# Patient Record
Sex: MALE | Employment: OTHER | URBAN - METROPOLITAN AREA
[De-identification: names, ages, dates, MRNs, and addresses within clinical notes are randomized per-mention and may not be internally consistent; named-entity substitution may affect disease eponyms.]

---

## 2018-02-17 ENCOUNTER — HOSPITAL ENCOUNTER (INPATIENT)
Age: 68
LOS: 6 days | Discharge: NURSING, REHAB OR LONG-TERM CARE | DRG: 248 | End: 2018-02-23
Attending: EMERGENCY MEDICINE | Admitting: FAMILY MEDICINE
Payer: MEDICARE

## 2018-02-17 ENCOUNTER — APPOINTMENT (OUTPATIENT)
Dept: CT IMAGING | Age: 68
DRG: 248 | End: 2018-02-17
Attending: FAMILY MEDICINE
Payer: MEDICARE

## 2018-02-17 ENCOUNTER — APPOINTMENT (OUTPATIENT)
Dept: GENERAL RADIOLOGY | Age: 68
DRG: 248 | End: 2018-02-17
Attending: EMERGENCY MEDICINE
Payer: MEDICARE

## 2018-02-17 DIAGNOSIS — R53.1 WEAKNESS: ICD-10-CM

## 2018-02-17 DIAGNOSIS — R26.9 GAIT ABNORMALITY: ICD-10-CM

## 2018-02-17 DIAGNOSIS — J44.1 COPD EXACERBATION (HCC): Primary | ICD-10-CM

## 2018-02-17 PROBLEM — R06.02 SHORTNESS OF BREATH: Status: ACTIVE | Noted: 2018-02-17

## 2018-02-17 LAB
ALBUMIN SERPL-MCNC: 3.1 G/DL (ref 3.5–5)
ALBUMIN/GLOB SERPL: 0.6 {RATIO} (ref 1.1–2.2)
ALP SERPL-CCNC: 91 U/L (ref 45–117)
ALT SERPL-CCNC: 28 U/L (ref 12–78)
ANION GAP SERPL CALC-SCNC: 11 MMOL/L (ref 5–15)
ARTERIAL PATENCY WRIST A: YES
AST SERPL-CCNC: 64 U/L (ref 15–37)
BASE EXCESS BLD CALC-SCNC: 1 MMOL/L
BASOPHILS # BLD: 0 K/UL (ref 0–0.1)
BASOPHILS NFR BLD: 0 % (ref 0–1)
BDY SITE: ABNORMAL
BILIRUB SERPL-MCNC: 0.9 MG/DL (ref 0.2–1)
BUN SERPL-MCNC: 30 MG/DL (ref 6–20)
BUN/CREAT SERPL: 25 (ref 12–20)
CALCIUM SERPL-MCNC: 9.3 MG/DL (ref 8.5–10.1)
CHLORIDE SERPL-SCNC: 101 MMOL/L (ref 97–108)
CO2 SERPL-SCNC: 28 MMOL/L (ref 21–32)
CREAT SERPL-MCNC: 1.21 MG/DL (ref 0.7–1.3)
D DIMER PPP FEU-MCNC: 1.4 MG/L FEU (ref 0–0.65)
DIFFERENTIAL METHOD BLD: ABNORMAL
EOSINOPHIL # BLD: 0 K/UL (ref 0–0.4)
EOSINOPHIL NFR BLD: 0 % (ref 0–7)
ERYTHROCYTE [DISTWIDTH] IN BLOOD BY AUTOMATED COUNT: 13.8 % (ref 11.5–14.5)
GAS FLOW.O2 O2 DELIVERY SYS: ABNORMAL L/MIN
GAS FLOW.O2 SETTING OXYMISER: 3.5 L/M
GLOBULIN SER CALC-MCNC: 5 G/DL (ref 2–4)
GLUCOSE SERPL-MCNC: 106 MG/DL (ref 65–100)
HCO3 BLD-SCNC: 24.8 MMOL/L (ref 22–26)
HCT VFR BLD AUTO: 47.6 % (ref 36.6–50.3)
HGB BLD-MCNC: 16.2 G/DL (ref 12.1–17)
IMM GRANULOCYTES # BLD: 0.1 K/UL (ref 0–0.04)
IMM GRANULOCYTES NFR BLD AUTO: 1 % (ref 0–0.5)
LYMPHOCYTES # BLD: 1.5 K/UL (ref 0.8–3.5)
LYMPHOCYTES NFR BLD: 9 % (ref 12–49)
MAGNESIUM SERPL-MCNC: 2.1 MG/DL (ref 1.6–2.4)
MCH RBC QN AUTO: 31 PG (ref 26–34)
MCHC RBC AUTO-ENTMCNC: 34 G/DL (ref 30–36.5)
MCV RBC AUTO: 91 FL (ref 80–99)
MONOCYTES # BLD: 1.9 K/UL (ref 0–1)
MONOCYTES NFR BLD: 11 % (ref 5–13)
NEUTS SEG # BLD: 13.4 K/UL (ref 1.8–8)
NEUTS SEG NFR BLD: 79 % (ref 32–75)
NRBC # BLD: 0 K/UL (ref 0–0.01)
NRBC BLD-RTO: 0 PER 100 WBC
PCO2 BLD: 35 MMHG (ref 35–45)
PH BLD: 7.46 [PH] (ref 7.35–7.45)
PLATELET # BLD AUTO: 271 K/UL (ref 150–400)
PMV BLD AUTO: 11 FL (ref 8.9–12.9)
PO2 BLD: 98 MMHG (ref 80–100)
POTASSIUM SERPL-SCNC: 4.2 MMOL/L (ref 3.5–5.1)
PROT SERPL-MCNC: 8.1 G/DL (ref 6.4–8.2)
RBC # BLD AUTO: 5.23 M/UL (ref 4.1–5.7)
SAO2 % BLD: 98 % (ref 92–97)
SODIUM SERPL-SCNC: 140 MMOL/L (ref 136–145)
SPECIMEN TYPE: ABNORMAL
TROPONIN I SERPL-MCNC: 5.35 NG/ML
TROPONIN I SERPL-MCNC: 8.81 NG/ML
WBC # BLD AUTO: 17 K/UL (ref 4.1–11.1)

## 2018-02-17 PROCEDURE — A9270 NON-COVERED ITEM OR SERVICE: HCPCS | Performed by: EMERGENCY MEDICINE

## 2018-02-17 PROCEDURE — 80053 COMPREHEN METABOLIC PANEL: CPT | Performed by: EMERGENCY MEDICINE

## 2018-02-17 PROCEDURE — 74011636637 HC RX REV CODE- 636/637: Performed by: EMERGENCY MEDICINE

## 2018-02-17 PROCEDURE — 94640 AIRWAY INHALATION TREATMENT: CPT

## 2018-02-17 PROCEDURE — 74011000258 HC RX REV CODE- 258: Performed by: EMERGENCY MEDICINE

## 2018-02-17 PROCEDURE — 77030029684 HC NEB SM VOL KT MONA -A

## 2018-02-17 PROCEDURE — 93005 ELECTROCARDIOGRAM TRACING: CPT

## 2018-02-17 PROCEDURE — 99285 EMERGENCY DEPT VISIT HI MDM: CPT

## 2018-02-17 PROCEDURE — 65660000000 HC RM CCU STEPDOWN

## 2018-02-17 PROCEDURE — 74011250636 HC RX REV CODE- 250/636: Performed by: EMERGENCY MEDICINE

## 2018-02-17 PROCEDURE — 85025 COMPLETE CBC W/AUTO DIFF WBC: CPT | Performed by: EMERGENCY MEDICINE

## 2018-02-17 PROCEDURE — 36600 WITHDRAWAL OF ARTERIAL BLOOD: CPT

## 2018-02-17 PROCEDURE — 83735 ASSAY OF MAGNESIUM: CPT | Performed by: EMERGENCY MEDICINE

## 2018-02-17 PROCEDURE — 36415 COLL VENOUS BLD VENIPUNCTURE: CPT | Performed by: EMERGENCY MEDICINE

## 2018-02-17 PROCEDURE — 74011636320 HC RX REV CODE- 636/320: Performed by: EMERGENCY MEDICINE

## 2018-02-17 PROCEDURE — 74011000250 HC RX REV CODE- 250: Performed by: EMERGENCY MEDICINE

## 2018-02-17 PROCEDURE — 84484 ASSAY OF TROPONIN QUANT: CPT | Performed by: EMERGENCY MEDICINE

## 2018-02-17 PROCEDURE — 85379 FIBRIN DEGRADATION QUANT: CPT | Performed by: EMERGENCY MEDICINE

## 2018-02-17 PROCEDURE — 74011000250 HC RX REV CODE- 250: Performed by: FAMILY MEDICINE

## 2018-02-17 PROCEDURE — 74011250637 HC RX REV CODE- 250/637: Performed by: FAMILY MEDICINE

## 2018-02-17 PROCEDURE — 74011000258 HC RX REV CODE- 258: Performed by: FAMILY MEDICINE

## 2018-02-17 PROCEDURE — 74011250636 HC RX REV CODE- 250/636: Performed by: FAMILY MEDICINE

## 2018-02-17 PROCEDURE — 82803 BLOOD GASES ANY COMBINATION: CPT

## 2018-02-17 PROCEDURE — 74011636637 HC RX REV CODE- 636/637: Performed by: FAMILY MEDICINE

## 2018-02-17 PROCEDURE — 71275 CT ANGIOGRAPHY CHEST: CPT

## 2018-02-17 PROCEDURE — 71045 X-RAY EXAM CHEST 1 VIEW: CPT

## 2018-02-17 RX ORDER — SIMVASTATIN 40 MG/1
40 TABLET, FILM COATED ORAL
Status: DISCONTINUED | OUTPATIENT
Start: 2018-02-17 | End: 2018-02-23 | Stop reason: HOSPADM

## 2018-02-17 RX ORDER — ALBUTEROL SULFATE 0.83 MG/ML
0.63 SOLUTION RESPIRATORY (INHALATION)
Status: DISCONTINUED | OUTPATIENT
Start: 2018-02-17 | End: 2018-02-18

## 2018-02-17 RX ORDER — PREDNISONE 20 MG/1
40 TABLET ORAL
Status: COMPLETED | OUTPATIENT
Start: 2018-02-17 | End: 2018-02-21

## 2018-02-17 RX ORDER — ENOXAPARIN SODIUM 100 MG/ML
60 INJECTION SUBCUTANEOUS EVERY 12 HOURS
Status: DISCONTINUED | OUTPATIENT
Start: 2018-02-17 | End: 2018-02-19

## 2018-02-17 RX ORDER — LEVOFLOXACIN 5 MG/ML
500 INJECTION, SOLUTION INTRAVENOUS EVERY 24 HOURS
Status: DISCONTINUED | OUTPATIENT
Start: 2018-02-17 | End: 2018-02-20 | Stop reason: CLARIF

## 2018-02-17 RX ORDER — SODIUM CHLORIDE 0.9 % (FLUSH) 0.9 %
10 SYRINGE (ML) INJECTION
Status: COMPLETED | OUTPATIENT
Start: 2018-02-17 | End: 2018-02-17

## 2018-02-17 RX ORDER — IPRATROPIUM BROMIDE AND ALBUTEROL SULFATE 2.5; .5 MG/3ML; MG/3ML
3 SOLUTION RESPIRATORY (INHALATION)
Status: DISCONTINUED | OUTPATIENT
Start: 2018-02-17 | End: 2018-02-21

## 2018-02-17 RX ORDER — PREDNISONE 20 MG/1
40 TABLET ORAL
Status: COMPLETED | OUTPATIENT
Start: 2018-02-17 | End: 2018-02-17

## 2018-02-17 RX ORDER — OXYCODONE HYDROCHLORIDE 5 MG/1
40 TABLET ORAL
Status: DISCONTINUED | OUTPATIENT
Start: 2018-02-17 | End: 2018-02-23 | Stop reason: HOSPADM

## 2018-02-17 RX ORDER — SODIUM CHLORIDE 0.9 % (FLUSH) 0.9 %
5-10 SYRINGE (ML) INJECTION AS NEEDED
Status: DISCONTINUED | OUTPATIENT
Start: 2018-02-17 | End: 2018-02-23 | Stop reason: HOSPADM

## 2018-02-17 RX ORDER — ALBUTEROL SULFATE 0.83 MG/ML
2.5 SOLUTION RESPIRATORY (INHALATION)
Status: COMPLETED | OUTPATIENT
Start: 2018-02-17 | End: 2018-02-17

## 2018-02-17 RX ORDER — OXYCODONE HYDROCHLORIDE 5 MG/1
40 TABLET ORAL EVERY 8 HOURS
Status: DISCONTINUED | OUTPATIENT
Start: 2018-02-17 | End: 2018-02-20

## 2018-02-17 RX ORDER — SODIUM CHLORIDE 9 MG/ML
100 INJECTION, SOLUTION INTRAVENOUS CONTINUOUS
Status: DISCONTINUED | OUTPATIENT
Start: 2018-02-17 | End: 2018-02-17

## 2018-02-17 RX ORDER — DEXTROSE MONOHYDRATE AND SODIUM CHLORIDE 5; .9 G/100ML; G/100ML
50 INJECTION, SOLUTION INTRAVENOUS CONTINUOUS
Status: DISCONTINUED | OUTPATIENT
Start: 2018-02-17 | End: 2018-02-20

## 2018-02-17 RX ORDER — SODIUM CHLORIDE 0.9 % (FLUSH) 0.9 %
5-10 SYRINGE (ML) INJECTION EVERY 8 HOURS
Status: DISCONTINUED | OUTPATIENT
Start: 2018-02-17 | End: 2018-02-23 | Stop reason: HOSPADM

## 2018-02-17 RX ORDER — BUDESONIDE 0.5 MG/2ML
500 INHALANT ORAL 2 TIMES DAILY
Status: DISCONTINUED | OUTPATIENT
Start: 2018-02-17 | End: 2018-02-20 | Stop reason: SDUPTHER

## 2018-02-17 RX ORDER — ENOXAPARIN SODIUM 100 MG/ML
40 INJECTION SUBCUTANEOUS EVERY 24 HOURS
Status: DISCONTINUED | OUTPATIENT
Start: 2018-02-17 | End: 2018-02-17

## 2018-02-17 RX ADMIN — SIMVASTATIN 40 MG: 40 TABLET, FILM COATED ORAL at 22:51

## 2018-02-17 RX ADMIN — Medication 10 ML: at 17:39

## 2018-02-17 RX ADMIN — Medication 10 ML: at 22:51

## 2018-02-17 RX ADMIN — BUDESONIDE 500 MCG: 0.5 INHALANT RESPIRATORY (INHALATION) at 20:11

## 2018-02-17 RX ADMIN — ALBUTEROL SULFATE 2.5 MG: 2.5 SOLUTION RESPIRATORY (INHALATION) at 16:48

## 2018-02-17 RX ADMIN — ALBUTEROL SULFATE 2.5 MG: 2.5 SOLUTION RESPIRATORY (INHALATION) at 15:09

## 2018-02-17 RX ADMIN — PREDNISONE 40 MG: 20 TABLET ORAL at 15:09

## 2018-02-17 RX ADMIN — ALBUTEROL SULFATE 0.63 MG: 2.5 SOLUTION RESPIRATORY (INHALATION) at 20:11

## 2018-02-17 RX ADMIN — IOPAMIDOL 75 ML: 755 INJECTION, SOLUTION INTRAVENOUS at 17:39

## 2018-02-17 RX ADMIN — LEVOFLOXACIN 500 MG: 5 INJECTION, SOLUTION INTRAVENOUS at 17:52

## 2018-02-17 RX ADMIN — PREDNISONE 40 MG: 20 TABLET ORAL at 19:43

## 2018-02-17 RX ADMIN — SODIUM CHLORIDE 1000 ML: 900 INJECTION, SOLUTION INTRAVENOUS at 16:35

## 2018-02-17 RX ADMIN — SODIUM CHLORIDE 100 ML: 900 INJECTION, SOLUTION INTRAVENOUS at 17:39

## 2018-02-17 RX ADMIN — DEXTROSE MONOHYDRATE AND SODIUM CHLORIDE 100 ML/HR: 5; .9 INJECTION, SOLUTION INTRAVENOUS at 19:00

## 2018-02-17 RX ADMIN — ENOXAPARIN SODIUM 60 MG: 60 INJECTION SUBCUTANEOUS at 19:42

## 2018-02-17 NOTE — H&P
Bailey Rizzo MD  Please call  and page for questions. Call physician on-call through the  7pm-7am      History & Physical    Primary Care Provider: Vidhi Monk MD  Source of Information: Patient, medical record and his sister at the bedside. History of Presenting Illness:   Sean Dhaliwal is a 78 y.o. male with past medical history of lung cancer, COPD, hyperlipidemia, presented to the ED with shortness of breath. He is on home oxygen and who had been diagnosed with lung cancer and had surgical resection, a right lobectomy in 2009. He said he has worsening weakness, SOB, and cough for the past 3 days. Patient said he can not walk few steps without getting SOB. Patient said he coughs all the time but recently it is getting worse. He also mentioned of producing mucus. Pt was seen at [facility] recently and had a negative workup. The patient denies any fever, chills, chest pain, recent illness, palpitations, or dysuria. He denies nausea and vomiting. He denies abdominal pain. He denies any sick contact recently. Patient lives by himself at home. Review of Systems:  A comprehensive review of systems was negative except for that written in the History of Present Illness. Past Medical History:   Diagnosis Date    Asthma     Cancer ([facility] [address]     Lung Cancer    Chronic obstructive pulmonary disease (HCC)     Chronic pain     High cholesterol     Lung cancer (HCC)       Past Surgical History:   Procedure Laterality Date    HX LOBECTOMY      Right lung    HX OTHER SURGICAL      Partial right lung removal     Prior to Admission medications    Medication Sig Start Date End Date Taking? Authorizing Provider   albuterol (PROVENTIL VENTOLIN) 2.5 mg /3 mL (0.083 %) nebulizer solution 3 mL by Nebulization route every two (2) hours as needed for Wheezing. 10/8/15   Aileen Montejo MD   simvastatin (ZOCOR) 40 mg tablet Take 40 mg by mouth nightly.     Historical Provider   oxyCODONE IR (ROXICODONE) 20 mg immediate release tablet Take 40 mg by mouth three (3) times daily as needed for Pain. Historical Provider   albuterol-ipratropium (DUO-NEB) 2.5 mg-0.5 mg/3 ml nebulizer solution 3 mL by Nebulization route every six (6) hours as needed for Wheezing. 9/16/15   Yady Astudillo MD   budesonide (PULMICORT) 0.5 mg/2 mL nebulizer suspension 2 mL by Nebulization route two (2) times a day. 9/16/15   Yady Astudillo MD   albuterol (ACCUNEB) 0.63 mg/3 mL nebulizer solution 3 mL by Nebulization route every six (6) hours as needed for Wheezing. 8/13/15   Solo Rothman MD   oxyCODONE IR (ROXICODONE) 20 mg immediate release tablet Take 40 mg by mouth every eight (8) hours. Historical Provider     No Known Allergies   No family history on file. SOCIAL HISTORY:  Patient resides:  Independently x   Assisted Living    SNF    With family care       Smoking history:   None    Former x   Chronic      Alcohol history:   None x   Social    Chronic      Ambulates:   Independently x   w/cane    w/walker    w/wc    CODE STATUS:  DNR    Full x   Other      Objective:     Physical Exam:     Visit Vitals    /74    Pulse (!) 118    Resp 21    SpO2 94%    O2 Flow Rate (L/min): 3 l/min O2 Device: Nasal cannula    General:  Alert, cooperative, no distress, appears stated age. Head:  Normocephalic, without obvious abnormality, atraumatic. Eyes:  Conjunctivae/corneas clear. PERRL, EOMs intact. Nose: Nares normal. Septum midline. Mucosa normal. No drainage or sinus tenderness. Neck: Supple, symmetrical, trachea midline, no adenopathy, thyroid: no enlargement/tenderness/nodules, no carotid bruit and no JVD. Lungs:   Clear to auscultation bilaterally. Decrease breath sound on right side. Chest wall:  No tenderness or deformity. Heart:  Regular rate and rhythm, S1, S2 normal, no murmur, click, rub or gallop. Abdomen:   Soft, non-tender.  Bowel sounds normal. No masses,  No organomegaly. Extremities: Extremities normal, atraumatic, no cyanosis or edema. Skin: Skin color, texture, turgor normal. No rashes or lesions   Neurologic: CNII-XII intact. EKG:  Sinus tachycardia. Data Review:     Recent Days:  Recent Labs      02/17/18   1502   WBC  17.0*   HGB  16.2   HCT  47.6   PLT  271     Recent Labs      02/17/18   1502   NA  140   K  4.2   CL  101   CO2  28   GLU  106*   BUN  30*   CREA  1.21   CA  9.3   MG  2.1   ALB  3.1*   SGOT  64*   ALT  28     No results for input(s): PH, PCO2, PO2, HCO3, FIO2 in the last 72 hours.     24 Hour Results:  Recent Results (from the past 24 hour(s))   EKG, 12 LEAD, INITIAL    Collection Time: 02/17/18  2:52 PM   Result Value Ref Range    Ventricular Rate 120 BPM    Atrial Rate 120 BPM    P-R Interval 124 ms    QRS Duration 72 ms    Q-T Interval 330 ms    QTC Calculation (Bezet) 466 ms    Calculated P Axis 84 degrees    Calculated R Axis 50 degrees    Calculated T Axis -112 degrees    Diagnosis       Sinus tachycardia  Biatrial enlargement  Low voltage QRS  ST & T wave abnormality, consider inferolateral ischemia  When compared with ECG of 28-NOV-2016 15:17,  ST elevation now present in Anterior leads  T wave inversion now evident in Inferior leads  T wave inversion more evident in Anterolateral leads     MAGNESIUM    Collection Time: 02/17/18  3:02 PM   Result Value Ref Range    Magnesium 2.1 1.6 - 2.4 mg/dL   METABOLIC PANEL, COMPREHENSIVE    Collection Time: 02/17/18  3:02 PM   Result Value Ref Range    Sodium 140 136 - 145 mmol/L    Potassium 4.2 3.5 - 5.1 mmol/L    Chloride 101 97 - 108 mmol/L    CO2 28 21 - 32 mmol/L    Anion gap 11 5 - 15 mmol/L    Glucose 106 (H) 65 - 100 mg/dL    BUN 30 (H) 6 - 20 MG/DL    Creatinine 1.21 0.70 - 1.30 MG/DL    BUN/Creatinine ratio 25 (H) 12 - 20      GFR est AA >60 >60 ml/min/1.73m2    GFR est non-AA >60 >60 ml/min/1.73m2    Calcium 9.3 8.5 - 10.1 MG/DL    Bilirubin, total 0.9 0.2 - 1.0 MG/DL    ALT (SGPT) 28 12 - 78 U/L    AST (SGOT) 64 (H) 15 - 37 U/L    Alk. phosphatase 91 45 - 117 U/L    Protein, total 8.1 6.4 - 8.2 g/dL    Albumin 3.1 (L) 3.5 - 5.0 g/dL    Globulin 5.0 (H) 2.0 - 4.0 g/dL    A-G Ratio 0.6 (L) 1.1 - 2.2     CBC WITH AUTOMATED DIFF    Collection Time: 02/17/18  3:02 PM   Result Value Ref Range    WBC 17.0 (H) 4.1 - 11.1 K/uL    RBC 5.23 4. 10 - 5.70 M/uL    HGB 16.2 12.1 - 17.0 g/dL    HCT 47.6 36.6 - 50.3 %    MCV 91.0 80.0 - 99.0 FL    MCH 31.0 26.0 - 34.0 PG    MCHC 34.0 30.0 - 36.5 g/dL    RDW 13.8 11.5 - 14.5 %    PLATELET 484 236 - 444 K/uL    MPV 11.0 8.9 - 12.9 FL    NRBC 0.0 0  WBC    ABSOLUTE NRBC 0.00 0.00 - 0.01 K/uL    NEUTROPHILS 79 (H) 32 - 75 %    LYMPHOCYTES 9 (L) 12 - 49 %    MONOCYTES 11 5 - 13 %    EOSINOPHILS 0 0 - 7 %    BASOPHILS 0 0 - 1 %    IMMATURE GRANULOCYTES 1 (H) 0.0 - 0.5 %    ABS. NEUTROPHILS 13.4 (H) 1.8 - 8.0 K/UL    ABS. LYMPHOCYTES 1.5 0.8 - 3.5 K/UL    ABS. MONOCYTES 1.9 (H) 0.0 - 1.0 K/UL    ABS. EOSINOPHILS 0.0 0.0 - 0.4 K/UL    ABS. BASOPHILS 0.0 0.0 - 0.1 K/UL    ABS. IMM. GRANS. 0.1 (H) 0.00 - 0.04 K/UL    DF AUTOMATED     D DIMER    Collection Time: 02/17/18  3:02 PM   Result Value Ref Range    D-dimer 1.40 (H) 0.00 - 0.65 mg/L FEU         Imaging:     Assessment and Plan:       SOB:   Possibly COPD exacerbation as patient was wheezing at the time of presentation. Will do bronchodilator Neb, PO steroid and monitor. Patient has elevated D dimer so CTA of chest was ordered. Patient troponin has been significantly increased. Patient denied chest pain at this time. Will admit to telemetry, will do serial cardiac enzymes. Will get TTE. Cardiology has been consulted. Will do Lovenox until we R/O the ACS. Patient has significant SOB. Leukocytosis: Probably PNA. Will start with Levaquin. HLD: Will continue his statin.         See orders for other plans:   VTE prophylaxis: Lovenox  Code status: Full  Discussed plan of care with Patient/Family and Nurse. Patient sister was at the bedside. Pre-admission lived at home:   Disposition: To telemetry. Discharge planning: pending.            Signed By: Ann-Marie Busch MD     February 17, 2018

## 2018-02-17 NOTE — ED TRIAGE NOTES
Pt alert and oriented, coming from home. Pt reporting shortness of breath since this morning. Pt states that he is normally on 3L nc due to hx of right lower lobectomy post cancer. Pt states that he has increasingly become short of breath and more weak, losing more than 20 pounds recently. Pt found to be with oxygenation at 90% with home oxygen. Pt placed on nonrebreather at 12lpm once placed in room.

## 2018-02-17 NOTE — PROGRESS NOTES
02/17/18 1655   Oxygen Therapy   O2 Sat (%) 95 %   Pulse via Oximetry 118 beats per minute   O2 Device Nasal cannula   O2 Flow Rate (L/min) 3 l/min   Patient taken off of non-rebreather mask

## 2018-02-17 NOTE — IP AVS SNAPSHOT
[address]00 [facility] [address] [place] [address] 
[phone] Patient: Charity Davidson MRN: [id number] :1950 About your hospitalization You were admitted on:  2018 You last received care in the:  [facility] 4 [facility] 2 You were discharged on:  2018 Why you were hospitalized Your primary diagnosis was:  Systolic Chf, Acute (Hcc) Your diagnoses also included:  Copd Exacerbation (Hcc), Shortness Of Breath, Nstemi (Non-St Elevated Myocardial Infarction) (Hcc) Follow-up Information Follow up With Details Comments Contact Info Aisha Burroughs MD In 1 week  [facility] [address] 
[phone] Herminia Camilo MD In 3 weeks  1808 [facility] [address] [facility] [address] 
[phone] [facility] Provider In 1 week Tod Turner MD On 3/13/2018 Hospital follow up PCP appointment from [address] [place] [address] on Tuesday, 3/13/18 @ 10:20 a.m. Patient needs to provide a listing of all medications including over the counter. [place] CERDA [place] [address] [address] [facility] 7 [address] [phone] Discharge Orders None A check marcin indicates which time of day the medication should be taken. My Medications START taking these medications Instructions Each Dose to Equal  
 Morning Noon Evening Bedtime  
 aspirin delayed-release 81 mg tablet Your last dose was: Your next dose is: Take 1 Tab by mouth daily. 81 mg  
    
   
   
   
  
 clopidogrel 75 mg Tab Commonly known as:  PLAVIX Your last dose was: Your next dose is: Take 1 Tab by mouth daily. 75 mg  
    
   
   
   
  
 docusate sodium 100 mg capsule Commonly known as:  Sanaz Parra Your last dose was: Your next dose is: Take 1 Cap by mouth daily for 30 doses.   
 100 mg  
    
   
   
   
  
 fluticasone-salmeterol 500-50 mcg/dose diskus inhaler Commonly known as:  ADVAIR DISKUS Your last dose was: Your next dose is: Take 1 Puff by inhalation every twelve (12) hours. 1 Puff  
    
   
   
   
  
 levoFLOXacin 500 mg tablet Commonly known as:  Mare Wang Your last dose was: Your next dose is: Take 1 Tab by mouth every twenty-four (24) hours for 4 days. 500 mg  
    
   
   
   
  
 tiotropium 18 mcg inhalation capsule Commonly known as:  [address] [place] [address] Dionne Monique Your last dose was: Your next dose is: Take 1 Cap by inhalation daily. 1 Cap CHANGE how you take these medications Instructions Each Dose to Equal  
 Morning Noon Evening Bedtime  
 albuterol-ipratropium 2.5 mg-0.5 mg/3 ml Nebu Commonly known as:  Leland Camarena What changed:   
- when to take this 
- reasons to take this Your last dose was: Your next dose is:    
   
   
 3 mL by Nebulization route every four (4) hours as needed. 3 mL CONTINUE taking these medications Instructions Each Dose to Equal  
 Morning Noon Evening Bedtime  
 simvastatin 40 mg tablet Commonly known as:  ZOCOR Your last dose was: Your next dose is: Take 40 mg by mouth nightly. 40 mg  
    
   
   
   
  
  
STOP taking these medications   
 albuterol 0.63 mg/3 mL nebulizer solution Commonly known as:  ACCUNEB  
   
  
 albuterol 2.5 mg /3 mL (0.083 %) nebulizer solution Commonly known as:  PROVENTIL VENTOLIN  
   
  
 budesonide 0.5 mg/2 mL Nbsp Commonly known as:  PULMICORT  
   
  
 oxyCODONE IR 20 mg immediate release tablet Commonly known as:  Gayatri Loop Where to Get Your Medications Information on where to get these meds will be given to you by the nurse or doctor. ! Ask your nurse or doctor about these medications albuterol-ipratropium 2.5 mg-0.5 mg/3 ml Nebu  
 aspirin delayed-release 81 mg tablet  
 clopidogrel 75 mg Tab  
 docusate sodium 100 mg capsule  
 fluticasone-salmeterol 500-50 mcg/dose diskus inhaler  
 levoFLOXacin 500 mg tablet  
 tiotropium 18 mcg inhalation capsule Discharge Instructions Discharge Summary  
  
PATIENT ID: Marguerite Sheikh MRN: [id number] YOB: 1950 DATE OF ADMISSION: 2/17/2018  2:37 PM   
DATE OF DISCHARGE: 2/23/2018 PRIMARY CARE PROVIDER: [facility] physician ATTENDING PHYSICIAN: Conor Jacinto MD 
DISCHARGING PROVIDER: Yonatan Hodgson NP. To contact this individual call [phone] and ask the  to page. If unavailable ask to be transferred the Adult Hospitalist Department. 
  
CONSULTATIONS: IP CONSULT TO CARDIOLOGY 
IP CONSULT TO PULMONOLOGY 
  
ADMITTING [address]:  
Pt presented to the ED with shortness of breath, cough and worsening weakness for the past 3 days. Reported he could not walk few steps without getting SOB. Has a chronic cough but recently it is getting worse and is productive.  
   
Pt was seen at [facility] recently and had a negative workup.  
  
DISCHARGE DIAGNOSES / PLAN:   
  
Hypotension:  
- all cardiac medications stopped  
- gave a total of ~ 1 L fluids yesterday 
- chest xray 2/23: results pending. Imaging reviewed.  
   
NSTEMI (likely predating admit): 
- denied chest pain at any point prior to or during admit 
- serial cardiac enzymes: peak of 8.81 ->5.35 -> 3.75-> 1.51 
- FLP reviewed: Chol 121 Hdl 43 Ldl 59.4 (on statin) - s/p cardiac cath 2/19: stents placed. Significant 2 vessel CAD with 90% lesion in large mid-diagonal and short  of mid-cx. Dilated LV with severe hypokinesis of apical and anteroapical walls. EF 30% via cardiac cath. - continue with ASA/plavix 
- stopped BB and ACEi due to BP intolerance - Pt will need Plavix x 1 month - He has Bare Metal Stents - will need to follow up with Cardiologist outpatient, Dr. Halley Olivas 
Pneumonia (POA): 
- CTA of chest: Chronic lung disease, LLL infiltrate (which was present in 2016) - continue with levryder pulmonary recommending 10 days (today is day 7) - continue nebs; spiriva and advair on discharge 
- will need repeat CT in 4-6 weeks and PFTS with pulmonary follow up 
- remains afebrile - leukocytosis could be due to steroids (which are now done). Will suggest checking CBC in ~ 1 week. 
   
COPD exacerbation due to above:   
- wheezing at the time of presentation in ED but this has resolved, lungs diminished 
- on nebs/abx  
- received po steroids x 5 doses - elevated D dimer but no pulmonary embolus seen on Chest CTA 
   
Severe protein-calorie malnutrition: - Underweight with BMI 17.62 
- albumin 2.4 
- poor-fair appetite 
- supplements BID on discharge 
   
Hx Chronic Respiratory Failure:  
- hx of lung cancer with lobectomy 2009 
- on home oxygen (3 L NC at max and does not wear continuously) 
   
Hx Hyperlipidemia: continue statin.   
  
FOLLOW UP APPOINTMENTS:   
Follow-up Information Follow up With Details Comments Contact Info  
  Karen Dawson MD In 1 week   200 [facility] [address] [facility] 7 [phone] Son Leary MD In 3 weeks   1808 [facility] [address] Devon Gabby BL[place] 55. 
[phone]  
  [facility] Provider In 1 week      
  
  
ADDITIONAL CARE RECOMMENDATIONS: 
- will need repeat CT scan in 4-6 weeks and follow up PFTs with pulmonologist 
- will need to see cardiologist within the week 
  
Blood Pressure daily, keep record for cardiology review 2-3 L Oxygen via NC 
  
Check CBC in 1 week 
  
DIET: Cardiac Diet Oral Nutritional Supplements: Ensure EnliveTwice daily 
  
ACTIVITY: PT/OT Eval and Treat 
  
DISCHARGE MEDICATIONS: 
     
Current Discharge Medication List  
   
     
START taking these medications  
  Details levoFLOXacin (LEVAQUIN) 500 mg tablet Take 1 Tab by mouth every twenty-four (24) hours for 4 days. Qty: 4 Tab, Refills: 0  
   
fluticasone-salmeterol (ADVAIR DISKUS) 500-50 mcg/dose diskus inhaler Take 1 Puff by inhalation every twelve (12) hours. Qty: 1 Inhaler, Refills: 1  
   
tiotropium (SPIRIVA WITH HANDIHALER) 18 mcg inhalation capsule Take 1 Cap by inhalation daily. Qty: 30 Cap, Refills: 1  
   
aspirin delayed-release 81 mg tablet Take 1 Tab by mouth daily. Qty: 30 Tab, Refills: 0  
   
clopidogrel (PLAVIX) 75 mg tab Take 1 Tab by mouth daily. Qty: 30 Tab, Refills: 0  
   
docusate sodium (COLACE) 100 mg capsule Take 1 Cap by mouth daily for 30 doses. Qty: 30 Cap, Refills: 0  
   
   
     
CONTINUE these medications which have CHANGED  
  Details  
albuterol-ipratropium (DUO-NEB) 2.5 mg-0.5 mg/3 ml nebu 3 mL by Nebulization route every four (4) hours as needed. Qty: 30 Nebule, Refills: 0  
   
   
     
CONTINUE these medications which have NOT CHANGED  
  Details  
simvastatin (ZOCOR) 40 mg tablet Take 40 mg by mouth nightly.  
   
   
    
STOP taking these medications  
   
  albuterol (PROVENTIL VENTOLIN) 2.5 mg /3 mL (0.083 %) nebulizer solution Comments:  
Reason for Stopping:   
     
  oxyCODONE IR (ROXICODONE) 20 mg immediate release tablet Comments:  
Reason for Stopping:   
     
  budesonide (PULMICORT) 0.5 mg/2 mL nebulizer suspension Comments:  
Reason for Stopping:   
     
  albuterol (ACCUNEB) 0.63 mg/3 mL nebulizer solution Comments:  
Reason for Stopping:   
     
  oxyCODONE IR (ROXICODONE) 20 mg immediate release tablet Comments:  
Reason for Stopping:   
     
   
  
NOTIFY YOUR PHYSICIAN FOR ANY OF THE FOLLOWING:  
Fever over 101 degrees for 24 hours. Chest pain, shortness of breath, fever, chills, nausea, vomiting, diarrhea, change in mentation, falling, weakness, bleeding. Severe pain or pain not relieved by medications. Or, any other signs or symptoms that you may have questions about. 
  
DISPOSITION: 
   Home With: 
  OT   PT   HH   RN  
  
xx Long term SNF/Inpatient Rehab  
  Independent/assisted living  
  Hospice  
  Other:  
  
PATIENT CONDITION AT DISCHARGE:  
Functional status  
  Poor   
xx Deconditioned   
  Independent   
  
Cognition  
xx  Lucid   
  Forgetful   
  Dementia   
  
Catheters/lines (plus indication)  
  Burch   
  PICC   
  PEG   
xx None   
  
Code status  
xx  Full code   
  DNR   
  
PHYSICAL EXAMINATION AT DISCHARGE: 
/66 (BP 1 Location: Left arm, BP Patient Position: At rest)  Pulse 95  Temp 97.9 °F (36.6 °C)  Resp 18  Ht 5' 11\" (1.803 m)  Wt 57.6 kg (126 lb 15.8 oz)  SpO2 97%  BMI 17.71 kg/m2 
  
Pt in bed, no new complaints. BPs are better - plans for discharge today to [facility]. 
  
Constitutional:  No acute distress, cooperative, pleasant. Very thin/emaciated appearance.   
ENT:  Oral mucous membranes dry. Resp:  Diminished but no wheezing/rhonchi/rales. On 2-3 L NC.  
CV:  Regular rhythm, normal rate, no murmurs. SR on tele review.   
 GI:  Soft, non distended, non tender. Normoactive bowel sounds.  
 Musculoskeletal:  No edema, warm, 2+ pulses throughout.  
 Neurologic:  Moves all extremities. [id number] Susy 
CHRONIC MEDICAL DIAGNOSES: 
Problem List as of 2/23/2018  Date Reviewed: 2/23/2018  
          Codes Class Noted - Resolved  
  * (Principal)Systolic [facility], acute ([facility] ICD-10-CM: I50.21 ICD-9-CM: 428.21, 428.0   2/22/2018 - Present  
     
  Shortness of breath ICD-10-CM: R06.02 
ICD-9-CM: 786.05   2/17/2018 - Present  
     
  COPD exacerbation (HCC) ICD-10-CM: J44.1 ICD-9-CM: 491.21   8/10/2015 - Present  
     
  RESOLVED: NSTEMI (non-ST elevated myocardial infarction) ([facility].) ICD-10-CM: I21.4 ICD-9-CM: 410.70   2/22/2018 - 2/22/2018  
     
  RESOLVED: COPD exacerbation (HCC) (Chronic) ICD-10-CM: J44.1 ICD-9-CM: 491.21   9/5/2015 - 2/22/2018  
     
  
  
 Greater than 30 minutes were spent with the patient on counseling and coordination of care 
  
Signed:  
Manisha Hu NP 
2/23/2018 
7:53 AM 
  
 
 
  
  
  
C[employer] Announcement We are excited to announce that we are making your provider's discharge notes available to you in Cymphonix. You will see these notes when they are completed and signed by the physician that discharged you from your recent hospital stay. If you have any questions or concerns about any information you see in Cymphonix, please call the Health Information Department where you were seen or reach out to your Primary Care Provider for more information about your plan of care. Introducing [facility] SERVICES! Dear Felisa Moore: Thank you for requesting a Cymph[employer]ix account. Our records indicate that you already have an active C[employer] account. You can access your account anytime at [email or web address] Did you know that you can access your hospital and ER discharge instructions at any time in Cymphonix? You can also review all of your test results from your hospital stay or ER visit. Additional Information If you have questions, please visit the Frequently Asked Questions section of the C[employer]ix website at [email or web address] Remember, Cymphonix is NOT to be used for urgent needs. For medical emergencies, dial 911. Now available from your iPhone and Android! Providers Seen During Your Hospitalization Provider Specialty Primary office phone Emir Washington MD Emergency Medicine [phone] Hailey Wilcox MD [facility] [phone] Vashti Morgan MD Hospitalist [phone] Zenobia Flaherty MD Hospitalist [phone] Your Primary Care Physician (PCP) Primary Care Physician Office Phone Office Fax OTHER, PHYS ** None ** ** None ** You are allergic to the following No active allergies Recent Documentation Height Weight BMI Smoking Status 1.803 m 57.6 kg 17.71 kg/m2 Former Smoker Emergency Contacts Name Discharge Info Relation Home Work Mobile Kimi Fallon DISCHARGE CAREGIVER [3] Life Partner [7] [phone] Vitaly Feliciano  Sister [23] [phone] Patient Belongings The following personal items are in your possession at time of discharge: 
  Dental Appliances: None  Visual Aid: None      Home Medications: None   Jewelry: None  Clothing: Pants, With patient    Other Valuables: None Discharge Instructions Attachments/References HEART FAILURE: AVOIDING TRIGGERS (ENGLISH) Patient Handouts Avoiding Triggers With Heart Failure: Care Instructions Your Care Instructions Triggers are anything that make your heart failure flare up. A flare-up is also called \"sudden heart failure\" or \"acute heart failure. \" When you have a flare-up, fluid builds up in your lungs, and you have problems breathing. You might need to go to the hospital. By watching for changes in your condition and avoiding triggers, you can prevent heart failure flare-ups. Follow-up care is a key part of your treatment and safety. Be sure to make and go to all appointments, and call your doctor if you are having problems. It's also a good idea to know your test results and keep a list of the medicines you take. How can you care for yourself at home? Watch for changes in your weight and condition · Weigh yourself without clothing at the same time each day. Record your weight. Call your doctor if you have sudden weight gain, such as more than 2 to 3 pounds in a day or 5 pounds in a week. (Your doctor may suggest a different range of weight gain.) A sudden weight gain may mean that your heart failure is getting worse. · Keep a daily record of your symptoms. Write down any changes in how you feel, such as new shortness of breath, cough, or problems eating.  Also record if your ankles are more swollen than usual and if you feel more tired than usual. Note anything that you ate or did that could have triggered these changes. Limit sodium Sodium causes your body to hold on to extra water. This may cause your heart failure symptoms to get worse. People get most of their sodium from processed foods. Fast food and restaurant meals also tend to be very high in sodium. · Your doctor may suggest that you limit sodium to 2,000 milligrams (mg) a day or less. That is less than 1 teaspoon of salt a day, including all the salt you eat in cooking or in packaged foods. · Read food labels on cans and food packages. They tell you how much sodium you get in one serving. Check the serving size. If you eat more than one serving, you are getting more sodium. · Be aware that sodium can come in forms other than salt, including monosodium glutamate (MSG), sodium citrate, and sodium bicarbonate (baking soda). MSG is often added to Asian food. You can sometimes ask for food without MSG or salt. · Slowly reducing salt will help you adjust to the taste. Take the salt shaker off the table. · Flavor your food with garlic, lemon juice, onion, vinegar, herbs, and spices instead of salt. Do not use soy sauce, steak sauce, onion salt, garlic salt, mustard, or ketchup on your food, unless it is labeled \"low-sodium\" or \"low-salt. \" 
· Make your own salad dressings, sauces, and ketchup without adding salt. · Use fresh or frozen ingredients, instead of canned ones, whenever you can. Choose low-sodium canned goods. · Eat less processed food and food from restaurants, including fast food. Exercise as directed Moderate, regular exercise is very good for your heart. It improves your blood flow and helps control your weight. But too much exercise can stress your heart and cause a heart failure flare-up.  
· Check with your doctor before you start an exercise program. 
 · Walking is an easy way to get exercise. Start out slowly. Gradually increase the length and pace of your walk. Swimming, riding a bike, and using a treadmill are also good forms of exercise. · When you exercise, watch for signs that your heart is working too hard. You are pushing yourself too hard if you cannot talk while you are exercising. If you become short of breath or dizzy or have chest pain, stop, sit down, and rest. 
· Do not exercise when you do not feel well. Take medicines correctly · Take your medicines exactly as prescribed. Call your doctor if you think you are having a problem with your medicine. · Make a list of all the medicines you take. Include those prescribed to you by other doctors and any over-the-counter medicines, vitamins, or supplements you take. Take this list with you when you go to any doctor. · Take your medicines at the same time every day. It may help you to post a list of all the medicines you take every day and what time of day you take them. · Make taking your medicine as simple as you can. Plan times to take your medicines when you are doing other things, such as eating a meal or getting ready for bed. This will make it easier to remember to take your medicines. · Get organized. Use helpful tools, such as daily or weekly pill containers. When should you call for help? Call 911 if you have symptoms of sudden heart failure such as: 
? · You have severe trouble breathing. ? · You cough up pink, foamy mucus. ? · You have a new irregular or rapid heartbeat. ?Call your doctor now or seek immediate medical care if: 
? · You have new or increased shortness of breath. ? · You are dizzy or lightheaded, or you feel like you may faint. ? · You have sudden weight gain, such as more than 2 to 3 pounds in a day or 5 pounds in a week. (Your doctor may suggest a different range of weight gain.) ? · You have increased swelling in your legs, ankles, or feet. ? · You are suddenly so tired or weak that you cannot do your usual activities. ? Watch closely for changes in your health, and be sure to contact your doctor if you develop new symptoms. Where can you learn more? Go to http://brando-adalid.info/. Enter B880 in the search box to learn more about \"Avoiding Triggers With Heart Failure: Care Instructions. \" Current as of: September 21, 2016 Content Version: 11.4 © 20066968-3424 [employer]wise[employer]. Care instructions adapted under license by [employer] (which disclaims liability or warranty for this information). If you have questions about a medical condition or this instruction, always ask your healthcare professional. [address] any warranty or liability for your use of this information. Please provide this summary of care documentation to your next provider. Signatures-by signing, you are acknowledging that this After Visit Summary has been reviewed with you and you have received a copy. Patient Signature:  ____________________________________________________________ Date:  ____________________________________________________________  
  
[facility] Provider Signature:  ____________________________________________________________ Date:  ____________________________________________________________

## 2018-02-17 NOTE — ED PROVIDER NOTES
HPI Comments: 78 y.o. male with past medical history significant for lung cancer, COPD, asthma, chronic pain who presents from home via EMS with chief complaint of shortness of breath. Pt reports worsening weakness, SOB, and cough for the past 3 days. Pt reports that his cough is productive for clear mucus. Pt has breathing treatments at home, but denies using them PTA. [place] [facility] [place]. Pt has hx of similar sx 3 years ago and was admitted for a COPD exacerbation. Denies fever, chills, nausea, vomiting, diarrhea, sore throat. There are no other acute medical concerns at this time. Social hx: former tobacco smoker (quit 2009), +rare EtOH consumption    Note written by Nathalia Do, as dictated by Jem Mccoy MD 2:51 PM      The history is provided by the patient. No  was used. Past Medical History:   Diagnosis Date    Asthma     Cancer [facility])     Lung Cancer    Chronic obstructive pulmonary disease (HCC)     Chronic pain     High cholesterol     Lung cancer (HCC)        Past Surgical History:   Procedure Laterality Date    HX LOBECTOMY      Right lung    HX OTHER SURGICAL      Partial right lung removal         No family history on file. Social History     Social History    Marital status: LIFE PARTNER     Spouse name: N/A    Number of children: N/A    Years of education: N/A     Occupational History    Not on file. Social History Main Topics    Smoking status: Former Smoker    Smokeless tobacco: Not on file    Alcohol use No      Comment: rarely/social     Drug use: No    Sexual activity: Not on file     Other Topics Concern    Not on file     Social History Narrative    ** Merged History Encounter **              ALLERGIES: Review of patient's allergies indicates no known allergies. Review of Systems   Constitutional: Negative for appetite change, chills and fever.    HENT: Negative for rhinorrhea, sore throat and trouble swallowing. Eyes: Negative for photophobia. Respiratory: Positive for cough and shortness of breath. Cardiovascular: Negative for chest pain and palpitations. Gastrointestinal: Negative for abdominal pain, nausea and vomiting. Genitourinary: Negative for dysuria, frequency and hematuria. Musculoskeletal: Negative for arthralgias. Neurological: Positive for weakness. Negative for dizziness and syncope. Psychiatric/Behavioral: Negative for behavioral problems. The patient is not nervous/anxious. All other systems reviewed and are negative. There were no vitals filed for this visit. Physical Exam   Constitutional: He appears well-developed and well-nourished. Cachectic. HENT:   Head: Normocephalic and atraumatic. Mouth/Throat: Oropharynx is clear and moist.   Eyes: EOM are normal. Pupils are equal, round, and reactive to light. Neck: Normal range of motion. Neck supple. Cardiovascular: Normal rate, regular rhythm, normal heart sounds and intact distal pulses. Exam reveals no gallop and no friction rub. No murmur heard. . Pulmonary/Chest: Effort normal. He has no wheezes. He has no rales. Moderately SOB at rest. Bilaterally decreased breath sounds. Abdominal: Soft. There is no tenderness. There is no rebound. Musculoskeletal: Normal range of motion. He exhibits no tenderness. Neurological: He is alert. No cranial nerve deficit. Motor; symmetric   Skin: No erythema. Psychiatric: He has a normal mood and affect. His behavior is normal.   Nursing note and vitals reviewed. Note written by Nathalia Do, as dictated by Jem Mccoy MD 3:04 PM        [facility]      ED Course       Procedures                       CONSULT NOTE:  Spoke to Dr Jona Bassett  concerning the patient. The patient's history, presentation, physical findings, and results were all discussed. 4:33 PM  ED EKG interpretation:  Rhythm: sinus tachycardia; and regular .  Rate (approx.): 120; Axis: normal; P wave: normal; QRS interval: normal ; ST/T wave: non-specific changes; in  Lead: ; Other findings: . This EKG was interpreted by Don Martinez MD,ED Provider.  4:38 PM

## 2018-02-17 NOTE — ED NOTES
Pt alert and oriented, pt transported on stretcher with medic. Transported with monitor and o2.   Report called to floor

## 2018-02-18 LAB
ALBUMIN SERPL-MCNC: 2.4 G/DL (ref 3.5–5)
ALBUMIN/GLOB SERPL: 0.5 {RATIO} (ref 1.1–2.2)
ALP SERPL-CCNC: 76 U/L (ref 45–117)
ALT SERPL-CCNC: 23 U/L (ref 12–78)
ANION GAP SERPL CALC-SCNC: 8 MMOL/L (ref 5–15)
AST SERPL-CCNC: 36 U/L (ref 15–37)
ATRIAL RATE: 120 BPM
ATRIAL RATE: 90 BPM
BASOPHILS # BLD: 0 K/UL (ref 0–0.1)
BASOPHILS NFR BLD: 0 % (ref 0–1)
BILIRUB SERPL-MCNC: 0.5 MG/DL (ref 0.2–1)
BNP SERPL-MCNC: 9946 PG/ML (ref 0–125)
BUN SERPL-MCNC: 24 MG/DL (ref 6–20)
BUN/CREAT SERPL: 24 (ref 12–20)
CALCIUM SERPL-MCNC: 8.8 MG/DL (ref 8.5–10.1)
CALCULATED P AXIS, ECG09: 80 DEGREES
CALCULATED P AXIS, ECG09: 84 DEGREES
CALCULATED R AXIS, ECG10: 50 DEGREES
CALCULATED R AXIS, ECG10: 67 DEGREES
CALCULATED T AXIS, ECG11: -102 DEGREES
CALCULATED T AXIS, ECG11: -112 DEGREES
CHLORIDE SERPL-SCNC: 104 MMOL/L (ref 97–108)
CO2 SERPL-SCNC: 26 MMOL/L (ref 21–32)
CREAT SERPL-MCNC: 0.99 MG/DL (ref 0.7–1.3)
DIAGNOSIS, 93000: NORMAL
DIAGNOSIS, 93000: NORMAL
DIFFERENTIAL METHOD BLD: ABNORMAL
EOSINOPHIL # BLD: 0 K/UL (ref 0–0.4)
EOSINOPHIL NFR BLD: 0 % (ref 0–7)
ERYTHROCYTE [DISTWIDTH] IN BLOOD BY AUTOMATED COUNT: 13.5 % (ref 11.5–14.5)
GLOBULIN SER CALC-MCNC: 4.4 G/DL (ref 2–4)
GLUCOSE SERPL-MCNC: 141 MG/DL (ref 65–100)
HCT VFR BLD AUTO: 41.2 % (ref 36.6–50.3)
HGB BLD-MCNC: 13.8 G/DL (ref 12.1–17)
IMM GRANULOCYTES # BLD: 0.1 K/UL (ref 0–0.04)
IMM GRANULOCYTES NFR BLD AUTO: 1 % (ref 0–0.5)
LYMPHOCYTES # BLD: 0.9 K/UL (ref 0.8–3.5)
LYMPHOCYTES NFR BLD: 8 % (ref 12–49)
MCH RBC QN AUTO: 30.3 PG (ref 26–34)
MCHC RBC AUTO-ENTMCNC: 33.5 G/DL (ref 30–36.5)
MCV RBC AUTO: 90.5 FL (ref 80–99)
MONOCYTES # BLD: 0.9 K/UL (ref 0–1)
MONOCYTES NFR BLD: 8 % (ref 5–13)
NEUTS SEG # BLD: 10.2 K/UL (ref 1.8–8)
NEUTS SEG NFR BLD: 84 % (ref 32–75)
NRBC # BLD: 0 K/UL (ref 0–0.01)
NRBC BLD-RTO: 0 PER 100 WBC
P-R INTERVAL, ECG05: 118 MS
P-R INTERVAL, ECG05: 124 MS
PLATELET # BLD AUTO: 216 K/UL (ref 150–400)
PMV BLD AUTO: 11.4 FL (ref 8.9–12.9)
POTASSIUM SERPL-SCNC: 4.1 MMOL/L (ref 3.5–5.1)
PROT SERPL-MCNC: 6.8 G/DL (ref 6.4–8.2)
Q-T INTERVAL, ECG07: 330 MS
Q-T INTERVAL, ECG07: 370 MS
QRS DURATION, ECG06: 72 MS
QRS DURATION, ECG06: 86 MS
QTC CALCULATION (BEZET), ECG08: 452 MS
QTC CALCULATION (BEZET), ECG08: 466 MS
RBC # BLD AUTO: 4.55 M/UL (ref 4.1–5.7)
SODIUM SERPL-SCNC: 138 MMOL/L (ref 136–145)
TROPONIN I SERPL-MCNC: 3.75 NG/ML
VENTRICULAR RATE, ECG03: 120 BPM
VENTRICULAR RATE, ECG03: 90 BPM
WBC # BLD AUTO: 12.2 K/UL (ref 4.1–11.1)

## 2018-02-18 PROCEDURE — 85025 COMPLETE CBC W/AUTO DIFF WBC: CPT | Performed by: FAMILY MEDICINE

## 2018-02-18 PROCEDURE — 74011000258 HC RX REV CODE- 258: Performed by: INTERNAL MEDICINE

## 2018-02-18 PROCEDURE — 93005 ELECTROCARDIOGRAM TRACING: CPT

## 2018-02-18 PROCEDURE — 74011636637 HC RX REV CODE- 636/637: Performed by: FAMILY MEDICINE

## 2018-02-18 PROCEDURE — 36415 COLL VENOUS BLD VENIPUNCTURE: CPT | Performed by: FAMILY MEDICINE

## 2018-02-18 PROCEDURE — 94640 AIRWAY INHALATION TREATMENT: CPT

## 2018-02-18 PROCEDURE — 74011250637 HC RX REV CODE- 250/637: Performed by: INTERNAL MEDICINE

## 2018-02-18 PROCEDURE — 84484 ASSAY OF TROPONIN QUANT: CPT | Performed by: FAMILY MEDICINE

## 2018-02-18 PROCEDURE — 74011250636 HC RX REV CODE- 250/636: Performed by: FAMILY MEDICINE

## 2018-02-18 PROCEDURE — 74011000250 HC RX REV CODE- 250: Performed by: FAMILY MEDICINE

## 2018-02-18 PROCEDURE — 74011250636 HC RX REV CODE- 250/636: Performed by: INTERNAL MEDICINE

## 2018-02-18 PROCEDURE — 83880 ASSAY OF NATRIURETIC PEPTIDE: CPT | Performed by: INTERNAL MEDICINE

## 2018-02-18 PROCEDURE — 80053 COMPREHEN METABOLIC PANEL: CPT | Performed by: FAMILY MEDICINE

## 2018-02-18 PROCEDURE — 74011250637 HC RX REV CODE- 250/637: Performed by: FAMILY MEDICINE

## 2018-02-18 PROCEDURE — 65660000000 HC RM CCU STEPDOWN

## 2018-02-18 PROCEDURE — 77010033678 HC OXYGEN DAILY

## 2018-02-18 PROCEDURE — 74011000258 HC RX REV CODE- 258: Performed by: FAMILY MEDICINE

## 2018-02-18 RX ORDER — METOPROLOL TARTRATE 25 MG/1
12.5 TABLET, FILM COATED ORAL 2 TIMES DAILY
Status: DISCONTINUED | OUTPATIENT
Start: 2018-02-18 | End: 2018-02-19

## 2018-02-18 RX ORDER — ASPIRIN 325 MG
325 TABLET ORAL ONCE
Status: COMPLETED | OUTPATIENT
Start: 2018-02-18 | End: 2018-02-18

## 2018-02-18 RX ORDER — FUROSEMIDE 10 MG/ML
40 INJECTION INTRAMUSCULAR; INTRAVENOUS ONCE
Status: COMPLETED | OUTPATIENT
Start: 2018-02-18 | End: 2018-02-18

## 2018-02-18 RX ORDER — CLOPIDOGREL BISULFATE 75 MG/1
75 TABLET ORAL DAILY
Status: DISCONTINUED | OUTPATIENT
Start: 2018-02-19 | End: 2018-02-23 | Stop reason: HOSPADM

## 2018-02-18 RX ORDER — CLOPIDOGREL BISULFATE 75 MG/1
300 TABLET ORAL ONCE
Status: COMPLETED | OUTPATIENT
Start: 2018-02-18 | End: 2018-02-18

## 2018-02-18 RX ORDER — ALBUTEROL SULFATE 0.83 MG/ML
0.63 SOLUTION RESPIRATORY (INHALATION)
Status: DISCONTINUED | OUTPATIENT
Start: 2018-02-18 | End: 2018-02-23 | Stop reason: HOSPADM

## 2018-02-18 RX ORDER — ASPIRIN 81 MG/1
81 TABLET ORAL DAILY
Status: DISCONTINUED | OUTPATIENT
Start: 2018-02-19 | End: 2018-02-23 | Stop reason: HOSPADM

## 2018-02-18 RX ADMIN — ASPIRIN 325 MG: 325 TABLET ORAL at 13:03

## 2018-02-18 RX ADMIN — ALBUTEROL SULFATE 0.63 MG: 2.5 SOLUTION RESPIRATORY (INHALATION) at 15:01

## 2018-02-18 RX ADMIN — SIMVASTATIN 40 MG: 40 TABLET, FILM COATED ORAL at 21:39

## 2018-02-18 RX ADMIN — ALBUTEROL SULFATE 0.63 MG: 2.5 SOLUTION RESPIRATORY (INHALATION) at 09:23

## 2018-02-18 RX ADMIN — ALBUTEROL SULFATE 2.5 MG: 2.5 SOLUTION RESPIRATORY (INHALATION) at 20:27

## 2018-02-18 RX ADMIN — BUDESONIDE 500 MCG: 0.5 INHALANT RESPIRATORY (INHALATION) at 09:23

## 2018-02-18 RX ADMIN — ENOXAPARIN SODIUM 60 MG: 60 INJECTION SUBCUTANEOUS at 17:54

## 2018-02-18 RX ADMIN — LEVOFLOXACIN 500 MG: 5 INJECTION, SOLUTION INTRAVENOUS at 17:54

## 2018-02-18 RX ADMIN — METOPROLOL TARTRATE 12.5 MG: 25 TABLET ORAL at 13:03

## 2018-02-18 RX ADMIN — ENOXAPARIN SODIUM 60 MG: 60 INJECTION SUBCUTANEOUS at 05:29

## 2018-02-18 RX ADMIN — PREDNISONE 40 MG: 20 TABLET ORAL at 09:48

## 2018-02-18 RX ADMIN — CLOPIDOGREL BISULFATE 300 MG: 75 TABLET ORAL at 13:04

## 2018-02-18 RX ADMIN — Medication 10 ML: at 05:29

## 2018-02-18 RX ADMIN — DEXTROSE MONOHYDRATE AND SODIUM CHLORIDE 50 ML/HR: 5; .9 INJECTION, SOLUTION INTRAVENOUS at 17:54

## 2018-02-18 RX ADMIN — FUROSEMIDE 40 MG: 10 INJECTION, SOLUTION INTRAMUSCULAR; INTRAVENOUS at 13:03

## 2018-02-18 RX ADMIN — BUDESONIDE 500 MCG: 0.5 INHALANT RESPIRATORY (INHALATION) at 17:54

## 2018-02-18 RX ADMIN — DEXTROSE MONOHYDRATE AND SODIUM CHLORIDE 100 ML/HR: 5; .9 INJECTION, SOLUTION INTRAVENOUS at 05:31

## 2018-02-18 RX ADMIN — Medication 10 ML: at 14:00

## 2018-02-18 RX ADMIN — Medication 10 ML: at 21:39

## 2018-02-18 RX ADMIN — METOPROLOL TARTRATE 12.5 MG: 25 TABLET ORAL at 18:12

## 2018-02-18 NOTE — PROGRESS NOTES
Bedside shift change report given to Sergio Blanchard RN (oncoming nurse) by Jas Medina (offgoing nurse). Report included the following information SBAR, Kardex, Procedure Summary, Intake/Output, MAR, Recent Results and Cardiac Rhythm NSR/ST.       0945-Spoke with Kayli Cheatham MD. Troponin has peaked and trending down. Per MD okay to discontinue Q6 troponin labs. Bedside shift change report given to Jas Medina RN (oncoming nurse) by Sergio Blanchard RN (offgoing nurse). Report included the following information SBAR, Kardex, Procedure Summary, Intake/Output, MAR, Recent Results and Cardiac Rhythm NSR/ST.

## 2018-02-18 NOTE — PROGRESS NOTES
Primary Nurse Yoan Perez and [facility], RN performed a dual skin assessment on this patient No impairment noted  Ramos score is in flow sheet

## 2018-02-18 NOTE — CONSULTS
Cardiology consult dictated #[id number]  Impressions:  Subacute myocardial infarction. His troponin at the time of admission indicates that the acute events predated admission. His subsequent troponin have been trending down  Suspect his dyspnea is due to new LV dysfunction on the basis of his MI  25# weight loss over the past month of unclear etiology  COPD  Lung CA treated with right upper lobectomy and chemotherapy [id number]  Recommendations:  Continue full dose lovenox until tomorrow  Dual antiplatelet therapy  diuretics  BB as tolerated  Cath later this week.  Without CP and troponin levels that are down trending no need to proceed with cath today  Thank you for this referral  Kenn Aschoff, MD

## 2018-02-18 NOTE — PROGRESS NOTES
Problem: Breathing Pattern - Ineffective  Goal: *Use of effective breathing techniques  Outcome: Progressing Towards Goal  Patient experiencing SOB. Was placed on nonrebreather in ED. Was not able to switch to NC due to anxiety of change. Will attempt to change over. Continue to teach effective breathing patterns.

## 2018-02-18 NOTE — PROGRESS NOTES
Problem: Falls - Risk of  Goal: *Absence of Falls  Document Rakesh Fall Risk and appropriate interventions in the flowsheet. Outcome: Progressing Towards Goal  Fall Risk Interventions:            Medication Interventions: Assess postural VS orthostatic hypotension, Evaluate medications/consider consulting pharmacy, Patient to call before getting OOB, Teach patient to arise slowly                  Problem: Breathing Pattern - Ineffective  Goal: *Absence of hypoxia  Outcome: Progressing Towards Goal  Patient now on 3.5 liters nasal cannula O2 sats remaining 93 or greater.

## 2018-02-18 NOTE — CONSULTS
[address]    Bal Donovan  MR#: [id number]  : 1950  ACCOUNT #: [de-identified]   DATE OF SERVICE: 2018    REFERRING PHYSICIAN:  Dr. Billy Faye. HISTORY OF PRESENT ILLNESS:  Mr. Matthew Slaughter presented to the hospital yesterday with worsening shortness of breath and weakness and was found to have an elevated troponin at 8.81. Patient was admitted and was given full dose Lovenox and other medications. He denies, at any point, having chest pain, just more short of breath. He does have chronic obstructive pulmonary disease and has baseline level of dyspnea, but this was apparently much worse. He has no cardiac history, specifically denies previous myocardial infarction, stroke, rheumatic fever. He had lung cancer in , partial lobectomy and chemotherapy. Has lost about 25 pounds over the last month due to poor appetite. REVIEW OF SYSTEMS: Other than for the dyspnea and fatigue, his review of systems is otherwise unremarkable. He specifically denies syncope, diaphoresis, any bleeding per urine or stool, any hemoptysis, any chills or fever. MEDICATIONS:  His medications at home prior to admission include the following:  Albuterol inhaler, simvastatin 40 mg daily, Roxicodone 40 mg 3 times a day as needed for pain, DuoNeb treatments, budesonide 0.5 2 times a day. ALLERGIES:  NONE. REVIEW OF SYSTEMS:  Otherwise unremarkable. PHYSICAL EXAMINATION:  GENERAL:  A well-developed, but pleasant male age 78, looks his stated age. VITAL SIGNS:  Temperature 97.5, pulse 88, respirations 16, blood pressure 111/74, sats 95%. HEENT:  Unremarkable. NECK:  Supple. No adenopathy. CHEST:  Chest wall nontender. Dullness in the right base. No rales or wheezing. HEART:  Regular, moderate rhythm. Moderate S4, no S3. No friction rub. No neck vein distention. No hepatojugular reflux. No thrills or heaves. ABDOMEN:  Soft, nontender. No bruits.   No ascites. No palpable masses. EXTREMITIES:  No edema. Normal pedal pulses. Normal radial pulses. LABORATORY DATA:  Hemoglobin 13.8, hematocrit 41.2, platelets 224. White count 12,200. Chemistries:  BUN 24, creatinine 0.99, potassium 4.1. His troponin on admission is 8.81, second was 5.35, third is 3.75. Chest x-ray was reviewed, report reviewed indicating status post right upper lobectomy, no acute abnormalities. EKG on admission demonstrated inferior lateral T-wave inversion and ST segment elevation in leads V3, 4, 5 and 6. Today's tracing demonstrates the inferior lateral ischemic changes and a lesser degree of ST segment elevation in these leads. ASSESSMENT:  This patient likely has the onset of LV dysfunction with myocardial infarction that clearly predated his admission with a troponin of 8. By the time he got here, his troponin is trending down, his EKG changes have improved and he is not having chest pain. RECOMMENDATIONS:  1. An echocardiogram has been ordered. Will review when completed. 2.  Will empirically start the patient on diuretic. 3.  He is already on full dose Lovenox. Will add aspirin and a second antiplatelet drug and will follow clinically. The acute events seem to have subsided, so no clear indication to proceed to the cath lab today. We discussed all of this with the patient.     Thank you for this referral.      MD KATHLEEN Solis / MARCIA  D: 02/18/2018 12:00     T: 02/18/2018 18:04  JOB #: [id number]

## 2018-02-19 LAB
ANION GAP SERPL CALC-SCNC: 8 MMOL/L (ref 5–15)
ARTERIAL PATENCY WRIST A: ABNORMAL
BASE EXCESS BLDV CALC-SCNC: 2 MMOL/L
BDY SITE: ABNORMAL
BUN SERPL-MCNC: 30 MG/DL (ref 6–20)
BUN/CREAT SERPL: 24 (ref 12–20)
CALCIUM SERPL-MCNC: 8.7 MG/DL (ref 8.5–10.1)
CHLORIDE SERPL-SCNC: 107 MMOL/L (ref 97–108)
CHOLEST SERPL-MCNC: 121 MG/DL
CO2 SERPL-SCNC: 28 MMOL/L (ref 21–32)
CREAT SERPL-MCNC: 1.24 MG/DL (ref 0.7–1.3)
ERYTHROCYTE [DISTWIDTH] IN BLOOD BY AUTOMATED COUNT: 13.2 % (ref 11.5–14.5)
GAS FLOW.O2 O2 DELIVERY SYS: ABNORMAL L/MIN
GAS FLOW.O2 SETTING OXYMISER: 4 L/M
GLUCOSE SERPL-MCNC: 100 MG/DL (ref 65–100)
HCO3 BLDV-SCNC: 26.5 MMOL/L (ref 23–28)
HCT VFR BLD AUTO: 39.7 % (ref 36.6–50.3)
HDLC SERPL-MCNC: 43 MG/DL
HDLC SERPL: 2.8 {RATIO} (ref 0–5)
HGB BLD-MCNC: 13.2 G/DL (ref 12.1–17)
LDLC SERPL CALC-MCNC: 59.4 MG/DL (ref 0–100)
LIPID PROFILE,FLP: NORMAL
MCH RBC QN AUTO: 30.6 PG (ref 26–34)
MCHC RBC AUTO-ENTMCNC: 33.2 G/DL (ref 30–36.5)
MCV RBC AUTO: 91.9 FL (ref 80–99)
NRBC # BLD: 0 K/UL (ref 0–0.01)
NRBC BLD-RTO: 0 PER 100 WBC
O2/TOTAL GAS SETTING VFR VENT: 36 %
PCO2 BLDV: 40.9 MMHG (ref 41–51)
PH BLDV: 7.42 [PH] (ref 7.32–7.42)
PLATELET # BLD AUTO: 248 K/UL (ref 150–400)
PMV BLD AUTO: 11.1 FL (ref 8.9–12.9)
PO2 BLDV: 33 MMHG (ref 25–40)
POTASSIUM SERPL-SCNC: 4 MMOL/L (ref 3.5–5.1)
RBC # BLD AUTO: 4.32 M/UL (ref 4.1–5.7)
SAO2 % BLDV: 66 % (ref 65–88)
SODIUM SERPL-SCNC: 143 MMOL/L (ref 136–145)
SPECIMEN TYPE: ABNORMAL
TRIGL SERPL-MCNC: 93 MG/DL (ref ?–150)
VLDLC SERPL CALC-MCNC: 18.6 MG/DL
WBC # BLD AUTO: 15.8 K/UL (ref 4.1–11.1)

## 2018-02-19 PROCEDURE — 93041 RHYTHM ECG TRACING: CPT

## 2018-02-19 PROCEDURE — 36415 COLL VENOUS BLD VENIPUNCTURE: CPT | Performed by: NURSE PRACTITIONER

## 2018-02-19 PROCEDURE — C1887 CATHETER, GUIDING: HCPCS

## 2018-02-19 PROCEDURE — C1751 CATH, INF, PER/CENT/MIDLINE: HCPCS

## 2018-02-19 PROCEDURE — C1876 STENT, NON-COA/NON-COV W/DEL: HCPCS

## 2018-02-19 PROCEDURE — C1769 GUIDE WIRE: HCPCS

## 2018-02-19 PROCEDURE — 74011000250 HC RX REV CODE- 250: Performed by: FAMILY MEDICINE

## 2018-02-19 PROCEDURE — 74011250637 HC RX REV CODE- 250/637: Performed by: INTERNAL MEDICINE

## 2018-02-19 PROCEDURE — 65660000000 HC RM CCU STEPDOWN

## 2018-02-19 PROCEDURE — 74011636320 HC RX REV CODE- 636/320: Performed by: INTERNAL MEDICINE

## 2018-02-19 PROCEDURE — 77030013715 HC INFL SYS MRTM -B

## 2018-02-19 PROCEDURE — 02713FZ DILATION OF CORONARY ARTERY, TWO ARTERIES WITH THREE INTRALUMINAL DEVICES, PERCUTANEOUS APPROACH: ICD-10-PCS | Performed by: INTERNAL MEDICINE

## 2018-02-19 PROCEDURE — 92928 PRQ TCAT PLMT NTRAC ST 1 LES: CPT

## 2018-02-19 PROCEDURE — 74011000250 HC RX REV CODE- 250: Performed by: INTERNAL MEDICINE

## 2018-02-19 PROCEDURE — 74011250637 HC RX REV CODE- 250/637: Performed by: FAMILY MEDICINE

## 2018-02-19 PROCEDURE — 80048 BASIC METABOLIC PNL TOTAL CA: CPT | Performed by: NURSE PRACTITIONER

## 2018-02-19 PROCEDURE — B2111ZZ FLUOROSCOPY OF MULTIPLE CORONARY ARTERIES USING LOW OSMOLAR CONTRAST: ICD-10-PCS | Performed by: INTERNAL MEDICINE

## 2018-02-19 PROCEDURE — 85027 COMPLETE CBC AUTOMATED: CPT | Performed by: NURSE PRACTITIONER

## 2018-02-19 PROCEDURE — 77030029065 HC DRSG HEMO QCLOT ZMED -B

## 2018-02-19 PROCEDURE — 94640 AIRWAY INHALATION TREATMENT: CPT

## 2018-02-19 PROCEDURE — B2151ZZ FLUOROSCOPY OF LEFT HEART USING LOW OSMOLAR CONTRAST: ICD-10-PCS | Performed by: INTERNAL MEDICINE

## 2018-02-19 PROCEDURE — 77030002996 HC SUT SLK J&J -A

## 2018-02-19 PROCEDURE — 82803 BLOOD GASES ANY COMBINATION: CPT

## 2018-02-19 PROCEDURE — 74011636637 HC RX REV CODE- 636/637: Performed by: FAMILY MEDICINE

## 2018-02-19 PROCEDURE — 77030013744

## 2018-02-19 PROCEDURE — 4A023N6 MEASUREMENT OF CARDIAC SAMPLING AND PRESSURE, RIGHT HEART, PERCUTANEOUS APPROACH: ICD-10-PCS | Performed by: INTERNAL MEDICINE

## 2018-02-19 PROCEDURE — 80061 LIPID PANEL: CPT | Performed by: NURSE PRACTITIONER

## 2018-02-19 PROCEDURE — 74011250636 HC RX REV CODE- 250/636: Performed by: FAMILY MEDICINE

## 2018-02-19 PROCEDURE — 74011000258 HC RX REV CODE- 258: Performed by: INTERNAL MEDICINE

## 2018-02-19 PROCEDURE — C1725 CATH, TRANSLUMIN NON-LASER: HCPCS

## 2018-02-19 PROCEDURE — 77010033678 HC OXYGEN DAILY

## 2018-02-19 PROCEDURE — 74011250636 HC RX REV CODE- 250/636: Performed by: INTERNAL MEDICINE

## 2018-02-19 PROCEDURE — 77030004533 HC CATH ANGI DX IMP BSC -B

## 2018-02-19 RX ORDER — ATROPINE SULFATE 0.1 MG/ML
0.4 INJECTION INTRAVENOUS AS NEEDED
Status: DISCONTINUED | OUTPATIENT
Start: 2018-02-19 | End: 2018-02-19

## 2018-02-19 RX ORDER — SODIUM CHLORIDE 0.9 % (FLUSH) 0.9 %
5-10 SYRINGE (ML) INJECTION AS NEEDED
Status: DISCONTINUED | OUTPATIENT
Start: 2018-02-19 | End: 2018-02-23 | Stop reason: HOSPADM

## 2018-02-19 RX ORDER — SODIUM CHLORIDE 0.9 % (FLUSH) 0.9 %
5-10 SYRINGE (ML) INJECTION EVERY 8 HOURS
Status: DISCONTINUED | OUTPATIENT
Start: 2018-02-19 | End: 2018-02-23 | Stop reason: HOSPADM

## 2018-02-19 RX ORDER — SODIUM CHLORIDE 0.9 % (FLUSH) 0.9 %
10 SYRINGE (ML) INJECTION AS NEEDED
Status: DISCONTINUED | OUTPATIENT
Start: 2018-02-19 | End: 2018-02-23 | Stop reason: HOSPADM

## 2018-02-19 RX ORDER — HEPARIN SODIUM 1000 [USP'U]/ML
5000 INJECTION, SOLUTION INTRAVENOUS; SUBCUTANEOUS AS NEEDED
Status: DISCONTINUED | OUTPATIENT
Start: 2018-02-19 | End: 2018-02-19

## 2018-02-19 RX ORDER — VERAPAMIL HYDROCHLORIDE 2.5 MG/ML
2.5-5 INJECTION, SOLUTION INTRAVENOUS
Status: DISCONTINUED | OUTPATIENT
Start: 2018-02-19 | End: 2018-02-19

## 2018-02-19 RX ORDER — HEPARIN SODIUM 200 [USP'U]/100ML
2000 INJECTION, SOLUTION INTRAVENOUS ONCE
Status: COMPLETED | OUTPATIENT
Start: 2018-02-19 | End: 2018-02-19

## 2018-02-19 RX ORDER — MIDAZOLAM HYDROCHLORIDE 1 MG/ML
1-10 INJECTION, SOLUTION INTRAMUSCULAR; INTRAVENOUS
Status: DISCONTINUED | OUTPATIENT
Start: 2018-02-19 | End: 2018-02-19

## 2018-02-19 RX ORDER — SODIUM CHLORIDE 9 MG/ML
85 INJECTION, SOLUTION INTRAVENOUS CONTINUOUS
Status: DISCONTINUED | OUTPATIENT
Start: 2018-02-19 | End: 2018-02-19

## 2018-02-19 RX ORDER — CARVEDILOL 6.25 MG/1
6.25 TABLET ORAL EVERY 12 HOURS
Status: DISCONTINUED | OUTPATIENT
Start: 2018-02-19 | End: 2018-02-21

## 2018-02-19 RX ORDER — FENTANYL CITRATE 50 UG/ML
25-200 INJECTION, SOLUTION INTRAMUSCULAR; INTRAVENOUS
Status: DISCONTINUED | OUTPATIENT
Start: 2018-02-19 | End: 2018-02-19

## 2018-02-19 RX ORDER — LIDOCAINE HYDROCHLORIDE 10 MG/ML
5-30 INJECTION INFILTRATION; PERINEURAL AS NEEDED
Status: DISCONTINUED | OUTPATIENT
Start: 2018-02-19 | End: 2018-02-19

## 2018-02-19 RX ORDER — FUROSEMIDE 10 MG/ML
40 INJECTION INTRAMUSCULAR; INTRAVENOUS ONCE
Status: COMPLETED | OUTPATIENT
Start: 2018-02-19 | End: 2018-02-19

## 2018-02-19 RX ORDER — CLOPIDOGREL 300 MG/1
600 TABLET, FILM COATED ORAL AS NEEDED
Status: DISCONTINUED | OUTPATIENT
Start: 2018-02-19 | End: 2018-02-19

## 2018-02-19 RX ORDER — SODIUM CHLORIDE 9 MG/ML
171 INJECTION, SOLUTION INTRAVENOUS CONTINUOUS
Status: DISPENSED | OUTPATIENT
Start: 2018-02-19 | End: 2018-02-19

## 2018-02-19 RX ORDER — SODIUM CHLORIDE 9 MG/ML
100 INJECTION, SOLUTION INTRAVENOUS CONTINUOUS
Status: DISCONTINUED | OUTPATIENT
Start: 2018-02-19 | End: 2018-02-19

## 2018-02-19 RX ADMIN — MIDAZOLAM HYDROCHLORIDE 1 MG: 1 INJECTION, SOLUTION INTRAMUSCULAR; INTRAVENOUS at 14:33

## 2018-02-19 RX ADMIN — FENTANYL CITRATE 25 MCG: 50 INJECTION, SOLUTION INTRAMUSCULAR; INTRAVENOUS at 15:16

## 2018-02-19 RX ADMIN — FENTANYL CITRATE 25 MCG: 50 INJECTION, SOLUTION INTRAMUSCULAR; INTRAVENOUS at 14:33

## 2018-02-19 RX ADMIN — SIMVASTATIN 40 MG: 40 TABLET, FILM COATED ORAL at 21:14

## 2018-02-19 RX ADMIN — MIDAZOLAM HYDROCHLORIDE 2 MG: 1 INJECTION, SOLUTION INTRAMUSCULAR; INTRAVENOUS at 14:03

## 2018-02-19 RX ADMIN — CLOPIDOGREL BISULFATE 75 MG: 75 TABLET ORAL at 09:40

## 2018-02-19 RX ADMIN — PREDNISONE 40 MG: 20 TABLET ORAL at 09:39

## 2018-02-19 RX ADMIN — FENTANYL CITRATE 25 MCG: 50 INJECTION, SOLUTION INTRAMUSCULAR; INTRAVENOUS at 14:04

## 2018-02-19 RX ADMIN — Medication 10 ML: at 21:15

## 2018-02-19 RX ADMIN — CARVEDILOL 6.25 MG: 6.25 TABLET, FILM COATED ORAL at 21:14

## 2018-02-19 RX ADMIN — MIDAZOLAM HYDROCHLORIDE 1 MG: 1 INJECTION, SOLUTION INTRAMUSCULAR; INTRAVENOUS at 14:11

## 2018-02-19 RX ADMIN — IOPAMIDOL 353 ML: 755 INJECTION, SOLUTION INTRAVENOUS at 15:16

## 2018-02-19 RX ADMIN — METOPROLOL TARTRATE 12.5 MG: 25 TABLET ORAL at 09:42

## 2018-02-19 RX ADMIN — LEVOFLOXACIN 500 MG: 5 INJECTION, SOLUTION INTRAVENOUS at 19:03

## 2018-02-19 RX ADMIN — BIVALIRUDIN 1.75 MG/KG/HR: 250 INJECTION, POWDER, LYOPHILIZED, FOR SOLUTION INTRAVENOUS at 14:46

## 2018-02-19 RX ADMIN — HEPARIN SODIUM 2000 UNITS: 200 INJECTION, SOLUTION INTRAVENOUS at 13:54

## 2018-02-19 RX ADMIN — BUDESONIDE 500 MCG: 0.5 INHALANT RESPIRATORY (INHALATION) at 07:53

## 2018-02-19 RX ADMIN — ASPIRIN 81 MG: 81 TABLET, COATED ORAL at 09:39

## 2018-02-19 RX ADMIN — OXYCODONE HYDROCHLORIDE 40 MG: 5 TABLET ORAL at 21:15

## 2018-02-19 RX ADMIN — FENTANYL CITRATE 25 MCG: 50 INJECTION, SOLUTION INTRAMUSCULAR; INTRAVENOUS at 14:11

## 2018-02-19 RX ADMIN — SODIUM CHLORIDE 85 ML/HR: 900 INJECTION, SOLUTION INTRAVENOUS at 13:15

## 2018-02-19 RX ADMIN — LIDOCAINE HYDROCHLORIDE 10 ML: 10 INJECTION, SOLUTION INFILTRATION; PERINEURAL at 14:07

## 2018-02-19 RX ADMIN — SODIUM CHLORIDE 171 ML/HR: 900 INJECTION, SOLUTION INTRAVENOUS at 12:11

## 2018-02-19 RX ADMIN — IOPAMIDOL 50 ML: 755 INJECTION, SOLUTION INTRAVENOUS at 14:55

## 2018-02-19 RX ADMIN — FUROSEMIDE 40 MG: 10 INJECTION, SOLUTION INTRAMUSCULAR; INTRAVENOUS at 17:41

## 2018-02-19 RX ADMIN — NITROGLYCERIN 1 INCH: 20 OINTMENT TOPICAL at 17:39

## 2018-02-19 RX ADMIN — MIDAZOLAM HYDROCHLORIDE 1 MG: 1 INJECTION, SOLUTION INTRAMUSCULAR; INTRAVENOUS at 14:19

## 2018-02-19 RX ADMIN — Medication 5 ML: at 17:45

## 2018-02-19 RX ADMIN — FENTANYL CITRATE 25 MCG: 50 INJECTION, SOLUTION INTRAMUSCULAR; INTRAVENOUS at 14:19

## 2018-02-19 RX ADMIN — CLOPIDOGREL BISULFATE 600 MG: 300 TABLET, FILM COATED ORAL at 15:19

## 2018-02-19 NOTE — PROGRESS NOTES
Bedside and Verbal shift change report given to Jeyson Del Rio rn (oncoming nurse) by Gomez Guerrero RN (offgoing nurse). Report included the following information SBAR, Kardex, Intake/Output, MAR, Recent Results and Cardiac Rhythm NSR.

## 2018-02-19 NOTE — PROGRESS NOTES
TRANSFER - OUT REPORT:    Verbal report given to [address] RN(name) on Time Leander  being transferred to [facility]) for routine progression of care       Report consisted of patients Situation, Background, Assessment and   Recommendations(SBAR). Information from the following report(s) Procedure Summary, Intake/Output, MAR, Accordion, Recent Results, Med Rec Status and Cardiac Rhythm SR-ST was reviewed with the receiving nurse. Lines:   Peripheral IV 02/19/18 Right Wrist (Active)   Site Assessment Clean, dry, & intact 2/19/2018 12:45 PM   Phlebitis Assessment 0 2/19/2018 12:45 PM   Infiltration Assessment 0 2/19/2018 12:45 PM   Dressing Status Clean, dry, & intact 2/19/2018 12:45 PM   Dressing Type Transparent 2/19/2018 12:45 PM   Hub Color/Line Status Pink; Infusing 2/19/2018 12:45 PM   Action Taken Open ports on tubing capped 2/19/2018 12:30 PM   Alcohol Cap Used Yes 2/19/2018 12:45 PM        Opportunity for questions and clarification was provided.       Patient transported with:   Monitor  Registered Nurse and O2 3lpm    1830 transferred via stretcher to room 420, rec'd by [address]  Right groin dsg D&I

## 2018-02-19 NOTE — PROGRESS NOTES
Dr Flash Martin called- pts -114, exp wheezing, resp dept called for Bruce.Jump, orders rec'd    8916 pt stated his breathing much better since breathing treatment, decreased wheezing noted. voide total of 650 ml of ua since Lasix was given.

## 2018-02-19 NOTE — PROGRESS NOTES
Hospitalist Progress Note  Aram Lucio NP  Answering service: [phone] OR [phone] from in house phone  Cell: Deya Furnace   Date of Service:  2018  NAME:  Nader Young  :  1950  MRN:  [id number]    Admission Summary:   Pt presented to the ED with shortness of breath, cough and worsening weakness for the past 3 days. Reported he could not walk few steps without getting SOB. Has a chronic cough but recently it is getting worse and is productive. Pt was seen at [facility] recently and had a negative workup. Interval history / Subjective:   Pt in bed, sister at bedside. No new complaints - still feeling tired and fatigued. He is aware of plans for cardiac cath today (~ 1:15 pm). Dr. Chico Wynn at bedside - discussed procedure planned and ok with pt receiving breakfast this morning as cath planned for afternoon.      Assessment & Plan:     Myocardial Infarction (likely predating admit):  - denied chest pain at any point prior to or during admit  - serial cardiac enzymes: peak of 8.81 ->5.35 -> 3.75  - TTE has been ordered, not yet done  - on Lovenox BID and ASA/plavix  - BB  - plans for cardiac cath today  - FLP reviewed: Chol 121 Hdl 43 Ldl 59.4 (on statin)    Possible Pneumonia (POA):  - CTA of chest: Chronic lung disease, left lower lobe infiltrate (which was present in 2016)  - continue with levaquin for now  - continue nebs  - leukocytosis back up today but remains afebrile - could be due to steroids    COPD exacerbation due to above:    - wheezing at the time of presentation in ED but this has resolved, lungs diminished  - on nebs/abx   - po steroids x 5 doses/abx  - elevated D dimer but no pulmonary embolus seen on Chest CTA    Underweight:  - BMI 17.62  - poor-fair appetite  - supplements added    Hx Chronic Respiratory Failure:   - hx of lung cancer with lobectomy   - on home oxygen (3 L NC at max and does not wear continuously)    Hx Hyperlipidemia: continue statin. Code status: Full  DVT prophylaxis: Lovenox  Care Plan discussed with: patient, sister and cardiologist  Disposition: Lives at home alone - is concerned about his weakness, will be evaluating physical function prior to discharge - pt wants to go to [facility] (has been there before)     Hospital Problems  Date Reviewed: 10/5/2015          Codes Class Noted POA    * (Principal)Shortness of breath ICD-10-CM: R06.02  ICD-9-CM: 786.05  2/17/2018 Yes        COPD exacerbation ([address] (Chronic) ICD-10-CM: J44.1  ICD-9-CM: 491.21  9/5/2015 Yes            Review of Systems:   Denies HA. No chest pain or pressure. + SOB and fatigues easily on exertion. Hungry. No N/V. Vital Signs:    Last 24hrs VS reviewed since prior progress note. Most recent are:  Visit Vitals    /66 (BP 1 Location: Left arm, BP Patient Position: At rest)    Pulse 98    Temp 98.5 °F (36.9 °C)    Resp 16    Ht 5' 11\" (1.803 m)    Wt 57.3 kg (126 lb 5.2 oz)    SpO2 92%    BMI 17.62 kg/m2       Intake/Output Summary (Last 24 hours) at 02/19/18 0816  Last data filed at 02/19/18 0404   Gross per 24 hour   Intake          1591.67 ml   Output             1395 ml   Net           196.67 ml      Physical Examination:         Constitutional:  No acute distress, cooperative, pleasant. Very thin/emaciated appearance    ENT:  Oral mucous membranes dry   Resp:  Diminished but no wheezing/rhonchi/rales. On 2-3 L NC   CV:  Regular rhythm, normal rate, no murmurs. SR on tele review     GI:  Soft, non distended, non tender. Normoactive bowel sounds    Musculoskeletal:  No edema, warm, 2+ pulses throughout    Neurologic:  Moves all extremities.  AAOx3       Data Review:   Review and/or order of clinical lab test  Review and/or order of tests in the radiology section of CPT  Review and/or order of tests in the medicine section of CPT    Labs:     Recent Labs      02/19/18   0357  02/18/18   0530   WBC 15.8*  12.2*   HGB  13.2  13.8   HCT  39.7  41.2   PLT  248  216     Recent Labs      02/19/18   0357  02/18/18   0530  02/17/18   1502   NA  143  138  140   K  4.0  4.1  4.2   CL  107  104  101   CO2  28  26  28   BUN  30*  24*  30*   CREA  1.24  0.99  1.21   GLU  100  141*  106*   CA  8.7  8.8  9.3   MG   --    --   2.1     Recent Labs      02/18/18   0530  02/17/18   1502   SGOT  36  64*   ALT  23  28   AP  76  91   TBILI  0.5  0.9   TP  6.8  8.1   ALB  2.4*  3.1*   GLOB  4.4*  5.0*     No results for input(s): INR, PTP, APTT in the last 72 hours. No lab exists for component: INREXT, INREXT   No results for input(s): FE, TIBC, PSAT, FERR in the last 72 hours. No results found for: FOL, RBCF   No results for input(s): PH, PCO2, PO2 in the last 72 hours.   Recent Labs      02/18/18   0530  02/17/18   2253  02/17/18   1502   TROIQ  3.75*  5.35*  8.81*     Lab Results   Component Value Date/Time    Cholesterol, total 121 02/19/2018 03:57 AM    HDL Cholesterol 43 02/19/2018 03:57 AM    LDL, calculated 59.4 02/19/2018 03:57 AM    Triglyceride 93 02/19/2018 03:57 AM    CHOL/HDL Ratio 2.8 02/19/2018 03:57 AM     Lab Results   Component Value Date/Time    Glucose (POC) 100 09/14/2015 12:14 PM     Lab Results   Component Value Date/Time    Color YELLOW/STRAW 11/28/2016 04:44 PM    Appearance CLEAR 11/28/2016 04:44 PM    Specific gravity 1.020 11/28/2016 04:44 PM    pH (UA) 5.0 11/28/2016 04:44 PM    Protein TRACE (A) 11/28/2016 04:44 PM    Glucose NEGATIVE  11/28/2016 04:44 PM    Ketone TRACE (A) 11/28/2016 04:44 PM    Bilirubin NEGATIVE  11/28/2016 04:44 PM    Urobilinogen 0.2 11/28/2016 04:44 PM    Nitrites NEGATIVE  11/28/2016 04:44 PM    Leukocyte Esterase TRACE (A) 11/28/2016 04:44 PM    Epithelial cells FEW 11/28/2016 04:44 PM    Bacteria NEGATIVE  11/28/2016 04:44 PM    WBC 5-10 11/28/2016 04:44 PM    RBC 0-5 11/28/2016 04:44 PM     Medications Reviewed:     Current Facility-Administered Medications   Medication Dose Route Frequency    metoprolol tartrate (LOPRESSOR) tablet 12.5 mg  12.5 mg Oral BID    aspirin delayed-release tablet 81 mg  81 mg Oral DAILY    clopidogrel (PLAVIX) tablet 75 mg  75 mg Oral DAILY    albuterol (PROVENTIL VENTOLIN) nebulizer solution 0.63 mg  0.63 mg Nebulization Q6H PRN    levoFLOXacin (LEVAQUIN) 500 mg in D5W IVPB  500 mg IntraVENous Q24H    albuterol-ipratropium (DUO-NEB) 2.5 MG-0.5 MG/3 ML  3 mL Nebulization Q4H PRN    budesonide (PULMICORT) 500 mcg/2 ml nebulizer suspension  500 mcg Nebulization BID    oxyCODONE IR (ROXICODONE) tablet 40 mg  40 mg Oral Q8H    oxyCODONE IR (ROXICODONE) tablet 40 mg  40 mg Oral Q8H PRN    simvastatin (ZOCOR) tablet 40 mg  40 mg Oral QHS    sodium chloride (NS) flush 5-10 mL  5-10 mL IntraVENous Q8H    sodium chloride (NS) flush 5-10 mL  5-10 mL IntraVENous PRN    predniSONE (DELTASONE) tablet 40 mg  40 mg Oral DAILY WITH BREAKFAST    enoxaparin (LOVENOX) injection 60 mg  60 mg SubCUTAneous Q12H    dextrose 5% and 0.9% NaCl infusion  50 mL/hr IntraVENous CONTINUOUS   ______________________________________________________________________  EXPECTED LENGTH OF STAY: - - -  ACTUAL LENGTH OF STAY:          2              Mihir Holman NP

## 2018-02-19 NOTE — PROGRESS NOTES
SHEATH PULL NOTE:    Patient informed of procedure with questions answered with review. Sheath site prepped with Chloraprep swab. 6 fr sheath in right groin- artery,  1715    6 fr right groin sheath venous pulled by Justo Fatima RN,RN. Hand hold and quick clot, with manual compression to site. No bleeding, no hematoma, no pain at site. Hemostasis obtained with hand hold/manual compression at site. Patient tolerated well. No change in status. Handhold for 20 minutes. No change at site. Gauze and tegaderm  dressing applied to site. No bleeding, no hematoma, no pain/discomfort at site. Groin instructions provided with review. Continue to monitor procedure site and patient status. *Advised patient to keep head flat and extremity flat to decrease risk of bleeding. *Recommended that patient not drink for ONE HOUR post sheath pull completion. *Recommended that patient not eat for TWO HOURS post sheath pull completion. *Instructed patient on rationale for delay of PO products to decrease risk for aspiration and if additional treatment to procedure site is required. Patient verbalized understanding of instructions with review.

## 2018-02-19 NOTE — PROGRESS NOTES
Cardiac Cath Lab Recovery Arrival Note:      Maira Pacheco arrived to Cardiac Cath Lab, Recovery Area. Staff introduced to patient. Patient identifiers verified with NAME and DATE OF BIRTH. Procedure verified with patient. Consent forms reviewed and signed by patient or authorized representative and verified. Allergies verified. Patient and family oriented to department. Patient and family informed of procedure and plan of care. Questions answered with review. Patient prepped for procedure, per orders from physician, prior to arrival.    Patient on cardiac monitor, non-invasive blood pressure, SPO2 monitor. On O2 3lpm. Patient is A&Ox 4. Patient reports no . Patient in stretcher, in low position, with side rails up, call bell within reach, patient instructed to call if assistance as needed. Patient prep in: [address], [place] [address].    Patient family has pager # [phone]  Family in: no one in hospital.   Prep by: Liv Mistry RN  Pre cath teaching completed

## 2018-02-19 NOTE — PROGRESS NOTES
Problem: Falls - Risk of  Goal: *Absence of Falls  Document Rakesh Fall Risk and appropriate interventions in the flowsheet.    Outcome: Progressing Towards Goal  Fall Risk Interventions:  Mobility Interventions: Patient to call before getting OOB, PT Consult for mobility concerns, Utilize walker, cane, or other assitive device, Mechanical lift, Communicate number of staff needed for ambulation/transfer         Medication Interventions: Patient to call before getting OOB, Teach patient to arise slowly, Utilize gait belt for transfers/ambulation, Evaluate medications/consider consulting pharmacy, Bed/chair exit alarm

## 2018-02-19 NOTE — CDMP QUERY
Patient is noted to have a BMI of 17.62. Please clarify if this patient is:     =>Underweight  =>Cachexia  =>Failure to Thrive  =>Other explanation of clinical findings  =>Unable to determine (no explanation for clinical findings)    Presentation:   BMI: 17.62  Ht 5'11\"  Wt 126 Lbs. Please clarify and document your clinical opinion in the progress notes and discharge summary, including the definitive and or presumptive diagnosis, (suspected or probable), related to the above clinical findings. Please include clinical findings supporting your diagnosis.      Thank you  Jana Rizzo  [facility]  [phone]

## 2018-02-19 NOTE — PROGRESS NOTES
NUTRITION COMPLETE ASSESSMENT    RECOMMENDATIONS:   1. Resume pre-procedure diet as tolerated  2. Continue daily weights via standing scale     Interventions/Plan:   Food/Nutrient Delivery:TBD (will return to complete assessment)    Assessment:   Reason for Assessment:   [x]BPA/MST Referral     Diet:  NPO  Nutritionally Significant Medications: [x] Reviewed & Includes: 0.9% sodium chloride @ 171 mL/hr; plavix; levaquin daily; prednisone daily   Meal Intake:   Patient Vitals for the past 100 hrs:   % Diet Eaten   02/19/18 0845 100 %   02/18/18 1300 50 %   02/18/18 0900 25 %     Subjective:  Pt off the floor in cardiac cath lab. Will return at a later date to interview. Objective:  Chart reviewed. Pt admitted with SOB. PMHx: lung cancer, COPD, hyperlipidemia, others noted. Pt with severe wegiht los over the past 2 years (20%); however, unable to assess most recent trends as last weight in EHR is from 11/28/16. Noted pt ate 100% of tray prior to procedure today and 25-50% of meal yesterday. Estimated Nutrition Needs:   Kcals/day: 9638 Kcals/day (1729-7530 kcal/day (MSJ x 1.3-1.4))  Protein: 69 g (69-74 g/day (1.2-1.3 g/kg))  Fluid: 1800 ml (1 mL/kcal)     Based On: [place] [facility] Jeor  Weight Used: Actual wt (57.3 kg)    Pt expected to meet estimated nutrient needs:  []   Yes     []  No  [x] Unable to predict at this time    Nutrition Diagnosis:   1. Inadequate protein-energy intake related to increased energy expenditure, ? other etiology as evidenced by 20% weight loss over the past 2 years    Goals:     Pt to maintain weight +/- 2# over the next 5-7 days     Monitoring & Evaluation:    - Total energy intake   - Weight/weight change      Previous Nutrition Goals Met:  N/A  Previous Recommendations:      N/A    Education & Discharge Needs:   [x] None Identified   [] Identified and addressed    [x] Participated in care plan, discharge planning, and/or interdisciplinary rounds        Cultural, [employer]alism and ethnic food preferences identified:  NONE      Skin Integrity: [x]Intact  []Other  Edema: [x]None []Other  Last BM: 2/17/18  Food Allergies: [x]None []Other    Anthropometrics:    Weight Loss Metrics 2/19/2018 11/28/2016 10/5/2015 9/15/2015 8/11/2015 8/10/2015   Today's Wt 126 lb 5.2 oz 157 lb 146 lb 146 lb 11.2 oz 149 lb 14.6 oz -   BMI 17.62 kg/m2 21.9 kg/m2 20.37 kg/m2 20.47 kg/m2 - 20.92 kg/m2      Last 3 Recorded Weights in this Encounter    02/17/18 1826 02/18/18 0321 02/19/18 0544   Weight: 56.6 kg (124 lb 12.5 oz) 56.5 kg (124 lb 8 oz) 57.3 kg (126 lb 5.2 oz)      Weight Source: Standing scale (comment)  Height: 5' 11\" (180.3 cm),    Body mass index is 17.62 kg/(m^2). ,     ,      Labs:    Lab Results   Component Value Date/Time    Sodium 143 02/19/2018 03:57 AM    Potassium 4.0 02/19/2018 03:57 AM    Chloride 107 02/19/2018 03:57 AM    CO2 28 02/19/2018 03:57 AM    Glucose 100 02/19/2018 03:57 AM    BUN 30 (H) 02/19/2018 03:57 AM    Creatinine 1.24 02/19/2018 03:57 AM    Calcium 8.7 02/19/2018 03:57 AM    Magnesium 2.1 02/17/2018 03:02 PM    Phosphorus 4.4 09/15/2015 03:33 AM    Albumin 2.4 (L) 02/18/2018 05:30 AM     No results found for: HBA1C, HGBE8, DYY4MIXO, JEW5GOUZ, BHS3MOPT  Lab Results   Component Value Date/Time    Glucose 100 02/19/2018 03:57 AM    Glucose (POC) 100 09/14/2015 12:14 PM      Lab Results   Component Value Date/Time    ALT (SGPT) 23 02/18/2018 05:30 AM    AST (SGOT) 36 02/18/2018 05:30 AM    Alk.  phosphatase 76 02/18/2018 05:30 AM    Bilirubin, total 0.5 02/18/2018 05:30 AM     [address]

## 2018-02-19 NOTE — PROGRESS NOTES
Physical Therapy Screening:    An [facility] screening referral was triggered for physical therapy based on results obtained during the nursing admission assessment. The patients chart was reviewed and the patient is appropriate for a skilled therapy evaluation if there is a decline in functional mobility from baseline. Please order a consult for physical therapy if you are in agreement and would like an evaluation to be completed. Thank you.     Tyra Nielsen, PT

## 2018-02-19 NOTE — PROGRESS NOTES
Cardiac Catheterization Procedure Note   Patient: Marguerite Sheikh  MRN: [id number]  SSN: [id number]   YOB: 1950 Age: 78 y.o. Sex: male    Date of Procedure: 2/19/2018   Pre-procedure Diagnosis: NSTEMI  Post-procedure Diagnosis: Congestive Heart Failure and Coronary Artery Disease  Procedure: Left Heart Cath, PCI, to circumflex and to diagonal  :  Dr. Marce Love MD    Assistant(s):  None  Anesthesia: Moderate Sedation   Estimated Blood Loss: Less than 10 mL   Specimens Removed: None  Findings: Significant 2 vessel CAD with 90% lesion in large mid-diagonal and short  of mid-Cx. Dilated LV with severe hypokinesis of apical, infero-apical, and uziel-apical walls with LVEF 30%. No residual post 2.5 IGNACIA x 2 to the diagonal and 2.5 IGNACIA to mid-Cx.     Complications: None   Implants:  None  Signed by:  Marce Love MD  2/19/2018  4:16 PM

## 2018-02-19 NOTE — PROGRESS NOTES
1350 -   Cardiac Cath Lab Procedure Area Arrival Note:    Rachel Larson arrived to Cardiac Cath Lab, Procedure Area. Patient identifiers verified with NAME and DATE OF BIRTH. Procedure verified with patient. Consent forms verified. Allergies verified. Patient informed of procedure and plan of care. Questions answered with review. Patient voiced understanding of procedure and plan of care. Patient on cardiac monitor, non-invasive blood pressure, SPO2 monitor. Placed on O2 @ 4 lpm via NC.  IV of NS on pump at 85 ml/hr. Patient status doing well with some problems : shortness of breath. Patient is A&Ox 4. Patient reports no pain at this time. Patient medicated during procedure with orders obtained and verified by Dr. Vamsi Barone. Refer to patients Cardiac Cath Lab PROCEDURE REPORT for vital signs, assessment, status, and response during procedure, printed at end of case. Printed report on chart or scanned into chart. 1525 - TRANSFER - OUT REPORT:    Verbal report given to [facility] on Rachel Larson  being transferred to (unit) for routine post - op       Report consisted of patients Situation, Background, Assessment and   Recommendations(SBAR). Information from the following report(s) Procedure Summary and MAR was reviewed with the receiving nurse. Lines:   Peripheral IV 02/19/18 Right Wrist (Active)   Site Assessment Clean, dry, & intact 2/19/2018 12:45 PM   Phlebitis Assessment 0 2/19/2018 12:45 PM   Infiltration Assessment 0 2/19/2018 12:45 PM   Dressing Status Clean, dry, & intact 2/19/2018 12:45 PM   Dressing Type Transparent 2/19/2018 12:45 PM   Hub Color/Line Status Pink; Infusing 2/19/2018 12:45 PM   Action Taken Open ports on tubing capped 2/19/2018 12:30 PM   Alcohol Cap Used Yes 2/19/2018 12:45 PM        Opportunity for questions and clarification was provided.       Patient transported with:   [employer]

## 2018-02-20 LAB
ANION GAP SERPL CALC-SCNC: 9 MMOL/L (ref 5–15)
BASOPHILS # BLD: 0 K/UL (ref 0–0.1)
BASOPHILS NFR BLD: 0 % (ref 0–1)
BUN SERPL-MCNC: 28 MG/DL (ref 6–20)
BUN/CREAT SERPL: 24 (ref 12–20)
CALCIUM SERPL-MCNC: 8.7 MG/DL (ref 8.5–10.1)
CHLORIDE SERPL-SCNC: 103 MMOL/L (ref 97–108)
CHOLEST SERPL-MCNC: 106 MG/DL
CK MB CFR SERPL CALC: 11.4 % (ref 0–2.5)
CK MB SERPL-MCNC: 8.1 NG/ML (ref 5–25)
CK SERPL-CCNC: 71 U/L (ref 39–308)
CO2 SERPL-SCNC: 28 MMOL/L (ref 21–32)
CREAT SERPL-MCNC: 1.15 MG/DL (ref 0.7–1.3)
DIFFERENTIAL METHOD BLD: ABNORMAL
EOSINOPHIL # BLD: 0 K/UL (ref 0–0.4)
EOSINOPHIL NFR BLD: 0 % (ref 0–7)
ERYTHROCYTE [DISTWIDTH] IN BLOOD BY AUTOMATED COUNT: 13.5 % (ref 11.5–14.5)
GLUCOSE SERPL-MCNC: 86 MG/DL (ref 65–100)
HCT VFR BLD AUTO: 40.9 % (ref 36.6–50.3)
HDLC SERPL-MCNC: 45 MG/DL
HDLC SERPL: 2.4 {RATIO} (ref 0–5)
HGB BLD-MCNC: 13.5 G/DL (ref 12.1–17)
IMM GRANULOCYTES # BLD: 0 K/UL
IMM GRANULOCYTES NFR BLD AUTO: 0 %
LDLC SERPL CALC-MCNC: 46 MG/DL (ref 0–100)
LIPID PROFILE,FLP: NORMAL
LYMPHOCYTES # BLD: 1.1 K/UL (ref 0.8–3.5)
LYMPHOCYTES NFR BLD: 8 % (ref 12–49)
MCH RBC QN AUTO: 30.3 PG (ref 26–34)
MCHC RBC AUTO-ENTMCNC: 33 G/DL (ref 30–36.5)
MCV RBC AUTO: 91.7 FL (ref 80–99)
MONOCYTES # BLD: 1.7 K/UL (ref 0–1)
MONOCYTES NFR BLD: 12 % (ref 5–13)
NEUTS BAND NFR BLD MANUAL: 1 % (ref 0–6)
NEUTS SEG # BLD: 11.3 K/UL (ref 1.8–8)
NEUTS SEG NFR BLD: 79 % (ref 32–75)
NRBC # BLD: 0 K/UL (ref 0–0.01)
NRBC BLD-RTO: 0 PER 100 WBC
PLATELET # BLD AUTO: 321 K/UL (ref 150–400)
PMV BLD AUTO: 11.1 FL (ref 8.9–12.9)
POTASSIUM SERPL-SCNC: 4.1 MMOL/L (ref 3.5–5.1)
RBC # BLD AUTO: 4.46 M/UL (ref 4.1–5.7)
RBC MORPH BLD: ABNORMAL
SODIUM SERPL-SCNC: 140 MMOL/L (ref 136–145)
T4 FREE SERPL-MCNC: 2.3 NG/DL (ref 0.8–1.5)
TRIGL SERPL-MCNC: 75 MG/DL (ref ?–150)
TROPONIN I SERPL-MCNC: 1.51 NG/ML
TSH SERPL DL<=0.05 MIU/L-ACNC: 0.23 UIU/ML (ref 0.36–3.74)
VLDLC SERPL CALC-MCNC: 15 MG/DL
WBC # BLD AUTO: 14.1 K/UL (ref 4.1–11.1)

## 2018-02-20 PROCEDURE — 97165 OT EVAL LOW COMPLEX 30 MIN: CPT

## 2018-02-20 PROCEDURE — 80048 BASIC METABOLIC PNL TOTAL CA: CPT | Performed by: INTERNAL MEDICINE

## 2018-02-20 PROCEDURE — 97161 PT EVAL LOW COMPLEX 20 MIN: CPT

## 2018-02-20 PROCEDURE — 85025 COMPLETE CBC W/AUTO DIFF WBC: CPT | Performed by: INTERNAL MEDICINE

## 2018-02-20 PROCEDURE — 84484 ASSAY OF TROPONIN QUANT: CPT | Performed by: INTERNAL MEDICINE

## 2018-02-20 PROCEDURE — 97530 THERAPEUTIC ACTIVITIES: CPT

## 2018-02-20 PROCEDURE — 74011250637 HC RX REV CODE- 250/637: Performed by: FAMILY MEDICINE

## 2018-02-20 PROCEDURE — 80061 LIPID PANEL: CPT | Performed by: INTERNAL MEDICINE

## 2018-02-20 PROCEDURE — 65660000000 HC RM CCU STEPDOWN

## 2018-02-20 PROCEDURE — 74011636637 HC RX REV CODE- 636/637: Performed by: FAMILY MEDICINE

## 2018-02-20 PROCEDURE — 74011000250 HC RX REV CODE- 250: Performed by: INTERNAL MEDICINE

## 2018-02-20 PROCEDURE — 84480 ASSAY TRIIODOTHYRONINE (T3): CPT | Performed by: INTERNAL MEDICINE

## 2018-02-20 PROCEDURE — 36415 COLL VENOUS BLD VENIPUNCTURE: CPT | Performed by: INTERNAL MEDICINE

## 2018-02-20 PROCEDURE — 82550 ASSAY OF CK (CPK): CPT | Performed by: INTERNAL MEDICINE

## 2018-02-20 PROCEDURE — 93005 ELECTROCARDIOGRAM TRACING: CPT

## 2018-02-20 PROCEDURE — 84439 ASSAY OF FREE THYROXINE: CPT | Performed by: INTERNAL MEDICINE

## 2018-02-20 PROCEDURE — 74011000250 HC RX REV CODE- 250: Performed by: FAMILY MEDICINE

## 2018-02-20 PROCEDURE — 74011250637 HC RX REV CODE- 250/637: Performed by: HOSPITALIST

## 2018-02-20 PROCEDURE — 94640 AIRWAY INHALATION TREATMENT: CPT

## 2018-02-20 PROCEDURE — G8979 MOBILITY GOAL STATUS: HCPCS

## 2018-02-20 PROCEDURE — G8978 MOBILITY CURRENT STATUS: HCPCS

## 2018-02-20 PROCEDURE — 77010033678 HC OXYGEN DAILY

## 2018-02-20 PROCEDURE — 94664 DEMO&/EVAL PT USE INHALER: CPT

## 2018-02-20 PROCEDURE — 77030027138 HC INCENT SPIROMETER -A

## 2018-02-20 PROCEDURE — 84443 ASSAY THYROID STIM HORMONE: CPT | Performed by: INTERNAL MEDICINE

## 2018-02-20 PROCEDURE — 74011250637 HC RX REV CODE- 250/637: Performed by: INTERNAL MEDICINE

## 2018-02-20 PROCEDURE — 74011000258 HC RX REV CODE- 258: Performed by: INTERNAL MEDICINE

## 2018-02-20 PROCEDURE — 97110 THERAPEUTIC EXERCISES: CPT

## 2018-02-20 RX ORDER — LEVOFLOXACIN 500 MG/1
500 TABLET, FILM COATED ORAL EVERY 24 HOURS
Status: DISCONTINUED | OUTPATIENT
Start: 2018-02-20 | End: 2018-02-23 | Stop reason: HOSPADM

## 2018-02-20 RX ORDER — BUDESONIDE 0.5 MG/2ML
500 INHALANT ORAL
Status: DISCONTINUED | OUTPATIENT
Start: 2018-02-20 | End: 2018-02-23 | Stop reason: HOSPADM

## 2018-02-20 RX ORDER — LISINOPRIL 5 MG/1
5 TABLET ORAL DAILY
Status: DISCONTINUED | OUTPATIENT
Start: 2018-02-20 | End: 2018-02-21

## 2018-02-20 RX ORDER — DOCUSATE SODIUM 100 MG/1
100 CAPSULE, LIQUID FILLED ORAL DAILY
Status: DISCONTINUED | OUTPATIENT
Start: 2018-02-21 | End: 2018-02-23 | Stop reason: HOSPADM

## 2018-02-20 RX ORDER — FUROSEMIDE 40 MG/1
40 TABLET ORAL DAILY
Status: DISCONTINUED | OUTPATIENT
Start: 2018-02-20 | End: 2018-02-21

## 2018-02-20 RX ADMIN — Medication 10 ML: at 06:12

## 2018-02-20 RX ADMIN — SIMVASTATIN 40 MG: 40 TABLET, FILM COATED ORAL at 21:27

## 2018-02-20 RX ADMIN — BUDESONIDE 500 MCG: 0.5 INHALANT RESPIRATORY (INHALATION) at 09:15

## 2018-02-20 RX ADMIN — DEXTROSE MONOHYDRATE AND SODIUM CHLORIDE 50 ML/HR: 5; .9 INJECTION, SOLUTION INTRAVENOUS at 04:45

## 2018-02-20 RX ADMIN — CLOPIDOGREL BISULFATE 75 MG: 75 TABLET ORAL at 08:17

## 2018-02-20 RX ADMIN — Medication 5 ML: at 14:00

## 2018-02-20 RX ADMIN — CARVEDILOL 6.25 MG: 6.25 TABLET, FILM COATED ORAL at 08:18

## 2018-02-20 RX ADMIN — LEVOFLOXACIN 500 MG: 500 TABLET, FILM COATED ORAL at 20:40

## 2018-02-20 RX ADMIN — LISINOPRIL 5 MG: 5 TABLET ORAL at 09:30

## 2018-02-20 RX ADMIN — NITROGLYCERIN 1 INCH: 20 OINTMENT TOPICAL at 06:12

## 2018-02-20 RX ADMIN — Medication 10 ML: at 21:30

## 2018-02-20 RX ADMIN — BUDESONIDE 500 MCG: 0.5 INHALANT RESPIRATORY (INHALATION) at 20:00

## 2018-02-20 RX ADMIN — ASPIRIN 81 MG: 81 TABLET, COATED ORAL at 08:17

## 2018-02-20 RX ADMIN — FUROSEMIDE 40 MG: 40 TABLET ORAL at 09:30

## 2018-02-20 RX ADMIN — OXYCODONE HYDROCHLORIDE 40 MG: 5 TABLET ORAL at 06:13

## 2018-02-20 RX ADMIN — PREDNISONE 40 MG: 20 TABLET ORAL at 08:18

## 2018-02-20 NOTE — NURSE NAVIGATOR
Chart reviewed by Heart Failure Nurse Navigator. Heart Failure database completed. EF 30% via cardiac cath. ACEi/ARB: lisinopril 5 mg, daily. BB: coreg 6.25 mg twice daily      CRT:      NY[facility] Functional Class documentation requested via Provider Message on [place] [address]. Heart Failure Teach Back in Patient Education. Heart Failure Avoiding Triggers on Discharge Instructions.      Cardiologist:  Dr. Purvi Whittaker ([facility])

## 2018-02-20 NOTE — PROGRESS NOTES
Hospitalist Progress Note  Monik Chaudhary NP  Answering service: [phone] OR [phone] from in house phone  Cell: [phone]   Date of Service:  2018  NAME:  Roberto Josue  :  1950  MRN:  [id number]    Admission Summary:   Pt presented to the ED with shortness of breath, cough and worsening weakness for the past 3 days. Reported he could not walk few steps without getting SOB. Has a chronic cough but recently it is getting worse and is productive. Pt was seen at [facility] recently and had a negative workup. Interval history / Subjective:   Pt in bed, reports he does not feel much better - still fatigued. Really wants to go to [facility]. Assessment & Plan:     NSTEMI (likely predating admit):  - denied chest pain at any point prior to or during admit  - serial cardiac enzymes: peak of 8.81 ->5.35 -> 3.75-> 1.51  - FLP reviewed: Chol 121 Hdl 43 Ldl 59.4 (on statin)  - s/p cardiac cath : stents placed. Significant 2 vessel CAD with 90% lesion in large mid-diagonal and short  of mid-cx. Dilated LV with severe hypokinesis of apical and anteroapical walls.  EF 30% via cardiac cath  - continue with ASA/plavix  - BB, ACEi, lasix    Pneumonia (POA):  - CTA of chest: Chronic lung disease, LLL infiltrate (which was present in 2016)  - continue with levaquin, pulmonary recommending 8-10 days (today is day 4)  - continue nebs and pulmonary recommends spiriva and advair on discharge  - leukocytosis back up today but remains afebrile - could be due to steroids    COPD exacerbation due to above:    - wheezing at the time of presentation in ED but this has resolved, lungs diminished  - on nebs/abx   - po steroids x 5 doses  - elevated D dimer but no pulmonary embolus seen on Chest CTA    Underweight:  - BMI 17.62  - poor-fair appetite  - supplements TID    Hx Chronic Respiratory Failure:   - hx of lung cancer with lobectomy   - on home oxygen (3 L NC at max and does not wear continuously)    Hx Hyperlipidemia: continue statin. Code status: Full  DVT prophylaxis: Lovenox  Care Plan discussed with: patient, sister and cardiologist  Disposition: Lives at home alone, pt wants to go to [address] [place] [address] (has been there before). Consulted  for referral.     Hospital Problems  Date Reviewed: 10/5/2015          Codes Class Noted POA    * (Principal)Shortness of breath ICD-10-CM: R06.02  ICD-9-CM: 786.05  2/17/2018 Yes        COPD exacerbation ([facility] (Chronic) ICD-10-CM: J44.1  ICD-9-CM: 491.21  9/5/2015 Yes            Review of Systems:   Denies HA. No chest pain or pressure. + SOB and fatigues easily on exertion. No N/V. Vital Signs:    Last 24hrs VS reviewed since prior progress note. Most recent are:  Visit Vitals    BP (!) 85/59    Pulse 80    Temp 97.6 °F (36.4 °C)    Resp 14    Ht 5' 11\" (1.803 m)    Wt 57.6 kg (126 lb 15.8 oz)    SpO2 92%    BMI 17.71 kg/m2       Intake/Output Summary (Last 24 hours) at 02/20/18 1650  Last data filed at 02/20/18 1623   Gross per 24 hour   Intake              900 ml   Output             2525 ml   Net            -1625 ml      Physical Examination:         Constitutional:  No acute distress, cooperative, pleasant. Very thin/emaciated appearance.    ENT:  Oral mucous membranes dry. Resp:  Diminished but no wheezing/rhonchi/rales. On 4 L NC.   CV:  Regular rhythm, normal rate, no murmurs. SR on tele review. GI:  Soft, non distended, non tender. Normoactive bowel sounds. Musculoskeletal:  No edema, warm, 2+ pulses throughout. Neurologic:  Moves all extremities. AAOx3.        Data Review:   Review and/or order of clinical lab test  Review and/or order of tests in the radiology section of CPT  Review and/or order of tests in the medicine section of CPT    Labs:     Recent Labs      02/20/18   0454  02/19/18   0357   WBC  14.1*  15.8*   HGB  13.5  13.2   HCT  40.9  39.7   PLT  321  248 Recent Labs      02/20/18   0454  02/19/18   0357  02/18/18   0530   NA  140  143  138   K  4.1  4.0  4.1   CL  103  107  104   CO2  28  28  26   BUN  28*  30*  24*   CREA  1.15  1.24  0.99   GLU  86  100  141*   CA  8.7  8.7  8.8     Recent Labs      02/18/18   0530   SGOT  36   ALT  23   AP  76   TBILI  0.5   TP  6.8   ALB  2.4*   GLOB  4.4*     No results for input(s): INR, PTP, APTT in the last 72 hours. No lab exists for component: INREXT, INREXT   No results for input(s): FE, TIBC, PSAT, FERR in the last 72 hours. No results found for: FOL, RBCF   No results for input(s): PH, PCO2, PO2 in the last 72 hours.   Recent Labs      02/20/18   0454  02/18/18   0530  02/17/18   2253   CPK  71   --    --    CKNDX  11.4*   --    --    TROIQ  1.51*  3.75*  5.35*     Lab Results   Component Value Date/Time    Cholesterol, total 106 02/20/2018 04:54 AM    HDL Cholesterol 45 02/20/2018 04:54 AM    LDL, calculated 46 02/20/2018 04:54 AM    Triglyceride 75 02/20/2018 04:54 AM    CHOL/HDL Ratio 2.4 02/20/2018 04:54 AM     Lab Results   Component Value Date/Time    Glucose (POC) 100 09/14/2015 12:14 PM     Lab Results   Component Value Date/Time    Color YELLOW/STRAW 11/28/2016 04:44 PM    Appearance CLEAR 11/28/2016 04:44 PM    Specific gravity 1.020 11/28/2016 04:44 PM    pH (UA) 5.0 11/28/2016 04:44 PM    Protein TRACE (A) 11/28/2016 04:44 PM    Glucose NEGATIVE  11/28/2016 04:44 PM    Ketone TRACE (A) 11/28/2016 04:44 PM    Bilirubin NEGATIVE  11/28/2016 04:44 PM    Urobilinogen 0.2 11/28/2016 04:44 PM    Nitrites NEGATIVE  11/28/2016 04:44 PM    Leukocyte Esterase TRACE (A) 11/28/2016 04:44 PM    Epithelial cells FEW 11/28/2016 04:44 PM    Bacteria NEGATIVE  11/28/2016 04:44 PM    WBC 5-10 11/28/2016 04:44 PM    RBC 0-5 11/28/2016 04:44 PM     Medications Reviewed:     Current Facility-Administered Medications   Medication Dose Route Frequency    furosemide (LASIX) tablet 40 mg  40 mg Oral DAILY    lisinopril (PRINIVIL, ZESTRIL) tablet 5 mg  5 mg Oral DAILY    levoFLOXacin (LEVAQUIN) tablet 500 mg  500 mg Oral Q24H    budesonide (PULMICORT) 500 mcg/2 ml nebulizer suspension  500 mcg Nebulization BID RT    [START ON 2/21/2018] docusate sodium (COLACE) capsule 100 mg  100 mg Oral DAILY    saline peripheral flush soln 10 mL  10 mL InterCATHeter PRN    sodium chloride (NS) flush 5-10 mL  5-10 mL IntraVENous Q8H    sodium chloride (NS) flush 5-10 mL  5-10 mL IntraVENous PRN    carvedilol (COREG) tablet 6.25 mg  6.25 mg Oral Q12H    aspirin delayed-release tablet 81 mg  81 mg Oral DAILY    clopidogrel (PLAVIX) tablet 75 mg  75 mg Oral DAILY    albuterol (PROVENTIL VENTOLIN) nebulizer solution 0.63 mg  0.63 mg Nebulization Q6H PRN    albuterol-ipratropium (DUO-NEB) 2.5 MG-0.5 MG/3 ML  3 mL Nebulization Q4H PRN    oxyCODONE IR (ROXICODONE) tablet 40 mg  40 mg Oral Q8H PRN    simvastatin (ZOCOR) tablet 40 mg  40 mg Oral QHS    sodium chloride (NS) flush 5-10 mL  5-10 mL IntraVENous Q8H    sodium chloride (NS) flush 5-10 mL  5-10 mL IntraVENous PRN    predniSONE (DELTASONE) tablet 40 mg  40 mg Oral DAILY WITH BREAKFAST   ______________________________________________________________________  EXPECTED LENGTH OF STAY: 3d 19h  ACTUAL LENGTH OF STAY:          3              Emmanuel Calixto NP

## 2018-02-20 NOTE — PROGRESS NOTES
TRANSFER - IN REPORT:    Verbal report received from [address] CVT on Vashti Liner  being received from procedure for routine progression of care. Report consisted of patients Situation, Background, Assessment and Recommendations(SBAR). Information from the following report(s) Procedure Summary, MAR, Recent Results and Med Rec Status was reviewed with the receiving clinician. Opportunity for questions and clarification was provided. Assessment completed upon patients arrival to [address] [place] [address] and care assumed. Cardiac Cath Lab Recovery Arrival Note:    Vashti Liner arrived to [facility] recovery area. Patient procedure= C. Patient on cardiac monitor, non-invasive blood pressure, SPO2 monitor. On  O2 @ 3 lpm via n/c. IV  of nacl on pump at 85 ml/hr. Patient status doing well without problems. Patient is A&Ox 4. Patient reports no complaints. PROCEDURE SITE CHECK:    Procedure site:without any bleeding and or hematoma, no pain/discomfort reported at procedure site. No change in patient status. Continue to monitor patient and status. Venous and arterial sheath sutured in right groin. Dr Meryle Carp talked with pt and his sister.

## 2018-02-20 NOTE — PROGRESS NOTES
Cardiology Progress Note  2018     Admit Date: 2018  Admit Diagnosis: COPD exacerbation ([facility]  CC: none currently    Assessment:   Principal Problem:    Shortness of breath (2018)    Active Problems:    COPD exacerbation ([facility] (2015)      Plan:   Feels no different. Labs and EKG pending. Good output post IV lasix in Cath Lab recovery. Ambulate, adjust meds and consult Pulmonary re: lung CA and recent profound weight loss. Volume status:euvolemic  Renal function: stable    For other plans, see orders.   Subjective: Sean Madhuri reports   Chest Pain:  [x]   none,  consistent with  []   non-cardiac   []   atypical   []   angina             [x]   none now    []      on-going  Dyspnea: [x]   none  []   at rest  []   with exertion     []   improved   []   unchanged   []   worsening  PND:       [x]   none  []   overnight    Orthopnea: [x]   none  []   improved  []   unchanged  []   worsening  Presyncope: [x]   none   []   improved    []   unchanged    []   worsening  Ambulated in hallway without symptoms  []   Yes  Ambulated in room without symptoms  []   Yes    Objective:    Physical Exam:  Overall VSSAF;    Visit Vitals    /72    Pulse 84    Temp 97.8 °F (36.6 °C)    Resp 12    Ht 5' 11\" (1.803 m)    Wt 57.3 kg (126 lb 5.2 oz)  Comment: nurse chloe aware    SpO2 92%    BMI 17.62 kg/m2     Temp (24hrs), Av °F (36.7 °C), Min:97.7 °F (36.5 °C), Max:98.7 °F (37.1 °C)    Patient Vitals for the past 8 hrs:   Pulse   18 0814 84   18 0803 85   18 0445 86    Patient Vitals for the past 8 hrs:   Resp   18 0814 12   18 0803 16   18 0445 22    Patient Vitals for the past 8 hrs:   BP   18 0814 118/72   18 0803 110/74   18 0445 111/61        Intake/Output Summary (Last 24 hours) at 18 0834  Last data filed at 18 8510   Gross per 24 hour   Intake              440 ml   Output             2350 ml   Net -1910 ml       General Appearance: No acute distress. Ears/Nose/Mouth/Throat:   Normal MM; anicteric. JVP: WNL   Resp:   Lungs clear to auscultation bilaterally. Nl resp effort. Cardiovascular:  RRR, S1, S2 normal, no new murmur. No gallop or rub. Abdomen:   Soft, non-tender, bowel sounds are present. Extremities: No edema bilaterally. Skin:  Neuro: Warm and dry. A/O x3, grossly nonfocal    [x]      cath site intact w/o hematoma or bruit; distal pulse unchanged. Data Review:     Telemetry independently reviewed : [x]   sinus  []   chronic afib   []   par afib  []      NSVT    ECG independently reviewed:  []   NSR   []   no significant changes  [x]   no new ECG provided for review  Lab results reviewed as noted below. Current medications reviewed as noted below. No results for input(s): PH, PCO2, PO2 in the last 72 hours. Recent Labs      02/18/18   0530  02/17/18   2253  02/17/18   1502   TROIQ  3.75*  5.35*  8.81*     Recent Labs      02/20/18   0454  02/19/18   0357  02/18/18   0530  02/17/18   1502   NA   --   143  138  140   K   --   4.0  4.1  4.2   CL   --   107  104  101   CO2   --   28  26  28   BUN   --   30*  24*  30*   CREA   --   1.24  0.99  1.21   GLU   --   100  141*  106*   CA   --   8.7  8.8  9.3   ALB   --    --   2.4*  3.1*   WBC  14.1*  15.8*  12.2*  17.0*   HGB  13.5  13.2  13.8  16.2   HCT  40.9  39.7  41.2  47.6   PLT  321  248  216  271     Recent Labs      02/18/18   0530  02/17/18   1502   SGOT  36  64*   ALT  23  28   AP  76  91   TBILI  0.5  0.9   TP  6.8  8.1   ALB  2.4*  3.1*   GLOB  4.4*  5.0*     No results for input(s): INR, PTP, APTT in the last 72 hours. No lab exists for component: INREXT   No results for input(s): FE, TIBC, PSAT, FERR in the last 72 hours.    Lab Results   Component Value Date/Time    Glucose (POC) 100 09/14/2015 12:14 PM       Current Facility-Administered Medications   Medication Dose Route Frequency    furosemide (LASIX) tablet 40 mg 40 mg Oral DAILY    saline peripheral flush soln 10 mL  10 mL InterCATHeter PRN    sodium chloride (NS) flush 5-10 mL  5-10 mL IntraVENous Q8H    sodium chloride (NS) flush 5-10 mL  5-10 mL IntraVENous PRN    carvedilol (COREG) tablet 6.25 mg  6.25 mg Oral Q12H    aspirin delayed-release tablet 81 mg  81 mg Oral DAILY    clopidogrel (PLAVIX) tablet 75 mg  75 mg Oral DAILY    albuterol (PROVENTIL VENTOLIN) nebulizer solution 0.63 mg  0.63 mg Nebulization Q6H PRN    levoFLOXacin (LEVAQUIN) 500 mg in D5W IVPB  500 mg IntraVENous Q24H    albuterol-ipratropium (DUO-NEB) 2.5 MG-0.5 MG/3 ML  3 mL Nebulization Q4H PRN    budesonide (PULMICORT) 500 mcg/2 ml nebulizer suspension  500 mcg Nebulization BID    oxyCODONE IR (ROXICODONE) tablet 40 mg  40 mg Oral Q8H    oxyCODONE IR (ROXICODONE) tablet 40 mg  40 mg Oral Q8H PRN    simvastatin (ZOCOR) tablet 40 mg  40 mg Oral QHS    sodium chloride (NS) flush 5-10 mL  5-10 mL IntraVENous Q8H    sodium chloride (NS) flush 5-10 mL  5-10 mL IntraVENous PRN    predniSONE (DELTASONE) tablet 40 mg  40 mg Oral DAILY WITH BREAKFAST        Maxine Sr MD

## 2018-02-20 NOTE — PROGRESS NOTES
Clinical Pharmacy Note: Re: IV to PO Automatic Conversion - Antibiotic    Please note: Nilo Driver medication- Levofloxacin has been changed from IV to PO based on the following criteria:    The patient:  1. Has received IV therapy for at least 48 hours   2. Has a functioning GI tract  - Taking scheduled oral medications  - Tolerating tube feeds at goal rate or a full liquid, soft, or regular diet         3. Is clinically stable        - Temperature < 100.4F for at least 24 hours        - WBC is trending down    This IV to PO conversion is based on the [employer] approved automatic conversion policy for eligible patients. Please call with questions.

## 2018-02-20 NOTE — PROGRESS NOTES
Problem: Self Care Deficits Care Plan (Adult)  Goal: *Acute Goals and Plan of Care (Insert Text)  Occupational Therapy Goals  Initiated 2/20/2018  1. Patient will perform ADLs standing 2 mins without fatigue or LOB with modified independence within 7 day(s). 2.  Patient will perform lower body ADLs with modified independence within 7 day(s). 3.  Patient will perform bathing with supervision/set-up within 7 day(s). 4.  Patient will perform toilet transfers with modified independence within 7 day(s). 5.  Patient will perform all aspects of toileting with independence within 7 day(s). 6.  Patient will participate in upper extremity therapeutic exercise/activities with focus on PLB and pacing to increase independence with ADLs with independence for 5 minutes within 7 day(s). Occupational Therapy EVALUATION  Patient: Treva Schwazr (46 y.o. male)  Date: 2/20/2018  Primary Diagnosis: COPD exacerbation ([facility] [address]        Precautions: Fall       ASSESSMENT :  Based on the objective data described below, the patient presents with overall Min A x1 for functional mobility, up to Mod A for lower body ADLs, and independent for upper body ADLs s/p COPD exacerbation. Patient primary deficits related to impaired pulmonary status/activity tolerance and global BLE weakness d/t deconditioning. Reinforced functional incorporation of PLB techniques into ADLs and simple independent BUE exercises. Patient demonstrating good understanding. Patient with O2 saturation >90% on 4LO2 NC. Issued incentive spirometer and education on proper use to maximize lung health and capacity. Patient reporting he is very motivated to return to independent lifestyle and is interested in inpatient rehab. Patient is steadily working towards tolerating 3 hours/day of therapy. Will benefit from pacing, energy conservation techniques, therapeutic exercises, and increased time OOB as medically tolerated to maximize achievement of OT/PT goals.     Patient will benefit from skilled intervention to address the above impairments. Patients rehabilitation potential is considered to be Good  Factors which may influence rehabilitation potential include:   []             None noted  []             Mental ability/status  []             Medical condition  []             Home/family situation and support systems  []             Safety awareness  []             Pain tolerance/management  []             Other:      PLAN :  Recommendations and Planned Interventions:  [x]               Self Care Training                  [x]        Therapeutic Activities  [x]               Functional Mobility Training    []        Cognitive Retraining  [x]               Therapeutic Exercises           [x]        Endurance Activities  [x]               Balance Training                   []        Neuromuscular Re-Education  []               Visual/Perceptual Training     [x]   Home Safety Training  [x]               Patient Education                 [x]        Family Training/Education  []               Other (comment):    Frequency/Duration: Patient will be followed by occupational therapy 5 times a week to address goals. Discharge Recommendations: Inpatient pulmonary rehab - patient requesting [facility], has been to in the past when experiencing similar medical deficits  Further Equipment Recommendations for Discharge: TBD     SUBJECTIVE:   Patient stated I want to work on getting my lungs strong so I can walk.     OBJECTIVE DATA SUMMARY:   HISTORY:   Past Medical History:   Diagnosis Date    Asthma     Cancer ([facility] [address]     Lung Cancer    Chronic obstructive pulmonary disease (HCC)     Chronic pain     High cholesterol     Lung cancer (HCC)      Past Surgical History:   Procedure Laterality Date    HX LOBECTOMY      Right lung    HX OTHER SURGICAL      Partial right lung removal       Prior Level of Function/Environment/Context: Per patient report, lives at home with wife.  Patient is typically independent with ADLs. Patient has been to [facility] 2 years ago, states he experienced similar medical events and initially unable to walk >10 feet, was independent with mobility after 7 days at inpatient rehab. Home Situation  Home Environment: Private residence  # Steps to Enter: 0  One/Two Story Residence: One story  Living Alone: No  Support Systems: Family member(s)  Patient Expects to be Discharged to[de-identified] Private residence  Current DME Used/Available at Home: None, Oxygen, portable  []  Right hand dominant   []  Left hand dominant    EXAMINATION OF PERFORMANCE DEFICITS:  Cognitive/Behavioral Status:  Neurologic State: Alert  Orientation Level: Oriented X4  Cognition: Appropriate for age attention/concentration; Follows commands  Perception: Appears intact  Perseveration: No perseveration noted  Safety/Judgement: Awareness of environment; Insight into deficits; Fall prevention    Skin: Appears intact    Edema: None noted in BUEs    Hearing: Auditory  Auditory Impairment: None    Vision/Perceptual:    Tracking: Able to track stimulus in all quadrants w/o difficulty                      Acuity: Within Defined Limits         Range of Motion:  In BUEs  AROM: Within functional limits  PROM: Within functional limits                      Strength: In BUEs  Strength: Generally decreased, functional                Coordination:  Coordination: Within functional limits  Fine Motor Skills-Upper: Left Intact; Right Intact    Gross Motor Skills-Upper: Left Intact; Right Intact    Tone & Sensation:  In BUEs  Tone: Normal  Sensation: Intact                      Balance:  Sitting: Intact  Standing: Impaired  Standing - Static: Good  Standing - Dynamic : Fair    Functional Mobility and Transfers for ADLs:  Bed Mobility:  Rolling: Additional time;Minimum assistance;Assist x1  Supine to Sit: Minimum assistance  Scooting:  Additional time;Modified independent    Transfers:  Sit to Stand: Assist x1;Minimum assistance  Toilet Transfer : Minimum assistance;Assist x1 (Inferred per PT report of mobility and O2 sats)    ADL Assessment:  Feeding: Independent    Oral Facial Hygiene/Grooming: Independent    Bathing: Moderate assistance (Inferred for dec fxnl reach d/t balance & pulm. def.)    Upper Body Dressing: Independent    Lower Body Dressing: Moderate assistance (Inferred for dec fxnl reach d/t balance & pulm. def.)    Toileting: Supervision (Inferred for imapired activity tolerance)    ADL Intervention and task modifications:    Cognitive Retraining  Safety/Judgement: Awareness of environment; Insight into deficits; Fall prevention    Therapeutic Exercise:    EXERCISE   Sets   Reps   Active Active Assist   Passive   Comments   Shoulder Flexion 1 5 [x]           []           []              Scapular Elevation 1 5 [x]           []           []              Shoulder abduction 1 5 [x]           []           []              Incentive Spirometer 1 10 [x]           []           []                 Functional Measure:  Barthel Index:    Bathin  Bladder: 10  Bowels: 10  Groomin  Dressin  Feeding: 10  Mobility: 10  Stairs: 0  Toilet Use: 5  Transfer (Bed to Chair and Back): 10  Total: 65       Barthel and G-code impairment scale:  Percentage of impairment CH  0% CI  1-19% CJ  20-39% CK  40-59% CL  60-79% CM  80-99% CN  100%   Barthel Score 0-100 100 99-80 79-60 59-40 20-39 1-19   0   Barthel Score 0-20 20 17-19 13-16 9-12 5-8 1-4 0      The Barthel ADL Index: Guidelines  1. The index should be used as a record of what a patient does, not as a record of what a patient could do. 2. The main aim is to establish degree of independence from any help, physical or verbal, however minor and for whatever reason. 3. The need for supervision renders the patient not independent. 4. A patient's performance should be established using the best available evidence.  Asking the patient, friends/relatives and nurses are the usual sources, but direct observation and common sense are also important. However direct testing is not needed. 5. Usually the patient's performance over the preceding 24-48 hours is important, but occasionally longer periods will be relevant. 6. Middle categories imply that the patient supplies over 50 per cent of the effort. 7. Use of aids to be independent is allowed. [place] Akhtar., Barthel, D.W. (4137). Functional evaluation: the Barthel Index. [facility] (14)2. RANCHO Banerjee, Jay Crenshaw., Dick Montejo., [place], [facility] (1999). Measuring the change indisability after inpatient rehabilitation; comparison of the responsiveness of the Barthel Index and Functional [place] Measure. Journal of Neurology, Neurosurgery, and Psychiatry, 66(4), 238-289. LORAINE Oconnell, MARI Allen, & Monik Castro M.A. (2004.) Assessment of post-stroke quality of life in cost-effectiveness studies: The usefulness of the Barthel Index and the EuroQoL-5D. Quality of Life Research, 13, 580-82         G codes: In compliance with CMSs Claims Based Outcome Reporting, the following G-code set was chosen for this patient based on their primary functional limitation being treated: The outcome measure chosen to determine the severity of the functional limitation was the Barthel Index with a score of 65/100 which was correlated with the impairment scale. ?  Self Care:     - CURRENT STATUS: CJ - 20%-39% impaired, limited or restricted    - GOAL STATUS: CI - 1%-19% impaired, limited or restricted    - D/C STATUS:  ---------------To be determined---------------     Occupational Therapy Evaluation Charge Determination   History Examination Decision-Making   LOW Complexity : Brief history review  LOW Complexity : 1-3 performance deficits relating to physical, cognitive , or psychosocial skils that result in activity limitations and / or participation restrictions  MEDIUM Complexity : Patient may present with comorbidities that affect occupational performnce. Miniml to moderate modification of tasks or assistance (eg, physical or verbal ) with assesment(s) is necessary to enable patient to complete evaluation       Based on the above components, the patient evaluation is determined to be of the following complexity level: LOW   Pain:  Pain Scale 1: Numeric (0 - 10)  Pain Intensity 1: 0              Activity Tolerance:   Good. O2 saturation >90% on 4LO2 NC while participating in exercises in bed  Please refer to the flowsheet for vital signs taken during this treatment. After treatment:   [] Patient left in no apparent distress sitting up in chair  [x] Patient left in no apparent distress in bed  [x] Call bell left within reach  [x] Nursing notified  [] Caregiver present  [] Bed alarm activated    COMMUNICATION/EDUCATION:   The patients plan of care was discussed with: Physical Therapist and Registered Nurse. [x] Home safety education was provided and the patient/caregiver indicated understanding. [x] Patient/family have participated as able in goal setting and plan of care. [] Patient/family agree to work toward stated goals and plan of care. [] Patient understands intent and goals of therapy, but is neutral about his/her participation. [] Patient is unable to participate in goal setting and plan of care. This patients plan of care is appropriate for delegation to [facility].     Thank you for this referral.  Danny Banuelos OT  Time Calculation: 13 mins

## 2018-02-20 NOTE — PROGRESS NOTES
Problem: Mobility Impaired (Adult and Pediatric)  Goal: *Acute Goals and Plan of Care (Insert Text)  Physical Therapy Goals  Initiated 2/20/2018  1. Patient will move from supine to sit and sit to supine , scoot up and down and roll side to side in bed with independence within 7 day(s). 2.  Patient will transfer from bed to chair and chair to bed with independence using the least restrictive device within 7 day(s). 3.  Patient will perform sit to stand with independence within 7 day(s). 4.  Patient will ambulate with minimal assistance/contact guard assist for 100 feet with the least restrictive device within 7 day(s). 5.  Patient will maintain sats >90% with activity within 7 days. physical Therapy EVALUATION  Patient: Jessica Gaffney (24 y.o. male)  Date: 2/20/2018  Primary Diagnosis: COPD exacerbation ([facility] [address]        Precautions: falls,        ASSESSMENT :  Based on the objective data described below, the patient presents with generalized weakness, decreased functional mobility, decreased sats with activity and SOB with activity, decreased gait below his baseline. Pt stating he normally ambulates without an assistive device but shortly PTA he was unable to get to his front door. Pt attributes this to weakness as well as SOB. Pt today with sats in mid 90s on 31/2 liters NC O2. With activity to edge of bed and to stand, assisted nsg with standing weight on scale, pt able to perform with min assist.  Pt's balance fair with standing mobility and requiring min assist for all. His main issue was resultant desats with wheezing/SOB after this minimal activity. sats were 86%. Pt cued for deep, pursed lip breathing as he was mouth breathing. Increased O2 to 4 liters and slow improvement noted to 90%. Pt would benefit from inpt rehab vs snf after discharge to help him attain his prior level of function. .    Patient will benefit from skilled intervention to address the above impairments.   Patients rehabilitation potential is considered to be Good  Factors which may influence rehabilitation potential include:   [x]         None noted  []         Mental ability/status  []         Medical condition  []         Home/family situation and support systems  []         Safety awareness  []         Pain tolerance/management  []         Other:      PLAN :  Recommendations and Planned Interventions:  [x]           Bed Mobility Training             []    Neuromuscular Re-Education  [x]           Transfer Training                   []    Orthotic/Prosthetic Training  []           Gait Training                         []    Modalities  [x]           Therapeutic Exercises           []    Edema Management/Control  []           Therapeutic Activities            []    Patient and Family Training/Education  []           Other (comment):    Frequency/Duration: Patient will be followed by physical therapy  5 times a week to address goals. Discharge Recommendations: Rehab  Further Equipment Recommendations for Discharge: tbd     SUBJECTIVE:   Patient stated I feel so weak, I can't get up and move around right now.     OBJECTIVE DATA SUMMARY:   HISTORY:    Past Medical History:   Diagnosis Date    Asthma     Cancer ([facility] [address]     Lung Cancer    Chronic obstructive pulmonary disease (HCC)     Chronic pain     High cholesterol     Lung cancer (HCC)      Past Surgical History:   Procedure Laterality Date    HX LOBECTOMY      Right lung    HX OTHER SURGICAL      Partial right lung removal     Prior Level of Function/Home Situation: Ind PTA  Personal factors and/or comorbidities impacting plan of care:     Home Situation  Home Environment: Private residence  # Steps to Enter: 0  One/Two Story Residence: One story  Living Alone: No  Support Systems: Family member(s)  Patient Expects to be Discharged to[de-identified] Private residence  Current DME Used/Available at Home: None, Oxygen, portable    EXAMINATION/PRESENTATION/DECISION MAKING: Critical Behavior: AxOx3  Hearing: Auditory  Auditory Impairment: None  Skin:  intact  Edema: none noted  Range Of Motion:  AROM: Generally decreased, functional      PROM: Generally decreased, functional           Strength:    Strength: Generally decreased, functional         Tone & Sensation:          Sensation: Intact               Coordination:  Coordination: Within functional limits  Vision:      Functional Mobility:  Bed Mobility:  Rolling: Additional time;Minimum assistance;Assist x1  Supine to Sit: Minimum assistance     Scooting: Additional time;Modified independent  Transfers:  Sit to Stand: Assist x1;Minimum assistance         Balance:   Sitting: Intact  Standing: Impaired  Standing - Static: Good  Standing - Dynamic : Fair  Ambulation/Gait Training:              Gait Description (WDL): Exceptions to WDL           Base of Support: Narrowed         Therapeutic Exercises:   Instructed in deep/pursed lip breathing      Tinetti test:    Sitting Balance: 1  Arises: 1  Attempts to Rise: 0  Immediate Standing Balance: 1  Standing Balance: 1  Nudged: 1  Eyes Closed: 0  Turn 360 Degrees - Continuous/Discontinuous: 0  Turn 360 Degrees - Steady/Unsteady: 0  Sitting Down: 1  Balance Score: 6  Indication of Gait: 0  R Step Length/Height: 0  L Step Length/Height: 0  R Foot Clearance: 0  L Foot Clearance: 0  Step Symmetry: 0  Step Continuity: 0  Path: 0  Trunk: 0  Walking Time: 1  Gait Score: 1  Total Score: 7       Tinetti Test and G-code impairment scale:  Percentage of Impairment CH    0%   CI    1-19% CJ    20-39% CK    40-59% CL    60-79% CM    80-99% CN     100%   Tinetti  Score 0-28 28 23-27 17-22 12-16 6-11 1-5 0       Tinetti Tool Score Risk of Falls  <19 = High Fall Risk  19-24 = Moderate Fall Risk  25-28 = Low Fall Risk  Tinetti ME. Performance-Oriented Assessment of Mobility Problems in Elderly Patients. [address]; [id number].  (Scoring Description: PT Bulletin Feb. 10, 1993)    Older adults: Donna Egan et al, 2009; n = 1000 [facility] elderly evaluated with ABC, ARMEN, ADL, and IADL)  · Mean ARMEN score for males aged 69-68 years = 26.21(3.40)  · Mean ARMEN score for females age 69-68 years = 25.16(4.30)  · Mean ARMEN score for males over 80 years = 23.29(6.02)  · Mean ARMEN score for females over 80 years = 17.20(8.32)           G codes: In compliance with CMSs Claims Based Outcome Reporting, the following G-code set was chosen for this patient based on their primary functional limitation being treated: The outcome measure chosen to determine the severity of the functional limitation was the Tinetti with a score of 7 which was correlated with the impairment scale. ? Mobility - Walking and Moving Around:     - CURRENT STATUS: CL - 60%-79% impaired, limited or restricted    - GOAL STATUS: CK - 40%-59% impaired, limited or restricted    - D/C STATUS:  ---------------To be determined---------------      Physical Therapy Evaluation Charge Determination   History Examination Presentation Decision-Making   MEDIUM  Complexity : 1-2 comorbidities / personal factors will impact the outcome/ POC  LOW Complexity : 1-2 Standardized tests and measures addressing body structure, function, activity limitation and / or participation in recreation  LOW Complexity : Stable, uncomplicated  LOW Complexity : FOTO score of       Based on the above components, the patient evaluation is determined to be of the following complexity level: LOW     Pain:  Pain Scale 1: Numeric (0 - 10)  Pain Intensity 1: 0              Activity Tolerance:   Poor, limited by SOB  Please refer to the flowsheet for vital signs taken during this treatment.   After treatment:   []         Patient left in no apparent distress sitting up in chair  [x]         Patient left in no apparent distress in bed  [x]         Call bell left within reach  [x]         Nursing notified  []         Caregiver present  []         Bed alarm activated    COMMUNICATION/EDUCATION:   The patients plan of care was discussed with: Registered Nurse. [x]         Fall prevention education was provided and the patient/caregiver indicated understanding. [x]         Patient/family have participated as able in goal setting and plan of care. [x]         Patient/family agree to work toward stated goals and plan of care. []         Patient understands intent and goals of therapy, but is neutral about his/her participation. []         Patient is unable to participate in goal setting and plan of care.     Thank you for this referral.  Michael Varela, PT   Time Calculation: 22 mins

## 2018-02-20 NOTE — PROGRESS NOTES
Problem: Breathing Pattern - Ineffective  Goal: *Absence of hypoxia  Outcome: Progressing Towards Goal  Pt maintains O2 saturations above 95% on 4L NC. Pt coached with pursed lip breathing after drop in saturations after standing up. 2000  Bedside and Verbal shift change report given to Leon De Paz RN (oncoming nurse) by Sumi Almonte (offgoing nurse). Report included the following information SBAR, ED Summary, Procedure Summary, Intake/Output, MAR, Recent Results and Cardiac Rhythm nsr. Hourly rounds performed.

## 2018-02-20 NOTE — PROGRESS NOTES
Reason for Admission: Shortness of breath  RRAT Score:8  Plan for utilizing home health:TBD  Likelihood of Readmission: Low based on review of frequency of admissions    CM met with patient at beside to discuss CM role and to assess needs. Patient's sister Pia Cherry was present in the room and appeared to be incontinent. Patient verified demographic info. Patient reports that he lives with his son, grandkids and son's girlfriend but he stays in a \"12x12\" outside of the home that has electricity. Patient reports that he goes to the actual house for water. Patient reports that he also receives support from his patrick Damico [place]. Patient reports that his PCP is \"[facility]. Kalpana Duke at [facility]" and that he sees her every three months. Patient reports that he has 3 liters of oxygen at home but he does not use this everyday. Patient's pharmacy is [facility]. Patient reports that he has always been fairly independent up until now. Patient stated that he wants to go to [facility] following hospital discharge as he has been there before. He also reports that he is a member of the \"gym\" at [facility]. CM to continue to monitor needs. Care Management Interventions  PCP Verified by CM: Yes (Patient reports that he sees \"Dr. Kalpana Duke at [facility]\")  Mode of Transport at Discharge: Other (see comment) (Family)  MyChart Signup: Yes  Discharge Durable Medical Equipment: No  Physical Therapy Consult: Yes  Occupational Therapy Consult: No  Speech Therapy Consult: No  Current Support Network: Other (Patient lives in a [address] building near actual home)  Confirm Follow Up Transport: Family (Patient reports that if he can not drive, he will have someone to transport him to where he needs to go.)  Plan discussed with Pt/Family/Caregiver: Yes  Freedom of Choice Offered:  Yes    Kishor Laureano MS

## 2018-02-20 NOTE — CONSULTS
[facility]  Pulmonary, Critical Care, and Sleep Medicine    Initial Patient Consult    Name: Nader Young MRN: [id number]   : 1950 [facility]   Date: 2018        IMPRESSION:   · LLL CAP- agree with levaquin  · COPD exacerbation  · H/o Lung cancer - s/p R partial lung resection  · HTN  · Weight loss      RECOMMENDATIONS:   · Suspect pt has clinical-radiographic PNA LLL. · Would treat with 8-10 days levaquin. · Will need re CT chest in 6 weeks to eval mass like consolidation LLL. If this area persists, will need CT- FNA  · Suggest advair+ spiriva on d/c  · Bedside silvio here  · Check A1AT     Subjective: This patient has been seen and evaluated at the request of Dr. Chico Wynn for SOB. Patient is a 78 y.o. male   With h/o COPD on alb nebs at home who was admitted  with cough and SOB. Dx'd with CAP and placed on levaquin + nebs  Cough better. States SOB better. Admits to weight loss over last few weeks. No fevers chills or sweats. Past Medical History:   Diagnosis Date    Asthma     Cancer ([facility] [address]     Lung Cancer    Chronic obstructive pulmonary disease (HCC)     Chronic pain     High cholesterol     Lung cancer (HCC)       Past Surgical History:   Procedure Laterality Date    HX LOBECTOMY      Right lung    HX OTHER SURGICAL      Partial right lung removal      Prior to Admission medications    Medication Sig Start Date End Date Taking? Authorizing Provider   albuterol (PROVENTIL VENTOLIN) 2.5 mg /3 mL (0.083 %) nebulizer solution 3 mL by Nebulization route every two (2) hours as needed for Wheezing. 10/8/15   Arik Dey MD   simvastatin (ZOCOR) 40 mg tablet Take 40 mg by mouth nightly. Historical Provider   oxyCODONE IR (ROXICODONE) 20 mg immediate release tablet Take 40 mg by mouth three (3) times daily as needed for Pain.     Historical Provider   albuterol-ipratropium (DUO-NEB) 2.5 mg-0.5 mg/3 ml nebulizer solution 3 mL by Nebulization route every six (6) hours as needed for Wheezing. 9/16/15   Julianna Langston MD   budesonide (PULMICORT) 0.5 mg/2 mL nebulizer suspension 2 mL by Nebulization route two (2) times a day. 9/16/15   Julianna Langston MD   albuterol (ACCUNEB) 0.63 mg/3 mL nebulizer solution 3 mL by Nebulization route every six (6) hours as needed for Wheezing. 8/13/15   Katharina Abernathy MD   oxyCODONE IR (ROXICODONE) 20 mg immediate release tablet Take 40 mg by mouth every eight (8) hours. Historical Provider     No Known Allergies   Social History   Substance Use Topics    Smoking status: Former Smoker    Smokeless tobacco: Not on file    Alcohol use No      Comment: rarely/social       No family history on file. Current Facility-Administered Medications   Medication Dose Route Frequency    furosemide (LASIX) tablet 40 mg  40 mg Oral DAILY    lisinopril (PRINIVIL, ZESTRIL) tablet 5 mg  5 mg Oral DAILY    levoFLOXacin (LEVAQUIN) tablet 500 mg  500 mg Oral Q24H    sodium chloride (NS) flush 5-10 mL  5-10 mL IntraVENous Q8H    carvedilol (COREG) tablet 6.25 mg  6.25 mg Oral Q12H    aspirin delayed-release tablet 81 mg  81 mg Oral DAILY    clopidogrel (PLAVIX) tablet 75 mg  75 mg Oral DAILY    budesonide (PULMICORT) 500 mcg/2 ml nebulizer suspension  500 mcg Nebulization BID    simvastatin (ZOCOR) tablet 40 mg  40 mg Oral QHS    sodium chloride (NS) flush 5-10 mL  5-10 mL IntraVENous Q8H    predniSONE (DELTASONE) tablet 40 mg  40 mg Oral DAILY WITH BREAKFAST       Review of Systems:  A comprehensive review of systems was negative except for that written in the HPI.     Objective:   Vital Signs:    Visit Vitals    /65    Pulse 95    Temp 98.3 °F (36.8 °C)    Resp 17    Ht 5' 11\" (1.803 m)    Wt 57.6 kg (126 lb 15.8 oz)    SpO2 95%    BMI 17.71 kg/m2       O2 Device: Nasal cannula   O2 Flow Rate (L/min): 4 l/min   Temp (24hrs), Av °F (36.7 °C), Min:97.7 °F (36.5 °C), Max:98.7 °F (37.1 °C)       Intake/Output:   Last shift:      02/20 0701 - 02/20 1900  In: 700 [P.O.:700]  Out: 625 [Urine:625]  Last 3 shifts: 02/18 1901 - 02/20 0700  In: 1080.8 [P.O.:440; I.V.:640.8]  Out: 2600 [Urine:2600]    Intake/Output Summary (Last 24 hours) at 02/20/18 1443  Last data filed at 02/20/18 1317   Gross per 24 hour   Intake              900 ml   Output             2525 ml   Net            -1625 ml      Physical Exam:   General:  Alert, cooperative, no distress, appears stated age. Head:  Normocephalic, without obvious abnormality, atraumatic. Eyes:  Conjunctivae/corneas clear. PERRL, EOMs intact. Nose: Nares normal. Septum midline. Mucosa normal. No drainage or sinus tenderness. Throat: Lips, mucosa, and tongue normal. Teeth and gums normal.       Back:   Symmetric, no curvature. ROM normal.   Lungs:   Clear to auscultation bilaterally. Chest wall:  No tenderness or deformity. Heart:  Regular rate and rhythm, S1, S2 normal, no murmur, click, rub or gallop. Abdomen:   Soft, non-tender. Bowel sounds normal. No masses,  No organomegaly. Extremities: Extremities normal, atraumatic, no cyanosis or edema. Pulses: 2+ and symmetric all extremities.    Skin: Skin color, texture, turgor normal. No rashes or lesions   Lymph nodes: Cervical, supraclavicular, and axillary nodes normal.   Neurologic: Grossly nonfocal     Data review:     Recent Results (from the past 24 hour(s))   ECG RHYTHM ANALYSIS ADULT    Collection Time: 02/19/18  4:59 PM   Result Value Ref Range    Ventricular Rate 99 BPM    Atrial Rate 99 BPM    P-R Interval 104 ms    QRS Duration 70 ms    Q-T Interval 344 ms    QTC Calculation (Bezet) 441 ms    Calculated P Axis 82 degrees    Calculated R Axis 65 degrees    Calculated T Axis -101 degrees    Diagnosis       Sinus rhythm with short FL  ST & T wave abnormality, consider inferolateral ischemia  When compared with ECG of 18-FEB-2018 11:48,  ST now depressed in Inferior leads  ST no longer elevated in Anterior leads     CBC WITH AUTOMATED DIFF    Collection Time: 02/20/18  4:54 AM   Result Value Ref Range    WBC 14.1 (H) 4.1 - 11.1 K/uL    RBC 4.46 4.10 - 5.70 M/uL    HGB 13.5 12.1 - 17.0 g/dL    HCT 40.9 36.6 - 50.3 %    MCV 91.7 80.0 - 99.0 FL    MCH 30.3 26.0 - 34.0 PG    MCHC 33.0 30.0 - 36.5 g/dL    RDW 13.5 11.5 - 14.5 %    PLATELET 881 765 - 342 K/uL    MPV 11.1 8.9 - 12.9 FL    NRBC 0.0 0  WBC    ABSOLUTE NRBC 0.00 0.00 - 0.01 K/uL    NEUTROPHILS 79 (H) 32 - 75 %    BAND NEUTROPHILS 1 0 - 6 %    LYMPHOCYTES 8 (L) 12 - 49 %    MONOCYTES 12 5 - 13 %    EOSINOPHILS 0 0 - 7 %    BASOPHILS 0 0 - 1 %    IMMATURE GRANULOCYTES 0 %    ABS. NEUTROPHILS 11.3 (H) 1.8 - 8.0 K/UL    ABS. LYMPHOCYTES 1.1 0.8 - 3.5 K/UL    ABS. MONOCYTES 1.7 (H) 0.0 - 1.0 K/UL    ABS. EOSINOPHILS 0.0 0.0 - 0.4 K/UL    ABS. BASOPHILS 0.0 0.0 - 0.1 K/UL    ABS. IMM.  GRANS. 0.0 K/UL    DF MANUAL      RBC COMMENTS NORMOCYTIC, NORMOCHROMIC     CK W/ CKMB & INDEX    Collection Time: 02/20/18  4:54 AM   Result Value Ref Range    CK 71 39 - 308 U/L    CK - MB 8.1 (H) <3.6 NG/ML    CK-MB Index 11.4 (H) 0 - 2.5     LIPID PANEL    Collection Time: 02/20/18  4:54 AM   Result Value Ref Range    LIPID PROFILE          Cholesterol, total 106 <200 MG/DL    Triglyceride 75 <150 MG/DL    HDL Cholesterol 45 MG/DL    LDL, calculated 46 0 - 100 MG/DL    VLDL, calculated 15 MG/DL    CHOL/HDL Ratio 2.4 0 - 5.0     METABOLIC PANEL, BASIC    Collection Time: 02/20/18  4:54 AM   Result Value Ref Range    Sodium 140 136 - 145 mmol/L    Potassium 4.1 3.5 - 5.1 mmol/L    Chloride 103 97 - 108 mmol/L    CO2 28 21 - 32 mmol/L    Anion gap 9 5 - 15 mmol/L    Glucose 86 65 - 100 mg/dL    BUN 28 (H) 6 - 20 MG/DL    Creatinine 1.15 0.70 - 1.30 MG/DL    BUN/Creatinine ratio 24 (H) 12 - 20      GFR est AA >60 >60 ml/min/1.73m2    GFR est non-AA >60 >60 ml/min/1.73m2    Calcium 8.7 8.5 - 10.1 MG/DL   TSH 3RD GENERATION    Collection Time: 02/20/18 4:54 AM   Result Value Ref Range    TSH 0.23 (L) 0.36 - 3.74 uIU/mL   T4, FREE    Collection Time: 02/20/18  4:54 AM   Result Value Ref Range    T4, Free 2.3 (H) 0.8 - 1.5 NG/DL   TROPONIN I    Collection Time: 02/20/18  4:54 AM   Result Value Ref Range    Troponin-I, Qt. 1.51 (H) <0.05 ng/mL   EKG, 12 LEAD, INITIAL    Collection Time: 02/20/18  9:39 AM   Result Value Ref Range    Ventricular Rate 80 BPM    Atrial Rate 80 BPM    P-R Interval 116 ms    QRS Duration 82 ms    Q-T Interval 406 ms    QTC Calculation (Bezet) 468 ms    Calculated P Axis 77 degrees    Calculated R Axis 65 degrees    Calculated T Axis -113 degrees    Diagnosis       Sinus rhythm with occasional premature ventricular complexes  Possible Left atrial enlargement  T wave abnormality, consider inferolateral ischemia  Prolonged QT  When compared with ECG of 19-FEB-2018 16:59,  premature ventricular complexes are now present         Imaging:  I have personally reviewed the patients radiographs and have reviewed the reports:  CT chest with mass like consolidation LLL + emphysema no LAD or effusions.          Nata Garcia MD

## 2018-02-20 NOTE — PROGRESS NOTES
Received call earlier this evening about pts low BP 85/59 (normal pulse rate). Instructed to repeat and call in ~ 1 hour. BP still low 85/57 with noraml HR and still asymptomatic. No dizziness. He received lisinopril for first time this am, along with lasix and coreg. Will hold on bolus for now and hold coreg this evening. Cardiology is aware. Instructed nursing if BP continues to drop to please call hospitalist on-call.

## 2018-02-20 NOTE — PROGRESS NOTES
Problem: Discharge Planning  Goal: *Discharge to safe environment  Outcome: Progressing Towards Goal  See CM note.   Ck Kovacs MS

## 2018-02-20 NOTE — PROGRESS NOTES
Spiritual Care Partner Volunteer visited patient in 5 on 2.19.18    [id number] Rosa Mccurdy M.Div, M.S, [address] [facility]([id number][facility]

## 2018-02-20 NOTE — PROGRESS NOTES
Problem: Breathing Pattern - Ineffective  Goal: *Absence of hypoxia  Outcome: Progressing Towards Goal  Patients oxygen saturation above 92% on 3L nasal cannula no signs or symptoms of respiratory distress, will continue to monitor. 0500- Bed scale broken, patient able to stand on scale but prefers to do this after breakfast. Will pass this information on to the next shift. 0730- Bedside and Verbal shift change report given to [facility] (oncoming nurse) by Harjeet Mahmood (offgoing nurse). Report included the following information SBAR, Kardex, Intake/Output, MAR, Recent Results and Cardiac Rhythm NSR with PVCs.

## 2018-02-20 NOTE — PROGRESS NOTES
TRANSFER - IN REPORT:    Verbal report received from LINUS Bello(name) on Time Tabor  being received from Cath lab(unit) for routine progression of care      Report consisted of patients Situation, Background, Assessment and   Recommendations(SBAR). Information from the following report(s) SBAR, Kardex, ED Summary, Intake/Output, MAR, Recent Results and Cardiac Rhythm NSR, Sinus tach was reviewed with the receiving nurse. Opportunity for questions and clarification was provided. Assessment completed upon patients arrival to unit and care assumed. Bedside shift change report given to Julianna Tate RN (oncoming nurse) by Paola vance RN (offgoing nurse). Report included the following information SBAR, Kardex, ED Summary, Intake/Output, MAR, Recent Results and Cardiac Rhythm NSR.

## 2018-02-21 LAB
ANION GAP SERPL CALC-SCNC: 8 MMOL/L (ref 5–15)
ATRIAL RATE: 80 BPM
ATRIAL RATE: 99 BPM
BUN SERPL-MCNC: 28 MG/DL (ref 6–20)
BUN/CREAT SERPL: 29 (ref 12–20)
CALCIUM SERPL-MCNC: 8.6 MG/DL (ref 8.5–10.1)
CALCULATED P AXIS, ECG09: 77 DEGREES
CALCULATED P AXIS, ECG09: 82 DEGREES
CALCULATED R AXIS, ECG10: 65 DEGREES
CALCULATED R AXIS, ECG10: 65 DEGREES
CALCULATED T AXIS, ECG11: -101 DEGREES
CALCULATED T AXIS, ECG11: -113 DEGREES
CHLORIDE SERPL-SCNC: 101 MMOL/L (ref 97–108)
CO2 SERPL-SCNC: 30 MMOL/L (ref 21–32)
CREAT SERPL-MCNC: 0.98 MG/DL (ref 0.7–1.3)
DIAGNOSIS, 93000: NORMAL
DIAGNOSIS, 93000: NORMAL
GLUCOSE SERPL-MCNC: 92 MG/DL (ref 65–100)
P-R INTERVAL, ECG05: 104 MS
P-R INTERVAL, ECG05: 116 MS
POTASSIUM SERPL-SCNC: 3.7 MMOL/L (ref 3.5–5.1)
Q-T INTERVAL, ECG07: 344 MS
Q-T INTERVAL, ECG07: 406 MS
QRS DURATION, ECG06: 70 MS
QRS DURATION, ECG06: 82 MS
QTC CALCULATION (BEZET), ECG08: 441 MS
QTC CALCULATION (BEZET), ECG08: 468 MS
SODIUM SERPL-SCNC: 139 MMOL/L (ref 136–145)
T3 SERPL-MCNC: 73 NG/DL (ref 71–180)
VENTRICULAR RATE, ECG03: 80 BPM
VENTRICULAR RATE, ECG03: 99 BPM

## 2018-02-21 PROCEDURE — 80048 BASIC METABOLIC PNL TOTAL CA: CPT | Performed by: NURSE PRACTITIONER

## 2018-02-21 PROCEDURE — 74011000250 HC RX REV CODE- 250: Performed by: HOSPITALIST

## 2018-02-21 PROCEDURE — 94664 DEMO&/EVAL PT USE INHALER: CPT

## 2018-02-21 PROCEDURE — 94640 AIRWAY INHALATION TREATMENT: CPT

## 2018-02-21 PROCEDURE — 74011000250 HC RX REV CODE- 250: Performed by: PHYSICIAN ASSISTANT

## 2018-02-21 PROCEDURE — 74011636637 HC RX REV CODE- 636/637: Performed by: FAMILY MEDICINE

## 2018-02-21 PROCEDURE — 97530 THERAPEUTIC ACTIVITIES: CPT

## 2018-02-21 PROCEDURE — 36415 COLL VENOUS BLD VENIPUNCTURE: CPT | Performed by: NURSE PRACTITIONER

## 2018-02-21 PROCEDURE — 65660000000 HC RM CCU STEPDOWN

## 2018-02-21 PROCEDURE — 74011000250 HC RX REV CODE- 250: Performed by: FAMILY MEDICINE

## 2018-02-21 PROCEDURE — 74011250637 HC RX REV CODE- 250/637: Performed by: HOSPITALIST

## 2018-02-21 PROCEDURE — 74011250637 HC RX REV CODE- 250/637: Performed by: INTERNAL MEDICINE

## 2018-02-21 PROCEDURE — 97110 THERAPEUTIC EXERCISES: CPT

## 2018-02-21 PROCEDURE — 74011250637 HC RX REV CODE- 250/637: Performed by: FAMILY MEDICINE

## 2018-02-21 PROCEDURE — 94010 BREATHING CAPACITY TEST: CPT

## 2018-02-21 PROCEDURE — 74011000250 HC RX REV CODE- 250: Performed by: INTERNAL MEDICINE

## 2018-02-21 PROCEDURE — 74011250637 HC RX REV CODE- 250/637: Performed by: NURSE PRACTITIONER

## 2018-02-21 PROCEDURE — 77010033678 HC OXYGEN DAILY

## 2018-02-21 RX ORDER — IPRATROPIUM BROMIDE AND ALBUTEROL SULFATE 2.5; .5 MG/3ML; MG/3ML
3 SOLUTION RESPIRATORY (INHALATION)
Status: DISCONTINUED | OUTPATIENT
Start: 2018-02-21 | End: 2018-02-22

## 2018-02-21 RX ORDER — CARVEDILOL 3.12 MG/1
3.12 TABLET ORAL EVERY 12 HOURS
Status: DISCONTINUED | OUTPATIENT
Start: 2018-02-21 | End: 2018-02-22

## 2018-02-21 RX ORDER — LISINOPRIL 5 MG/1
2.5 TABLET ORAL DAILY
Status: DISCONTINUED | OUTPATIENT
Start: 2018-02-22 | End: 2018-02-22

## 2018-02-21 RX ADMIN — BUDESONIDE 500 MCG: 0.5 INHALANT RESPIRATORY (INHALATION) at 08:45

## 2018-02-21 RX ADMIN — PREDNISONE 40 MG: 20 TABLET ORAL at 08:40

## 2018-02-21 RX ADMIN — Medication 10 ML: at 21:20

## 2018-02-21 RX ADMIN — IPRATROPIUM BROMIDE AND ALBUTEROL SULFATE 3 ML: .5; 3 SOLUTION RESPIRATORY (INHALATION) at 08:46

## 2018-02-21 RX ADMIN — Medication 10 ML: at 06:42

## 2018-02-21 RX ADMIN — BUDESONIDE 500 MCG: 0.5 INHALANT RESPIRATORY (INHALATION) at 20:48

## 2018-02-21 RX ADMIN — IPRATROPIUM BROMIDE AND ALBUTEROL SULFATE 3 ML: .5; 3 SOLUTION RESPIRATORY (INHALATION) at 20:48

## 2018-02-21 RX ADMIN — Medication 10 ML: at 14:00

## 2018-02-21 RX ADMIN — ASPIRIN 81 MG: 81 TABLET, COATED ORAL at 08:40

## 2018-02-21 RX ADMIN — ALBUTEROL SULFATE 0.63 MG: 2.5 SOLUTION RESPIRATORY (INHALATION) at 11:31

## 2018-02-21 RX ADMIN — DOCUSATE SODIUM 100 MG: 100 CAPSULE, LIQUID FILLED ORAL at 08:41

## 2018-02-21 RX ADMIN — LEVOFLOXACIN 500 MG: 500 TABLET, FILM COATED ORAL at 18:04

## 2018-02-21 RX ADMIN — CLOPIDOGREL BISULFATE 75 MG: 75 TABLET ORAL at 08:40

## 2018-02-21 RX ADMIN — CARVEDILOL 6.25 MG: 6.25 TABLET, FILM COATED ORAL at 08:40

## 2018-02-21 RX ADMIN — CARVEDILOL 3.12 MG: 3.12 TABLET, FILM COATED ORAL at 21:18

## 2018-02-21 RX ADMIN — IPRATROPIUM BROMIDE AND ALBUTEROL SULFATE 3 ML: .5; 3 SOLUTION RESPIRATORY (INHALATION) at 14:26

## 2018-02-21 RX ADMIN — SIMVASTATIN 40 MG: 40 TABLET, FILM COATED ORAL at 21:18

## 2018-02-21 RX ADMIN — LISINOPRIL 5 MG: 5 TABLET ORAL at 08:40

## 2018-02-21 NOTE — PROGRESS NOTES
Problem: Mobility Impaired (Adult and Pediatric)  Goal: *Acute Goals and Plan of Care (Insert Text)  Physical Therapy Goals  Initiated 2/20/2018  1. Patient will move from supine to sit and sit to supine , scoot up and down and roll side to side in bed with independence within 7 day(s). 2.  Patient will transfer from bed to chair and chair to bed with independence using the least restrictive device within 7 day(s). 3.  Patient will perform sit to stand with independence within 7 day(s). 4.  Patient will ambulate with minimal assistance/contact guard assist for 100 feet with the least restrictive device within 7 day(s). 5.  Patient will maintain sats >90% with activity within 7 days. physical Therapy TREATMENT  Patient: Sean Dhaliwal (89 y.o. male)  Date: 2/21/2018  Diagnosis: COPD exacerbation ([address] Shortness of breath       Precautions:    Chart, physical therapy assessment, plan of care and goals were reviewed. ASSESSMENT:  Chart reviewed, RN cleared patient for mobility, and patient received in bed initially refusing therapy. [facility] liaison entered room, and patient after a little education/encouragement was agreeable to participate. Patient performed supine and seated LE ther ex before standing and sidestepping to [facility]. O2 sats remained stable throughout session and patient was agreeable to continue with mobility schedule (sitting at EOB for lunch and transferring to chair for dinner). Patient ended session in chair with all needs placed within reach and RN notified of patient's status.  Discharge rec - Pulm IP rehab    PT recommending patient stand/sidestep 3x/day with RN/PCT assist and either sit on EOB or transfer to chair for all meals in order to improve tolerance to activity and facilitate strengthening/balance     Progression toward goals:  []    Improving appropriately and progressing toward goals  []    Improving slowly and progressing toward goals  []    Not making progress toward goals and plan of care will be adjusted     PLAN:  Patient continues to benefit from skilled intervention to address the above impairments. Continue treatment per established plan of care. Discharge Recommendations:  Inpatient Rehab  Further Equipment Recommendations for Discharge:  TBD     SUBJECTIVE:   Patient stated I think that's reasonable.  with regard to sitting up for all meals    OBJECTIVE DATA SUMMARY:   Critical Behavior:  Neurologic State: Alert  Orientation Level: Oriented X4  Cognition: Appropriate decision making, Appropriate for age attention/concentration, Appropriate safety awareness, Follows commands  Safety/Judgement: Awareness of environment, Insight into deficits, Fall prevention  Functional Mobility Training:  Bed Mobility:     Supine to Sit: Contact guard assistance  Sit to Supine: Contact guard assistance           Transfers:  Sit to Stand: Contact guard assistance  Stand to Sit: Contact guard assistance                             Balance:     Ambulation/Gait Training:  Distance (ft): 3 Feet (ft) (sidestepping)  Assistive Device: Gait belt  Ambulation - Level of Assistance: Contact guard assistance                 Base of Support: Narrowed        Step Length: Right shortened;Left shortened                    Stairs:            Functional Measure:    Neuro Re-Education:    Therapeutic Exercises:   Bridging x 10  SLR x 10 each  Seated hip flexion x 10 each  Seated knee extension x 10 each  Pain:  Pain Scale 1: Numeric (0 - 10)  Pain Intensity 1: 0              Activity Tolerance:   Fair, patient continues to be reluctant to perform OOB activity however is capable. Requires moderate encouragement/education  Please refer to the flowsheet for vital signs taken during this treatment.   After treatment:   []    Patient left in no apparent distress sitting up in chair  [x]    Patient left in no apparent distress in bed  [x]    Call bell left within reach  [x]    Nursing notified  [] Caregiver present  []    Bed alarm activated    COMMUNICATION/COLLABORATION:   The patients plan of care was discussed with: Registered Nurse    Leslie Garcia PT, DPT   Time Calculation: 24 mins

## 2018-02-21 NOTE — PROGRESS NOTES
Hospitalist Progress Note  Shyam Ortiz NP  Answering service: [phone] OR [phone] from in house phone  Cell: [phone]   Date of Service:  2018  NAME:  Jesse Macias  :  1950  MRN:  [id number]    Admission Summary:   Pt presented to the ED with shortness of breath, cough and worsening weakness for the past 3 days. Reported he could not walk few steps without getting SOB. Has a chronic cough but recently it is getting worse and is productive. Pt was seen at [facility] recently and had a negative workup. Interval history / Subjective:   Pt in bed, reports he does not feel much better - still fatigued. Really wants to go to [facility]. Assessment & Plan:     NSTEMI (likely predating admit):  - denied chest pain at any point prior to or during admit  - serial cardiac enzymes: peak of 8.81 ->5.35 -> 3.75-> 1.51  - FLP reviewed: Chol 121 Hdl 43 Ldl 59.4 (on statin)  - s/p cardiac cath : stents placed. Significant 2 vessel CAD with 90% lesion in large mid-diagonal and short  of mid-cx. Dilated LV with severe hypokinesis of apical and anteroapical walls.  EF 30% via cardiac cath  - continue with ASA/plavix  - reduce dose of BB and ACEi and stop lasix due to low BP (discussed with cards)  - Pt will need Plavix x 1 month - He has Bare Metal Stents    Pneumonia (POA):  - CTA of chest: Chronic lung disease, LLL infiltrate (which was present in 2016)  - continue with levaquin, pulmonary recommending 10 days (today is day 5)  - continue nebs; pulmonary recommends spiriva and advair on discharge  - will need repeat CT in 4-6 weeks and PFTS with pulmonary follow up  - remains afebrile - leukocytosis could be due to steroids    COPD exacerbation due to above:    - wheezing at the time of presentation in ED but this has resolved, lungs diminished  - on nebs/abx   - received po steroids x 5 doses  - elevated D dimer but no pulmonary embolus seen on Chest CTA    Underweight:  - BMI 17.62  - poor-fair appetite  - supplements TID    Hx Chronic Respiratory Failure:   - hx of lung cancer with lobectomy 2009  - on home oxygen (3 L NC at max and does not wear continuously)    Hx Hyperlipidemia: continue statin. Code status: Full  DVT prophylaxis: Lovenox  Care Plan discussed with: patient, cardiologist and   Disposition: Lives at home alone, pt wants to go to [facility] (has been there before). Consulted  for referral - [facility] Problems  Date Reviewed: 10/5/2015          Codes Class Noted POA    * (Principal)Shortness of breath ICD-10-CM: R06.02  ICD-9-CM: 786.05  2/17/2018 Yes        COPD exacerbation ([facility] [address] (Chronic) ICD-10-CM: J44.1  ICD-9-CM: 491.21  9/5/2015 Yes            Review of Systems:   Denies HA. No chest pain or pressure. + SOB and fatigues easily on exertion. No N/V. Vital Signs:    Last 24hrs VS reviewed since prior progress note. Most recent are:  Visit Vitals    BP (!) 70/44 (BP 1 Location: Left arm, BP Patient Position: At rest)    Pulse (!) 105    Temp 97.8 °F (36.6 °C)    Resp 25    Ht 5' 11\" (1.803 m)    Wt 57.6 kg (126 lb 15.8 oz)    SpO2 92%    BMI 17.71 kg/m2       Intake/Output Summary (Last 24 hours) at 02/21/18 1600  Last data filed at 02/21/18 4775   Gross per 24 hour   Intake              350 ml   Output              725 ml   Net             -375 ml      Physical Examination:         Constitutional:  No acute distress, cooperative, pleasant. Very thin/emaciated appearance.    ENT:  Oral mucous membranes dry. Resp:  Diminished but no wheezing/rhonchi/rales. On 2 L NC.   CV:  Regular rhythm, normal rate, no murmurs. ST on tele review. GI:  Soft, non distended, non tender. Normoactive bowel sounds. Musculoskeletal:  No edema, warm, 2+ pulses throughout. Neurologic:  Moves all extremities. AAOx3.        Data Review:   Review and/or order of clinical lab test  Review and/or order of tests in the radiology section of CPT  Review and/or order of tests in the medicine section of CPT    Labs:     Recent Labs      02/20/18 0454 02/19/18 0357   WBC  14.1*  15.8*   HGB  13.5  13.2   HCT  40.9  39.7   PLT  321  248     Recent Labs      02/21/18   0339  02/20/18 0454  02/19/18   0357   NA  139  140  143   K  3.7  4.1  4.0   CL  101  103  107   CO2  30  28  28   BUN  28*  28*  30*   CREA  0.98  1.15  1.24   GLU  92  86  100   CA  8.6  8.7  8.7     No results for input(s): SGOT, GPT, ALT, AP, TBIL, TBILI, TP, ALB, GLOB, GGT, AML, LPSE in the last 72 hours. No lab exists for component: AMYP, HLPSE  No results for input(s): INR, PTP, APTT in the last 72 hours. No lab exists for component: INREXT, INREXT   No results for input(s): FE, TIBC, PSAT, FERR in the last 72 hours. No results found for: FOL, RBCF   No results for input(s): PH, PCO2, PO2 in the last 72 hours.   Recent Labs      02/20/18 0454   CPK  71   CKNDX  11.4*   TROIQ  1.51*     Lab Results   Component Value Date/Time    Cholesterol, total 106 02/20/2018 04:54 AM    HDL Cholesterol 45 02/20/2018 04:54 AM    LDL, calculated 46 02/20/2018 04:54 AM    Triglyceride 75 02/20/2018 04:54 AM    CHOL/HDL Ratio 2.4 02/20/2018 04:54 AM     Lab Results   Component Value Date/Time    Glucose (POC) 100 09/14/2015 12:14 PM     Lab Results   Component Value Date/Time    Color YELLOW/STRAW 11/28/2016 04:44 PM    Appearance CLEAR 11/28/2016 04:44 PM    Specific gravity 1.020 11/28/2016 04:44 PM    pH (UA) 5.0 11/28/2016 04:44 PM    Protein TRACE (A) 11/28/2016 04:44 PM    Glucose NEGATIVE  11/28/2016 04:44 PM    Ketone TRACE (A) 11/28/2016 04:44 PM    Bilirubin NEGATIVE  11/28/2016 04:44 PM    Urobilinogen 0.2 11/28/2016 04:44 PM    Nitrites NEGATIVE  11/28/2016 04:44 PM    Leukocyte Esterase TRACE (A) 11/28/2016 04:44 PM    Epithelial cells FEW 11/28/2016 04:44 PM    Bacteria NEGATIVE  11/28/2016 04:44 PM WBC 5-10 11/28/2016 04:44 PM    RBC 0-5 11/28/2016 04:44 PM     Medications Reviewed:     Current Facility-Administered Medications   Medication Dose Route Frequency    albuterol-ipratropium (DUO-NEB) 2.5 MG-0.5 MG/3 ML  3 mL Nebulization TID RT    furosemide (LASIX) tablet 40 mg  40 mg Oral DAILY    lisinopril (PRINIVIL, ZESTRIL) tablet 5 mg  5 mg Oral DAILY    levoFLOXacin (LEVAQUIN) tablet 500 mg  500 mg Oral Q24H    budesonide (PULMICORT) 500 mcg/2 ml nebulizer suspension  500 mcg Nebulization BID RT    docusate sodium (COLACE) capsule 100 mg  100 mg Oral DAILY    saline peripheral flush soln 10 mL  10 mL InterCATHeter PRN    sodium chloride (NS) flush 5-10 mL  5-10 mL IntraVENous Q8H    sodium chloride (NS) flush 5-10 mL  5-10 mL IntraVENous PRN    carvedilol (COREG) tablet 6.25 mg  6.25 mg Oral Q12H    aspirin delayed-release tablet 81 mg  81 mg Oral DAILY    clopidogrel (PLAVIX) tablet 75 mg  75 mg Oral DAILY    albuterol (PROVENTIL VENTOLIN) nebulizer solution 0.63 mg  0.63 mg Nebulization Q6H PRN    oxyCODONE IR (ROXICODONE) tablet 40 mg  40 mg Oral Q8H PRN    simvastatin (ZOCOR) tablet 40 mg  40 mg Oral QHS    sodium chloride (NS) flush 5-10 mL  5-10 mL IntraVENous Q8H    sodium chloride (NS) flush 5-10 mL  5-10 mL IntraVENous PRN   ______________________________________________________________________  EXPECTED LENGTH OF STAY: 3d 19h  ACTUAL LENGTH OF STAY:          Javier Honeycutt 1277, NP

## 2018-02-21 NOTE — PROGRESS NOTES
Problem: Cath Lab Procedures: Post-Cath Day 1  Goal: Activity/Safety  Outcome: Progressing Towards Goal  Patient is off of bedrest and is moving around more freely. Bed is in the lowest position and wheels are locked, call bell is within reach, bathroom light is on during evening hours, gripper socks are on and patient has been instructed to call out for assistance if needed. As of now, patient is free from falls and will continue to be monitored.

## 2018-02-21 NOTE — PROGRESS NOTES
Cardiology Progress Note  2018     Admit Date: 2018  Admit Diagnosis: COPD exacerbation ([facility]  CC: none currently    Assessment:   Principal Problem:    Shortness of breath (2018)    Active Problems:    COPD exacerbation ([facility] (2015)      Plan:   Improved overall. No CP and breathing is better. No changes. Agree with plans and can f/u with me post Rehab. Volume status:euvolemic  Renal function: stable    For other plans, see orders. Subjective: Barrie Anderson reports   Chest Pain:  [x]   none,  consistent with  []   non-cardiac   []   atypical   []   angina             [x]   none now    []      on-going  Dyspnea: [x]   none  []   at rest  []   with exertion     []   improved   []   unchanged   []   worsening  PND:       [x]   none  []   overnight    Orthopnea: [x]   none  []   improved  []   unchanged  []   worsening  Presyncope: [x]   none   []   improved    []   unchanged    []   worsening  Ambulated in hallway without symptoms  []   Yes  Ambulated in room without symptoms  []   Yes    Objective:    Physical Exam:  Overall VSSAF;    Visit Vitals    /58 (BP 1 Location: Left arm, BP Patient Position: At rest)    Pulse 89    Temp 98.2 °F (36.8 °C)    Resp 16    Ht 5' 11\" (1.803 m)    Wt 57.6 kg (126 lb 15.8 oz)    SpO2 96%    BMI 17.71 kg/m2     Temp (24hrs), Av.8 °F (36.6 °C), Min:97.4 °F (36.3 °C), Max:98.2 °F (36.8 °C)    Patient Vitals for the past 8 hrs:   Pulse   18 0823 89    Patient Vitals for the past 8 hrs:   Resp   18 0823 16    Patient Vitals for the past 8 hrs:   BP   18 0823 103/58        Intake/Output Summary (Last 24 hours) at 18 1541  Last data filed at 18 0643   Gross per 24 hour   Intake              350 ml   Output              725 ml   Net             -375 ml       General Appearance: Well developed, well nourished, no acute distress. Ears/Nose/Mouth/Throat:   Normal MM; anicteric.      JVP: WNL   Resp: Lungs clear to auscultation bilaterally. Nl resp effort. Cardiovascular:  RRR, S1, S2 normal, no new murmur. No gallop or rub. Abdomen:   Soft, non-tender, bowel sounds are present. Extremities: No edema bilaterally. Skin:  Neuro: Warm and dry. A/O x3, grossly nonfocal    [x]      cath site intact w/o hematoma or bruit; distal pulse unchanged. Data Review:     Telemetry independently reviewed : [x]   sinus  []   chronic afib   []   par afib  []      NSVT    ECG independently reviewed:  []   NSR   []   no significant changes  []   no new ECG provided for review  Lab results reviewed as noted below. Current medications reviewed as noted below. No results for input(s): PH, PCO2, PO2 in the last 72 hours. Recent Labs      02/20/18   0454   CPK  71   CKMB  8.1*   TROIQ  1.51*     Recent Labs      02/21/18   0339  02/20/18   0454  02/19/18   0357   NA  139  140  143   K  3.7  4.1  4.0   CL  101  103  107   CO2  30  28  28   BUN  28*  28*  30*   CREA  0.98  1.15  1.24   GLU  92  86  100   CA  8.6  8.7  8.7   WBC   --   14.1*  15.8*   HGB   --   13.5  13.2   HCT   --   40.9  39.7   PLT   --   321  248     No results for input(s): SGOT, GPT, ALT, AP, TBIL, TBILI, TP, ALB, GLOB, GGT, AML, LPSE in the last 72 hours. No lab exists for component: AMYP, HLPSE  No results for input(s): INR, PTP, APTT in the last 72 hours. No lab exists for component: INREXT   No results for input(s): FE, TIBC, PSAT, FERR in the last 72 hours.    Lab Results   Component Value Date/Time    Glucose (POC) 100 09/14/2015 12:14 PM       Current Facility-Administered Medications   Medication Dose Route Frequency    albuterol-ipratropium (DUO-NEB) 2.5 MG-0.5 MG/3 ML  3 mL Nebulization TID RT    furosemide (LASIX) tablet 40 mg  40 mg Oral DAILY    lisinopril (PRINIVIL, ZESTRIL) tablet 5 mg  5 mg Oral DAILY    levoFLOXacin (LEVAQUIN) tablet 500 mg  500 mg Oral Q24H    budesonide (PULMICORT) 500 mcg/2 ml nebulizer suspension  500 mcg Nebulization BID RT    docusate sodium (COLACE) capsule 100 mg  100 mg Oral DAILY    saline peripheral flush soln 10 mL  10 mL InterCATHeter PRN    sodium chloride (NS) flush 5-10 mL  5-10 mL IntraVENous Q8H    sodium chloride (NS) flush 5-10 mL  5-10 mL IntraVENous PRN    carvedilol (COREG) tablet 6.25 mg  6.25 mg Oral Q12H    aspirin delayed-release tablet 81 mg  81 mg Oral DAILY    clopidogrel (PLAVIX) tablet 75 mg  75 mg Oral DAILY    albuterol (PROVENTIL VENTOLIN) nebulizer solution 0.63 mg  0.63 mg Nebulization Q6H PRN    oxyCODONE IR (ROXICODONE) tablet 40 mg  40 mg Oral Q8H PRN    simvastatin (ZOCOR) tablet 40 mg  40 mg Oral QHS    sodium chloride (NS) flush 5-10 mL  5-10 mL IntraVENous Q8H    sodium chloride (NS) flush 5-10 mL  5-10 mL IntraVENous PRN        Yash Guzman MD

## 2018-02-21 NOTE — PROGRESS NOTES
NUTRITION       Recommendations:  1. Liberalize diet to Regular, 2 gm sodium  -- Encourage small, frequent meals, snacks and supplements  -- Include Ensure Enlive (or equivalent) as well as AM/PM/HS snacks on discharge instructions    2. Continue daily weights    Brief note. Chart reviewed, discussed with RN and team during interdisciplinary rounds. Pt admits his appetite is coming back, but he has a hard time breathing during meals. He is snacking well (has had 3 ice creams since lunch). He ate 95% of his lunch tray at the bedside. He will accept Ensure Enlive, but chocolate only. Order entered for all meals (1050 kcal, 60 g protein-- meeting 59% and 87% of estimated kcal and protein needs, respectively). Pt agreeable to high protein/high calorie snacks, so RD to order. Discussed the risks for malnutrition as well. Patient Vitals for the past 100 hrs:   % Diet Eaten   02/21/18 1239 95 %   02/20/18 2047 75 %   02/20/18 1317 30 %   02/20/18 0814 75 %   02/19/18 0845 100 %   02/18/18 1300 50 %   02/18/18 0900 25 %     Patient meets criteria for Severe Protein Calorie Malnutrition as evidenced by:   ASPEN Malnutrition Criteria  Acute Illness, Chronic Illness, or Social/Enviornmental: Acute illness  Energy Intake: <75% est energy req for > 7 days  Body Fat: Moderate  Muscle Mass: Moderate  ASPEN Malnutrition Score - Acute Illness: 13  Acute Illness - Malnutrition Diagnosis: Severe malnutrition. Pt with severe fat and muscle wasting. Noted wasting of orbital, temporal, clavicle, calves, fingers. Pt admits his UBW is 151#, which indicates a 16% weight loss. Estimated Nutrition Needs:   Kcals/day: 5544 Kcals/day (6088-8268 kcal/day (MSJ x 1.3-1.4))  Protein: 69 g (69-74 g/day (1.2-1.3 g/kg))  Fluid: 1800 ml (1 mL/kcal)     Based On:  Martaa 1898  Weight Used: Actual wt (57.3 kg)    [address]

## 2018-02-21 NOTE — PROGRESS NOTES
Bedside shift change report given to Chun Isidro RN (oncoming nurse) by Catie Vance RN (offgoing nurse). Report included the following information SBAR, Kardex, MAR, Accordion, Recent Results and Cardiac Rhythm NSR.

## 2018-02-21 NOTE — PROGRESS NOTES
Pulmonary, Critical Care, and Sleep Medicine~Progress Note    Name: Rody Kwan MRN: [id number]   : 1950 [facility]   Date: 2018 10:28 AM Admission: 2018     IMPRESSION:   · Acute on chronic hypoxic resp failure  · AE of COPD  · LLL CAP  · Hx of lung cancer; s/p [facility] at [facility]  · HTN      PLAN:   · 10 days of levaquin  · CT scan in 4-6 wks  · advair and spiriva at discharge  · O2 titration above 90%  · Will need full PFTs at follow up  · AAT pending. Daily Progression:    No acute complaints     I have reviewed the labs and previous days notes. A comprehensive review of systems was negative.     OBJECTIVE:     Vital Signs:       Visit Vitals    /58 (BP 1 Location: Left arm, BP Patient Position: At rest)    Pulse 89    Temp 98.2 °F (36.8 °C)    Resp 16    Ht 5' 11\" (1.803 m)    Wt 57.6 kg (126 lb 15.8 oz)    SpO2 91%    BMI 17.71 kg/m2      Temp (24hrs), Av.9 °F (36.6 °C), Min:97.4 °F (36.3 °C), Max:98.3 °F (36.8 °C)     Intake/Output:     Last shift:      Last 3 shifts:  1901 -  0700  In: 1050 [P.O.:1050]  Out: 1950 [Urine:1950]        Intake/Output Summary (Last 24 hours) at 18 1028  Last data filed at 18 3475   Gross per 24 hour   Intake              700 ml   Output             1150 ml   Net             -450 ml       Physical Exam:                                        Exam Findings Other   General: No resp distress noted, appears stated age    HEENT:  No ulcers, JVD not elevated, no cervical LAD    Chest: No pectus deformity, normal chest rise b/l    HEART:  RRR, no murmurs/rubs/gallops    Lungs:  CTA b/l, no rhonchi/crackles/wheeze, diminished BS at bases    ABD: Soft/NT, non rigid mildly distended    EXT: No cyanosis/clubbing/edema, normal peripheral pulses    Skin: No rashes or ulcers, no mottling    Neuro:  A/O x 3 Medications:  Current Facility-Administered Medications   Medication Dose Route Frequency    albuterol-ipratropium (DUO-NEB) 2.5 MG-0.5 MG/3 ML  3 mL Nebulization TID RT    furosemide (LASIX) tablet 40 mg  40 mg Oral DAILY    lisinopril (PRINIVIL, ZESTRIL) tablet 5 mg  5 mg Oral DAILY    levoFLOXacin (LEVAQUIN) tablet 500 mg  500 mg Oral Q24H    budesonide (PULMICORT) 500 mcg/2 ml nebulizer suspension  500 mcg Nebulization BID RT    docusate sodium (COLACE) capsule 100 mg  100 mg Oral DAILY    saline peripheral flush soln 10 mL  10 mL InterCATHeter PRN    sodium chloride (NS) flush 5-10 mL  5-10 mL IntraVENous Q8H    sodium chloride (NS) flush 5-10 mL  5-10 mL IntraVENous PRN    carvedilol (COREG) tablet 6.25 mg  6.25 mg Oral Q12H    aspirin delayed-release tablet 81 mg  81 mg Oral DAILY    clopidogrel (PLAVIX) tablet 75 mg  75 mg Oral DAILY    albuterol (PROVENTIL VENTOLIN) nebulizer solution 0.63 mg  0.63 mg Nebulization Q6H PRN    oxyCODONE IR (ROXICODONE) tablet 40 mg  40 mg Oral Q8H PRN    simvastatin (ZOCOR) tablet 40 mg  40 mg Oral QHS    sodium chloride (NS) flush 5-10 mL  5-10 mL IntraVENous Q8H    sodium chloride (NS) flush 5-10 mL  5-10 mL IntraVENous PRN       Labs:  ABG Recent Labs      02/19/18   1424   FIO2I  36        CBC Recent Labs      02/20/18   0454  02/19/18   0357   WBC  14.1*  15.8*   HGB  13.5  13.2   HCT  40.9  39.7   PLT  321  248   MCV  91.7  91.9   MCH  30.3  47.9        Metabolic  Panel Recent Labs      02/21/18   0339  02/20/18   0454  02/19/18   0357   NA  139  140  143   K  3.7  4.1  4.0   CL  101  103  107   CO2  30  28  28   GLU  92  86  100   BUN  28*  28*  30*   CREA  0.98  1.15  1.24   CA  8.6  8.7  8.7        Pertinent Labs                HERMELINDA Fields  2/21/2018

## 2018-02-21 NOTE — PROGRESS NOTES
1630: PCT report pt with low BP. RN took manual BP. BP WNL. Will continue to monitor. Pt reports no  Nausea, dizziness or pain at this time. Pt remains asymptomatic.

## 2018-02-22 PROBLEM — I21.4 NSTEMI (NON-ST ELEVATED MYOCARDIAL INFARCTION) (HCC): Status: ACTIVE | Noted: 2018-02-22

## 2018-02-22 PROBLEM — I21.4 NSTEMI (NON-ST ELEVATED MYOCARDIAL INFARCTION) (HCC): Status: RESOLVED | Noted: 2018-02-22 | Resolved: 2018-02-22

## 2018-02-22 PROBLEM — I50.21 SYSTOLIC CHF, ACUTE (HCC): Status: ACTIVE | Noted: 2018-02-22

## 2018-02-22 LAB
ANION GAP SERPL CALC-SCNC: 9 MMOL/L (ref 5–15)
BUN SERPL-MCNC: 34 MG/DL (ref 6–20)
BUN/CREAT SERPL: 27 (ref 12–20)
CALCIUM SERPL-MCNC: 8.9 MG/DL (ref 8.5–10.1)
CHLORIDE SERPL-SCNC: 102 MMOL/L (ref 97–108)
CO2 SERPL-SCNC: 27 MMOL/L (ref 21–32)
CREAT SERPL-MCNC: 1.25 MG/DL (ref 0.7–1.3)
ERYTHROCYTE [DISTWIDTH] IN BLOOD BY AUTOMATED COUNT: 13.2 % (ref 11.5–14.5)
GLUCOSE SERPL-MCNC: 121 MG/DL (ref 65–100)
HCT VFR BLD AUTO: 39.9 % (ref 36.6–50.3)
HGB BLD-MCNC: 13.5 G/DL (ref 12.1–17)
MCH RBC QN AUTO: 30.3 PG (ref 26–34)
MCHC RBC AUTO-ENTMCNC: 33.8 G/DL (ref 30–36.5)
MCV RBC AUTO: 89.7 FL (ref 80–99)
NRBC # BLD: 0 K/UL (ref 0–0.01)
NRBC BLD-RTO: 0 PER 100 WBC
PLATELET # BLD AUTO: 367 K/UL (ref 150–400)
PMV BLD AUTO: 10.2 FL (ref 8.9–12.9)
POTASSIUM SERPL-SCNC: 3.6 MMOL/L (ref 3.5–5.1)
RBC # BLD AUTO: 4.45 M/UL (ref 4.1–5.7)
SODIUM SERPL-SCNC: 138 MMOL/L (ref 136–145)
WBC # BLD AUTO: 17.7 K/UL (ref 4.1–11.1)

## 2018-02-22 PROCEDURE — 36415 COLL VENOUS BLD VENIPUNCTURE: CPT | Performed by: NURSE PRACTITIONER

## 2018-02-22 PROCEDURE — 82103 ALPHA-1-ANTITRYPSIN TOTAL: CPT | Performed by: HOSPITALIST

## 2018-02-22 PROCEDURE — 74011250637 HC RX REV CODE- 250/637: Performed by: INTERNAL MEDICINE

## 2018-02-22 PROCEDURE — 74011250637 HC RX REV CODE- 250/637: Performed by: HOSPITALIST

## 2018-02-22 PROCEDURE — 74011250637 HC RX REV CODE- 250/637: Performed by: NURSE PRACTITIONER

## 2018-02-22 PROCEDURE — 80048 BASIC METABOLIC PNL TOTAL CA: CPT | Performed by: NURSE PRACTITIONER

## 2018-02-22 PROCEDURE — 65660000000 HC RM CCU STEPDOWN

## 2018-02-22 PROCEDURE — 94640 AIRWAY INHALATION TREATMENT: CPT

## 2018-02-22 PROCEDURE — 85027 COMPLETE CBC AUTOMATED: CPT | Performed by: NURSE PRACTITIONER

## 2018-02-22 PROCEDURE — 74011250636 HC RX REV CODE- 250/636: Performed by: NURSE PRACTITIONER

## 2018-02-22 PROCEDURE — 94664 DEMO&/EVAL PT USE INHALER: CPT

## 2018-02-22 PROCEDURE — 77010033678 HC OXYGEN DAILY

## 2018-02-22 PROCEDURE — 74011000250 HC RX REV CODE- 250: Performed by: INTERNAL MEDICINE

## 2018-02-22 PROCEDURE — 74011250637 HC RX REV CODE- 250/637: Performed by: FAMILY MEDICINE

## 2018-02-22 RX ORDER — ASPIRIN 81 MG/1
81 TABLET ORAL DAILY
Qty: 30 TAB | Refills: 0 | Status: ON HOLD
Start: 2018-02-23

## 2018-02-22 RX ORDER — IPRATROPIUM BROMIDE AND ALBUTEROL SULFATE 2.5; .5 MG/3ML; MG/3ML
3 SOLUTION RESPIRATORY (INHALATION)
Qty: 30 NEBULE | Refills: 0 | Status: SHIPPED
Start: 2018-02-22 | End: 2020-01-06

## 2018-02-22 RX ORDER — LEVOFLOXACIN 500 MG/1
500 TABLET, FILM COATED ORAL EVERY 24 HOURS
Qty: 5 TAB | Refills: 0 | Status: SHIPPED
Start: 2018-02-22 | End: 2018-02-23

## 2018-02-22 RX ORDER — CLOPIDOGREL BISULFATE 75 MG/1
75 TABLET ORAL DAILY
Qty: 30 TAB | Refills: 0 | Status: SHIPPED
Start: 2018-02-23 | End: 2018-04-04

## 2018-02-22 RX ORDER — IPRATROPIUM BROMIDE AND ALBUTEROL SULFATE 2.5; .5 MG/3ML; MG/3ML
3 SOLUTION RESPIRATORY (INHALATION)
Status: DISCONTINUED | OUTPATIENT
Start: 2018-02-22 | End: 2018-02-23 | Stop reason: HOSPADM

## 2018-02-22 RX ORDER — DOCUSATE SODIUM 100 MG/1
100 CAPSULE, LIQUID FILLED ORAL DAILY
Qty: 30 CAP | Refills: 0 | Status: SHIPPED
Start: 2018-02-23 | End: 2018-03-25

## 2018-02-22 RX ADMIN — SODIUM CHLORIDE 500 ML: 900 INJECTION, SOLUTION INTRAVENOUS at 12:33

## 2018-02-22 RX ADMIN — BUDESONIDE 500 MCG: 0.5 INHALANT RESPIRATORY (INHALATION) at 07:43

## 2018-02-22 RX ADMIN — LISINOPRIL 2.5 MG: 5 TABLET ORAL at 09:00

## 2018-02-22 RX ADMIN — Medication 10 ML: at 06:38

## 2018-02-22 RX ADMIN — DOCUSATE SODIUM 100 MG: 100 CAPSULE, LIQUID FILLED ORAL at 09:00

## 2018-02-22 RX ADMIN — ASPIRIN 81 MG: 81 TABLET, COATED ORAL at 09:00

## 2018-02-22 RX ADMIN — LEVOFLOXACIN 500 MG: 500 TABLET, FILM COATED ORAL at 19:52

## 2018-02-22 RX ADMIN — BUDESONIDE 500 MCG: 0.5 INHALANT RESPIRATORY (INHALATION) at 20:51

## 2018-02-22 RX ADMIN — CLOPIDOGREL BISULFATE 75 MG: 75 TABLET ORAL at 09:00

## 2018-02-22 RX ADMIN — SIMVASTATIN 40 MG: 40 TABLET, FILM COATED ORAL at 23:51

## 2018-02-22 RX ADMIN — SODIUM CHLORIDE 500 ML: 900 INJECTION, SOLUTION INTRAVENOUS at 15:37

## 2018-02-22 RX ADMIN — CARVEDILOL 3.12 MG: 3.12 TABLET, FILM COATED ORAL at 09:00

## 2018-02-22 NOTE — PROGRESS NOTES
Occupational Therapy  Chart reviewed, discussed with PT post rounds, patient with symptomatic hypotension with standing earlier today, and BP remains low, bolus to be given, expect discharge to [place] [address] today or tomorrow. Will follow up tomorrow for OT. Yeison Cintron.  MS Alexi OTR/L

## 2018-02-22 NOTE — PROGRESS NOTES
Pulmonary, Critical Care, and Sleep Medicine~Progress Note    Name: Rober Clemente MRN: [id number]   : 1950 Hospital: [address]   Date: 2018 10:28 AM Admission: 2018     IMPRESSION:   · Acute on chronic hypoxic resp failure  · AE of COPD; improving   · LLL CAP  · Hx of lung cancer; s/p [facility] at [facility]  · HTN      PLAN:   · 10 days of levaquin  · CT scan in 4-6 wks  · advair and spiriva at discharge  · O2 titration above 90%  · Will need full PFTs at follow up  · AAT pending. Daily Progression:    No acute complaints     I have reviewed the labs and previous days notes. A comprehensive review of systems was negative.     OBJECTIVE:     Vital Signs:       Visit Vitals    BP (!) 87/52 (BP 1 Location: Left arm, BP Patient Position: At rest;Supine)    Pulse 91    Temp 98.8 °F (37.1 °C)    Resp 22    Ht 5' 11\" (1.803 m)    Wt 57 kg (125 lb 10.6 oz)    SpO2 96%    BMI 17.53 kg/m2      Temp (24hrs), Av °F (36.7 °C), Min:97.5 °F (36.4 °C), Max:98.8 °F (37.1 °C)     Intake/Output:     Last shift:      Last 3 shifts:  1901 -  0700  In: 2030 [P.O.:2030]  Out: 1025 [Urine:1025]          Intake/Output Summary (Last 24 hours) at 18 1041  Last data filed at 18 0400   Gross per 24 hour   Intake             1260 ml   Output              450 ml   Net              810 ml       Physical Exam:                                        Exam Findings Other   General: No resp distress noted, appears stated age    HEENT:  No ulcers, JVD not elevated, no cervical LAD    Chest: No pectus deformity, normal chest rise b/l    HEART:  RRR, no murmurs/rubs/gallops    Lungs:  CTA b/l, no rhonchi/crackles/wheeze, diminished BS at bases    ABD: Soft/NT, non rigid mildly distended    EXT: No cyanosis/clubbing/edema, normal peripheral pulses    Skin: No rashes or ulcers, no mottling    Neuro: A/O x 3        Medications:  Current Facility-Administered Medications   Medication Dose Route Frequency    albuterol-ipratropium (DUO-NEB) 2.5 MG-0.5 MG/3 ML  3 mL Nebulization Q4H PRN    carvedilol (COREG) tablet 3.125 mg  3.125 mg Oral Q12H    lisinopril (PRINIVIL, ZESTRIL) tablet 2.5 mg  2.5 mg Oral DAILY    levoFLOXacin (LEVAQUIN) tablet 500 mg  500 mg Oral Q24H    budesonide (PULMICORT) 500 mcg/2 ml nebulizer suspension  500 mcg Nebulization BID RT    docusate sodium (COLACE) capsule 100 mg  100 mg Oral DAILY    saline peripheral flush soln 10 mL  10 mL InterCATHeter PRN    sodium chloride (NS) flush 5-10 mL  5-10 mL IntraVENous Q8H    sodium chloride (NS) flush 5-10 mL  5-10 mL IntraVENous PRN    aspirin delayed-release tablet 81 mg  81 mg Oral DAILY    clopidogrel (PLAVIX) tablet 75 mg  75 mg Oral DAILY    albuterol (PROVENTIL VENTOLIN) nebulizer solution 0.63 mg  0.63 mg Nebulization Q6H PRN    oxyCODONE IR (ROXICODONE) tablet 40 mg  40 mg Oral Q8H PRN    simvastatin (ZOCOR) tablet 40 mg  40 mg Oral QHS    sodium chloride (NS) flush 5-10 mL  5-10 mL IntraVENous Q8H    sodium chloride (NS) flush 5-10 mL  5-10 mL IntraVENous PRN       Labs:  ABG Recent Labs      02/19/18   1424   FIO2I  36        CBC Recent Labs      02/22/18   0402  02/20/18   0454   WBC  17.7*  14.1*   HGB  13.5  13.5   HCT  39.9  40.9   PLT  367  321   MCV  89.7  91.7   MCH  30.3  53.9        Metabolic  Panel Recent Labs      02/22/18   0402  02/21/18   0339  02/20/18   0454   NA  138  139  140   K  3.6  3.7  4.1   CL  102  101  103   CO2  27  30  28   GLU  121*  92  86   BUN  34*  28*  28*   CREA  1.25  0.98  1.15   CA  8.9  8.6  8.7        Pertinent Labs                HERMELINDA Hummel  2/22/2018

## 2018-02-22 NOTE — PROGRESS NOTES
[facility] has accepted. Pt having some problems with low BP - already received low dose ACEi and BB today. Will give 500 mL bolus and monitor. Possible discharge later today.

## 2018-02-22 NOTE — PROGRESS NOTES
Hospitalist Progress Note  Magnolia Gore NP  Answering service: [phone] OR 1100 from in house phone  Cell: [phone]   Date of Service:  2018  NAME:  Rachel Larson  :  1950  MRN:  [id number]    Admission Summary:   Pt presented to the ED with shortness of breath, cough and worsening weakness for the past 3 days. Reported he could not walk few steps without getting SOB. Has a chronic cough but recently it is getting worse and is productive. [place] [facility] [place] and had a negative workup. Interval history / Subjective:   Pt in bed, feels about the same. Blood pressures are low and he indicates time- to- time dizziness. Orthostatics completed by measuring orthostatics both manually and through automatic machine:    Lying 85/56  (manual) 77/44 (auto)  Sitting 90/68  (manual) 87/52 (auto)  Standing 80/60  (manual) 99/58 (auto)     Assessment & Plan:     Hypotension:   - all cardiac medications stopped (received BB and ACEi today)  - gave 500 mL bolus earlier this am, repeating this afternoon  - monitor respiratory status - will repeat chest xray in am    NSTEMI (likely predating admit):  - denied chest pain at any point prior to or during admit  - serial cardiac enzymes: peak of 8.81 ->5.35 -> 3.75-> 1.51  - FLP reviewed: Chol 121 Hdl 43 Ldl 59.4 (on statin)  - s/p cardiac cath : stents placed. Significant 2 vessel CAD with 90% lesion in large mid-diagonal and short  of mid-cx. Dilated LV with severe hypokinesis of apical and anteroapical walls. EF 30% via cardiac cath.   - continue with ASA/plavix  - stopped BB and ACEi due to BP intolerance  - Pt will need Plavix x 1 month - He has Bare Metal Stents  - will need to follow up with Cardiologist outpatient    Pneumonia (POA):  - CTA of chest: Chronic lung disease, LLL infiltrate (which was present in )  - continue with levaquin, pulmonary recommending 10 days (today is day 6)  - continue nebs; pulmonary recommends spiriva and advair on discharge  - will need repeat CT in 4-6 weeks and PFTS with pulmonary follow up  - remains afebrile - leukocytosis could be due to steroids    COPD exacerbation due to above:    - wheezing at the time of presentation in ED but this has resolved, lungs diminished  - on nebs/abx   - received po steroids x 5 doses  - elevated D dimer but no pulmonary embolus seen on Chest CTA    Underweight:  - BMI 17.62  - poor-fair appetite  - supplements TID    Hx Chronic Respiratory Failure:   - hx of lung cancer with lobectomy 2009  - on home oxygen (3 L NC at max and does not wear continuously)    Hx Hyperlipidemia: continue statin. Code status: Full  DVT prophylaxis: Lovenox  Care Plan discussed with: patient, cardiologist and   Disposition:has been accepted to [facility], plan for discharge tomorrow. Want to watch blood pressures overnight     Hospital Problems  Date Reviewed: 2/22/2018          Codes Class Noted POA    * (Principal)Systolic CHF, acute ([facility] ICD-10-CM: I50.21  ICD-9-CM: 428.21, 428.0  2/22/2018 Yes        Shortness of breath ICD-10-CM: R06.02  ICD-9-CM: 786.05  2/17/2018 Yes            Review of Systems:   Denies HA. No chest pain or pressure. + SOB and fatigues easily on exertion. No N/V. Vital Signs:    Last 24hrs VS reviewed since prior progress note. Most recent are:  Visit Vitals    BP (!) 80/48    Pulse 98    Temp 97.9 °F (36.6 °C)    Resp 24    Ht 5' 11\" (1.803 m)    Wt 57 kg (125 lb 10.6 oz)    SpO2 94%    BMI 17.53 kg/m2       Intake/Output Summary (Last 24 hours) at 02/22/18 1521  Last data filed at 02/22/18 0800   Gross per 24 hour   Intake             1080 ml   Output              650 ml   Net              430 ml      Physical Examination:         Constitutional:  No acute distress, cooperative, pleasant. Very thin/emaciated appearance. Some dizziness when standing.    ENT: Oral mucous membranes dry. Resp:  Diminished but no wheezing/rhonchi/rales. On 2-3 L NC.   CV:  Regular rhythm, normal rate, no murmurs. SR-ST on tele review. GI:  Soft, non distended, non tender. Normoactive bowel sounds. Musculoskeletal:  No edema, warm, 2+ pulses throughout. Neurologic:  Moves all extremities. AAOx3. Data Review:   Review and/or order of clinical lab test  Review and/or order of tests in the radiology section of CPT  Review and/or order of tests in the medicine section of CPT    Labs:     Recent Labs      02/22/18   0402  02/20/18   0454   WBC  17.7*  14.1*   HGB  13.5  13.5   HCT  39.9  40.9   PLT  367  321     Recent Labs      02/22/18   0402  02/21/18   0339  02/20/18 0454   NA  138  139  140   K  3.6  3.7  4.1   CL  102  101  103   CO2  27  30  28   BUN  34*  28*  28*   CREA  1.25  0.98  1.15   GLU  121*  92  86   CA  8.9  8.6  8.7     No results for input(s): SGOT, GPT, ALT, AP, TBIL, TBILI, TP, ALB, GLOB, GGT, AML, LPSE in the last 72 hours. No lab exists for component: AMYP, HLPSE  No results for input(s): INR, PTP, APTT in the last 72 hours. No lab exists for component: INREXT, INREXT   No results for input(s): FE, TIBC, PSAT, FERR in the last 72 hours. No results found for: FOL, RBCF   No results for input(s): PH, PCO2, PO2 in the last 72 hours.   Recent Labs      02/20/18 0454   CPK  71   CKNDX  11.4*   TROIQ  1.51*     Lab Results   Component Value Date/Time    Cholesterol, total 106 02/20/2018 04:54 AM    HDL Cholesterol 45 02/20/2018 04:54 AM    LDL, calculated 46 02/20/2018 04:54 AM    Triglyceride 75 02/20/2018 04:54 AM    CHOL/HDL Ratio 2.4 02/20/2018 04:54 AM     Lab Results   Component Value Date/Time    Glucose (POC) 100 09/14/2015 12:14 PM     Lab Results   Component Value Date/Time    Color YELLOW/STRAW 11/28/2016 04:44 PM    Appearance CLEAR 11/28/2016 04:44 PM    Specific gravity 1.020 11/28/2016 04:44 PM    pH (UA) 5.0 11/28/2016 04:44 PM Protein TRACE (A) 11/28/2016 04:44 PM    Glucose NEGATIVE  11/28/2016 04:44 PM    Ketone TRACE (A) 11/28/2016 04:44 PM    Bilirubin NEGATIVE  11/28/2016 04:44 PM    Urobilinogen 0.2 11/28/2016 04:44 PM    Nitrites NEGATIVE  11/28/2016 04:44 PM    Leukocyte Esterase TRACE (A) 11/28/2016 04:44 PM    Epithelial cells FEW 11/28/2016 04:44 PM    Bacteria NEGATIVE  11/28/2016 04:44 PM    WBC 5-10 11/28/2016 04:44 PM    RBC 0-5 11/28/2016 04:44 PM     Medications Reviewed:     Current Facility-Administered Medications   Medication Dose Route Frequency    albuterol-ipratropium (DUO-NEB) 2.5 MG-0.5 MG/3 ML  3 mL Nebulization Q4H PRN    sodium chloride 0.9 % bolus infusion 500 mL  500 mL IntraVENous ONCE    levoFLOXacin (LEVAQUIN) tablet 500 mg  500 mg Oral Q24H    budesonide (PULMICORT) 500 mcg/2 ml nebulizer suspension  500 mcg Nebulization BID RT    docusate sodium (COLACE) capsule 100 mg  100 mg Oral DAILY    saline peripheral flush soln 10 mL  10 mL InterCATHeter PRN    sodium chloride (NS) flush 5-10 mL  5-10 mL IntraVENous Q8H    sodium chloride (NS) flush 5-10 mL  5-10 mL IntraVENous PRN    aspirin delayed-release tablet 81 mg  81 mg Oral DAILY    clopidogrel (PLAVIX) tablet 75 mg  75 mg Oral DAILY    albuterol (PROVENTIL VENTOLIN) nebulizer solution 0.63 mg  0.63 mg Nebulization Q6H PRN    oxyCODONE IR (ROXICODONE) tablet 40 mg  40 mg Oral Q8H PRN    simvastatin (ZOCOR) tablet 40 mg  40 mg Oral QHS    sodium chloride (NS) flush 5-10 mL  5-10 mL IntraVENous Q8H    sodium chloride (NS) flush 5-10 mL  5-10 mL IntraVENous PRN   ______________________________________________________________________  EXPECTED LENGTH OF STAY: 3d 19h  ACTUAL LENGTH OF STAY:          [id number] Jason Leary, NP

## 2018-02-22 NOTE — PROCEDURES
[address]  PULMONARY FUNCTION    Georgina Be  MR#: [id number]  : 1950  ACCOUNT #: [de-identified]   DATE OF SERVICE: 2018    REQUESTING PRACTITIONER:  Dr. Mihai Centeno:  COPD. Spirometry is uninterpretable.       MD STARLA Rowe / [place]  D: 2018 16:03     T: 2018 17:04  JOB #: [id number]

## 2018-02-22 NOTE — PROGRESS NOTES
Bedside shift change report given to Mikie Enamorado RN (oncoming nurse) by Graham Pérez RN (offgoing nurse). Report included the following information SBAR, Kardex, Accordion, Recent Results and Cardiac Rhythm NSR.

## 2018-02-22 NOTE — PROGRESS NOTES
Case discussed with pt's nurse who reports that pt is not cleared for PT right now, last BP 76/45. PT will continue to follow and see as appropriate.  Thank you, Flakita Wheatley, PT

## 2018-02-22 NOTE — PROGRESS NOTES
Cardiology Progress Note  2018     Admit Date: 2018  Admit Diagnosis: COPD exacerbation ([facility]  CC: none currently    Assessment:   Principal Problem:    Shortness of breath (2018)    Active Problems:    COPD exacerbation ([facility] (2015)      Plan:   BP still low and will stop coreg and lisinopril. Apparently runs low BP's at home. Otherwise stable, looks chronically ill and cachectic. Needs plavix for total one month with bare metal stents placed. Volume status:euvolemic  Renal function: stable    For other plans, see orders.   Subjective: Maira Milling reports   Chest Pain:  [x]   none,  consistent with  []   non-cardiac   []   atypical   []   angina             [x]   none now    []      on-going  Dyspnea: [x]   none  []   at rest  []   with exertion     []   improved   []   unchanged   []   worsening  PND:       [x]   none  []   overnight    Orthopnea: [x]   none  []   improved  []   unchanged  []   worsening  Presyncope: [x]   none   []   improved    []   unchanged    []   worsening  Ambulated in hallway without symptoms  []   Yes  Ambulated in room without symptoms  []   Yes    Objective:    Physical Exam:  Overall VSSAF;    Visit Vitals    BP (!) 87/52 (BP 1 Location: Left arm, BP Patient Position: At rest;Supine)    Pulse 91    Temp 98.8 °F (37.1 °C)    Resp 22    Ht 5' 11\" (1.803 m)    Wt 57 kg (125 lb 10.6 oz)    SpO2 96%    BMI 17.53 kg/m2     Temp (24hrs), Av °F (36.7 °C), Min:97.5 °F (36.4 °C), Max:98.8 °F (37.1 °C)    Patient Vitals for the past 8 hrs:   Pulse   18 0845 91   18 0301 85    Patient Vitals for the past 8 hrs:   Resp   18 0845 22   18 0301 20    Patient Vitals for the past 8 hrs:   BP   18 0845 (!) 87/52   18 0301 92/53        Intake/Output Summary (Last 24 hours) at 18 1043  Last data filed at 18 0400   Gross per 24 hour   Intake             1260 ml   Output              450 ml   Net 810 ml       General Appearance: Well developed, well nourished, no acute distress. Ears/Nose/Mouth/Throat:   Normal MM; anicteric. JVP: WNL   Resp:   Lungs clear to auscultation bilaterally. Nl resp effort. Cardiovascular:  RRR, S1, S2 normal, no new murmur. No gallop or rub. Abdomen:   Soft, non-tender, bowel sounds are present. Extremities: No edema bilaterally. Skin:  Neuro: Warm and dry. A/O x3, grossly nonfocal    []      cath site intact w/o hematoma or bruit; distal pulse unchanged. Data Review:     Telemetry independently reviewed : [x]   sinus  []   chronic afib   []   par afib  []      NSVT    ECG independently reviewed:  []   NSR   []   no significant changes  []   no new ECG provided for review  Lab results reviewed as noted below. Current medications reviewed as noted below. No results for input(s): PH, PCO2, PO2 in the last 72 hours. Recent Labs      02/20/18   0454   CPK  71   CKMB  8.1*   TROIQ  1.51*     Recent Labs      02/22/18   0402  02/21/18   0339  02/20/18   0454   NA  138  139  140   K  3.6  3.7  4.1   CL  102  101  103   CO2  27  30  28   BUN  34*  28*  28*   CREA  1.25  0.98  1.15   GLU  121*  92  86   CA  8.9  8.6  8.7   WBC  17.7*   --   14.1*   HGB  13.5   --   13.5   HCT  39.9   --   40.9   PLT  367   --   321     No results for input(s): SGOT, GPT, ALT, AP, TBIL, TBILI, TP, ALB, GLOB, GGT, AML, LPSE in the last 72 hours. No lab exists for component: AMYP, HLPSE  No results for input(s): INR, PTP, APTT in the last 72 hours. No lab exists for component: INREXT   No results for input(s): FE, TIBC, PSAT, FERR in the last 72 hours.    Lab Results   Component Value Date/Time    Glucose (POC) 100 09/14/2015 12:14 PM       Current Facility-Administered Medications   Medication Dose Route Frequency    albuterol-ipratropium (DUO-NEB) 2.5 MG-0.5 MG/3 ML  3 mL Nebulization Q4H PRN    levoFLOXacin (LEVAQUIN) tablet 500 mg  500 mg Oral Q24H    budesonide (PULMICORT) 500 mcg/2 ml nebulizer suspension  500 mcg Nebulization BID RT    docusate sodium (COLACE) capsule 100 mg  100 mg Oral DAILY    saline peripheral flush soln 10 mL  10 mL InterCATHeter PRN    sodium chloride (NS) flush 5-10 mL  5-10 mL IntraVENous Q8H    sodium chloride (NS) flush 5-10 mL  5-10 mL IntraVENous PRN    aspirin delayed-release tablet 81 mg  81 mg Oral DAILY    clopidogrel (PLAVIX) tablet 75 mg  75 mg Oral DAILY    albuterol (PROVENTIL VENTOLIN) nebulizer solution 0.63 mg  0.63 mg Nebulization Q6H PRN    oxyCODONE IR (ROXICODONE) tablet 40 mg  40 mg Oral Q8H PRN    simvastatin (ZOCOR) tablet 40 mg  40 mg Oral QHS    sodium chloride (NS) flush 5-10 mL  5-10 mL IntraVENous Q8H    sodium chloride (NS) flush 5-10 mL  5-10 mL IntraVENous PRN        Luigi Dao MD

## 2018-02-22 NOTE — CDMP QUERY
Please clarify if this patient is (was) being treated/managed for Severe protein-calorie malnutrition in the setting of Unintended weight loss, UBW is 151#, which indicates a 16% weight loss.   severe fat and muscle wasting, wasting of orbital, temporal, clavicle, calves, fingers, BMI 17.62 requiring Nutrition consult and nutritional  supplements.     => Other explanation of clinical findings  => Unable to determine (no explanation for clinical findings)    The medical record reflects the following clinical findings, treatment, and risk factors. Risk Factors:  Debility    Clinical Indicators:    Patient meets criteria for Severe Protein Calorie Malnutrition as evidenced by:   ASPEN Malnutrition Criteria  Acute Illness, Chronic Illness, or Social/Enviornmental: Acute illness  Energy Intake: <75% est energy req for > 7 days  Body Fat: Moderate  Muscle Mass: Moderate  ASPEN Malnutrition Score - Acute Illness: 13  Acute Illness - Malnutrition Diagnosis: Severe malnutrition. BMI: 17.62  Ht 5'11\"  Wt 126 Lbs. Treatment: Nutritional consult and nutritional supplements    Please clarify and document your clinical opinion in the progress notes and discharge summary including the definitive and/or presumptive diagnosis, (suspected or probable), related to the above clinical findings. Please include clinical findings supporting your diagnosis.     Thank you  Vishal Chavarria  [facility]  [phone]

## 2018-02-22 NOTE — PROGRESS NOTES
Problem: Cath Lab Procedures: Discharge Outcomes  Goal: *Stable cardiac rhythm  Outcome: Progressing Towards Goal  Patient is in Normal sinus. Highest heart rate today was 105. Patient's baseline heart rate today was 97. Goal: *Optimal pain control at patient's stated goal  Outcome: Progressing Towards Goal  Patient has reported NO pain  Goal: *Pulses palpable, skin color within defined limits, skin temperature warm  Outcome: Progressing Towards Goal  Pulses are strong and palpable on all 4 extremeties    Goal: *No signs and symptoms of infection or wound complications  Outcome: Progressing Towards Goal  R groin cath site looks good. Dressing was removed today by MD, the area is without redness or swelling, patient reports no pain at site and patient is afebrile.

## 2018-02-22 NOTE — PROGRESS NOTES
UPDATE:  In anticipation of patient being ready for discharge tomorrow, 2/23, this CM confirmed with Guerrero Arboleda, that [facility] can take patient at 10 a.m. No ambulance preference, so [facility] is set up for 10 am transport tomorrow, Friday, 2/23. UPDATE:  [facility] has accepted patient. Left [facility] for attending to let him know.     Sent referral to [facility] per patient request.

## 2018-02-23 ENCOUNTER — APPOINTMENT (OUTPATIENT)
Dept: GENERAL RADIOLOGY | Age: 68
DRG: 248 | End: 2018-02-23
Attending: NURSE PRACTITIONER
Payer: MEDICARE

## 2018-02-23 VITALS
TEMPERATURE: 97.9 F | HEIGHT: 71 IN | HEART RATE: 95 BPM | SYSTOLIC BLOOD PRESSURE: 111 MMHG | RESPIRATION RATE: 18 BRPM | OXYGEN SATURATION: 97 % | BODY MASS INDEX: 17.78 KG/M2 | WEIGHT: 126.98 LBS | DIASTOLIC BLOOD PRESSURE: 66 MMHG

## 2018-02-23 LAB
A1AT SERPL-MCNC: 190 MG/DL (ref 90–200)
ANION GAP SERPL CALC-SCNC: 8 MMOL/L (ref 5–15)
BUN SERPL-MCNC: 30 MG/DL (ref 6–20)
BUN/CREAT SERPL: 27 (ref 12–20)
CALCIUM SERPL-MCNC: 8.2 MG/DL (ref 8.5–10.1)
CHLORIDE SERPL-SCNC: 102 MMOL/L (ref 97–108)
CO2 SERPL-SCNC: 26 MMOL/L (ref 21–32)
CREAT SERPL-MCNC: 1.11 MG/DL (ref 0.7–1.3)
ERYTHROCYTE [DISTWIDTH] IN BLOOD BY AUTOMATED COUNT: 13.4 % (ref 11.5–14.5)
GLUCOSE SERPL-MCNC: 90 MG/DL (ref 65–100)
HCT VFR BLD AUTO: 37.5 % (ref 36.6–50.3)
HGB BLD-MCNC: 12.5 G/DL (ref 12.1–17)
MCH RBC QN AUTO: 30.2 PG (ref 26–34)
MCHC RBC AUTO-ENTMCNC: 33.3 G/DL (ref 30–36.5)
MCV RBC AUTO: 90.6 FL (ref 80–99)
NRBC # BLD: 0 K/UL (ref 0–0.01)
NRBC BLD-RTO: 0 PER 100 WBC
PLATELET # BLD AUTO: 360 K/UL (ref 150–400)
PMV BLD AUTO: 10.1 FL (ref 8.9–12.9)
POTASSIUM SERPL-SCNC: 3.9 MMOL/L (ref 3.5–5.1)
RBC # BLD AUTO: 4.14 M/UL (ref 4.1–5.7)
SODIUM SERPL-SCNC: 136 MMOL/L (ref 136–145)
WBC # BLD AUTO: 18.9 K/UL (ref 4.1–11.1)

## 2018-02-23 PROCEDURE — 74011250637 HC RX REV CODE- 250/637: Performed by: INTERNAL MEDICINE

## 2018-02-23 PROCEDURE — 80048 BASIC METABOLIC PNL TOTAL CA: CPT | Performed by: NURSE PRACTITIONER

## 2018-02-23 PROCEDURE — 94640 AIRWAY INHALATION TREATMENT: CPT

## 2018-02-23 PROCEDURE — 74011250637 HC RX REV CODE- 250/637: Performed by: NURSE PRACTITIONER

## 2018-02-23 PROCEDURE — 74011000250 HC RX REV CODE- 250: Performed by: INTERNAL MEDICINE

## 2018-02-23 PROCEDURE — 85027 COMPLETE CBC AUTOMATED: CPT | Performed by: NURSE PRACTITIONER

## 2018-02-23 PROCEDURE — 77010033678 HC OXYGEN DAILY

## 2018-02-23 PROCEDURE — 36415 COLL VENOUS BLD VENIPUNCTURE: CPT | Performed by: NURSE PRACTITIONER

## 2018-02-23 PROCEDURE — 71045 X-RAY EXAM CHEST 1 VIEW: CPT

## 2018-02-23 RX ORDER — FLUTICASONE PROPIONATE AND SALMETEROL 500; 50 UG/1; UG/1
1 POWDER RESPIRATORY (INHALATION) EVERY 12 HOURS
Qty: 1 INHALER | Refills: 1 | Status: SHIPPED
Start: 2018-02-23 | End: 2018-04-04

## 2018-02-23 RX ORDER — LEVOFLOXACIN 500 MG/1
500 TABLET, FILM COATED ORAL EVERY 24 HOURS
Qty: 4 TAB | Refills: 0 | Status: SHIPPED
Start: 2018-02-23 | End: 2018-02-27

## 2018-02-23 RX ADMIN — DOCUSATE SODIUM 100 MG: 100 CAPSULE, LIQUID FILLED ORAL at 08:22

## 2018-02-23 RX ADMIN — ASPIRIN 81 MG: 81 TABLET, COATED ORAL at 08:21

## 2018-02-23 RX ADMIN — Medication 10 ML: at 06:39

## 2018-02-23 RX ADMIN — BUDESONIDE 500 MCG: 0.5 INHALANT RESPIRATORY (INHALATION) at 08:55

## 2018-02-23 RX ADMIN — CLOPIDOGREL BISULFATE 75 MG: 75 TABLET ORAL at 08:21

## 2018-02-23 NOTE — PROGRESS NOTES
Problem: Cath Lab Procedures: Discharge Outcomes  Goal: *Stable cardiac rhythm  Outcome: Progressing Towards Goal  Patient has been in NSR and his rhythm is stable  Goal: *Hemodynamically stable  Outcome: Progressing Towards Goal  Patient's vitals are stable and in no distress  Goal: *Optimal pain control at patient's stated goal  Outcome: Progressing Towards Goal  Patient has reported no pain  Goal: *Pulses palpable, skin color within defined limits, skin temperature warm  Pulses are palpable on all extremities, skin color is appropriate for race and skin is warm and dry

## 2018-02-23 NOTE — DISCHARGE INSTRUCTIONS
Discharge Summary      PATIENT ID: Sue Davenport  MRN: [id number]   YOB: 1950    DATE OF ADMISSION: 2/17/2018  2:37 PM    DATE OF DISCHARGE: 2/23/2018  PRIMARY CARE PROVIDER: [facility] physician  ATTENDING PHYSICIAN: Alfred Sousa MD  DISCHARGING PROVIDER: Chai Tran NP. To contact this individual call [phone] and ask the  to page. If unavailable ask to be transferred the Adult Hospitalist Department.     CONSULTATIONS: IP CONSULT TO CARDIOLOGY  IP CONSULT TO PULMONOLOGY     ADMITTING [address]:   Pt presented to the ED with shortness of breath, cough and worsening weakness for the past 3 days. Reported he could not walk few steps without getting SOB. Has a chronic cough but recently it is getting worse and is productive.       Pt was seen at [facility] recently and had a negative workup.      DISCHARGE DIAGNOSES / PLAN:       Hypotension:   - all cardiac medications stopped   - gave a total of ~ 1 L fluids yesterday  - chest xray 2/23: results pending. Imaging reviewed.       NSTEMI (likely predating admit):  - denied chest pain at any point prior to or during admit  - serial cardiac enzymes: peak of 8.81 ->5.35 -> 3.75-> 1.51  - FLP reviewed: Chol 121 Hdl 43 Ldl 59.4 (on statin)  - s/p cardiac cath 2/19: stents placed. Significant 2 vessel CAD with 90% lesion in large mid-diagonal and short  of mid-cx. Dilated LV with severe hypokinesis of apical and anteroapical walls. EF 30% via cardiac cath.   - continue with ASA/plavix  - stopped BB and ACEi due to BP intolerance  - Pt will need Plavix x 1 month - He has Bare Metal Stents  - will need to follow up with Cardiologist outpatient, Dr. Camilo Montilla  Pneumonia (POA):  - CTA of chest: Chronic lung disease, LLL infiltrate (which was present in 2016)  - continue with levaquin, pulmonary recommending 10 days (today is day 7)  - continue nebs; spiriva and advair on discharge  - will need repeat CT in 4-6 weeks and PFTS with pulmonary follow up  - remains afebrile - leukocytosis could be due to steroids (which are now done). Will suggest checking CBC in ~ 1 week.      COPD exacerbation due to above:    - wheezing at the time of presentation in ED but this has resolved, lungs diminished  - on nebs/abx   - received po steroids x 5 doses  - elevated D dimer but no pulmonary embolus seen on Chest CTA      Severe protein-calorie malnutrition:  - Underweight with BMI 17.62  - albumin 2.4  - poor-fair appetite  - supplements BID on discharge      Hx Chronic Respiratory Failure:   - hx of lung cancer with lobectomy 2009  - on home oxygen (3 L NC at max and does not wear continuously)      Hx Hyperlipidemia: continue statin.       FOLLOW UP APPOINTMENTS:    Follow-up Information     Follow up With Details Comments Contact Info     Earlene Adams MD In 1 week   [address] [facility]  [address] [facility] [facility] [address]   Albert Rust MD In 3 weeks   [address] [facility]  [address] [place] [address]  [phone]     [facility] Provider In 1 week             ADDITIONAL CARE RECOMMENDATIONS:  - will need repeat CT scan in 4-6 weeks and follow up PFTs with pulmonologist  - will need to see cardiologist within the week     Blood Pressure daily, keep record for cardiology review  2-3 L Oxygen via NC     Check CBC in 1 week     DIET: Cardiac Diet  Oral Nutritional Supplements: Ensure EnliveTwice daily     ACTIVITY: PT/OT Eval and Treat     DISCHARGE MEDICATIONS:        Current Discharge Medication List             START taking these medications     Details   levoFLOXacin (LEVAQUIN) 500 mg tablet Take 1 Tab by mouth every twenty-four (24) hours for 4 days. Qty: 4 Tab, Refills: 0       fluticasone-salmeterol (ADVAIR DISKUS) 500-50 mcg/dose diskus inhaler Take 1 Puff by inhalation every twelve (12) hours.   Qty: 1 Inhaler, Refills: 1       tiotropium (SPIRIVA WITH HANDIHALER) 18 mcg inhalation capsule Take 1 Cap by inhalation daily. Qty: 30 Cap, Refills: 1       aspirin delayed-release 81 mg tablet Take 1 Tab by mouth daily. Qty: 30 Tab, Refills: 0       clopidogrel (PLAVIX) 75 mg tab Take 1 Tab by mouth daily. Qty: 30 Tab, Refills: 0       docusate sodium (COLACE) 100 mg capsule Take 1 Cap by mouth daily for 30 doses. Qty: 30 Cap, Refills: 0                 CONTINUE these medications which have CHANGED     Details   albuterol-ipratropium (DUO-NEB) 2.5 mg-0.5 mg/3 ml nebu 3 mL by Nebulization route every four (4) hours as needed. Qty: 30 Nebule, Refills: 0                 CONTINUE these medications which have NOT CHANGED     Details   simvastatin (ZOCOR) 40 mg tablet Take 40 mg by mouth nightly.                STOP taking these medications         albuterol (PROVENTIL VENTOLIN) 2.5 mg /3 mL (0.083 %) nebulizer solution Comments:   Reason for Stopping:            oxyCODONE IR (ROXICODONE) 20 mg immediate release tablet Comments:   Reason for Stopping:            budesonide (PULMICORT) 0.5 mg/2 mL nebulizer suspension Comments:   Reason for Stopping:            albuterol (ACCUNEB) 0.63 mg/3 mL nebulizer solution Comments:   Reason for Stopping:            oxyCODONE IR (ROXICODONE) 20 mg immediate release tablet Comments:   Reason for Stopping:                 NOTIFY YOUR PHYSICIAN FOR ANY OF THE FOLLOWING:   Fever over 101 degrees for 24 hours. Chest pain, shortness of breath, fever, chills, nausea, vomiting, diarrhea, change in mentation, falling, weakness, bleeding. Severe pain or pain not relieved by medications.   Or, any other signs or symptoms that you may have questions about.     DISPOSITION:     Home With:    OT   PT   HH   RN      xx Long term SNF/Inpatient Rehab     Independent/assisted living     Hospice     Other:      PATIENT CONDITION AT DISCHARGE:   Functional status     Poor    xx Deconditioned      Independent       Cognition   xx  Lucid      Forgetful      Dementia       Catheters/lines (plus indication)   Marcin      PICC      PEG    xx None       Code status   xx  Full code      DNR       PHYSICAL EXAMINATION AT DISCHARGE:  /66 (BP 1 Location: Left arm, BP Patient Position: At rest)  Pulse 95  Temp 97.9 °F (36.6 °C)  Resp 18  Ht 5' 11\" (1.803 m)  Wt 57.6 kg (126 lb 15.8 oz)  SpO2 97%  BMI 17.71 kg/m2     Pt in bed, no new complaints. BPs are better - plans for discharge today to [facility].     Constitutional:  No acute distress, cooperative, pleasant. Very thin/emaciated appearance.    ENT:  Oral mucous membranes dry. Resp:  Diminished but no wheezing/rhonchi/rales. On 2-3 L NC.   CV:  Regular rhythm, normal rate, no murmurs. SR on tele review.     GI:  Soft, non distended, non tender. Normoactive bowel sounds.    Musculoskeletal:  No edema, warm, 2+ pulses throughout.    Neurologic:  Moves all extremities.  [id number] Paling  CHRONIC MEDICAL DIAGNOSES:  Problem List as of 2/23/2018  Date Reviewed: 2/23/2018             Codes Class Noted - Resolved     * (Principal)Systolic CHF, acute ([facility] ICD-10-CM: I50.21  ICD-9-CM: 428.21, 428.0   2/22/2018 - Present           Shortness of breath ICD-10-CM: R06.02  ICD-9-CM: 786.05   2/17/2018 - Present           COPD exacerbation (HCC) ICD-10-CM: J44.1  ICD-9-CM: 491.21   8/10/2015 - Present           RESOLVED: NSTEMI (non-ST elevated myocardial infarction) ([facility] ICD-10-CM: I21.4  ICD-9-CM: 410.70   2/22/2018 - 2/22/2018           RESOLVED: COPD exacerbation (HCC) (Chronic) ICD-10-CM: J44.1  ICD-9-CM: 491.21   9/5/2015 - 2/22/2018               Greater than 30 minutes were spent with the patient on counseling and coordination of care     Signed:   Marco Antonio Tyson NP  2/23/2018  7:53 AM

## 2018-02-23 NOTE — DISCHARGE SUMMARY
Discharge Summary     PATIENT ID: Barrie Anderson  MRN: [id number]   YOB: 1950    DATE OF ADMISSION: 2/17/2018  2:37 PM    DATE OF DISCHARGE: 2/23/2018  PRIMARY CARE PROVIDER: [facility] physician  ATTENDING PHYSICIAN: Franck Mancilla MD  DISCHARGING PROVIDER: Lindsey Capone NP. To contact this individual call [phone] and ask the  to page. If unavailable ask to be transferred the Adult Hospitalist Department. CONSULTATIONS: IP CONSULT TO CARDIOLOGY  IP CONSULT TO PULMONOLOGY    ADMITTING [address] [facility] COURSE:   Pt presented to the ED with shortness of breath, cough and worsening weakness for the past 3 days. Reported he could not walk few steps without getting SOB. Has a chronic cough but recently it is getting worse and is productive.      Pt was seen at [facility] recently and had a negative workup. DISCHARGE DIAGNOSES / PLAN:      Hypotension:   - all cardiac medications stopped   - gave a total of ~ 1 L fluids yesterday  - chest xray 2/23: results pending. Imaging reviewed.      NSTEMI (likely predating admit):  - denied chest pain at any point prior to or during admit  - serial cardiac enzymes: peak of 8.81 ->5.35 -> 3.75-> 1.51  - FLP reviewed: Chol 121 Hdl 43 Ldl 59.4 (on statin)  - s/p cardiac cath 2/19: stents placed. Significant 2 vessel CAD with 90% lesion in large mid-diagonal and short  of mid-cx. Dilated LV with severe hypokinesis of apical and anteroapical walls. EF 30% via cardiac cath.   - continue with ASA/plavix  - stopped BB and ACEi due to BP intolerance  - Pt will need Plavix x 1 month - He has Bare Metal Stents  - will need to follow up with Cardiologist outpatient, Dr. Katie Rosado     Pneumonia (POA):  - CTA of chest: Chronic lung disease, LLL infiltrate (which was present in 2016)  - continue with levaquin, pulmonary recommending 10 days (today is day 7)  - continue nebs; spiriva and advair on discharge  - will need repeat CT in 4-6 weeks and PFTS with pulmonary follow up  - remains afebrile - leukocytosis could be due to steroids (which are now done). Will suggest checking CBC in ~ 1 week.     COPD exacerbation due to above:    - wheezing at the time of presentation in ED but this has resolved, lungs diminished  - on nebs/abx   - received po steroids x 5 doses  - elevated D dimer but no pulmonary embolus seen on Chest CTA     Severe protein-calorie malnutrition:  - Underweight with BMI 17.62  - albumin 2.4  - poor-fair appetite  - supplements BID on discharge     Hx Chronic Respiratory Failure:   - hx of lung cancer with lobectomy 2009  - on home oxygen (3 L NC at max and does not wear continuously)     Hx Hyperlipidemia: continue statin. FOLLOW UP APPOINTMENTS:    Follow-up Information     Follow up With Details Comments Contact Info    Rukhsana Berumen MD In 1 week  [facility]  [address]      Evelin Jackson MD In 3 weeks  [address] [facility]  [address]  [facility] [address]  [address]  [phone]      [facility] Provider In 1 week           ADDITIONAL CARE RECOMMENDATIONS:  - will need repeat CT scan in 4-6 weeks and follow up PFTs with pulmonologist  - will need to see cardiologist within the week    Blood Pressure daily, keep record for cardiology review  2-3 L Oxygen via NC    Check CBC in 1 week    DIET: Cardiac Diet  Oral Nutritional Supplements: Ensure EnliveTwice daily    ACTIVITY: PT/OT Eval and Treat    DISCHARGE MEDICATIONS:  Current Discharge Medication List      START taking these medications    Details   levoFLOXacin (LEVAQUIN) 500 mg tablet Take 1 Tab by mouth every twenty-four (24) hours for 4 days. Qty: 4 Tab, Refills: 0      fluticasone-salmeterol (ADVAIR DISKUS) 500-50 mcg/dose diskus inhaler Take 1 Puff by inhalation every twelve (12) hours. Qty: 1 Inhaler, Refills: 1      tiotropium (SPIRIVA WITH HANDIHALER) 18 mcg inhalation capsule Take 1 Cap by inhalation daily.   Qty: 30 Cap, Refills: 1      aspirin delayed-release 81 mg tablet Take 1 Tab by mouth daily. Qty: 30 Tab, Refills: 0      clopidogrel (PLAVIX) 75 mg tab Take 1 Tab by mouth daily. Qty: 30 Tab, Refills: 0      docusate sodium (COLACE) 100 mg capsule Take 1 Cap by mouth daily for 30 doses. Qty: 30 Cap, Refills: 0         CONTINUE these medications which have CHANGED    Details   albuterol-ipratropium (DUO-NEB) 2.5 mg-0.5 mg/3 ml nebu 3 mL by Nebulization route every four (4) hours as needed. Qty: 30 Nebule, Refills: 0         CONTINUE these medications which have NOT CHANGED    Details   simvastatin (ZOCOR) 40 mg tablet Take 40 mg by mouth nightly. STOP taking these medications       albuterol (PROVENTIL VENTOLIN) 2.5 mg /3 mL (0.083 %) nebulizer solution Comments:   Reason for Stopping:         oxyCODONE IR (ROXICODONE) 20 mg immediate release tablet Comments:   Reason for Stopping:         budesonide (PULMICORT) 0.5 mg/2 mL nebulizer suspension Comments:   Reason for Stopping:         albuterol (ACCUNEB) 0.63 mg/3 mL nebulizer solution Comments:   Reason for Stopping:         oxyCODONE IR (ROXICODONE) 20 mg immediate release tablet Comments:   Reason for Stopping:             NOTIFY YOUR PHYSICIAN FOR ANY OF THE FOLLOWING:   Fever over 101 degrees for 24 hours. Chest pain, shortness of breath, fever, chills, nausea, vomiting, diarrhea, change in mentation, falling, weakness, bleeding. Severe pain or pain not relieved by medications. Or, any other signs or symptoms that you may have questions about.     DISPOSITION:    Home With:   OT  PT  HH  RN      xx Long term SNF/Inpatient Rehab    Independent/assisted living    Hospice    Other:     PATIENT CONDITION AT DISCHARGE:   Functional status    Poor    xx Deconditioned     Independent      Cognition   xx  Lucid     Forgetful     Dementia      Catheters/lines (plus indication)    Burch     PICC     PEG    xx None      Code status   xx  Full code     DNR      PHYSICAL EXAMINATION AT DISCHARGE:  /66 (BP 1 Location: Left arm, BP Patient Position: At rest)  Pulse 95  Temp 97.9 °F (36.6 °C)  Resp 18  Ht 5' 11\" (1.803 m)  Wt 57.6 kg (126 lb 15.8 oz)  SpO2 97%  BMI 17.71 kg/m2    Pt in bed, no new complaints. BPs are better - plans for discharge today to [facility]. Constitutional:  No acute distress, cooperative, pleasant. Very thin/emaciated appearance.    ENT:  Oral mucous membranes dry. Resp:  Diminished but no wheezing/rhonchi/rales. On 2-3 L NC.   CV:  Regular rhythm, normal rate, no murmurs. SR on tele review. GI:  Soft, non distended, non tender. Normoactive bowel sounds. Musculoskeletal:  No edema, warm, 2+ pulses throughout. Neurologic:  Moves all extremities.  [id number]  CHRONIC MEDICAL DIAGNOSES:  Problem List as of 2/23/2018  Date Reviewed: 2/23/2018          Codes Class Noted - Resolved    * (Principal)Systolic CHF, acute ([facility] ICD-10-CM: I50.21  ICD-9-CM: 428.21, 428.0  2/22/2018 - Present        Shortness of breath ICD-10-CM: R06.02  ICD-9-CM: 786.05  2/17/2018 - Present        COPD exacerbation ([facility] ICD-10-CM: J44.1  ICD-9-CM: 491.21  8/10/2015 - Present        RESOLVED: NSTEMI (non-ST elevated myocardial infarction) ([facility] ICD-10-CM: I21.4  ICD-9-CM: 410.70  2/22/2018 - 2/22/2018        RESOLVED: COPD exacerbation ([facility] (Chronic) ICD-10-CM: J44.1  ICD-9-CM: 491.21  9/5/2015 - 2/22/2018            Greater than 30 minutes were spent with the patient on counseling and coordination of care    Signed:   Veronika Deluca NP  2/23/2018  7:53 AM

## 2018-02-23 NOTE — PROGRESS NOTES
TRANSFER - OUT REPORT:    Verbal report given to Navarro Estrada RN(name) on Time Leander  being transferred to [facility](unit) for routine progression of care       Report consisted of patients Situation, Background, Assessment and   Recommendations(SBAR). Information from the following report(s) SBAR, Kardex, ED Summary, Procedure Summary, Intake/Output, MAR, Accordion, Recent Results, Med Rec Status and Cardiac Rhythm NSR was reviewed with the receiving nurse. Lines:       Opportunity for questions and clarification was provided.       Patient transported with:   Monitor  O2 @ 2 liters   Medical Transport

## 2018-02-23 NOTE — PROGRESS NOTES
Bedside shift change report given to LINUS Brownlee (oncoming nurse) by Shelly Boyd RN (offgoing nurse). Report included the following information SBAR, Kardex, MAR, Accordion, Recent Results and Cardiac Rhythm NSR.

## 2018-02-23 NOTE — PROGRESS NOTES
Hospital follow-up PCP transitional care appointment has been scheduled with Dr. Fleming for Tuesday, 3/13/18 at 10:20 a.m. Patient needs to provide a listing of all medications including over the counter. Pending patient discharge.   Abdiel Lehman, Care Management Specialist.

## 2018-03-02 ENCOUNTER — TELEPHONE (OUTPATIENT)
Dept: CARDIAC REHAB | Age: 68
End: 2018-03-02

## 2018-03-02 NOTE — TELEPHONE ENCOUNTER
3/2/2018 Cardiac Rehab: Called Mr. Abigail Bowens  to discuss participation in the Cardiac Rehab Program following MI and stents on 2/19/18. He is still in rehab and is planning on being discharged \"next week\". Will recall the week of 3/16/18.  Britta Wheatley RN

## 2018-03-16 ENCOUNTER — TELEPHONE (OUTPATIENT)
Dept: CARDIAC REHAB | Age: 68
End: 2018-03-16

## 2018-03-16 NOTE — TELEPHONE ENCOUNTER
3/16/2018 Cardiac Rehab: Mamta Zazuetaleslye Liberty to follow-up on enrollment in cardiac rehab. His voice mail was not set up, I was not able to leave a message. Will follow the week of 3/19/18. Merced Burgos RN    3/2/2018 Cardiac Rehab: Called Mr. Myrna Mendoza  to discuss participation in the Cardiac Rehab Program following MI and stents on 2/19/18. He is still in rehab and is planning on being discharged \"next week\". Will recall the week of 3/16/18.  Merced Burgos RN

## 2018-03-19 ENCOUNTER — TELEPHONE (OUTPATIENT)
Dept: CARDIAC REHAB | Age: 68
End: 2018-03-19

## 2018-03-19 NOTE — TELEPHONE ENCOUNTER
3/19/2018 Cardiac Rehab: Margy Hudsonrosa Koenig. Discussed the cardiac rehab program. He feels he is able to participate at this time, however, he is currently \"in the doctor's office\". He requested that we call back tomorrow \"anytime after 8\". Will follow-up the week fo 3/20/18. Karen Beverly RN    3/16/2018 Cardiac Rehab: Margy Koenig to follow-up on enrollment in cardiac rehab. His voice mail was not set up, I was not able to leave a message. Will follow the week of 3/19/18. Karen Beverly RN     3/2/2018 Cardiac Rehab: Called Mr. Nilo Koenig  to discuss participation in the Cardiac Rehab Program following MI and stents on 2/19/18. He is still in rehab and is planning on being discharged \"next week\". Will recall the week of 3/16/18.  Karen Beverly RN

## 2018-03-20 ENCOUNTER — TELEPHONE (OUTPATIENT)
Dept: CARDIAC REHAB | Age: 68
End: 2018-03-20

## 2018-04-04 ENCOUNTER — HOSPITAL ENCOUNTER (INPATIENT)
Age: 68
LOS: 6 days | Discharge: HOME | DRG: 167 | End: 2018-04-10
Attending: EMERGENCY MEDICINE | Admitting: INTERNAL MEDICINE
Payer: MEDICARE

## 2018-04-04 ENCOUNTER — APPOINTMENT (OUTPATIENT)
Dept: GENERAL RADIOLOGY | Age: 68
DRG: 167 | End: 2018-04-04
Attending: PHYSICIAN ASSISTANT
Payer: MEDICARE

## 2018-04-04 ENCOUNTER — APPOINTMENT (OUTPATIENT)
Dept: CT IMAGING | Age: 68
DRG: 167 | End: 2018-04-04
Attending: EMERGENCY MEDICINE
Payer: MEDICARE

## 2018-04-04 DIAGNOSIS — J69.0 ASPIRATION PNEUMONIA OF BOTH LOWER LOBES, UNSPECIFIED ASPIRATION PNEUMONIA TYPE (HCC): Primary | ICD-10-CM

## 2018-04-04 DIAGNOSIS — R09.02 HYPOXIA: ICD-10-CM

## 2018-04-04 LAB
ALBUMIN SERPL-MCNC: 3.5 G/DL (ref 3.5–5)
ALBUMIN/GLOB SERPL: 0.9 {RATIO} (ref 1.1–2.2)
ALP SERPL-CCNC: 93 U/L (ref 45–117)
ALT SERPL-CCNC: 20 U/L (ref 12–78)
ANION GAP SERPL CALC-SCNC: 6 MMOL/L (ref 5–15)
AST SERPL-CCNC: 13 U/L (ref 15–37)
BASOPHILS # BLD: 0.1 K/UL (ref 0–0.1)
BASOPHILS NFR BLD: 0 % (ref 0–1)
BILIRUB SERPL-MCNC: 0.6 MG/DL (ref 0.2–1)
BUN SERPL-MCNC: 19 MG/DL (ref 6–20)
BUN/CREAT SERPL: 15 (ref 12–20)
CALCIUM SERPL-MCNC: 9.4 MG/DL (ref 8.5–10.1)
CHLORIDE SERPL-SCNC: 108 MMOL/L (ref 97–108)
CO2 SERPL-SCNC: 27 MMOL/L (ref 21–32)
CREAT SERPL-MCNC: 1.29 MG/DL (ref 0.7–1.3)
DIFFERENTIAL METHOD BLD: ABNORMAL
EOSINOPHIL # BLD: 0.3 K/UL (ref 0–0.4)
EOSINOPHIL NFR BLD: 2 % (ref 0–7)
ERYTHROCYTE [DISTWIDTH] IN BLOOD BY AUTOMATED COUNT: 14.2 % (ref 11.5–14.5)
GLOBULIN SER CALC-MCNC: 3.9 G/DL (ref 2–4)
GLUCOSE SERPL-MCNC: 114 MG/DL (ref 65–100)
HCT VFR BLD AUTO: 44.9 % (ref 36.6–50.3)
HGB BLD-MCNC: 14.5 G/DL (ref 12.1–17)
IMM GRANULOCYTES # BLD: 0.1 K/UL (ref 0–0.04)
IMM GRANULOCYTES NFR BLD AUTO: 0 % (ref 0–0.5)
LACTATE SERPL-SCNC: 1.5 MMOL/L (ref 0.4–2)
LYMPHOCYTES # BLD: 1.1 K/UL (ref 0.8–3.5)
LYMPHOCYTES NFR BLD: 8 % (ref 12–49)
MCH RBC QN AUTO: 30.7 PG (ref 26–34)
MCHC RBC AUTO-ENTMCNC: 32.3 G/DL (ref 30–36.5)
MCV RBC AUTO: 95.1 FL (ref 80–99)
MONOCYTES # BLD: 0.7 K/UL (ref 0–1)
MONOCYTES NFR BLD: 5 % (ref 5–13)
NEUTS SEG # BLD: 12.1 K/UL (ref 1.8–8)
NEUTS SEG NFR BLD: 85 % (ref 32–75)
NRBC # BLD: 0 K/UL (ref 0–0.01)
NRBC BLD-RTO: 0 PER 100 WBC
PLATELET # BLD AUTO: 278 K/UL (ref 150–400)
PMV BLD AUTO: 9.7 FL (ref 8.9–12.9)
POTASSIUM SERPL-SCNC: 4.2 MMOL/L (ref 3.5–5.1)
PROT SERPL-MCNC: 7.4 G/DL (ref 6.4–8.2)
RBC # BLD AUTO: 4.72 M/UL (ref 4.1–5.7)
SODIUM SERPL-SCNC: 141 MMOL/L (ref 136–145)
TROPONIN I SERPL-MCNC: <0.04 NG/ML
WBC # BLD AUTO: 14.3 K/UL (ref 4.1–11.1)

## 2018-04-04 PROCEDURE — 84484 ASSAY OF TROPONIN QUANT: CPT | Performed by: PHYSICIAN ASSISTANT

## 2018-04-04 PROCEDURE — 87040 BLOOD CULTURE FOR BACTERIA: CPT | Performed by: EMERGENCY MEDICINE

## 2018-04-04 PROCEDURE — 71275 CT ANGIOGRAPHY CHEST: CPT

## 2018-04-04 PROCEDURE — 93005 ELECTROCARDIOGRAM TRACING: CPT

## 2018-04-04 PROCEDURE — 94640 AIRWAY INHALATION TREATMENT: CPT

## 2018-04-04 PROCEDURE — 74011636320 HC RX REV CODE- 636/320: Performed by: EMERGENCY MEDICINE

## 2018-04-04 PROCEDURE — 65660000001 HC RM ICU INTERMED STEPDOWN

## 2018-04-04 PROCEDURE — 99285 EMERGENCY DEPT VISIT HI MDM: CPT

## 2018-04-04 PROCEDURE — 96365 THER/PROPH/DIAG IV INF INIT: CPT

## 2018-04-04 PROCEDURE — 36415 COLL VENOUS BLD VENIPUNCTURE: CPT | Performed by: EMERGENCY MEDICINE

## 2018-04-04 PROCEDURE — 71046 X-RAY EXAM CHEST 2 VIEWS: CPT

## 2018-04-04 PROCEDURE — 80053 COMPREHEN METABOLIC PANEL: CPT | Performed by: PHYSICIAN ASSISTANT

## 2018-04-04 PROCEDURE — 74011250637 HC RX REV CODE- 250/637: Performed by: INTERNAL MEDICINE

## 2018-04-04 PROCEDURE — 83605 ASSAY OF LACTIC ACID: CPT | Performed by: EMERGENCY MEDICINE

## 2018-04-04 PROCEDURE — 74011000250 HC RX REV CODE- 250: Performed by: EMERGENCY MEDICINE

## 2018-04-04 PROCEDURE — 85025 COMPLETE CBC W/AUTO DIFF WBC: CPT | Performed by: PHYSICIAN ASSISTANT

## 2018-04-04 PROCEDURE — 74011250636 HC RX REV CODE- 250/636: Performed by: INTERNAL MEDICINE

## 2018-04-04 PROCEDURE — 74011250636 HC RX REV CODE- 250/636: Performed by: EMERGENCY MEDICINE

## 2018-04-04 PROCEDURE — 74011000258 HC RX REV CODE- 258: Performed by: EMERGENCY MEDICINE

## 2018-04-04 RX ORDER — LEVOFLOXACIN 5 MG/ML
750 INJECTION, SOLUTION INTRAVENOUS EVERY 24 HOURS
Status: DISCONTINUED | OUTPATIENT
Start: 2018-04-04 | End: 2018-04-10 | Stop reason: HOSPADM

## 2018-04-04 RX ORDER — SODIUM CHLORIDE 9 MG/ML
50 INJECTION, SOLUTION INTRAVENOUS CONTINUOUS
Status: DISCONTINUED | OUTPATIENT
Start: 2018-04-04 | End: 2018-04-10 | Stop reason: HOSPADM

## 2018-04-04 RX ORDER — SODIUM CHLORIDE 0.9 % (FLUSH) 0.9 %
10 SYRINGE (ML) INJECTION
Status: COMPLETED | OUTPATIENT
Start: 2018-04-04 | End: 2018-04-04

## 2018-04-04 RX ORDER — IPRATROPIUM BROMIDE AND ALBUTEROL SULFATE 2.5; .5 MG/3ML; MG/3ML
3 SOLUTION RESPIRATORY (INHALATION)
Status: DISCONTINUED | OUTPATIENT
Start: 2018-04-04 | End: 2018-04-06 | Stop reason: SDUPTHER

## 2018-04-04 RX ORDER — IPRATROPIUM BROMIDE 0.5 MG/2.5ML
0.5 SOLUTION RESPIRATORY (INHALATION)
Status: DISCONTINUED | OUTPATIENT
Start: 2018-04-05 | End: 2018-04-05

## 2018-04-04 RX ORDER — SODIUM CHLORIDE 0.9 % (FLUSH) 0.9 %
5-10 SYRINGE (ML) INJECTION AS NEEDED
Status: DISCONTINUED | OUTPATIENT
Start: 2018-04-04 | End: 2018-04-10 | Stop reason: HOSPADM

## 2018-04-04 RX ORDER — ASPIRIN 81 MG/1
81 TABLET ORAL DAILY
Status: DISCONTINUED | OUTPATIENT
Start: 2018-04-05 | End: 2018-04-10 | Stop reason: HOSPADM

## 2018-04-04 RX ORDER — OXYCODONE HYDROCHLORIDE 5 MG/1
5 TABLET ORAL
Status: DISCONTINUED | OUTPATIENT
Start: 2018-04-04 | End: 2018-04-10 | Stop reason: HOSPADM

## 2018-04-04 RX ORDER — SODIUM CHLORIDE 0.9 % (FLUSH) 0.9 %
5-10 SYRINGE (ML) INJECTION EVERY 8 HOURS
Status: DISCONTINUED | OUTPATIENT
Start: 2018-04-04 | End: 2018-04-10 | Stop reason: HOSPADM

## 2018-04-04 RX ORDER — ACETAMINOPHEN 325 MG/1
650 TABLET ORAL
Status: DISCONTINUED | OUTPATIENT
Start: 2018-04-04 | End: 2018-04-10 | Stop reason: HOSPADM

## 2018-04-04 RX ORDER — SIMVASTATIN 40 MG/1
40 TABLET, FILM COATED ORAL
Status: DISCONTINUED | OUTPATIENT
Start: 2018-04-04 | End: 2018-04-10 | Stop reason: HOSPADM

## 2018-04-04 RX ORDER — ENOXAPARIN SODIUM 100 MG/ML
40 INJECTION SUBCUTANEOUS EVERY 24 HOURS
Status: DISCONTINUED | OUTPATIENT
Start: 2018-04-04 | End: 2018-04-10 | Stop reason: HOSPADM

## 2018-04-04 RX ORDER — OXYCODONE HYDROCHLORIDE 20 MG/1
40 TABLET ORAL
Status: ON HOLD | COMMUNITY

## 2018-04-04 RX ADMIN — Medication 10 ML: at 20:42

## 2018-04-04 RX ADMIN — ENOXAPARIN SODIUM 40 MG: 40 INJECTION SUBCUTANEOUS at 23:50

## 2018-04-04 RX ADMIN — PIPERACILLIN SODIUM AND TAZOBACTAM SODIUM 3.38 G: 3; .375 INJECTION, POWDER, LYOPHILIZED, FOR SOLUTION INTRAVENOUS at 21:56

## 2018-04-04 RX ADMIN — IOPAMIDOL 70 ML: 755 INJECTION, SOLUTION INTRAVENOUS at 20:34

## 2018-04-04 RX ADMIN — SODIUM CHLORIDE 100 ML: 900 INJECTION, SOLUTION INTRAVENOUS at 20:43

## 2018-04-04 RX ADMIN — Medication 10 ML: at 23:52

## 2018-04-04 RX ADMIN — LEVOFLOXACIN 750 MG: 5 INJECTION, SOLUTION INTRAVENOUS at 23:49

## 2018-04-04 RX ADMIN — SIMVASTATIN 40 MG: 40 TABLET, FILM COATED ORAL at 23:50

## 2018-04-04 RX ADMIN — SODIUM CHLORIDE 100 ML/HR: 900 INJECTION, SOLUTION INTRAVENOUS at 23:50

## 2018-04-04 RX ADMIN — ACETAMINOPHEN 650 MG: 325 TABLET, FILM COATED ORAL at 23:56

## 2018-04-04 RX ADMIN — ALBUTEROL SULFATE 1 DOSE: 2.5 SOLUTION RESPIRATORY (INHALATION) at 20:30

## 2018-04-04 NOTE — ED PROVIDER NOTES
HPI Comments: 78 y.o. male with past medical history significant for MI, lung cancer, COPD, asthma, right lobectomy who presents from home with chief complaint of fever. Pt has onset this morning of feeling febrile after visiting his outpatient clinic. Following, pt reports having \"chills and his legs started shaking which is indicative of a fever, based on past experiences. \" Accompanying sx include CP, chest tightness, subjective fever, and cough. He notes his current sx are similar to a past episode of which he was diagnosed with PNA. In the past pt was able to find temporarily relief using tylenol. Pt states that he has a hx of pulmonary disorders. At home he uses an 3L of O2 at night, and as needed during the day along with his inhaler (albuterol). Does not use Neb treatment at home. There are no other acute medical concerns at this time. Chart Review: Pt admitted on 2/17/18 through 2/23/18 for hypotension, PNA, nSTEMI, and COPD exacerbation. Diagnosis opted for Plavix course for one month. Continues to take Levaquin. Indicated past hx of lung cancer and right lobectomy in 2009. Note written by Nathalia Claudio, as dictated by Aryan Alanis MD 7:33 PM      The history is provided by the patient. No  was used. Past Medical History:   Diagnosis Date    Asthma     Cancer [facility])     Lung Cancer    Chronic obstructive pulmonary disease (HCC)     Chronic pain     High cholesterol     Lung cancer (HCC)        Past Surgical History:   Procedure Laterality Date    HX LOBECTOMY      Right lung    HX OTHER SURGICAL      Partial right lung removal         History reviewed. No pertinent family history. Social History     Social History    Marital status: LIFE PARTNER     Spouse name: N/A    Number of children: N/A    Years of education: N/A     Occupational History    Not on file.      Social History Main Topics    Smoking status: Former Smoker    Smokeless tobacco: Not on file    Alcohol use No      Comment: rarely/social     Drug use: No    Sexual activity: Not on file     Other Topics Concern    Not on file     Social History Narrative    ** Merged History Encounter **              ALLERGIES: Review of patient's allergies indicates no known allergies. Review of Systems   Constitutional: Positive for chills and fever. Respiratory: Positive for cough, chest tightness and shortness of breath. Cardiovascular: Positive for chest pain. Musculoskeletal: Positive for myalgias. All other systems reviewed and are negative. Vitals:    04/04/18 1527 04/04/18 1922 04/04/18 2000   BP: 132/73 135/67 125/54   Pulse: (!) 115 (!) 110 (!) 108   Resp: 20 22 (!) 33   Temp: 98.9 °F (37.2 °C)     SpO2: 95% (!) 86% 91%   Weight: 62.6 kg (138 lb)     Height: 5' 11\" (1.803 m)              Physical Exam     Nursing note and vitals reviewed. Constitutional: appears well-developed and well-nourished. No distress. HENT:   Head: Normocephalic and atraumatic. Sclera anicteric  Nose: No rhinorrhea  Mouth/Throat: Oropharynx is clear and moist. Pharynx normal  Eyes: Conjunctivae are normal. Pupils are equal, round, and reactive to light. Right eye exhibits no discharge. Left eye exhibits no discharge. No scleral icterus. Neck: Painless normal range of motion. Supple  Cardiovascular: Normal rate, regular rhythm, normal heart sounds and intact distal pulses. Exam reveals no gallop and no friction rub. No murmur heard. Pulmonary/Chest: Effort normal. Decreased breath sounds bilaterally. No respiratory distress. no wheezes. no rales. Abdominal: Soft. Bowel sounds are normal. Exhibits no distension and no mass. No tenderness. No guarding. Musculoskeletal: Normal range of motion. no tenderness. No edema  Lymphadenopathy:   No cervical adenopathy. Neurological:  Alert and oriented to person, place, and time. Coordination normal. CN 2-12 intact. Moving all extremities.     Skin: Skin is warm and dry. No rash noted. No pallor. Note written by Nathalia Lua, as dictated by Sidney Nash MD 7:33 PM      MDM    69-year-old male the prior history of lung cancer and lobectomy, recent hospitalization for COPD exacerbation and pneumonia here with chest pain, shortness of breath and increasing FiO2 requirement.  Patient requiring 4 L of oxygen when his max is normally 3 L.  Diminished breath sounds bilaterally.  Patient states that he feels febrile but is afebrile.  Mild tachycardia. Differential diagnosis includes pneumonia, PE, COPD exacerbation and others.  Some results have resulted already which shows white count 14.3, chest x-ray without acute process, negative troponin.  Will check CT scan for PE, DuoNeb. ED Course       Procedures    CONSULT NOTE:  9:19 PM Sidney Nash MD spoke with Dr. Zulema Ocampo, Consult for Hospitalist.  Discussed available diagnostic tests and clinical findings. Dr. Zulema Ocampo will admit. Treating for aspiration pneumonia with zosyn. I updated pt and family at bedside.       Recent Results (from the past 24 hour(s))   EKG, 12 LEAD, INITIAL    Collection Time: 04/04/18  3:50 PM   Result Value Ref Range    Ventricular Rate 110 BPM    Atrial Rate 110 BPM    P-R Interval 130 ms    QRS Duration 68 ms    Q-T Interval 296 ms    QTC Calculation (Bezet) 400 ms    Calculated P Axis 77 degrees    Calculated R Axis 43 degrees    Calculated T Axis 109 degrees    Diagnosis       Sinus tachycardia  ST & T wave abnormality, consider anterolateral ischemia  When compared with ECG of 20-FEB-2018 09:39,  premature ventricular complexes are no longer present  ST now depressed in Anterior leads  T wave inversion no longer evident in Inferior leads  T wave inversion more evident in Anterior leads  QT has shortened     CBC WITH AUTOMATED DIFF    Collection Time: 04/04/18  3:54 PM   Result Value Ref Range    WBC 14.3 (H) 4.1 - 11.1 K/uL    RBC 4.72 4.10 - 5.70 M/uL    HGB 14.5 12.1 - 17.0 g/dL    HCT 44.9 36.6 - 50.3 %    MCV 95.1 80.0 - 99.0 FL    MCH 30.7 26.0 - 34.0 PG    MCHC 32.3 30.0 - 36.5 g/dL    RDW 14.2 11.5 - 14.5 %    PLATELET 644 384 - 418 K/uL    MPV 9.7 8.9 - 12.9 FL    NRBC 0.0 0  WBC    ABSOLUTE NRBC 0.00 0.00 - 0.01 K/uL    NEUTROPHILS 85 (H) 32 - 75 %    LYMPHOCYTES 8 (L) 12 - 49 %    MONOCYTES 5 5 - 13 %    EOSINOPHILS 2 0 - 7 %    BASOPHILS 0 0 - 1 %    IMMATURE GRANULOCYTES 0 0.0 - 0.5 %    ABS. NEUTROPHILS 12.1 (H) 1.8 - 8.0 K/UL    ABS. LYMPHOCYTES 1.1 0.8 - 3.5 K/UL    ABS. MONOCYTES 0.7 0.0 - 1.0 K/UL    ABS. EOSINOPHILS 0.3 0.0 - 0.4 K/UL    ABS. BASOPHILS 0.1 0.0 - 0.1 K/UL    ABS. IMM. GRANS. 0.1 (H) 0.00 - 0.04 K/UL    DF AUTOMATED     METABOLIC PANEL, COMPREHENSIVE    Collection Time: 04/04/18  3:54 PM   Result Value Ref Range    Sodium 141 136 - 145 mmol/L    Potassium 4.2 3.5 - 5.1 mmol/L    Chloride 108 97 - 108 mmol/L    CO2 27 21 - 32 mmol/L    Anion gap 6 5 - 15 mmol/L    Glucose 114 (H) 65 - 100 mg/dL    BUN 19 6 - 20 MG/DL    Creatinine 1.29 0.70 - 1.30 MG/DL    BUN/Creatinine ratio 15 12 - 20      GFR est AA >60 >60 ml/min/1.73m2    GFR est non-AA 56 (L) >60 ml/min/1.73m2    Calcium 9.4 8.5 - 10.1 MG/DL    Bilirubin, total 0.6 0.2 - 1.0 MG/DL    ALT (SGPT) 20 12 - 78 U/L    AST (SGOT) 13 (L) 15 - 37 U/L    Alk.  phosphatase 93 45 - 117 U/L    Protein, total 7.4 6.4 - 8.2 g/dL    Albumin 3.5 3.5 - 5.0 g/dL    Globulin 3.9 2.0 - 4.0 g/dL    A-G Ratio 0.9 (L) 1.1 - 2.2     TROPONIN I    Collection Time: 04/04/18  3:54 PM   Result Value Ref Range    Troponin-I, Qt. <0.04 <0.05 ng/mL   LACTIC ACID    Collection Time: 04/04/18  9:20 PM   Result Value Ref Range    Lactic acid 1.5 0.4 - 2.0 MMOL/L       Xr Chest Pa Lat    Result Date: 4/4/2018  INDICATION:  chest pain with history of lung cancer, COPD COMPARISON: 2/23/2018 FINDINGS: PA and lateral views of the chest demonstrate a stable cardiomediastinal silhouette with unchanged volume loss in the right hemithorax and elevation of the right hemidiaphragm. There is no focal airspace disease. There is a chronic small left pleural effusion. There is a background of COPD. IMPRESSION: No acute process     Cta Chest W Or W Wo Cont    Result Date: 4/4/2018  INDICATION:   chest pain, sob, more hypoxia COMPARISON:  CT 2/17/2018 TECHNIQUE:  Following the uneventful intravenous administration of 100 cc Isovue 847, thin helical axial images were obtained through the chest. Postprocessing was performed. 3D image postprocessing was performed. CT dose reduction was achieved through the use of a standardized protocol tailored for this examination and automatic exposure control for dose modulation. FINDINGS: There are no enlarged mediastinal or hilar lymph nodes. The heart size is normal.  There is no pericardial effusion. No filling defect is seen within the pulmonary arterial system to suggest pulmonary embolus. The aorta is normal in caliber. There is a background of centrilobular emphysema. There is evidence of right lung surgery, as well as mild right basilar airspace disease. There is also dense consolidation within the left lower lobe, with partial filling of the left lower lobe bronchial tree compatible with aspiration. . The superior aspect of this chronic infiltrate appears masslike and may represent an underlying malignancy. No pulmonary nodule or mass is seen. There are no pleural effusions. Limited evaluation of the upper abdomen demonstrates no abnormality. The osseous structures are unremarkable. IMPRESSION: 1. No evidence of pulmonary embolus. 2.  Chronic left lower lobe infiltrate, worse than prior study. Additionally, there is debris filling the left lower lobe bronchial tree, consistent with aspiration. 3. Additionally, there is mild right basilar pneumonia. 4. Background of emphysema.

## 2018-04-04 NOTE — ED NOTES

## 2018-04-04 NOTE — ED TRIAGE NOTES
Reports spiking fevers x years. Also reports bilateral leg weakness and chest pain earlier today. +SOB when his fevers start. PNA during last admission.   3lpm NC baseline (intemrittently PRN at home)

## 2018-04-04 NOTE — IP AVS SNAPSHOT
[address] 
[phone] Patient: Arben Adkins MRN: [id number] :1950 About your hospitalization You were admitted on:  2018 You last received care in the:  [place] [address] You were discharged on:  April 10, 2018 Why you were hospitalized Your primary diagnosis was:  Aspiration Pneumonia (Hcc) Your diagnoses also included: Malnutrition (Hcc), Shortness Of Breath, Respiratory Failure With Hypoxia (Hcc) Follow-up Information Follow up With Details Comments Contact Info Britt Lovett MD Go on 2018 hospital f/u with NP Amilcar Smith on 2018 @ 8:00 a.m. If patient is unable to attend,please call the office. [place] [address] [address] [place] [address] 
[phone] Radha Reyes MD In 2 weeks Please call to schedule follow up  [facility] [place] [address] 
[phone] Your Scheduled Appointments Tuesday May 01, 2018  9:00 AM EDT  
CR INTAKE with Maryse Escobar RN  
[address] [place] [address] ([address] [address] [place] [address]  
[phone]  
  
    
[address], [address]. Please arrive 15 minutes prior to your appointment time and you will register in the [place] [address], [address], on the first floor of the [address] [place] [address] Road. Telephone: [phone] Fax: [phone] Driving directions To [facility] [place]. Building: Driving WEST on [address], take exit [address] to [employer]. Turn left onto [facility], then turn right into [employer] Parking lot Driving EAST on , take exit [address] [facility]. Turn right at the end of the exit ramp. Turn left onto [address], then turn right into [employer] lot. Discharge Orders None A check marcin indicates which time of day the medication should be taken. My Medications START taking these medications Instructions Each Dose to Equal  
 Morning Noon Evening Bedtime  
 clindamycin 300 mg capsule Commonly known as:  CLEOCIN Your last dose was: Your next dose is: Take 1 Cap by mouth three (3) times daily. 300 mg  
    
   
   
   
  
 dronabinol 5 mg capsule Commonly known as:  Wilmanaline Brittani Your last dose was: Your next dose is: Take 1 Cap by mouth two (2) times a day. Max Daily Amount: 10 mg. Indications: malnutrition 5 mg  
    
   
   
   
  
 levoFLOXacin 750 mg tablet Commonly known as:  Gerda Boeck Your last dose was: Your next dose is: Take 1 Tab by mouth daily. 750 mg Saccharomyces boulardii 250 mg capsule Commonly known as:  Satya Dimas Your last dose was: Your next dose is: Take 1 Cap by mouth two (2) times a day for 10 days. 250 mg CONTINUE taking these medications Instructions Each Dose to Equal  
 Morning Noon Evening Bedtime  
 albuterol-ipratropium 2.5 mg-0.5 mg/3 ml Nebu Commonly known as:  Kanwal Brown Your last dose was: Your next dose is:    
   
   
 3 mL by Nebulization route every four (4) hours as needed. 3 mL  
    
   
   
   
  
 aspirin delayed-release 81 mg tablet Your last dose was: Your next dose is: Take 1 Tab by mouth daily. 81 mg  
    
   
   
   
  
 oxyCODONE IR 20 mg immediate release tablet Commonly known as:  Elisa Spivey Your last dose was: Your next dose is: Take 20-40 mg by mouth every six (6) hours as needed for Pain (severe pain). 20-40 mg  
    
   
   
   
  
 simvastatin 40 mg tablet Commonly known as:  ZOCOR Your last dose was: Your next dose is: Take 40 mg by mouth nightly. 40 mg  
    
   
   
   
  
 tiotropium 18 mcg inhalation capsule Commonly known as:  [address] Your last dose was: Your next dose is: Take 1 Cap by inhalation daily. 1 Cap Where to Get Your Medications Information on where to get these meds will be given to you by the nurse or doctor. ! Ask your nurse or doctor about these medications  
  clindamycin 300 mg capsule  
 dronabinol 5 mg capsule  
 levoFLOXacin 750 mg tablet Saccharomyces boulardii 250 mg capsule Opioid Education Prescription Opioids: What You Need to Know: 
 
 
ATTENDING PHYSICIAN: Yadira Jacques MD 
DISCHARGING PROVIDER: Zachariah Salazar NP To contact this individual call [phone] and ask the  to page. If unavailable ask to be transferred the Adult Hospitalist Department. DISCHARGE DIAGNOSES Acute Hypoxic Respiratory Failure Aspiration Pneumonia CONSULTATIONS: IP CONSULT TO HOSPITALIST 
IP CONSULT TO PULMONOLOGY PROCEDURES/SURGERIES: Procedure(s): BRONCHOSCOPY NAVIGATIONAL 
BRONCHOSCOPY WITH BIOPSY PENDING TEST RESULTS:  
At the time of discharge the following test results are still pending: Biopsy results from Bronchoscopy as well as culture results, please call Dr. Yael Brunner on Friday to receive preliminary results. FOLLOW UP APPOINTMENTS:  
Follow-up Information Follow up With Details Comments Contact Info Venessa Kuo MD In 7 days As needed, If symptoms worsen [address] [place]  [address] 
[phone] Becky James MD In 2 weeks Please call to schedule follow up  [address] [facility] [address] [place] [address] 
[phone] ADDITIONAL CARE RECOMMENDATIONS: 
 
1. You should continue taking Levaquin and Clindamycin for a remaining 9 days after discharge to complete a total of 2 weeks of antibiotic therapy. 2. During this time you should definitely take a probiotic ( I have written a prescription for one ) to help with the side effects of the antibiotics and help prevent GI upset and diarrhea. 3. Start taking Marinol 2 times daily, this is an appetite stimulant to make you more hungry. You need to gain some weight as your body mass index is only 18 ( very low). 4. Follow up with primary care doctor as needed. 5. Call to schedule follow up with Dr. Marquis Castrejon within 2 weeks from now. You may call on Friday to get preliminary results from biopsy . DIET: Resume previous diet ACTIVITY: Activity as tolerated DISCHARGE MEDICATIONS: 
 See Medication Reconciliation Form · It is important that you take the medication exactly as they are prescribed. · Keep your medication in the bottles provided by the pharmacist and keep a list of the medication names, dosages, and times to be taken in your wallet. · Do not take other medications without consulting your doctor. NOTIFY YOUR PHYSICIAN FOR ANY OF THE FOLLOWING:  
Fever over 101 degrees for 24 hours. Chest pain, shortness of breath, fever, chills, nausea, vomiting, diarrhea, change in mentation, falling, weakness, bleeding. Severe pain or pain not relieved by medications. Or, any other signs or symptoms that you may have questions about. DISPOSITION: 
X  Home With: none OT  PT  [place]  RN  
  
 SNF/Inpatient Rehab/LTAC Independent/assisted living Hospice Other: CDMP Checked: Yes X PROBLEM LIST Updated: Yes X Signed:  
Sherif Martinez NP 
4/10/2018 
8:10 AM 
 
 
 
  
  
  
Introducing [facility]! Dear Jeremias Diamond: Thank you for requesting a LRN account. Our records indicate that you already have an active LRN account. You can access your account anytime at [email or web address] Did you know that you can access your hospital and ER discharge instructions at any time in LRN? You can also review all of your test results from your hospital stay or ER visit. Additional Information If you have questions, please visit the Frequently Asked Questions section of the LRN website at [email or web address] Remember, LRN is NOT to be used for urgent needs. For medical emergencies, dial 911. Now available from your iPhone and Android! Introducing Ezequiel Ceballos As a [facility] patient, I wanted to make you aware of our electronic visit tool called Ezequiel Ceballos. [facility] 24/7 allows you to connect within minutes with a medical provider 24 hours a day, seven days a week via a mobile device or tablet or logging into a secure website from your computer. You can access Ezequiel Ceballos from anywhere in the [place]. A virtual visit might be right for you when you have a simple condition and feel like you just dont want to get out of bed, or cant get away from work for an appointment, when your regular [facility] provider is not available (evenings, weekends or holidays), or when youre out of town and need minor care. Electronic visits cost only $49 and if the [facility] 24/7 provider determines a prescription is needed to treat your condition, one can be electronically transmitted to a nearby pharmacy*. Please take a moment to enroll today if you have not already done so. The enrollment process is free and takes just a few minutes.   To enroll, please download the On [employer] Bill 24/7 ebenezer to your tablet or phone, or visit [email or web address] to enroll on your computer. And, as an [address] [place] [address] patient with a [facility] account, the results of your visits will be scanned into your electronic medical record and your primary care provider will be able to view the scanned results. We urge you to continue to see your regular [facility] provider for your ongoing medical care. And while your primary care provider may not be the one available when you seek a [facility] virtual visit, the peace of mind you get from getting a real diagnosis real time can be priceless. For more information on Luminal, view our Frequently Asked Questions (FAQs) at [email or web address]. Sincerely, 
 
Rebecca Haas MD 
Chief Medical Officer Immanuel Loco Aries *:  certain medications cannot be prescribed via Lum[facility] Unresulted Labs-Please follow up with your PCP about these lab tests Order Current Status AFB CULTURE + SMEAR W/RFLX ID FROM CULTURE In process AFB CULTURE + SMEAR W/RFLX ID FROM CULTURE In process CULTURE, FUNGUS In process CULTURE, FUNGUS In process CULTURE, VIRAL In process CULTURE, RESPIRATORY/SPUTUM/BRONCH W GRAM STAIN Preliminary result CULTURE, RESPIRATORY/SPUTUM/BRONCH W GRAM STAIN Preliminary result Providers Seen During Your Hospitalization Provider Specialty Primary office phone Cristina Pearson MD Emergency Medicine [phone] Anupam Foster MD Internal Medicine [phone] Navneet Blood MD Hospitalist [phone] Derotha Severe, MD Internal Medicine [phone] Your Primary Care Physician (PCP) Primary Care Physician Office Phone Office Fax Trenton Robbins [phone] You are allergic to the following No active allergies Recent Documentation Height Weight BMI Smoking Status 1.803 m 62.3 kg 19.15 kg/m2 Former Smoker Emergency Contacts Name Discharge Info Relation Home Work Mobile Kimi Fallon DISCHARGE CAREGIVER [3] Life Partner [7]   [phone] Maryse Natarajan N/A  AT THIS TIME [6] Sister [23] [id number] Patient Belongings The following personal items are in your possession at time of discharge: 
  Dental Appliances: None  Visual Aid: None      Home Medications: None   Jewelry: None  Clothing: At bedside    Other Valuables: None  Personal Items Sent to Safe: none Please provide this summary of care documentation to your next provider. Signatures-by signing, you are acknowledging that this After Visit Summary has been reviewed with you and you have received a copy. Patient Signature:  ____________________________________________________________ Date:  ____________________________________________________________  
  
[facility] Provider Signature:  ____________________________________________________________ Date:  ____________________________________________________________

## 2018-04-05 LAB
ANION GAP SERPL CALC-SCNC: 10 MMOL/L (ref 5–15)
ATRIAL RATE: 110 BPM
BUN SERPL-MCNC: 20 MG/DL (ref 6–20)
BUN/CREAT SERPL: 16 (ref 12–20)
CALCIUM SERPL-MCNC: 8.3 MG/DL (ref 8.5–10.1)
CALCULATED P AXIS, ECG09: 77 DEGREES
CALCULATED R AXIS, ECG10: 43 DEGREES
CALCULATED T AXIS, ECG11: 109 DEGREES
CHLORIDE SERPL-SCNC: 107 MMOL/L (ref 97–108)
CO2 SERPL-SCNC: 24 MMOL/L (ref 21–32)
CREAT SERPL-MCNC: 1.24 MG/DL (ref 0.7–1.3)
DIAGNOSIS, 93000: NORMAL
ERYTHROCYTE [DISTWIDTH] IN BLOOD BY AUTOMATED COUNT: 14.5 % (ref 11.5–14.5)
GLUCOSE SERPL-MCNC: 112 MG/DL (ref 65–100)
HCT VFR BLD AUTO: 36.5 % (ref 36.6–50.3)
HGB BLD-MCNC: 12.1 G/DL (ref 12.1–17)
MCH RBC QN AUTO: 30.6 PG (ref 26–34)
MCHC RBC AUTO-ENTMCNC: 33.2 G/DL (ref 30–36.5)
MCV RBC AUTO: 92.4 FL (ref 80–99)
NRBC # BLD: 0 K/UL (ref 0–0.01)
NRBC BLD-RTO: 0 PER 100 WBC
P-R INTERVAL, ECG05: 130 MS
PLATELET # BLD AUTO: 237 K/UL (ref 150–400)
PMV BLD AUTO: 10.5 FL (ref 8.9–12.9)
POTASSIUM SERPL-SCNC: 3.8 MMOL/L (ref 3.5–5.1)
Q-T INTERVAL, ECG07: 296 MS
QRS DURATION, ECG06: 68 MS
QTC CALCULATION (BEZET), ECG08: 400 MS
RBC # BLD AUTO: 3.95 M/UL (ref 4.1–5.7)
SODIUM SERPL-SCNC: 141 MMOL/L (ref 136–145)
VENTRICULAR RATE, ECG03: 110 BPM
WBC # BLD AUTO: 22.2 K/UL (ref 4.1–11.1)

## 2018-04-05 PROCEDURE — 65660000000 HC RM CCU STEPDOWN

## 2018-04-05 PROCEDURE — 74011250636 HC RX REV CODE- 250/636: Performed by: INTERNAL MEDICINE

## 2018-04-05 PROCEDURE — G8997 SWALLOW GOAL STATUS: HCPCS | Performed by: SPEECH-LANGUAGE PATHOLOGIST

## 2018-04-05 PROCEDURE — 94640 AIRWAY INHALATION TREATMENT: CPT

## 2018-04-05 PROCEDURE — G8996 SWALLOW CURRENT STATUS: HCPCS | Performed by: SPEECH-LANGUAGE PATHOLOGIST

## 2018-04-05 PROCEDURE — 85027 COMPLETE CBC AUTOMATED: CPT | Performed by: INTERNAL MEDICINE

## 2018-04-05 PROCEDURE — 77030029684 HC NEB SM VOL KT MONA -A

## 2018-04-05 PROCEDURE — 80048 BASIC METABOLIC PNL TOTAL CA: CPT | Performed by: INTERNAL MEDICINE

## 2018-04-05 PROCEDURE — 74011000250 HC RX REV CODE- 250: Performed by: INTERNAL MEDICINE

## 2018-04-05 PROCEDURE — 87070 CULTURE OTHR SPECIMN AEROBIC: CPT | Performed by: INTERNAL MEDICINE

## 2018-04-05 PROCEDURE — G8998 SWALLOW D/C STATUS: HCPCS | Performed by: SPEECH-LANGUAGE PATHOLOGIST

## 2018-04-05 PROCEDURE — 74011250637 HC RX REV CODE- 250/637: Performed by: INTERNAL MEDICINE

## 2018-04-05 PROCEDURE — 94667 MNPJ CHEST WALL 1ST: CPT

## 2018-04-05 PROCEDURE — 74011000258 HC RX REV CODE- 258: Performed by: INTERNAL MEDICINE

## 2018-04-05 PROCEDURE — 74011250637 HC RX REV CODE- 250/637: Performed by: NURSE PRACTITIONER

## 2018-04-05 PROCEDURE — 74011250636 HC RX REV CODE- 250/636: Performed by: NURSE PRACTITIONER

## 2018-04-05 PROCEDURE — 36415 COLL VENOUS BLD VENIPUNCTURE: CPT | Performed by: INTERNAL MEDICINE

## 2018-04-05 PROCEDURE — 92610 EVALUATE SWALLOWING FUNCTION: CPT | Performed by: SPEECH-LANGUAGE PATHOLOGIST

## 2018-04-05 RX ORDER — GUAIFENESIN 600 MG/1
600 TABLET, EXTENDED RELEASE ORAL EVERY 12 HOURS
Status: DISCONTINUED | OUTPATIENT
Start: 2018-04-05 | End: 2018-04-10 | Stop reason: HOSPADM

## 2018-04-05 RX ORDER — VANCOMYCIN 1.75 GRAM/500 ML IN 0.9 % SODIUM CHLORIDE INTRAVENOUS
1750 ONCE
Status: COMPLETED | OUTPATIENT
Start: 2018-04-05 | End: 2018-04-05

## 2018-04-05 RX ORDER — BENZONATATE 100 MG/1
100 CAPSULE ORAL
Status: DISCONTINUED | OUTPATIENT
Start: 2018-04-05 | End: 2018-04-10 | Stop reason: HOSPADM

## 2018-04-05 RX ORDER — IPRATROPIUM BROMIDE AND ALBUTEROL SULFATE 2.5; .5 MG/3ML; MG/3ML
3 SOLUTION RESPIRATORY (INHALATION)
Status: DISCONTINUED | OUTPATIENT
Start: 2018-04-05 | End: 2018-04-06

## 2018-04-05 RX ADMIN — PIPERACILLIN SODIUM AND TAZOBACTAM SODIUM 4.5 G: 4; .5 INJECTION, POWDER, LYOPHILIZED, FOR SOLUTION INTRAVENOUS at 12:18

## 2018-04-05 RX ADMIN — SIMVASTATIN 40 MG: 40 TABLET, FILM COATED ORAL at 21:48

## 2018-04-05 RX ADMIN — PIPERACILLIN SODIUM AND TAZOBACTAM SODIUM 4.5 G: 4; .5 INJECTION, POWDER, LYOPHILIZED, FOR SOLUTION INTRAVENOUS at 04:14

## 2018-04-05 RX ADMIN — Medication 10 ML: at 17:59

## 2018-04-05 RX ADMIN — SODIUM CHLORIDE 50 ML/HR: 900 INJECTION, SOLUTION INTRAVENOUS at 17:59

## 2018-04-05 RX ADMIN — ENOXAPARIN SODIUM 40 MG: 40 INJECTION SUBCUTANEOUS at 22:18

## 2018-04-05 RX ADMIN — IPRATROPIUM BROMIDE AND ALBUTEROL SULFATE 3 ML: .5; 3 SOLUTION RESPIRATORY (INHALATION) at 12:30

## 2018-04-05 RX ADMIN — BENZONATATE 100 MG: 100 CAPSULE ORAL at 17:59

## 2018-04-05 RX ADMIN — IPRATROPIUM BROMIDE AND ALBUTEROL SULFATE 3 ML: .5; 3 SOLUTION RESPIRATORY (INHALATION) at 21:16

## 2018-04-05 RX ADMIN — VANCOMYCIN HYDROCHLORIDE 1750 MG: 10 INJECTION, POWDER, LYOPHILIZED, FOR SOLUTION INTRAVENOUS at 12:14

## 2018-04-05 RX ADMIN — IPRATROPIUM BROMIDE 0.5 MG: 0.5 SOLUTION RESPIRATORY (INHALATION) at 03:41

## 2018-04-05 RX ADMIN — IPRATROPIUM BROMIDE 0.5 MG: 0.5 SOLUTION RESPIRATORY (INHALATION) at 07:13

## 2018-04-05 RX ADMIN — Medication 10 ML: at 21:48

## 2018-04-05 RX ADMIN — ASPIRIN 81 MG: 81 TABLET, COATED ORAL at 12:18

## 2018-04-05 RX ADMIN — LEVOFLOXACIN 750 MG: 5 INJECTION, SOLUTION INTRAVENOUS at 22:18

## 2018-04-05 RX ADMIN — PIPERACILLIN SODIUM AND TAZOBACTAM SODIUM 4.5 G: 4; .5 INJECTION, POWDER, LYOPHILIZED, FOR SOLUTION INTRAVENOUS at 20:08

## 2018-04-05 RX ADMIN — GUAIFENESIN 600 MG: 600 TABLET, EXTENDED RELEASE ORAL at 20:41

## 2018-04-05 RX ADMIN — IPRATROPIUM BROMIDE AND ALBUTEROL SULFATE 3 ML: .5; 3 SOLUTION RESPIRATORY (INHALATION) at 17:07

## 2018-04-05 NOTE — CDMP QUERY
Patient is noted to have a BMI: 19.25 kg/m 2  Please clarify if this patient is:     =>Underweight  =>Cachexia  =>Failure to Thrive  =>Other explanation of clinical findings  =>Unable to determine (no explanation for clinical findings)    Presentation:     Ht: 5' 11\" (1.803 m)  Wt: 62.6 kg (138 lb)    Please clarify and document your clinical opinion in the progress notes and discharge summary, including the definitive and or presumptive diagnosis, (suspected or probable), related to the above clinical findings. Please include clinical findings supporting your diagnosis.      Thank you,  Annette Clay, RN  [phone]

## 2018-04-05 NOTE — CONSULTS
[facility] [address] [place] [address] Day: 2    4/5/2018     Case discussed with patient and offered EBUS and Navigational Bronch. He has had sampling last 1-2 years ago at [facility]. Records unavailable. LLL change is worse over several Years and certainly in last month. He prefers to get abx and repeat CT in 3 weeks and if still there we can do EBUS / Robe as out patient. If he changes his mind we can schedule as inpatient next week. History Source: review of chart and the patient  · Consult Requested by: Navneet Blood MD   · Reason for consult: abnormal CXR  · Severity:  moderate  · Began: several years ago  · Duration:  continuous   · Course:  rapidly worsening  · Worse with: Time   · Better with: nothing. Medical Decision Making Today  · Acute or chronic illness that poses a threat to life or bodily function  · Review and Order of Radiology tests  · Review and Order of Medicine tests  · Independent visualization of Image  · I have personally reviewed the patients ECG / Tele     ID: Catalina Marquez is a 78 y.o. PATIENT REFUSED male    admitted on 4/4/2018 with problem list as below    Active Hospital Problems    Diagnosis Date Noted    Aspiration pneumonia ([facility] 04/04/2018         Hospital Problems  Date Reviewed: 4/4/2018          Codes Class Noted POA    * (Principal)Aspiration pneumonia ([facility] ICD-10-CM: J69.0  ICD-9-CM: 507.0  4/4/2018 Unknown                               IMPRESSION     · LLL Pneumonia - Mucous retention  - ? Lung mass - Personal History of Lung cancer within contralateral lung - recommend bronch but he would like to delay and do as an outpatient after abx - alternatively we can do as inpatient.   He will let me know his decision       PLAN     · As Above       VITALS INS & OUTS   Visit Vitals    /82    Pulse 74    Temp 98.8 °F (37.1 °C)    Resp 22    Ht 5' 11\" (1.803 m)    Wt 62.6 kg (138 lb)    SpO2 95%    BMI 19.25 kg/m2       Temp (24hrs), Av.7 °F (37.1 °C), Min:98.3 °F (36.8 °C), Max:98.9 °F (37.2 °C)    No intake or output data in the 24 hours ending 18 1138                OTHER:      [] Drain  [] ICP  [] Chest Tube  []        [] Vent    [] BiPAP  [] CPAP  [] A-Line      [] NGT   [] ETT  [] Burch  [] C-Line    GEN:  [x] Nontoxic   [x] No Distress  [] Toxic  [] Distressed    Eyes:  [x] Anicteric    [x] Noninjected  [] Icteric  [] injected    ENT:  [x] Moist   [x] No Thrush  [] Dry  [] Thrush    CV:  [x] Regular    [x] No Murmur  [] Irregular  [] Murmur    LUNG:  [x] Clear    [x] Equal BS  []  Wheeze  [] Unequal BS    GI:  [x] NABS  [x] Nontender  [] No Sounds  [] Tender    :  [x] Clear Urine  [x] Norm Genitalia   [] Burch  [] Deferred Exam    SKEL:  [x] WD WN  [x] No Clubbing  [] Wasted  [] Clubbing    SKIN:  [x] Perfused    [x] No Drug Rash  [] Cool  [] Rash    NEURO:  [x] A & O x 3  [x]  Non Focal  [] Confused  [] Focal    PSYCH:  [x] Nonagitated  [x] Good Insight  [] Agitated  [] Poor Insight    LYMPH:  [x] No edema   [x] No ROMA  [] Edema  [] Regional ROMA      VENTILATOR - BIPAP - O2:     [x]  Reviewed Ventilator / BiPAP / O2  O2 Device: Nasal cannula (18 0700)    Ideal body weight: 75.3 kg (166 lb 0.1 oz)                       Mode:            Tidal Volume:                   Pressure:                         FiO2:                       PEEP:                           PIP:     Minute Ventilation:       LABS:    No results for input(s): PHI, PCO2I, PO2I in the last 72 hours.     Recent Labs      18   035   WBC  22.2*   HGB  12.1   PLT  237       Recent Labs      18   0357  18   2120  18   1554   NA  141   --   141   K  3.8   --   4.2   CL  107   --   108   CO2  24   --   27   GLU  112*   --   114*   BUN  20   --   19   CREA  1.24   --   1.29   CA  8.3*   --   9.4   LAC   --   1.5   --    ALB   --    --   3.5   SGOT   --    --   13*   ALT   --    --   20       IMAGING:      Results from [facility] En[place] encounter on 04/04/18   XR CHEST PA LAT   Narrative INDICATION:  chest pain with history of lung cancer, COPD    COMPARISON: 2/23/2018    FINDINGS: PA and lateral views of the chest demonstrate a stable  cardiomediastinal silhouette with unchanged volume loss in the right hemithorax  and elevation of the right hemidiaphragm. There is no focal airspace disease. There is a chronic small left pleural effusion. There is a background of COPD. Impression IMPRESSION: No acute process        Results from Hospital Encounter encounter on 02/17/18   XR CHEST PORT   Narrative EXAM:  XR CHEST PORT. INDICATION: Follow-up congestive heart failure and shortness of breath and  pneumonia. COMPARISON: 2/17/2018. FINDINGS:   A portable AP radiograph of the chest was obtained at 0718 hours. Lines and tubes: The patient is on a cardiac monitor. Lungs: There are surgical clips and staples in the right hilum with superior  retraction of the hilum. The lungs are reinflated and hyperlucent and there is  improved aeration in the left lower lobe. Pleura: There is blunting of the right costophrenic angle which is unchanged. Mediastinum: The cardiac and mediastinal contours and pulmonary vascularity are  normal.  Bones and soft tissues: The bones and soft tissues are grossly within normal  limits. Impression IMPRESSION: Improved aeration in the left lower lobe. Results from [place] encounter on 04/04/18   CTA CHEST W OR W WO CONT   Narrative INDICATION:   chest pain, sob, more hypoxia     COMPARISON:  CT 2/17/2018    TECHNIQUE:  Following the uneventful intravenous administration of 100 cc Isovue  173, thin helical axial images were obtained through the chest. Postprocessing  was performed. 3D image postprocessing was performed.     CT dose reduction was achieved through the use of a standardized protocol  tailored for this examination and automatic exposure control for dose  modulation. FINDINGS:    There are no enlarged mediastinal or hilar lymph nodes. The heart size is  normal.  There is no pericardial effusion. No filling defect is seen within the pulmonary arterial system to suggest  pulmonary embolus. The aorta is normal in caliber. There is a background of centrilobular emphysema. There is evidence of right  lung surgery, as well as mild right basilar airspace disease. There is also  dense consolidation within the left lower lobe, with partial filling of the left  lower lobe bronchial tree compatible with aspiration. . The superior aspect of  this chronic infiltrate appears masslike and may represent an underlying  malignancy. No pulmonary nodule or mass is seen. There are no pleural effusions. Limited evaluation of the upper abdomen demonstrates no abnormality. The osseous  structures are unremarkable. Impression IMPRESSION:  1. No evidence of pulmonary embolus. 2.  Chronic left lower lobe infiltrate, worse than prior study. Additionally,  there is debris filling the left lower lobe bronchial tree, consistent with  aspiration. 3. Additionally, there is mild right basilar pneumonia. 4. Background of emphysema. Results from [place] encounter on 02/17/18   CTA CHEST W OR W WO CONT   Narrative Clinical indication: Dyspnea on exertion. Localizer obtained without contrast at the level of the pulmonary arteries. Fast  injection rate of 75 cc of Isovue-370 with review of the raw data and MIP  reconstructions. Comparison November 20, 2016. CT dose reduction was achieved  through the use of a standardized protocol tailored for this examination and  automatic exposure control for dose modulation . [facility] There is a persistent left  lower lobe infiltrate. There is no definite evidence for pulmonary emboli. There  is no definite evidence for mediastinal or hilar adenopathy. Aorta does appear  unremarkable. No parenchymal mass. Impression IMPRESSION: Chronic lung disease, left lower lobe infiltrate. PMH:  has a past medical history of Asthma; Cancer ([address] Chronic obstructive pulmonary disease ([address] Chronic pain; High cholesterol; and Lung cancer ([facility] PSH:  has a past surgical history that includes hx other surgical and hx lobectomy. FHX: family history is not on file. SHX:  reports that he has quit smoking. He does not have any smokeless tobacco history on file. He reports that he does not drink alcohol or use illicit drugs. No Known Allergies    Current Facility-Administered Medications   Medication    albuterol-ipratropium (DUO-NEB) 2.5 MG-0.5 MG/3 ML    albuterol-ipratropium (DUO-NEB) 2.5 MG-0.5 MG/3 ML    aspirin delayed-release tablet 81 mg    oxyCODONE IR (ROXICODONE) tablet 5 mg    simvastatin (ZOCOR) tablet 40 mg    sodium chloride (NS) flush 5-10 mL    sodium chloride (NS) flush 5-10 mL    levoFLOXacin (LEVAQUIN) 750 mg in D5W IVPB    acetaminophen (TYLENOL) tablet 650 mg    enoxaparin (LOVENOX) injection 40 mg    0.9% sodium chloride infusion    piperacillin-tazobactam (ZOSYN) 4.5 g in 0.9% sodium chloride (MBP/ADV) 100 mL     Current Outpatient Prescriptions   Medication Sig    oxyCODONE IR (ROXICODONE) 20 mg immediate release tablet Take 20-40 mg by mouth every six (6) hours as needed for Pain (severe pain).  tiotropium (SPIRIVA WITH HANDIHALER) 18 mcg inhalation capsule Take 1 Cap by inhalation daily.  albuterol-ipratropium (DUO-NEB) 2.5 mg-0.5 mg/3 ml nebu 3 mL by Nebulization route every four (4) hours as needed.  aspirin delayed-release 81 mg tablet Take 1 Tab by mouth daily.  simvastatin (ZOCOR) 40 mg tablet Take 40 mg by mouth nightly.         Nina Davenport MD [facility]

## 2018-04-05 NOTE — ED NOTES
Gave bedside report regarding, SBAR, MAR, and plan of care to Tavia Villegas, [address] [place] [address]. Transfered care of patient to RN.

## 2018-04-05 NOTE — CDMP QUERY
#1  Please clarify if this patient is (was) being treated/managed for:     => Chronic Respiratory Failure with hypoxia in the setting of Pneumonia requiring increase in supplemental O2.  => Other explanation of clinical findings  => Clinically Undetermined (no explanation for clinical findings)    The medical record reflects the following clinical findings, treatment, and risk factors. Risk Factors: Vinicio Pneumonia,  COPD, Lobectomy, Asthma, O2 dep    Clinical Indicators:   ED: Note pulse ox on 3 liters to be 86-90%. Decreased breath sounds bilaterally     Treatment: Increased oxygen to 4 liters to maint sats >92; Duo-nebs    Please clarify and document your clinical opinion in the progress notes and discharge summary including the definitive and/or presumptive diagnosis, (suspected or probable), related to the above clinical findings. Please include clinical findings supporting your diagnosis.     Thank you,  Kailey Monaco RN  [phone]

## 2018-04-05 NOTE — ED NOTES
TRANSFER - OUT REPORT:    Verbal report given to LINUS Joseph(name) on Michaelle Rangel  being transferred to [facility](unit) for routine progression of care       Report consisted of patients Situation, Background, Assessment and   Recommendations(SBAR). Information from the following report(s) SBAR, Kardex, ED Summary, Intake/Output, MAR, Recent Results and Med Rec Status was reviewed with the receiving nurse. Lines:   Peripheral IV 04/04/18 Right Antecubital (Active)   Site Assessment Clean, dry, & intact 4/4/2018  4:00 PM   Phlebitis Assessment 0 4/4/2018  4:00 PM   Infiltration Assessment 0 4/4/2018  4:00 PM   Dressing Status Clean, dry, & intact 4/4/2018  4:00 PM   Dressing Type Transparent 4/4/2018  4:00 PM   Hub Color/Line Status Pink;Capped;Flushed;Patent 4/4/2018  4:00 PM   Action Taken Blood drawn 4/4/2018  4:00 PM       Peripheral IV 04/04/18 Left; Lower Arm (Active)   Site Assessment Clean, dry, & intact 4/4/2018 10:11 PM   Phlebitis Assessment 0 4/4/2018 10:11 PM   Infiltration Assessment 0 4/4/2018 10:11 PM   Dressing Status Clean, dry, & intact 4/4/2018 10:11 PM        Opportunity for questions and clarification was provided.

## 2018-04-05 NOTE — PROGRESS NOTES
Hospitalist Progress Note  Cedrick Rutledge NP  Answering service: [phone] OR [phone] from in house phone  Cell: [phone]      Date of Service:  2018  NAME:  Michell Elizondo  :  1950  MRN:  [id number]      Admission Summary:   78year old male with history of COPD and Lung Cancer who presented with LLL Pneumonia   And concern for aspiration pneumonia. He was due to fu with Chest CT and possible FNA after discharge in February but that would be about now. He wants to follow with Dr. Chinyere Severino and stop getting his Pulmonary care  At [facility]. Of note he is on nocturnal O2 at 2L and rarely uses oxygen in the day. Interval history / Subjective:     Mr. Kaden Topete was seen by Pulmonology. He would like to consider moving forward with the bronchoscopy  This admission. Will need to discuss with pulm. Assessment & Plan:     Acute  On Chronic Hypoxic Respiratory Failure in the setting of COPD exacerbation and Pneumonia (POA)  -Chest CT with LLL infiltrate, worse than prior study  -Concern for aspiration , speech eval  -Pulm consulted for possible bronch, pt would like to move forward  -Levaquin and Zosyn  -Spiriva and Duonebs  -Mucinex and Tessalon Pearls    Leukocytosis  WBC 22  monitor    History of Lung cancer and s/p Lobectomy    Underweight  Nutrition consult for supplements    Hyperlipidemia  Zocor    Chronic Back Pain  Takes Oxycodone 20 mg q 6 hours at home    Code status: Full  DVT prophylaxis: Lovenox    Care Plan discussed with: Patient/Family and Nurse  Disposition: Home w/Family and TBD     Hospital Problems  Date Reviewed: 2018          Codes Class Noted POA    * (Principal)Aspiration pneumonia ([facility] [address] ICD-10-CM: J69.0  ICD-9-CM: 507.0  2018 Unknown                Review of Systems:   A comprehensive review of systems was negative except for that written in the HPI.        Vital Signs:    Last 24hrs VS reviewed since prior progress note. Most recent are:  Visit Vitals    /60 (BP 1 Location: Right arm, BP Patient Position: At rest)    Pulse 80    Temp 98 °F (36.7 °C)    Resp 20    Ht 5' 11\" (1.803 m)    Wt 62.6 kg (138 lb)    SpO2 94%    BMI 19.25 kg/m2       No intake or output data in the 24 hours ending 04/05/18 1649     Physical Examination:             Constitutional:  No acute distress, cooperative, pleasant, thin   ENT:  Oral mucous moist, oropharynx benign. Neck supple,    Resp:  Diminished bases blt, LLL worse and ronchi noted, No accessory muscle use   CV:  Regular rhythm, normal rate, no murmurs, gallops, rubs    GI:  Soft, non distended, non tender. normoactive bowel sounds, no hepatosplenomegaly     Musculoskeletal:  No edema, warm, 2+ pulses throughout    Neurologic:  Moves all extremities. AAOx3, CN II-XII reviewed     Psych:  Good insight, Not anxious nor agitated. Skin:  Good turgor, no rashes or ulcers  Hematologic/Lymphatic/Immunlogic:  No jaundice nor lymph node swelling       Data Review:    Review and/or order of clinical lab test  Review and/or order of tests in the radiology section of CPT      Labs:     Recent Labs      04/05/18   0357  04/04/18   1554   WBC  22.2*  14.3*   HGB  12.1  14.5   HCT  36.5*  44.9   PLT  237  278     Recent Labs      04/05/18   0357  04/04/18   1554   NA  141  141   K  3.8  4.2   CL  107  108   CO2  24  27   BUN  20  19   CREA  1.24  1.29   GLU  112*  114*   CA  8.3*  9.4     Recent Labs      04/04/18   1554   SGOT  13*   ALT  20   AP  93   TBILI  0.6   TP  7.4   ALB  3.5   GLOB  3.9     No results for input(s): INR, PTP, APTT in the last 72 hours. No lab exists for component: INREXT   No results for input(s): FE, TIBC, PSAT, FERR in the last 72 hours. No results found for: FOL, RBCF   No results for input(s): PH, PCO2, PO2 in the last 72 hours.   Recent Labs      04/04/18   1554   TROIQ  <0.04     Lab Results   Component Value Date/Time    Cholesterol, total 106 02/20/2018 04:54 AM    HDL Cholesterol 45 02/20/2018 04:54 AM    LDL, calculated 46 02/20/2018 04:54 AM    Triglyceride 75 02/20/2018 04:54 AM    CHOL/HDL Ratio 2.4 02/20/2018 04:54 AM     Lab Results   Component Value Date/Time    Glucose (POC) 100 09/14/2015 12:14 PM     Lab Results   Component Value Date/Time    Color YELLOW/STRAW 11/28/2016 04:44 PM    Appearance CLEAR 11/28/2016 04:44 PM    Specific gravity 1.020 11/28/2016 04:44 PM    pH (UA) 5.0 11/28/2016 04:44 PM    Protein TRACE (A) 11/28/2016 04:44 PM    Glucose NEGATIVE  11/28/2016 04:44 PM    Ketone TRACE (A) 11/28/2016 04:44 PM    Bilirubin NEGATIVE  11/28/2016 04:44 PM    Urobilinogen 0.2 11/28/2016 04:44 PM    Nitrites NEGATIVE  11/28/2016 04:44 PM    Leukocyte Esterase TRACE (A) 11/28/2016 04:44 PM    Epithelial cells FEW 11/28/2016 04:44 PM    Bacteria NEGATIVE  11/28/2016 04:44 PM    WBC 5-10 11/28/2016 04:44 PM    RBC 0-5 11/28/2016 04:44 PM         Medications Reviewed:     Current Facility-Administered Medications   Medication Dose Route Frequency    albuterol-ipratropium (DUO-NEB) 2.5 MG-0.5 MG/3 ML  3 mL Nebulization QID RT    Vancomycin Pharmacy to Dose   Other Rx Dosing/Monitoring    [START ON 4/6/2018] vancomycin (VANCOCIN) 1,000 mg in 0.9% sodium chloride (MBP/ADV) 250 mL  1,000 mg IntraVENous Q16H    albuterol-ipratropium (DUO-NEB) 2.5 MG-0.5 MG/3 ML  3 mL Nebulization Q4H PRN    aspirin delayed-release tablet 81 mg  81 mg Oral DAILY    oxyCODONE IR (ROXICODONE) tablet 5 mg  5 mg Oral Q6H PRN    simvastatin (ZOCOR) tablet 40 mg  40 mg Oral QHS    sodium chloride (NS) flush 5-10 mL  5-10 mL IntraVENous Q8H    sodium chloride (NS) flush 5-10 mL  5-10 mL IntraVENous PRN    levoFLOXacin (LEVAQUIN) 750 mg in D5W IVPB  750 mg IntraVENous Q24H    acetaminophen (TYLENOL) tablet 650 mg  650 mg Oral Q4H PRN    enoxaparin (LOVENOX) injection 40 mg  40 mg SubCUTAneous Q24H    0.9% sodium chloride infusion  100 mL/hr IntraVENous CONTINUOUS    piperacillin-tazobactam (ZOSYN) 4.5 g in 0.9% sodium chloride (MBP/ADV) 100 mL  4.5 g IntraVENous Q8H     ______________________________________________________________________  EXPECTED LENGTH OF STAY: 3d 9h  ACTUAL LENGTH OF STAY:          [address],

## 2018-04-05 NOTE — H&P
[address]  ACUTE CARE HISTORY AND PHYSICAL    Monserrat Camara  MR#: [id number]  : 1950  ACCOUNT #: [de-identified]   DATE OF SERVICE: 2018    PRIMARY CARE PHYSICIAN:  None. PRESENTING COMPLAINT:  Fever x1 day. HISTORY OF PRESENT ILLNESS:  Patient is a 71-year-old male with history of COPD and lung cancer who presented to the emergency room with complaints of fever that started this morning. According to patient, after visiting his outpatient clinic, patient reports having chills. He did not check his temperature, but he reports subjective feeling of fever. The patient also reported that his legs started shaking which is indicative of fever based on his past experiences. Fever is associated with chest pain and chest tightness. There is also a cough that is productive of yellowish phlegm. The patient states that his current symptoms are similar to his past episode of pneumonia. The patient stated that he took Tylenol with no relief. The patient is on oxygen via nasal cannula at 3 liters, especially at night for his COPD. He is also on inhalers. Patient is not on nebulizer treatment at home. He denied diaphoresis, wheezing or rhonchi. He also denied sore throat, nasal congestion, epistaxis or rhinorrhea. There is no headache, lightheadedness, dizziness, blurry vision, double vision, syncopal episode or seizures. Patient has no neck pain, neck stiffness or confusion. He has no report of nausea, vomiting, abdominal pain, constipation or diarrhea. He denied dysuria, frequency, hematuria or urethral discharge. There is no lower extremity pain or swelling. The patient has no back pain or flank pain. He has no report of any skin lesions or rash. He denied trauma or fall. He reports loss of appetite, but denied dysphagia, odynophagia.   There was reported generalized weakness, but there is no focal weakness, numbness, tingling sensation or abnormal sensations. PAST MEDICAL HISTORY:  1. Asthma. 2.  COPD. 3.  Lung cancer status post right lobectomy. 4.  Chronic pain syndrome. 5.  Hypercholesterolemia. PAST SURGICAL HISTORY:   1.  Lobectomy, right lung. 2.  Partial right lung removal.    MEDICATIONS:  DuoNeb nebulizer treatment every 4 hours as needed, aspirin one tablet daily. Oxycodone IR 20-40 mg every 6 hours as needed for pain, simvastatin 40 mg every night, Spiriva 18 mcg inhalation daily, oxygen 3 liters via nasal cannula especially at night. ALLERGIES:  NO KNOWN DRUG ALLERGIES. SOCIAL HISTORY:  Patient is a former smoker. He denies alcohol or recreational drug use. FAMILY HISTORY:  Reviewed, but noncontributory. REVIEW OF SYSTEMS:  More than 12 systems were reviewed and the pertinent positives are in the history of present illness and others are negative. PHYSICAL EXAMINATION:  GENERAL:  Patient seen lying in bed in no acute respiratory distress. He looks acutely ill. VITAL SIGNS:  Blood pressure 132/73, respirations 115, pulse 20, temperature 98.9, pulse ox 95% on room air. HEENT:  Atraumatic, normocephalic. Anicteric, not pale, not jaundiced. Extraocular muscles intact. Pupils bilaterally reactive, equal, round. NECK:  Supple, no JVD, no carotid bruit. HEART:  Heart sounds 1 and 2 tachycardic. No gallop, no friction, no rub. RESPIRATION:  No wheezing, no rhonchi, no rales. [address] air entry bilaterally. ABDOMEN:  Soft, nontender. Bowel sounds normoactive. NEUROLOGIC:  Alert, oriented x2. Cranial nerves II-XII intact. SKIN:  Warm, dry. Normal skin color, normal skin turgor. PSYCHIATRIC:  Normal mood, normal affect. BACK:  No CVA tenderness. EXTREMITIES:  No edema, no cyanosis. Normal range of motion. DIAGNOSTIC TESTS:  WBC 14.3, hemoglobin 14.5, hematocrit 44.9, platelets 960.   Sodium 141, potassium 4.2, chloride 108, carbon dioxide 27, glucose 114, BUN 19, creatinine 1.29, calcium 9.4, ALT 20, AST 13, alkaline phosphatase 93. Troponin negative. Chest x-ray showed no acute process. CTA of the chest showed:  1. No evidence of pulmonary embolus. 2.  Chronic left lower lobe infiltrate, worse than prior study. 3.  Additionally, there is debris filling the left lower lobe bronchial tree consistent with aspiration. 4.  Additionally, there is mild right basilar pneumonia. 5.  Background of emphysema. EKG showed sinus tachycardia at 110 beats per minute, nonspecific ST-T wave changes. ASSESSMENT:  1. Fever associated with cough. 2.  Bilateral pneumonia, possible aspiration pneumonia. 3.  Dehydration. 4.  COPD.  5.  Lung cancer status post right lobectomy. 6.  Asthma. 7.  Oxygen dependent. 8.  Hyperlipidemia. 9.  Chronic pain syndrome. PLAN:    1. To admit the patient to intermediate care unit under hospitalist service. 2.  Blood cultures. 3.  Lactic acid. 4.  IV antibiotics including Zosyn and Levaquin. 5.  IV fluid hydration. 6.  Monitor kidney function with hydration. 7.  Monitor electrolytes with hydration. 8.  Monitor vital signs including blood pressure as per unit protocol. 9.  Restart appropriate home medications. 10.  Extensive patient education was done by bedside. Fall precautions, skin care  precautions, aspiration precaution. Out of bed to chair with assistance. 11.  Bedside swallow evaluation. 12.  Deep venous thrombosis prophylaxis, Lovenox. 13.  I discussed advanced directive with the patient. PATIENT IS FULL CODE.  14.  Further management will depend on the patient's clinical progression, further evaluation by the attending physician and results of tests. 15.  Nebulizer treatment. 16.  Oxygen supplementation, maintain O2 saturation around 92%.       MD RORY Mckeon / MN  D: 04/04/2018 22:09     T: 04/05/2018 01:59  JOB #: [id number]

## 2018-04-05 NOTE — PROGRESS NOTES
Admission Medication Reconciliation:    Information obtained from: patient, RX Query    Significant PMH/Disease States:   Past Medical History:   Diagnosis Date    Asthma     Cancer ([facility] [address]     Lung Cancer    Chronic obstructive pulmonary disease ([facility] [address]     Chronic pain     High cholesterol     Lung cancer [facility])        Chief Complaint for this Admission:    Chief Complaint   Patient presents with    Fever    Shortness of Breath       Allergies:  Review of patient's allergies indicates no known allergies. Prior to Admission Medications:   Prior to Admission Medications   Prescriptions Last Dose Informant Patient Reported? Taking? albuterol-ipratropium (DUO-NEB) 2.5 mg-0.5 mg/3 ml nebu 4/4/2018 at Unknown time  No Yes   Sig: 3 mL by Nebulization route every four (4) hours as needed. aspirin delayed-release 81 mg tablet 4/4/2018 at am  No Yes   Sig: Take 1 Tab by mouth daily. oxyCODONE IR (ROXICODONE) 20 mg immediate release tablet 4/4/2018 at Unknown time  Yes Yes   Sig: Take 20-40 mg by mouth every six (6) hours as needed for Pain (severe pain). simvastatin (ZOCOR) 40 mg tablet 4/3/2018 at pm  Yes Yes   Sig: Take 40 mg by mouth nightly. tiotropium (SPIRIVA WITH HANDIHALER) 18 mcg inhalation capsule 4/4/2018 at Unknown time  No Yes   Sig: Take 1 Cap by inhalation daily. Facility-Administered Medications: None         Comments/Recommendations:    Added: oxycodone  Removed clopidogrel, Advair

## 2018-04-05 NOTE — PROGRESS NOTES
Speech Pathology bedside swallow evaluation/discharge  Patient: Jennifer Brock (62 y.o. male)  Date: 4/5/2018  Primary Diagnosis: Aspiration pneumonia ([facility] [address]        Precautions: fall        ASSESSMENT :  Based on the objective data described below, the patient presents with no oral or pharyngeal dysphagia. Timely and complete mastication for dentition, timely swallow initiation and functional hyolaryngeal elevation/excursion via palpation. No s/s of aspiration observed. Skilled therapy provided by a speech-language pathologist is not indicated at this time. PLAN :  Recommendations:  --continue regular diet. No further SLP needs at this time. Discharge Recommendations: None     SUBJECTIVE:   Patient stated no I don't think so. When asked if he had trouble swallowing. Patient alert, cooperative. Family present at bedside. OBJECTIVE:     Past Medical History:   Diagnosis Date    Asthma     Cancer ([facility] [address]     Lung Cancer    Chronic obstructive pulmonary disease (HCC)     Chronic pain     High cholesterol     Lung cancer (HCC)      Past Surgical History:   Procedure Laterality Date    HX LOBECTOMY      Right lung    HX OTHER SURGICAL      Partial right lung removal     Prior Level of Function/Home Situation:      Diet prior to admission: regular   Current Diet:  Regular    Cognitive and Communication Status:  Neurologic State: Alert  Orientation Level: Oriented X4  Cognition: Appropriate decision making, Appropriate for age attention/concentration  Perception: Appears intact  Perseveration: No perseveration noted  Safety/Judgement: Awareness of environment, Insight into deficits  Oral Assessment:  Oral Assessment  Labial: No impairment  Dentition: Edentulous  Oral Hygiene: moist mucosa   Lingual: No impairment  Mandible: No impairment  P.O. Trials:  Patient Position: upright in bed  Vocal quality prior to P.O.: No impairment  Consistency Presented: Thin liquid;Puree; Solid; Ice chips  How Presented: Self-fed/presented;Cup/sip;Cup/gulp;Straw;Successive swallows;Spoon     Bolus Acceptance: No impairment  Bolus Formation/Control: No impairment     Propulsion: No impairment  Oral Residue: None  Initiation of Swallow: No impairment  Laryngeal Elevation: Functional  Aspiration Signs/Symptoms: None  Pharyngeal Phase Characteristics: No impairment, issues, or problems              Oral Phase Severity: No impairment  Pharyngeal Phase Severity : No impairment    G Codes: In compliance with CMSs Claims Based Outcome Reporting, the following G-code set was chosen for this patient based the use of the NOMS functional outcome to quantify this patient's level of swallowing impairment. Using the NOMS, the patient was determined to be at level 7 for swallow function which correlates with the CH= 0% level of severity. Based on the objective assessment provided within this note, the current, goal, and discharge g-codes are as follows:    Swallow  Swallowing:   Swallow Current Status CH= 0%   Swallow Goal Status CH= 0%   Swallow D/C Status CH= 0%      NOMS Swallowing Levels:  Level 1 (CN): NPO  Level 2 (CM): NPO but takes consistency in therapy  Level 3 (CL): Takes less than 50% of nutrition p.o. and continues with nonoral feedings; and/or safe with mod cues; and/or max diet restriction  Level 4 (CK): Safe swallow but needs mod cues; and/or mod diet restriction; and/or still requires some nonoral feeding/supplements  Level 5 (CJ): Safe swallow with min diet restriction; and/or needs min cues  Level 6 (CI): Independent with p.o.; rare cues; usually self cues; may need to avoid some foods or needs extra time  Level 7 ([address] [place] [address]): Independent for all p.o.  ARYA. (2003). National Outcomes Measurement System (NOMS): Adult Speech-Language Pathology User's Guide.        Pain:  Pain Scale 1: Numeric (0 - 10)  Pain Intensity 1: 0     After treatment:   [] Patient left in no apparent distress sitting up in chair  [x] Patient left in no apparent distress in bed  [x] Call bell left within reach  [x] Nursing notified  [x] Caregiver present  [] Bed alarm activated    COMMUNICATION/EDUCATION:   The patients plan of care including findings, recommendations, and recommended diet changes were discussed with: Registered Nurse and NP . [x] Patient/family have participated as able and agree with findings and recommendations. [] Patient is unable to participate in plan of care at this time. Thank you for this referral.  Angela Warren M.CD.  CCC-SLP   Time Calculation: 15 mins

## 2018-04-05 NOTE — PROGRESS NOTES
Pharmacist Note - Vancomycin Dosing    Consult provided for this 78 y.o. male for indication of Sepsis of unknown etiology. Antibiotic regimen(s): Levaquin, vanc, and zosyn    Recent Labs      18   0357  18   1554   WBC  22.2*  14.3*   CREA  1.24  1.29   BUN  20  19     Frequency of BMP: ordered daily x 3  Height: 180.3 cm  Weight: 62.6 kg  Est CrCl: 51.9 ml/min; UO: not assessed  Temp (24hrs), Av.5 °F (36.9 °C), Min:98 °F (36.7 °C), Max:98.9 °F (37.2 °C)    Cultures:   blood - NGTD   resp - pending    Goal trough = 15 - 20 mcg/mL    Therapy will be initiated with a loading dose of 1750 mg IV x 1 to be followed by a maintenance dose of 1000 mg IV every 16 hours. Pharmacy to follow patient daily and order levels / make dose adjustments as appropriate.        Niki German, ChristoD

## 2018-04-05 NOTE — PROGRESS NOTES
I have seen and examined this patient independently. Please refer to my progress note with the same date. I agree with Oliverio Hope assessment and plan. Jerardo Alexis [facility]                                                                  Hospitalist Progress Note  Curtis Green NP student  Answering service: [phone] from in house phone        Date of Service:  2018  NAME:  Stephen Guillen  :  1950  MRN:  [id number]      Admission Summary:   Mr. Otis Mendez is a 78year old male with PMH of Asthma, COPD, Lung CA post right lobectomy, chronic pain syndrome, hypercholesterolemia who presented for fever that started the morning of . After visiting his outpatient clinic, patient reported having chills and a subjective feeling of fever. The patient also reported that his legs started shaking which is indicative of fever based on his past experiences. His fever was associated with chest pain, chest tightness, and a productive cough (yellowish phlegm). He is on home oxygen PRN during the day and up to 3L at bedtime. He denied wheezing, rhonchi, sore throat, congestion, sick contacts, headache, dizziness, lightheadedness, neck pain, palpitations, nausea, vomiting, diarrhea, constipation, urinary issues, weakness. Interval history / Subjective:      Patient was last seen her on 18 and discharge with instructions to follow up with pulmonary in 4-6 weeks for repeat CT scan and PFTs. His f/u appointment with pulmonary is this week. He is unsure if he followed up with Dr. Randee Sterling. He is currently not taking any cardiac medications. His wife and step daughter are at the beside with him. He reports that overall he has been doing much better since february. He is working out at [employer] walking everyday. Will occasionally use oxygen during day and consistently using 3 l nc at bedtime. Oxygen demands have not increased. He denies aspiration or difficulty swallowing.  He has a non-productive cough with some associated left flank pain. He feel better than when he first came in. Denies fever or further episodes of leg shaking or rigor. Assessment & Plan:     Bilateral Pneumonia chronic with possible aspiration  CT of chest-No PE. Chronic left lower lobe infiltrate, worse than prior study and debris filling the left lower lobe bronchial tree, consistent withaspiration. Additionally, there is mild right basilar pneumonia. Background emphysema. Leukocytosis-continue levaquin and zosyn  Pulmonary following-patient to decide on bronch outpatient after round of abx tx vs inpatient bronch. Speech consulted-no signs of aspiration    COPD/Asthma exacerbation  Continue scheduled and PRN duo nebs   Add PTA LABA-spiriva  Add mucinex and tessalon  Continue Home O2 requirements    Chest pain  Continue ASA  resolved  Troponin negative  EKG- sinus tach with st depression in anterior leads    Hx of lung cancer s/p lobectomy  See above     HX of Chronic pain  Continue PTA roxicodone    Hx of hypercholesterolemia  Continue zocor        Code status: full  DVT prophylaxis: Lovenox    Care Plan discussed with: Patient/Family  Disposition: Home w/Family and TBD     Hospital Problems  Date Reviewed: 4/4/2018          Codes Class Noted POA    * (Principal)[facility] ICD-10-CM: J69.0  ICD-9-CM: 507.0  4/4/2018 Unknown                Review of Systems:   A comprehensive review of systems was negative except for that written in the HPI. Vital Signs:    Last 24hrs VS reviewed since prior progress note.  Most recent are:  Visit Vitals    BP 96/57 (BP 1 Location: Left arm, BP Patient Position: At rest)    Pulse 72    Temp 98.8 °F (37.1 °C)    Resp 23    Ht 5' 11\" (1.803 m)    Wt 62.6 kg (138 lb)    SpO2 93%    BMI 19.25 kg/m2       No intake or output data in the 24 hours ending 04/05/18 1054     Physical Examination:             Constitutional:  No acute distress, cooperative, pleasant    ENT:  Mucous membranes moist, oropharynx benign. Neck supple   Resp:  Diminished bilaterally. No wheezing/rhonchi/rales. No accessory muscle use, non-productive cough. CV:  Regular rhythm, normal rate, no murmurs, gallops, rubs    GI:  Soft, non distended, non tender. normal bowel sounds, no hepatosplenomegaly     Musculoskeletal:  No edema, warm, 2+ pulses throughout    Neurologic:  Moves all extremities. AAOx3, CN II-XII reviewed  Psych:  Fair insight, Not anxious nor agitated. Skin:  Good turgor, no rashes or ulcers  Eyes:  EOMI. Anicteric sclerae, PERRL. Data Review:    Review and/or order of clinical lab test      Labs:     Recent Labs      04/05/18   0357  04/04/18   1554   WBC  22.2*  14.3*   HGB  12.1  14.5   HCT  36.5*  44.9   PLT  237  278     Recent Labs      04/05/18   0357  04/04/18   1554   NA  141  141   K  3.8  4.2   CL  107  108   CO2  24  27   BUN  20  19   CREA  1.24  1.29   GLU  112*  114*   CA  8.3*  9.4     Recent Labs      04/04/18   1554   SGOT  13*   ALT  20   AP  93   TBILI  0.6   TP  7.4   ALB  3.5   GLOB  3.9     No results for input(s): INR, PTP, APTT in the last 72 hours. No lab exists for component: INREXT   No results for input(s): FE, TIBC, PSAT, FERR in the last 72 hours. No results found for: FOL, RBCF   No results for input(s): PH, PCO2, PO2 in the last 72 hours.   Recent Labs      04/04/18   1554   TROIQ  <0.04     Lab Results   Component Value Date/Time    Cholesterol, total 106 02/20/2018 04:54 AM    HDL Cholesterol 45 02/20/2018 04:54 AM    LDL, calculated 46 02/20/2018 04:54 AM    Triglyceride 75 02/20/2018 04:54 AM    CHOL/HDL Ratio 2.4 02/20/2018 04:54 AM     Lab Results   Component Value Date/Time    Glucose (POC) 100 09/14/2015 12:14 PM     Lab Results   Component Value Date/Time    Color YELLOW/STRAW 11/28/2016 04:44 PM    Appearance CLEAR 11/28/2016 04:44 PM    Specific gravity 1.020 11/28/2016 04:44 PM    pH (UA) 5.0 11/28/2016 04:44 PM    Protein TRACE (A) 11/28/2016 04:44 PM    Glucose NEGATIVE  11/28/2016 04:44 PM    Ketone TRACE (A) 11/28/2016 04:44 PM    Bilirubin NEGATIVE  11/28/2016 04:44 PM    Urobilinogen 0.2 11/28/2016 04:44 PM    Nitrites NEGATIVE  11/28/2016 04:44 PM    Leukocyte Esterase TRACE (A) 11/28/2016 04:44 PM    Epithelial cells FEW 11/28/2016 04:44 PM    Bacteria NEGATIVE  11/28/2016 04:44 PM    WBC 5-10 11/28/2016 04:44 PM    RBC 0-5 11/28/2016 04:44 PM         Medications Reviewed:     Current Facility-Administered Medications   Medication Dose Route Frequency    albuterol-ipratropium (DUO-NEB) 2.5 MG-0.5 MG/3 ML  3 mL Nebulization Q4H PRN    aspirin delayed-release tablet 81 mg  81 mg Oral DAILY    oxyCODONE IR (ROXICODONE) tablet 5 mg  5 mg Oral Q6H PRN    simvastatin (ZOCOR) tablet 40 mg  40 mg Oral QHS    ipratropium (ATROVENT) 0.02 % nebulizer solution 0.5 mg  0.5 mg Nebulization Q6H RT    sodium chloride (NS) flush 5-10 mL  5-10 mL IntraVENous Q8H    sodium chloride (NS) flush 5-10 mL  5-10 mL IntraVENous PRN    levoFLOXacin (LEVAQUIN) 750 mg in D5W IVPB  750 mg IntraVENous Q24H    acetaminophen (TYLENOL) tablet 650 mg  650 mg Oral Q4H PRN    enoxaparin (LOVENOX) injection 40 mg  40 mg SubCUTAneous Q24H    0.9% sodium chloride infusion  100 mL/hr IntraVENous CONTINUOUS    piperacillin-tazobactam (ZOSYN) 4.5 g in 0.9% sodium chloride (MBP/ADV) 100 mL  4.5 g IntraVENous Q8H     Current Outpatient Prescriptions   Medication Sig    oxyCODONE IR (ROXICODONE) 20 mg immediate release tablet Take 20-40 mg by mouth every six (6) hours as needed for Pain (severe pain).  tiotropium (SPIRIVA WITH HANDIHALER) 18 mcg inhalation capsule Take 1 Cap by inhalation daily.  albuterol-ipratropium (DUO-NEB) 2.5 mg-0.5 mg/3 ml nebu 3 mL by Nebulization route every four (4) hours as needed.  aspirin delayed-release 81 mg tablet Take 1 Tab by mouth daily.  simvastatin (ZOCOR) 40 mg tablet Take 40 mg by mouth nightly. ______________________________________________________________________  EXPECTED LENGTH OF STAY: - - -  ACTUAL LENGTH OF STAY:          1                 Juliana Gates

## 2018-04-05 NOTE — CDMP QUERY
#2  There are noted documentations of COPD and Asthma on this record. Could this be further clarified as    =>COPD with possible Asthma Exacerbation in the setting of Pneumonia requiring nebulizers. =>Other Explanation of clinical findings  =>Unable to Determine (no explanation of clinical findings)    The medical record reflects the following clinical findings, treatment, and risk factors:    Risk Factors: Vinicio. Pneumonia, COPD, Asthma, O2 dep, lobectomy    Clinical Indicators:   Note pulse ox on 3 liters to be 86-90%. Decreased breath sounds bilaterally  CT: Background of emphysema  Fever associated with cough    Treatment: increase O2 to maintain sats >92, Duo-Nebs    Please clarify and document your clinical opinion in the progress notes and discharge summary including the definitive and/or presumptive diagnosis, (suspected or probable), related to the above clinical findings.  Please include clinical findings supporting your diagnosis    Thank you,  Jeff Puga, RN  [phone]

## 2018-04-06 ENCOUNTER — ANESTHESIA EVENT (OUTPATIENT)
Dept: ENDOSCOPY | Age: 68
DRG: 167 | End: 2018-04-06
Payer: MEDICARE

## 2018-04-06 LAB
ANION GAP SERPL CALC-SCNC: 8 MMOL/L (ref 5–15)
BUN SERPL-MCNC: 13 MG/DL (ref 6–20)
BUN/CREAT SERPL: 13 (ref 12–20)
CALCIUM SERPL-MCNC: 8.7 MG/DL (ref 8.5–10.1)
CHLORIDE SERPL-SCNC: 107 MMOL/L (ref 97–108)
CO2 SERPL-SCNC: 24 MMOL/L (ref 21–32)
CREAT SERPL-MCNC: 1.03 MG/DL (ref 0.7–1.3)
GLUCOSE SERPL-MCNC: 95 MG/DL (ref 65–100)
POTASSIUM SERPL-SCNC: 3.9 MMOL/L (ref 3.5–5.1)
SODIUM SERPL-SCNC: 139 MMOL/L (ref 136–145)
T3FREE SERPL-MCNC: 2.4 PG/ML (ref 2.2–4)
T4 FREE SERPL-MCNC: 1.5 NG/DL (ref 0.8–1.5)
TSH SERPL DL<=0.05 MIU/L-ACNC: 0.79 UIU/ML (ref 0.36–3.74)

## 2018-04-06 PROCEDURE — 74011000258 HC RX REV CODE- 258: Performed by: INTERNAL MEDICINE

## 2018-04-06 PROCEDURE — 74011250637 HC RX REV CODE- 250/637: Performed by: NURSE PRACTITIONER

## 2018-04-06 PROCEDURE — 84443 ASSAY THYROID STIM HORMONE: CPT | Performed by: INTERNAL MEDICINE

## 2018-04-06 PROCEDURE — 84481 FREE ASSAY (FT-3): CPT | Performed by: INTERNAL MEDICINE

## 2018-04-06 PROCEDURE — 74011250637 HC RX REV CODE- 250/637: Performed by: INTERNAL MEDICINE

## 2018-04-06 PROCEDURE — 74011250636 HC RX REV CODE- 250/636: Performed by: INTERNAL MEDICINE

## 2018-04-06 PROCEDURE — 94640 AIRWAY INHALATION TREATMENT: CPT

## 2018-04-06 PROCEDURE — 65660000000 HC RM CCU STEPDOWN

## 2018-04-06 PROCEDURE — 80048 BASIC METABOLIC PNL TOTAL CA: CPT | Performed by: INTERNAL MEDICINE

## 2018-04-06 PROCEDURE — 84439 ASSAY OF FREE THYROXINE: CPT | Performed by: INTERNAL MEDICINE

## 2018-04-06 PROCEDURE — 36415 COLL VENOUS BLD VENIPUNCTURE: CPT | Performed by: INTERNAL MEDICINE

## 2018-04-06 PROCEDURE — 74011250636 HC RX REV CODE- 250/636: Performed by: NURSE PRACTITIONER

## 2018-04-06 PROCEDURE — 74011000250 HC RX REV CODE- 250: Performed by: INTERNAL MEDICINE

## 2018-04-06 RX ORDER — MAGNESIUM SULFATE 1 G/100ML
1 INJECTION INTRAVENOUS ONCE
Status: COMPLETED | OUTPATIENT
Start: 2018-04-06 | End: 2018-04-06

## 2018-04-06 RX ORDER — IPRATROPIUM BROMIDE AND ALBUTEROL SULFATE 2.5; .5 MG/3ML; MG/3ML
3 SOLUTION RESPIRATORY (INHALATION)
Status: DISCONTINUED | OUTPATIENT
Start: 2018-04-06 | End: 2018-04-10 | Stop reason: HOSPADM

## 2018-04-06 RX ORDER — POTASSIUM CHLORIDE 750 MG/1
20 TABLET, FILM COATED, EXTENDED RELEASE ORAL
Status: COMPLETED | OUTPATIENT
Start: 2018-04-06 | End: 2018-04-06

## 2018-04-06 RX ORDER — DRONABINOL 2.5 MG/1
5 CAPSULE ORAL 2 TIMES DAILY
Status: DISCONTINUED | OUTPATIENT
Start: 2018-04-06 | End: 2018-04-10 | Stop reason: HOSPADM

## 2018-04-06 RX ADMIN — Medication 10 ML: at 05:52

## 2018-04-06 RX ADMIN — POTASSIUM CHLORIDE 20 MEQ: 750 TABLET, EXTENDED RELEASE ORAL at 10:21

## 2018-04-06 RX ADMIN — PIPERACILLIN SODIUM AND TAZOBACTAM SODIUM 4.5 G: 4; .5 INJECTION, POWDER, LYOPHILIZED, FOR SOLUTION INTRAVENOUS at 20:11

## 2018-04-06 RX ADMIN — PIPERACILLIN SODIUM AND TAZOBACTAM SODIUM 4.5 G: 4; .5 INJECTION, POWDER, LYOPHILIZED, FOR SOLUTION INTRAVENOUS at 05:52

## 2018-04-06 RX ADMIN — IPRATROPIUM BROMIDE AND ALBUTEROL SULFATE 3 ML: .5; 3 SOLUTION RESPIRATORY (INHALATION) at 08:26

## 2018-04-06 RX ADMIN — SIMVASTATIN 40 MG: 40 TABLET, FILM COATED ORAL at 22:30

## 2018-04-06 RX ADMIN — ASPIRIN 81 MG: 81 TABLET, COATED ORAL at 08:19

## 2018-04-06 RX ADMIN — Medication 10 ML: at 22:32

## 2018-04-06 RX ADMIN — VANCOMYCIN HYDROCHLORIDE 1000 MG: 1 INJECTION, POWDER, LYOPHILIZED, FOR SOLUTION INTRAVENOUS at 06:49

## 2018-04-06 RX ADMIN — VANCOMYCIN HYDROCHLORIDE 1000 MG: 1 INJECTION, POWDER, LYOPHILIZED, FOR SOLUTION INTRAVENOUS at 18:11

## 2018-04-06 RX ADMIN — MAGNESIUM SULFATE HEPTAHYDRATE 1 G: 1 INJECTION, SOLUTION INTRAVENOUS at 10:22

## 2018-04-06 RX ADMIN — SODIUM CHLORIDE 50 ML/HR: 900 INJECTION, SOLUTION INTRAVENOUS at 15:33

## 2018-04-06 RX ADMIN — PIPERACILLIN SODIUM AND TAZOBACTAM SODIUM 4.5 G: 4; .5 INJECTION, POWDER, LYOPHILIZED, FOR SOLUTION INTRAVENOUS at 12:34

## 2018-04-06 RX ADMIN — ENOXAPARIN SODIUM 40 MG: 40 INJECTION SUBCUTANEOUS at 22:30

## 2018-04-06 RX ADMIN — GUAIFENESIN 600 MG: 600 TABLET, EXTENDED RELEASE ORAL at 20:11

## 2018-04-06 RX ADMIN — DRONABINOL 5 MG: 2.5 CAPSULE ORAL at 18:11

## 2018-04-06 RX ADMIN — GUAIFENESIN 600 MG: 600 TABLET, EXTENDED RELEASE ORAL at 08:19

## 2018-04-06 RX ADMIN — LEVOFLOXACIN 750 MG: 5 INJECTION, SOLUTION INTRAVENOUS at 22:30

## 2018-04-06 NOTE — PROGRESS NOTES
Hospitalist Progress Note  Devonte Bond NP  Answering service: [phone] from in house phone  Cell: [phone]      Date of Service:  2018  NAME:  Nixon Sierra  :  1950  MRN:  [id number]      Admission Summary:   78year old male with history of COPD and Lung Cancer who presented with LLL Pneumonia   And concern for aspiration pneumonia. He was due to fu with Chest CT and possible FNA after discharge in February but that would be about now. He wants to follow with Dr. Ovidio Valdovinos and stop getting his Pulmonary care  At [facility]. Of note he is on nocturnal O2 at 2L and rarely uses oxygen in the day. Interval history / Subjective:     Mr. Saritha Soni is improving slowly. His oxygen saturations remain 89-91 % on 4L NC AT REST. His baseline oxygen level is 2L NC. The plan is for bronchoscopy on Monday with Dr. Ovidio Valdovinos.       Assessment & Plan:     Possible Mild Respiratory Distress superimposed on Chronic Hypoxic Respiratory Failure in the setting of COPD exacerbation and Pneumonia  (POA)  - requiring increase in oxygen from baseline   -Chest CT with LLL infiltrate, worse than prior study  -Concern for aspiration , speech eval  -bronchoscopy planned for Monday   -Levaquin and Zosyn  -Spiriva and Duonebs  -Mucinex and Tessalon Pearls    Leukocytosis- repeat blood work in AM   WBC 22  monitor    History of Lung cancer and s/p Lobectomy    Underweight  Nutrition consult for supplements  Adding Marinol for appetite stimulation per Nutrition reccs    Hyperlipidemia  Zocor    Chronic Back Pain  Takes Oxycodone 20 mg q 6 hours at home    Code status: Full  DVT prophylaxis: Lovenox    Care Plan discussed with: Patient/Family and Nurse  Disposition: Home w/Family and TBD     Hospital Problems  Date Reviewed: 2018          Codes Class Noted POA    * (Principal)[facility] ICD-10-CM: J69.0  ICD-9-CM: 507.0  2018 Unknown                Review of Systems:   A comprehensive review of systems was negative except for that written in the HPI. Vital Signs:    Last 24hrs VS reviewed since prior progress note. Most recent are:  Visit Vitals    /67 (BP 1 Location: Left arm, BP Patient Position: At rest)    Pulse 84    Temp 98.1 °F (36.7 °C)    Resp 20    Ht 5' 11\" (1.803 m)    Wt 63.2 kg (139 lb 5.3 oz)    SpO2 96%    BMI 19.43 kg/m2         Intake/Output Summary (Last 24 hours) at 04/06/18 1610  Last data filed at 04/06/18 1354   Gross per 24 hour   Intake             1200 ml   Output             2225 ml   Net            -1025 ml        Physical Examination:             Constitutional:  No acute distress, cooperative, pleasant, thin   ENT:  Oral mucous moist, oropharynx benign. Neck supple,    Resp:  Diminished bases blt, LLL worse and ronchi noted, No accessory muscle use   CV:  Regular rhythm, normal rate, no murmurs, gallops, rubs    GI:  Soft, non distended, non tender. normoactive bowel sounds, no hepatosplenomegaly     Musculoskeletal:  No edema, warm, 2+ pulses throughout    Neurologic:  Moves all extremities. AAOx3     Psych:  Good insight, Not anxious nor agitated.   Skin:  Good turgor, no rashes or ulcers  Hematologic/Lymphatic/Immunlogic:  No jaundice nor lymph node swelling       Data Review:    Review and/or order of clinical lab test  Review and/or order of tests in the radiology section of [facility]      Labs:     Recent Labs      04/05/18   0357  04/04/18   1554   WBC  22.2*  14.3*   HGB  12.1  14.5   HCT  36.5*  44.9   PLT  237  278     Recent Labs      04/06/18   0550  04/05/18   0357  04/04/18   1554   NA  139  141  141   K  3.9  3.8  4.2   CL  107  107  108   CO2  24  24  27   BUN  13  20  19   CREA  1.03  1.24  1.29   GLU  95  112*  114*   CA  8.7  8.3*  9.4     Recent Labs      04/04/18   1554   SGOT  13*   ALT  20   AP  93   TBILI  0.6   TP  7.4   ALB  3.5   GLOB  3.9     No results for input(s): INR, PTP, APTT in the last 72 hours. No lab exists for component: INREXT, INREXT   No results for input(s): FE, TIBC, PSAT, FERR in the last 72 hours. No results found for: FOL, RBCF   No results for input(s): PH, PCO2, PO2 in the last 72 hours.   Recent Labs      04/04/18   1554   TROIQ  <0.04     Lab Results   Component Value Date/Time    Cholesterol, total 106 02/20/2018 04:54 AM    HDL Cholesterol 45 02/20/2018 04:54 AM    LDL, calculated 46 02/20/2018 04:54 AM    Triglyceride 75 02/20/2018 04:54 AM    CHOL/HDL Ratio 2.4 02/20/2018 04:54 AM     Lab Results   Component Value Date/Time    Glucose (POC) 100 09/14/2015 12:14 PM     Lab Results   Component Value Date/Time    Color YELLOW/STRAW 11/28/2016 04:44 PM    Appearance CLEAR 11/28/2016 04:44 PM    Specific gravity 1.020 11/28/2016 04:44 PM    pH (UA) 5.0 11/28/2016 04:44 PM    Protein TRACE (A) 11/28/2016 04:44 PM    Glucose NEGATIVE  11/28/2016 04:44 PM    Ketone TRACE (A) 11/28/2016 04:44 PM    Bilirubin NEGATIVE  11/28/2016 04:44 PM    Urobilinogen 0.2 11/28/2016 04:44 PM    Nitrites NEGATIVE  11/28/2016 04:44 PM    Leukocyte Esterase TRACE (A) 11/28/2016 04:44 PM    Epithelial cells FEW 11/28/2016 04:44 PM    Bacteria NEGATIVE  11/28/2016 04:44 PM    WBC 5-10 11/28/2016 04:44 PM    RBC 0-5 11/28/2016 04:44 PM         Medications Reviewed:     Current Facility-Administered Medications   Medication Dose Route Frequency    albuterol-ipratropium (DUO-NEB) 2.5 MG-0.5 MG/3 ML  3 mL Nebulization Q4H PRN    vancomycin (VANCOCIN) 1,000 mg in 0.9% sodium chloride (MBP/ADV) 250 mL  1,000 mg IntraVENous Q12H    Vancomycin Pharmacy to Dose   Other Rx Dosing/Monitoring    guaiFENesin ER (MUCINEX) tablet 600 mg  600 mg Oral Q12H    benzonatate (TESSALON) capsule 100 mg  100 mg Oral TID PRN    aspirin delayed-release tablet 81 mg  81 mg Oral DAILY    oxyCODONE IR (ROXICODONE) tablet 5 mg  5 mg Oral Q6H PRN    simvastatin (ZOCOR) tablet 40 mg  40 mg Oral QHS  sodium chloride (NS) flush 5-10 mL  5-10 mL IntraVENous Q8H    sodium chloride (NS) flush 5-10 mL  5-10 mL IntraVENous PRN    levoFLOXacin (LEVAQUIN) 750 mg in D5W IVPB  750 mg IntraVENous Q24H    acetaminophen (TYLENOL) tablet 650 mg  650 mg Oral Q4H PRN    enoxaparin (LOVENOX) injection 40 mg  40 mg SubCUTAneous Q24H    0.9% sodium chloride infusion  50 mL/hr IntraVENous CONTINUOUS    piperacillin-tazobactam (ZOSYN) 4.5 g in 0.9% sodium chloride (MBP/ADV) 100 mL  4.5 g IntraVENous Q8H     ______________________________________________________________________  EXPECTED LENGTH OF STAY: 3d 9h  ACTUAL LENGTH OF STAY:          2                 Bella Hyde NP

## 2018-04-06 NOTE — PROGRESS NOTES
[facility] [address] [place] [address] Day: 3    2018     · He agrees to [place] EBUS on Monday       We will see the patient as needed over the weekend. Please call if there is any change or questions. Continue current abx    ID: Nohemy Keith is a 78 y.o. PATIENT REFUSED male    admitted on 2018 with problem list as below    Active Hospital Problems    Diagnosis Date Noted    Aspiration pneumonia ([facility] [address] 2018         Hospital Problems  Date Reviewed: 2018          Codes Class Noted POA    * (Principal)Aspiration pneumonia ([facility] [address] ICD-10-CM: J69.0  ICD-9-CM: 507.0  2018 Unknown                               IMPRESSION     · LLL Pneumonia - Mucous retention  - ? Lung mass - Personal History of Lung cancer within contralateral lung - recommend bronch but he would like to delay and do as an outpatient after abx - alternatively we can do as inpatient.          PLAN     · As Above  · Bronch and EBUS [place] Monday       VITALS INS & OUTS   Visit Vitals    /68 (BP 1 Location: Left arm, BP Patient Position: At rest)    Pulse 86    Temp 98.7 °F (37.1 °C)    Resp 20    Ht 5' 11\" (1.803 m)    Wt 63.2 kg (139 lb 5.3 oz)    SpO2 90%    BMI 19.43 kg/m2       Temp (24hrs), Av.8 °F (37.1 °C), Min:98 °F (36.7 °C), Max:99.4 °F (37.4 °C)      Intake/Output Summary (Last 24 hours) at 18 1207  Last data filed at 18 1114   Gross per 24 hour   Intake              960 ml   Output             1975 ml   Net            -1015 ml                   OTHER:      [] Drain  [] ICP  [] Chest Tube  []        [] Vent    [] BiPAP  [] CPAP  [] A-Line      [] NGT   [] ETT  [] Burch  [] C-Line    GEN:  [x] Nontoxic   [x] No Distress  [] Toxic  [] Distressed    Eyes:  [x] Anicteric    [x] Noninjected  [] Icteric  [] injected    ENT:  [x] Moist   [x] No Thrush  [] Dry  [] Thrush    CV:  [x] Regular    [x] No Murmur  [] Irregular  [] Murmur    LUNG:  [x] Clear    [x] Equal BS  [] Wheeze  [] Unequal BS    GI:  [x] NABS  [x] Nontender  [] No Sounds  [] Tender    :  [x] Clear Urine  [x] Norm Genitalia   [] Burch  [] Deferred Exam    SKEL:  [x] WD WN  [x] No Clubbing  [] Wasted  [] Clubbing    SKIN:  [x] Perfused    [x] No Drug Rash  [] Cool  [] Rash    NEURO:  [x] A & O x 3  [x]  Non Focal  [] Confused  [] Focal    PSYCH:  [x] Nonagitated  [x] Good Insight  [] Agitated  [] Poor Insight    LYMPH:  [x] No edema   [x] No ROMA  [] Edema  [] Regional ROMA      VENTILATOR - BIPAP - O2:     [x]  Reviewed Ventilator / BiPAP / O2  O2 Device: Nasal cannula (04/06/18 0808)    Ideal body weight: 75.3 kg (166 lb 0.1 oz)                       Mode:            Tidal Volume:                   Pressure:                         FiO2:                       PEEP:                           PIP:     Minute Ventilation:       LABS:    No results for input(s): PHI, PCO2I, PO2I in the last 72 hours. Recent Labs      04/05/18   0357   WBC  22.2*   HGB  12.1   PLT  237       Recent Labs      04/06/18   0550   04/04/18   2120  04/04/18   1554   NA  139   < >   --   141   K  3.9   < >   --   4.2   CL  107   < >   --   108   CO2  24   < >   --   27   GLU  95   < >   --   114*   BUN  13   < >   --   19   CREA  1.03   < >   --   1.29   CA  8.7   < >   --   9.4   LAC   --    --   1.5   --    ALB   --    --    --   3.5   SGOT   --    --    --   13*   ALT   --    --    --   20    < > = values in this interval not displayed. IMAGING:      Results from Hospital Encounter encounter on 04/04/18   XR CHEST PA LAT   Narrative INDICATION:  chest pain with history of lung cancer, COPD    COMPARISON: 2/23/2018    FINDINGS: PA and lateral views of the chest demonstrate a stable  cardiomediastinal silhouette with unchanged volume loss in the right hemithorax  and elevation of the right hemidiaphragm. There is no focal airspace disease. There is a chronic small left pleural effusion. There is a background of COPD. Impression IMPRESSION: No acute process        Results from Hospital Encounter encounter on 02/17/18   XR CHEST PORT   Narrative EXAM:  XR CHEST PORT. INDICATION: Follow-up congestive heart failure and shortness of breath and  pneumonia. COMPARISON: 2/17/2018. FINDINGS:   A portable AP radiograph of the chest was obtained at 0718 hours. Lines and tubes: The patient is on a cardiac monitor. Lungs: There are surgical clips and staples in the right hilum with superior  retraction of the hilum. The lungs are reinflated and hyperlucent and there is  improved aeration in the left lower lobe. Pleura: There is blunting of the right costophrenic angle which is unchanged. Mediastinum: The cardiac and mediastinal contours and pulmonary vascularity are  normal.  Bones and soft tissues: The bones and soft tissues are grossly within normal  limits. Impression IMPRESSION: Improved aeration in the left lower lobe. Results from [place] encounter on 04/04/18   CTA CHEST W OR W WO CONT   Narrative INDICATION:   chest pain, sob, more hypoxia     COMPARISON:  CT 2/17/2018    TECHNIQUE:  Following the uneventful intravenous administration of 100 cc Isovue  960, thin helical axial images were obtained through the chest. Postprocessing  was performed. 3D image postprocessing was performed. CT dose reduction was achieved through the use of a standardized protocol  tailored for this examination and automatic exposure control for dose  modulation. FINDINGS:    There are no enlarged mediastinal or hilar lymph nodes. The heart size is  normal.  There is no pericardial effusion. No filling defect is seen within the pulmonary arterial system to suggest  pulmonary embolus. The aorta is normal in caliber. There is a background of centrilobular emphysema. There is evidence of right  lung surgery, as well as mild right basilar airspace disease.  There is also  dense consolidation within the left lower lobe, with partial filling of the left  lower lobe bronchial tree compatible with aspiration. . The superior aspect of  this chronic infiltrate appears masslike and may represent an underlying  malignancy. No pulmonary nodule or mass is seen. There are no pleural effusions. Limited evaluation of the upper abdomen demonstrates no abnormality. The osseous  structures are unremarkable. Impression IMPRESSION:  1. No evidence of pulmonary embolus. 2.  Chronic left lower lobe infiltrate, worse than prior study. Additionally,  there is debris filling the left lower lobe bronchial tree, consistent with  aspiration. 3. Additionally, there is mild right basilar pneumonia. 4. Background of emphysema. Results from [place] encounter on 02/17/18   CTA CHEST W OR W WO CONT   Narrative Clinical indication: Dyspnea on exertion. Localizer obtained without contrast at the level of the pulmonary arteries. Fast  injection rate of 75 cc of Isovue-370 with review of the raw data and MIP  reconstructions. Comparison November 20, 2016. CT dose reduction was achieved  through the use of a standardized protocol tailored for this examination and  automatic exposure control for dose modulation . [facility] There is a persistent left  lower lobe infiltrate. There is no definite evidence for pulmonary emboli. There  is no definite evidence for mediastinal or hilar adenopathy. Aorta does appear  unremarkable. No parenchymal mass. Impression IMPRESSION: Chronic lung disease, left lower lobe infiltrate. PMH:  has a past medical history of Asthma; Cancer ([address] Chronic obstructive pulmonary disease ([address] Chronic pain; High cholesterol; and Lung cancer ([address] PSH:  has a past surgical history that includes hx other surgical and hx lobectomy. FHX: family history is not on file. SHX:  reports that he has quit smoking. He does not have any smokeless tobacco history on file.  He reports that he does not drink alcohol or use illicit drugs.     No Known Allergies    Current Facility-Administered Medications   Medication    albuterol-ipratropium (DUO-NEB) 2.5 MG-0.5 MG/3 ML    Vancomycin Pharmacy to Dose    vancomycin (VANCOCIN) 1,000 mg in 0.9% sodium chloride (MBP/ADV) 250 mL    guaiFENesin ER (MUCINEX) tablet 600 mg    benzonatate (TESSALON) capsule 100 mg    aspirin delayed-release tablet 81 mg    oxyCODONE IR (ROXICODONE) tablet 5 mg    simvastatin (ZOCOR) tablet 40 mg    sodium chloride (NS) flush 5-10 mL    sodium chloride (NS) flush 5-10 mL    levoFLOXacin (LEVAQUIN) 750 mg in D5W IVPB    acetaminophen (TYLENOL) tablet 650 mg    enoxaparin (LOVENOX) injection 40 mg    0.9% sodium chloride infusion    piperacillin-tazobactam (ZOSYN) 4.5 g in 0.9% sodium chloride (MBP/ADV) 100 mL        Joanna Irving MD [facility]

## 2018-04-06 NOTE — PROGRESS NOTES
Bedside shift change report given to Nannette Bergeron RN (oncoming nurse) by Selena Angeles RN (offgoing nurse). Report included the following information SBAR, Intake/Output and MAR.

## 2018-04-06 NOTE — PROGRESS NOTES
RN received call from [facility] that patient had 12beat run of Vtach then returned to normal rhythm. Dr. Niya Cohn notified. No new orders at this time. Will continue to monitor.

## 2018-04-06 NOTE — PROGRESS NOTES
Bedside shift change report given to Johnie Mary RN (oncoming nurse) by Anisha Garcia RN (offgoing nurse). Report included the following information SBAR, Kardex, Intake/Output and MAR.

## 2018-04-06 NOTE — PROGRESS NOTES
NUTRITION COMPLETE ASSESSMENT    RECOMMENDATIONS:   1. Consider adding marinol - see comments below  2. Weekly weights     Interventions/Plan:   Food/Nutrient Delivery:   (diet liberalized to regular + snacks) Commercial supplement (Ensure TID)          Assessment:   Reason for Assessment: [x]At Nutrition Risk    Diet: Cardiac  Supplements: none  Nutritionally Significant Medications: [x] Reviewed & Includes:   Meal Intake:   Patient Vitals for the past 100 hrs:   % Diet Eaten   04/06/18 0808 75 %     Pre-Hospitalization:  Usual Appetite: Good (prior to Tuesday)  Diet at Home: regular  Vitamins/Supplements: Yes (Ensure occassionally)    Current Hospitalization:   Fluid Restriction:    Appetite: Fair  PO Ability: Independent Average po intake:50-75%  Average supplements intake:        Subjective:  \"I was actually doing pretty good prior to Tuesday. I would have a 6 inch sub for a snack before bed every night. I probably wasn't doing as many of the shakes as I should. ... Can I have an ice cream now? \"    Objective:  Pt admitted for aspiration PNA. PMHx: COPD, lung CA s/p loblectomy, chronic back pain. PNA noted with mucous retention vs lung mass. With hx of lung CA plans for bronchoscopy noted. Pulmonology following. Seen by SLP with no aspiration noted, cleared for regular diet. Pt with chronically low wt. Low wt of 126# in February Happy to see has regained some wt since. Pt confirms wt has been 138-139# prior to admit on multiple scales. Wt Readings from Last 10 Encounters:   04/05/18 63.2 kg (139 lb 5.3 oz)   02/23/18 57.6 kg (126 lb 15.8 oz)   11/28/16 71.2 kg (157 lb)   10/05/15 66.2 kg (146 lb)   09/15/15 66.5 kg (146 lb 11.2 oz)   08/11/15 68 kg (149 lb 14.6 oz)     Pt and wife visited. Pt reports very good appetite at home, except for the past 3-4 days PTA. Tips for increasing intake and use of supplements at home reviewed.   Pt notes being on marinol in the past with great success, but has not been taking recently due to cost. Unsure it there would be any benefit to ordering marinol for just a few days while inpatient, but may want to consider. Will resumed snacks between meals (AM: chicken salad/crackers; PM: ice cream, cookie; HS: turkey sandwich) + Ensure Enlive (chocolate) TID to provide: 1050kcal, 60g protein). Will follow for results of bronch, PO intake, wt trends, and adjustment to snacks/supplments PRN.    Estimated Nutrition Needs:   Kcals/day: 7202 Kcals/day (2205-2520kcal)  Protein: 76 g (76-88g (1.2-1.4g/kg))  Fluid: 2205 ml (1ml/kcal)  Based On: Kcal/kg - specify (Comment) (35-40kcal)  Weight Used: Actual wt (63kg)    Pt expected to meet estimated nutrient needs:  []   Yes     []  No [x] Unable to predict at this time  Nutrition Diagnosis:   1.  (Increased protein/energy needs) related to increased energy expenditure as evidenced by underweight, COPD/lung CA hx    Goals:     Consumption of at least 75% of meals and 2 supplements/day in 5-7 days; wt maintenance     Monitoring & Evaluation:    - Liquid meal replacement, Total energy intake, Protein intake   - Weight/weight change    Previous Nutrition Goals Met:   N/A  Previous Recommendations:    N/A    Education & Discharge Needs:   [] None Identified   [x] Identified and addressed    [x] Participated in care plan, discharge planning, and/or interdisciplinary rounds        Cultural, Lutheran and ethnic food preferences identified: None    Skin Integrity: [x]Intact  []Other  Edema: [x]None []Other  Last BM: 4/3  Food Allergies: [x]None []Other  Diet Restrictions: Cultural/Tenriism Preference(s): None     Anthropometrics:    Weight Loss Metrics 4/5/2018 2/23/2018 11/28/2016 10/5/2015 9/15/2015 8/11/2015 8/10/2015   Today's Wt 139 lb 5.3 oz 126 lb 15.8 oz 157 lb 146 lb 146 lb 11.2 oz 149 lb 14.6 oz -   BMI 19.43 kg/m2 17.71 kg/m2 21.9 kg/m2 20.37 kg/m2 20.47 kg/m2 - 20.92 kg/m2      Weight Source: Standing scale (comment)  Height: 5' 11\" (180.3 cm),    Body mass index is 19.43 kg/(m^2).   IBW : 78 kg (172 lb), % IBW (Calculated): 81.01 %  Usual Body Weight: 66.2 kg (146 lb),      Labs:    Lab Results   Component Value Date/Time    Sodium 139 04/06/2018 05:50 AM    Potassium 3.9 04/06/2018 05:50 AM    Chloride 107 04/06/2018 05:50 AM    CO2 24 04/06/2018 05:50 AM    Glucose 95 04/06/2018 05:50 AM    BUN 13 04/06/2018 05:50 AM    Creatinine 1.03 04/06/2018 05:50 AM    Calcium 8.7 04/06/2018 05:50 AM    Magnesium 2.1 02/17/2018 03:02 PM    Phosphorus 4.4 09/15/2015 03:33 AM    Albumin 3.5 04/04/2018 03:54 PM     Michael Gerber RD

## 2018-04-06 NOTE — PROGRESS NOTES
Brief:   Asymptomatic and non sustained VT on monitor,  K ok but low nl , will give 20 mEq k and empiric Mag and f/u lab in am.     Ranjana Whitten MD 4/6/2018

## 2018-04-06 NOTE — PROGRESS NOTES
Day #2 of Vancomycin  Indication:  sepsis of unknown etiology - aspiration PNA  Current regimen:  1000 mg IV Q16H  Abx regimen:  Levaquin, Zosyn, Vancomycin  ID Following ?: NO  Concomitant nephrotoxic drugs (requires more frequent monitoring): None  Frequency of BMP?: daily through     Recent Labs      18   0550  18   0357  18   1554   WBC   --   22.2*  14.3*   CREA  1.03  1.24  1.29   BUN  13  20  19     Est CrCl: 63.1 ml/min; UO: 0.7 ml/kg/hr  Temp (24hrs), Av.8 °F (37.1 °C), Min:98 °F (36.7 °C), Max:99.4 °F (37.4 °C)    Cultures:    blood - NGTD   resp - no organisms seen, pending    Goal trough = 15 - 20 mcg/mL    Recent trough history (date/time/level/dose/action taken):  None thus far    Plan: Change to 1000 mg IV Q12H due to improved renal function and plan for trough prior to dose  at 1900 (4th dose of Q12H regimen, 5th total dose)  Pharmacy will continue to monitor patient and order levels / make dose adjustments as appropriate.       Radha Capellan, PharmD

## 2018-04-06 NOTE — PROGRESS NOTES
Reason for Admission:  LLL PNA          RRAT Score: 15      Do you (patient/family) have any concerns for transition/discharge? No    [facility]. Likelihood of readmission? Moderate 2/2 medical condition    Patient is a 79 y/o  male insured by [facility] A/B, admitted to [facility] 4/4/18 for acute on chronic hypoxic respiratory failure in setting of COPD exacerbation and PNA, questionable lung mass. PMH COPD, lung CA. Pulmonology consulted, plan for bronch on Monday. Per chart review, patient last at [facility] in February, at which time he discharged to [facility] acute rehab. CM s/w patient at bedside. Patient A&Ox4, confirmed demographics on facesheet. Patient lives with son, son's fiance, and son's children in [address] [place] [address], independent with ADL/IADL's. DME includes home O2 with portable concentrator - patient generally uses O2 only at night however does have supplies needed for daytime use. Follows for primary care with Dr. Jackeline Velez at [facility]. CM updated PCP in [facility]. Patient reports that he stayed at [facility] for approx 12 days after last [facility] admission, returned home with no additional [place] needs. Patient would be interested in returning to [facility] if medically appropriate following current hospitalization. If rehab not needed, patient agrees to accept [place] and would use [facility] (no agency preference if [facility] unable to accept). Discharge [facility] rehab v. Home with [place], anticipate potential oncology and/or palliative involvement pending pulmonology workup results. No discharge referrals sent 2/2 needs yet to be determined. CM left note for hospitalist, requesting PT/OT consults. Covering CM to follow.     LIBERTAD Martinez

## 2018-04-06 NOTE — CDMP QUERY
The diagnosis of Acute  On Chronic Hypoxic Respiratory Failure has been documented for your patient. Currently, the documentation does not meet the Acute Respiratory Criteria for this diagnosis, and may be challenged by an external reviewer. Could this be further clarified as:    =>Possible Mild Respiratory Distress superimposed on Chronic Hypoxic Respiratory Failure in the setting of COPD exacerbation and Pneumonia requiring an increase of supplemental O2.  =>Other Explanation of clinical findings  =>Unable to Determine (no explanation of clinical findings)    The medical record reflects the following clinical findings, treatment, and risk factors:    Risk Factors: COPD/Asthma Exac, Vinicio PNA, Lung Ca, O2 dep 3L NC    Clinical Indicators:  ED: No respiratory distress; Note pulse ox on 3 liters to be 86-90%. NP PN: No acute distress; No accessory muscle use  No ABG this admit   RR 16-24    Treatment: Increased oxygen to 4 liters to maint sats >92%    REFERENCE:   Acute Respiratory Failure indicators include:   - Respirations <12 or >25   - Air hunger   - Use of accessory muscles of respiration   - Inability to speak in full sentences   - Cyanosis                AND  - Pulse ox <90% RA or <95% on O2   - pH <7.35 or >7.45   - pO2 < 60 mm Hg (or 10mm below COPD patient's baseline)   - pCO2 >50mm Hg (or 10mm above COPD patient's baseline)     Please clarify and document your clinical opinion in the progress notes and discharge summary including the definitive and/or presumptive diagnosis, (suspected or probable), related to the above clinical findings.  Please include clinical findings supporting your diagnosis    Thank you,  Juan Escamilla, RN  [phone]

## 2018-04-07 LAB
ANION GAP SERPL CALC-SCNC: 8 MMOL/L (ref 5–15)
BACTERIA SPEC CULT: NORMAL
BASOPHILS # BLD: 0.1 K/UL (ref 0–0.1)
BASOPHILS NFR BLD: 1 % (ref 0–1)
BUN SERPL-MCNC: 12 MG/DL (ref 6–20)
BUN/CREAT SERPL: 11 (ref 12–20)
CALCIUM SERPL-MCNC: 8.9 MG/DL (ref 8.5–10.1)
CHLORIDE SERPL-SCNC: 109 MMOL/L (ref 97–108)
CO2 SERPL-SCNC: 25 MMOL/L (ref 21–32)
CREAT SERPL-MCNC: 1.11 MG/DL (ref 0.7–1.3)
DIFFERENTIAL METHOD BLD: ABNORMAL
EOSINOPHIL # BLD: 0.6 K/UL (ref 0–0.4)
EOSINOPHIL NFR BLD: 6 % (ref 0–7)
ERYTHROCYTE [DISTWIDTH] IN BLOOD BY AUTOMATED COUNT: 14.1 % (ref 11.5–14.5)
GLUCOSE SERPL-MCNC: 103 MG/DL (ref 65–100)
GRAM STN SPEC: NORMAL
HCT VFR BLD AUTO: 35.2 % (ref 36.6–50.3)
HGB BLD-MCNC: 11.5 G/DL (ref 12.1–17)
IMM GRANULOCYTES # BLD: 0 K/UL (ref 0–0.04)
IMM GRANULOCYTES NFR BLD AUTO: 0 % (ref 0–0.5)
LYMPHOCYTES # BLD: 1.4 K/UL (ref 0.8–3.5)
LYMPHOCYTES NFR BLD: 14 % (ref 12–49)
MAGNESIUM SERPL-MCNC: 1.9 MG/DL (ref 1.6–2.4)
MCH RBC QN AUTO: 30.3 PG (ref 26–34)
MCHC RBC AUTO-ENTMCNC: 32.7 G/DL (ref 30–36.5)
MCV RBC AUTO: 92.9 FL (ref 80–99)
MONOCYTES # BLD: 0.9 K/UL (ref 0–1)
MONOCYTES NFR BLD: 9 % (ref 5–13)
NEUTS SEG # BLD: 6.8 K/UL (ref 1.8–8)
NEUTS SEG NFR BLD: 69 % (ref 32–75)
NRBC # BLD: 0 K/UL (ref 0–0.01)
NRBC BLD-RTO: 0 PER 100 WBC
PLATELET # BLD AUTO: 208 K/UL (ref 150–400)
PMV BLD AUTO: 10.3 FL (ref 8.9–12.9)
POTASSIUM SERPL-SCNC: 4.3 MMOL/L (ref 3.5–5.1)
RBC # BLD AUTO: 3.79 M/UL (ref 4.1–5.7)
SERVICE CMNT-IMP: NORMAL
SODIUM SERPL-SCNC: 142 MMOL/L (ref 136–145)
WBC # BLD AUTO: 9.8 K/UL (ref 4.1–11.1)

## 2018-04-07 PROCEDURE — 74011250637 HC RX REV CODE- 250/637: Performed by: NURSE PRACTITIONER

## 2018-04-07 PROCEDURE — 83735 ASSAY OF MAGNESIUM: CPT | Performed by: INTERNAL MEDICINE

## 2018-04-07 PROCEDURE — 97116 GAIT TRAINING THERAPY: CPT

## 2018-04-07 PROCEDURE — 74011000258 HC RX REV CODE- 258: Performed by: INTERNAL MEDICINE

## 2018-04-07 PROCEDURE — G8980 MOBILITY D/C STATUS: HCPCS

## 2018-04-07 PROCEDURE — G8978 MOBILITY CURRENT STATUS: HCPCS

## 2018-04-07 PROCEDURE — 74011250636 HC RX REV CODE- 250/636: Performed by: INTERNAL MEDICINE

## 2018-04-07 PROCEDURE — 85025 COMPLETE CBC W/AUTO DIFF WBC: CPT | Performed by: INTERNAL MEDICINE

## 2018-04-07 PROCEDURE — 80048 BASIC METABOLIC PNL TOTAL CA: CPT | Performed by: INTERNAL MEDICINE

## 2018-04-07 PROCEDURE — 74011250637 HC RX REV CODE- 250/637: Performed by: INTERNAL MEDICINE

## 2018-04-07 PROCEDURE — 36415 COLL VENOUS BLD VENIPUNCTURE: CPT | Performed by: INTERNAL MEDICINE

## 2018-04-07 PROCEDURE — 74011250636 HC RX REV CODE- 250/636: Performed by: NURSE PRACTITIONER

## 2018-04-07 PROCEDURE — G8979 MOBILITY GOAL STATUS: HCPCS

## 2018-04-07 PROCEDURE — 77010033678 HC OXYGEN DAILY

## 2018-04-07 PROCEDURE — 65660000000 HC RM CCU STEPDOWN

## 2018-04-07 PROCEDURE — 97161 PT EVAL LOW COMPLEX 20 MIN: CPT

## 2018-04-07 RX ADMIN — PIPERACILLIN SODIUM AND TAZOBACTAM SODIUM 4.5 G: 4; .5 INJECTION, POWDER, LYOPHILIZED, FOR SOLUTION INTRAVENOUS at 19:27

## 2018-04-07 RX ADMIN — DRONABINOL 5 MG: 2.5 CAPSULE ORAL at 17:53

## 2018-04-07 RX ADMIN — PIPERACILLIN SODIUM AND TAZOBACTAM SODIUM 4.5 G: 4; .5 INJECTION, POWDER, LYOPHILIZED, FOR SOLUTION INTRAVENOUS at 12:25

## 2018-04-07 RX ADMIN — DRONABINOL 5 MG: 2.5 CAPSULE ORAL at 09:46

## 2018-04-07 RX ADMIN — GUAIFENESIN 600 MG: 600 TABLET, EXTENDED RELEASE ORAL at 09:46

## 2018-04-07 RX ADMIN — SODIUM CHLORIDE 50 ML/HR: 900 INJECTION, SOLUTION INTRAVENOUS at 22:23

## 2018-04-07 RX ADMIN — ENOXAPARIN SODIUM 40 MG: 40 INJECTION SUBCUTANEOUS at 22:24

## 2018-04-07 RX ADMIN — VANCOMYCIN HYDROCHLORIDE 1000 MG: 1 INJECTION, POWDER, LYOPHILIZED, FOR SOLUTION INTRAVENOUS at 19:27

## 2018-04-07 RX ADMIN — PIPERACILLIN SODIUM AND TAZOBACTAM SODIUM 4.5 G: 4; .5 INJECTION, POWDER, LYOPHILIZED, FOR SOLUTION INTRAVENOUS at 04:36

## 2018-04-07 RX ADMIN — BENZONATATE 100 MG: 100 CAPSULE ORAL at 09:54

## 2018-04-07 RX ADMIN — GUAIFENESIN 600 MG: 600 TABLET, EXTENDED RELEASE ORAL at 22:24

## 2018-04-07 RX ADMIN — SIMVASTATIN 40 MG: 40 TABLET, FILM COATED ORAL at 22:24

## 2018-04-07 RX ADMIN — VANCOMYCIN HYDROCHLORIDE 1000 MG: 1 INJECTION, POWDER, LYOPHILIZED, FOR SOLUTION INTRAVENOUS at 07:42

## 2018-04-07 RX ADMIN — ASPIRIN 81 MG: 81 TABLET, COATED ORAL at 09:46

## 2018-04-07 NOTE — PROGRESS NOTES
Hospitalist Progress Note  Jose Molina NP  Answering service: [phone] OR [phone] from in house phone  Cell: [phone]      Date of Service:  2018  NAME:  Paresh Bennett  :  1950  MRN:  [id number]      Admission Summary:   78year old male with history of COPD and Lung Cancer who presented with LLL Pneumonia   And concern for aspiration pneumonia. He was due to fu with Chest CT and possible FNA after discharge in February but that would be about now. He wants to follow with Dr. Yunior Diego and stop getting his Pulmonary care  At [facility]. Of note he is on nocturnal O2 at 2L and rarely uses oxygen in the day. Interval history / Subjective:     Mr. Dianna Bradley is doing much better today. His oxygen has been weaned 2L ( which is home dose). Plans for Bronchoscopy on Monday.       Assessment & Plan:     Possible Mild Respiratory Distress superimposed on Chronic Hypoxic Respiratory Failure in the setting of COPD exacerbation and Pneumonia  (POA)  - requiring increase in oxygen from baseline   -Chest CT with LLL infiltrate, worse than prior study  -Concern for aspiration , speech eval  -bronchoscopy planned for Monday   -Levaquin and Zosyn and VANC started  by Dr. Yunior Diego   -Spiriva and Duonebs  -Mucinex and Tessalon Pearls    Leukocytosis- repeat blood work in AM   WBC 9.8   monitor    History of Lung cancer and s/p Lobectomy    Underweight  Nutrition consult for supplements  Adding Marinol for appetite stimulation per Nutrition reccs    Hyperlipidemia  Zocor    Chronic Back Pain  Takes Oxycodone 20 mg q 6 hours at home    Code status: Full  DVT prophylaxis: Lovenox    Care Plan discussed with: Patient/Family and Nurse  Disposition: Home w/Family and TBD     Plans for discharge on Monday [address] [facility] [facility] Problems  Date Reviewed: 2018          Codes Class Noted POA    * (Principal)Aspiration pneumonia ([facility] [address] ICD-10-CM: J69.0  ICD-9-CM: 507.0  4/4/2018 Unknown                Review of Systems:   A comprehensive review of systems was negative except for that written in the HPI. Vital Signs:    Last 24hrs VS reviewed since prior progress note. Most recent are:  Visit Vitals    /64 (BP 1 Location: Right arm, BP Patient Position: At rest)    Pulse 77    Temp 97.7 °F (36.5 °C)    Resp 18    Ht 5' 11\" (1.803 m)    Wt 61.8 kg (136 lb 4.8 oz)    SpO2 96%    BMI 19.01 kg/m2         Intake/Output Summary (Last 24 hours) at 04/07/18 1718  Last data filed at 04/07/18 1225   Gross per 24 hour   Intake              720 ml   Output             2875 ml   Net            -2155 ml        Physical Examination:             Constitutional:  No acute distress, cooperative, pleasant, thin   ENT:  Oral mucous moist, oropharynx benign. Neck supple,    Resp:  Diminished bases blt, LLL worse and rhonchi noted, No accessory muscle use   CV:  Regular rhythm, normal rate, no murmurs, gallops, rubs    GI:  Soft, non distended, non tender. normoactive bowel sounds, no hepatosplenomegaly     Musculoskeletal:  No edema, warm, 2+ pulses throughout    Neurologic:  Moves all extremities. AAOx3     Psych:  Good insight, Not anxious nor agitated.   Skin:  Good turgor, no rashes or ulcers  Hematologic/Lymphatic/Immunlogic:  No jaundice nor lymph node swelling       Data Review:    Review and/or order of clinical lab test  Review and/or order of tests in the radiology section of CPT      Labs:     Recent Labs      04/07/18   0423  04/05/18   0357   WBC  9.8  22.2*   HGB  11.5*  12.1   HCT  35.2*  36.5*   PLT  208  237     Recent Labs      04/07/18   0423  04/06/18   0550  04/05/18   0357   NA  142  139  141   K  4.3  3.9  3.8   CL  109*  107  107   CO2  25  24  24   BUN  12  13  20   CREA  1.11  1.03  1.24   GLU  103*  95  112*   CA  8.9  8.7  8.3*   MG  1.9   --    --      No results for input(s): SGOT, GPT, ALT, AP, TBIL, TBILI, TP, ALB, GLOB, GGT, AML, LPSE in the last 72 hours. No lab exists for component: AMYP, HLPSE  No results for input(s): INR, PTP, APTT in the last 72 hours. No lab exists for component: INREXT, INREXT   No results for input(s): FE, TIBC, PSAT, FERR in the last 72 hours. No results found for: FOL, RBCF   No results for input(s): PH, PCO2, PO2 in the last 72 hours. No results for input(s): CPK, CKNDX, TROIQ in the last 72 hours. No lab exists for component: CPKMB  Lab Results   Component Value Date/Time    Cholesterol, total 106 02/20/2018 04:54 AM    HDL Cholesterol 45 02/20/2018 04:54 AM    LDL, calculated 46 02/20/2018 04:54 AM    Triglyceride 75 02/20/2018 04:54 AM    CHOL/HDL Ratio 2.4 02/20/2018 04:54 AM     Lab Results   Component Value Date/Time    Glucose (POC) 100 09/14/2015 12:14 PM     Lab Results   Component Value Date/Time    Color YELLOW/STRAW 11/28/2016 04:44 PM    Appearance CLEAR 11/28/2016 04:44 PM    Specific gravity 1.020 11/28/2016 04:44 PM    pH (UA) 5.0 11/28/2016 04:44 PM    Protein TRACE (A) 11/28/2016 04:44 PM    Glucose NEGATIVE  11/28/2016 04:44 PM    Ketone TRACE (A) 11/28/2016 04:44 PM    Bilirubin NEGATIVE  11/28/2016 04:44 PM    Urobilinogen 0.2 11/28/2016 04:44 PM    Nitrites NEGATIVE  11/28/2016 04:44 PM    Leukocyte Esterase TRACE (A) 11/28/2016 04:44 PM    Epithelial cells FEW 11/28/2016 04:44 PM    Bacteria NEGATIVE  11/28/2016 04:44 PM    WBC 5-10 11/28/2016 04:44 PM    RBC 0-5 11/28/2016 04:44 PM         Medications Reviewed:     Current Facility-Administered Medications   Medication Dose Route Frequency    [START ON 4/8/2018] Vancomycin immediately before 0700 dose on 4/8. Thanks!    Other ONCE    albuterol-ipratropium (DUO-NEB) 2.5 MG-0.5 MG/3 ML  3 mL Nebulization Q4H PRN    vancomycin (VANCOCIN) 1,000 mg in 0.9% sodium chloride (MBP/ADV) 250 mL  1,000 mg IntraVENous Q12H    dronabinol (MARINOL) capsule 5 mg  5 mg Oral BID    Vancomycin Pharmacy to Dose   Other Rx Dosing/Monitoring    guaiFENesin ER (MUCINEX) tablet 600 mg  600 mg Oral Q12H    benzonatate (TESSALON) capsule 100 mg  100 mg Oral TID PRN    aspirin delayed-release tablet 81 mg  81 mg Oral DAILY    oxyCODONE IR (ROXICODONE) tablet 5 mg  5 mg Oral Q6H PRN    simvastatin (ZOCOR) tablet 40 mg  40 mg Oral QHS    sodium chloride (NS) flush 5-10 mL  5-10 mL IntraVENous Q8H    sodium chloride (NS) flush 5-10 mL  5-10 mL IntraVENous PRN    levoFLOXacin (LEVAQUIN) 750 mg in D5W IVPB  750 mg IntraVENous Q24H    acetaminophen (TYLENOL) tablet 650 mg  650 mg Oral Q4H PRN    enoxaparin (LOVENOX) injection 40 mg  40 mg SubCUTAneous Q24H    0.9% sodium chloride infusion  50 mL/hr IntraVENous CONTINUOUS    piperacillin-tazobactam (ZOSYN) 4.5 g in 0.9% sodium chloride (MBP/ADV) 100 mL  4.5 g IntraVENous Q8H     ______________________________________________________________________  EXPECTED LENGTH OF STAY: 3d 9h  ACTUAL LENGTH OF STAY:          3                 Calvin Shelton, NP

## 2018-04-07 NOTE — PROGRESS NOTES
2017:  Patient's continuous pulse ox continuing to alarm with sats at 87%. RN in to assess. Pulse ox from dynamap reads 96%. Called RT for new pulse ox machine. Bedside shift change report given to Salinas Wall (oncoming nurse) by Vanessa Giron RN (offgoing nurse). Report included the following information SBAR, Intake/Output and MAR.

## 2018-04-07 NOTE — PROGRESS NOTES
physical Therapy EVALUATION/DISCHARGE  Patient: Ki Roberts (33 y.o. male)  Date: 4/7/2018  Primary Diagnosis: Abnormal chest CT [R93.8]  Procedure(s) (LRB):  BRONCHOSCOPY NAVIGATIONAL (N/A)     Precautions:     ASSESSMENT :  Based on the objective data described below, the patient presents with baseline functional mobility as he was able to ambulate the unit without LOB or instability on 2 L O2. Pt reports he has home O2, however typically only wears it at night. He is physically at his baseline and encouraged to continue mobilizing 3x/day and up in chair 3x/day with nursing supervision to maintain his functional independence. From a PT standpoint he is cleared for home with no further skilled needs. Will sign off at this this. Skilled physical therapy is not indicated at this time. PLAN :  Discharge Recommendations: None  Further Equipment Recommendations for Discharge: none     SUBJECTIVE:   Patient stated I would like to get up .     OBJECTIVE DATA SUMMARY:   HISTORY:    Past Medical History:   Diagnosis Date    Asthma     Cancer ([facility] [address]     Lung Cancer    Chronic obstructive pulmonary disease (HCC)     Chronic pain     High cholesterol     Lung cancer (HCC)      Past Surgical History:   Procedure Laterality Date    HX LOBECTOMY      Right lung    HX OTHER SURGICAL      Partial right lung removal     Prior Level of Function/Home Situation: independent  Personal factors and/or comorbidities impacting plan of care:     Home Situation  Home Environment: Private residence  # Steps to Enter: 0  One/Two Story Residence: One story  Living Alone: No  Support Systems: Family member(s)  Patient Expects to be Discharged to[de-identified] Private residence  Current DME Used/Available at Home: Oxygen, portable    EXAMINATION/PRESENTATION/DECISION MAKING:   Critical Behavior:  Neurologic State: Alert, Appropriate for age  Orientation Level: Oriented X4  Cognition: Appropriate decision making, Appropriate for age attention/concentration, Appropriate safety awareness, Follows commands  Safety/Judgement: Awareness of environment  Hearing: Auditory  Auditory Impairment: None  Skin:  Appears intact  Range Of Motion:  AROM: Within functional limits                       Strength:    Strength: Within functional limits                    Tone & Sensation:   Tone: Normal              Sensation: Intact               Coordination:  Coordination: Within functional limits  Functional Mobility:  Bed Mobility:     Supine to Sit: Independent  Sit to Supine: Independent     Transfers:  Sit to Stand: Independent  Stand to Sit: Independent                       Balance:   Sitting: Intact  Standing: Intact  Ambulation/Gait Training:     Gait Description (WDL): Within defined limits (Pt ambulated halls without AD, no LOB or instability)       Functional Measure:  Tinetti test:    Sitting Balance: 1  Arises: 1  Attempts to Rise: 2  Immediate Standing Balance: 2  Standing Balance: 2  Nudged: 2  Eyes Closed: 1  Turn 360 Degrees - Continuous/Discontinuous: 1  Turn 360 Degrees - Steady/Unsteady: 1  Sitting Down: 1  Balance Score: 14  Indication of Gait: 1  R Step Length/Height: 1  L Step Length/Height: 1  R Foot Clearance: 1  L Foot Clearance: 1  Step Symmetry: 1  Step Continuity: 1  Path: 2  Trunk: 2  Walking Time: 1  Gait Score: 12  Total Score: 26       Tinetti Test and G-code impairment scale:  Percentage of Impairment CH    0%   CI    1-19% CJ    20-39% CK    40-59% CL    60-79% CM    80-99% CN     100%   Tinetti  Score 0-28 28 23-27 17-22 12-16 6-11 1-5 0       Tinetti Tool Score Risk of Falls  <19 = High Fall Risk  19-24 = Moderate Fall Risk  25-28 = Low Fall Risk  Tinetti ME. Performance-Oriented Assessment of Mobility Problems in Elderly Patients. [address]; [id number].  (Scoring Description: PT Bulletin Feb. 10, 1993)    Older adults: Aidan Pedraza et al, 2009; n = [address] elderly evaluated with ABC, ARMEN, ADL, and IADL)  · Mean ARMEN score for males aged 69-68 years = 26.21(3.40)  · Mean ARMEN score for females age 69-68 years = 25.16(4.30)  · Mean ARMEN score for males over 80 years = 23.29(6.02)  · Mean ARMEN score for females over 80 years = 17.20(8.32)       G codes: In compliance with CMSs Claims Based Outcome Reporting, the following G-code set was chosen for this patient based on their primary functional limitation being treated: The outcome measure chosen to determine the severity of the functional limitation was the Tinetti with a score of 26/28 which was correlated with the impairment scale. ? Mobility - Walking and Moving Around:     - CURRENT STATUS: CI - 1%-19% impaired, limited or restricted    - GOAL STATUS: CI - 1%-19% impaired, limited or restricted    - D/C STATUS:  CI - 1%-19% impaired, limited or restricted        Physical Therapy Evaluation Charge Determination   History Examination Presentation Decision-Making   MEDIUM  Complexity : 1-2 comorbidities / personal factors will impact the outcome/ POC  LOW Complexity : 1-2 Standardized tests and measures addressing body structure, function, activity limitation and / or participation in recreation  LOW Complexity : Stable, uncomplicated  Other outcome measures Tinetti  LOW       Based on the above components, the patient evaluation is determined to be of the following complexity level: LOW     Pain:  Pain Scale 1: Numeric (0 - 10)  Pain Intensity 1: 0     Activity Tolerance:   No apparent distress   Please refer to the flowsheet for vital signs taken during this treatment. After treatment:   []   Patient left in no apparent distress sitting up in chair  [x]   Patient left in no apparent distress in bed  [x]   Call bell left within reach  [x]   Nursing notified  []   Caregiver present  []   Bed alarm activated    COMMUNICATION/EDUCATION:   Communication/Collaboration:  [x]   Fall prevention education was provided and the patient/caregiver indicated understanding.   [x] Patient/family have participated as able and agree with findings and recommendations. []   Patient is unable to participate in plan of care at this time.   Findings and recommendations were discussed with: Registered Nurse    Thank you for this referral.  Cynthia Meyer, PT   Time Calculation: 15 mins

## 2018-04-07 NOTE — PROGRESS NOTES
Bedside shift change report given to Nicole Hardy RN (oncoming nurse) by Delmy Jenkins RN (offgoing nurse). Report included the following information SBAR, Kardex, Intake/Output and MAR.

## 2018-04-08 LAB
ANION GAP SERPL CALC-SCNC: 6 MMOL/L (ref 5–15)
BUN SERPL-MCNC: 14 MG/DL (ref 6–20)
BUN/CREAT SERPL: 13 (ref 12–20)
CALCIUM SERPL-MCNC: 8.9 MG/DL (ref 8.5–10.1)
CHLORIDE SERPL-SCNC: 108 MMOL/L (ref 97–108)
CO2 SERPL-SCNC: 26 MMOL/L (ref 21–32)
CREAT SERPL-MCNC: 1.09 MG/DL (ref 0.7–1.3)
DATE LAST DOSE: ABNORMAL
GLUCOSE SERPL-MCNC: 99 MG/DL (ref 65–100)
POTASSIUM SERPL-SCNC: 4 MMOL/L (ref 3.5–5.1)
REPORTED DOSE,DOSE: 1000 UNITS
REPORTED DOSE/TIME,TMG: 1900
SODIUM SERPL-SCNC: 140 MMOL/L (ref 136–145)
VANCOMYCIN TROUGH SERPL-MCNC: 13.7 UG/ML (ref 5–10)

## 2018-04-08 PROCEDURE — 36415 COLL VENOUS BLD VENIPUNCTURE: CPT | Performed by: INTERNAL MEDICINE

## 2018-04-08 PROCEDURE — 74011250637 HC RX REV CODE- 250/637: Performed by: INTERNAL MEDICINE

## 2018-04-08 PROCEDURE — 80202 ASSAY OF VANCOMYCIN: CPT | Performed by: INTERNAL MEDICINE

## 2018-04-08 PROCEDURE — 74011250636 HC RX REV CODE- 250/636: Performed by: INTERNAL MEDICINE

## 2018-04-08 PROCEDURE — 74011000258 HC RX REV CODE- 258: Performed by: INTERNAL MEDICINE

## 2018-04-08 PROCEDURE — G8988 SELF CARE GOAL STATUS: HCPCS

## 2018-04-08 PROCEDURE — 80048 BASIC METABOLIC PNL TOTAL CA: CPT | Performed by: INTERNAL MEDICINE

## 2018-04-08 PROCEDURE — 74011250637 HC RX REV CODE- 250/637: Performed by: NURSE PRACTITIONER

## 2018-04-08 PROCEDURE — G8987 SELF CARE CURRENT STATUS: HCPCS

## 2018-04-08 PROCEDURE — G8989 SELF CARE D/C STATUS: HCPCS

## 2018-04-08 PROCEDURE — 65660000000 HC RM CCU STEPDOWN

## 2018-04-08 PROCEDURE — 97165 OT EVAL LOW COMPLEX 30 MIN: CPT

## 2018-04-08 RX ADMIN — VANCOMYCIN HYDROCHLORIDE 1000 MG: 1 INJECTION, POWDER, LYOPHILIZED, FOR SOLUTION INTRAVENOUS at 18:58

## 2018-04-08 RX ADMIN — PIPERACILLIN SODIUM AND TAZOBACTAM SODIUM 4.5 G: 4; .5 INJECTION, POWDER, LYOPHILIZED, FOR SOLUTION INTRAVENOUS at 06:37

## 2018-04-08 RX ADMIN — PIPERACILLIN SODIUM AND TAZOBACTAM SODIUM 4.5 G: 4; .5 INJECTION, POWDER, LYOPHILIZED, FOR SOLUTION INTRAVENOUS at 19:24

## 2018-04-08 RX ADMIN — DRONABINOL 5 MG: 2.5 CAPSULE ORAL at 18:58

## 2018-04-08 RX ADMIN — LEVOFLOXACIN 750 MG: 5 INJECTION, SOLUTION INTRAVENOUS at 01:42

## 2018-04-08 RX ADMIN — DRONABINOL 5 MG: 2.5 CAPSULE ORAL at 09:27

## 2018-04-08 RX ADMIN — PIPERACILLIN SODIUM AND TAZOBACTAM SODIUM 4.5 G: 4; .5 INJECTION, POWDER, LYOPHILIZED, FOR SOLUTION INTRAVENOUS at 12:06

## 2018-04-08 RX ADMIN — ASPIRIN 81 MG: 81 TABLET, COATED ORAL at 09:27

## 2018-04-08 RX ADMIN — GUAIFENESIN 600 MG: 600 TABLET, EXTENDED RELEASE ORAL at 09:27

## 2018-04-08 RX ADMIN — GUAIFENESIN 600 MG: 600 TABLET, EXTENDED RELEASE ORAL at 22:11

## 2018-04-08 RX ADMIN — VANCOMYCIN HYDROCHLORIDE 1000 MG: 1 INJECTION, POWDER, LYOPHILIZED, FOR SOLUTION INTRAVENOUS at 06:26

## 2018-04-08 RX ADMIN — SIMVASTATIN 40 MG: 40 TABLET, FILM COATED ORAL at 22:11

## 2018-04-08 RX ADMIN — ENOXAPARIN SODIUM 40 MG: 40 INJECTION SUBCUTANEOUS at 22:11

## 2018-04-08 RX ADMIN — LEVOFLOXACIN 750 MG: 5 INJECTION, SOLUTION INTRAVENOUS at 23:27

## 2018-04-08 NOTE — PROGRESS NOTES
Bedside shift change report given to Fletcher Anand RN (oncoming nurse) by Talha Barnes RN (offgoing nurse). Report included the following information SBAR, Kardex, Intake/Output and MAR.

## 2018-04-08 NOTE — PROGRESS NOTES
Day #4 of Vancomycin  Indication:  sepsis of unknown etiology - aspiration PNA  Current regimen: 1000 mg IV Q12H  Abx regimen:  Levaquin, Zosyn, Vancomycin  Concomitant nephrotoxic drugs (requires more frequent monitoring): None    Recent Labs      18   0626  18   0423  18   0550   WBC   --   9.8   --    CREA  1.09  1.11  1.03   BUN  14  12  13     Est CrCl: 58.3 ml/min; UO: >1 ml/kg/hr  Temp (24hrs), Av °F (36.7 °C), Min:97.7 °F (36.5 °C), Max:98.4 °F (36.9 °C)    Cultures:    blood - NGTD   resp - normal hever, final    Goal trough = 15 - 20 mcg/mL    Recent trough history (date/time/level/dose/action taken):   = 13.7 mcg/mL (~11 hours post dose on 1000mg Q12h)    Plan: Trough is slightly below goal but will continue current regimen for now as patient is improving.

## 2018-04-08 NOTE — PROGRESS NOTES
Hospitalist Progress Note  Blank Ramirez NP  Answering service: [phone] OR [phone] from in house phone  Cell: [phone]      Date of Service:  2018  NAME:  Michelle Albert  :  1950  MRN:  [id number]      Admission Summary:   78year old male with history of COPD and Lung Cancer who presented with LLL Pneumonia   And concern for aspiration pneumonia. He was due to fu with Chest CT and possible FNA after discharge in February but that would be about now. He wants to follow with Dr. Julia Jefferson and stop getting his Pulmonary care  At [address] [place] [address]. Of note he is on nocturnal O2 at 2L and rarely uses oxygen in the day. Interval history / Subjective:     Mr. Veronica Logan continues to do well today. Is taking multiple walks in hallway daily. His oxygen has been weaned 2L ( which is home dose). Plans for Bronchoscopy on Monday then likely discharge.       Assessment & Plan:     Possible Mild Respiratory Distress superimposed on Chronic Hypoxic Respiratory Failure in the setting of COPD exacerbation and Pneumonia  (POA)  - requiring increase in oxygen from baseline   -Chest CT with LLL infiltrate, worse than prior study  -Concern for aspiration , speech eval  -bronchoscopy planned for Monday   -Levaquin and Zosyn and VANC started  by Dr. Julia Jefferson , will switch to oral abx tomorrow after discussion with pulmonary   -Spiriva and Gaviota  -Mucinex and Tessalon Pearls    Leukocytosis- repeat blood work in AM   WBC 9.8   monitor    History of Lung cancer and s/p Lobectomy    Underweight  Nutrition consult for supplements  Adding Marinol for appetite stimulation per Nutrition reccs    Hyperlipidemia  Zocor    Chronic Back Pain  Takes Oxycodone 20 mg q 6 hours at home    Code status: Full  DVT prophylaxis: Lovenox    Care Plan discussed with: Patient/Family and Nurse  Disposition: Home w/Family and TBD     Plans for discharge on Monday [facility]  Date Reviewed: 4/4/2018          Codes Class Noted POA    * (Principal)Aspiration pneumonia [facility]) ICD-10-CM: J69.0  ICD-9-CM: 507.0  4/4/2018 Unknown                Review of Systems:   A comprehensive review of systems was negative except for that written in the HPI. + productive, coarse cough     Vital Signs:    Last 24hrs VS reviewed since prior progress note. Most recent are:  Visit Vitals    /74 (BP 1 Location: Right arm, BP Patient Position: At rest)    Pulse 77    Temp 98.3 °F (36.8 °C)    Resp 18    Ht 5' 11\" (1.803 m)    Wt 61.6 kg (135 lb 14.4 oz)    SpO2 95%    BMI 18.95 kg/m2         Intake/Output Summary (Last 24 hours) at 04/08/18 1538  Last data filed at 04/08/18 1306   Gross per 24 hour   Intake                0 ml   Output             2550 ml   Net            -2550 ml        Physical Examination:             Constitutional:  No acute distress, cooperative, pleasant, thin   ENT:  Oral mucous moist, oropharynx benign. Neck supple,    Resp:  Diminished bases blt, LLL worse and rhonchi noted, No accessory muscle use   CV:  Regular rhythm, normal rate, no murmurs, gallops, rubs    GI:  Soft, non distended, non tender. normoactive bowel sounds, no hepatosplenomegaly     Musculoskeletal:  No edema, warm, 2+ pulses throughout    Neurologic:  Moves all extremities. AAOx3     Psych:  Good insight, Not anxious nor agitated.   Skin:  Good turgor, no rashes or ulcers  Hematologic/Lymphatic/Immunlogic:  No jaundice nor lymph node swelling       Data Review:    Review and/or order of clinical lab test  Review and/or order of tests in the radiology section of CPT      Labs:     Recent Labs      04/07/18   0423   WBC  9.8   HGB  11.5*   HCT  35.2*   PLT  208     Recent Labs      04/08/18   0626  04/07/18   0423  04/06/18   0550   NA  140  142  139   K  4.0  4.3  3.9   CL  108  109*  107   CO2  26  25  24   BUN  14  12  13   CREA  1.09  1.11  1.03   GLU  99  103*  95   CA  8.9 8.9  8.7   MG   --   1.9   --      No results for input(s): SGOT, GPT, ALT, AP, TBIL, TBILI, TP, ALB, GLOB, GGT, AML, LPSE in the last 72 hours. No lab exists for component: AMYP, HLPSE  No results for input(s): INR, PTP, APTT in the last 72 hours. No lab exists for component: INREXT, INREXT   No results for input(s): FE, TIBC, PSAT, FERR in the last 72 hours. No results found for: FOL, RBCF   No results for input(s): PH, PCO2, PO2 in the last 72 hours. No results for input(s): CPK, CKNDX, TROIQ in the last 72 hours.     No lab exists for component: CPKMB  Lab Results   Component Value Date/Time    Cholesterol, total 106 02/20/2018 04:54 AM    HDL Cholesterol 45 02/20/2018 04:54 AM    LDL, calculated 46 02/20/2018 04:54 AM    Triglyceride 75 02/20/2018 04:54 AM    CHOL/HDL Ratio 2.4 02/20/2018 04:54 AM     Lab Results   Component Value Date/Time    Glucose (POC) 100 09/14/2015 12:14 PM     Lab Results   Component Value Date/Time    Color YELLOW/STRAW 11/28/2016 04:44 PM    Appearance CLEAR 11/28/2016 04:44 PM    Specific gravity 1.020 11/28/2016 04:44 PM    pH (UA) 5.0 11/28/2016 04:44 PM    Protein TRACE (A) 11/28/2016 04:44 PM    Glucose NEGATIVE  11/28/2016 04:44 PM    Ketone TRACE (A) 11/28/2016 04:44 PM    Bilirubin NEGATIVE  11/28/2016 04:44 PM    Urobilinogen 0.2 11/28/2016 04:44 PM    Nitrites NEGATIVE  11/28/2016 04:44 PM    Leukocyte Esterase TRACE (A) 11/28/2016 04:44 PM    Epithelial cells FEW 11/28/2016 04:44 PM    Bacteria NEGATIVE  11/28/2016 04:44 PM    WBC 5-10 11/28/2016 04:44 PM    RBC 0-5 11/28/2016 04:44 PM         Medications Reviewed:     Current Facility-Administered Medications   Medication Dose Route Frequency    albuterol-ipratropium (DUO-NEB) 2.5 MG-0.5 MG/3 ML  3 mL Nebulization Q4H PRN    vancomycin (VANCOCIN) 1,000 mg in 0.9% sodium chloride (MBP/ADV) 250 mL  1,000 mg IntraVENous Q12H    dronabinol (MARINOL) capsule 5 mg  5 mg Oral BID    Vancomycin Pharmacy to Dose   Other Rx Dosing/Monitoring    guaiFENesin ER (MUCINEX) tablet 600 mg  600 mg Oral Q12H    benzonatate (TESSALON) capsule 100 mg  100 mg Oral TID PRN    aspirin delayed-release tablet 81 mg  81 mg Oral DAILY    oxyCODONE IR (ROXICODONE) tablet 5 mg  5 mg Oral Q6H PRN    simvastatin (ZOCOR) tablet 40 mg  40 mg Oral QHS    sodium chloride (NS) flush 5-10 mL  5-10 mL IntraVENous Q8H    sodium chloride (NS) flush 5-10 mL  5-10 mL IntraVENous PRN    levoFLOXacin (LEVAQUIN) 750 mg in D5W IVPB  750 mg IntraVENous Q24H    acetaminophen (TYLENOL) tablet 650 mg  650 mg Oral Q4H PRN    enoxaparin (LOVENOX) injection 40 mg  40 mg SubCUTAneous Q24H    0.9% sodium chloride infusion  50 mL/hr IntraVENous CONTINUOUS    piperacillin-tazobactam (ZOSYN) 4.5 g in 0.9% sodium chloride (MBP/ADV) 100 mL  4.5 g IntraVENous Q8H     ______________________________________________________________________  EXPECTED LENGTH OF STAY: 3d 9h  ACTUAL LENGTH OF STAY:          Naresh MedicJEROD cornelius

## 2018-04-08 NOTE — PROGRESS NOTES
Occupational Therapy EVALUATION/discharge  Patient: Michelle Albert (45 y.o. male)  Date: 4/8/2018  Primary Diagnosis: Abnormal chest CT [R93.8]  Procedure(s) (LRB):  BRONCHOSCOPY NAVIGATIONAL (N/A)     Precautions: none      ASSESSMENT:   Based on the objective data described below, the patient presents with no functional impairment. Patient currently performs self-care and mobility tasks independently. Patient self-monitors SPO2 with handheld pulsox and reports removing O2 NC today with SPO2 stable. Patient removed NC during evaluation for mobility to bathroom. With activity, SPO2 91% on RA, HR 98 bpm.  2L O2 returned, SPO2 95%, HR 77 bpm.  Nursing notified. Patient with no additional occupational therapy needs. Recommend discharge home when medically stable. Further skilled acute occupational therapy is not indicated at this time. Discharge Recommendations: home with family support  Further Equipment Recommendations for Discharge: none     SUBJECTIVE:   Patient stated I'm doing just fine.     OBJECTIVE DATA SUMMARY:   HISTORY:   Past Medical History:   Diagnosis Date    Asthma     Cancer ([facility] [address]     Lung Cancer    Chronic obstructive pulmonary disease (HCC)     Chronic pain     High cholesterol     Lung cancer (HCC)      Past Surgical History:   Procedure Laterality Date    HX LOBECTOMY      Right lung    HX OTHER SURGICAL      Partial right lung removal       Prior Level of Function/Environment/Context: indep with ADLs and IADLs, lives with son  Expanded or extensive additional review of patient history:     Home Situation  Home Environment: Private residence  # Steps to Enter: 0  One/Two Story Residence: One story  Living Alone: No  Support Systems: Family member(s)  Patient Expects to be Discharged to[de-identified] Private residence  Current DME Used/Available at Home: Oxygen, portable  []  Right hand dominant   []  Left hand dominant    EXAMINATION OF PERFORMANCE DEFICITS:  Cognitive/Behavioral Status:  Neurologic State: Alert  Orientation Level: Oriented X4  Cognition: Appropriate decision making; Appropriate for age attention/concentration; Appropriate safety awareness  Perception: Appears intact  Perseveration: No perseveration noted  Safety/Judgement: Awareness of environment; Insight into deficits    Skin: visible skin appears intact    Edema: none noted    Hearing: Auditory  Auditory Impairment: None    Vision/Perceptual:                           Acuity: Within Defined Limits;Able to read clock/calendar on wall without difficulty; Able to read employee name badge without difficulty         Range of Motion:    AROM: Within functional limits  PROM: Within functional limits                      Strength:    Strength: Within functional limits                Coordination:  Coordination: Within functional limits  Fine Motor Skills-Upper: Left Intact; Right Intact    Gross Motor Skills-Upper: Left Intact; Right Intact    Tone & Sensation:    Tone: Normal  Sensation: Intact                      Balance:  Sitting: Intact  Standing: Intact; Without support    Functional Mobility and Transfers for ADLs:  Bed Mobility:  Rolling: Independent  Supine to Sit: Independent  Sit to Supine: Independent  Scooting: Independent    Transfers:  Sit to Stand: Independent  Stand to Sit: Independent  Toilet Transfer : Independent (on standard toilet)    ADL Assessment:  Feeding: Independent    Oral Facial Hygiene/Grooming: Independent (standing at sink)    Bathing: Independent (inferred)    Upper Body Dressing: Independent (inferred)    Lower Body Dressing: Independent (dons/ doffs socks sitting EOB)    Toileting: Independent (inferred)                ADL Intervention and task modifications:              Cognitive Retraining  Safety/Judgement: Awareness of environment; Insight into deficits      Functional Measure:  Barthel Index:    Bathin  Bladder: 10  Bowels: 10  Groomin  Dressing: 10  Feeding: 10  Mobility: 15  Stairs: 10  Toilet Use: 10  Transfer (Bed to Chair and Back): 15  Total: 100       Barthel and G-code impairment scale:  Percentage of impairment CH  0% CI  1-19% CJ  20-39% CK  40-59% CL  60-79% CM  80-99% CN  100%   Barthel Score 0-100 100 99-80 79-60 59-40 20-39 1-19   0   Barthel Score 0-20 20 17-19 13-16 9-12 5-8 1-4 0      The Barthel ADL Index: Guidelines  1. The index should be used as a record of what a patient does, not as a record of what a patient could do. 2. The main aim is to establish degree of independence from any help, physical or verbal, however minor and for whatever reason. 3. The need for supervision renders the patient not independent. 4. A patient's performance should be established using the best available evidence. Asking the patient, friends/relatives and nurses are the usual sources, but direct observation and common sense are also important. However direct testing is not needed. 5. Usually the patient's performance over the preceding 24-48 hours is important, but occasionally longer periods will be relevant. 6. Middle categories imply that the patient supplies over 50 per cent of the effort. 7. Use of aids to be independent is allowed. Lesly Trejo., Barthel, DSunnyW. (1965). Functional evaluation: the Barthel Index. [address] [facility] (14)2. [facility] evita MARIA ISABEL KebedeF, Jason Saldivar., Marlee Serrano., [place], [address] [place] [address] (1999). Measuring the change indisability after inpatient rehabilitation; comparison of the responsiveness of the Barthel Index and Functional [place] Measure. Journal of Neurology, Neurosurgery, and Psychiatry, 66(4), 394-556. Indira Castillo, N.J.A, JUN Allen.MILAN, & Nehal Lee, M.A. (2004.) Assessment of post-stroke quality of life in cost-effectiveness studies: The usefulness of the Barthel Index and the EuroQoL-5D. Quality of Life Research, 13, 103-88       G codes:   In compliance with CMSs Claims Based Outcome Reporting, the following G-code set was chosen for this patient based on their primary functional limitation being treated: The outcome measure chosen to determine the severity of the functional limitation was the Barthel Index with a score of 100/100 which was correlated with the impairment scale. ? Self Care:     - CURRENT STATUS: CH - 0% impaired, limited or restricted    - GOAL STATUS: CH - 0% impaired, limited or restricted    - D/C STATUS:  CH - 0% impaired, limited or restricted     Occupational Therapy Evaluation Charge Determination   History Examination Decision-Making   LOW Complexity : Brief history review  LOW Complexity : 1-3 performance deficits relating to physical, cognitive , or psychosocial skils that result in activity limitations and / or participation restrictions  LOW Complexity : No comorbidities that affect functional and no verbal or physical assistance needed to complete eval tasks       Based on the above components, the patient evaluation is determined to be of the following complexity level: LOW   Pain:  Pain Scale 1: Numeric (0 - 10)  Pain Intensity 1: 0              Activity Tolerance:   Vitals:    04/08/18 1222 04/08/18 1225   Pulse: 98 77   SpO2: 91% with activity on RA 95% post activity on 2L O2 NC     Please refer to the flowsheet for vital signs taken during this treatment. After treatment:   []  Patient left in no apparent distress sitting up in chair  [x]  Patient left in no apparent distress in bed  [x]  Call bell left within reach  [x]  Nursing notified  []  Caregiver present  []  Bed alarm activated    COMMUNICATION/EDUCATION:   Communication/Collaboration:  [x]      Home safety education was provided and the patient/caregiver indicated understanding. [x]      Patient/family have participated as able and agree with findings and recommendations. []      Patient is unable to participate in plan of care at this time.   Findings and recommendations were discussed with: Registered Nurse    Александр Rollins OT  Time Calculation: 15 mins

## 2018-04-09 ENCOUNTER — ANESTHESIA (OUTPATIENT)
Dept: ENDOSCOPY | Age: 68
DRG: 167 | End: 2018-04-09
Payer: MEDICARE

## 2018-04-09 LAB
ANION GAP SERPL CALC-SCNC: 10 MMOL/L (ref 5–15)
APPEARANCE FLD: CLEAR
BACTERIA SPEC CULT: NORMAL
BUN SERPL-MCNC: 13 MG/DL (ref 6–20)
BUN/CREAT SERPL: 11 (ref 12–20)
CALCIUM SERPL-MCNC: 9 MG/DL (ref 8.5–10.1)
CHLORIDE SERPL-SCNC: 108 MMOL/L (ref 97–108)
CO2 SERPL-SCNC: 24 MMOL/L (ref 21–32)
COLOR FLD: COLORLESS
CREAT SERPL-MCNC: 1.14 MG/DL (ref 0.7–1.3)
GLUCOSE SERPL-MCNC: 102 MG/DL (ref 65–100)
NUC CELL # FLD: 4 /CU MM
POTASSIUM SERPL-SCNC: 3.9 MMOL/L (ref 3.5–5.1)
RBC # FLD: 19 /CU MM
SERVICE CMNT-IMP: NORMAL
SODIUM SERPL-SCNC: 142 MMOL/L (ref 136–145)
SPECIMEN SOURCE FLD: ABNORMAL

## 2018-04-09 PROCEDURE — 77030008684 HC TU ET CUF COVD -B: Performed by: ANESTHESIOLOGY

## 2018-04-09 PROCEDURE — 76040000020: Performed by: INTERNAL MEDICINE

## 2018-04-09 PROCEDURE — 87252 VIRUS INOCULATION TISSUE: CPT | Performed by: INTERNAL MEDICINE

## 2018-04-09 PROCEDURE — 87116 MYCOBACTERIA CULTURE: CPT | Performed by: INTERNAL MEDICINE

## 2018-04-09 PROCEDURE — 74011250636 HC RX REV CODE- 250/636: Performed by: NURSE PRACTITIONER

## 2018-04-09 PROCEDURE — 0WCQ8ZZ EXTIRPATION OF MATTER FROM RESPIRATORY TRACT, VIA NATURAL OR ARTIFICIAL OPENING ENDOSCOPIC: ICD-10-PCS | Performed by: INTERNAL MEDICINE

## 2018-04-09 PROCEDURE — 88173 CYTOPATH EVAL FNA REPORT: CPT | Performed by: INTERNAL MEDICINE

## 2018-04-09 PROCEDURE — 76040000019: Performed by: INTERNAL MEDICINE

## 2018-04-09 PROCEDURE — 87070 CULTURE OTHR SPECIMN AEROBIC: CPT | Performed by: INTERNAL MEDICINE

## 2018-04-09 PROCEDURE — 74011250637 HC RX REV CODE- 250/637: Performed by: INTERNAL MEDICINE

## 2018-04-09 PROCEDURE — 99152 MOD SED SAME PHYS/QHP 5/>YRS: CPT

## 2018-04-09 PROCEDURE — 87102 FUNGUS ISOLATION CULTURE: CPT | Performed by: INTERNAL MEDICINE

## 2018-04-09 PROCEDURE — 0BD48ZX EXTRACTION OF RIGHT UPPER LOBE BRONCHUS, VIA NATURAL OR ARTIFICIAL OPENING ENDOSCOPIC, DIAGNOSTIC: ICD-10-PCS | Performed by: INTERNAL MEDICINE

## 2018-04-09 PROCEDURE — 80048 BASIC METABOLIC PNL TOTAL CA: CPT | Performed by: INTERNAL MEDICINE

## 2018-04-09 PROCEDURE — 65270000029 HC RM PRIVATE

## 2018-04-09 PROCEDURE — 74011250637 HC RX REV CODE- 250/637: Performed by: NURSE PRACTITIONER

## 2018-04-09 PROCEDURE — 77030026438 HC STYL ET INTUB CARD -A: Performed by: ANESTHESIOLOGY

## 2018-04-09 PROCEDURE — 74011250636 HC RX REV CODE- 250/636: Performed by: INTERNAL MEDICINE

## 2018-04-09 PROCEDURE — 74011250636 HC RX REV CODE- 250/636

## 2018-04-09 PROCEDURE — 88305 TISSUE EXAM BY PATHOLOGIST: CPT | Performed by: INTERNAL MEDICINE

## 2018-04-09 PROCEDURE — 87106 FUNGI IDENTIFICATION YEAST: CPT | Performed by: INTERNAL MEDICINE

## 2018-04-09 PROCEDURE — 36415 COLL VENOUS BLD VENIPUNCTURE: CPT | Performed by: INTERNAL MEDICINE

## 2018-04-09 PROCEDURE — 74011000258 HC RX REV CODE- 258: Performed by: INTERNAL MEDICINE

## 2018-04-09 PROCEDURE — 94640 AIRWAY INHALATION TREATMENT: CPT

## 2018-04-09 PROCEDURE — 74011000250 HC RX REV CODE- 250

## 2018-04-09 PROCEDURE — 89050 BODY FLUID CELL COUNT: CPT | Performed by: INTERNAL MEDICINE

## 2018-04-09 PROCEDURE — 77010033678 HC OXYGEN DAILY

## 2018-04-09 PROCEDURE — 88333 PATH CONSLTJ SURG CYTO XM 1: CPT | Performed by: INTERNAL MEDICINE

## 2018-04-09 PROCEDURE — 0BDJ8ZX EXTRACTION OF LEFT LOWER LUNG LOBE, VIA NATURAL OR ARTIFICIAL OPENING ENDOSCOPIC, DIAGNOSTIC: ICD-10-PCS | Performed by: INTERNAL MEDICINE

## 2018-04-09 RX ORDER — ALBUTEROL SULFATE 0.83 MG/ML
SOLUTION RESPIRATORY (INHALATION)
Status: COMPLETED
Start: 2018-04-09 | End: 2018-04-09

## 2018-04-09 RX ORDER — MORPHINE SULFATE 10 MG/ML
2 INJECTION, SOLUTION INTRAMUSCULAR; INTRAVENOUS
Status: DISCONTINUED | OUTPATIENT
Start: 2018-04-09 | End: 2018-04-09 | Stop reason: HOSPADM

## 2018-04-09 RX ORDER — DIPHENHYDRAMINE HYDROCHLORIDE 50 MG/ML
12.5 INJECTION, SOLUTION INTRAMUSCULAR; INTRAVENOUS AS NEEDED
Status: DISCONTINUED | OUTPATIENT
Start: 2018-04-09 | End: 2018-04-09 | Stop reason: HOSPADM

## 2018-04-09 RX ORDER — PROPOFOL 10 MG/ML
INJECTION, EMULSION INTRAVENOUS AS NEEDED
Status: DISCONTINUED | OUTPATIENT
Start: 2018-04-09 | End: 2018-04-09 | Stop reason: HOSPADM

## 2018-04-09 RX ORDER — SODIUM CHLORIDE, SODIUM LACTATE, POTASSIUM CHLORIDE, CALCIUM CHLORIDE 600; 310; 30; 20 MG/100ML; MG/100ML; MG/100ML; MG/100ML
125 INJECTION, SOLUTION INTRAVENOUS CONTINUOUS
Status: DISCONTINUED | OUTPATIENT
Start: 2018-04-09 | End: 2018-04-09 | Stop reason: HOSPADM

## 2018-04-09 RX ORDER — DEXAMETHASONE SODIUM PHOSPHATE 4 MG/ML
INJECTION, SOLUTION INTRA-ARTICULAR; INTRALESIONAL; INTRAMUSCULAR; INTRAVENOUS; SOFT TISSUE AS NEEDED
Status: DISCONTINUED | OUTPATIENT
Start: 2018-04-09 | End: 2018-04-09 | Stop reason: HOSPADM

## 2018-04-09 RX ORDER — SODIUM CHLORIDE, SODIUM LACTATE, POTASSIUM CHLORIDE, CALCIUM CHLORIDE 600; 310; 30; 20 MG/100ML; MG/100ML; MG/100ML; MG/100ML
INJECTION, SOLUTION INTRAVENOUS
Status: DISCONTINUED | OUTPATIENT
Start: 2018-04-09 | End: 2018-04-09 | Stop reason: HOSPADM

## 2018-04-09 RX ORDER — SUCCINYLCHOLINE CHLORIDE 20 MG/ML
INJECTION INTRAMUSCULAR; INTRAVENOUS AS NEEDED
Status: DISCONTINUED | OUTPATIENT
Start: 2018-04-09 | End: 2018-04-09 | Stop reason: HOSPADM

## 2018-04-09 RX ORDER — PHENYLEPHRINE HCL IN 0.9% NACL 0.4MG/10ML
SYRINGE (ML) INTRAVENOUS AS NEEDED
Status: DISCONTINUED | OUTPATIENT
Start: 2018-04-09 | End: 2018-04-09 | Stop reason: HOSPADM

## 2018-04-09 RX ORDER — LIDOCAINE HYDROCHLORIDE 20 MG/ML
INJECTION, SOLUTION EPIDURAL; INFILTRATION; INTRACAUDAL; PERINEURAL AS NEEDED
Status: DISCONTINUED | OUTPATIENT
Start: 2018-04-09 | End: 2018-04-09 | Stop reason: HOSPADM

## 2018-04-09 RX ORDER — SODIUM CHLORIDE 0.9 % (FLUSH) 0.9 %
5-10 SYRINGE (ML) INJECTION AS NEEDED
Status: DISCONTINUED | OUTPATIENT
Start: 2018-04-09 | End: 2018-04-10 | Stop reason: HOSPADM

## 2018-04-09 RX ORDER — ALBUTEROL SULFATE 0.83 MG/ML
2.5 SOLUTION RESPIRATORY (INHALATION)
Status: COMPLETED | OUTPATIENT
Start: 2018-04-09 | End: 2018-04-09

## 2018-04-09 RX ORDER — SODIUM CHLORIDE 9 MG/ML
INJECTION, SOLUTION INTRAVENOUS
Status: DISCONTINUED | OUTPATIENT
Start: 2018-04-09 | End: 2018-04-09 | Stop reason: HOSPADM

## 2018-04-09 RX ORDER — ROCURONIUM BROMIDE 10 MG/ML
INJECTION, SOLUTION INTRAVENOUS AS NEEDED
Status: DISCONTINUED | OUTPATIENT
Start: 2018-04-09 | End: 2018-04-09 | Stop reason: HOSPADM

## 2018-04-09 RX ORDER — FENTANYL CITRATE 50 UG/ML
50 INJECTION, SOLUTION INTRAMUSCULAR; INTRAVENOUS AS NEEDED
Status: DISCONTINUED | OUTPATIENT
Start: 2018-04-09 | End: 2018-04-09 | Stop reason: HOSPADM

## 2018-04-09 RX ORDER — LIDOCAINE HYDROCHLORIDE AND EPINEPHRINE 10; 10 MG/ML; UG/ML
INJECTION, SOLUTION INFILTRATION; PERINEURAL
Status: DISPENSED
Start: 2018-04-09 | End: 2018-04-10

## 2018-04-09 RX ORDER — DEXTROSE, SODIUM CHLORIDE, SODIUM LACTATE, POTASSIUM CHLORIDE, AND CALCIUM CHLORIDE 5; .6; .31; .03; .02 G/100ML; G/100ML; G/100ML; G/100ML; G/100ML
125 INJECTION, SOLUTION INTRAVENOUS CONTINUOUS
Status: DISCONTINUED | OUTPATIENT
Start: 2018-04-09 | End: 2018-04-09 | Stop reason: HOSPADM

## 2018-04-09 RX ORDER — ONDANSETRON 2 MG/ML
INJECTION INTRAMUSCULAR; INTRAVENOUS AS NEEDED
Status: DISCONTINUED | OUTPATIENT
Start: 2018-04-09 | End: 2018-04-09 | Stop reason: HOSPADM

## 2018-04-09 RX ORDER — LIDOCAINE HYDROCHLORIDE 10 MG/ML
0.5 INJECTION, SOLUTION EPIDURAL; INFILTRATION; INTRACAUDAL; PERINEURAL AS NEEDED
Status: DISCONTINUED | OUTPATIENT
Start: 2018-04-09 | End: 2018-04-09 | Stop reason: HOSPADM

## 2018-04-09 RX ORDER — ONDANSETRON 2 MG/ML
4 INJECTION INTRAMUSCULAR; INTRAVENOUS AS NEEDED
Status: DISCONTINUED | OUTPATIENT
Start: 2018-04-09 | End: 2018-04-09 | Stop reason: HOSPADM

## 2018-04-09 RX ORDER — FENTANYL CITRATE 50 UG/ML
25 INJECTION, SOLUTION INTRAMUSCULAR; INTRAVENOUS
Status: DISCONTINUED | OUTPATIENT
Start: 2018-04-09 | End: 2018-04-09 | Stop reason: HOSPADM

## 2018-04-09 RX ORDER — MIDAZOLAM HYDROCHLORIDE 1 MG/ML
1 INJECTION, SOLUTION INTRAMUSCULAR; INTRAVENOUS
Status: DISCONTINUED | OUTPATIENT
Start: 2018-04-09 | End: 2018-04-09 | Stop reason: HOSPADM

## 2018-04-09 RX ORDER — OXYCODONE HYDROCHLORIDE 5 MG/1
5 TABLET ORAL AS NEEDED
Status: DISCONTINUED | OUTPATIENT
Start: 2018-04-09 | End: 2018-04-09 | Stop reason: SDUPTHER

## 2018-04-09 RX ORDER — SODIUM CHLORIDE 0.9 % (FLUSH) 0.9 %
5-10 SYRINGE (ML) INJECTION EVERY 8 HOURS
Status: DISCONTINUED | OUTPATIENT
Start: 2018-04-09 | End: 2018-04-10 | Stop reason: HOSPADM

## 2018-04-09 RX ORDER — MIDAZOLAM HYDROCHLORIDE 1 MG/ML
1 INJECTION, SOLUTION INTRAMUSCULAR; INTRAVENOUS AS NEEDED
Status: DISCONTINUED | OUTPATIENT
Start: 2018-04-09 | End: 2018-04-09 | Stop reason: HOSPADM

## 2018-04-09 RX ORDER — SODIUM CHLORIDE 0.9 % (FLUSH) 0.9 %
5-10 SYRINGE (ML) INJECTION AS NEEDED
Status: DISCONTINUED | OUTPATIENT
Start: 2018-04-09 | End: 2018-04-09 | Stop reason: HOSPADM

## 2018-04-09 RX ADMIN — PROPOFOL 50 MG: 10 INJECTION, EMULSION INTRAVENOUS at 13:20

## 2018-04-09 RX ADMIN — PROPOFOL 50 MG: 10 INJECTION, EMULSION INTRAVENOUS at 13:24

## 2018-04-09 RX ADMIN — SIMVASTATIN 40 MG: 40 TABLET, FILM COATED ORAL at 22:06

## 2018-04-09 RX ADMIN — ALBUTEROL SULFATE 2.5 MG: 2.5 SOLUTION RESPIRATORY (INHALATION) at 14:08

## 2018-04-09 RX ADMIN — PIPERACILLIN SODIUM AND TAZOBACTAM SODIUM 4.5 G: 4; .5 INJECTION, POWDER, LYOPHILIZED, FOR SOLUTION INTRAVENOUS at 22:05

## 2018-04-09 RX ADMIN — PIPERACILLIN SODIUM AND TAZOBACTAM SODIUM 4.5 G: 4; .5 INJECTION, POWDER, LYOPHILIZED, FOR SOLUTION INTRAVENOUS at 04:22

## 2018-04-09 RX ADMIN — SODIUM CHLORIDE: 9 INJECTION, SOLUTION INTRAVENOUS at 13:00

## 2018-04-09 RX ADMIN — PROPOFOL 50 MG: 10 INJECTION, EMULSION INTRAVENOUS at 13:28

## 2018-04-09 RX ADMIN — LEVOFLOXACIN 750 MG: 5 INJECTION, SOLUTION INTRAVENOUS at 23:05

## 2018-04-09 RX ADMIN — ALBUTEROL SULFATE 2.5 MG: 0.83 SOLUTION RESPIRATORY (INHALATION) at 14:08

## 2018-04-09 RX ADMIN — PROPOFOL 150 MG: 10 INJECTION, EMULSION INTRAVENOUS at 13:10

## 2018-04-09 RX ADMIN — DRONABINOL 5 MG: 2.5 CAPSULE ORAL at 17:13

## 2018-04-09 RX ADMIN — DEXAMETHASONE SODIUM PHOSPHATE 4 MG: 4 INJECTION, SOLUTION INTRA-ARTICULAR; INTRALESIONAL; INTRAMUSCULAR; INTRAVENOUS; SOFT TISSUE at 13:16

## 2018-04-09 RX ADMIN — Medication 10 ML: at 09:05

## 2018-04-09 RX ADMIN — PROPOFOL 50 MG: 10 INJECTION, EMULSION INTRAVENOUS at 13:16

## 2018-04-09 RX ADMIN — ONDANSETRON 4 MG: 2 INJECTION INTRAMUSCULAR; INTRAVENOUS at 13:16

## 2018-04-09 RX ADMIN — SODIUM CHLORIDE, SODIUM LACTATE, POTASSIUM CHLORIDE, CALCIUM CHLORIDE: 600; 310; 30; 20 INJECTION, SOLUTION INTRAVENOUS at 13:40

## 2018-04-09 RX ADMIN — Medication 100 MCG: at 13:32

## 2018-04-09 RX ADMIN — SUCCINYLCHOLINE CHLORIDE 120 MG: 20 INJECTION INTRAMUSCULAR; INTRAVENOUS at 13:10

## 2018-04-09 RX ADMIN — ROCURONIUM BROMIDE 5 MG: 10 INJECTION, SOLUTION INTRAVENOUS at 13:10

## 2018-04-09 RX ADMIN — GUAIFENESIN 600 MG: 600 TABLET, EXTENDED RELEASE ORAL at 22:06

## 2018-04-09 RX ADMIN — VANCOMYCIN HYDROCHLORIDE 1000 MG: 1 INJECTION, POWDER, LYOPHILIZED, FOR SOLUTION INTRAVENOUS at 06:45

## 2018-04-09 RX ADMIN — Medication 100 MCG: at 13:28

## 2018-04-09 RX ADMIN — PIPERACILLIN SODIUM AND TAZOBACTAM SODIUM 4.5 G: 4; .5 INJECTION, POWDER, LYOPHILIZED, FOR SOLUTION INTRAVENOUS at 15:21

## 2018-04-09 RX ADMIN — VANCOMYCIN HYDROCHLORIDE 1000 MG: 1 INJECTION, POWDER, LYOPHILIZED, FOR SOLUTION INTRAVENOUS at 18:37

## 2018-04-09 RX ADMIN — LIDOCAINE HYDROCHLORIDE 60 MG: 20 INJECTION, SOLUTION EPIDURAL; INFILTRATION; INTRACAUDAL; PERINEURAL at 13:10

## 2018-04-09 RX ADMIN — SODIUM CHLORIDE 50 ML/HR: 900 INJECTION, SOLUTION INTRAVENOUS at 04:22

## 2018-04-09 NOTE — PROGRESS NOTES
Hospitalist Progress Note  Chrissy Spencer NP  Answering service: [phone] OR [phone] from in house phone  Cell: [phone]      Date of Service:  2018  NAME:  Juanis Prakash  :  1950  MRN:  [id number]      Admission Summary:   78year old male with history of COPD and Lung Cancer who presented with LLL Pneumonia   And concern for aspiration pneumonia. He was due to fu with Chest CT and possible FNA after discharge in February but that would be about now. He wants to follow with Dr. Chelsea Aggarwal and stop getting his Pulmonary care  At [facility]. Of note he is on nocturnal O2 at 2L and rarely uses oxygen in the day. Interval history / Subjective:     Mr. Sommer Comment  Is not feeling well after his bronchoscopy today. Discussed with Dr. Chelsea Aggarwal about staying overnight today to monitor oxygen saturations. We will discharge him in the morning. He will get prelim results on Friday when he calls Pulmonology office. He will discharge on Levaquin and Clinda to complete 14 days and f/u with pulmonary in 2-4 weeks.      Assessment & Plan:     Possible Mild Respiratory Distress superimposed on Chronic Hypoxic Respiratory Failure in the setting of COPD exacerbation and Pneumonia  (POA)  - requiring increase in oxygen from baseline   -Chest CT with LLL infiltrate, worse than prior study  -Concern for aspiration , speech eval  -bronchoscopy planned for Monday   -Levaquin and Zosyn and VANC started  by Dr. Chelsea Aggarwal , will switch to oral abx tomorrow after discussion with pulmonary   -Spiriva and Gaviota  -Mucinex and Tessalon Pearls    Leukocytosis- repeat blood work in AM   WBC 9.8   monitor    History of Lung cancer and s/p Lobectomy    Underweight  Nutrition consult for supplements  Adding Marinol for appetite stimulation per Nutrition reccs    Hyperlipidemia  Zocor    Chronic Back Pain  Takes Oxycodone 20 mg q 6 hours at home    Code status: Full  DVT prophylaxis: Lovenox    Care Plan discussed with: Patient/Family and Nurse  Disposition: Home w/Family and TBD     Plans for discharge on Monday [address] [employer] Problems  Date Reviewed: 4/4/2018          Codes Class Noted POA    * (Principal)Aspiration pneumonia [facility]) ICD-10-CM: J69.0  ICD-9-CM: 507.0  4/4/2018 Unknown                Review of Systems:   A comprehensive review of systems was negative except for that written in the HPI. + productive, coarse cough     Vital Signs:    Last 24hrs VS reviewed since prior progress note. Most recent are:  Visit Vitals    /71    Pulse 76    Temp 97.9 °F (36.6 °C)    Resp 16    Ht 5' 11\" (1.803 m)    Wt 61 kg (134 lb 7.7 oz)    SpO2 97%    BMI 18.76 kg/m2         Intake/Output Summary (Last 24 hours) at 04/09/18 1719  Last data filed at 04/09/18 1617   Gross per 24 hour   Intake          5888.34 ml   Output             2730 ml   Net          3158.34 ml        Physical Examination:             Constitutional:  No acute distress, cooperative, pleasant, thin   ENT:  Oral mucous moist, oropharynx benign. Neck supple,    Resp:  Diminished bases blt, LLL worse and rhonchi noted, No accessory muscle use   CV:  Regular rhythm, normal rate, no murmurs, gallops, rubs    GI:  Soft, non distended, non tender. normoactive bowel sounds, no hepatosplenomegaly     Musculoskeletal:  No edema, warm, 2+ pulses throughout    Neurologic:  Moves all extremities. AAOx3     Psych:  Good insight, Not anxious nor agitated.   Skin:  Good turgor, no rashes or ulcers  Hematologic/Lymphatic/Immunlogic:  No jaundice nor lymph node swelling       Data Review:    Review and/or order of clinical lab test  Review and/or order of tests in the radiology section of CPT      Labs:     Recent Labs      04/07/18   0423   WBC  9.8   HGB  11.5*   HCT  35.2*   PLT  208     Recent Labs      04/09/18   0429  04/08/18   0626  04/07/18   0423   NA  142  140  142   K  3.9  4.0  4.3   CL 108  108  109*   CO2  24  26  25   BUN  13  14  12   CREA  1.14  1.09  1.11   GLU  102*  99  103*   CA  9.0  8.9  8.9   MG   --    --   1.9     No results for input(s): SGOT, GPT, ALT, AP, TBIL, TBILI, TP, ALB, GLOB, GGT, AML, LPSE in the last 72 hours. No lab exists for component: AMYP, HLPSE  No results for input(s): INR, PTP, APTT in the last 72 hours. No lab exists for component: INREXT, INREXT   No results for input(s): FE, TIBC, PSAT, FERR in the last 72 hours. No results found for: FOL, RBCF   No results for input(s): PH, PCO2, PO2 in the last 72 hours. No results for input(s): CPK, CKNDX, TROIQ in the last 72 hours.     No lab exists for component: CPKMB  Lab Results   Component Value Date/Time    Cholesterol, total 106 02/20/2018 04:54 AM    HDL Cholesterol 45 02/20/2018 04:54 AM    LDL, calculated 46 02/20/2018 04:54 AM    Triglyceride 75 02/20/2018 04:54 AM    CHOL/HDL Ratio 2.4 02/20/2018 04:54 AM     Lab Results   Component Value Date/Time    Glucose (POC) 100 09/14/2015 12:14 PM     Lab Results   Component Value Date/Time    Color YELLOW/STRAW 11/28/2016 04:44 PM    Appearance CLEAR 11/28/2016 04:44 PM    Specific gravity 1.020 11/28/2016 04:44 PM    pH (UA) 5.0 11/28/2016 04:44 PM    Protein TRACE (A) 11/28/2016 04:44 PM    Glucose NEGATIVE  11/28/2016 04:44 PM    Ketone TRACE (A) 11/28/2016 04:44 PM    Bilirubin NEGATIVE  11/28/2016 04:44 PM    Urobilinogen 0.2 11/28/2016 04:44 PM    Nitrites NEGATIVE  11/28/2016 04:44 PM    Leukocyte Esterase TRACE (A) 11/28/2016 04:44 PM    Epithelial cells FEW 11/28/2016 04:44 PM    Bacteria NEGATIVE  11/28/2016 04:44 PM    WBC 5-10 11/28/2016 04:44 PM    RBC 0-5 11/28/2016 04:44 PM         Medications Reviewed:     Current Facility-Administered Medications   Medication Dose Route Frequency    sodium chloride (NS) flush 5-10 mL  5-10 mL IntraVENous Q8H    sodium chloride (NS) flush 5-10 mL  5-10 mL IntraVENous PRN    lidocaine-EPINEPHrine (XYLOCAINE) 1 %-1:100,000 injection        albuterol-ipratropium (DUO-NEB) 2.5 MG-0.5 MG/3 ML  3 mL Nebulization Q4H PRN    vancomycin (VANCOCIN) 1,000 mg in 0.9% sodium chloride (MBP/ADV) 250 mL  1,000 mg IntraVENous Q12H    dronabinol (MARINOL) capsule 5 mg  5 mg Oral BID    Vancomycin Pharmacy to Dose   Other Rx Dosing/Monitoring    guaiFENesin ER (MUCINEX) tablet 600 mg  600 mg Oral Q12H    benzonatate (TESSALON) capsule 100 mg  100 mg Oral TID PRN    aspirin delayed-release tablet 81 mg  81 mg Oral DAILY    oxyCODONE IR (ROXICODONE) tablet 5 mg  5 mg Oral Q6H PRN    simvastatin (ZOCOR) tablet 40 mg  40 mg Oral QHS    sodium chloride (NS) flush 5-10 mL  5-10 mL IntraVENous Q8H    sodium chloride (NS) flush 5-10 mL  5-10 mL IntraVENous PRN    levoFLOXacin (LEVAQUIN) 750 mg in D5W IVPB  750 mg IntraVENous Q24H    acetaminophen (TYLENOL) tablet 650 mg  650 mg Oral Q4H PRN    enoxaparin (LOVENOX) injection 40 mg  40 mg SubCUTAneous Q24H    0.9% sodium chloride infusion  50 mL/hr IntraVENous CONTINUOUS    piperacillin-tazobactam (ZOSYN) 4.5 g in 0.9% sodium chloride (MBP/ADV) 100 mL  4.5 g IntraVENous Q8H     ______________________________________________________________________  EXPECTED LENGTH OF STAY: 3d 9h  ACTUAL LENGTH OF STAY:          [address], NP

## 2018-04-09 NOTE — ANESTHESIA POSTPROCEDURE EVALUATION
Post-Anesthesia Evaluation and Assessment    Patient: Nixon Sierra MRN: [id number]  SSN: [id number]    YOB: 1950  Age: 78 y.o. Sex: male       Cardiovascular Function/Vital Signs  Visit Vitals    /71    Pulse 76    Temp 36.6 °C (97.9 °F)    Resp 16    Ht 5' 11\" (1.803 m)    Wt 61 kg (134 lb 7.7 oz)    SpO2 97%    BMI 18.76 kg/m2       Patient is status post general anesthesia for Procedure(s):  BRONCHOSCOPY NAVIGATIONAL  BRONCHOSCOPY WITH BIOPSY. Nausea/Vomiting: None    Postoperative hydration reviewed and adequate. Pain:  Pain Scale 1: Numeric (0 - 10) (04/09/18 1454)  Pain Intensity 1: 0 (04/09/18 1454)   Managed    Neurological Status:   Neuro  Neurologic State: Alert; Appropriate for age (04/09/18 9963)  Orientation Level: Oriented X4 (04/09/18 2944)  Cognition: Appropriate decision making (04/09/18 0917)   At baseline    Mental Status and Level of Consciousness: Alert and oriented     Pulmonary Status:   O2 Device: Nasal cannula (04/09/18 1454)   Adequate oxygenation and airway patent    Complications related to anesthesia: None    Post-anesthesia assessment completed.  No concerns    Signed By: Emmanuel Sen DO     April 9, 2018

## 2018-04-09 NOTE — PROGRESS NOTES
Bedside shift change report given to [facility] and LINUS Adams (oncoming nurse) by Sarah Fairchild (offgoing nurse). Report included the following information SBAR, Kardex, Intake/Output, MAR and Recent Results.

## 2018-04-09 NOTE — PROGRESS NOTES
Problem: Nutrition Deficit  Goal: *Optimize nutritional status  Outcome: Progressing Towards Goal  Received patient from previous RN via bedside shift report. Patient is resting comfortably; A&Ox4. AVSS, NAD noted at this time. Family at bedside at this time. Pt to have bronchoscopy today. Pt has been NPO. Full assessment into epic. Patient updated on current POC- verbalized understanding. All safety precautions in place- safety maintained. Will continue to monitor.

## 2018-04-09 NOTE — PROGRESS NOTES
[facility] [address] [place] [address] Day: 6    2018     Risks, benefits and alternatives were discussed with patient. All questions were answered and the patient agrees to proceed with planned procedure of Navigational Bronch with Biopsy    · 1:51 PM   · Uneventful Robe Bronch  - Can recover and be discharged later today. · Levaquin clinda to complete 14 days  · Follow up with me / Jeri Yanez in 2-4 weeks with CXR or alternatively with his pulmonary team at [facility]  · He is to call Friday for preliminary results     ID: Michelle Albert is a 78 y.o.  PATIENT REFUSED male    admitted on 2018 with problem list as below    Active Hospital Problems    Diagnosis Date Noted    Aspiration pneumonia ([facility] [address] 2018         Hospital Problems  Date Reviewed: 2018          Codes Class Noted POA    * (Principal)Aspiration pneumonia [facility]) ICD-10-CM: J69.0  ICD-9-CM: 507.0  2018 Unknown                               IMPRESSION     LLL Pneumonia - Mucous retention  ----> Robe Bronch     PLAN     · As Above       VITALS INS & OUTS   Visit Vitals    /79 (BP Patient Position: At rest)    Pulse 75    Temp 98.4 °F (36.9 °C)    Resp 18    Ht 5' 11\" (1.803 m)    Wt 61.6 kg (135 lb 14.4 oz)    SpO2 95%    BMI 18.95 kg/m2       Temp (24hrs), Av.9 °F (36.6 °C), Min:97.4 °F (36.3 °C), Max:98.4 °F (36.9 °C)      Intake/Output Summary (Last 24 hours) at 18 1337  Last data filed at 18 1022   Gross per 24 hour   Intake          4411.67 ml   Output             2230 ml   Net          2181.67 ml                   OTHER:      [] Drain  [] ICP  [] Chest Tube  []        [] Vent    [] BiPAP  [] CPAP  [] A-Line      [] NGT   [] ETT  [] Burch  [] C-Line    GEN:  [x] Nontoxic   [x] No Distress  [] Toxic  [] Distressed    Eyes:  [x] Anicteric    [x] Noninjected  [] Icteric  [] injected    ENT:  [x] Moist   [x] No Thrush  [] Dry  [] Thrush    CV:  [x] Regular    [x] No Murmur [] Irregular  [] Murmur    LUNG:  [x] Clear    [x] Equal BS  []  Wheeze  [] Unequal BS    GI:  [x] NABS  [x] Nontender  [] No Sounds  [] Tender    :  [x] Clear Urine  [x] Norm Genitalia   [] Burch  [] Deferred Exam    SKEL:  [x] WD WN  [x] No Clubbing  [] Wasted  [] Clubbing    SKIN:  [x] Perfused    [x] No Drug Rash  [] Cool  [] Rash    NEURO:  [x] A & O x 3  [x]  Non Focal  [] Confused  [] Focal    PSYCH:  [x] Nonagitated  [x] Good Insight  [] Agitated  [] Poor Insight    LYMPH:  [x] No edema   [x] No ROMA  [] Edema  [] Regional ROMA      VENTILATOR - BIPAP - O2:     [x]  Reviewed Ventilator / BiPAP / O2  O2 Device: Nasal cannula (04/09/18 0917)    Ideal body weight: 75.3 kg (166 lb 0.1 oz)                       Mode:            Tidal Volume:                   Pressure:                         FiO2:                       PEEP:                           PIP:     Minute Ventilation:       LABS:    No results for input(s): PHI, PCO2I, PO2I in the last 72 hours. Recent Labs      04/07/18   0423   WBC  9.8   HGB  11.5*   PLT  208       Recent Labs      04/09/18   0429   04/07/18   0423   NA  142   < >  142   K  3.9   < >  4.3   CL  108   < >  109*   CO2  24   < >  25   GLU  102*   < >  103*   BUN  13   < >  12   CREA  1.14   < >  1.11   CA  9.0   < >  8.9   MG   --    --   1.9    < > = values in this interval not displayed. IMAGING:      Results from Hospital Encounter encounter on 04/04/18   XR CHEST PA LAT   Narrative INDICATION:  chest pain with history of lung cancer, COPD    COMPARISON: 2/23/2018    FINDINGS: PA and lateral views of the chest demonstrate a stable  cardiomediastinal silhouette with unchanged volume loss in the right hemithorax  and elevation of the right hemidiaphragm. There is no focal airspace disease. There is a chronic small left pleural effusion. There is a background of COPD.          Impression IMPRESSION: No acute process        Results from Hospital Encounter encounter on 02/17/18 XR CHEST PORT   Narrative EXAM:  XR CHEST PORT. INDICATION: Follow-up congestive heart failure and shortness of breath and  pneumonia. COMPARISON: 2/17/2018. FINDINGS:   A portable AP radiograph of the chest was obtained at 0718 hours. Lines and tubes: The patient is on a cardiac monitor. Lungs: There are surgical clips and staples in the right hilum with superior  retraction of the hilum. The lungs are reinflated and hyperlucent and there is  improved aeration in the left lower lobe. Pleura: There is blunting of the right costophrenic angle which is unchanged. Mediastinum: The cardiac and mediastinal contours and pulmonary vascularity are  normal.  Bones and soft tissues: The bones and soft tissues are grossly within normal  limits. Impression IMPRESSION: Improved aeration in the left lower lobe. Results from [place] encounter on 04/04/18   CTA CHEST W OR W WO CONT   Narrative INDICATION:   chest pain, sob, more hypoxia     COMPARISON:  CT 2/17/2018    TECHNIQUE:  Following the uneventful intravenous administration of 100 cc Isovue  275, thin helical axial images were obtained through the chest. Postprocessing  was performed. 3D image postprocessing was performed. CT dose reduction was achieved through the use of a standardized protocol  tailored for this examination and automatic exposure control for dose  modulation. FINDINGS:    There are no enlarged mediastinal or hilar lymph nodes. The heart size is  normal.  There is no pericardial effusion. No filling defect is seen within the pulmonary arterial system to suggest  pulmonary embolus. The aorta is normal in caliber. There is a background of centrilobular emphysema. There is evidence of right  lung surgery, as well as mild right basilar airspace disease. There is also  dense consolidation within the left lower lobe, with partial filling of the left  lower lobe bronchial tree compatible with aspiration. . The superior aspect of  this chronic infiltrate appears masslike and may represent an underlying  malignancy. No pulmonary nodule or mass is seen. There are no pleural effusions. Limited evaluation of the upper abdomen demonstrates no abnormality. The osseous  structures are unremarkable. Impression IMPRESSION:  1. No evidence of pulmonary embolus. 2.  Chronic left lower lobe infiltrate, worse than prior study. Additionally,  there is debris filling the left lower lobe bronchial tree, consistent with  aspiration. 3. Additionally, there is mild right basilar pneumonia. 4. Background of emphysema. Results from [place] encounter on 02/17/18   CTA CHEST W OR W WO CONT   Narrative Clinical indication: Dyspnea on exertion. Localizer obtained without contrast at the level of the pulmonary arteries. Fast  injection rate of 75 cc of Isovue-370 with review of the raw data and MIP  reconstructions. Comparison November 20, 2016. CT dose reduction was achieved  through the use of a standardized protocol tailored for this examination and  automatic exposure control for dose modulation . Estanislado Lobstein There is a persistent left  lower lobe infiltrate. There is no definite evidence for pulmonary emboli. There  is no definite evidence for mediastinal or hilar adenopathy. Aorta does appear  unremarkable. No parenchymal mass. Impression IMPRESSION: Chronic lung disease, left lower lobe infiltrate. PMH:  has a past medical history of Asthma; Cancer ([address] Chronic obstructive pulmonary disease ([address] Chronic pain; High cholesterol; and Lung cancer ([address] PSH:  has a past surgical history that includes hx other surgical and hx lobectomy. FHX: family history is not on file. SHX:  reports that he has quit smoking. He does not have any smokeless tobacco history on file. He reports that he does not drink alcohol or use illicit drugs.     No Known Allergies    Current Facility-Administered Medications   Medication    sodium chloride (NS) flush 5-10 mL    sodium chloride (NS) flush 5-10 mL    oxyCODONE IR (ROXICODONE) tablet 5 mg    morphine injection 2 mg    lidocaine-EPINEPHrine (XYLOCAINE) 1 %-1:100,000 injection    albuterol-ipratropium (DUO-NEB) 2.5 MG-0.5 MG/3 ML    vancomycin (VANCOCIN) 1,000 mg in 0.9% sodium chloride (MBP/ADV) 250 mL    dronabinol (MARINOL) capsule 5 mg    Vancomycin Pharmacy to Dose    guaiFENesin ER (MUCINEX) tablet 600 mg    benzonatate (TESSALON) capsule 100 mg    aspirin delayed-release tablet 81 mg    oxyCODONE IR (ROXICODONE) tablet 5 mg    simvastatin (ZOCOR) tablet 40 mg    sodium chloride (NS) flush 5-10 mL    sodium chloride (NS) flush 5-10 mL    levoFLOXacin (LEVAQUIN) 750 mg in D5W IVPB    acetaminophen (TYLENOL) tablet 650 mg    enoxaparin (LOVENOX) injection 40 mg    0.9% sodium chloride infusion    piperacillin-tazobactam (ZOSYN) 4.5 g in 0.9% sodium chloride (MBP/ADV) 100 mL     Facility-Administered Medications Ordered in Other Encounters   Medication    0.9% sodium chloride infusion    lidocaine (PF) (XYLOCAINE) 20 mg/mL (2 %) injection    propofol (DIPRIVAN) 10 mg/mL injection    rocuronium (ZEMURON) injection    succinylcholine (ANECTINE) injection    dexamethasone (DECADRON) 4 mg/mL injection    ondansetron (ZOFRAN) injection    PHENYLephrine (NEOSYNEPHRINE) in NS syringe        Joanna Irving MD [facility]

## 2018-04-09 NOTE — PROCEDURES
Hospital: [facility]   Name: Michelle Albert   : 1950   MRN: [id number]   Code: Full Code       Procedure:  Diagnostic bronchoscopy  - Veran Electromagnetic Navigational  Bronchoscopy  With Transbronchial Biopsy to LLL and endobronchial Biopsy RUL stump    Preoperative Diagnosis:  Abnormal CT scan, chest    Postoperative Diagnosis:   · Abnormal CT scan, chest  · RUL stump intact - minor abnormal Mucosa RUL stump - endobronchial Biopsy  · LLL  Mucus plug    Specimens:  1. Endobronchial biopsies from the RUL stump were sent for surgical pathology  2. Transbronchial biopsy from LLL was performed using Veran Electromagnetic Navigational  Bronchoscopy  guidance and sent for  microbiology, AFB smear and culture, fungal culture and surgical pathology  3. LLL Mucus plug sent for routine culture, AFB, fungal culture virus. [facility] query Nurse THIS WAS NOT A BAL    Procedure:   After answering all questions and full information as to risks, benefits, alternatives and goals, consent was obtained from Patient. A timeout was taken in the bronchoscopy suite in the presence of the operative team to confirm the patient's identity and planned procedure. There was agreement and the bronchscope was introduced through an endotracheal tube. Findings as noted below:    -- Pharynx:  Normal    -- Vocal cords: Normal    -- Trachea: Normal    -- Hazel: Normal    -- RUL:  abnormal findings - stump intact - minor mucosal abnormality      -- RML:  Normal     -- RLL:  Normal    -- MELVIN:  Normal    -- LLL:  abnormal findings  - mucus plug     -- Endobronchial biopsy: An abnormality involving the RUL stump was identified. The biopsy forceps were advanced easily into the abnormality. Endobronchial biobsy times 2 was obtained. There was mild bleeding at the biopsy site. The procedure was terminated after adequate hemostasis was achieved. Endobronchial Biopsy was sent for cytology.     --  Barbara Sandifer [facility] Bronchoscopy: Using Veran Electromagnetic guidance, the abnormality involving the LLL was identified. Navigation was excellent. The Biopsy forceps / Needle were advanced easily into the region of the ab normality and 8 samples were obtained. There was no significant bleeding at the biopsy site. The procedure was terminated after adequate hemostasis was achieved. Sent to pathology and micro for routine AFB and fungal    Complications: none           Disposition: PACU - hemodynamically stable. Condition: stable    ASA Score: ASA 3 - Severe systemic disease but compensated      Mallampati Score: II (soft palate, uvula, fauces visible)    Estimated Blood Loss: Minimal    Anesthesia / Sedation:  General anesthesia was performed by anesthesiology      Monitoring:  Vital signs monitored by attending RN as well as pulse oximetry.        Implants:   none           Surgeon: Yary Guevara MD, [facility]

## 2018-04-09 NOTE — ANESTHESIA PREPROCEDURE EVALUATION
Anesthetic History   No history of anesthetic complications            Review of Systems / Medical History  Patient summary reviewed, nursing notes reviewed and pertinent labs reviewed    Pulmonary    COPD      Shortness of breath      Comments: Aspiration pneumonia ([address]   Neuro/Psych   Within defined limits           Cardiovascular          CHF    Past MI, CAD, cardiac stents and hyperlipidemia    Exercise tolerance: <4 METS  Comments:  Analysis of regional contractile function demonstrated anterolateral  dyskinesis, severe apical hypokinesis, and severe inferolateral  hypokinesis. --  Global left ventricular function was severely depressed. EF calculated by contrast ventriculography was 30 %. --  The left ventricle was moderately dilated.    GI/Hepatic/Renal  Within defined limits              Endo/Other        Cancer     Other Findings            Physical Exam    Airway  Mallampati: I  TM Distance: > 6 cm  Neck ROM: normal range of motion   Mouth opening: Normal     Cardiovascular  Regular rate and rhythm,  S1 and S2 normal,  no murmur, click, rub, or gallop  Rhythm: regular  Rate: normal         Dental    Dentition: Edentulous     Pulmonary  Breath sounds clear to auscultation               Abdominal  GI exam deferred       Other Findings            Anesthetic Plan    ASA: 3  Anesthesia type: general          Induction: Intravenous  Anesthetic plan and risks discussed with: Patient

## 2018-04-10 VITALS
BODY MASS INDEX: 19.22 KG/M2 | HEART RATE: 85 BPM | OXYGEN SATURATION: 98 % | SYSTOLIC BLOOD PRESSURE: 111 MMHG | RESPIRATION RATE: 19 BRPM | TEMPERATURE: 97.5 F | DIASTOLIC BLOOD PRESSURE: 76 MMHG | HEIGHT: 71 IN | WEIGHT: 137.3 LBS

## 2018-04-10 PROBLEM — E46 MALNUTRITION (HCC): Status: ACTIVE | Noted: 2018-04-10

## 2018-04-10 PROBLEM — J96.91 RESPIRATORY FAILURE WITH HYPOXIA (HCC): Status: ACTIVE | Noted: 2018-04-10

## 2018-04-10 PROCEDURE — 74011250636 HC RX REV CODE- 250/636: Performed by: INTERNAL MEDICINE

## 2018-04-10 PROCEDURE — 77010033678 HC OXYGEN DAILY

## 2018-04-10 PROCEDURE — 74011250637 HC RX REV CODE- 250/637: Performed by: NURSE PRACTITIONER

## 2018-04-10 PROCEDURE — 74011000258 HC RX REV CODE- 258: Performed by: INTERNAL MEDICINE

## 2018-04-10 PROCEDURE — 74011250637 HC RX REV CODE- 250/637: Performed by: INTERNAL MEDICINE

## 2018-04-10 RX ORDER — CLINDAMYCIN HYDROCHLORIDE 300 MG/1
300 CAPSULE ORAL 3 TIMES DAILY
Qty: 27 CAP | Refills: 0 | Status: SHIPPED | OUTPATIENT
Start: 2018-04-10 | End: 2019-01-09 | Stop reason: ALTCHOICE

## 2018-04-10 RX ORDER — DRONABINOL 5 MG/1
5 CAPSULE ORAL 2 TIMES DAILY
Qty: 60 CAP | Refills: 1 | Status: SHIPPED | OUTPATIENT
Start: 2018-04-10 | End: 2019-01-09

## 2018-04-10 RX ORDER — SAME BUTANEDISULFONATE/BETAINE 400-600 MG
250 POWDER IN PACKET (EA) ORAL 2 TIMES DAILY
Qty: 20 CAP | Refills: 0 | Status: SHIPPED | OUTPATIENT
Start: 2018-04-10 | End: 2018-04-20

## 2018-04-10 RX ORDER — LEVOFLOXACIN 750 MG/1
750 TABLET ORAL DAILY
Qty: 9 TAB | Refills: 0 | Status: SHIPPED | OUTPATIENT
Start: 2018-04-10 | End: 2019-01-09 | Stop reason: ALTCHOICE

## 2018-04-10 RX ADMIN — DRONABINOL 5 MG: 2.5 CAPSULE ORAL at 08:32

## 2018-04-10 RX ADMIN — Medication 10 ML: at 07:23

## 2018-04-10 RX ADMIN — PIPERACILLIN SODIUM AND TAZOBACTAM SODIUM 4.5 G: 4; .5 INJECTION, POWDER, LYOPHILIZED, FOR SOLUTION INTRAVENOUS at 04:03

## 2018-04-10 RX ADMIN — ASPIRIN 81 MG: 81 TABLET, COATED ORAL at 08:32

## 2018-04-10 RX ADMIN — VANCOMYCIN HYDROCHLORIDE 1000 MG: 1 INJECTION, POWDER, LYOPHILIZED, FOR SOLUTION INTRAVENOUS at 07:23

## 2018-04-10 RX ADMIN — GUAIFENESIN 600 MG: 600 TABLET, EXTENDED RELEASE ORAL at 08:32

## 2018-04-10 NOTE — PROGRESS NOTES
D/C instructions given to pt. All questions answered at this time. Pt to be d/c'd via wheelchair to main entrance with all belongings. Wife to transport. 1000- Scripts given to pt. All follow up questions answered. Pt dressed and awaiting ride home.

## 2018-04-10 NOTE — PROGRESS NOTES
Bedside and Verbal shift change report given to Nata Lester RN (oncoming nurse) by Sanjana Bella RN (offgoing nurse). Report included the following information SBAR, Kardex, OR Summary, Procedure Summary, Intake/Output, MAR, Accordion, Recent Results and Med Rec Status.

## 2018-04-10 NOTE — DISCHARGE INSTRUCTIONS
Discharge Instructions       PATIENT ID: Abdelrahman Santiago  MRN: [id number]   YOB: 1950    DATE OF ADMISSION: 4/4/2018  7:11 PM    DATE OF DISCHARGE: 4/10/2018    PRIMARY CARE PROVIDER: Lesley Olszewski, MD     ATTENDING PHYSICIAN: Amy Simpson MD  DISCHARGING PROVIDER: Bella Hyde NP    To contact this individual call [phone] and ask the  to page. If unavailable ask to be transferred the Adult Hospitalist Department. DISCHARGE DIAGNOSES     Acute Hypoxic Respiratory Failure   Aspiration Pneumonia       CONSULTATIONS: IP CONSULT TO HOSPITALIST  IP CONSULT TO PULMONOLOGY    PROCEDURES/SURGERIES: Procedure(s):  BRONCHOSCOPY NAVIGATIONAL  BRONCHOSCOPY WITH BIOPSY    PENDING TEST RESULTS:   At the time of discharge the following test results are still pending: Biopsy results from Bronchoscopy as well as culture results, please call Dr. Camelia Castelan on Friday to receive preliminary results. FOLLOW UP APPOINTMENTS:   Follow-up Information     Follow up With Details Comments [address],[facility], MD In 7 days As needed, If symptoms worsen [address]  [address]      Jaspreet Dueñas MD In 2 weeks Please call to schedule follow up  [address]:    1. You should continue taking Levaquin and Clindamycin for a remaining 9 days after discharge to complete a total of 2 weeks of antibiotic therapy. 2. During this time you should definitely take a probiotic ( I have written a prescription for one ) to help with the side effects of the antibiotics and help prevent GI upset and diarrhea. 3. Start taking Marinol 2 times daily, this is an appetite stimulant to make you more hungry. You need to gain some weight as your body mass index is only 18 ( very low). 4. Follow up with primary care doctor as needed.      5. Call to schedule follow up with Dr. Monique Ackerman within 2 weeks from now. You may call on Friday to get preliminary results from biopsy . DIET: Resume previous diet    ACTIVITY: Activity as tolerated    DISCHARGE MEDICATIONS:   See Medication Reconciliation Form    · It is important that you take the medication exactly as they are prescribed. · Keep your medication in the bottles provided by the pharmacist and keep a list of the medication names, dosages, and times to be taken in your wallet. · Do not take other medications without consulting your doctor. NOTIFY YOUR PHYSICIAN FOR ANY OF THE FOLLOWING:   Fever over 101 degrees for 24 hours. Chest pain, shortness of breath, fever, chills, nausea, vomiting, diarrhea, change in mentation, falling, weakness, bleeding. Severe pain or pain not relieved by medications. Or, any other signs or symptoms that you may have questions about. DISPOSITION:  X  Home With: none   OT  PT  HH  RN       SNF/Inpatient Rehab/LTAC    Independent/assisted living    Hospice    Other:     CDMP Checked: Yes X     PROBLEM LIST Updated:   Yes X       Signed:   Keturah Geronimo NP  4/10/2018  8:10 AM

## 2018-04-10 NOTE — DISCHARGE SUMMARY
Discharge Summary       PATIENT ID: [facility]  MRN: [id number]   YOB: 1950    DATE OF ADMISSION: 4/4/2018  7:11 PM    DATE OF DISCHARGE: 4/10/2018  PRIMARY CARE PROVIDER: Pauline Mata MD       DISCHARGING PHYSICIAN: Lotus Rojo NP    To contact this individual call [phone] and ask the  to page. If unavailable ask to be transferred the Adult Hospitalist Department. CONSULTATIONS: IP CONSULT TO HOSPITALIST  IP CONSULT TO PULMONOLOGY    PROCEDURES/SURGERIES: Procedure(s):  BRONCHOSCOPY NAVIGATIONAL  BRONCHOSCOPY WITH BIOPSY    ADMITTING DIAGNOSES & HOSPITAL COURSE:     78year old male with history of COPD and Lung Cancer who presented with LLL Pneumonia   And concern for aspiration pneumonia. He was due to fu with Chest CT and possible FNA after discharge in February but that would be about now. He wants to follow with Dr. Awa Almonte and stop getting his Pulmonary care  At [facility]. Of note he is on nocturnal O2 at 2L and rarely uses oxygen in the day. During admission he was treated with broad spectrum antibiotics for aspiration pneumonia. He underwent a bronchoscopy , diagnostic, on 4/9 with multiple biopsies of LLL and RML taken. He will discharge with Clinda and Levaquin to complete total of 2 week course and follow up with DR. Awa Almonte outpatient for results of bronchoscopy.       DISCHARGE DIAGNOSES / PLAN:      Possible Mild Respiratory Distress superimposed on Chronic Hypoxic Respiratory Failure in the setting of COPD exacerbation and Pneumonia  (POA)  - requiring increase in oxygen from baseline   -Chest CT with LLL infiltrate, worse than prior study  -Concern for aspiration , speech eval  -bronchoscopy planned for Monday   -Levaquin and Zosyn and VANC started 4/6 by Dr. Awa Almonte , will switch to oral abx tomorrow after discussion with pulmonary   -Spiriva and Gaviota  -Mucinex and Tessalon Pearls     Leukocytosis- repeat blood work in AM   WBC 9.8 monitor     History of Lung cancer and s/p Lobectomy     Underweight  Nutrition consult for supplements  Adding Marinol for appetite stimulation per Nutrition reccs     Hyperlipidemia  Zocor     Chronic Back Pain  Takes Oxycodone 20 mg q 6 hours at home          PENDING TEST RESULTS:   At the time of discharge the following test results are still pending: Biopsy results    FOLLOW UP APPOINTMENTS:    Follow-up Information     Follow up With Details Comments Contact Info    Maryse Kramer MD Go on 4/16/2018 hospital f/u with NP Lesa Scherer on Monday April 16, 2018 @ 8:00 a.m. If patient is unable to attend,please call the office. [address] [facility] 13      Veronica Fajardo MD In 2 weeks Please call to schedule follow up  [address]:       1. You should continue taking Levaquin and Clindamycin for a remaining 9 days after discharge to complete a total of 2 weeks of antibiotic therapy. 2. During this time you should definitely take a probiotic ( I have written a prescription for one ) to help with the side effects of the antibiotics and help prevent GI upset and diarrhea. 3. Start taking Marinol 2 times daily, this is an appetite stimulant to make you more hungry. You need to gain some weight as your body mass index is only 18 ( very low). 4. Follow up with primary care doctor as needed. 5. Call to schedule follow up with Dr. Jackson Vargas within 2 weeks from now. You may call on Friday to get preliminary results from biopsy . DIET: Resume previous diet    ACTIVITY: Activity as tolerated        DISCHARGE MEDICATIONS:  Discharge Medication List as of 4/10/2018  9:32 AM      START taking these medications    Details   dronabinol (MARINOL) 5 mg capsule Take 1 Cap by mouth two (2) times a day. Max Daily Amount: 10 mg.  Indications: malnutrition, Print, Disp-60 Cap, R-1      levoFLOXacin (LEVAQUIN) 750 mg tablet Take 1 Tab by mouth daily. , Print, Disp-9 Tab, R-0      clindamycin (CLEOCIN) 300 mg capsule Take 1 Cap by mouth three (3) times daily. , Print, Disp-27 Cap, R-0      Saccharomyces boulardii (FLORASTOR) 250 mg capsule Take 1 Cap by mouth two (2) times a day for 10 days. , Print, Disp-20 Cap, R-0         CONTINUE these medications which have NOT CHANGED    Details   oxyCODONE IR (ROXICODONE) 20 mg immediate release tablet Take 20-40 mg by mouth every six (6) hours as needed for Pain (severe pain). , Historical Med      tiotropium (SPIRIVA WITH HANDIHALER) 18 mcg inhalation capsule Take 1 Cap by inhalation daily. , No Print, Disp-30 Cap, R-1      albuterol-ipratropium (DUO-NEB) 2.5 mg-0.5 mg/3 ml nebu 3 mL by Nebulization route every four (4) hours as needed., No Print, Disp-30 Nebule, R-0      aspirin delayed-release 81 mg tablet Take 1 Tab by mouth daily. , No Print, Disp-30 Tab, R-0      simvastatin (ZOCOR) 40 mg tablet Take 40 mg by mouth nightly., Historical Med               NOTIFY YOUR PHYSICIAN FOR ANY OF THE FOLLOWING:   Fever over 101 degrees for 24 hours. Chest pain, shortness of breath, fever, chills, nausea, vomiting, diarrhea, change in mentation, falling, weakness, bleeding. Severe pain or pain not relieved by medications. Or, any other signs or symptoms that you may have questions about. DISPOSITION:    Home With:   OT  PT  HH  RN       Long term SNF/Inpatient Rehab    Independent/assisted living    Hospice    Other:       PATIENT CONDITION AT DISCHARGE:     Functional status    Poor     Deconditioned    X Independent      Cognition    X Lucid     Forgetful     Dementia      Catheters/lines (plus indication)    Burch     PICC     PEG    X None      Code status    X Full code     DNR      PHYSICAL EXAMINATION AT DISCHARGE:     Constitutional:  No acute distress, cooperative, pleasant, thin   ENT:  Oral mucous moist, oropharynx benign.  Neck supple, Resp:  Lungs are CTA , No accessory muscle use   CV:  Regular rhythm, normal rate, no murmurs, gallops, rubs    GI:  Soft, non distended, non tender. normoactive bowel sounds, no hepatosplenomegaly     Musculoskeletal:  No edema, warm, 2+ pulses throughout    Neurologic:  Moves all extremities. AAOx3                                             Psych:  Good insight, Not anxious nor agitated.   Skin:  Good turgor, no rashes or ulcers  Hematologic/Lymphatic/Immunlogic:  No jaundice nor lymph node swelling              CHRONIC MEDICAL DIAGNOSES:  Problem List as of 4/10/2018  Date Reviewed: 4/10/2018          Codes Class Noted - Resolved    Malnutrition ([facility] ICD-10-CM: E46  ICD-9-CM: 263.9  4/10/2018 - Present        Respiratory failure with hypoxia ([facility] ICD-10-CM: J96.91  ICD-9-CM: 518.81  4/10/2018 - Present        * (Principal)Aspiration pneumonia ([facility] ICD-10-CM: J69.0  ICD-9-CM: 507.0  4/4/2018 - Present        Systolic CHF, acute ([facility] ICD-10-CM: I50.21  ICD-9-CM: 428.21, 428.0  2/22/2018 - Present        Shortness of breath ICD-10-CM: R06.02  ICD-9-CM: 786.05  2/17/2018 - Present        COPD exacerbation ([facility] ICD-10-CM: J44.1  ICD-9-CM: 491.21  8/10/2015 - Present        RESOLVED: NSTEMI (non-ST elevated myocardial infarction) ([facility] ICD-10-CM: I21.4  ICD-9-CM: 410.70  2/22/2018 - 2/22/2018        RESOLVED: COPD exacerbation ([facility] (Chronic) ICD-10-CM: J44.1  ICD-9-CM: 491.21  9/5/2015 - 2/22/2018              Greater than 30 minutes were spent with the patient on counseling and coordination of care    Signed:   Keturah Geronimo NP  4/10/2018  11:43 AM

## 2018-04-10 NOTE — PROGRESS NOTES
Problem: Nutrition Deficit  Goal: *Optimize nutritional status  Outcome: Progressing Towards Goal  Received patient from previous RN via bedside shift report. Patient is resting comfortably; A&Ox4. AVSS, NAD noted at this time. Full assessment into epic. Pt using O2 as needed, uses O2 at home. Pt hoping to be d/c'd home this morning- will update pt as needed. IVF infusing per mar. Pt up with minimal assist well tolerated. Patient updated on current POC- verbalized understanding. All safety precautions in place- safety maintained. Will continue to monitor.

## 2018-04-10 NOTE — PROGRESS NOTES
Hospital PCP [facility] follow-up appointment with Shereen Cabezas NP on Monday April 16, 2018 @ 8:00 a.m.  Added to AVS.   Yelena Conway CM Specialist

## 2018-04-11 ENCOUNTER — PATIENT OUTREACH (OUTPATIENT)
Dept: FAMILY MEDICINE CLINIC | Age: 68
End: 2018-04-11

## 2018-04-11 LAB
BACTERIA SPEC CULT: ABNORMAL
BACTERIA SPEC CULT: NORMAL
GRAM STN SPEC: ABNORMAL
GRAM STN SPEC: NORMAL
SERVICE CMNT-IMP: ABNORMAL
SERVICE CMNT-IMP: NORMAL

## 2018-04-11 NOTE — PROGRESS NOTES
Hospital Discharge Follow-Up      Date/Time:  2018 3:08 PM    Patient was admitted to [facility] on 18 and discharged on 4/10/18 for Aspiration Pneumonia . The physician discharge summary was available at the time of outreach. Patient was contacted within 1 business days of discharge. Top Challenges reviewed with the provider   Unable to afford discharge antibiotic medications Clindamycin and Levaquin. Discussed this challenge with Pharm-D and High Risk team.  Patient states that he has no Cardiologist       Inpatient RRAT score: 25  Was this a readmission? no   Patient stated reason for the readmission: N/A    Method of communication with provider :staff message, Spoke with Manager Andrei Del Real and sent message to Pharm D Manuel Reed. Nurse Navigator (NN) contacted the patient by telephone to perform post hospital discharge assessment. Verified name and  with patient as identifiers. Provided introduction to self, and explanation of the Nurse Navigator role. Reviewed discharge instructions and red flags with  patient who verbalized understanding. Patient given an opportunity to ask questions and does not have any further questions or concerns at this time. The patient agrees to contact the PCP office for questions related to their healthcare. NN provided contact information for future reference. Summary of patients top problems:  1. Does not have discharge antibiotic medications Clindamycin and Levaquin  2. Patient states that he has no Cardiologist      Home Health orders at discharge: none Patient is receiving Pulmonary Rehab with [facility].   [address]: [facility]  Date of initial visit: 18    Durable Medical Equipment ordered/company: O2 (2L) portable concentrator, Nebulizer  Durable Medical Equipment received: None    Barriers to care? financial, level of motivation, medication management, support system, transportation    Advance Care Planning:   Does patient have an Advance Directive:  not on file       Medication:     New Medications at Discharge: Marinol, Levaquin, Cleocin, and Florastor  Changed Medications at Discharge: None  Discontinued Medications at Discharge: None    Medication reconciliation was performed with patient, who verbalizes understanding of administration of home medications. There were barriers to obtaining medications identified at this time. Referral to Pharm D needed: yes     Current Outpatient Prescriptions   Medication Sig    oxyCODONE IR (ROXICODONE) 20 mg immediate release tablet Take 20-40 mg by mouth every six (6) hours as needed for Pain (severe pain).  tiotropium (SPIRIVA WITH HANDIHALER) 18 mcg inhalation capsule Take 1 Cap by inhalation daily.  albuterol-ipratropium (DUO-NEB) 2.5 mg-0.5 mg/3 ml nebu 3 mL by Nebulization route every four (4) hours as needed.  aspirin delayed-release 81 mg tablet Take 1 Tab by mouth daily.  simvastatin (ZOCOR) 40 mg tablet Take 40 mg by mouth nightly.  dronabinol (MARINOL) 5 mg capsule Take 1 Cap by mouth two (2) times a day. Max Daily Amount: 10 mg. Indications: malnutrition    levoFLOXacin (LEVAQUIN) 750 mg tablet Take 1 Tab by mouth daily.  clindamycin (CLEOCIN) 300 mg capsule Take 1 Cap by mouth three (3) times daily.  Saccharomyces boulardii (FLORASTOR) 250 mg capsule Take 1 Cap by mouth two (2) times a day for 10 days. No current facility-administered medications for this visit. There are no discontinued medications. PCP/Specialist follow up:   Future Appointments  Date Time Provider Carol Yoo   5/1/2018 9:00 AM [facility] 11          Goals      Medicaiton Adherence (pt-stated)            4/11/18 Patient states that he does not have his antibiotics due to not being able to afford them at pharmacy. Will look for prescription assistance for these needed medications. TCP       Understand CHF action plan.             4/11/18 Patient states that he does not have a Cardiologist. Will assist the patient in locating a Cardiologist and schedule a follow up appointment as soon as possible.  TCP

## 2018-04-12 ENCOUNTER — PATIENT OUTREACH (OUTPATIENT)
Dept: FAMILY MEDICINE CLINIC | Age: 68
End: 2018-04-12

## 2018-04-17 NOTE — PROGRESS NOTES
Goals      Medicaiton Adherence (pt-stated)            4/11/18 Patient states that he does not have his antibiotics due to not being able to afford them at pharmacy. Will look for prescription assistance for these needed medications. TCP    4/12/18 [facility] albe to fill these needed prescriptions for this patient. Patient notified is this assistance. Will continue to monitor patient. TCP       Understand CHF action plan. 4/11/18 Patient states that he does not have a Cardiologist. Will assist the patient in locating a Cardiologist and schedule a follow up appointment as soon as possible. TCP            Outreach made to [facility] and spoke with Mamie Alston and she set up prescription assistance for this patient for medications Levaquin, Cleocin, and Florastor. Yael Stern stated that in order for the fourth medication Marinol to be filled she would need to have the original prescription. Yael Stern states that the prescriptions will be filled by noon today. Outreach made to this patient and informed him of the time of pick for these prescriptions and the location. Patient stated that he will be picking these prescriptions up. Contact information provided for any future questions or concerns.

## 2018-04-18 LAB
BACTERIA SPEC CULT: ABNORMAL
BACTERIA SPEC CULT: ABNORMAL
SERVICE CMNT-IMP: ABNORMAL

## 2018-04-19 ENCOUNTER — PATIENT OUTREACH (OUTPATIENT)
Dept: FAMILY MEDICINE CLINIC | Age: 68
End: 2018-04-19

## 2018-04-20 NOTE — PROGRESS NOTES
Outreach made to this patient as a follow up to his care. Patient states that he is doing well he says that he has appointment with PCP Dr. Armando Moya on 4/25/18. Patient states that he is having no issues with this medications and he will reach out to this NN if he has any questions or concerns. Contact information provided to this patient for any future questions or concerns. Goals      Medicaiton Adherence (pt-stated)            4/11/18 Patient states that he does not have his antibiotics due to not being able to afford them at pharmacy. Will look for prescription assistance for these needed medications. NN/ TCP    4/12/18 [facility] albe to fill these needed prescriptions for this patient. Patient notified is this assistance. Will continue to monitor patient. NN/TCP    4/19/18 Patient is currently completing the full course of his antibiotics and other prescribed medications. NN/TCP       Understand CHF action plan. 4/11/18 Patient states that he does not have a Cardiologist. Will assist the patient in locating a Cardiologist and schedule a follow up appointment as soon as possible. NN/TCP    4/19/18 Patient states that he has a follow up appointment with his PCP Dr. Armando Moya ([facility]) on 4/25/18 and he will talk to her about which Cardiologist she would like for him to see.  NN/TCP

## 2018-04-24 LAB
SPECIMEN SOURCE: NORMAL
VIRUS SPEC CULT: NORMAL

## 2018-04-27 ENCOUNTER — TELEPHONE (OUTPATIENT)
Dept: CARDIAC REHAB | Age: 68
End: 2018-04-27

## 2018-04-27 NOTE — TELEPHONE ENCOUNTER
4/27/2018 Cardiac Rehab: Called Mr. Julianna Tinajero to remind of intake appointment on Tuesday, May 1, 2018. Confirmed appointment with patient. Provided patient with contact information for [facility]. Also, reminded patient to bring a list of current medications, a personal schedule, and to wear comfortable clothes and shoes.  Vanessa Ramirez

## 2018-05-01 ENCOUNTER — APPOINTMENT (OUTPATIENT)
Dept: CARDIAC REHAB | Age: 68
End: 2018-05-01

## 2018-05-01 ENCOUNTER — TELEPHONE (OUTPATIENT)
Dept: CARDIAC REHAB | Age: 68
End: 2018-05-01

## 2018-05-01 NOTE — TELEPHONE ENCOUNTER
5/1/2018 Cardiac Wellness: Called Mr. Mariposa Lopez  to inquire about today's intake appointment since he has not arrive 20 minutes past his scheduled time. Mr. Arpit Kaiser states he does not want to do CWP since he goes to [facility] every morning, so I advised him to call his cardiologist office and speak with Dr. Rios Cheema about not wanting to participate since he goes to [facility] so he is aware.  Mervat Has

## 2018-05-02 ENCOUNTER — PATIENT OUTREACH (OUTPATIENT)
Dept: FAMILY MEDICINE CLINIC | Age: 68
End: 2018-05-02

## 2018-05-02 NOTE — PROGRESS NOTES
Outreach made to this patient as a follow up call to his care. The patient states that he is doing well and has no current needs at this time. Patient states that he has completed his antibiotic course and will be seeing his primary care physician towards the end of this month. Contact information provided to this patient for any future questions or concerns. Goals      Medicaiton Adherence (pt-stated)            4/11/18 Patient states that he does not have his antibiotics due to not being able to afford them at pharmacy. Will look for prescription assistance for these needed medications. NN/ TCP    4/12/18 [facility] albe to fill these needed prescriptions for this patient. Patient notified is this assistance. Will continue to monitor patient. NN/TCP    4/19/18 Patient is currently completing the full course of his antibiotics and other prescribed medications. NN/TCP    5/2/18 Patient has completed his full course of antibiotics and states that he is doing well. NN/TCP       Understand CHF action plan. 4/11/18 Patient states that he does not have a Cardiologist. Will assist the patient in locating a Cardiologist and schedule a follow up appointment as soon as possible. NN/TCP    4/19/18 Patient states that he has a follow up appointment with his PCP Dr. Madina Gao ([facility]) on 4/25/18 and he will talk to her about which Cardiologist she would like for him to see. NN/TCP    5/2/18 Patient states that his next follow up appointment with Dr. Madina Gao is at the end of this month. Will continue to monitor this patient.  NN/TCP

## 2018-05-07 LAB
BACTERIA SPEC CULT: NORMAL
SERVICE CMNT-IMP: NORMAL

## 2018-05-08 ENCOUNTER — HOSPITAL ENCOUNTER (OUTPATIENT)
Dept: CT IMAGING | Age: 68
Discharge: HOME | End: 2018-05-08
Attending: INTERNAL MEDICINE
Payer: MEDICARE

## 2018-05-08 DIAGNOSIS — R93.89 ABNORMAL COMPUTERIZED AXIAL TOMOGRAPHY OF CHEST: ICD-10-CM

## 2018-05-08 PROCEDURE — 71250 CT THORAX DX C-: CPT

## 2018-05-16 ENCOUNTER — PATIENT OUTREACH (OUTPATIENT)
Dept: FAMILY MEDICINE CLINIC | Age: 68
End: 2018-05-16

## 2018-05-16 NOTE — PROGRESS NOTES
Spoke with this patient and he states that he his doing great. The patient states that he has visited his PCP and Cardiologist. He also states that he continues to go to [facility] for his cardiac rehabilitation. Contact information provided to this patient for any future questions or concerns. Goals      Understand CHF action plan. 4/11/18 Patient states that he does not have a Cardiologist. Will assist the patient in locating a Cardiologist and schedule a follow up appointment as soon as possible. NN/TCP    4/19/18 Patient states that he has a follow up appointment with his PCP Dr. Jt Coello ([facility]) on 4/25/18 and he will talk to her about which Cardiologist she would like for him to see. NN/TCP    5/2/18 Patient states that his next follow up appointment with Dr. Jt Coello is at the end of this month. Will continue to monitor this patient. NN/TCP    5/16/18 Patient states that he has followed up with his Cardiologist Ang Saenz and also attending his cardiac rehab outpatient with [facility].  NN/TCP

## 2018-05-24 LAB
ACID FAST STN SPEC: NEGATIVE
ACID FAST STN SPEC: NEGATIVE
MYCOBACTERIUM SPEC QL CULT: NEGATIVE
MYCOBACTERIUM SPEC QL CULT: NEGATIVE
SPECIMEN PREPARATION: NORMAL
SPECIMEN PREPARATION: NORMAL
SPECIMEN SOURCE: NORMAL
SPECIMEN SOURCE: NORMAL

## 2018-06-04 ENCOUNTER — TELEPHONE (OUTPATIENT)
Dept: CARDIAC REHAB | Age: 68
End: 2018-06-04

## 2018-06-04 NOTE — TELEPHONE ENCOUNTER
6/4/2018 Cardiac Rehab: Called Mr. Stephen Guillen to remind of intake appointment on Tuesday, June 5, 2018. Unable to leave voicemail message. Provided patient with contact information for [facility]. Also, reminded patient to bring a list of current medications, a personal schedule, and to wear comfortable clothes and shoes.  Doretha Musa

## 2018-06-05 ENCOUNTER — HOSPITAL ENCOUNTER (OUTPATIENT)
Dept: CARDIAC REHAB | Age: 68
Discharge: HOME | End: 2018-06-05
Payer: MEDICARE

## 2018-06-05 VITALS — WEIGHT: 147 LBS | HEIGHT: 71 IN | BODY MASS INDEX: 20.58 KG/M2

## 2018-06-05 VITALS — WEIGHT: 147 LBS | BODY MASS INDEX: 20.5 KG/M2

## 2018-06-05 PROCEDURE — 93798 PHYS/QHP OP CAR RHAB W/ECG: CPT

## 2018-06-06 ENCOUNTER — TELEPHONE (OUTPATIENT)
Dept: CARDIAC REHAB | Age: 68
End: 2018-06-06

## 2018-06-06 NOTE — CARDIO/PULMONARY
Jennifer Brock  78 y.o.  presented to cardiac wellness for orientation and exercise tolerance test today with a primary diagnosis of MI, PCI and SHF. Jennifer Brock has a history of lung cancer but no previous cardiac history. Cardiac risk factors include stress, chemotherapy and these were reviewed with him. Jennifer Brock is  and lives with his son, daughter in law and grandchildren. He is retired and enjoys traveling with his friends. PHQ9 depression score, Cardiac Knowledge Test, [facility] Activity Status Index, [facility] Quality of Life Index and Rate Your Plate Nutrition Assessments were not completed at time of intake appointment by patient request.  He explained that his reading ability if limited and asked if he could take the forms with him and return them the same day. He wanted his friend to assist him with the forms and declined to have a Cardiac Rehab RN assist today. Patient denied chest pain or SOB during 6 minute walk and was in SR with rare/occasional PVCs. Jennifer Brock will attend a 60 minute class once a week and exercise 1-2 days a week in cardiac wellness. Education manual given. EF is 30% . Exceptions noted during intake include: Pt. Has a history of lung cancer and does use home O2 at night. We will continue to monitor his O2 status should he become symptomatic during exercise at cardiac wellness outpatient clinic.     Julia Kwon, RN      Julia Kwon RN  6/6/2018

## 2018-06-06 NOTE — TELEPHONE ENCOUNTER
6/6/2018 Cardiac Rehab: Called Mr. Barrie Messina  to discuss participation in the Cardiac Rehab Program following his intake yesterday 6/5/18. He promised to return on 6/5 with his paperwork for his intake appointment stating he preferred to fill it out with a friend and declining assistance from cardiac wellness staff. I have made 2 phone calls today to Barrie Messina. Both phone calls were answered by his son stating the patient was unavailable. I left a verbal message with Mr. Juaquin Bhatti son asking him to please call cardiac wellness as soon as possible. He is scheduled to return to wellness later this week. We want to verify his schedule and remind him to bring his paperwork when he comes back.  Adis Suazo RN

## 2018-06-07 ENCOUNTER — APPOINTMENT (OUTPATIENT)
Dept: CARDIAC REHAB | Age: 68
End: 2018-06-07
Payer: MEDICARE

## 2018-06-13 ENCOUNTER — APPOINTMENT (OUTPATIENT)
Dept: CARDIAC REHAB | Age: 68
End: 2018-06-13
Payer: MEDICARE

## 2018-06-14 ENCOUNTER — APPOINTMENT (OUTPATIENT)
Dept: CARDIAC REHAB | Age: 68
End: 2018-06-14
Payer: MEDICARE

## 2018-06-21 ENCOUNTER — APPOINTMENT (OUTPATIENT)
Dept: CARDIAC REHAB | Age: 68
End: 2018-06-21
Payer: MEDICARE

## 2018-06-28 ENCOUNTER — APPOINTMENT (OUTPATIENT)
Dept: CARDIAC REHAB | Age: 68
End: 2018-06-28
Payer: MEDICARE

## 2018-07-05 ENCOUNTER — APPOINTMENT (OUTPATIENT)
Dept: CARDIAC REHAB | Age: 68
End: 2018-07-05

## 2018-07-12 ENCOUNTER — APPOINTMENT (OUTPATIENT)
Dept: CARDIAC REHAB | Age: 68
End: 2018-07-12

## 2018-07-19 ENCOUNTER — APPOINTMENT (OUTPATIENT)
Dept: CARDIAC REHAB | Age: 68
End: 2018-07-19

## 2018-07-23 ENCOUNTER — TELEPHONE (OUTPATIENT)
Dept: CARDIAC REHAB | Age: 68
End: 2018-07-23

## 2018-07-23 NOTE — TELEPHONE ENCOUNTER
7/23/2018 Cardiac Rehab: Called Mr. Hannah Witt  to inquire about his return to the Cardiac Rehab program. He was not available and his son stated Ceci Arriola is not planning on returning because of the cost and he will continue to exercise on his own. \" Minh Puckett RN    6/6/2018 Cardiac Rehab: Called Mr. Hannah Witt  to discuss participation in the Cardiac Rehab Program following his intake yesterday 6/5/18. He promised to return on 6/5 with his paperwork for his intake appointment stating he preferred to fill it out with a friend and declining assistance from cardiac wellness staff. I have made 2 phone calls today to Hannah Witt. Both phone calls were answered by his son stating the patient was unavailable. I left a verbal message with Mr. Rebecca Roche son asking him to please call cardiac wellness as soon as possible. He is scheduled to return to wellness later this week. We want to verify his schedule and remind him to bring his paperwork when he comes back.  Myra aBcon RN

## 2018-07-25 NOTE — CARDIO/PULMONARY
Paresh Bennett  78 y.o. With diagnosis of MI and stents, 02/19/18, attended phase II cardiac rehab 1 session on 06/05/18. He will not return to cardiac rehab due to cost and will exercise on his own. He has been discharged.      Jayda Rocha RN  7/25/2018

## 2018-07-26 ENCOUNTER — APPOINTMENT (OUTPATIENT)
Dept: CARDIAC REHAB | Age: 68
End: 2018-07-26

## 2018-08-02 ENCOUNTER — APPOINTMENT (OUTPATIENT)
Dept: CARDIAC REHAB | Age: 68
End: 2018-08-02

## 2018-08-09 ENCOUNTER — APPOINTMENT (OUTPATIENT)
Dept: CARDIAC REHAB | Age: 68
End: 2018-08-09

## 2018-08-16 ENCOUNTER — APPOINTMENT (OUTPATIENT)
Dept: CARDIAC REHAB | Age: 68
End: 2018-08-16

## 2018-08-23 ENCOUNTER — APPOINTMENT (OUTPATIENT)
Dept: CARDIAC REHAB | Age: 68
End: 2018-08-23

## 2018-11-26 ENCOUNTER — HOSPITAL ENCOUNTER (EMERGENCY)
Age: 68
Discharge: HOME | End: 2018-11-26
Attending: EMERGENCY MEDICINE
Payer: MEDICARE

## 2018-11-26 ENCOUNTER — APPOINTMENT (OUTPATIENT)
Dept: CT IMAGING | Age: 68
End: 2018-11-26
Attending: PHYSICIAN ASSISTANT
Payer: MEDICARE

## 2018-11-26 VITALS
DIASTOLIC BLOOD PRESSURE: 91 MMHG | RESPIRATION RATE: 16 BRPM | BODY MASS INDEX: 22.07 KG/M2 | HEART RATE: 89 BPM | OXYGEN SATURATION: 94 % | WEIGHT: 157.63 LBS | TEMPERATURE: 98 F | SYSTOLIC BLOOD PRESSURE: 150 MMHG | HEIGHT: 71 IN

## 2018-11-26 DIAGNOSIS — B02.9 HERPES ZOSTER WITHOUT COMPLICATION: Primary | ICD-10-CM

## 2018-11-26 LAB
ALBUMIN SERPL-MCNC: 3.6 G/DL (ref 3.5–5)
ALBUMIN/GLOB SERPL: 0.9 {RATIO} (ref 1.1–2.2)
ALP SERPL-CCNC: 80 U/L (ref 45–117)
ALT SERPL-CCNC: 16 U/L (ref 12–78)
ANION GAP SERPL CALC-SCNC: 9 MMOL/L (ref 5–15)
AST SERPL-CCNC: 14 U/L (ref 15–37)
BASOPHILS # BLD: 0.1 K/UL (ref 0–0.1)
BASOPHILS NFR BLD: 1 % (ref 0–1)
BILIRUB SERPL-MCNC: 0.5 MG/DL (ref 0.2–1)
BUN SERPL-MCNC: 18 MG/DL (ref 6–20)
BUN/CREAT SERPL: 15 (ref 12–20)
CALCIUM SERPL-MCNC: 9 MG/DL (ref 8.5–10.1)
CHLORIDE SERPL-SCNC: 107 MMOL/L (ref 97–108)
CO2 SERPL-SCNC: 25 MMOL/L (ref 21–32)
COMMENT, HOLDF: NORMAL
CREAT SERPL-MCNC: 1.17 MG/DL (ref 0.7–1.3)
DIFFERENTIAL METHOD BLD: NORMAL
EOSINOPHIL # BLD: 0.3 K/UL (ref 0–0.4)
EOSINOPHIL NFR BLD: 5 % (ref 0–7)
ERYTHROCYTE [DISTWIDTH] IN BLOOD BY AUTOMATED COUNT: 13.6 % (ref 11.5–14.5)
GLOBULIN SER CALC-MCNC: 4.1 G/DL (ref 2–4)
GLUCOSE SERPL-MCNC: 81 MG/DL (ref 65–100)
HCT VFR BLD AUTO: 42.2 % (ref 36.6–50.3)
HGB BLD-MCNC: 13.6 G/DL (ref 12.1–17)
IMM GRANULOCYTES # BLD: 0 K/UL (ref 0–0.04)
IMM GRANULOCYTES NFR BLD AUTO: 0 % (ref 0–0.5)
LIPASE SERPL-CCNC: 107 U/L (ref 73–393)
LYMPHOCYTES # BLD: 1.6 K/UL (ref 0.8–3.5)
LYMPHOCYTES NFR BLD: 24 % (ref 12–49)
MCH RBC QN AUTO: 29.5 PG (ref 26–34)
MCHC RBC AUTO-ENTMCNC: 32.2 G/DL (ref 30–36.5)
MCV RBC AUTO: 91.5 FL (ref 80–99)
MONOCYTES # BLD: 0.7 K/UL (ref 0–1)
MONOCYTES NFR BLD: 11 % (ref 5–13)
NEUTS SEG # BLD: 3.9 K/UL (ref 1.8–8)
NEUTS SEG NFR BLD: 59 % (ref 32–75)
NRBC # BLD: 0 K/UL (ref 0–0.01)
NRBC BLD-RTO: 0 PER 100 WBC
PLATELET # BLD AUTO: 322 K/UL (ref 150–400)
PMV BLD AUTO: 9.9 FL (ref 8.9–12.9)
POTASSIUM SERPL-SCNC: 4.5 MMOL/L (ref 3.5–5.1)
PROT SERPL-MCNC: 7.7 G/DL (ref 6.4–8.2)
RBC # BLD AUTO: 4.61 M/UL (ref 4.1–5.7)
SAMPLES BEING HELD,HOLD: NORMAL
SODIUM SERPL-SCNC: 141 MMOL/L (ref 136–145)
WBC # BLD AUTO: 6.6 K/UL (ref 4.1–11.1)

## 2018-11-26 PROCEDURE — 74011250637 HC RX REV CODE- 250/637: Performed by: PHYSICIAN ASSISTANT

## 2018-11-26 PROCEDURE — 80053 COMPREHEN METABOLIC PANEL: CPT

## 2018-11-26 PROCEDURE — 99283 EMERGENCY DEPT VISIT LOW MDM: CPT

## 2018-11-26 PROCEDURE — 36415 COLL VENOUS BLD VENIPUNCTURE: CPT

## 2018-11-26 PROCEDURE — 74177 CT ABD & PELVIS W/CONTRAST: CPT

## 2018-11-26 PROCEDURE — 85025 COMPLETE CBC W/AUTO DIFF WBC: CPT

## 2018-11-26 PROCEDURE — 74011000250 HC RX REV CODE- 250: Performed by: PHYSICIAN ASSISTANT

## 2018-11-26 PROCEDURE — 74011000258 HC RX REV CODE- 258: Performed by: RADIOLOGY

## 2018-11-26 PROCEDURE — 74011636320 HC RX REV CODE- 636/320: Performed by: RADIOLOGY

## 2018-11-26 PROCEDURE — 83690 ASSAY OF LIPASE: CPT

## 2018-11-26 RX ORDER — HYDROCODONE BITARTRATE AND ACETAMINOPHEN 5; 325 MG/1; MG/1
1 TABLET ORAL
Qty: 15 TAB | Refills: 0 | Status: SHIPPED | OUTPATIENT
Start: 2018-11-26 | End: 2019-01-09

## 2018-11-26 RX ORDER — VALACYCLOVIR HYDROCHLORIDE 500 MG/1
1000 TABLET, FILM COATED ORAL 3 TIMES DAILY
Qty: 42 TAB | Refills: 0 | Status: SHIPPED | OUTPATIENT
Start: 2018-11-26 | End: 2018-12-03

## 2018-11-26 RX ORDER — FAMOTIDINE 20 MG/1
20 TABLET, FILM COATED ORAL 2 TIMES DAILY
Qty: 20 TAB | Refills: 0 | Status: SHIPPED | OUTPATIENT
Start: 2018-11-26 | End: 2018-12-06

## 2018-11-26 RX ORDER — VALACYCLOVIR HYDROCHLORIDE 500 MG/1
1000 TABLET, FILM COATED ORAL
Status: COMPLETED | OUTPATIENT
Start: 2018-11-26 | End: 2018-11-26

## 2018-11-26 RX ORDER — SODIUM CHLORIDE 0.9 % (FLUSH) 0.9 %
10 SYRINGE (ML) INJECTION
Status: COMPLETED | OUTPATIENT
Start: 2018-11-26 | End: 2018-11-26

## 2018-11-26 RX ADMIN — VALACYCLOVIR HYDROCHLORIDE 1000 MG: 500 TABLET, FILM COATED ORAL at 09:53

## 2018-11-26 RX ADMIN — Medication 10 ML: at 12:19

## 2018-11-26 RX ADMIN — IOPAMIDOL 100 ML: 755 INJECTION, SOLUTION INTRAVENOUS at 12:20

## 2018-11-26 RX ADMIN — LIDOCAINE HYDROCHLORIDE 40 ML: 20 SOLUTION ORAL; TOPICAL at 09:53

## 2018-11-26 RX ADMIN — SODIUM CHLORIDE 100 ML: 900 INJECTION, SOLUTION INTRAVENOUS at 12:19

## 2018-11-26 NOTE — ED TRIAGE NOTES
Pt states that he noticed a rash to his left side of torso and to his mid abd. Pt state that he has pain to his mid abd where he has had a hernia for the past couple of years.

## 2018-11-26 NOTE — DISCHARGE INSTRUCTIONS

## 2018-11-26 NOTE — ED PROVIDER NOTES
81 y/o male with PMHx of lung cancer, hyperlipidemia, and COPD, presenting with complaint of shingles and abdominal pain. The patient reports a longstanding history of intermittent epigastric discomfort due to a known hernia, but 3 days ago he noticed worsened epigastric pain. He also reports a painful rash on his left chest wall and the left side of his abdomen for the past 4 days. The rash is not itchy. A friend told him it looked like shingles, which prompted him to come to the ED for evaluation. The pain is 6/10, somewhat relieved by tylenol. He endorses baseline shortness of breath due to COPD, and baseline light-headedness with exercising. He denies fevers, chills, diffuse rash, chest pain, N/V/D, body aches or syncope. The history is provided by the patient. Past Medical History:  
Diagnosis Date  Asthma  Cancer ([facility] [address] Lung Cancer  Chronic obstructive pulmonary disease ([facility] [address]  Chronic pain  High cholesterol  Lung cancer ([facility] Past Surgical History:  
Procedure Laterality Date  HX LOBECTOMY Right lung  HX OTHER SURGICAL Partial right lung removal  
 
   
No family history on file. Social History Socioeconomic History  Marital status: LIFE PARTNER Spouse name: Not on file  Number of children: Not on file  Years of education: Not on file  Highest education level: Not on file Social Needs  Financial resource strain: Not on file  Food insecurity - worry: Not on file  Food insecurity - inability: Not on file  Transportation needs - medical: Not on file  Transportation needs - non-medical: Not on file Occupational History  Not on file Tobacco Use  Smoking status: Former Smoker Packs/day: 1.00 Years: 45.00 Pack years: 45.00 Last attempt to quit: 2009 Years since quittin.9  Smokeless tobacco: Never Used Substance and Sexual Activity  Alcohol use:  No  
 Comment: rarely/social   
 Drug use: No  
 Sexual activity: Not on file Other Topics Concern  Not on file Social History Narrative ** Merged History Encounter ** ALLERGIES: Patient has no known allergies. Review of Systems Constitutional: Negative for chills and fever. Respiratory: Positive for shortness of breath (baseline). Cardiovascular: Negative for chest pain. Gastrointestinal: Positive for abdominal pain. Negative for diarrhea, nausea and vomiting. Musculoskeletal: Negative for myalgias. Skin: Positive for rash. Neurological: Positive for light-headedness (occasional light-headedness when exercising). Negative for syncope. All other systems reviewed and are negative. Vitals:  
 11/26/18 4261 11/26/18 0915 BP:  132/79 Pulse: 98 82 Resp:  16 Temp:  97.8 °F (36.6 °C) SpO2: 92% 94% Weight:  71.5 kg (157 lb 10.1 oz) Height:  5' 11\" (1.803 m) Physical Exam  
Constitutional: He is oriented to person, place, and time. He appears well-developed and well-nourished. No distress. HENT:  
Head: Normocephalic and atraumatic. Eyes: Conjunctivae and EOM are normal.  
Neck: Normal range of motion. Neck supple. Cardiovascular: Normal rate. Pulmonary/Chest: Effort normal. No respiratory distress. Abdominal: Soft. He exhibits no distension. There is tenderness (mild epigastric). There is no rebound and no guarding. Musculoskeletal: Normal range of motion. Neurological: He is alert and oriented to person, place, and time. Skin: Skin is warm and dry. He is not diaphoretic. Vesicular rash on erythematous base noted on left thoracic back, flank and abdomen. Nursing note and vitals reviewed. MDM Number of Diagnoses or Management Options Herpes zoster without complication:  
  
Amount and/or Complexity of Data Reviewed Clinical lab tests: ordered and reviewed Tests in the radiology section of CPT®: ordered and reviewed Patient Progress Patient progress: stable Procedures 79 y/o male with PMHx of lung cancer, hyperlipidemia, and COPD, presenting with complaint of shingles and abdominal pain. Physical exam consistent with shingles. Abdominal pain suggestive of gastritis, pain improved with GI cocktail. CBC, CMP and lipase are unremarkable. CT abd/pelvis reveals metallic foreign body in left flank, patient reports previous pellet injury; no evidence of acute intraabdominal process. Safe for discharge home with Rx for pepcid, valtrex and norco. Recommended PCP follow up if pain does not improve. Strict ED return precautions discussed and provided in writing at time of discharge. The patient verbalized understanding and agreement with this plan.

## 2018-11-27 NOTE — CALL BACK NOTE
[facility] Emergency Department Follow Up Call Record Discharged to : Home/Family Home/Home Health/Skilled Facility/Rehab/Assisted Living/Other__Home_____ 1) Did you receive your discharge instructions? Yes This writer spoke with Grecia Santos , who verified . Mr. Jennifer Bowens states he is managing well with the rash. He has picked up and taking his prescriptions. 2) Do you understand them? Yes        
3) Are you able to follow them? Yes If NO, what can I clarify for you? 4) Do you understand your diagnosis? Yes        
5) Do you know which symptoms should prompt you to call the doctor? Yes    
6) Were you able to fill and  any medications that were prescribed? Yes  
 
7) You were prescribed  pepcid, hydrocodone, acyclovir___________for _shingles rash_ _________________. Common side effects of this medication are___headache, mild nausea_________________. This is not a complete list so please review the forms given from the pharmacy for a complete list. 8) Are there any questions about your medications? No     
 
  
 Have you scheduled any recommended doctors appointments (specialty, PCP) Encouraged follow up with PCP. If NO, what barriers are you encountering (transportation/lost contact info/cost/ 
didnt think necessary/no PCP 
9) If discharged with Home Health, has the agency contacted you to schedule visit? Not applicable 10) Is there anyone available to help you at home (meals, errands, transportation   
monitoring) (adult children, neighbors, private duty companions) Not applicable 11) Are you on a special diet? No        
If YES, do you understand the requirements for this diet? Education provided? 12) If presented with cough, bronchitis, COPD, asthma, is it ok to ask that the 
 respiratory disease management educator call you? Not applicable 13)  A) If presented with fall, were you issued an assistive device in the ED   
 Are you using? Not applicable B) If given RX for device, have you obtained? Not applicable If NO, barriers? C) Therapist recommended: 
 Are you able to implement the suggestions? Not applicable If NO, barriers to implementation? D) Are you having any difficulties with mobility inside your home?   
 (steps, bed, tub)No 
 If YES, ask if the [facility] PT can contact patient and good time and number? 
14)  At the end of your discharge instructions, there is information about accessing [facility] SERVICES, have you had a chance to review those? No     
 
 Do you have any questions about signing up for this service? We encourage our patients to be active participants in their healthcare and this site is one of the ways to do that. It will allow you to access parts of your medical record, email your doctors office, schedule appointments, and request medications refills . 15) Are there any other questions that I can answer for you regarding  
 your Emergency department visit? NO    Reviewed precautions concerning Varicella rash with this patient. Estimated Call Time:____3:36 PM 
_______________ Date/Time:_______________

## 2019-01-09 ENCOUNTER — HOSPITAL ENCOUNTER (INPATIENT)
Age: 69
LOS: 4 days | Discharge: NURSING, REHAB OR LONG-TERM CARE | DRG: 194 | End: 2019-01-13
Attending: EMERGENCY MEDICINE | Admitting: FAMILY MEDICINE
Payer: MEDICARE

## 2019-01-09 ENCOUNTER — APPOINTMENT (OUTPATIENT)
Dept: GENERAL RADIOLOGY | Age: 69
DRG: 194 | End: 2019-01-09
Attending: EMERGENCY MEDICINE
Payer: MEDICARE

## 2019-01-09 ENCOUNTER — APPOINTMENT (OUTPATIENT)
Dept: CT IMAGING | Age: 69
DRG: 194 | End: 2019-01-09
Attending: FAMILY MEDICINE
Payer: MEDICARE

## 2019-01-09 DIAGNOSIS — J44.1 COPD EXACERBATION (HCC): Primary | ICD-10-CM

## 2019-01-09 PROBLEM — R06.02 SOB (SHORTNESS OF BREATH): Status: ACTIVE | Noted: 2019-01-09

## 2019-01-09 LAB
ALBUMIN SERPL-MCNC: 3.5 G/DL (ref 3.5–5)
ALBUMIN/GLOB SERPL: 0.9 {RATIO} (ref 1.1–2.2)
ALP SERPL-CCNC: 95 U/L (ref 45–117)
ALT SERPL-CCNC: 19 U/L (ref 12–78)
ANION GAP SERPL CALC-SCNC: 7 MMOL/L (ref 5–15)
APPEARANCE UR: CLEAR
ARTERIAL PATENCY WRIST A: YES
AST SERPL-CCNC: 15 U/L (ref 15–37)
ATRIAL RATE: 84 BPM
BACTERIA URNS QL MICRO: NEGATIVE /HPF
BASE EXCESS BLD CALC-SCNC: 1 MMOL/L
BASOPHILS # BLD: 0.1 K/UL (ref 0–0.1)
BASOPHILS NFR BLD: 1 % (ref 0–1)
BDY SITE: NORMAL
BILIRUB SERPL-MCNC: 1.1 MG/DL (ref 0.2–1)
BILIRUB UR QL: NEGATIVE
BUN SERPL-MCNC: 13 MG/DL (ref 6–20)
BUN/CREAT SERPL: 12 (ref 12–20)
CALCIUM SERPL-MCNC: 9 MG/DL (ref 8.5–10.1)
CALCULATED P AXIS, ECG09: 94 DEGREES
CALCULATED R AXIS, ECG10: 26 DEGREES
CALCULATED T AXIS, ECG11: 121 DEGREES
CHLORIDE SERPL-SCNC: 101 MMOL/L (ref 97–108)
CO2 SERPL-SCNC: 30 MMOL/L (ref 21–32)
COLOR UR: ABNORMAL
COMMENT, HOLDF: NORMAL
CREAT SERPL-MCNC: 1.09 MG/DL (ref 0.7–1.3)
DIAGNOSIS, 93000: NORMAL
DIFFERENTIAL METHOD BLD: ABNORMAL
EOSINOPHIL # BLD: 2.7 K/UL (ref 0–0.4)
EOSINOPHIL NFR BLD: 21 % (ref 0–7)
EPITH CASTS URNS QL MICRO: ABNORMAL /LPF
ERYTHROCYTE [DISTWIDTH] IN BLOOD BY AUTOMATED COUNT: 15.4 % (ref 11.5–14.5)
FLUAV AG NPH QL IA: NEGATIVE
FLUBV AG NOSE QL IA: NEGATIVE
GAS FLOW.O2 O2 DELIVERY SYS: NORMAL L/MIN
GAS FLOW.O2 SETTING OXYMISER: 3 L/M
GLOBULIN SER CALC-MCNC: 3.8 G/DL (ref 2–4)
GLUCOSE SERPL-MCNC: 82 MG/DL (ref 65–100)
GLUCOSE UR STRIP.AUTO-MCNC: NEGATIVE MG/DL
HCO3 BLD-SCNC: 25.8 MMOL/L (ref 22–26)
HCT VFR BLD AUTO: 46.1 % (ref 36.6–50.3)
HGB BLD-MCNC: 14.5 G/DL (ref 12.1–17)
HGB UR QL STRIP: NEGATIVE
HYALINE CASTS URNS QL MICRO: ABNORMAL /LPF (ref 0–5)
IMM GRANULOCYTES # BLD AUTO: 0.1 K/UL (ref 0–0.04)
IMM GRANULOCYTES NFR BLD AUTO: 1 % (ref 0–0.5)
KETONES UR QL STRIP.AUTO: ABNORMAL MG/DL
LACTATE BLD-SCNC: 1.11 MMOL/L (ref 0.4–2)
LEUKOCYTE ESTERASE UR QL STRIP.AUTO: NEGATIVE
LYMPHOCYTES # BLD: 2.2 K/UL (ref 0.8–3.5)
LYMPHOCYTES NFR BLD: 17 % (ref 12–49)
MAGNESIUM SERPL-MCNC: 2.3 MG/DL (ref 1.6–2.4)
MCH RBC QN AUTO: 29.6 PG (ref 26–34)
MCHC RBC AUTO-ENTMCNC: 31.5 G/DL (ref 30–36.5)
MCV RBC AUTO: 94.1 FL (ref 80–99)
MONOCYTES # BLD: 1 K/UL (ref 0–1)
MONOCYTES NFR BLD: 8 % (ref 5–13)
NEUTS SEG # BLD: 6.9 K/UL (ref 1.8–8)
NEUTS SEG NFR BLD: 53 % (ref 32–75)
NITRITE UR QL STRIP.AUTO: NEGATIVE
NRBC # BLD: 0 K/UL (ref 0–0.01)
NRBC BLD-RTO: 0 PER 100 WBC
O2/TOTAL GAS SETTING VFR VENT: 32 %
P-R INTERVAL, ECG05: 136 MS
PCO2 BLD: 42.6 MMHG (ref 35–45)
PH BLD: 7.39 [PH] (ref 7.35–7.45)
PH UR STRIP: 7.5 [PH] (ref 5–8)
PLATELET # BLD AUTO: 316 K/UL (ref 150–400)
PMV BLD AUTO: 9.6 FL (ref 8.9–12.9)
PO2 BLD: 87 MMHG (ref 80–100)
POTASSIUM SERPL-SCNC: 4.1 MMOL/L (ref 3.5–5.1)
PROT SERPL-MCNC: 7.3 G/DL (ref 6.4–8.2)
PROT UR STRIP-MCNC: NEGATIVE MG/DL
Q-T INTERVAL, ECG07: 354 MS
QRS DURATION, ECG06: 82 MS
QTC CALCULATION (BEZET), ECG08: 418 MS
RBC # BLD AUTO: 4.9 M/UL (ref 4.1–5.7)
RBC #/AREA URNS HPF: ABNORMAL /HPF (ref 0–5)
SAMPLES BEING HELD,HOLD: NORMAL
SAO2 % BLD: 96 % (ref 92–97)
SODIUM SERPL-SCNC: 138 MMOL/L (ref 136–145)
SP GR UR REFRACTOMETRY: 1.01 (ref 1–1.03)
SPECIMEN TYPE: NORMAL
TOTAL RESP. RATE, ITRR: 18
TROPONIN I SERPL-MCNC: <0.05 NG/ML
TROPONIN I SERPL-MCNC: <0.05 NG/ML
UR CULT HOLD, URHOLD: NORMAL
UROBILINOGEN UR QL STRIP.AUTO: 0.2 EU/DL (ref 0.2–1)
VENTRICULAR RATE, ECG03: 84 BPM
WBC # BLD AUTO: 13 K/UL (ref 4.1–11.1)
WBC URNS QL MICRO: ABNORMAL /HPF (ref 0–4)

## 2019-01-09 PROCEDURE — 74011636320 HC RX REV CODE- 636/320: Performed by: RADIOLOGY

## 2019-01-09 PROCEDURE — 74011000258 HC RX REV CODE- 258: Performed by: RADIOLOGY

## 2019-01-09 PROCEDURE — 36415 COLL VENOUS BLD VENIPUNCTURE: CPT

## 2019-01-09 PROCEDURE — 82803 BLOOD GASES ANY COMBINATION: CPT

## 2019-01-09 PROCEDURE — 71275 CT ANGIOGRAPHY CHEST: CPT

## 2019-01-09 PROCEDURE — 83735 ASSAY OF MAGNESIUM: CPT

## 2019-01-09 PROCEDURE — 94640 AIRWAY INHALATION TREATMENT: CPT

## 2019-01-09 PROCEDURE — 65660000000 HC RM CCU STEPDOWN

## 2019-01-09 PROCEDURE — 70450 CT HEAD/BRAIN W/O DYE: CPT

## 2019-01-09 PROCEDURE — 74011000258 HC RX REV CODE- 258: Performed by: FAMILY MEDICINE

## 2019-01-09 PROCEDURE — 80053 COMPREHEN METABOLIC PANEL: CPT

## 2019-01-09 PROCEDURE — 36600 WITHDRAWAL OF ARTERIAL BLOOD: CPT

## 2019-01-09 PROCEDURE — 85025 COMPLETE CBC W/AUTO DIFF WBC: CPT

## 2019-01-09 PROCEDURE — 81001 URINALYSIS AUTO W/SCOPE: CPT

## 2019-01-09 PROCEDURE — 77010033678 HC OXYGEN DAILY

## 2019-01-09 PROCEDURE — 96374 THER/PROPH/DIAG INJ IV PUSH: CPT

## 2019-01-09 PROCEDURE — 74011000250 HC RX REV CODE- 250: Performed by: EMERGENCY MEDICINE

## 2019-01-09 PROCEDURE — 74011250636 HC RX REV CODE- 250/636: Performed by: EMERGENCY MEDICINE

## 2019-01-09 PROCEDURE — 99285 EMERGENCY DEPT VISIT HI MDM: CPT

## 2019-01-09 PROCEDURE — 74011000250 HC RX REV CODE- 250: Performed by: FAMILY MEDICINE

## 2019-01-09 PROCEDURE — 77030029684 HC NEB SM VOL KT MONA -A

## 2019-01-09 PROCEDURE — 93005 ELECTROCARDIOGRAM TRACING: CPT

## 2019-01-09 PROCEDURE — 87804 INFLUENZA ASSAY W/OPTIC: CPT

## 2019-01-09 PROCEDURE — 74011250636 HC RX REV CODE- 250/636: Performed by: FAMILY MEDICINE

## 2019-01-09 PROCEDURE — 84484 ASSAY OF TROPONIN QUANT: CPT

## 2019-01-09 PROCEDURE — 71046 X-RAY EXAM CHEST 2 VIEWS: CPT

## 2019-01-09 PROCEDURE — 83605 ASSAY OF LACTIC ACID: CPT

## 2019-01-09 RX ORDER — SODIUM CHLORIDE 0.9 % (FLUSH) 0.9 %
5-40 SYRINGE (ML) INJECTION AS NEEDED
Status: DISCONTINUED | OUTPATIENT
Start: 2019-01-09 | End: 2019-01-13 | Stop reason: HOSPADM

## 2019-01-09 RX ORDER — ACETAMINOPHEN 325 MG/1
650 TABLET ORAL
Status: DISCONTINUED | OUTPATIENT
Start: 2019-01-09 | End: 2019-01-13 | Stop reason: HOSPADM

## 2019-01-09 RX ORDER — OXYCODONE HYDROCHLORIDE 5 MG/1
5 TABLET ORAL
Status: DISCONTINUED | OUTPATIENT
Start: 2019-01-09 | End: 2019-01-13 | Stop reason: HOSPADM

## 2019-01-09 RX ORDER — SODIUM CHLORIDE 0.9 % (FLUSH) 0.9 %
10 SYRINGE (ML) INJECTION
Status: COMPLETED | OUTPATIENT
Start: 2019-01-09 | End: 2019-01-09

## 2019-01-09 RX ORDER — ASPIRIN 81 MG/1
81 TABLET ORAL DAILY
Status: DISCONTINUED | OUTPATIENT
Start: 2019-01-10 | End: 2019-01-13 | Stop reason: HOSPADM

## 2019-01-09 RX ORDER — IPRATROPIUM BROMIDE AND ALBUTEROL SULFATE 2.5; .5 MG/3ML; MG/3ML
SOLUTION RESPIRATORY (INHALATION)
Status: DISPENSED
Start: 2019-01-09 | End: 2019-01-10

## 2019-01-09 RX ORDER — IPRATROPIUM BROMIDE AND ALBUTEROL SULFATE 2.5; .5 MG/3ML; MG/3ML
3 SOLUTION RESPIRATORY (INHALATION)
Status: DISCONTINUED | OUTPATIENT
Start: 2019-01-09 | End: 2019-01-13 | Stop reason: HOSPADM

## 2019-01-09 RX ORDER — BUDESONIDE 0.5 MG/2ML
500 INHALANT ORAL
Status: DISCONTINUED | OUTPATIENT
Start: 2019-01-09 | End: 2019-01-13 | Stop reason: HOSPADM

## 2019-01-09 RX ORDER — ALBUTEROL SULFATE 0.83 MG/ML
SOLUTION RESPIRATORY (INHALATION)
Status: DISPENSED
Start: 2019-01-09 | End: 2019-01-10

## 2019-01-09 RX ORDER — ALBUTEROL SULFATE 90 UG/1
1-2 AEROSOL, METERED RESPIRATORY (INHALATION)
COMMUNITY
End: 2022-10-26

## 2019-01-09 RX ORDER — SODIUM CHLORIDE 0.9 % (FLUSH) 0.9 %
5-40 SYRINGE (ML) INJECTION EVERY 8 HOURS
Status: DISCONTINUED | OUTPATIENT
Start: 2019-01-09 | End: 2019-01-13 | Stop reason: HOSPADM

## 2019-01-09 RX ORDER — FUROSEMIDE 10 MG/ML
20 INJECTION INTRAMUSCULAR; INTRAVENOUS ONCE
Status: COMPLETED | OUTPATIENT
Start: 2019-01-09 | End: 2019-01-09

## 2019-01-09 RX ORDER — IPRATROPIUM BROMIDE AND ALBUTEROL SULFATE 2.5; .5 MG/3ML; MG/3ML
3 SOLUTION RESPIRATORY (INHALATION)
Status: DISCONTINUED | OUTPATIENT
Start: 2019-01-09 | End: 2019-01-09 | Stop reason: SDUPTHER

## 2019-01-09 RX ADMIN — Medication 10 ML: at 20:35

## 2019-01-09 RX ADMIN — METHYLPREDNISOLONE SODIUM SUCCINATE 60 MG: 125 INJECTION, POWDER, FOR SOLUTION INTRAMUSCULAR; INTRAVENOUS at 23:15

## 2019-01-09 RX ADMIN — ALBUTEROL SULFATE 1 DOSE: 2.5 SOLUTION RESPIRATORY (INHALATION) at 13:05

## 2019-01-09 RX ADMIN — AZITHROMYCIN MONOHYDRATE 500 MG: 500 INJECTION, POWDER, LYOPHILIZED, FOR SOLUTION INTRAVENOUS at 21:06

## 2019-01-09 RX ADMIN — CEFTRIAXONE 1 G: 1 INJECTION, POWDER, FOR SOLUTION INTRAMUSCULAR; INTRAVENOUS at 20:38

## 2019-01-09 RX ADMIN — IOPAMIDOL 80 ML: 755 INJECTION, SOLUTION INTRAVENOUS at 18:33

## 2019-01-09 RX ADMIN — ALBUTEROL SULFATE 1 DOSE: 2.5 SOLUTION RESPIRATORY (INHALATION) at 13:16

## 2019-01-09 RX ADMIN — SODIUM CHLORIDE 100 ML: 900 INJECTION, SOLUTION INTRAVENOUS at 18:33

## 2019-01-09 RX ADMIN — BUDESONIDE 500 MCG: 0.5 INHALANT RESPIRATORY (INHALATION) at 22:07

## 2019-01-09 RX ADMIN — Medication 10 ML: at 18:33

## 2019-01-09 RX ADMIN — IPRATROPIUM BROMIDE AND ALBUTEROL SULFATE 3 ML: .5; 3 SOLUTION RESPIRATORY (INHALATION) at 22:07

## 2019-01-09 RX ADMIN — FUROSEMIDE 20 MG: 10 INJECTION, SOLUTION INTRAMUSCULAR; INTRAVENOUS at 20:35

## 2019-01-09 RX ADMIN — METHYLPREDNISOLONE SODIUM SUCCINATE 125 MG: 125 INJECTION, POWDER, FOR SOLUTION INTRAMUSCULAR; INTRAVENOUS at 12:37

## 2019-01-09 RX ADMIN — ALBUTEROL SULFATE 1 DOSE: 2.5 SOLUTION RESPIRATORY (INHALATION) at 13:00

## 2019-01-09 RX ADMIN — IPRATROPIUM BROMIDE AND ALBUTEROL SULFATE 3 ML: .5; 3 SOLUTION RESPIRATORY (INHALATION) at 17:08

## 2019-01-09 NOTE — ED TRIAGE NOTES
TRIAGE: Pt arrives via EMS from home with increased sob, generalized weakness and bilateral feet swelling started last night. Denies fever. Ran out of albuterol last night.  Hx: COPD, lung cancer and partial lobectomy

## 2019-01-09 NOTE — ROUTINE PROCESS
TRANSFER - OUT REPORT:    Verbal report given to Janice(name) on Paresh Bennett  being transferred to [facility](unit) for routine progression of care       Report consisted of patients Situation, Background, Assessment and   Recommendations(SBAR). Information from the following report(s) SBAR, Kardex, Intake/Output and Recent Results was reviewed with the receiving nurse. Lines:   Peripheral IV 01/09/19 Right Hand (Active)   Site Assessment Clean, dry, & intact 1/9/2019 11:30 AM   Phlebitis Assessment 0 1/9/2019 11:30 AM   Infiltration Assessment 0 1/9/2019 11:30 AM   Dressing Status Clean, dry, & intact 1/9/2019 11:30 AM   Hub Color/Line Status Pink 1/9/2019 11:30 AM       Peripheral IV 01/09/19 Left Forearm (Active)   Site Assessment Clean, dry, & intact 1/9/2019  5:08 PM   Phlebitis Assessment 0 1/9/2019  5:08 PM   Infiltration Assessment 0 1/9/2019  5:08 PM   Dressing Status Clean, dry, & intact 1/9/2019  5:08 PM   Hub Color/Line Status Pink 1/9/2019  5:08 PM        Opportunity for questions and clarification was provided.       Patient transported with:   Monitor  O2 @ 2 liters  Tech

## 2019-01-09 NOTE — ED PROVIDER NOTES
80 y.o. male with past medical history significant for Lung Cancer, High Cholesterol, COPD, and Asthma who presents via EMS with chief complaint of moderate SOB. Patient states gradual onset last night of increased SOB. Upon examination at [facility] ED patient has constant, worsening SOB with O2 sat 92% on 2L NC. Patient reports accompanying generalized weakness, bilateral feet swelling, gait difficulty, productive cough with \"thick, brown\" sputum which onset a few days ago, and right rib pain. Patient reports the use of O2 every night at baseline, but states he has needed to use O2 constantly for the past 2-3 days due to worsening SOB. Patient reports the use of albuterol inhaler for symptoms with no relief, but notes he ran out last night. Patient reports history of symptoms presented today as he stated this \"happens every year, sometimes it's pneumonia and sometimes they don't know what it is. \" Patient endorses history of COPD and lung cancer which required partial lobectomy. Patient denies history of blood clots. Pt denies fever, chills, hemoptysis, congestion, chest pain, abdominal pain, nausea, vomiting, diarrhea, difficulty with urination or dysuria. Old Chart Review: Patient was seen at [facility] on 11/28/2018 for shingles, completed blood work up which was unremarkable, given a dose of Valtrex, and discharged home with Valtrex, Norco, and Pepcid. Patient admitted for COPD exacerbation on 02/17/2018, and aspiration pneumonia of both lower lobes on 04/04/2018. There are no other acute medical concerns at this time.     PCP: Chris Calzada MD    Note written by Nathalia Castaneda, as dictated by Judith Jenkins DO 11:54 AM               Past Medical History:   Diagnosis Date    Asthma     Cancer [facility])     Lung Cancer    Chronic obstructive pulmonary disease ([address]     Chronic pain     High cholesterol     Lung cancer [facility])        Past Surgical History:   Procedure Laterality Date    HX LOBECTOMY Right lung    HX OTHER SURGICAL      Partial right lung removal         History reviewed. No pertinent family history. Social History     Socioeconomic History    Marital status: LIFE PARTNER     Spouse name: Not on file    Number of children: Not on file    Years of education: Not on file    Highest education level: Not on file   Social Needs    Financial resource strain: Not on file    Food insecurity - worry: Not on file    Food insecurity - inability: Not on file    Transportation needs - medical: Not on file   [employer] needs - non-medical: Not on file   Occupational History    Not on file   Tobacco Use    Smoking status: Former Smoker     Packs/day: 1.00     Years: 45.00     Pack years: 45.00     Last attempt to quit: 1/1/2009     Years since quitting: 10.0    Smokeless tobacco: Never Used   Substance and Sexual Activity    Alcohol use: No     Comment: rarely/social     Drug use: No    Sexual activity: Not on file   Other Topics Concern    Not on file   Social History Narrative    ** Merged History Encounter **              ALLERGIES: Patient has no known allergies. Review of Systems   Constitutional: Negative for chills and fever. HENT: Negative for congestion. Respiratory: Positive for cough and shortness of breath. Cardiovascular: Negative for chest pain. Gastrointestinal: Negative for abdominal pain, diarrhea, nausea and vomiting. Genitourinary: Negative for difficulty urinating and dysuria. Musculoskeletal: Positive for gait problem. Rib pain   Neurological: Positive for weakness. All other systems reviewed and are negative. Vitals:    01/09/19 1157 01/09/19 1300 01/09/19 1305 01/09/19 1310   BP: 156/89      Pulse: 88 82     Resp: 20 18     Temp: 97.7 °F (36.5 °C)      SpO2:  96% 96% 96%   Weight:       Height:                Physical Exam   Nursing note and vitals reviewed. Constitutional: Pt is awake and alert. Pt appears well-nourished. NAD. Frail.  HENT:   Head: Normocephalic and atraumatic. Nose: Nose normal.   Mouth/Throat: Oropharynx is clear and moist. No oropharyngeal exudate. Eyes: Conjunctivae and extraocular motions are normal. Pupils are equal, round, and reactive to light. Right eye exhibits no discharge. Left eye exhibits no discharge. No scleral icterus. Neck: No tracheal deviation present. Supple neck. Cardiovascular: Normal rate, regular rhythm, normal heart sounds and intact distal pulses. Exam reveals no gallop and no friction rub. No murmur heard. Pulmonary/Chest: Mild respiratory distress and wheezing bilaterally. Pt  has no rales. Abdominal: Soft. Pt  exhibits no distension and no mass. No tenderness. Pt  has no rebound and no guarding. Musculoskeletal:  Pt  exhibits no edema and no tenderness. Ext: Normal ROM in all four extremities; not tender to palpation; distal pulses are normal, no edema. Neurological:  Pt is alert. nonfocal neuro exam.  Skin: Skin is warm and dry. Pt  is not diaphoretic. Psychiatric:  Pt  has a normal mood and affect. Behavior is normal.   Note written by Frank Camacho, as dictated by Dav Anderson DO 11:54 AM    MDM       Procedures  ED EKG interpretation:  Rhythm: normal sinus rhythm with PACs; and regular . Rate (approx.): 84 bpm; Axis: normal; P wave: normal; QRS interval: normal ; ST/T wave: No acute ST or T wave changes noted. EKG documented by frank Camacho, as interpreted by Dav Anderson DO, ED.    CONSULT NOTE:  2:21 PM Dav Anderson DO communicated with Dr. Lin Acosta, Consult for Hospitalist via [facility] Text. Discussed available diagnostic tests and clinical findings. Dr. Lin Acosta will evaluate patient for possible admission. Dr. Lin Acosta will admit patient.          Nebs  Steroids  Still dyspneic, wheezing  Will admit  Doubt PE    Reviewed cxr    Recent Results (from the past 12 hour(s))   CBC WITH AUTOMATED DIFF    Collection Time: 01/09/19 12:04 PM   Result Value Ref Range    WBC 13.0 (H) 4.1 - 11.1 K/uL    RBC 4.90 4. 10 - 5.70 M/uL    HGB 14.5 12.1 - 17.0 g/dL    HCT 46.1 36.6 - 50.3 %    MCV 94.1 80.0 - 99.0 FL    MCH 29.6 26.0 - 34.0 PG    MCHC 31.5 30.0 - 36.5 g/dL    RDW 15.4 (H) 11.5 - 14.5 %    PLATELET 633 253 - 755 K/uL    MPV 9.6 8.9 - 12.9 FL    NRBC 0.0 0  WBC    ABSOLUTE NRBC 0.00 0.00 - 0.01 K/uL    NEUTROPHILS 53 32 - 75 %    LYMPHOCYTES 17 12 - 49 %    MONOCYTES 8 5 - 13 %    EOSINOPHILS 21 (H) 0 - 7 %    BASOPHILS 1 0 - 1 %    IMMATURE GRANULOCYTES 1 (H) 0.0 - 0.5 %    ABS. NEUTROPHILS 6.9 1.8 - 8.0 K/UL    ABS. LYMPHOCYTES 2.2 0.8 - 3.5 K/UL    ABS. MONOCYTES 1.0 0.0 - 1.0 K/UL    ABS. EOSINOPHILS 2.7 (H) 0.0 - 0.4 K/UL    ABS. BASOPHILS 0.1 0.0 - 0.1 K/UL    ABS. IMM. GRANS. 0.1 (H) 0.00 - 0.04 K/UL    DF AUTOMATED     METABOLIC PANEL, COMPREHENSIVE    Collection Time: 01/09/19 12:04 PM   Result Value Ref Range    Sodium 138 136 - 145 mmol/L    Potassium 4.1 3.5 - 5.1 mmol/L    Chloride 101 97 - 108 mmol/L    CO2 30 21 - 32 mmol/L    Anion gap 7 5 - 15 mmol/L    Glucose 82 65 - 100 mg/dL    BUN 13 6 - 20 MG/DL    Creatinine 1.09 0.70 - 1.30 MG/DL    BUN/Creatinine ratio 12 12 - 20      GFR est AA >60 >60 ml/min/1.73m2    GFR est non-AA >60 >60 ml/min/1.73m2    Calcium 9.0 8.5 - 10.1 MG/DL    Bilirubin, total 1.1 (H) 0.2 - 1.0 MG/DL    ALT (SGPT) 19 12 - 78 U/L    AST (SGOT) 15 15 - 37 U/L    Alk. phosphatase 95 45 - 117 U/L    Protein, total 7.3 6.4 - 8.2 g/dL    Albumin 3.5 3.5 - 5.0 g/dL    Globulin 3.8 2.0 - 4.0 g/dL    A-G Ratio 0.9 (L) 1.1 - 2.2     SAMPLES BEING HELD    Collection Time: 01/09/19 12:04 PM   Result Value Ref Range    SAMPLES BEING HELD 1RED,1BLUE     COMMENT        Add-on orders for these samples will be processed based on acceptable specimen integrity and analyte stability, which may vary by analyte.    TROPONIN I    Collection Time: 01/09/19 12:04 PM   Result Value Ref Range    Troponin-I, Qt. <0.05 <0.05 ng/mL   EKG, 12 LEAD, INITIAL    Collection Time: 01/09/19 12:10 PM   Result Value Ref Range    Ventricular Rate 84 BPM    Atrial Rate 84 BPM    P-R Interval 136 ms    QRS Duration 82 ms    Q-T Interval 354 ms    QTC Calculation (Bezet) 418 ms    Calculated P Axis 94 degrees    Calculated R Axis 26 degrees    Calculated T Axis 121 degrees    Diagnosis       Sinus rhythm with premature supraventricular complexes  Indeterminate axis  Lateral infarct , age undetermined  Inferior infarct , age undetermined  When compared with ECG of 04-APR-2018 15:50,  premature supraventricular complexes are now present  Lateral infarct is now present  Inferior infarct is now present  ST more depressed in Inferior leads  T wave inversion no longer evident in Anterolateral leads     POC LACTIC ACID    Collection Time: 01/09/19 12:38 PM   Result Value Ref Range    Lactic Acid (POC) 1.11 0.40 - 2.00 mmol/L

## 2019-01-09 NOTE — PROGRESS NOTES
Admission Medication Reconciliation:    Information obtained from: Patient    Significant PMH/Disease States:   Past Medical History:   Diagnosis Date    Asthma     [facility] ([facility] [address]     Lung Cancer    Chronic obstructive pulmonary disease ([facility] [address]     Chronic pain     High cholesterol     Lung cancer [facility])        Chief Complaint for this Admission:  SOB    Allergies:  Patient has no known allergies. Prior to Admission Medications:   Prior to Admission Medications   Prescriptions Last Dose Informant Patient Reported? Taking? albuterol (PROVENTIL HFA, VENTOLIN HFA, PROAIR HFA) 90 mcg/actuation inhaler   Yes Yes   Sig: Take 1-2 Puffs by inhalation every four (4) hours as needed for Wheezing. albuterol-ipratropium (DUO-NEB) 2.5 mg-0.5 mg/3 ml nebu   No yes   Sig: 3 mL by Nebulization route every four (4) hours as needed. aspirin delayed-release 81 mg tablet 1/9/2019  No yes   Sig: Take 1 Tab by mouth daily. oxyCODONE IR (ROXICODONE) 20 mg immediate release tablet 1/9/2019 at Unknown time  Yes Yes   Sig: Take 40 mg by mouth TID PRN      Facility-Administered Medications: None       Comments/Recommendations:   1. Removed clindamycin, Levaquin, Norco, simvastatin  2. Removed Spiriva and dronabinol - patient reports he cannot afford these medications  3. Changed oxycodone 40mg TID PRN (instructions confirmed with [facility] [address])  4. Added albuterol HFA.

## 2019-01-09 NOTE — PROGRESS NOTES
1800: TRANSFER - IN REPORT:    Verbal report received from [facility] RN(name) on Time Tabor  being received from ER(unit) for routine progression of care      Report consisted of patients Situation, Background, Assessment and   Recommendations(SBAR). Information from the following report(s) SBAR and Kardex was reviewed with the receiving nurse. Opportunity for questions and clarification was provided. Assessment completed upon patients arrival to unit and care assumed.

## 2019-01-09 NOTE — PROGRESS NOTES
Pt states he feels sob. Pt states his baseline is not using O2 during he day but the last few days he has been using it during the day. Pt also c/o R rib pain from a \"lung surgery\" in 2009. States, \"I get this once or twice a year,. It's usually pna\". Pt states he has hx of COPD, lung ca. Pt able to complete full sentences, states he has chronic cough with chronic clear sputum production that has changed to thick and brownish. Pt satting at 91-93% on RA but visibly more sob when talking; placed back onto 2L via NC for comfort.

## 2019-01-10 LAB
ANION GAP SERPL CALC-SCNC: 8 MMOL/L (ref 5–15)
BASOPHILS # BLD: 0 K/UL (ref 0–0.1)
BASOPHILS NFR BLD: 0 % (ref 0–1)
BUN SERPL-MCNC: 22 MG/DL (ref 6–20)
BUN/CREAT SERPL: 18 (ref 12–20)
CALCIUM SERPL-MCNC: 8.6 MG/DL (ref 8.5–10.1)
CHLORIDE SERPL-SCNC: 105 MMOL/L (ref 97–108)
CO2 SERPL-SCNC: 24 MMOL/L (ref 21–32)
CREAT SERPL-MCNC: 1.22 MG/DL (ref 0.7–1.3)
DIFFERENTIAL METHOD BLD: ABNORMAL
EOSINOPHIL # BLD: 0 K/UL (ref 0–0.4)
EOSINOPHIL NFR BLD: 0 % (ref 0–7)
ERYTHROCYTE [DISTWIDTH] IN BLOOD BY AUTOMATED COUNT: 14.9 % (ref 11.5–14.5)
GLUCOSE SERPL-MCNC: 140 MG/DL (ref 65–100)
HCT VFR BLD AUTO: 42.5 % (ref 36.6–50.3)
HGB BLD-MCNC: 13.8 G/DL (ref 12.1–17)
IMM GRANULOCYTES # BLD AUTO: 0.1 K/UL (ref 0–0.04)
IMM GRANULOCYTES NFR BLD AUTO: 1 % (ref 0–0.5)
LYMPHOCYTES # BLD: 0.9 K/UL (ref 0.8–3.5)
LYMPHOCYTES NFR BLD: 7 % (ref 12–49)
MCH RBC QN AUTO: 30.5 PG (ref 26–34)
MCHC RBC AUTO-ENTMCNC: 32.5 G/DL (ref 30–36.5)
MCV RBC AUTO: 94 FL (ref 80–99)
MONOCYTES # BLD: 0.1 K/UL (ref 0–1)
MONOCYTES NFR BLD: 1 % (ref 5–13)
NEUTS SEG # BLD: 12.4 K/UL (ref 1.8–8)
NEUTS SEG NFR BLD: 91 % (ref 32–75)
NRBC # BLD: 0 K/UL (ref 0–0.01)
NRBC BLD-RTO: 0 PER 100 WBC
PLATELET # BLD AUTO: 299 K/UL (ref 150–400)
PMV BLD AUTO: 9.7 FL (ref 8.9–12.9)
POTASSIUM SERPL-SCNC: 4 MMOL/L (ref 3.5–5.1)
RBC # BLD AUTO: 4.52 M/UL (ref 4.1–5.7)
RBC MORPH BLD: ABNORMAL
RBC MORPH BLD: ABNORMAL
SODIUM SERPL-SCNC: 137 MMOL/L (ref 136–145)
TROPONIN I SERPL-MCNC: <0.05 NG/ML
WBC # BLD AUTO: 13.5 K/UL (ref 4.1–11.1)

## 2019-01-10 PROCEDURE — 94640 AIRWAY INHALATION TREATMENT: CPT

## 2019-01-10 PROCEDURE — 93306 TTE W/DOPPLER COMPLETE: CPT

## 2019-01-10 PROCEDURE — 74011250637 HC RX REV CODE- 250/637: Performed by: FAMILY MEDICINE

## 2019-01-10 PROCEDURE — 74011636637 HC RX REV CODE- 636/637: Performed by: INTERNAL MEDICINE

## 2019-01-10 PROCEDURE — 36415 COLL VENOUS BLD VENIPUNCTURE: CPT

## 2019-01-10 PROCEDURE — 74011250636 HC RX REV CODE- 250/636: Performed by: INTERNAL MEDICINE

## 2019-01-10 PROCEDURE — 65660000000 HC RM CCU STEPDOWN

## 2019-01-10 PROCEDURE — 74011000250 HC RX REV CODE- 250: Performed by: FAMILY MEDICINE

## 2019-01-10 PROCEDURE — 85025 COMPLETE CBC W/AUTO DIFF WBC: CPT

## 2019-01-10 PROCEDURE — 84484 ASSAY OF TROPONIN QUANT: CPT

## 2019-01-10 PROCEDURE — 80048 BASIC METABOLIC PNL TOTAL CA: CPT

## 2019-01-10 RX ORDER — LEVOFLOXACIN 5 MG/ML
750 INJECTION, SOLUTION INTRAVENOUS EVERY 24 HOURS
Status: DISCONTINUED | OUTPATIENT
Start: 2019-01-10 | End: 2019-01-13 | Stop reason: HOSPADM

## 2019-01-10 RX ORDER — PREDNISONE 20 MG/1
40 TABLET ORAL
Status: DISCONTINUED | OUTPATIENT
Start: 2019-01-10 | End: 2019-01-13 | Stop reason: HOSPADM

## 2019-01-10 RX ADMIN — Medication 10 ML: at 13:18

## 2019-01-10 RX ADMIN — LEVOFLOXACIN 750 MG: 5 INJECTION, SOLUTION INTRAVENOUS at 13:14

## 2019-01-10 RX ADMIN — IPRATROPIUM BROMIDE AND ALBUTEROL SULFATE 3 ML: .5; 3 SOLUTION RESPIRATORY (INHALATION) at 22:01

## 2019-01-10 RX ADMIN — IPRATROPIUM BROMIDE AND ALBUTEROL SULFATE 3 ML: .5; 3 SOLUTION RESPIRATORY (INHALATION) at 16:18

## 2019-01-10 RX ADMIN — BUDESONIDE 500 MCG: 0.5 INHALANT RESPIRATORY (INHALATION) at 10:32

## 2019-01-10 RX ADMIN — ASPIRIN 81 MG: 81 TABLET, COATED ORAL at 08:17

## 2019-01-10 RX ADMIN — BUDESONIDE 500 MCG: 0.5 INHALANT RESPIRATORY (INHALATION) at 22:01

## 2019-01-10 RX ADMIN — IPRATROPIUM BROMIDE AND ALBUTEROL SULFATE 3 ML: .5; 3 SOLUTION RESPIRATORY (INHALATION) at 10:32

## 2019-01-10 RX ADMIN — IPRATROPIUM BROMIDE AND ALBUTEROL SULFATE 3 ML: .5; 3 SOLUTION RESPIRATORY (INHALATION) at 04:35

## 2019-01-10 RX ADMIN — Medication 10 ML: at 06:43

## 2019-01-10 RX ADMIN — IPRATROPIUM BROMIDE AND ALBUTEROL SULFATE 3 ML: .5; 3 SOLUTION RESPIRATORY (INHALATION) at 01:04

## 2019-01-10 RX ADMIN — PREDNISONE 40 MG: 20 TABLET ORAL at 13:14

## 2019-01-10 NOTE — PROGRESS NOTES
1930: Bedside shift change report received by Hitesh Amanda (offgoing nurse). Report included the following information SBAR, Kardex, ED Summary, Intake/Output, MAR, Recent Results and Cardiac Rhythm ST.   0000: Bedside shift change report given to Artem Diaz (oncoming nurse). Report included the following information SBAR, Kardex, ED Summary, Intake/Output, MAR, Recent Results and Cardiac Rhythm SR. Care Plan 2051  Problem: Falls - Risk of  Goal: *Absence of Falls  Document Rakesh Fall Risk and appropriate interventions in the flowsheet. Outcome: Progressing Towards Goal  Fall Risk Interventions:  Mobility Interventions: Communicate number of staff needed for ambulation/transfer, Patient to call before getting OOB         Medication Interventions: Evaluate medications/consider consulting pharmacy, Patient to call before getting OOB, Teach patient to arise slowly                  Problem: Pressure Injury - Risk of  Goal: *Prevention of pressure injury  Document Ramos Scale and appropriate interventions in the flowsheet. Outcome: Progressing Towards Goal  Pressure Injury Interventions:             Activity Interventions: Increase time out of bed, Pressure redistribution bed/mattress(bed type)    Mobility Interventions: Pressure redistribution bed/mattress (bed type), HOB 30 degrees or less

## 2019-01-10 NOTE — CONSULTS
Patient has been seen and examined. Full note to follow. Discussed recommendations with RN  Orders placed      Assessment:     Consulted for shortness of breath- multifactorial  COPD, severe at baseline, acute exacerbation- unable to afford maintenance bronchodilators  LLL CAP  OHD with diastolic dysfunction  Chronic hypoxic pulmonary insufficiency  Other medical problems per chart    Recommendations:   O2- is on home O2  On abx- complete course  On prednisone- complete course  Consider discharge on LAMA (spiriva) +  nebulized LABA/ICS regimen: Brovana/budesonide  Wants pulmonary rehab  Outpatient follow up after discharge  D/W patient        Name: Gisel Doeelor   : 1950   MRN: [id number]   Date: 1/10/2019      Subjective:   Consult Note: 1/10/2019   Requesting Physician:  Reason for consult:    Medical records and data reviewed. Patient is a 41-year-old man with multiple comorbidities that include organic cardiac disease with known history of heart failure with preserved ejection fraction, known severe COPD who was admitted with worsening shortness of breath and cough. CT scan of the chest had shown left lower lobe community-acquired pneumonia. He is on antibiotics. In addition he has been diuresed. He reports his symptoms are improving but he has persistent weakness and is interested in pulmonary rehabilitation program. 4 COPD he hasn't been able to afford maintenance bronchodilators. Pedal edema has resolved with diuretics. Agree with current management. He may benefit from maintenance bronchodilators at the time of discharge and suggested regimen could include Spiriva, Brovana and budesonide.  We will arrange for outpatient follow-up after discharge    Review of Systems:     A comprehensive 12 system review of systems was negative except for as documented in HPI        Active Problem List:     Problem List  Date Reviewed: 2019          Codes Class    * (Principal) SOB (shortness of breath) ICD-10-CM: R06.02  ICD-9-CM: 786.05         Malnutrition ([facility] ICD-10-CM: E46  ICD-9-CM: 263.9         Respiratory failure with hypoxia (HCC) ICD-10-CM: J96.91  ICD-9-CM: 518.81         Aspiration pneumonia (HCC) ICD-10-CM: J69.0  ICD-9-CM: 585.5         Systolic CHF, acute (HCC) ICD-10-CM: I50.21  ICD-9-CM: 428.21, 428.0         Shortness of breath ICD-10-CM: R06.02  ICD-9-CM: 786.05         COPD exacerbation (HCC) ICD-10-CM: J44.1  ICD-9-CM: 491.21               Past Medical History:      has a past medical history of Asthma, Cancer ([facility] Chronic obstructive pulmonary disease ([facility] Chronic pain, High cholesterol, and Lung cancer ([place] [id number].). Past Surgical History:      has a past surgical history that includes hx other surgical; hx lobectomy; BRONCHOSCOPY NAVIGATIONAL (Bilateral, 4/9/2018); and BRONCHOSCOPY WITH BIOPSY (Bilateral, 4/9/2018). Home Medications:     Prior to Admission medications    Medication Sig Start Date End Date Taking? Authorizing Provider   albuterol (PROVENTIL HFA, VENTOLIN HFA, PROAIR HFA) 90 mcg/actuation inhaler Take 1-2 Puffs by inhalation every four (4) hours as needed for Wheezing. Yes Provider, Historical   oxyCODONE IR (ROXICODONE) 20 mg immediate release tablet Take 40 mg by mouth every eight (8) hours as needed for Pain (severe pain). Yes Provider, Historical   albuterol-ipratropium (DUO-NEB) 2.5 mg-0.5 mg/3 ml nebu 3 mL by Nebulization route every four (4) hours as needed. 2/22/18   De Jaimes NP   aspirin delayed-release 81 mg tablet Take 1 Tab by mouth daily.  2/23/18   De Jaimes NP       Allergies/Social/Family History:     No Known Allergies   Social History     Tobacco Use    Smoking status: Former Smoker     Packs/day: 1.00     Years: 45.00     Pack years: 45.00     Last attempt to quit: 1/1/2009     Years since quitting: 10.0    Smokeless tobacco: Never Used   Substance Use Topics    Alcohol use: No     Comment: rarely/social       History reviewed. No pertinent family history. Objective:   Vital Signs:  Visit Vitals  /65 (BP 1 Location: Left arm, BP Patient Position: At rest)   Pulse 98   Temp 97.4 °F (36.3 °C)   Resp 18   Ht 5' 11\" (1.803 m)   Wt 65.5 kg (144 lb 6.4 oz)   SpO2 93%   BMI 20.14 kg/m²    O2 Flow Rate (L/min): 3 l/min O2 Device: Nasal cannula Temp (24hrs), Av.7 °F (36.5 °C), Min:97.4 °F (36.3 °C), Max:98 °F (36.7 °C)           Intake/Output:     Intake/Output Summary (Last 24 hours) at 1/10/2019 1537  Last data filed at 1/10/2019 1318  Gross per 24 hour   Intake 390 ml   Output 1425 ml   Net -1035 ml       Physical Exam:   General:  Alert, cooperative, no distress, appears stated age. Head:  Normocephalic, without obvious abnormality, atraumatic. Eyes:  Conjunctivae/corneas clear. PERRL   Neck: Supple, symmetrical, no adenopathy, no carotid bruit and no JVD. Lungs:   Diminished BS   Chest wall:  No tenderness or deformity. Heart:  Regular rate and rhythm, S1-S2 normal, no murmur, no click, rub or gallop. Abdomen:   Soft, non-tender. Bowel sounds present. No masses,  No organomegaly. Extremities: Atraumatic, no cyanosis or edema. Pulses: Palpable   Skin: No rashes or lesions   Neurologic: Grossly nonfocal         LABS AND  DATA: Personally reviewed  Recent Labs     01/10/19  0432 19  1204   WBC 13.5* 13.0*   HGB 13.8 14.5   HCT 42.5 46.1    316     Recent Labs     01/10/19  0432 19  1204    138   K 4.0 4.1    101   CO2 24 30   BUN 22* 13   CREA 1.22 1.09   * 82   CA 8.6 9.0   MG  --  2.3     Recent Labs     19  1204   SGOT 15   AP 95   TP 7.3   ALB 3.5   GLOB 3.8     No results for input(s): INR, PTP, APTT in the last 72 hours.     No lab exists for component: INREXT   Recent Labs     19  1708   PHI 7.391   PCO2I 42.6   PO2I 87   FIO2I 32     Recent Labs     01/10/19  0000 19  1700   TROIQ <0.05 <0.05       MEDS: Reviewed    Chest Imaging: personally reviewed and report checked    Tele- reviewed    Medical decision making:   I have reviewed the flowsheet and previous day's notes  Patient has acute or chronic illness that poses a threat to life or bodily function  Review and order of Clinical lab tests  Review and Order of Radiology tests  Independent visualization of Image        Thank you for allowing me to participate in this patient's care.     Verónica Lee MD      [facility], [place]

## 2019-01-10 NOTE — PROGRESS NOTES
0730: Bedside shift change report given to Patrice Mcfadden 90 (oncoming nurse) by Hussain Cantrell RN (offgoing nurse). Report included the following information SBAR and Kardex. 1930: Bedside shift change report [facility] [address] (oncoming nurse) by Israel Parsons RN (offgoing nurse). Report included the following information SBAR and Kardex. Problem: Falls - Risk of  Goal: *Absence of Falls  Document Rakesh Fall Risk and appropriate interventions in the flowsheet.   Outcome: Progressing Towards Goal  Fall Risk Interventions:  Mobility Interventions: Patient to call before getting OOB         Medication Interventions: Patient to call before getting OOB

## 2019-01-10 NOTE — PROGRESS NOTES
Hospitalist Progress Note  Danielle Corley MD  Answering service: [phone] from in house phone      Date of Service:  1/10/2019  NAME:  Gisel Brownlee  :  1950  MRN:  [id number]    Admission Summary:   68M hx of COPD, apparently can walk more than half a mile without dyspnea at baseline, p/w 2days hx of increasing cough, phlegm, mostly clear, soboe, and fatigue. Diagnosed with PNA and worsening copd    Interval history / Subjective:   Patient seen and examined at bedside, feels well, much better, very limited with movement only few meters now due to sob, not tried today yet. Assessment & Plan:     #. CAP: left lower lung  #. COPD exacerbation: 2nd to above, uptodate with flu and PNA shots. On home oxgyen 2-3L nightly  - continue DuoNebs, Steroids switch to PO, IV abx switch to levaquin, oxygen prn, monitor  - will get PT/OT for safe dc, required rehab in previous admissions. Echo pending    #. Hx of Lung ca: R lower zone, resected, presumed cure since     Code status: Full  DVT prophylaxis: Lovenox  Care Plan discussed with: Patient/Family and Nurse  Disposition: TBD     Hospital Problems  Date Reviewed: 2019          Codes Class Noted POA    * (Principal) SOB (shortness of breath) ICD-10-CM: R06.02  ICD-9-CM: 786.05  2019 Unknown            Review of Systems:   Pertinent items are mentioned in interval history. Vital Signs:    Last 24hrs VS reviewed since prior progress note.  Most recent are:  Visit Vitals  /66 (BP 1 Location: Left arm, BP Patient Position: Sitting)   Pulse (!) 104   Temp 97.5 °F (36.4 °C)   Resp 18   Ht 5' 11\" (1.803 m)   Wt 65.5 kg (144 lb 6.4 oz)   SpO2 94%   BMI 20.14 kg/m²         Intake/Output Summary (Last 24 hours) at 1/10/2019 1132  Last data filed at 1/10/2019 0644  Gross per 24 hour   Intake 290 ml   Output 1225 ml   Net -935 ml        Physical Examination:   General:  Alert, oriented, No acute distress, WM  Card:  S1, S2 without murmurs, good peripheral perfusion  Resp:  No accessory muscle use, Good AE, no rhonchi, mild wheeze only  Abd:  Soft, non-tender, non-distended, BS+  Extremities:  No cyanosis or clubbing, no significant edema    Data Review:    Review and/or order of clinical lab test  Review and/or order of tests in the radiology section of [facility]    Labs:     Recent Labs     01/10/19  0432 01/09/19  1204   WBC 13.5* 13.0*   HGB 13.8 14.5   HCT 42.5 46.1    316     Recent Labs     01/10/19  0432 01/09/19  1204    138   K 4.0 4.1    101   CO2 24 30   BUN 22* 13   CREA 1.22 1.09   * 82   CA 8.6 9.0   MG  --  2.3     Recent Labs     01/09/19  1204   SGOT 15   ALT 19   AP 95   TBILI 1.1*   TP 7.3   ALB 3.5   GLOB 3.8     No results for input(s): INR, PTP, APTT in the last 72 hours. No lab exists for component: INREXT   No results for input(s): FE, TIBC, PSAT, FERR in the last 72 hours. No results found for: FOL, RBCF   No results for input(s): PH, PCO2, PO2 in the last 72 hours.   Recent Labs     01/10/19  0000 01/09/19  1700 01/09/19  1204   TROIQ <0.05 <0.05 <0.05     Lab Results   Component Value Date/Time    Cholesterol, total 106 02/20/2018 04:54 AM    HDL Cholesterol 45 02/20/2018 04:54 AM    LDL, calculated 46 02/20/2018 04:54 AM    Triglyceride 75 02/20/2018 04:54 AM    CHOL/HDL Ratio 2.4 02/20/2018 04:54 AM     Lab Results   Component Value Date/Time    Glucose (POC) 100 09/14/2015 12:14 PM     Lab Results   Component Value Date/Time    Color YELLOW/STRAW 01/09/2019 05:00 PM    Appearance CLEAR 01/09/2019 05:00 PM    Specific gravity 1.014 01/09/2019 05:00 PM    pH (UA) 7.5 01/09/2019 05:00 PM    Protein NEGATIVE  01/09/2019 05:00 PM    Glucose NEGATIVE  01/09/2019 05:00 PM    Ketone TRACE (A) 01/09/2019 05:00 PM    Bilirubin NEGATIVE  01/09/2019 05:00 PM    Urobilinogen 0.2 01/09/2019 05:00 PM    Nitrites NEGATIVE  01/09/2019 05:00 PM Leukocyte Esterase NEGATIVE  01/09/2019 05:00 PM    Epithelial cells FEW 01/09/2019 05:00 PM    Bacteria NEGATIVE  01/09/2019 05:00 PM    WBC 0-4 01/09/2019 05:00 PM    RBC 0-5 01/09/2019 05:00 PM       Medications Reviewed:     Current Facility-Administered Medications   Medication Dose Route Frequency    levoFLOXacin (LEVAQUIN) 750 mg in D5W IVPB  750 mg IntraVENous Q24H    [START ON 1/11/2019] predniSONE (DELTASONE) tablet 40 mg  40 mg Oral DAILY WITH BREAKFAST    aspirin delayed-release tablet 81 mg  81 mg Oral DAILY    oxyCODONE IR (ROXICODONE) tablet 5 mg  5 mg Oral Q8H PRN    sodium chloride (NS) flush 5-40 mL  5-40 mL IntraVENous Q8H    sodium chloride (NS) flush 5-40 mL  5-40 mL IntraVENous PRN    budesonide (PULMICORT) 500 mcg/2 ml nebulizer suspension  500 mcg Nebulization BID RT    acetaminophen (TYLENOL) tablet 650 mg  650 mg Oral Q4H PRN    albuterol-ipratropium (DUO-NEB) 2.5 MG-0.5 MG/3 ML  3 mL Nebulization Q4H RT     ______________________________________________________________________  EXPECTED LENGTH OF STAY: - - -  ACTUAL LENGTH OF STAY:          1               Lajune Simmonds, MD

## 2019-01-10 NOTE — H&P
[address]  HISTORY AND PHYSICAL [place] 
MR#: [id number] : 1950 ACCOUNT #: [de-identified] ADMIT DATE: 2019 CHIEF COMPLAINT:  Shortness of breath. HISTORY OF PRESENT ILLNESS:  Patient is a 60-year-old gentleman with past medical history of lung cancer, high cholesterol, COPD, asthma who presents to the hospital complaining of the above-mentioned symptoms. Patient reports that his symptoms started about 3 days ago, started getting progressively worsening shortness of breath associated with cough, mild swelling in his legs, and patient reports that he wears oxygen only at night, but has been wearing it for . Patient reports that he had some swelling in his legs and continued to feel shortness of breath. Last night he could not sleep on the bed, and he had to sleep on a reclining chair. Patient reports the symptoms have persisted today, and he decided to come to the hospital.  The patient reports that he was wheezing quite a bit today. Also reports that he felt weak, had trouble walking and had thick productive sputum. Patient denies any other complaints or problems. In the ER, the patient was evaluated and was requested to be admitted under the hospitalist service. The patient denies any other complaints or problems. Denies any headache, blurry vision, sore throat, trouble swallowing, trouble with speech, any chest pain, abdominal pain, constipation, diarrhea, urinary symptoms, focal or generalized neurological weakness, recent travel, sick contacts, falls, injuries, hematemesis, melena,  hemoptysis, hematuria, or other concerns or problems. PAST MEDICAL HISTORY:  See above. HOME MEDICATIONS:  Currently, the patient is on albuterol p.r.n., oxycodone 20 mg every 8 hours as needed for pain, albuterol p.r.n., and aspirin 81 mg daily. SOCIAL HISTORY:  Former smoker, smoked 1 pack per day for 45 years. No alcohol. No IV drug abuse. ALLERGIES:  NO KNOWN DRUG ALLERGIES. FAMILY HISTORY:  Discussed and found to be noncontributory. REVIEW OF SYSTEMS:  All systems were reviewed and are found to be essentially negative except for the symptoms mentioned above. PHYSICAL EXAMINATION: 
VITAL SIGNS:  Temperature 97.7, pulse 104, respiration rate 32, blood pressure 119/68, pulse ox 94% on 2 liters. GENERAL:  Alert and oriented x3, awake, moderately distressed, pleasant male, appears to be stated age. HEENT:  Pupils equal and reactive to light. Dry mucous membranes. Tympanic membranes clear. NECK:  Supple. CHEST:  Scattered wheezes throughout lung fields, patient mildly tachypneic. HEART:  S1, S2 heard. ABDOMEN:  Soft, nontender, nondistended. Bowel sounds are physiologic. EXTREMITIES:  No clubbing, no cyanosis, no edema. NEUROPSYCHIATRIC:  Pleasant mood and affect. Cranial nerves II-XII grossly intact. Sensory grossly within normal limits. DTRs 2+/4. Strength 5/5. SKIN:  Warm. LABORATORY DATA:  White count 13, hemoglobin 14.5, hematocrit 46.1, platelets 959. Sodium 138, potassium 4.1, chloride 101, bicarbonate 30, anion gap 7, glucose 82, BUN 30 and creatinine 1.09, calcium 9.0, bilirubin total 1.1, ALT 19, AST 15, alkaline phosphatase 95. Troponin less than 0.05. CT of the chest did not show any acute pathology. EKG shows sinus rhythm with PACs, nonspecific ST changes. ASSESSMENT AND PLAN: 
1. Chronic obstructive pulmonary disease exacerbation with acute hypoxic respiratory failure. The patient will be admitted on a telemetry bed. Will start the patient on broad spectrum IV antibiotics, DuoNebs, IV steroids, oxygen support, continuous pulse ox monitoring and continue to monitor. Further intervention per hospital course. Will get an ABG and reassess as needed. Continue to monitor. 2.  Possible volume overload.   Concern about possible right heart failure secondary to chronic obstructive pulmonary disease/cor pulmonale. Will give patient 1 dose of Lasix. Echocardiogram, strict I's and O's, daily weights, aspirin, and close monitoring. Further intervention per hospital course. Will consider getting a cardiology consult. Reassess as needed. Continue to monitor. 3.  History of lung cancer, stable. Continue to monitor. 4.  Leukocytosis, unclear etiology. Will get a UA. No obvious signs of infection. The patient on antibiotics. Continue to monitor. 5.  Gastrointestinal and deep venous thrombosis prophylaxis. The patient will be on sequential compression devices. Tory Guan MD [facility] 
D: 01/09/2019 16:46    
T: 01/09/2019 20:27 
JOB #: [id number]

## 2019-01-11 LAB
ANION GAP SERPL CALC-SCNC: 9 MMOL/L (ref 5–15)
BUN SERPL-MCNC: 23 MG/DL (ref 6–20)
BUN/CREAT SERPL: 18 (ref 12–20)
CALCIUM SERPL-MCNC: 8.7 MG/DL (ref 8.5–10.1)
CHLORIDE SERPL-SCNC: 102 MMOL/L (ref 97–108)
CO2 SERPL-SCNC: 26 MMOL/L (ref 21–32)
CREAT SERPL-MCNC: 1.28 MG/DL (ref 0.7–1.3)
ERYTHROCYTE [DISTWIDTH] IN BLOOD BY AUTOMATED COUNT: 15.5 % (ref 11.5–14.5)
GLUCOSE SERPL-MCNC: 118 MG/DL (ref 65–100)
HCT VFR BLD AUTO: 39.8 % (ref 36.6–50.3)
HGB BLD-MCNC: 12.5 G/DL (ref 12.1–17)
MCH RBC QN AUTO: 29.6 PG (ref 26–34)
MCHC RBC AUTO-ENTMCNC: 31.4 G/DL (ref 30–36.5)
MCV RBC AUTO: 94.1 FL (ref 80–99)
NRBC # BLD: 0 K/UL (ref 0–0.01)
NRBC BLD-RTO: 0 PER 100 WBC
PLATELET # BLD AUTO: 287 K/UL (ref 150–400)
PMV BLD AUTO: 9.8 FL (ref 8.9–12.9)
POTASSIUM SERPL-SCNC: 4.3 MMOL/L (ref 3.5–5.1)
RBC # BLD AUTO: 4.23 M/UL (ref 4.1–5.7)
SODIUM SERPL-SCNC: 137 MMOL/L (ref 136–145)
WBC # BLD AUTO: 19.8 K/UL (ref 4.1–11.1)

## 2019-01-11 PROCEDURE — 74011250637 HC RX REV CODE- 250/637: Performed by: INTERNAL MEDICINE

## 2019-01-11 PROCEDURE — 94760 N-INVAS EAR/PLS OXIMETRY 1: CPT

## 2019-01-11 PROCEDURE — 65270000032 HC RM SEMIPRIVATE

## 2019-01-11 PROCEDURE — 80048 BASIC METABOLIC PNL TOTAL CA: CPT

## 2019-01-11 PROCEDURE — 97165 OT EVAL LOW COMPLEX 30 MIN: CPT

## 2019-01-11 PROCEDURE — 74011000250 HC RX REV CODE- 250: Performed by: FAMILY MEDICINE

## 2019-01-11 PROCEDURE — 74011250637 HC RX REV CODE- 250/637: Performed by: FAMILY MEDICINE

## 2019-01-11 PROCEDURE — 97535 SELF CARE MNGMENT TRAINING: CPT

## 2019-01-11 PROCEDURE — 74011250636 HC RX REV CODE- 250/636: Performed by: INTERNAL MEDICINE

## 2019-01-11 PROCEDURE — 97161 PT EVAL LOW COMPLEX 20 MIN: CPT

## 2019-01-11 PROCEDURE — 77030029684 HC NEB SM VOL KT MONA -A

## 2019-01-11 PROCEDURE — 94640 AIRWAY INHALATION TREATMENT: CPT

## 2019-01-11 PROCEDURE — 74011000250 HC RX REV CODE- 250: Performed by: PHYSICIAN ASSISTANT

## 2019-01-11 PROCEDURE — 85027 COMPLETE CBC AUTOMATED: CPT

## 2019-01-11 PROCEDURE — 77010033678 HC OXYGEN DAILY

## 2019-01-11 PROCEDURE — 74011636637 HC RX REV CODE- 636/637: Performed by: INTERNAL MEDICINE

## 2019-01-11 PROCEDURE — 36415 COLL VENOUS BLD VENIPUNCTURE: CPT

## 2019-01-11 RX ORDER — ACETYLCYSTEINE 200 MG/ML
2 SOLUTION ORAL; RESPIRATORY (INHALATION)
Status: COMPLETED | OUTPATIENT
Start: 2019-01-11 | End: 2019-01-12

## 2019-01-11 RX ORDER — GUAIFENESIN 600 MG/1
600 TABLET, EXTENDED RELEASE ORAL EVERY 12 HOURS
Status: DISCONTINUED | OUTPATIENT
Start: 2019-01-11 | End: 2019-01-13 | Stop reason: HOSPADM

## 2019-01-11 RX ADMIN — IPRATROPIUM BROMIDE AND ALBUTEROL SULFATE 3 ML: .5; 3 SOLUTION RESPIRATORY (INHALATION) at 09:33

## 2019-01-11 RX ADMIN — ACETYLCYSTEINE 400 MG: 200 SOLUTION ORAL; RESPIRATORY (INHALATION) at 19:29

## 2019-01-11 RX ADMIN — GUAIFENESIN 600 MG: 600 TABLET, EXTENDED RELEASE ORAL at 21:46

## 2019-01-11 RX ADMIN — Medication 10 ML: at 07:08

## 2019-01-11 RX ADMIN — Medication 10 ML: at 00:00

## 2019-01-11 RX ADMIN — ACETYLCYSTEINE 400 MG: 200 SOLUTION ORAL; RESPIRATORY (INHALATION) at 09:36

## 2019-01-11 RX ADMIN — ACETYLCYSTEINE 400 MG: 200 SOLUTION ORAL; RESPIRATORY (INHALATION) at 15:36

## 2019-01-11 RX ADMIN — LEVOFLOXACIN 750 MG: 5 INJECTION, SOLUTION INTRAVENOUS at 12:49

## 2019-01-11 RX ADMIN — Medication 10 ML: at 21:46

## 2019-01-11 RX ADMIN — PREDNISONE 40 MG: 20 TABLET ORAL at 08:48

## 2019-01-11 RX ADMIN — ASPIRIN 81 MG: 81 TABLET, COATED ORAL at 08:48

## 2019-01-11 RX ADMIN — BUDESONIDE 500 MCG: 0.5 INHALANT RESPIRATORY (INHALATION) at 19:29

## 2019-01-11 RX ADMIN — IPRATROPIUM BROMIDE AND ALBUTEROL SULFATE 3 ML: .5; 3 SOLUTION RESPIRATORY (INHALATION) at 15:36

## 2019-01-11 RX ADMIN — ACETYLCYSTEINE 400 MG: 200 SOLUTION ORAL; RESPIRATORY (INHALATION) at 12:31

## 2019-01-11 RX ADMIN — IPRATROPIUM BROMIDE AND ALBUTEROL SULFATE 3 ML: .5; 3 SOLUTION RESPIRATORY (INHALATION) at 19:29

## 2019-01-11 RX ADMIN — BUDESONIDE 500 MCG: 0.5 INHALANT RESPIRATORY (INHALATION) at 09:33

## 2019-01-11 RX ADMIN — GUAIFENESIN 600 MG: 600 TABLET, EXTENDED RELEASE ORAL at 12:49

## 2019-01-11 RX ADMIN — IPRATROPIUM BROMIDE AND ALBUTEROL SULFATE 3 ML: .5; 3 SOLUTION RESPIRATORY (INHALATION) at 12:30

## 2019-01-11 NOTE — PROGRESS NOTES
TRANSFER - IN REPORT:    Verbal report received from Bandon (name) on Radha Simpson  being received from [facility] (unit) for routine progression of care      Report consisted of patients Situation, Background, Assessment and   Recommendations(SBAR). Information from the following report(s) SBAR and Kardex was reviewed with the receiving nurse. Opportunity for questions and clarification was provided. Assessment completed upon patients arrival to unit and care assumed.

## 2019-01-11 NOTE — PROGRESS NOTES
Orders received, chart reviewed and patient evaluated by occupational therapy. Recommend patient to discharge to Pulmonary rehab pending progression with skilled acute occupational therapy.      Recommend with nursing patient to complete as able in order to maintain strength, endurance and independence: OOB to chair 3x/day, ADLs with supervision/setup and mobilizing to the [facility] for toileting IND or to bathroom with 1 assist. Thank you for your assistance.      Full evaluation to follow.

## 2019-01-11 NOTE — PROGRESS NOTES
Problem: Mobility Impaired (Adult and Pediatric)  Goal: *Acute Goals and Plan of Care (Insert Text)  Physical Therapy Goals  Initiated 1/11/2019  1. Patient will transfer from bed to chair and chair to bed with modified independence using the least restrictive device within 7 day(s). 2.  Patient will perform sit to stand with modified independence within 7 day(s). 3.  Patient will ambulate with modified independence for 300 feet with O2 sats > 90% with the least restrictive device within 7 day(s). physical Therapy EVALUATION  Patient: Michell Elizondo (20 y.o. male)  Date: 1/11/2019  Primary Diagnosis: SOB (shortness of breath) [R06.02]  SOB (shortness of breath) [R06.02]       Precautions:        ASSESSMENT :  Based on the objective data described below, the patient presents with decline in functional mobility tolerance and independence due to impaired cardiopulmonary function, poor activity endurance, mild gait and balance dysfunction s/p admission for SOB. Patient with new dx of CAP as well as COPD, hx of lung cancer. Typically indep without AD and lives with daughter and family, travels and does go to [facility] every week. Reports being on 2L O2 but only using at night prior to this admission. Today, patient overall supervision level for OOB mobility with cautious gait, patient noting 2/2 SOB and fatigue. Completed 120' gait but reaching for wall support when more fatigued, sats remained > 90% on 2L O2 but tachy in 120s at rest and with gait. Recommend acute therapy three times a week to increase endurance and cardiopulmonary function. Will benefit from return to IP cardiopulmonary rehab to maximize functional activity tolerance. Patient will benefit from skilled intervention to address the above impairments.   Patients rehabilitation potential is considered to be Excellent  Factors which may influence rehabilitation potential include:   []         None noted  [] Mental ability/status  [x]         Medical condition  []         Home/family situation and support systems  []         Safety awareness  []         Pain tolerance/management  []         Other:      PLAN :  Recommendations and Planned Interventions:  []           Bed Mobility Training             []    Neuromuscular Re-Education  [x]           Transfer Training                   []    Orthotic/Prosthetic Training  [x]           Gait Training                         []    Modalities  [x]           Therapeutic Exercises           []    Edema Management/Control  [x]           Therapeutic Activities            [x]    Patient and Family Training/Education  [x]           Other (comment): O2 monitoring with activity    Frequency/Duration: Patient will be followed by physical therapy  3 times a week to address goals. Discharge Recommendations: Inpatient Pulmonary Rehab  Further Equipment Recommendations for Discharge: None likely     SUBJECTIVE:   Patient stated I get this once a year it seem.     OBJECTIVE DATA SUMMARY:   HISTORY:    Past Medical History:   Diagnosis Date    Asthma     Cancer ([facility] [address]     Lung Cancer    Chronic obstructive pulmonary disease (HCC)     Chronic pain     High cholesterol     Lung cancer (HCC)      Past Surgical History:   Procedure Laterality Date    HX LOBECTOMY      Right lung    HX OTHER SURGICAL      Partial right lung removal     Prior Level of Function/Home Situation: lives in small home beside his duaghter's home, they assist with some iADLs but patient capable, travels, Indep PTA but on O2 at night  Personal factors and/or comorbidities impacting plan of care:     Home Situation  Home Environment: Private residence  # Steps to Enter: 0  One/Two Story Residence: One story  Living Alone: Yes(but lives on property with children very close)  Support Systems: Child(gregorio), Family member(s)  Patient Expects to be Discharged to[de-identified] Rehabilitation facility  Current DME Used/Available at Home: Oxygen, portable(only uses at night, per patient)  Tub or Shower Type: Tub/Shower combination    EXAMINATION/PRESENTATION/DECISION MAKING:   Critical Behavior:  Neurologic State: Alert  Orientation Level: Oriented X4  Cognition: Appropriate for age attention/concentration, Follows commands  Safety/Judgement: Awareness of environment, Fall prevention  Hearing: Auditory  Auditory Impairment: None  Skin:  See nursing  Edema: none  Range Of Motion:  AROM: Within functional limits           PROM: Within functional limits           Strength:    Strength:  Within functional limits                    Tone & Sensation:   Tone: Normal              Sensation: Intact               Coordination:  Coordination: Within functional limits  Vision:   Tracking: Able to track stimulus in all quadrants w/o difficulty  Diplopia: No  Corrective Lenses: Reading glasses  Functional Mobility:  Bed Mobility:     Supine to Sit: Independent     Scooting: Independent  Transfers:  Sit to Stand: Supervision  Stand to Sit: Supervision                       Balance:   Sitting: Intact  Ambulation/Gait Training:      Distance: 120'  Ambulation - Level of Assistance: Stand-by assistance        Gait Abnormalities: Decreased step clearance        Base of Support: Narrowed     Speed/Maite: Slow  Step Length: Right shortened;Left shortened                Functional Measure:  Tinetti test:    Sitting Balance: 1  Arises: 2  Attempts to Rise: 2  Immediate Standing Balance: 2  Standing Balance: 2  Nudged: 2  Eyes Closed: 1  Turn 360 Degrees - Continuous/Discontinuous: 1  Turn 360 Degrees - Steady/Unsteady: 1  Sitting Down: 1  Balance Score: 15  Indication of Gait: 1  R Step Length/Height: 1  L Step Length/Height: 1  R Foot Clearance: 1  L Foot Clearance: 1  Step Symmetry: 1  Step Continuity: 0  Path: 2  Trunk: 1  Walking Time: 0  Gait Score: 9  Total Score: 24         Tinetti Tool Score Risk of Falls  <19 = High Fall Risk  19-24 = Moderate Fall Risk  25-28 = Low Fall Risk  Tinannelise KING. Performance-Oriented Assessment of Mobility Problems in Elderly Patients. [facility] 66; [id number]. (Scoring Description: PT Bulletin Feb. 10, 1993)    Older adults: Gina Luke et al, 2009; n = 1000 [facility] elderly evaluated with ABC, ARMEN, ADL, and IADL)  · Mean ARMEN score for males aged 69-68 years = 26.21(3.40)  · Mean ARMEN score for females age 69-68 years = 25.16(4.30)  · Mean ARMEN score for males over 80 years = 23.29(6.02)  · Mean ARMEN score for females over 80 years = 17.20(8.32)            Physical Therapy Evaluation Charge Determination   History Examination Presentation Decision-Making   HIGH Complexity :3+ comorbidities / personal factors will impact the outcome/ POC  LOW Complexity : 1-2 Standardized tests and measures addressing body structure, function, activity limitation and / or participation in recreation  MEDIUM Complexity : Evolving with changing characteristics  Other outcome measures Tinetti 22/28  LOW       Based on the above components, the patient evaluation is determined to be of the following complexity level: LOW     Pain:  Pain Scale 1: Numeric (0 - 10)  Pain Intensity 1: 0              Activity Tolerance:   Low - 120' with SpO2 >90% and HR 120s    Please refer to the flowsheet for vital signs taken during this treatment. After treatment:   []         Patient left in no apparent distress sitting up in chair  [x]         Patient left in no apparent distress in bed  [x]         Call bell left within reach  [x]         Nursing notified  []         Caregiver present  []         Bed alarm activated    COMMUNICATION/EDUCATION:   The patients plan of care was discussed with: Registered Nurse. [x]         Fall prevention education was provided and the patient/caregiver indicated understanding. []         Patient/family have participated as able in goal setting and plan of care. [x]         Patient/family agree to work toward stated goals and plan of care.   [] Patient understands intent and goals of therapy, but is neutral about his/her participation. []         Patient is unable to participate in goal setting and plan of care.     Thank you for this referral.  Brittany Mirza, PT   Time Calculation: 20 mins

## 2019-01-11 NOTE — PROGRESS NOTES
Bedside and Verbal shift change report given to Tiny Clarke (oncoming nurse) by Sj Flores  (offgoing nurse). Report included the following information SBAR, Kardex, ED Summary, Intake/Output, MAR, Accordion, Recent Results, Med Rec Status and Cardiac Rhythm NSR. Problem: Falls - Risk of  Goal: *Absence of Falls  Document Rakesh Fall Risk and appropriate interventions in the flowsheet. Outcome: Progressing Towards Goal  Fall Risk Interventions:  Mobility Interventions: OT consult for ADLs, Patient to call before getting OOB, PT Consult for mobility concerns, PT Consult for assist device competence, Communicate number of staff needed for ambulation/transfer         Medication Interventions: Teach patient to arise slowly, Evaluate medications/consider consulting pharmacy     Problem: Chronic Obstructive Pulmonary Disease (COPD)  Goal: *Oxygen saturation during activity within specified parameters  Outcome: Progressing Towards Goal  Monitoring pulse ox while walking and resting.      0825 Request something to get sputum up from [facility] for pulmonology. [facility] report to [facility] spoke to [address] - OUT REPORT:    Verbal report given to [facility]) on Jo Sauer  being transferred to (unit) for routine progression of care       Report consisted of patients Situation, Background, Assessment and   Recommendations(SBAR). Information from the following report(s) SBAR, Kardex, ED Summary, Intake/Output, MAR, Accordion, Recent Results, Med Rec Status and Cardiac Rhythm NSR was reviewed with the receiving nurse.     Lines:   Peripheral IV 01/09/19 Right Hand (Active)   Site Assessment Clean, dry, & intact 1/11/2019  8:45 AM   Phlebitis Assessment 0 1/11/2019  8:45 AM   Infiltration Assessment 0 1/11/2019  8:45 AM   Dressing Status Clean, dry, & intact 1/11/2019  8:45 AM   Dressing Type Tape;Transparent 1/11/2019  8:45 AM   Hub Color/Line Status Pink;Capped 1/11/2019  8:45 AM   Action Taken Open ports on tubing capped 1/11/2019  8:45 AM   Alcohol Cap Used Yes 1/11/2019  8:45 AM       Peripheral IV 01/09/19 Left Forearm (Active)   Site Assessment Clean, dry, & intact 1/11/2019 12:30 PM   Phlebitis Assessment 0 1/11/2019 12:30 PM   Infiltration Assessment 0 1/11/2019 12:30 PM   Dressing Status Clean, dry, & intact 1/11/2019 12:30 PM   Dressing Type Transparent;Tape 1/11/2019 12:30 PM   Hub Color/Line Status Pink; Infusing 1/11/2019 12:30 PM   Action Taken Open ports on tubing capped 1/11/2019 12:30 PM   Alcohol Cap Used Yes 1/11/2019 12:30 PM        Opportunity for questions and clarification was provided.       Patient transported with:   O2 @ 2 liters

## 2019-01-11 NOTE — PROGRESS NOTES
Problem: Self Care Deficits Care Plan (Adult)  Goal: *Acute Goals and Plan of Care (Insert Text)  Occupational Therapy Goals  Initiated 1/11/2019  1. Patient will perform grooming standing at sink for 10 minutes with no signs of exertion of SOB with independence within 7 day(s). 2.  Patient will perform bathing for 10 minutes with no signs of exertion of SOB with independence within 7 day(s). 3.  Patient will perform toilet transfers while managing O2 tubing and no LOB with independence within 7 day(s). 4.  Patient will perform all aspects of toileting with independence within 7 day(s). 5.  Patient will participate in upper extremity therapeutic exercise/activities with independence for 5 minutes within 7 day(s). 6.  Patient will utilize energy conservation techniques during functional activities with no cues within 7 day(s). Occupational Therapy EVALUATION  Patient: Saintclair Birkenhead (16 y.o. male)  Date: 1/11/2019  Primary Diagnosis: SOB (shortness of breath) [R06.02]  SOB (shortness of breath) [R06.02]       Precautions: Falls       ASSESSMENT :  Based on the objective data described below, the patient presents with overall independent bed mobility, supervision for functional mobility with no AD, IND with upper body ADLs and supervision-independent for lower body ADLs s/p admission for SOB. Patient reporting significant hx of pneumonia, COPD, lung cancer, sx lobectomy (R partial). Patient lives with his daughter and her family and is IND at baseline. Patient reports using home O2 only at night at baseline. Today, patient ADLs limited by generalized weakness/debility, impaired cardiopulmonary endurance (SPO2 on 2L 88-91% with exertion, 95% with rest), mild balance impairment and decreased functional activity tolerance. Patient was left sitting EOB with call bell in reach, RN aware. Patient would benefit from pulmonary rehab once medically stable.      Recommend with nursing patient to complete as able in order to maintain strength, endurance and independence: ADLs with supervision/setup, OOB to chair 3x/day and mobilizing to [facility] independently or the bathroom for toileting with 1 assist. Thank you for your assistance. Patient will benefit from skilled intervention to address the above impairments. Patients rehabilitation potential is considered to be Fair  Factors which may influence rehabilitation potential include:   []             None noted  []             Mental ability/status  []             Medical condition  []             Home/family situation and support systems  []             Safety awareness  []             Pain tolerance/management  []             Other:      PLAN :  Recommendations and Planned Interventions:  [x]               Self Care Training                  [x]        Therapeutic Activities  [x]               Functional Mobility Training    []        Cognitive Retraining  [x]               Therapeutic Exercises           [x]        Endurance Activities  [x]               Balance Training                   []        Neuromuscular Re-Education  []               Visual/Perceptual Training     [x]   Home Safety Training  [x]               Patient Education                 [x]        Family Training/Education  []               Other (comment):    Frequency/Duration: Patient will be followed by occupational therapy 3 times a week to address goals. Discharge Recommendations: Pulmonary Rehab  Further Equipment Recommendations for Discharge: none     SUBJECTIVE:   Patient stated This happens to me every year to 18 months. .. Then then send me to rehab and I'm good.     OBJECTIVE DATA SUMMARY:   HISTORY:   Past Medical History:   Diagnosis Date    Asthma     Cancer ([facility] [address]     Lung Cancer    Chronic obstructive pulmonary disease (HCC)     Chronic pain     High cholesterol     [facility]      Past Surgical History:   Procedure Laterality Date    HX LOBECTOMY      Right lung    HX OTHER SURGICAL      Partial right lung removal       Prior Level of Function/Environment/Context: Patient lives in 1 level home with his children/family close on property. Patient IND with ADLs at baseline, going to gym every day. Patient reports he eats out or family completes meals. Home Situation  Home Environment: Private residence  # Steps to Enter: 0  One/Two Story Residence: One story  Living Alone: Yes(but lives on property with children very close)  Support Systems: Child(gregorio), Family member(s)  Patient Expects to be Discharged to[de-identified] Rehabilitation facility  Current DME Used/Available at Home: Oxygen, portable(only uses at night, per patient)  Tub or Shower Type: Tub/Shower combination    Hand dominance: Right    EXAMINATION OF PERFORMANCE DEFICITS:  Cognitive/Behavioral Status:  Neurologic State: Alert  Orientation Level: Oriented X4  Cognition: Appropriate for age attention/concentration; Follows commands  Perception: Appears intact  Perseveration: No perseveration noted  Safety/Judgement: Awareness of environment; Fall prevention    Skin: Appears grossly intact    Edema: none noted in BUEs    Hearing: Auditory  Auditory Impairment: None    Vision/Perceptual:    Tracking: Able to track stimulus in all quadrants w/o difficulty    Diplopia: No    Corrective Lenses: Reading glasses    Range of Motion:  In BUEs  AROM: Within functional limits  PROM: Within functional limits    Strength: In BUEs  Strength: Within functional limits    Coordination:  Coordination: Within functional limits  Fine Motor Skills-Upper: Left Intact; Right Intact    Gross Motor Skills-Upper: Left Intact; Right Intact    Tone & Sensation:  In BUEs  Tone: Normal  Sensation: Intact    Balance:  Sitting: Intact    Functional Mobility and Transfers for ADLs:  Bed Mobility:  Supine to Sit: Independent  Scooting: Independent    Transfers:  Sit to Stand: Supervision  Stand to Sit: Supervision  Toilet Transfer : Supervision(Infer)    ADL Assessment:  Feeding: Independent    Oral Facial Hygiene/Grooming: Supervision; Independent(infer IND sitting, supervision standing)    Bathing: Supervision(infer supervision )    Upper Body Dressing: Independent    Lower Body Dressing: Supervision; Independent(infer IND sitting, supervision standing)    Toileting: Independent;Supervision(IND to [facility], supervision to bathroom 2* line management)    ADL Intervention and task modifications:    Lower Body Dressing Assistance  Socks: Supervision/set-up  Leg Crossed Method Used: Yes  Position Performed: Seated edge of bed  Cues: Don;Doff    Cognitive Retraining  Safety/Judgement: Awareness of environment; Fall prevention    Functional Measure:  Barthel Index:    Bathin  Bladder: 10  Bowels: 10  Groomin  Dressing: 10  Feeding: 10  Mobility: 10  Stairs: 0  Toilet Use: 10  Transfer (Bed to Chair and Back): 15  Total: 85        The Barthel ADL Index: Guidelines  1. The index should be used as a record of what a patient does, not as a record of what a patient could do. 2. The main aim is to establish degree of independence from any help, physical or verbal, however minor and for whatever reason. 3. The need for supervision renders the patient not independent. 4. A patient's performance should be established using the best available evidence. Asking the patient, friends/relatives and nurses are the usual sources, but direct observation and common sense are also important. However direct testing is not needed. 5. Usually the patient's performance over the preceding 24-48 hours is important, but occasionally longer periods will be relevant. 6. Middle categories imply that the patient supplies over 50 per cent of the effort. 7. Use of aids to be independent is allowed. Nuno Esparza., Barthel, D.W. (3101). Functional evaluation: the Barthel Index. [address] [facility] (14)2. Gaile Rubinstein evita Delio, ANTONJSKIPF, Robby Schwartz., Opal Perez., Carmen Anderson, [address] [place] [address] ().  Measuring the change indisability after inpatient rehabilitation; comparison of the responsiveness of the Barthel Index and Functional Burnet Measure. Journal of Neurology, Neurosurgery, and Psychiatry, 66(4), 275-318. LORAINE Bates, MARI Allen, & Alonso Adam M.A. (2004.) Assessment of post-stroke quality of life in cost-effectiveness studies: The usefulness of the Barthel Index and the EuroQoL-5D. Quality of Life Research, 15, 167-40       Occupational Therapy Evaluation Charge Determination   History Examination Decision-Making   LOW Complexity : Brief history review  LOW Complexity : 1-3 performance deficits relating to physical, cognitive , or psychosocial skils that result in activity limitations and / or participation restrictions  LOW Complexity : No comorbidities that affect functional and no verbal or physical assistance needed to complete eval tasks       Based on the above components, the patient evaluation is determined to be of the following complexity level: LOW   Pain:  Pain Scale 1: Numeric (0 - 10)  Pain Intensity 1: 0              Activity Tolerance:   Good, VS (see above)  Please refer to the flowsheet for vital signs taken during this treatment. After treatment:   [] Patient left in no apparent distress sitting up in chair  [x] Patient left in no apparent distress in bed  [x] Call bell left within reach  [x] Nursing notified  [] Caregiver present  [] Bed alarm activated    COMMUNICATION/EDUCATION:   The patients plan of care was discussed with: Physical Therapist and Registered Nurse. [x] Home safety education was provided and the patient/caregiver indicated understanding. [x] Patient/family have participated as able in goal setting and plan of care. [] Patient/family agree to work toward stated goals and plan of care. [] Patient understands intent and goals of therapy, but is neutral about his/her participation.   [] Patient is unable to participate in goal setting and plan of care.  This patients plan of care is appropriate for delegation to [facility].     Thank you for this referral.  Arlen Cabrera OT  Time Calculation: 20 mins

## 2019-01-11 NOTE — PROGRESS NOTES
Hospitalist Progress Note  Randee Middleton MD  Answering service: [phone] from in house phone      Date of Service:  2019  NAME:  Rajan Sheehan  :  1950  MRN:  [id number]    Admission Summary:   68M hx of COPD, apparently can walk more than half a mile without dyspnea at baseline, p/w 2days hx of increasing cough, phlegm, mostly clear, soboe, and fatigue. Diagnosed with PNA and worsening copd    Interval history / Subjective:   Patient seen and examined at bedside, feels little better, still very limited to sob when tries to get about, sob when eating. Will move to medical bed, review tomorrow     Assessment & Plan:     #. CAP: left lower lung  #. COPD exacerbation: 2nd to above, uptodate with flu and PNA shots. On home oxgyen 2-3L nightly  - DuoNebs, guaifenesin, Steroids switch to PO, IV abx switch to levaquin, oxygen prn, monitor  - PT/OT for safe dc, required rehab in previous admissions. Echo ef 65%. #. Hx of Lung ca: R lower zone, resected, presumed cure since     Code status: Full  DVT prophylaxis: Lovenox  Care Plan discussed with: Patient/Family and Nurse  Disposition: TBD pulm rehab on dc ?tomorrow/next day     Hospital Problems  Date Reviewed: 2019          Codes Class Noted POA    * (Principal) SOB (shortness of breath) ICD-10-CM: R06.02  ICD-9-CM: 786.05  2019 Unknown            Review of Systems:   Pertinent items are mentioned in interval history. Vital Signs:    Last 24hrs VS reviewed since prior progress note.  Most recent are:  Visit Vitals  /65 (BP 1 Location: Left arm, BP Patient Position: At rest)   Pulse 83   Temp 97.5 °F (36.4 °C)   Resp 18   Ht 5' 11\" (1.803 m)   Wt 66.4 kg (146 lb 6.2 oz)   SpO2 95%   BMI 20.42 kg/m²         Intake/Output Summary (Last 24 hours) at 2019 1222  Last data filed at 2019 0845  Gross per 24 hour   Intake 960 ml   Output 525 ml   Net 435 ml Physical Examination:   General:  Alert, oriented, No acute distress, WM  Card:  S1, S2 without murmurs, good peripheral perfusion  Resp:  No accessory muscle use, tight chest with poor AE, mild rhonchi, mild wheeze only  Abd:  Soft, non-tender, non-distended, BS+  Extremities:  No cyanosis or clubbing, no significant edema    Data Review:    Review and/or order of clinical lab test  Review and/or order of tests in the radiology section of [facility]    Labs:     Recent Labs     01/11/19  0434 01/10/19  0432   WBC 19.8* 13.5*   HGB 12.5 13.8   HCT 39.8 42.5    299     Recent Labs     01/11/19  0434 01/10/19  0432 01/09/19  1204    137 138   K 4.3 4.0 4.1    105 101   CO2 26 24 30   BUN 23* 22* 13   CREA 1.28 1.22 1.09   * 140* 82   CA 8.7 8.6 9.0   MG  --   --  2.3     Recent Labs     01/09/19  1204   SGOT 15   ALT 19   AP 95   TBILI 1.1*   TP 7.3   ALB 3.5   GLOB 3.8     No results for input(s): INR, PTP, APTT in the last 72 hours. No lab exists for component: INREXT, INREXT   No results for input(s): FE, TIBC, PSAT, FERR in the last 72 hours. No results found for: FOL, RBCF   No results for input(s): PH, PCO2, PO2 in the last 72 hours.   Recent Labs     01/10/19  0000 01/09/19  1700 01/09/19  1204   TROIQ <0.05 <0.05 <0.05     Lab Results   Component Value Date/Time    Cholesterol, total 106 02/20/2018 04:54 AM    HDL Cholesterol 45 02/20/2018 04:54 AM    LDL, calculated 46 02/20/2018 04:54 AM    Triglyceride 75 02/20/2018 04:54 AM    CHOL/HDL Ratio 2.4 02/20/2018 04:54 AM     Lab Results   Component Value Date/Time    Glucose (POC) 100 09/14/2015 12:14 PM     Lab Results   Component Value Date/Time    Color YELLOW/STRAW 01/09/2019 05:00 PM    Appearance CLEAR 01/09/2019 05:00 PM    Specific gravity 1.014 01/09/2019 05:00 PM    pH (UA) 7.5 01/09/2019 05:00 PM    Protein NEGATIVE  01/09/2019 05:00 PM    Glucose NEGATIVE  01/09/2019 05:00 PM    Ketone TRACE (A) 01/09/2019 05:00 PM Bilirubin NEGATIVE  01/09/2019 05:00 PM    Urobilinogen 0.2 01/09/2019 05:00 PM    Nitrites NEGATIVE  01/09/2019 05:00 PM    Leukocyte Esterase NEGATIVE  01/09/2019 05:00 PM    Epithelial cells FEW 01/09/2019 05:00 PM    Bacteria NEGATIVE  01/09/2019 05:00 PM    WBC 0-4 01/09/2019 05:00 PM    RBC 0-5 01/09/2019 05:00 PM       Medications Reviewed:     Current Facility-Administered Medications   Medication Dose Route Frequency    acetylcysteine (MUCOMYST) 200 mg/mL (20 %) solution 400 mg  2 mL Nebulization TID RT    levoFLOXacin (LEVAQUIN) 750 mg in D5W IVPB  750 mg IntraVENous Q24H    predniSONE (DELTASONE) tablet 40 mg  40 mg Oral DAILY WITH BREAKFAST    aspirin delayed-release tablet 81 mg  81 mg Oral DAILY    oxyCODONE IR (ROXICODONE) tablet 5 mg  5 mg Oral Q8H PRN    sodium chloride (NS) flush 5-40 mL  5-40 mL IntraVENous Q8H    sodium chloride (NS) flush 5-40 mL  5-40 mL IntraVENous PRN    budesonide (PULMICORT) 500 mcg/2 ml nebulizer suspension  500 mcg Nebulization BID RT    acetaminophen (TYLENOL) tablet 650 mg  650 mg Oral Q4H PRN    albuterol-ipratropium (DUO-NEB) 2.5 MG-0.5 MG/3 ML  3 mL Nebulization Q4H RT     ______________________________________________________________________  EXPECTED LENGTH OF STAY: 3d 19h  ACTUAL LENGTH OF STAY:          2               Scott Galeano MD

## 2019-01-11 NOTE — PROGRESS NOTES
Pulmonary, Critical Care, and Sleep Medicine~Progress Note    Name: Julianna Tinajero MRN: [id number]   : 1950 Hospital: [facility]    Date: 2019 9:30 AM Admission: 2019     Impression Plan   1. Acute on chronic hypoxic resp insufficiency   2. LLL CAP  3. AE of COPD  4. Hx of lung cancer;   1. Few doses of mucomyst   2. Duonebs/pulmicort  3. On levaquin  4. Prednisone  5. O2 titration above 90%   6. PRN over the weekend       Daily Progression:    + cough and congestion. But getting better     I have reviewed the labs and previous days notes. Pertinent items are noted in HPI. OBJECTIVE:     Vital Signs:       Visit Vitals  /65 (BP 1 Location: Left arm, BP Patient Position: At rest)   Pulse 83   Temp 97.5 °F (36.4 °C)   Resp 18   Ht 5' 11\" (1.803 m)   Wt 66.4 kg (146 lb 6.2 oz)   SpO2 92%   BMI 20.42 kg/m²      Temp (24hrs), Av.5 °F (36.4 °C), Min:97.3 °F (36.3 °C), Max:97.9 °F (36.6 °C)     Intake/Output:     Last shift: No intake/output data recorded. Last 3 shifts:  1901 -  0700  In: 1210 [P.O.:1160;  I.V.:50]  Out: 1750 [Urine:1750]          Intake/Output Summary (Last 24 hours) at 2019 0930  Last data filed at 2019 5142  Gross per 24 hour   Intake 820 ml   Output 525 ml   Net 295 ml       Physical Exam:                                        Exam Findings Other   General: No resp distress noted, appears stated age    [de-identified]:  No ulcers, JVD not elevated, no cervical LAD    Chest: No pectus deformity, normal chest rise b/l    HEART:  RRR, no murmurs/rubs/gallops    Lungs:  coarse, diminished BS at bases    ABD: Soft/NT, non rigid mildly distended    EXT: No cyanosis/clubbing/edema, normal peripheral pulses    Skin: No rashes or ulcers, no mottling    Neuro:  A/O x 3        Medications:  Current Facility-Administered Medications   Medication Dose Route Frequency    levoFLOXacin (LEVAQUIN) 750 mg in D5W IVPB  750 mg IntraVENous Q24H    predniSONE (DELTASONE) tablet 40 mg  40 mg Oral DAILY WITH BREAKFAST    aspirin delayed-release tablet 81 mg  81 mg Oral DAILY    oxyCODONE IR (ROXICODONE) tablet 5 mg  5 mg Oral Q8H PRN    sodium chloride (NS) flush 5-40 mL  5-40 mL IntraVENous Q8H    sodium chloride (NS) flush 5-40 mL  5-40 mL IntraVENous PRN    budesonide (PULMICORT) 500 mcg/2 ml nebulizer suspension  500 mcg Nebulization BID RT    acetaminophen (TYLENOL) tablet 650 mg  650 mg Oral Q4H PRN    albuterol-ipratropium (DUO-NEB) 2.5 MG-0.5 MG/3 ML  3 mL Nebulization Q4H RT       Labs:  ABG Recent Labs     01/09/19  1708   PHI 7.391   PCO2I 42.6   PO2I 87   HCO3I 25.8   SO2I 96   FIO2I 32        CBC Recent Labs     01/11/19  0434 01/10/19  0432 01/09/19  1204   WBC 19.8* 13.5* 13.0*   HGB 12.5 13.8 14.5   HCT 39.8 42.5 46.1    299 316   MCV 94.1 94.0 94.1   MCH 29.6 30.5 09.5        Metabolic  Panel Recent Labs     01/11/19  0434 01/10/19  0432 01/09/19  1204    137 138   K 4.3 4.0 4.1    105 101   CO2 26 24 30   * 140* 82   BUN 23* 22* 13   CREA 1.28 1.22 1.09   CA 8.7 8.6 9.0   MG  --   --  2.3   ALB  --   --  3.5   SGOT  --   --  15   ALT  --   --  19        Pertinent Labs                HERMELINDA Cordero  1/11/2019

## 2019-01-11 NOTE — PROGRESS NOTES
Transitional Care Team: Initial [facility] Note    Date of Assessment: 01/11/19  Time of Assessment:  3:41 PM  Hospital Nurse Navigator: Patricia Galdamez, RN, BSN, [facility]    Eulalio Barraza is a 80 y.o. male inpatient at [facility] with SOB (shortness of breath) [R06.02]  SOB (shortness of breath) [R06.02] on  1/9/2019. This Nurse Navigator accessed chart to offer support and placement in the [address] [place] [address] Team bridges the gaps in care and education surrounding discharge from the acute care facility. The objective is to empower the patient and family in taking a proactive role in the task of preventing readmission within the first thirty days after discharge from the acute care setting. The team is also involved in the efforts to reduce readmission to the acute care setting after stabilization and discharge from the acute care environment either to the skilled nursing facilities or community. Primary Diagnosis  SOB (shortness of breath)    The following opportunities were noted following assessment:   1. IP Rehab - pt states that he would like to go to [facility] at discharge. Says when he is d/c to IPR he does much better. 2. Pt attends [facility] program at [facility]. Program costs $20/month. He goes M-F and does the new step (bike for arms and legs), walks, free weights. 3. Oxygen - 2L at HS. Provided by [facility]. Voiced no issues with provider. 4. Medications - says he has problems affording inhalers. If any can be combined, it would be helpful. He already has a nebulizer at home. 5. AMD - doesn't believe he has one. Consider completing when not so SOB. 6. Still drives. Lives with his son and 2 grandkids. EDUCATION / INTERVENTIONS OFFERED:      1. Medication reconciliation was not performed. 2. Patient / Family was instructed on specific signs/symptoms to look for with regards to worsening of their medical conditions.  Learning was assessed using teach back. 3. Patient / Family were given all educational material from the [facility] with services identified for complete wrap around services during their 30 day recovery phase. The patient and I discussed wrap around services including nurse navigation, care coordination, home health, transitional care appointments with their primary care provider and specialist team and the role of [facility]. Patient education focused on readmission zones as described as: The Red Zone: High risk for readmission, days 1-21    The Yellow Zone: Moderate risk for readmission, days 22-29    The Green Zone: Lower risk for readmission, days 30 and after    4. Advance Care Planning:  doesn't believe he has one. Suggested that he complete one prior to discharge. BARRIERS IDENTIFIED:    Leigh Ann Barreto expressed the following barriers to his discharge:   affordability of some of his inhalers    PLAN:  A written individual care plan including education materials, a calendar individualized with their follow up appointments with primary care providers, specialist, and telephone numbers of healthcare personal that are available for them to ask questions during the recovery phase. Pt will be discharging to D, but would prefer IP Rehab. .    The TC Team will follow patient from a distance while inpatient as well as be available for further transition disposition as needed. The [facility] TEAM will continue to offer support during the 30- 90 day discharge from acute care setting.      Past Medical History:   Diagnosis Date    Asthma     Cancer [facility])     Lung Cancer    Chronic obstructive pulmonary disease (HCC)     Chronic pain     High cholesterol     Lung cancer [facility])        Advance Care Planning 1/9/2019   Patient's Healthcare Decision Maker is: -   Primary Decision Maker Name -   Primary Decision Maker Phone Number -   Primary Decision Maker Relationship to Patient -   Confirm Advance Directive None   Patient Would Like to Complete Advance Directive -

## 2019-01-11 NOTE — ROUTINE PROCESS
Bedside shift change report given to michael gr rn (oncoming nurse) by Faye Meraz (offgoing nurse). Report included the following information SBAR and Kardex.

## 2019-01-12 PROCEDURE — 74011250636 HC RX REV CODE- 250/636: Performed by: INTERNAL MEDICINE

## 2019-01-12 PROCEDURE — 77010033678 HC OXYGEN DAILY

## 2019-01-12 PROCEDURE — 74011000250 HC RX REV CODE- 250: Performed by: FAMILY MEDICINE

## 2019-01-12 PROCEDURE — 74011636637 HC RX REV CODE- 636/637: Performed by: INTERNAL MEDICINE

## 2019-01-12 PROCEDURE — 94640 AIRWAY INHALATION TREATMENT: CPT

## 2019-01-12 PROCEDURE — 65270000032 HC RM SEMIPRIVATE

## 2019-01-12 PROCEDURE — 74011000250 HC RX REV CODE- 250: Performed by: PHYSICIAN ASSISTANT

## 2019-01-12 PROCEDURE — 74011250637 HC RX REV CODE- 250/637: Performed by: FAMILY MEDICINE

## 2019-01-12 PROCEDURE — 74011250637 HC RX REV CODE- 250/637: Performed by: INTERNAL MEDICINE

## 2019-01-12 PROCEDURE — 94760 N-INVAS EAR/PLS OXIMETRY 1: CPT

## 2019-01-12 RX ADMIN — BUDESONIDE 500 MCG: 0.5 INHALANT RESPIRATORY (INHALATION) at 07:11

## 2019-01-12 RX ADMIN — IPRATROPIUM BROMIDE AND ALBUTEROL SULFATE 3 ML: .5; 3 SOLUTION RESPIRATORY (INHALATION) at 15:12

## 2019-01-12 RX ADMIN — IPRATROPIUM BROMIDE AND ALBUTEROL SULFATE 3 ML: .5; 3 SOLUTION RESPIRATORY (INHALATION) at 07:11

## 2019-01-12 RX ADMIN — IPRATROPIUM BROMIDE AND ALBUTEROL SULFATE 3 ML: .5; 3 SOLUTION RESPIRATORY (INHALATION) at 19:51

## 2019-01-12 RX ADMIN — BUDESONIDE 500 MCG: 0.5 INHALANT RESPIRATORY (INHALATION) at 19:51

## 2019-01-12 RX ADMIN — LEVOFLOXACIN 750 MG: 5 INJECTION, SOLUTION INTRAVENOUS at 12:47

## 2019-01-12 RX ADMIN — GUAIFENESIN 600 MG: 600 TABLET, EXTENDED RELEASE ORAL at 21:54

## 2019-01-12 RX ADMIN — Medication 10 ML: at 21:54

## 2019-01-12 RX ADMIN — PREDNISONE 40 MG: 20 TABLET ORAL at 07:19

## 2019-01-12 RX ADMIN — Medication 10 ML: at 07:20

## 2019-01-12 RX ADMIN — GUAIFENESIN 600 MG: 600 TABLET, EXTENDED RELEASE ORAL at 09:34

## 2019-01-12 RX ADMIN — ASPIRIN 81 MG: 81 TABLET, COATED ORAL at 09:34

## 2019-01-12 RX ADMIN — Medication 10 ML: at 13:03

## 2019-01-12 RX ADMIN — IPRATROPIUM BROMIDE AND ALBUTEROL SULFATE 3 ML: .5; 3 SOLUTION RESPIRATORY (INHALATION) at 03:53

## 2019-01-12 RX ADMIN — ACETYLCYSTEINE 400 MG: 200 SOLUTION ORAL; RESPIRATORY (INHALATION) at 07:11

## 2019-01-12 RX ADMIN — IPRATROPIUM BROMIDE AND ALBUTEROL SULFATE 3 ML: .5; 3 SOLUTION RESPIRATORY (INHALATION) at 11:34

## 2019-01-12 NOTE — PROGRESS NOTES
Mr. Luis Gandara was seen. He was informed that he was referred to inpatient pulmonary rehab. He had been at [facility] and attends the [facility] clinic. Carlo Kaplan the Liaison was here. He was introduced to Mr. Luis Gandara. A referral was sent through [employer] to [facility]. They can take him tomorrow. Transportation will need to be arrange. Mr. Luis Gandara is on 3 liters of O2. Will continue to follow for discharge planning.   Signed By: Meri Medina LCSW     January 12, 2019

## 2019-01-12 NOTE — PROGRESS NOTES
Hospitalist Progress Note  Nataliia Denny MD  Answering service: [phone] from in house phone      Date of Service:  2019  NAME:  Enrike Kilpatrick  :  1950  MRN:  [id number]    Admission Summary:   68M hx of COPD, apparently can walk more than half a mile without dyspnea at baseline, p/w 2days hx of increasing cough, phlegm, mostly clear, soboe, and fatigue. Diagnosed with PNA and worsening copd    Interval history / Subjective:   Patient seen and examined at bedside, slowly getting better, needs inpatient pulm rehab for dc. Assessment & Plan:     #. CAP: left lower lung  #. COPD exacerbation: 2nd to above, uptodate with flu and PNA shots. On home oxgyen 2-3L nightly  - CT chest showed infiltrates on L lower zone, given prev hx of ca, will need f/u CT in 3months  - DuoNebs, guaifenesin, Steroids switch to PO, IV abx switch to levaquin, oxygen prn, monitor  - PT/OT for safe dc, required pulm rehab in previous admissions. Echo ef 65%. #. Hx of Lung ca: R lower zone, resected, presumed cure since     Code status: Full  DVT prophylaxis: Lovenox  Care Plan discussed with: Patient/Family and Nurse  Disposition: TBD a/w IP pulm rehab     Hospital Problems  Date Reviewed: 2019          Codes Class Noted POA    * (Principal) SOB (shortness of breath) ICD-10-CM: R06.02  ICD-9-CM: 786.05  2019 Unknown            Review of Systems:   Pertinent items are mentioned in interval history. Vital Signs:    Last 24hrs VS reviewed since prior progress note.  Most recent are:  Visit Vitals  /75 (BP 1 Location: Right arm, BP Patient Position: At rest)   Pulse 84   Temp 97.7 °F (36.5 °C)   Resp 16   Ht 5' 11\" (1.803 m)   Wt 67 kg (147 lb 9.6 oz)   SpO2 95%   BMI 20.59 kg/m²         Intake/Output Summary (Last 24 hours) at 2019 0758  Last data filed at 2019 0651  Gross per 24 hour   Intake 570 ml   Output 1700 ml Net -1130 ml        Physical Examination:   General:  Alert, oriented, No acute distress, WM  Card:  S1, S2 without murmurs, good peripheral perfusion  Resp:  No accessory muscle use, tight chest with poor AE, no rhonchi or wheeze  Abd:  Soft, non-tender, non-distended, BS+  Extremities:  No cyanosis or clubbing, no significant edema    Data Review:    Review and/or order of clinical lab test  Review and/or order of tests in the radiology section of [facility]    Labs:     Recent Labs     01/11/19  0434 01/10/19  0432   WBC 19.8* 13.5*   HGB 12.5 13.8   HCT 39.8 42.5    299     Recent Labs     01/11/19  0434 01/10/19  0432 01/09/19  1204    137 138   K 4.3 4.0 4.1    105 101   CO2 26 24 30   BUN 23* 22* 13   CREA 1.28 1.22 1.09   * 140* 82   CA 8.7 8.6 9.0   MG  --   --  2.3     Recent Labs     01/09/19  1204   SGOT 15   ALT 19   AP 95   TBILI 1.1*   TP 7.3   ALB 3.5   GLOB 3.8     No results for input(s): INR, PTP, APTT in the last 72 hours. No lab exists for component: INREXT, INREXT   No results for input(s): FE, TIBC, PSAT, FERR in the last 72 hours. No results found for: FOL, RBCF   No results for input(s): PH, PCO2, PO2 in the last 72 hours.   Recent Labs     01/10/19  0000 01/09/19  1700 01/09/19  1204   TROIQ <0.05 <0.05 <0.05     Lab Results   Component Value Date/Time    Cholesterol, total 106 02/20/2018 04:54 AM    HDL Cholesterol 45 02/20/2018 04:54 AM    LDL, calculated 46 02/20/2018 04:54 AM    Triglyceride 75 02/20/2018 04:54 AM    CHOL/HDL Ratio 2.4 02/20/2018 04:54 AM     Lab Results   Component Value Date/Time    Glucose (POC) 100 09/14/2015 12:14 PM     Lab Results   Component Value Date/Time    Color YELLOW/STRAW 01/09/2019 05:00 PM    Appearance CLEAR 01/09/2019 05:00 PM    Specific gravity 1.014 01/09/2019 05:00 PM    pH (UA) 7.5 01/09/2019 05:00 PM    Protein NEGATIVE  01/09/2019 05:00 PM    Glucose NEGATIVE  01/09/2019 05:00 PM    Ketone TRACE (A) 01/09/2019 05:00 PM Bilirubin NEGATIVE  01/09/2019 05:00 PM    Urobilinogen 0.2 01/09/2019 05:00 PM    Nitrites NEGATIVE  01/09/2019 05:00 PM    Leukocyte Esterase NEGATIVE  01/09/2019 05:00 PM    Epithelial cells FEW 01/09/2019 05:00 PM    Bacteria NEGATIVE  01/09/2019 05:00 PM    WBC 0-4 01/09/2019 05:00 PM    RBC 0-5 01/09/2019 05:00 PM       Medications Reviewed:     Current Facility-Administered Medications   Medication Dose Route Frequency    guaiFENesin ER (MUCINEX) tablet 600 mg  600 mg Oral Q12H    levoFLOXacin (LEVAQUIN) 750 mg in D5W IVPB  750 mg IntraVENous Q24H    predniSONE (DELTASONE) tablet 40 mg  40 mg Oral DAILY WITH BREAKFAST    aspirin delayed-release tablet 81 mg  81 mg Oral DAILY    oxyCODONE IR (ROXICODONE) tablet 5 mg  5 mg Oral Q8H PRN    sodium chloride (NS) flush 5-40 mL  5-40 mL IntraVENous Q8H    sodium chloride (NS) flush 5-40 mL  5-40 mL IntraVENous PRN    budesonide (PULMICORT) 500 mcg/2 ml nebulizer suspension  500 mcg Nebulization BID RT    acetaminophen (TYLENOL) tablet 650 mg  650 mg Oral Q4H PRN    albuterol-ipratropium (DUO-NEB) 2.5 MG-0.5 MG/3 ML  3 mL Nebulization Q4H RT     ______________________________________________________________________  EXPECTED LENGTH OF STAY: 3d 19h  ACTUAL LENGTH OF STAY:          3               Melody Mendez MD

## 2019-01-12 NOTE — PROGRESS NOTES
Received a call from nursing stating that the patient may need pulmonary rehab. [facility] was called. They will have the Liaison call me. Will continue to follow for discharge planning.   Signed By: Olesya Estrada LCSW     January 12, 2019

## 2019-01-12 NOTE — PROGRESS NOTES
Bedside shift change report given to Kalyan Dorado (oncoming nurse) by Rachel Klein (offgoing nurse). Report included the following information SBAR, Kardex and MAR.

## 2019-01-12 NOTE — ROUTINE PROCESS
Bedside shift change report given to michael gr rn (oncoming nurse) by Ras Thornton (offgoing nurse). Report included the following information SBAR and Kardex.

## 2019-01-13 VITALS
TEMPERATURE: 97.5 F | DIASTOLIC BLOOD PRESSURE: 73 MMHG | SYSTOLIC BLOOD PRESSURE: 132 MMHG | HEART RATE: 100 BPM | WEIGHT: 147.3 LBS | HEIGHT: 71 IN | OXYGEN SATURATION: 95 % | BODY MASS INDEX: 20.62 KG/M2 | RESPIRATION RATE: 16 BRPM

## 2019-01-13 LAB
ANION GAP SERPL CALC-SCNC: 9 MMOL/L (ref 5–15)
BUN SERPL-MCNC: 23 MG/DL (ref 6–20)
BUN/CREAT SERPL: 18 (ref 12–20)
CALCIUM SERPL-MCNC: 8.9 MG/DL (ref 8.5–10.1)
CHLORIDE SERPL-SCNC: 105 MMOL/L (ref 97–108)
CO2 SERPL-SCNC: 25 MMOL/L (ref 21–32)
CREAT SERPL-MCNC: 1.28 MG/DL (ref 0.7–1.3)
ERYTHROCYTE [DISTWIDTH] IN BLOOD BY AUTOMATED COUNT: 15.6 % (ref 11.5–14.5)
GLUCOSE SERPL-MCNC: 90 MG/DL (ref 65–100)
HCT VFR BLD AUTO: 41.4 % (ref 36.6–50.3)
HGB BLD-MCNC: 13.1 G/DL (ref 12.1–17)
MCH RBC QN AUTO: 29.8 PG (ref 26–34)
MCHC RBC AUTO-ENTMCNC: 31.6 G/DL (ref 30–36.5)
MCV RBC AUTO: 94.3 FL (ref 80–99)
NRBC # BLD: 0 K/UL (ref 0–0.01)
NRBC BLD-RTO: 0 PER 100 WBC
PLATELET # BLD AUTO: 250 K/UL (ref 150–400)
PMV BLD AUTO: 9.6 FL (ref 8.9–12.9)
POTASSIUM SERPL-SCNC: 4.2 MMOL/L (ref 3.5–5.1)
RBC # BLD AUTO: 4.39 M/UL (ref 4.1–5.7)
SODIUM SERPL-SCNC: 139 MMOL/L (ref 136–145)
WBC # BLD AUTO: 13.1 K/UL (ref 4.1–11.1)

## 2019-01-13 PROCEDURE — 74011636637 HC RX REV CODE- 636/637: Performed by: INTERNAL MEDICINE

## 2019-01-13 PROCEDURE — 74011000250 HC RX REV CODE- 250: Performed by: FAMILY MEDICINE

## 2019-01-13 PROCEDURE — 36415 COLL VENOUS BLD VENIPUNCTURE: CPT

## 2019-01-13 PROCEDURE — 94640 AIRWAY INHALATION TREATMENT: CPT

## 2019-01-13 PROCEDURE — 74011250636 HC RX REV CODE- 250/636: Performed by: INTERNAL MEDICINE

## 2019-01-13 PROCEDURE — 74011250637 HC RX REV CODE- 250/637: Performed by: FAMILY MEDICINE

## 2019-01-13 PROCEDURE — 85027 COMPLETE CBC AUTOMATED: CPT

## 2019-01-13 PROCEDURE — 80048 BASIC METABOLIC PNL TOTAL CA: CPT

## 2019-01-13 PROCEDURE — 74011250637 HC RX REV CODE- 250/637: Performed by: INTERNAL MEDICINE

## 2019-01-13 RX ORDER — BUDESONIDE 0.5 MG/2ML
500 INHALANT ORAL 2 TIMES DAILY
Qty: 120 ML | Refills: 0 | Status: SHIPPED | OUTPATIENT
Start: 2019-01-13 | End: 2020-01-06

## 2019-01-13 RX ORDER — GUAIFENESIN 600 MG/1
600 TABLET, EXTENDED RELEASE ORAL EVERY 12 HOURS
Qty: 20 TAB | Refills: 0 | Status: SHIPPED | OUTPATIENT
Start: 2019-01-13 | End: 2020-01-06

## 2019-01-13 RX ORDER — LEVOFLOXACIN 750 MG/1
750 TABLET ORAL DAILY
Qty: 4 TAB | Refills: 0 | Status: SHIPPED | OUTPATIENT
Start: 2019-01-13 | End: 2020-01-06

## 2019-01-13 RX ORDER — PREDNISONE 10 MG/1
TABLET ORAL
Qty: 18 TAB | Refills: 0 | Status: SHIPPED | OUTPATIENT
Start: 2019-01-13 | End: 2020-01-06

## 2019-01-13 RX ADMIN — IPRATROPIUM BROMIDE AND ALBUTEROL SULFATE 3 ML: .5; 3 SOLUTION RESPIRATORY (INHALATION) at 07:26

## 2019-01-13 RX ADMIN — GUAIFENESIN 600 MG: 600 TABLET, EXTENDED RELEASE ORAL at 10:09

## 2019-01-13 RX ADMIN — Medication 10 ML: at 07:07

## 2019-01-13 RX ADMIN — IPRATROPIUM BROMIDE AND ALBUTEROL SULFATE 3 ML: .5; 3 SOLUTION RESPIRATORY (INHALATION) at 11:18

## 2019-01-13 RX ADMIN — BUDESONIDE 500 MCG: 0.5 INHALANT RESPIRATORY (INHALATION) at 07:27

## 2019-01-13 RX ADMIN — LEVOFLOXACIN 750 MG: 5 INJECTION, SOLUTION INTRAVENOUS at 13:03

## 2019-01-13 RX ADMIN — PREDNISONE 40 MG: 20 TABLET ORAL at 07:06

## 2019-01-13 RX ADMIN — ASPIRIN 81 MG: 81 TABLET, COATED ORAL at 10:09

## 2019-01-13 RX ADMIN — Medication 10 ML: at 13:04

## 2019-01-13 NOTE — PROGRESS NOTES
I have reviewed discharge instructions with Gera BARRIGA, at [facility] rehab. Angélica Lipscomb He verbalized understanding. Prescriptions sent with pt.

## 2019-01-13 NOTE — DISCHARGE INSTRUCTIONS
Discharge Instructions       PATIENT ID: Leigh Ann Barreto  MRN: [id number]   YOB: 1950    DATE OF ADMISSION: 1/9/2019 11:20 AM    DATE OF DISCHARGE: 1/13/2019    PRIMARY CARE PROVIDER: Minh Silver MD     ATTENDING PHYSICIAN: Rodrigo Ferreira MD  DISCHARGING PROVIDER: Phil Bhatia MD    To contact this individual call [phone] and ask the  to page. If unavailable ask to be transferred the Adult Hospitalist Department. DISCHARGE DIAGNOSES Community acquired pneumonia and copd exacerbation    CONSULTATIONS: IP CONSULT TO HOSPITALIST    PROCEDURES/SURGERIES: * No surgery found *    FOLLOW UP APPOINTMENTS:   Follow-up Information     Follow up With Specialties Details Why Heidi Clinton MD Internal Medicine In 1 week  [facility] [address]  [phone]             ADDITIONAL CARE RECOMMENDATIONS: follow up with PCP     DIET: Resume previous diet    DISCHARGE MEDICATIONS:  Levaquin, prednisone, guaifenesin    · It is important that you take the medication exactly as they are prescribed. · Keep your medication in the bottles provided by the pharmacist and keep a list of the medication names, dosages, and times to be taken in your wallet. · Do not take other medications without consulting your doctor. NOTIFY YOUR PHYSICIAN FOR ANY OF THE FOLLOWING:   Fever over 101 degrees for 24 hours. Chest pain, shortness of breath, fever, chills, nausea, vomiting, diarrhea, change in mentation, falling, weakness, bleeding. Severe pain or pain not relieved by medications. Or, any other signs or symptoms that you may have questions about. It was our pleasure to help take care of you and we hope you get well very soon.     DISPOSITION:    Home With:   OT  PT  HH  RN       SNF/Inpatient Rehab/LTAC   x Independent/assisted living    Hospice    Other:     CDMP Checked:   Yes x     PROBLEM LIST Updated:  Yes x     Signed:   Phil Bhatia MD  1/13/2019  12:32 PM

## 2019-01-13 NOTE — PROGRESS NOTES
Bedside shift change report given to Estela Mackenzie RN (oncoming nurse) by Thomas Smith Rn (offgoing nurse). Report included the following information SBAR, MAR and Recent Results.

## 2019-01-13 NOTE — PROGRESS NOTES
CM was advised that patient is discharged today. CM spoke with Jose Daniel Regalado, [facility], who said that patient is accepted for admission and bed is available today. Accepting physician is Gina Colon MD. Nurse report to be called to [phone]. Referral sent to [facility] via [employer] for BLS transport, and referral was accepted for 3 pm pickup. CM completed CM portion of EMTALA. CM completed PCS. CM gave update to staff nurse.      [place] [id number], [place], [place]

## 2019-01-13 NOTE — PROGRESS NOTES
Bedside shift change report given to Mary García (oncoming nurse) by Kera Simon RN (offgoing nurse). Report included the following information SBAR, Kardex and MAR.

## 2019-01-13 NOTE — DISCHARGE SUMMARY
Discharge Summary       PATIENT ID: Nixon Sierra  MRN: [id number]   YOB: 1950    DATE OF ADMISSION: 1/9/2019 11:20 AM    DATE OF DISCHARGE: 1/13/2019   PRIMARY CARE PROVIDER: Venessa Robledo MD     ATTENDING PHYSICIAN: Patito Hinojosa MD  DISCHARGING PROVIDER: Patito Hinojosa MD    To contact this individual call [phone] and ask the  to page. If unavailable ask to be transferred the Adult Hospitalist Department. CONSULTATIONS: IP CONSULT TO HOSPITALIST    PROCEDURES/SURGERIES: * No surgery found *    ADMITTING [address] [place] [address] COURSE:   Admitted with sob, diagnosed with CAP and copd exacerbation, improved gradually with abx, and steroids awith nebs. Will dc to pulmonary rehab facility    Risk of Re-Admission: mod  DISCHARGE DIAGNOSES / PLAN:      #. CAP: left lower lung  #. COPD exacerbation: 2nd to above, uptodate with flu and PNA shots. On home oxgyen 2-3L nightly- CT chest showed infiltrates on L lower zone, given prev hx of ca, will need f/u CT in 3months  - DuoNebs, guaifenesin, Steroids switch to PO, IV abx switch to levaquin po to finish course, oxygen prn, monitor  - PT/OT for safe dc, required pulm rehab in previous admissions. Echo ef 65%.     #. Hx of Lung ca: R lower zone, resected, presumed cure since 2011     FOLLOW UP APPOINTMENTS:    Follow-up Information     Follow up With Specialties Details Why Jovanny Hernandez MD Internal Medicine In 1 week  [address] [phone]             ADDITIONAL CARE RECOMMENDATIONS:  Follow up with PCP     DIET: Resume previous diet    ACTIVITY: Activity as tolerated    DISCHARGE MEDICATIONS:  Current Discharge Medication List      START taking these medications    Details   budesonide (PULMICORT) 0.5 mg/2 mL nbsp 2 mL by Nebulization route two (2) times a day.   Qty: 120 mL, Refills: 0      guaiFENesin ER (MUCINEX) 600 mg ER tablet Take 1 Tab by mouth every twelve (12) hours.  Qty: 20 Tab, Refills: 0      predniSONE (DELTASONE) 10 mg tablet Take 30mg/day '3tabs' for 3days, then  Take 20mg/day '2tabs' for 3days, then  Take 10mg/day '1tab' for 3days, then stop  Qty: 18 Tab, Refills: 0         CONTINUE these medications which have CHANGED    Details   levoFLOXacin (LEVAQUIN) 750 mg tablet Take 1 Tab by mouth daily. Qty: 4 Tab, Refills: 0         CONTINUE these medications which have NOT CHANGED    Details   albuterol (PROVENTIL HFA, VENTOLIN HFA, PROAIR HFA) 90 mcg/actuation inhaler Take 1-2 Puffs by inhalation every four (4) hours as needed for Wheezing. oxyCODONE IR (ROXICODONE) 20 mg immediate release tablet Take 40 mg by mouth every eight (8) hours as needed for Pain (severe pain). albuterol-ipratropium (DUO-NEB) 2.5 mg-0.5 mg/3 ml nebu 3 mL by Nebulization route every four (4) hours as needed. Qty: 30 Nebule, Refills: 0      aspirin delayed-release 81 mg tablet Take 1 Tab by mouth daily. Qty: 30 Tab, Refills: 0         STOP taking these medications       clindamycin (CLEOCIN) 300 mg capsule Comments:   Reason for Stopping:               NOTIFY YOUR PHYSICIAN FOR ANY OF THE FOLLOWING:   Fever over 101 degrees for 24 hours. Chest pain, shortness of breath, fever, chills, nausea, vomiting, diarrhea, change in mentation, falling, weakness, bleeding. Severe pain or pain not relieved by medications. Or, any other signs or symptoms that you may have questions about.     DISPOSITION:    Home With:   OT  PT  HH  RN      x Long term SNF/Inpatient Rehab    Independent/assisted living    Hospice    Other:       PATIENT CONDITION AT DISCHARGE:     Functional status    Poor     Deconditioned     Independent      Cognition     Lucid     Forgetful     Dementia      Catheters/lines (plus indication)    Burch     PICC     PEG     None      Code status     Full code     DNR      PHYSICAL EXAMINATION AT DISCHARGE:  Patient seen and examined at bedside, Condition stable, explained discharge and follow up plans. General:  Alert, oriented, No acute distress  Resp:  No accessory muscle use, Good AE. Neuro:  Grossly normal, no focal neuro deficits, follows commands   CHRONIC MEDICAL DIAGNOSES:  Problem List as of 1/13/2019 Date Reviewed: 1/9/2019          Codes Class Noted - Resolved    * (Principal) SOB (shortness of breath) ICD-10-CM: R06.02  ICD-9-CM: 786.05  1/9/2019 - Present        Malnutrition ([facility] ICD-10-CM: E46  ICD-9-CM: 263.9  4/10/2018 - Present        Respiratory failure with hypoxia ([facility] ICD-10-CM: J96.91  ICD-9-CM: 518.81  4/10/2018 - Present        Aspiration pneumonia ([facility] ICD-10-CM: J69.0  ICD-9-CM: 507.0  4/4/2018 - Present        Systolic CHF, acute ([facility] ICD-10-CM: I50.21  ICD-9-CM: 428.21, 428.0  2/22/2018 - Present        Shortness of breath ICD-10-CM: R06.02  ICD-9-CM: 786.05  2/17/2018 - Present        COPD exacerbation ([facility] ICD-10-CM: J44.1  ICD-9-CM: 491.21  8/10/2015 - Present        RESOLVED: NSTEMI (non-ST elevated myocardial infarction) ([facility] ICD-10-CM: I21.4  ICD-9-CM: 410.70  2/22/2018 - 2/22/2018        RESOLVED: COPD exacerbation ([facility] (Chronic) ICD-10-CM: J44.1  ICD-9-CM: 491.21  9/5/2015 - 2/22/2018              37 minutes were spent with the patient on counseling and coordination of care.     Signed:   Venkat Loza MD  1/13/2019  12:34 PM

## 2019-01-13 NOTE — PROGRESS NOTES
9:10 AM  CM received notification from RN that patient is medically stable for discharge. Attending inquiring if [facility] can accept patient for services today. CM contacted, [facility] liaison [phone] with [facility] to verify if they are able to accept and accommodate patient for services today. [facility] to call CM back  With update. CM will continue to follow and assist with disposition needs as they arise.     LIBERTAD Beasley/PIO

## 2020-01-06 ENCOUNTER — APPOINTMENT (OUTPATIENT)
Dept: GENERAL RADIOLOGY | Age: 70
DRG: 194 | End: 2020-01-06
Attending: EMERGENCY MEDICINE
Payer: MEDICARE

## 2020-01-06 ENCOUNTER — HOSPITAL ENCOUNTER (INPATIENT)
Age: 70
LOS: 6 days | Discharge: HOME | DRG: 194 | End: 2020-01-12
Attending: EMERGENCY MEDICINE | Admitting: INTERNAL MEDICINE
Payer: MEDICARE

## 2020-01-06 DIAGNOSIS — A41.9 SEPSIS, DUE TO UNSPECIFIED ORGANISM, UNSPECIFIED WHETHER ACUTE ORGAN DYSFUNCTION PRESENT (HCC): Primary | ICD-10-CM

## 2020-01-06 DIAGNOSIS — J18.9 COMMUNITY ACQUIRED PNEUMONIA OF LEFT LOWER LOBE OF LUNG: ICD-10-CM

## 2020-01-06 LAB
ALBUMIN SERPL-MCNC: 3.3 G/DL (ref 3.5–5)
ALBUMIN/GLOB SERPL: 0.9 {RATIO} (ref 1.1–2.2)
ALP SERPL-CCNC: 84 U/L (ref 45–117)
ALT SERPL-CCNC: 9 U/L (ref 12–78)
ANION GAP SERPL CALC-SCNC: 10 MMOL/L (ref 5–15)
AST SERPL-CCNC: 9 U/L (ref 15–37)
ATRIAL RATE: 105 BPM
ATRIAL RATE: 106 BPM
B PERT DNA SPEC QL NAA+PROBE: NOT DETECTED
BASOPHILS # BLD: 0.1 K/UL (ref 0–0.1)
BASOPHILS NFR BLD: 0 % (ref 0–1)
BILIRUB SERPL-MCNC: 0.7 MG/DL (ref 0.2–1)
BORDETELLA PARAPERTUSSIS PCR, BORPAR: NOT DETECTED
BUN SERPL-MCNC: 18 MG/DL (ref 6–20)
BUN/CREAT SERPL: 17 (ref 12–20)
C PNEUM DNA SPEC QL NAA+PROBE: NOT DETECTED
CALCIUM SERPL-MCNC: 8.5 MG/DL (ref 8.5–10.1)
CALCULATED P AXIS, ECG09: 85 DEGREES
CALCULATED P AXIS, ECG09: 87 DEGREES
CALCULATED R AXIS, ECG10: 79 DEGREES
CALCULATED R AXIS, ECG10: 82 DEGREES
CALCULATED T AXIS, ECG11: 84 DEGREES
CALCULATED T AXIS, ECG11: 84 DEGREES
CHLORIDE SERPL-SCNC: 107 MMOL/L (ref 97–108)
CO2 SERPL-SCNC: 24 MMOL/L (ref 21–32)
COMMENT, HOLDF: NORMAL
CREAT SERPL-MCNC: 1.03 MG/DL (ref 0.7–1.3)
DIAGNOSIS, 93000: NORMAL
DIAGNOSIS, 93000: NORMAL
DIFFERENTIAL METHOD BLD: ABNORMAL
EOSINOPHIL # BLD: 0.4 K/UL (ref 0–0.4)
EOSINOPHIL NFR BLD: 3 % (ref 0–7)
ERYTHROCYTE [DISTWIDTH] IN BLOOD BY AUTOMATED COUNT: 13.2 % (ref 11.5–14.5)
FLUAV AG NPH QL IA: NEGATIVE
FLUAV H1 2009 PAND RNA SPEC QL NAA+PROBE: NOT DETECTED
FLUAV H1 RNA SPEC QL NAA+PROBE: NOT DETECTED
FLUAV H3 RNA SPEC QL NAA+PROBE: NOT DETECTED
FLUAV SUBTYP SPEC NAA+PROBE: NOT DETECTED
FLUBV AG NOSE QL IA: NEGATIVE
FLUBV RNA SPEC QL NAA+PROBE: NOT DETECTED
GLOBULIN SER CALC-MCNC: 3.5 G/DL (ref 2–4)
GLUCOSE SERPL-MCNC: 86 MG/DL (ref 65–100)
HADV DNA SPEC QL NAA+PROBE: NOT DETECTED
HCOV 229E RNA SPEC QL NAA+PROBE: NOT DETECTED
HCOV HKU1 RNA SPEC QL NAA+PROBE: NOT DETECTED
HCOV NL63 RNA SPEC QL NAA+PROBE: NOT DETECTED
HCOV OC43 RNA SPEC QL NAA+PROBE: NOT DETECTED
HCT VFR BLD AUTO: 42.8 % (ref 36.6–50.3)
HGB BLD-MCNC: 13.6 G/DL (ref 12.1–17)
HMPV RNA SPEC QL NAA+PROBE: NOT DETECTED
HPIV1 RNA SPEC QL NAA+PROBE: NOT DETECTED
HPIV2 RNA SPEC QL NAA+PROBE: NOT DETECTED
HPIV3 RNA SPEC QL NAA+PROBE: NOT DETECTED
HPIV4 RNA SPEC QL NAA+PROBE: NOT DETECTED
IMM GRANULOCYTES # BLD AUTO: 0.1 K/UL (ref 0–0.04)
IMM GRANULOCYTES NFR BLD AUTO: 0 % (ref 0–0.5)
LACTATE SERPL-SCNC: 2.4 MMOL/L (ref 0.4–2)
LACTATE SERPL-SCNC: 2.9 MMOL/L (ref 0.4–2)
LYMPHOCYTES # BLD: 1.5 K/UL (ref 0.8–3.5)
LYMPHOCYTES NFR BLD: 11 % (ref 12–49)
M PNEUMO DNA SPEC QL NAA+PROBE: NOT DETECTED
MCH RBC QN AUTO: 30.1 PG (ref 26–34)
MCHC RBC AUTO-ENTMCNC: 31.8 G/DL (ref 30–36.5)
MCV RBC AUTO: 94.7 FL (ref 80–99)
MONOCYTES # BLD: 1.2 K/UL (ref 0–1)
MONOCYTES NFR BLD: 9 % (ref 5–13)
NEUTS SEG # BLD: 10.1 K/UL (ref 1.8–8)
NEUTS SEG NFR BLD: 77 % (ref 32–75)
NRBC # BLD: 0 K/UL (ref 0–0.01)
NRBC BLD-RTO: 0 PER 100 WBC
P-R INTERVAL, ECG05: 138 MS
P-R INTERVAL, ECG05: 140 MS
PLATELET # BLD AUTO: 372 K/UL (ref 150–400)
PMV BLD AUTO: 10.7 FL (ref 8.9–12.9)
POTASSIUM SERPL-SCNC: 3.6 MMOL/L (ref 3.5–5.1)
PROT SERPL-MCNC: 6.8 G/DL (ref 6.4–8.2)
Q-T INTERVAL, ECG07: 306 MS
Q-T INTERVAL, ECG07: 310 MS
QRS DURATION, ECG06: 76 MS
QRS DURATION, ECG06: 76 MS
QTC CALCULATION (BEZET), ECG08: 404 MS
QTC CALCULATION (BEZET), ECG08: 411 MS
RBC # BLD AUTO: 4.52 M/UL (ref 4.1–5.7)
RSV RNA SPEC QL NAA+PROBE: NOT DETECTED
RV+EV RNA SPEC QL NAA+PROBE: NOT DETECTED
SAMPLES BEING HELD,HOLD: NORMAL
SODIUM SERPL-SCNC: 141 MMOL/L (ref 136–145)
TROPONIN I SERPL-MCNC: <0.05 NG/ML
VENTRICULAR RATE, ECG03: 105 BPM
VENTRICULAR RATE, ECG03: 106 BPM
WBC # BLD AUTO: 13.4 K/UL (ref 4.1–11.1)

## 2020-01-06 PROCEDURE — 80053 COMPREHEN METABOLIC PANEL: CPT

## 2020-01-06 PROCEDURE — 36415 COLL VENOUS BLD VENIPUNCTURE: CPT

## 2020-01-06 PROCEDURE — 0100U RESPIRATORY PANEL,PCR,NASOPHARYNGEAL: CPT

## 2020-01-06 PROCEDURE — 99284 EMERGENCY DEPT VISIT MOD MDM: CPT

## 2020-01-06 PROCEDURE — 87040 BLOOD CULTURE FOR BACTERIA: CPT

## 2020-01-06 PROCEDURE — 93005 ELECTROCARDIOGRAM TRACING: CPT

## 2020-01-06 PROCEDURE — 71046 X-RAY EXAM CHEST 2 VIEWS: CPT

## 2020-01-06 PROCEDURE — 74011000250 HC RX REV CODE- 250: Performed by: EMERGENCY MEDICINE

## 2020-01-06 PROCEDURE — 94760 N-INVAS EAR/PLS OXIMETRY 1: CPT

## 2020-01-06 PROCEDURE — 85025 COMPLETE CBC W/AUTO DIFF WBC: CPT

## 2020-01-06 PROCEDURE — 74011000258 HC RX REV CODE- 258: Performed by: EMERGENCY MEDICINE

## 2020-01-06 PROCEDURE — 94640 AIRWAY INHALATION TREATMENT: CPT

## 2020-01-06 PROCEDURE — 83605 ASSAY OF LACTIC ACID: CPT

## 2020-01-06 PROCEDURE — 94664 DEMO&/EVAL PT USE INHALER: CPT

## 2020-01-06 PROCEDURE — 77010033678 HC OXYGEN DAILY

## 2020-01-06 PROCEDURE — 65270000029 HC RM PRIVATE

## 2020-01-06 PROCEDURE — 84484 ASSAY OF TROPONIN QUANT: CPT

## 2020-01-06 PROCEDURE — 87804 INFLUENZA ASSAY W/OPTIC: CPT

## 2020-01-06 PROCEDURE — 74011000250 HC RX REV CODE- 250: Performed by: INTERNAL MEDICINE

## 2020-01-06 PROCEDURE — 74011250636 HC RX REV CODE- 250/636: Performed by: EMERGENCY MEDICINE

## 2020-01-06 PROCEDURE — 74011250636 HC RX REV CODE- 250/636: Performed by: INTERNAL MEDICINE

## 2020-01-06 PROCEDURE — 96374 THER/PROPH/DIAG INJ IV PUSH: CPT

## 2020-01-06 RX ORDER — OXYCODONE HYDROCHLORIDE 5 MG/1
10 TABLET ORAL
Status: DISCONTINUED | OUTPATIENT
Start: 2020-01-06 | End: 2020-01-12 | Stop reason: HOSPADM

## 2020-01-06 RX ORDER — SODIUM CHLORIDE 0.9 % (FLUSH) 0.9 %
5-40 SYRINGE (ML) INJECTION EVERY 8 HOURS
Status: DISCONTINUED | OUTPATIENT
Start: 2020-01-06 | End: 2020-01-12 | Stop reason: HOSPADM

## 2020-01-06 RX ORDER — ENOXAPARIN SODIUM 100 MG/ML
40 INJECTION SUBCUTANEOUS EVERY 24 HOURS
Status: DISCONTINUED | OUTPATIENT
Start: 2020-01-06 | End: 2020-01-12 | Stop reason: HOSPADM

## 2020-01-06 RX ORDER — PREDNISONE 20 MG/1
40 TABLET ORAL
Status: DISCONTINUED | OUTPATIENT
Start: 2020-01-07 | End: 2020-01-08

## 2020-01-06 RX ORDER — ONDANSETRON 2 MG/ML
4 INJECTION INTRAMUSCULAR; INTRAVENOUS
Status: DISCONTINUED | OUTPATIENT
Start: 2020-01-06 | End: 2020-01-12 | Stop reason: HOSPADM

## 2020-01-06 RX ORDER — SODIUM CHLORIDE 0.9 % (FLUSH) 0.9 %
5-40 SYRINGE (ML) INJECTION AS NEEDED
Status: DISCONTINUED | OUTPATIENT
Start: 2020-01-06 | End: 2020-01-12 | Stop reason: HOSPADM

## 2020-01-06 RX ORDER — ASPIRIN 81 MG/1
81 TABLET ORAL DAILY
Status: DISCONTINUED | OUTPATIENT
Start: 2020-01-07 | End: 2020-01-12 | Stop reason: HOSPADM

## 2020-01-06 RX ORDER — ALBUTEROL SULFATE 0.83 MG/ML
2.5 SOLUTION RESPIRATORY (INHALATION)
Status: ON HOLD | COMMUNITY

## 2020-01-06 RX ORDER — ACETAMINOPHEN 325 MG/1
650 TABLET ORAL
Status: DISCONTINUED | OUTPATIENT
Start: 2020-01-06 | End: 2020-01-12 | Stop reason: HOSPADM

## 2020-01-06 RX ORDER — SODIUM CHLORIDE 9 MG/ML
100 INJECTION, SOLUTION INTRAVENOUS CONTINUOUS
Status: DISCONTINUED | OUTPATIENT
Start: 2020-01-06 | End: 2020-01-09

## 2020-01-06 RX ORDER — IPRATROPIUM BROMIDE AND ALBUTEROL SULFATE 2.5; .5 MG/3ML; MG/3ML
3 SOLUTION RESPIRATORY (INHALATION)
Status: DISCONTINUED | OUTPATIENT
Start: 2020-01-06 | End: 2020-01-10

## 2020-01-06 RX ORDER — BUDESONIDE 0.5 MG/2ML
500 INHALANT ORAL
Status: DISCONTINUED | OUTPATIENT
Start: 2020-01-06 | End: 2020-01-12 | Stop reason: HOSPADM

## 2020-01-06 RX ORDER — ARFORMOTEROL TARTRATE 15 UG/2ML
15 SOLUTION RESPIRATORY (INHALATION)
Status: DISCONTINUED | OUTPATIENT
Start: 2020-01-06 | End: 2020-01-12 | Stop reason: HOSPADM

## 2020-01-06 RX ORDER — DIPHENHYDRAMINE HYDROCHLORIDE 50 MG/ML
12.5 INJECTION, SOLUTION INTRAMUSCULAR; INTRAVENOUS
Status: DISCONTINUED | OUTPATIENT
Start: 2020-01-06 | End: 2020-01-12 | Stop reason: HOSPADM

## 2020-01-06 RX ADMIN — SODIUM CHLORIDE 100 ML/HR: 900 INJECTION, SOLUTION INTRAVENOUS at 19:14

## 2020-01-06 RX ADMIN — Medication 10 ML: at 22:05

## 2020-01-06 RX ADMIN — ENOXAPARIN SODIUM 40 MG: 40 INJECTION SUBCUTANEOUS at 17:15

## 2020-01-06 RX ADMIN — BUDESONIDE 500 MCG: 0.5 INHALANT RESPIRATORY (INHALATION) at 19:31

## 2020-01-06 RX ADMIN — SODIUM CHLORIDE 1000 ML: 900 INJECTION, SOLUTION INTRAVENOUS at 12:54

## 2020-01-06 RX ADMIN — CEFTRIAXONE SODIUM 2 G: 2 INJECTION, POWDER, FOR SOLUTION INTRAMUSCULAR; INTRAVENOUS at 12:55

## 2020-01-06 RX ADMIN — ALBUTEROL SULFATE 1 DOSE: 2.5 SOLUTION RESPIRATORY (INHALATION) at 11:14

## 2020-01-06 RX ADMIN — AZITHROMYCIN MONOHYDRATE 500 MG: 500 INJECTION, POWDER, LYOPHILIZED, FOR SOLUTION INTRAVENOUS at 13:26

## 2020-01-06 RX ADMIN — SODIUM CHLORIDE 500 ML: 900 INJECTION, SOLUTION INTRAVENOUS at 19:12

## 2020-01-06 RX ADMIN — METHYLPREDNISOLONE SODIUM SUCCINATE 125 MG: 125 INJECTION, POWDER, FOR SOLUTION INTRAMUSCULAR; INTRAVENOUS at 11:07

## 2020-01-06 RX ADMIN — IPRATROPIUM BROMIDE AND ALBUTEROL SULFATE 3 ML: .5; 3 SOLUTION RESPIRATORY (INHALATION) at 19:32

## 2020-01-06 RX ADMIN — IPRATROPIUM BROMIDE AND ALBUTEROL SULFATE 3 ML: .5; 3 SOLUTION RESPIRATORY (INHALATION) at 15:57

## 2020-01-06 NOTE — ROUTINE PROCESS
TRANSFER - OUT REPORT:    Verbal report given to Ney Styles RN(name) on Time Leander  being transferred to (unit) for routine progression of care       Report consisted of patients Situation, Background, Assessment and   Recommendations(SBAR). Information from the following report(s) SBAR, ED Summary, [facility] and Recent Results was reviewed with the receiving nurse. Lines:   Peripheral IV 01/06/20 Right Antecubital (Active)   Site Assessment Clean, dry, & intact 1/6/2020 10:21 AM   Phlebitis Assessment 0 1/6/2020 10:21 AM   Infiltration Assessment 0 1/6/2020 10:21 AM   Dressing Status Clean, dry, & intact 1/6/2020 10:21 AM   Dressing Type Transparent 1/6/2020 10:21 AM   Hub Color/Line Status Pink;Flushed;Capped 1/6/2020 10:21 AM   Action Taken Blood drawn 1/6/2020 10:21 AM       Peripheral IV 01/06/20 Left Wrist (Active)   Site Assessment Clean, dry, & intact 1/6/2020 12:05 PM   Phlebitis Assessment 0 1/6/2020 12:05 PM   Infiltration Assessment 0 1/6/2020 12:05 PM   Dressing Status Clean, dry, & intact 1/6/2020 12:05 PM   Hub Color/Line Status Pink 1/6/2020 12:05 PM        Opportunity for questions and clarification was provided.       Patient transported with:   [employer]

## 2020-01-06 NOTE — PROGRESS NOTES
Admission Medication Reconciliation:    Information obtained from:  Patient/RxQuery  RxQuery data available¹:  YES    Comments/Recommendations: Updated PTA meds/reviewed patient's allergies. 1)  Patient is a good historian.      - COPD takes Trelegy Ellipta (fluticasone-umeclidinium-vilanterol) daily and uses supplemental albuterol nebs 3-4x daily. Has a rescue inhaler but predominantly uses nebulized meds. 2)  Medication changes (since last review): Added  - Trelegy Ellipta  - Albuterol Nebs    Adjusted  - None    Removed  - Levaquin  - Prednisone taper  - Budesonide Nebs  - DuoNebs  - Mucinex     3)  Confirmed NKDA status     ¹RxQuery pharmacy benefit data reflects medications filled and processed through the patient's insurance, however   this data does NOT capture whether the medication was picked up or is currently being taken by the patient. Allergies:  Patient has no known allergies. Significant PMH/Disease States:   Past Medical History:   Diagnosis Date    Asthma     Cancer ([facility]     Lung Cancer    Chronic obstructive pulmonary disease ([facility]     Chronic pain     High cholesterol     Lung cancer [facility])      Chief Complaint for this Admission:    Chief Complaint   Patient presents with    Shortness of Breath     Prior to Admission Medications:   Prior to Admission Medications   Prescriptions Last Dose Informant Taking? albuterol (PROVENTIL HFA, VENTOLIN HFA, PROAIR HFA) 90 mcg/actuation inhaler   Yes   Sig: Take 1-2 Puffs by inhalation every four (4) hours as needed for Wheezing. albuterol (PROVENTIL VENTOLIN) 2.5 mg /3 mL (0.083 %) nebu 2020 at AM  Yes   Si.5 mg by Nebulization route every six (6) hours as needed for Wheezing or Shortness of Breath. aspirin delayed-release 81 mg tablet 2020 at AM  Yes   Sig: Take 1 Tab by mouth daily. fluticasone-umeclidinium-vilanterol (TRELEGY ELLIPTA) 100-62.5-25 mcg inhaler 2020 at AM  Yes   Sig: Take 1 Puff by inhalation daily. oxyCODONE IR (ROXICODONE) 20 mg immediate release tablet 1/5/2020 at HS  Yes   Sig: Take 40 mg by mouth every eight (8) hours as needed for Pain (severe pain). Facility-Administered Medications: None       Please contact the main inpatient pharmacy with any questions or concerns at [phone] and we will direct you to the clinical pharmacist covering this patient's care while in-house.    Wing Brown, COMFORTD

## 2020-01-06 NOTE — H&P
[address] [facility] Adult  Hospitalist Group  History and Physical    Primary Care Provider: Teresa Howell MD    CC: shortness of breath    Subjective:     61-year-old male with past medical history COPD, chronic respiratory failure, CAD, presenting to [facility] with progressive shortness of breath and generally feeling unwell for 3 days. Patient reports febrile at home 102.4, worsening shortness of breath, and generally feeling unwell. He came to [facility] for further evaluation. Patient intermittently uses oxygen during the day, always 3 L at night. He was found to be hypoxic on arrival and required 3 L. Patient also endorses chills, chronic intermittent cough. Denies sick contacts. Uses Tylenol at home for fever. In the ED, chest x-ray concerning for left lower lobe pneumonia. WBC 13.4, tachycardic, tachypneic, hypoxic. Lactic acid elevated 2.9. Negative for flu. Code sepsis alerted, treated with IV antibiotics and steroids. Hospitalist consulted for evaluation and treatment/admission. Review of Systems:    A comprehensive review of systems was negative except for that written in the History of Present Illness. Past Medical History:   Diagnosis Date    Asthma     Cancer ([facility]     Lung Cancer    Chronic obstructive pulmonary disease (HCC)     Chronic pain     High cholesterol     Lung cancer (HCC)       Past Surgical History:   Procedure Laterality Date    HX LOBECTOMY      Right lung    HX OTHER SURGICAL      Partial right lung removal     Prior to Admission medications    Medication Sig Start Date End Date Taking? Authorizing Provider   albuterol (PROVENTIL VENTOLIN) 2.5 mg /3 mL (0.083 %) nebu 2.5 mg by Nebulization route every six (6) hours as needed for Wheezing or Shortness of Breath. Yes Provider, Historical   fluticasone-umeclidinium-vilanterol (TRELEGY ELLIPTA) 100-62.5-25 mcg inhaler Take 1 Puff by inhalation daily.    Yes Provider, Historical albuterol (PROVENTIL HFA, VENTOLIN HFA, PROAIR HFA) 90 mcg/actuation inhaler Take 1-2 Puffs by inhalation every four (4) hours as needed for Wheezing. Yes Provider, Historical   oxyCODONE IR (ROXICODONE) 20 mg immediate release tablet Take 40 mg by mouth every eight (8) hours as needed for Pain (severe pain). Yes Provider, Historical   aspirin delayed-release 81 mg tablet Take 1 Tab by mouth daily. 2/23/18  Yes Briana Torres, NP     No Known Allergies   History reviewed. No pertinent family history. SOCIAL HISTORY:  Patient resides at Home. Patient ambulates with independence. Smoking history: Prior significant history, patient quit 2009  Alcohol history: Rare        Objective:       Physical Exam:   Visit Vitals  /51 (BP 1 Location: Left arm, BP Patient Position: At rest)   Pulse 91   Temp 97.5 °F (36.4 °C)   Resp 15   Ht 5' 11\" (1.803 m)   Wt 64.2 kg (141 lb 8.6 oz)   SpO2 94%   BMI 19.74 kg/m²     General appearance: alert, cooperative, no distress, appears stated age  Head: Normocephalic, without obvious abnormality, atraumatic  Throat: Lips, mucosa, and tongue normal. Teeth and gums normal  Lungs: diminished breath sounds throughout, no wheeze  Heart: Tachycardic, S1, S2 normal, no murmur, click, rub or gallop  Abdomen: soft, non-tender. Bowel sounds normal. No masses,  no organomegaly  Extremities: extremities normal, atraumatic, no cyanosis or edema  Pulses: 2+ and symmetric  Skin: Skin color, texture, turgor normal. No rashes or lesions  Neurologic: Grossly normal  Cap refill: Brisk, less than 3 seconds  Pulses: 2+, symmetric in all extremities    ECG: sinus tachycardia     Data Review: All diagnostic labs and studies have been reviewed. Chest x-ray: IMPRESSION: Left infrahilar and basilar right lower lobe pulmonary  consolidations.     Assessment:     Active Problems:    Pneumonia (1/6/2020)        Plan:     Severe sepsis secondary to left lower lobe pneumonia, POA (lactic acidosis, leukocytosis, tachypnea, tachycardia):  Acute on chronic respiratory failure:  COPD with exacerbation:  -Admit for further care  -Sepsis reevaluation completed in ED  -Give 1 L fluids, recheck lactic acid  -S/p Rocephin, azithromycin, continue  -S/p solumedrol 125×1, schedule prednisone 40 mg x 4 additional days  -AA nebs, Brovana/Pulmicort    Chronic pain: Continue home oxycodone at reduced dose    DVT: Lovenox  Code: Full    Signed By: Earnest Woodall DO     January 6, 2020

## 2020-01-06 NOTE — ED NOTES
Verbal shift change report given to Cuco Herrera (oncoming nurse) by [address] (offgoing nurse). Report included the following information SBAR, Kardex, ED Summary, [facility] and Recent Results.

## 2020-01-06 NOTE — ED TRIAGE NOTES
Pt c/o not feeling good for a couple of days, +weakness, +sob, +fever of 101, denies n/v, denies cp, pt uses 3 liters of oxygen as needed

## 2020-01-06 NOTE — ED PROVIDER NOTES
----- Message from Duke Suresh sent at 9/13/2018  3:15 PM CDT -----  Type:  Patient Returning Call    Who Called:  Caregiver- Gracy [phone]  Who Left Message for Patient:  NA  Does the patient know what this is regarding?:  NA Best Call Back Number:  NA  Additional Information:     77 y.o. male with past medical history significant for lung cancer, COPD, and asthma who presents from home with chief complaint of shortness of breath. Pt complains of shortness of breath with associated fever (T max 102.4 F), and chills for the past 3 days. Pt states he is on 3 L home oxygen while sleeping and as needed during the day. Pt states he has had to wear the oxygen at all times over the past 3 days. Pt states he has been using nebulizer 3 times per day with little relief. Pt reports chronic productive cough. Pt denies recent antibiotics, or steroid use. Pt denies dysuria, diarrhea, nausea, or vomiting. There are no other acute medical concerns at this time. Social hx: Former Smoker. Rare EtOH Use. PCP: [place], MD    Note written by Praneeth Malcolm, as dictated by Lisbeth Nyhan, MD 10:38 AM      The history is provided by the patient. Past Medical History:   Diagnosis Date    Asthma     Cancer [facility])     Lung Cancer    Chronic obstructive pulmonary disease (HCC)     Chronic pain     High cholesterol     Lung cancer (HCC)        Past Surgical History:   Procedure Laterality Date    HX LOBECTOMY      Right lung    HX OTHER SURGICAL      Partial right lung removal         History reviewed. No pertinent family history.     Social History     Socioeconomic History    Marital status: LIFE PARTNER     Spouse name: Not on file    Number of children: Not on file    Years of education: Not on file    Highest education level: Not on file   Occupational History    Not on file   Social Needs    Financial resource strain: Not on file    Food insecurity:     Worry: Not on file     Inability: Not on file    Transportation needs:     Medical: Not on file     Non-medical: Not on file   Tobacco Use    Smoking status: Former Smoker     Packs/day: 1.00     Years: 45.00     Pack years: 45.00     Last attempt to quit: 2009     Years since quittin.0    Smokeless tobacco: Never Used   Substance and Sexual Activity    Alcohol use: No     Comment: rarely/social     Drug use: No    Sexual activity: Not on file   Lifestyle    Physical activity:     Days per week: Not on file     Minutes per session: Not on file    Stress: Not on file   Relationships    Social connections:     Talks on phone: Not on file     Gets together: Not on file     Attends Yazidi service: Not on file     Active member of club or organization: Not on file     Attends meetings of clubs or organizations: Not on file     Relationship status: Not on file    Intimate partner violence:     Fear of current or ex partner: Not on file     Emotionally abused: Not on file     Physically abused: Not on file     Forced sexual activity: Not on file   Other Topics Concern    Not on file   Social History Narrative    ** Merged History Encounter **              ALLERGIES: Patient has no known allergies. Review of Systems   Constitutional: Positive for chills and fever. Negative for diaphoresis. HENT: Negative for congestion, postnasal drip, rhinorrhea and sore throat. Eyes: Negative for photophobia, discharge, redness and visual disturbance. Respiratory: Positive for cough (Chronic.) and shortness of breath. Negative for chest tightness and wheezing. Cardiovascular: Negative for chest pain, palpitations and leg swelling. Gastrointestinal: Negative for abdominal distention, abdominal pain, blood in stool, constipation, diarrhea, nausea and vomiting. Genitourinary: Negative for difficulty urinating, dysuria, frequency, hematuria and urgency. Musculoskeletal: Negative for arthralgias, back pain, joint swelling and myalgias. Skin: Negative for color change and rash. Neurological: Negative for dizziness, speech difficulty, weakness, light-headedness, numbness and headaches. Psychiatric/Behavioral: Negative for confusion. The patient is not nervous/anxious.     All other systems reviewed and are negative. Vitals:    01/06/20 0950 01/06/20 0955   BP: 123/68    Pulse: (!) 103    Resp: (!) 32    Temp: 97.5 °F (36.4 °C)    SpO2: (!) 87% 97%            Physical Exam  Vitals signs and nursing note reviewed. Constitutional:       General: He is not in acute distress. Appearance: He is well-developed. He is cachectic. He is not ill-appearing or diaphoretic. Interventions: Nasal cannula in place. Comments: Thin. HENT:      Head: Normocephalic and atraumatic. Right Ear: External ear normal.      Left Ear: External ear normal.      Nose: Nose normal.   Eyes:      General: No scleral icterus. Conjunctiva/sclera: Conjunctivae normal.      Pupils: Pupils are equal, round, and reactive to light. Neck:      Musculoskeletal: Normal range of motion and neck supple. Thyroid: No thyromegaly. Vascular: No JVD. Trachea: No tracheal deviation. Cardiovascular:      Rate and Rhythm: Regular rhythm. Tachycardia present. Heart sounds: Normal heart sounds. No murmur. No friction rub. No gallop. Pulmonary:      Effort: Pulmonary effort is normal. No respiratory distress. Breath sounds: Rhonchi (In the right base, partially cleared after cough.) present. No wheezing or rales. Comments: Speaking with pursed lips. Chest:      Chest wall: No tenderness. Abdominal:      General: Bowel sounds are normal. There is no distension. Palpations: Abdomen is soft. There is no mass. Tenderness: There is no tenderness. There is no guarding or rebound. Musculoskeletal: Normal range of motion. General: No tenderness. Lymphadenopathy:      Cervical: No cervical adenopathy. Skin:     General: Skin is warm and dry. Findings: No erythema or rash. Neurological:      Mental Status: He is alert and oriented to person, place, and time. Cranial Nerves: No cranial nerve deficit. Motor: No tremor, atrophy or abnormal muscle tone.       Coordination: Coordination normal.      Gait: Gait normal.   Psychiatric:         Behavior: Behavior normal.         Thought Content: Thought content normal.         Judgment: Judgment normal.      Note written by Heriberto Flor, as dictated by Josr Pacheco MD 10:39 AM      [facility]  Number of Diagnoses or Management Options  Diagnosis management comments: Impression: 70-year-old male with a history of partial pneumonectomy on the right, history of COPD, pneumonia, presents the emergency department with fevers, chills, cough productive of phlegm. On the latter he states he normally produces phlegm. He uses 3 L nasal cannula usually at bedtime but now with increased frequency. He claims to be compliant with his medication regimen. Differential includes pneumonia, bronchitis, COPD exacerbation. Plan of care will be baseline labs, chest x-ray, will start treatment for COPD and treat accordingly. Procedures    11:25 AM  Code Sepsis called, no requirement for 30cc/kg as he is hemodynamically stable. Hospitalist Nolvia Crowe for Admission  11:25 AM    ED Room Number: R42/R42  Patient Name and age:  Lacy Perdomo 77 y.o.  male  Working Diagnosis:   1. Sepsis, due to unspecified organism, unspecified whether acute organ dysfunction present ([facility]    2. Community acquired pneumonia of left lower lobe of lung ([facility]      Readmission: no  Isolation Requirements:  no  Recommended Level of Care:  med/surg  Code Status:  Full Code  Department:[facility] Adult ED - [phone]  Other:  Code Sepsis, hemodynamically stable    CONSULT NOTE:  11:30 AM Josr Pacheco MD communicated with Rogers Sharif NP, Consult for Hospitalist via [employer]  Discussed available diagnostic tests and clinical findings.   Dr. Rosendo Muse will admit the patient to the hospital.

## 2020-01-07 LAB
ANION GAP SERPL CALC-SCNC: 5 MMOL/L (ref 5–15)
BUN SERPL-MCNC: 18 MG/DL (ref 6–20)
BUN/CREAT SERPL: 18 (ref 12–20)
CALCIUM SERPL-MCNC: 9.1 MG/DL (ref 8.5–10.1)
CHLORIDE SERPL-SCNC: 110 MMOL/L (ref 97–108)
CO2 SERPL-SCNC: 26 MMOL/L (ref 21–32)
CREAT SERPL-MCNC: 1 MG/DL (ref 0.7–1.3)
ERYTHROCYTE [DISTWIDTH] IN BLOOD BY AUTOMATED COUNT: 12.8 % (ref 11.5–14.5)
GLUCOSE SERPL-MCNC: 207 MG/DL (ref 65–100)
HCT VFR BLD AUTO: 33.9 % (ref 36.6–50.3)
HGB BLD-MCNC: 10.8 G/DL (ref 12.1–17)
LACTATE SERPL-SCNC: 1.6 MMOL/L (ref 0.4–2)
MCH RBC QN AUTO: 29.8 PG (ref 26–34)
MCHC RBC AUTO-ENTMCNC: 31.9 G/DL (ref 30–36.5)
MCV RBC AUTO: 93.4 FL (ref 80–99)
NRBC # BLD: 0 K/UL (ref 0–0.01)
NRBC BLD-RTO: 0 PER 100 WBC
PLATELET # BLD AUTO: 287 K/UL (ref 150–400)
PMV BLD AUTO: 10 FL (ref 8.9–12.9)
POTASSIUM SERPL-SCNC: 4.1 MMOL/L (ref 3.5–5.1)
RBC # BLD AUTO: 3.63 M/UL (ref 4.1–5.7)
SODIUM SERPL-SCNC: 141 MMOL/L (ref 136–145)
WBC # BLD AUTO: 11.7 K/UL (ref 4.1–11.1)

## 2020-01-07 PROCEDURE — 94640 AIRWAY INHALATION TREATMENT: CPT

## 2020-01-07 PROCEDURE — 74011000258 HC RX REV CODE- 258: Performed by: EMERGENCY MEDICINE

## 2020-01-07 PROCEDURE — 65270000029 HC RM PRIVATE

## 2020-01-07 PROCEDURE — 80048 BASIC METABOLIC PNL TOTAL CA: CPT

## 2020-01-07 PROCEDURE — 74011250636 HC RX REV CODE- 250/636: Performed by: EMERGENCY MEDICINE

## 2020-01-07 PROCEDURE — 74011000250 HC RX REV CODE- 250: Performed by: INTERNAL MEDICINE

## 2020-01-07 PROCEDURE — 94664 DEMO&/EVAL PT USE INHALER: CPT

## 2020-01-07 PROCEDURE — 83605 ASSAY OF LACTIC ACID: CPT

## 2020-01-07 PROCEDURE — 74011250637 HC RX REV CODE- 250/637: Performed by: INTERNAL MEDICINE

## 2020-01-07 PROCEDURE — 74011636637 HC RX REV CODE- 636/637: Performed by: INTERNAL MEDICINE

## 2020-01-07 PROCEDURE — 74011250636 HC RX REV CODE- 250/636: Performed by: INTERNAL MEDICINE

## 2020-01-07 PROCEDURE — 36415 COLL VENOUS BLD VENIPUNCTURE: CPT

## 2020-01-07 PROCEDURE — 85027 COMPLETE CBC AUTOMATED: CPT

## 2020-01-07 RX ADMIN — BUDESONIDE 500 MCG: 0.5 INHALANT RESPIRATORY (INHALATION) at 07:22

## 2020-01-07 RX ADMIN — IPRATROPIUM BROMIDE AND ALBUTEROL SULFATE 3 ML: .5; 3 SOLUTION RESPIRATORY (INHALATION) at 19:18

## 2020-01-07 RX ADMIN — BUDESONIDE 500 MCG: 0.5 INHALANT RESPIRATORY (INHALATION) at 19:18

## 2020-01-07 RX ADMIN — ASPIRIN 81 MG: 81 TABLET, COATED ORAL at 08:58

## 2020-01-07 RX ADMIN — AZITHROMYCIN MONOHYDRATE 500 MG: 500 INJECTION, POWDER, LYOPHILIZED, FOR SOLUTION INTRAVENOUS at 11:24

## 2020-01-07 RX ADMIN — PREDNISONE 40 MG: 20 TABLET ORAL at 07:05

## 2020-01-07 RX ADMIN — Medication 10 ML: at 06:00

## 2020-01-07 RX ADMIN — Medication 10 ML: at 20:26

## 2020-01-07 RX ADMIN — SODIUM CHLORIDE 100 ML/HR: 900 INJECTION, SOLUTION INTRAVENOUS at 00:31

## 2020-01-07 RX ADMIN — IPRATROPIUM BROMIDE AND ALBUTEROL SULFATE 3 ML: .5; 3 SOLUTION RESPIRATORY (INHALATION) at 07:22

## 2020-01-07 RX ADMIN — ENOXAPARIN SODIUM 40 MG: 40 INJECTION SUBCUTANEOUS at 15:43

## 2020-01-07 RX ADMIN — IPRATROPIUM BROMIDE AND ALBUTEROL SULFATE 3 ML: .5; 3 SOLUTION RESPIRATORY (INHALATION) at 14:34

## 2020-01-07 RX ADMIN — SODIUM CHLORIDE 100 ML/HR: 900 INJECTION, SOLUTION INTRAVENOUS at 10:13

## 2020-01-07 RX ADMIN — CEFTRIAXONE SODIUM 2 G: 2 INJECTION, POWDER, FOR SOLUTION INTRAMUSCULAR; INTRAVENOUS at 11:24

## 2020-01-07 NOTE — PROGRESS NOTES
[address] [facility]  Hospitalist Group                                                                                          Hospitalist Progress Note  Blanco Tariq MD  Answering service: [phone] OR [phone] from in house phone        Date of Service:  2020  NAME:  Hannah Witt  :  1950  MRN:  [id number]      Admission Summary:      20-year-old male with past medical history COPD, chronic respiratory failure, CAD, presenting to [facility] with progressive shortness of breath and generally feeling unwell for 3 days. Patient reports febrile at home 102.4, worsening shortness of breath, and generally feeling unwell. He came to [facility] for further evaluation. Patient intermittently uses oxygen during the day, always 3 L at night. He was found to be hypoxic on arrival and required 3 L. Patient also endorses chills, chronic intermittent cough. Denies sick contacts. Uses Tylenol at home for fever.     In the ED, chest x-ray concerning for left lower lobe pneumonia. WBC 13.4, tachycardic, tachypneic, hypoxic. Lactic acid elevated 2.9. Negative for flu. Code sepsis alerted, treated with IV antibiotics and steroids. Hospitalist consulted for evaluation and treatment/admission.       Interval history / Subjective:       2020 : Improved across the board as to admisison issues, pt feels better but not to baseline. Cont to not smoke since rt lung surg  for cancer of lungs. Assessment & Plan:     PNA, on ab x, ivf. Improved  Sirs response to PNA, resolved. COPD exacerbation, cont nebs, po steroids. Code status: full   DVT prophylaxis: Lovenox     Care Plan discussed with: Patient/Family  Disposition: Home w/Family and TBD     Hospital Problems  Date Reviewed: 2019          Codes Class Noted POA    Pneumonia ICD-10-CM: J18.9  ICD-9-CM: 486  2020 Unknown                Review of Systems:   Pertinent items are noted in HPI.        Vital Signs:    Last 24hrs VS reviewed since prior progress note. Most recent are:  Visit Vitals  /57 (BP 1 Location: Right arm, BP Patient Position: At rest)   Pulse 85   Temp 99 °F (37.2 °C)   Resp 16   Ht 5' 11\" (1.803 m)   Wt 70.8 kg (156 lb)   SpO2 96%   BMI 21.76 kg/m²         Intake/Output Summary (Last 24 hours) at 1/7/2020 1627  Last data filed at 1/7/2020 0032  Gross per 24 hour   Intake 982 ml   Output 425 ml   Net 557 ml        Physical Examination:             Constitutional:  No acute distress, cooperative, pleasant    ENT:  Oral mucous moist, oropharynx benign. Resp:  mild bilateral wheezing, no rhonchi/rales. No accessory muscle use   CV:  Regular rhythm, normal rate, no murmurs, gallops, rubs    GI:  Soft, non distended, non tender. normoactive bowel sounds, no hepatosplenomegaly     Musculoskeletal:  No edema, warm, 1+ pulses throughout    Neurologic:  Moves all extremities. AAOx3, CN II-XII reviewed     Psych:  Good insight, Not anxious nor agitated. Skin:  Good turgor, no rashes or ulcers       Data Review:    Review and/or order of clinical lab test      Labs:     Recent Labs     01/07/20  0034 01/06/20  1011   WBC 11.7* 13.4*   HGB 10.8* 13.6   HCT 33.9* 42.8    372     Recent Labs     01/07/20  0034 01/06/20  1204    141   K 4.1 3.6   * 107   CO2 26 24   BUN 18 18   CREA 1.00 1.03   * 86   CA 9.1 8.5     Recent Labs     01/06/20  1204   SGOT 9*   ALT 9*   AP 84   TBILI 0.7   TP 6.8   ALB 3.3*   GLOB 3.5     No results for input(s): INR, PTP, APTT, INREXT in the last 72 hours. No results for input(s): FE, TIBC, PSAT, FERR in the last 72 hours. No results found for: FOL, RBCF   No results for input(s): PH, PCO2, PO2 in the last 72 hours.   Recent Labs     01/06/20  1204   TROIQ <0.05     Lab Results   Component Value Date/Time    Cholesterol, total 106 02/20/2018 04:54 AM    HDL Cholesterol 45 02/20/2018 04:54 AM    LDL, calculated 46 02/20/2018 04:54 AM Triglyceride 75 02/20/2018 04:54 AM    CHOL/HDL Ratio 2.4 02/20/2018 04:54 AM     Lab Results   Component Value Date/Time    Glucose (POC) 100 09/14/2015 12:14 PM     Lab Results   Component Value Date/Time    Color YELLOW/STRAW 01/09/2019 05:00 PM    Appearance CLEAR 01/09/2019 05:00 PM    Specific gravity 1.014 01/09/2019 05:00 PM    pH (UA) 7.5 01/09/2019 05:00 PM    Protein NEGATIVE  01/09/2019 05:00 PM    Glucose NEGATIVE  01/09/2019 05:00 PM    Ketone TRACE (A) 01/09/2019 05:00 PM    Bilirubin NEGATIVE  01/09/2019 05:00 PM    Urobilinogen 0.2 01/09/2019 05:00 PM    Nitrites NEGATIVE  01/09/2019 05:00 PM    Leukocyte Esterase NEGATIVE  01/09/2019 05:00 PM    Epithelial cells FEW 01/09/2019 05:00 PM    Bacteria NEGATIVE  01/09/2019 05:00 PM    WBC 0-4 01/09/2019 05:00 PM    RBC 0-5 01/09/2019 05:00 PM         Medications Reviewed:     Current Facility-Administered Medications   Medication Dose Route Frequency    cefTRIAXone (ROCEPHIN) 2 g in 0.9% sodium chloride (MBP/ADV) 50 mL  2 g IntraVENous Q24H    azithromycin (ZITHROMAX) 500 mg in 0.9% sodium chloride (MBP/ADV) 250 mL  500 mg IntraVENous Q24H    sodium chloride (NS) flush 5-40 mL  5-40 mL IntraVENous Q8H    sodium chloride (NS) flush 5-40 mL  5-40 mL IntraVENous PRN    acetaminophen (TYLENOL) tablet 650 mg  650 mg Oral Q4H PRN    diphenhydrAMINE (BENADRYL) injection 12.5 mg  12.5 mg IntraVENous Q4H PRN    ondansetron (ZOFRAN) injection 4 mg  4 mg IntraVENous Q4H PRN    enoxaparin (LOVENOX) injection 40 mg  40 mg SubCUTAneous Q24H    aspirin delayed-release tablet 81 mg  81 mg Oral DAILY    oxyCODONE IR (ROXICODONE) tablet 10 mg  10 mg Oral Q8H PRN    predniSONE (DELTASONE) tablet 40 mg  40 mg Oral DAILY WITH BREAKFAST    arformoterol (BROVANA) neb solution 15 mcg  15 mcg Nebulization BID RT    budesonide (PULMICORT) 500 mcg/2 ml nebulizer suspension  500 mcg Nebulization BID RT    albuterol-ipratropium (DUO-NEB) 2.5 MG-0.5 MG/3 ML  3 mL Nebulization TID RT    influenza vaccine 2019-20 (6 mos+)(PF) (FLUARIX/FLULAVAL/FLUZONE QUAD) injection 0.5 mL  0.5 mL IntraMUSCular PRIOR TO DISCHARGE    0.9% sodium chloride infusion  100 mL/hr IntraVENous CONTINUOUS     ______________________________________________________________________  EXPECTED LENGTH OF STAY: 4d 19h  ACTUAL LENGTH OF STAY:          1                 Mariposa Gould MD

## 2020-01-08 PROCEDURE — 74011250636 HC RX REV CODE- 250/636: Performed by: EMERGENCY MEDICINE

## 2020-01-08 PROCEDURE — 94640 AIRWAY INHALATION TREATMENT: CPT

## 2020-01-08 PROCEDURE — 77010033678 HC OXYGEN DAILY

## 2020-01-08 PROCEDURE — 97161 PT EVAL LOW COMPLEX 20 MIN: CPT

## 2020-01-08 PROCEDURE — 74011250636 HC RX REV CODE- 250/636: Performed by: INTERNAL MEDICINE

## 2020-01-08 PROCEDURE — 74011000250 HC RX REV CODE- 250: Performed by: NURSE PRACTITIONER

## 2020-01-08 PROCEDURE — 65270000029 HC RM PRIVATE

## 2020-01-08 PROCEDURE — 74011250637 HC RX REV CODE- 250/637: Performed by: INTERNAL MEDICINE

## 2020-01-08 PROCEDURE — 74011000250 HC RX REV CODE- 250: Performed by: INTERNAL MEDICINE

## 2020-01-08 PROCEDURE — 74011000258 HC RX REV CODE- 258: Performed by: EMERGENCY MEDICINE

## 2020-01-08 PROCEDURE — 74011636637 HC RX REV CODE- 636/637: Performed by: INTERNAL MEDICINE

## 2020-01-08 RX ORDER — ALBUTEROL SULFATE 0.83 MG/ML
2.5 SOLUTION RESPIRATORY (INHALATION)
Status: COMPLETED | OUTPATIENT
Start: 2020-01-08 | End: 2020-01-08

## 2020-01-08 RX ADMIN — IPRATROPIUM BROMIDE AND ALBUTEROL SULFATE 3 ML: .5; 3 SOLUTION RESPIRATORY (INHALATION) at 14:59

## 2020-01-08 RX ADMIN — AZITHROMYCIN MONOHYDRATE 500 MG: 500 INJECTION, POWDER, LYOPHILIZED, FOR SOLUTION INTRAVENOUS at 11:09

## 2020-01-08 RX ADMIN — PREDNISONE 30 MG: 20 TABLET ORAL at 21:40

## 2020-01-08 RX ADMIN — CEFTRIAXONE SODIUM 2 G: 2 INJECTION, POWDER, FOR SOLUTION INTRAMUSCULAR; INTRAVENOUS at 11:09

## 2020-01-08 RX ADMIN — BUDESONIDE 500 MCG: 0.5 INHALANT RESPIRATORY (INHALATION) at 08:41

## 2020-01-08 RX ADMIN — SODIUM CHLORIDE 100 ML/HR: 900 INJECTION, SOLUTION INTRAVENOUS at 05:33

## 2020-01-08 RX ADMIN — ASPIRIN 81 MG: 81 TABLET, COATED ORAL at 09:28

## 2020-01-08 RX ADMIN — Medication 10 ML: at 08:30

## 2020-01-08 RX ADMIN — ENOXAPARIN SODIUM 40 MG: 40 INJECTION SUBCUTANEOUS at 13:36

## 2020-01-08 RX ADMIN — ALBUTEROL SULFATE 2.5 MG: 2.5 SOLUTION RESPIRATORY (INHALATION) at 03:52

## 2020-01-08 RX ADMIN — IPRATROPIUM BROMIDE AND ALBUTEROL SULFATE 3 ML: .5; 3 SOLUTION RESPIRATORY (INHALATION) at 08:41

## 2020-01-08 RX ADMIN — PREDNISONE 40 MG: 20 TABLET ORAL at 07:27

## 2020-01-08 RX ADMIN — IPRATROPIUM BROMIDE AND ALBUTEROL SULFATE 3 ML: .5; 3 SOLUTION RESPIRATORY (INHALATION) at 19:21

## 2020-01-08 RX ADMIN — Medication 10 ML: at 05:34

## 2020-01-08 RX ADMIN — BUDESONIDE 500 MCG: 0.5 INHALANT RESPIRATORY (INHALATION) at 19:21

## 2020-01-08 NOTE — PROGRESS NOTES
[address] [facility]  Hospitalist Group                                                                                          Hospitalist Progress Note  Angélica Beckford MD  Answering service: [phone] OR [phone] from in house phone        Date of Service:  2020  NAME:  Majo Alex  :  1950  MRN:  [id number]      Admission Summary:      81-year-old male with past medical history COPD, chronic respiratory failure, CAD, presenting to [facility] with progressive shortness of breath and generally feeling unwell for 3 days. Patient reports febrile at home 102.4, worsening shortness of breath, and generally feeling unwell. He came to [facility] for further evaluation. Patient intermittently uses oxygen during the day, always 3 L at night. He was found to be hypoxic on arrival and required 3 L. Patient also endorses chills, chronic intermittent cough. Denies sick contacts. Uses Tylenol at home for fever.     In the ED, chest x-ray concerning for left lower lobe pneumonia. WBC 13.4, tachycardic, tachypneic, hypoxic. Lactic acid elevated 2.9. Negative for flu. Code sepsis alerted, treated with IV antibiotics and steroids. Hospitalist consulted for evaluation and treatment/admission.       Interval history / Subjective:       2020 :  Pt feels worst today than prior day ,but VS and exam better, will change pred to bid w meals at 30 mg  Dose  PT/OT eval as pt has ended up in [facility] from prior admissions for PNA      Assessment & Plan:     PNA, on ab x, ivf. Improved over-all but pt feel poorly 2020 ; cont on abx, for pt /ot eval ,    Chronic hypoxic resp failure on home 02 at 3 LPM, ( currently on 4 LPM NC)   Sirs response to PNA, resolved. COPD exacerbation, cont nebs, po steroids.    H/o lung cancer with 2009 rt lower lung resection   Code status: full   DVT prophylaxis: Lovenox     Care Plan discussed with: Patient/Family  Disposition: Home w/Family and TBD     Hospital Problems  Date Reviewed: 1/9/2019          Codes Class Noted POA    Pneumonia ICD-10-CM: J18.9  ICD-9-CM: 318  1/6/2020 Unknown                Review of Systems:   Pertinent items are noted in HPI. some loose stools last pm       Vital Signs:    Last 24hrs VS reviewed since prior progress note. Most recent are:  Visit Vitals  /75 (BP 1 Location: Left arm, BP Patient Position: At rest)   Pulse 67   Temp 98.3 °F (36.8 °C)   Resp 16   Ht 5' 11\" (1.803 m)   Wt 70.8 kg (156 lb)   SpO2 95%   BMI 21.76 kg/m²         Intake/Output Summary (Last 24 hours) at 1/8/2020 6245  Last data filed at 1/8/2020 0758  Gross per 24 hour   Intake 3201.7 ml   Output 200 ml   Net 3001.7 ml        Physical Examination:             Constitutional:  No acute distress, cooperative, pleasant    ENT:  Oral mucous moist, oropharynx benign. Resp:  mild bilateral wheezing, no rhonchi/rales. No accessory muscle use   CV:  Regular rhythm, normal rate, no murmurs, gallops, rubs    GI:  Soft, non distended, non tender. normoactive bowel sounds, no hepatosplenomegaly     Musculoskeletal:  No edema, warm, 1+ pulses throughout    Neurologic:  Moves all extremities. AAOx3, CN II-XII reviewed     Psych:  Good insight, Not anxious nor agitated. Skin:  Good turgor, no rashes or ulcers       Data Review:    Review and/or order of clinical lab test      Labs:     Recent Labs     01/07/20  0034 01/06/20  1011   WBC 11.7* 13.4*   HGB 10.8* 13.6   HCT 33.9* 42.8    372     Recent Labs     01/07/20  0034 01/06/20  1204    141   K 4.1 3.6   * 107   CO2 26 24   BUN 18 18   CREA 1.00 1.03   * 86   CA 9.1 8.5     Recent Labs     01/06/20  1204   SGOT 9*   ALT 9*   AP 84   TBILI 0.7   TP 6.8   ALB 3.3*   GLOB 3.5     No results for input(s): INR, PTP, APTT, INREXT, INREXT in the last 72 hours. No results for input(s): FE, TIBC, PSAT, FERR in the last 72 hours.    No results found for: FOL, RBCF   No results for input(s): PH, PCO2, PO2 in the last 72 hours.   Recent Labs     01/06/20  1204   TROIQ <0.05     Lab Results   Component Value Date/Time    Cholesterol, total 106 02/20/2018 04:54 AM    HDL Cholesterol 45 02/20/2018 04:54 AM    LDL, calculated 46 02/20/2018 04:54 AM    Triglyceride 75 02/20/2018 04:54 AM    CHOL/HDL Ratio 2.4 02/20/2018 04:54 AM     Lab Results   Component Value Date/Time    Glucose (POC) 100 09/14/2015 12:14 PM     Lab Results   Component Value Date/Time    Color YELLOW/STRAW 01/09/2019 05:00 PM    Appearance CLEAR 01/09/2019 05:00 PM    Specific gravity 1.014 01/09/2019 05:00 PM    pH (UA) 7.5 01/09/2019 05:00 PM    Protein NEGATIVE  01/09/2019 05:00 PM    Glucose NEGATIVE  01/09/2019 05:00 PM    Ketone TRACE (A) 01/09/2019 05:00 PM    Bilirubin NEGATIVE  01/09/2019 05:00 PM    Urobilinogen 0.2 01/09/2019 05:00 PM    Nitrites NEGATIVE  01/09/2019 05:00 PM    Leukocyte Esterase NEGATIVE  01/09/2019 05:00 PM    Epithelial cells FEW 01/09/2019 05:00 PM    Bacteria NEGATIVE  01/09/2019 05:00 PM    WBC 0-4 01/09/2019 05:00 PM    RBC 0-5 01/09/2019 05:00 PM         Medications Reviewed:     Current Facility-Administered Medications   Medication Dose Route Frequency    predniSONE (DELTASONE) tablet 30 mg  30 mg Oral BID WITH MEALS    cefTRIAXone (ROCEPHIN) 2 g in 0.9% sodium chloride (MBP/ADV) 50 mL  2 g IntraVENous Q24H    azithromycin (ZITHROMAX) 500 mg in 0.9% sodium chloride (MBP/ADV) 250 mL  500 mg IntraVENous Q24H    sodium chloride (NS) flush 5-40 mL  5-40 mL IntraVENous Q8H    sodium chloride (NS) flush 5-40 mL  5-40 mL IntraVENous PRN    acetaminophen (TYLENOL) tablet 650 mg  650 mg Oral Q4H PRN    diphenhydrAMINE (BENADRYL) injection 12.5 mg  12.5 mg IntraVENous Q4H PRN    ondansetron (ZOFRAN) injection 4 mg  4 mg IntraVENous Q4H PRN    enoxaparin (LOVENOX) injection 40 mg  40 mg SubCUTAneous Q24H    aspirin delayed-release tablet 81 mg  81 mg Oral DAILY    oxyCODONE IR ([place]ana [place]) tablet 10 mg  10 mg Oral Q8H PRN    arformoterol (BROVANA) neb solution 15 mcg  15 mcg Nebulization BID RT    budesonide (PULMICORT) 500 mcg/2 ml nebulizer suspension  500 mcg Nebulization BID RT    albuterol-ipratropium (DUO-NEB) 2.5 MG-0.5 MG/3 ML  3 mL Nebulization TID RT    influenza vaccine 2019-20 (6 mos+)(PF) (FLUARIX/FLULAVAL/FLUZONE QUAD) injection 0.5 mL  0.5 mL IntraMUSCular PRIOR TO DISCHARGE    0.9% sodium chloride infusion  100 mL/hr IntraVENous CONTINUOUS     ______________________________________________________________________  EXPECTED LENGTH OF STAY: 4d 19h  ACTUAL LENGTH OF STAY:          2                 Justine Diop MD

## 2020-01-08 NOTE — PROGRESS NOTES
Problem: Mobility Impaired (Adult and Pediatric)  Goal: *Acute Goals and Plan of Care (Insert Text)  Description  FUNCTIONAL STATUS PRIOR TO ADMISSION: Patient was independent and active without use of DME. Used 2L O2 at home but went to the gym to workout most days. HOME SUPPORT PRIOR TO ADMISSION: The patient lived with his son and grandchildren but did not require assist.    Physical Therapy Goals  Initiated 1/8/2020  1. Patient will transfer from bed to chair and chair to bed with independence using the least restrictive device within 7 day(s). 3.  Patient will perform sit to stand with independence within 7 day(s). 4.  Patient will ambulate with independence for 300 feet with the least restrictive device within 7 day(s). Outcome: Progressing Towards Goal   PHYSICAL THERAPY EVALUATION  Patient: Major Gip (69 y.o. male)  Date: 1/8/2020  Primary Diagnosis: Pneumonia [J18.9]        Precautions:          ASSESSMENT  Based on the objective data described below, the patient presents with impaired pulmonary endurance and limited mobility tolerance s/p admission for SOB and PNA. Patient overall mobilizing safely with supervision today but very limited by SOB on 3L O2. Patient SOB mildly just talking during intake. After gait x 30' patient with increased WOB, chest and shoulder elevation and unable to speak. SpO2 over 92% at this time, HR in 100s. Required nearly 2 minutes to fully recover. At baseline, he uses 3L O2 at home but is fully indep without DME and goes to [facility] for exercise several times a week. Given his significant pulmonary history and limitations, would recommend IP pulmonary rehab at this time. Patient unable to return home and complete ADLs and iADLs due to his limited endurance. Current Level of Function Impacting Discharge (mobility/balance): Supervision x 30' for gait, steady but very SOB    Functional Outcome Measure:   The patient scored 18 on the TUG outcome measure which is indicative of increased fall risk. Other factors to consider for discharge: lives with son but does not require assist, indep on 2L O2 at home     Patient will benefit from skilled therapy intervention to address the above noted impairments. PLAN :  Recommendations and Planned Interventions: transfer training, gait training, therapeutic exercises, patient and family training/education, and therapeutic activities      Frequency/Duration: Patient will be followed by physical therapy:  3 times a week to address goals. Recommendation for discharge: (in order for the patient to meet his/her long term goals)  Therapy 3 hours per day 5-7 days per week in a pulmonary IP rehab setting    This discharge recommendation:  Has been made in collaboration with the attending provider and/or case management    IF patient discharges home will need the following DME: to be determined (TBD)         SUBJECTIVE:   Patient stated This happens to be about once a year, get sick and then I can't breathe or do anything.     OBJECTIVE DATA SUMMARY:   HISTORY:    Past Medical History:   Diagnosis Date    Asthma     Cancer ([facility] [address]     Lung Cancer    Chronic obstructive pulmonary disease (HCC)     Chronic pain     High cholesterol     Lung cancer (HCC)      Past Surgical History:   Procedure Laterality Date    HX LOBECTOMY      Right lung    HX OTHER SURGICAL      Partial right lung removal       Personal factors and/or comorbidities impacting plan of care:     Home Situation  Home Environment: Private residence  # Steps to Enter: 0  One/Two Story Residence: One story  Living Alone: No  Support Systems: Child(gregorio), Friends \ neighbors  Patient Expects to be Discharged to[de-identified] Rehabilitation facility  Current DME Used/Available at Home: Oxygen, portable    EXAMINATION/PRESENTATION/DECISION MAKING:   Critical Behavior:  Neurologic State: Alert  Orientation Level: Oriented X4        Hearing:   Auditory  Auditory Impairment: None    Range Of Motion:  AROM: Within functional limits                       Strength:    Strength: Within functional limits                    Tone & Sensation:   Tone: Normal              Sensation: Intact               Coordination:  Coordination: Within functional limits  Vision:      Functional Mobility:  Bed Mobility:  Rolling: Independent  Supine to Sit: Independent  Sit to Supine: Independent  Scooting: Independent  Transfers:  Sit to Stand: Supervision  Stand to Sit: Supervision        Bed to Chair: Supervision              Balance:   Sitting: Intact  Standing: Intact  Ambulation/Gait Training:  Distance (ft): 30 Feet (ft)  Assistive Device: Gait belt  Ambulation - Level of Assistance: Supervision        Gait Abnormalities: Decreased step clearance                                      Functional Measure:  Timed up and go:    Timed Get Up And Go Test: 18       < than 10 seconds=Normal  Greater then 13.5 seconds (in elderly)=Increased fall risk   Tracey RYDER, [place]. Predicting the probability for falls in community dwelling older adults using the Timed Up and Go Test. Phys Ther. 2000;80:[id number]. Physical Therapy Evaluation Charge Determination   History Examination Presentation Decision-Making   MEDIUM  Complexity : 1-2 comorbidities / personal factors will impact the outcome/ POC  LOW Complexity : 1-2 Standardized tests and measures addressing body structure, function, activity limitation and / or participation in recreation  MEDIUM Complexity : Evolving with changing characteristics  Other outcome measures TUG 18  MEDIUM      Based on the above components, the patient evaluation is determined to be of the following complexity level: LOW     Pain Rating:  None reported    Activity Tolerance:   Poor, desaturates with exertion and requires oxygen, and requires frequent rest breaks  Please refer to the flowsheet for vital signs taken during this treatment.     After treatment patient left in no apparent distress:   Supine in bed and Call bell within reach    COMMUNICATION/EDUCATION:   The patients plan of care was discussed with: Registered Nurse. Fall prevention education was provided and the patient/caregiver indicated understanding. and Patient/family agree to work toward stated goals and plan of care.     Thank you for this referral.  Rodney Costello, PT   Time Calculation: 18 mins

## 2020-01-09 LAB
ANION GAP SERPL CALC-SCNC: 6 MMOL/L (ref 5–15)
BASOPHILS # BLD: 0 K/UL (ref 0–0.1)
BASOPHILS NFR BLD: 0 % (ref 0–1)
BUN SERPL-MCNC: 13 MG/DL (ref 6–20)
BUN/CREAT SERPL: 13 (ref 12–20)
CALCIUM SERPL-MCNC: 8.7 MG/DL (ref 8.5–10.1)
CHLORIDE SERPL-SCNC: 107 MMOL/L (ref 97–108)
CO2 SERPL-SCNC: 26 MMOL/L (ref 21–32)
CREAT SERPL-MCNC: 1.04 MG/DL (ref 0.7–1.3)
DIFFERENTIAL METHOD BLD: ABNORMAL
EOSINOPHIL # BLD: 0 K/UL (ref 0–0.4)
EOSINOPHIL NFR BLD: 0 % (ref 0–7)
ERYTHROCYTE [DISTWIDTH] IN BLOOD BY AUTOMATED COUNT: 12.8 % (ref 11.5–14.5)
GLUCOSE SERPL-MCNC: 125 MG/DL (ref 65–100)
HCT VFR BLD AUTO: 38.9 % (ref 36.6–50.3)
HGB BLD-MCNC: 12.2 G/DL (ref 12.1–17)
IMM GRANULOCYTES # BLD AUTO: 0.2 K/UL (ref 0–0.04)
IMM GRANULOCYTES NFR BLD AUTO: 2 % (ref 0–0.5)
LYMPHOCYTES # BLD: 1 K/UL (ref 0.8–3.5)
LYMPHOCYTES NFR BLD: 12 % (ref 12–49)
MCH RBC QN AUTO: 29.3 PG (ref 26–34)
MCHC RBC AUTO-ENTMCNC: 31.4 G/DL (ref 30–36.5)
MCV RBC AUTO: 93.3 FL (ref 80–99)
MONOCYTES # BLD: 0.2 K/UL (ref 0–1)
MONOCYTES NFR BLD: 2 % (ref 5–13)
NEUTS SEG # BLD: 7.4 K/UL (ref 1.8–8)
NEUTS SEG NFR BLD: 84 % (ref 32–75)
NRBC # BLD: 0 K/UL (ref 0–0.01)
NRBC BLD-RTO: 0 PER 100 WBC
PLATELET # BLD AUTO: 355 K/UL (ref 150–400)
PMV BLD AUTO: 10.1 FL (ref 8.9–12.9)
POTASSIUM SERPL-SCNC: 4.2 MMOL/L (ref 3.5–5.1)
RBC # BLD AUTO: 4.17 M/UL (ref 4.1–5.7)
SODIUM SERPL-SCNC: 139 MMOL/L (ref 136–145)
WBC # BLD AUTO: 8.8 K/UL (ref 4.1–11.1)

## 2020-01-09 PROCEDURE — 94664 DEMO&/EVAL PT USE INHALER: CPT

## 2020-01-09 PROCEDURE — 74011250637 HC RX REV CODE- 250/637: Performed by: INTERNAL MEDICINE

## 2020-01-09 PROCEDURE — 97535 SELF CARE MNGMENT TRAINING: CPT

## 2020-01-09 PROCEDURE — 65270000029 HC RM PRIVATE

## 2020-01-09 PROCEDURE — 74011000250 HC RX REV CODE- 250: Performed by: INTERNAL MEDICINE

## 2020-01-09 PROCEDURE — 97165 OT EVAL LOW COMPLEX 30 MIN: CPT

## 2020-01-09 PROCEDURE — 36415 COLL VENOUS BLD VENIPUNCTURE: CPT

## 2020-01-09 PROCEDURE — 94640 AIRWAY INHALATION TREATMENT: CPT

## 2020-01-09 PROCEDURE — 77010033678 HC OXYGEN DAILY

## 2020-01-09 PROCEDURE — 74011636637 HC RX REV CODE- 636/637: Performed by: INTERNAL MEDICINE

## 2020-01-09 PROCEDURE — 74011250636 HC RX REV CODE- 250/636: Performed by: INTERNAL MEDICINE

## 2020-01-09 PROCEDURE — 74011000258 HC RX REV CODE- 258: Performed by: EMERGENCY MEDICINE

## 2020-01-09 PROCEDURE — 74011250636 HC RX REV CODE- 250/636: Performed by: EMERGENCY MEDICINE

## 2020-01-09 PROCEDURE — 80048 BASIC METABOLIC PNL TOTAL CA: CPT

## 2020-01-09 PROCEDURE — 85025 COMPLETE CBC W/AUTO DIFF WBC: CPT

## 2020-01-09 RX ORDER — PREDNISONE 20 MG/1
20 TABLET ORAL 2 TIMES DAILY WITH MEALS
Status: DISCONTINUED | OUTPATIENT
Start: 2020-01-09 | End: 2020-01-12 | Stop reason: HOSPADM

## 2020-01-09 RX ADMIN — BUDESONIDE 500 MCG: 0.5 INHALANT RESPIRATORY (INHALATION) at 07:26

## 2020-01-09 RX ADMIN — Medication 10 ML: at 13:47

## 2020-01-09 RX ADMIN — CEFTRIAXONE SODIUM 2 G: 2 INJECTION, POWDER, FOR SOLUTION INTRAMUSCULAR; INTRAVENOUS at 11:14

## 2020-01-09 RX ADMIN — IPRATROPIUM BROMIDE AND ALBUTEROL SULFATE 3 ML: .5; 3 SOLUTION RESPIRATORY (INHALATION) at 14:43

## 2020-01-09 RX ADMIN — ENOXAPARIN SODIUM 40 MG: 40 INJECTION SUBCUTANEOUS at 14:05

## 2020-01-09 RX ADMIN — IPRATROPIUM BROMIDE AND ALBUTEROL SULFATE 3 ML: .5; 3 SOLUTION RESPIRATORY (INHALATION) at 07:25

## 2020-01-09 RX ADMIN — PREDNISONE 20 MG: 20 TABLET ORAL at 17:10

## 2020-01-09 RX ADMIN — BUDESONIDE 500 MCG: 0.5 INHALANT RESPIRATORY (INHALATION) at 21:17

## 2020-01-09 RX ADMIN — IPRATROPIUM BROMIDE AND ALBUTEROL SULFATE 3 ML: .5; 3 SOLUTION RESPIRATORY (INHALATION) at 21:17

## 2020-01-09 RX ADMIN — ASPIRIN 81 MG: 81 TABLET, COATED ORAL at 09:55

## 2020-01-09 RX ADMIN — PREDNISONE 30 MG: 20 TABLET ORAL at 07:03

## 2020-01-09 RX ADMIN — SODIUM CHLORIDE 100 ML/HR: 900 INJECTION, SOLUTION INTRAVENOUS at 05:28

## 2020-01-09 RX ADMIN — AZITHROMYCIN MONOHYDRATE 500 MG: 500 INJECTION, POWDER, LYOPHILIZED, FOR SOLUTION INTRAVENOUS at 11:14

## 2020-01-09 NOTE — PROGRESS NOTES
[facility]  Hospitalist Group                                                                                          Hospitalist Progress Note  Nathan Pearson MD  Answering service: [phone] OR [phone] from in house phone        Date of Service:  2020  NAME:  Anne Jacob  :  1950  MRN:  [id number]      Admission Summary:      78-year-old male with past medical history COPD, chronic respiratory failure, CAD, presenting to [facility] with progressive shortness of breath and generally feeling unwell for 3 days. Patient reports febrile at home 102.4, worsening shortness of breath, and generally feeling unwell. He came to [facility] for further evaluation. Patient intermittently uses oxygen during the day, always 3 L at night. He was found to be hypoxic on arrival and required 3 L. Patient also endorses chills, chronic intermittent cough. Denies sick contacts. Uses Tylenol at home for fever.     In the ED, chest x-ray concerning for left lower lobe pneumonia. WBC 13.4, tachycardic, tachypneic, hypoxic. Lactic acid elevated 2.9. Negative for flu. Code sepsis alerted, treated with IV antibiotics and steroids. Hospitalist consulted for evaluation and treatment/admission.       Interval history / Subjective:       2020 :  Pt feels better and believes he would do just as well going home to his home gyn than inpt rehab, so will anticipated discharge in am 1/10/2020. Assessment & Plan:     PNA, on ab x, ivf. Improved over-all but pt feel poorly 2020 ; cont on abx, for pt /ot eval ,; cont abx iv and home in am - pt choice as he feels stronger ( declines inpt rehab)     Chronic hypoxic resp failure on home 02 at 3 LPM, ( currently on 4 LPM NC)   Sirs response to PNA, resolved. COPD exacerbation, cont nebs, po steroids. Steroids daily reduction in dose.    H/o lung cancer with  rt lower lung resection   Code status: full   DVT prophylaxis: Lovenox     Care Plan discussed with: Patient/Family  Disposition: Home w/Family and TBD     Hospital Problems  Date Reviewed: 1/9/2019          Codes Class Noted POA    Pneumonia ICD-10-CM: J18.9  ICD-9-CM: 428  1/6/2020 Unknown                Review of Systems:   n diarrhea, feels stronger, no cp, breathing better. Vital Signs:    Last 24hrs VS reviewed since prior progress note. Most recent are:  Visit Vitals  /84 (BP 1 Location: Right arm, BP Patient Position: Sitting)   Pulse 96   Temp 97.7 °F (36.5 °C)   Resp 16   Ht 5' 11\" (1.803 m)   Wt 70.2 kg (154 lb 12.8 oz)   SpO2 92%   BMI 21.59 kg/m²         Intake/Output Summary (Last 24 hours) at 1/9/2020 0943  Last data filed at 1/9/2020 6746  Gross per 24 hour   Intake 221 ml   Output 2750 ml   Net -2529 ml        Physical Examination:             Constitutional:  No acute distress, cooperative, pleasant    ENT:  Oral mucous moist, oropharynx benign. Resp:  mild bilateral wheezing, no rhonchi/rales. No accessory muscle use   CV:  Regular rhythm, normal rate, no murmurs, gallops, rubs    GI:  Soft, non distended, non tender. normoactive bowel sounds, no hepatosplenomegaly     Musculoskeletal:  No edema, warm, 1+ pulses throughout    Neurologic:  Moves all extremities. AAOx3, CN II-XII reviewed     Psych:  Good insight, Not anxious nor agitated.   Skin:  Good turgor, no rashes or ulcers       Data Review:    Review and/or order of clinical lab test      Labs:     Recent Labs     01/09/20  0348 01/07/20  0034   WBC 8.8 11.7*   HGB 12.2 10.8*   HCT 38.9 33.9*    287     Recent Labs     01/09/20  0348 01/07/20  0034 01/06/20  1204    141 141   K 4.2 4.1 3.6    110* 107   CO2 26 26 24   BUN 13 18 18   CREA 1.04 1.00 1.03   * 207* 86   CA 8.7 9.1 8.5     Recent Labs     01/06/20  1204   SGOT 9*   ALT 9*   AP 84   TBILI 0.7   TP 6.8   ALB 3.3*   GLOB 3.5     No results for input(s): INR, PTP, APTT, INREXT, INREXT in the last 72 hours. No results for input(s): FE, TIBC, PSAT, FERR in the last 72 hours. No results found for: FOL, RBCF   No results for input(s): PH, PCO2, PO2 in the last 72 hours.   Recent Labs     01/06/20  1204   TROIQ <0.05     Lab Results   Component Value Date/Time    Cholesterol, total 106 02/20/2018 04:54 AM    HDL Cholesterol 45 02/20/2018 04:54 AM    LDL, calculated 46 02/20/2018 04:54 AM    Triglyceride 75 02/20/2018 04:54 AM    CHOL/HDL Ratio 2.4 02/20/2018 04:54 AM     Lab Results   Component Value Date/Time    Glucose (POC) 100 09/14/2015 12:14 PM     Lab Results   Component Value Date/Time    Color YELLOW/STRAW 01/09/2019 05:00 PM    Appearance CLEAR 01/09/2019 05:00 PM    Specific gravity 1.014 01/09/2019 05:00 PM    pH (UA) 7.5 01/09/2019 05:00 PM    Protein NEGATIVE  01/09/2019 05:00 PM    Glucose NEGATIVE  01/09/2019 05:00 PM    Ketone TRACE (A) 01/09/2019 05:00 PM    Bilirubin NEGATIVE  01/09/2019 05:00 PM    Urobilinogen 0.2 01/09/2019 05:00 PM    Nitrites NEGATIVE  01/09/2019 05:00 PM    Leukocyte Esterase NEGATIVE  01/09/2019 05:00 PM    Epithelial cells FEW 01/09/2019 05:00 PM    Bacteria NEGATIVE  01/09/2019 05:00 PM    WBC 0-4 01/09/2019 05:00 PM    RBC 0-5 01/09/2019 05:00 PM         Medications Reviewed:     Current Facility-Administered Medications   Medication Dose Route Frequency    predniSONE (DELTASONE) tablet 20 mg  20 mg Oral BID WITH MEALS    cefTRIAXone (ROCEPHIN) 2 g in 0.9% sodium chloride (MBP/ADV) 50 mL  2 g IntraVENous Q24H    azithromycin (ZITHROMAX) 500 mg in 0.9% sodium chloride (MBP/ADV) 250 mL  500 mg IntraVENous Q24H    sodium chloride (NS) flush 5-40 mL  5-40 mL IntraVENous Q8H    sodium chloride (NS) flush 5-40 mL  5-40 mL IntraVENous PRN    acetaminophen (TYLENOL) tablet 650 mg  650 mg Oral Q4H PRN    diphenhydrAMINE (BENADRYL) injection 12.5 mg  12.5 mg IntraVENous Q4H PRN    ondansetron (ZOFRAN) injection 4 mg  4 mg IntraVENous Q4H PRN    enoxaparin (LOVENOX) injection 40 mg  40 mg SubCUTAneous Q24H    aspirin delayed-release tablet 81 mg  81 mg Oral DAILY    oxyCODONE IR (ROXICODONE) tablet 10 mg  10 mg Oral Q8H PRN    arformoterol (BROVANA) neb solution 15 mcg  15 mcg Nebulization BID RT    budesonide (PULMICORT) 500 mcg/2 ml nebulizer suspension  500 mcg Nebulization BID RT    albuterol-ipratropium (DUO-NEB) 2.5 MG-0.5 MG/3 ML  3 mL Nebulization TID RT    influenza vaccine 2019-20 (6 mos+)(PF) (FLUARIX/FLULAVAL/FLUZONE QUAD) injection 0.5 mL  0.5 mL IntraMUSCular PRIOR TO DISCHARGE     ______________________________________________________________________  EXPECTED LENGTH OF STAY: 4d 19h  ACTUAL LENGTH OF STAY:          3                 Amina Marroquin MD

## 2020-01-09 NOTE — PROGRESS NOTES
CM noted consult for pulmonary rehab. CM meeting with patient to  offer Freedom of Choice and discuss options to send referrals.      HUYEN Dunbar/CRM

## 2020-01-09 NOTE — PROGRESS NOTES
Bedside and Verbal shift change report given to Adis Stiles RN (oncoming nurse) by Di Boyd RN (offgoing nurse). Report included the following information SBAR, Kardex, Intake/Output, MAR and Recent Results.

## 2020-01-09 NOTE — CARDIO/PULMONARY
Cardiac Rehab: Heart Failure education folder, with Low Sodium brochure, given to Mathew Tabor. Educated using teach back method. Discussed diagnosis definition and assessed patient understanding. Reviewed importance of daily weight monitoring and Low Sodium diet (3538-4807 mg. Daily). Weights documented on his HF calender. Stressed the importance of reading labels. Encouraged activity and rest periods within symptom limitations and as ordered by physician. Reviewed lasix, purpose of medication, potential side effects, compliance, and what to do if dose is missed. Discussed importance of reporting signs and symptoms of exacerbation, and when to report them to the doctor, to prevent re-hospitalization. Mathew Tabor was encouraged to keep all appointments with doctor. Smoking history assessed. Patient is a FORMER smoker. Discussed the Cardiac Rehab Program and will enroll with MI diagnosis when able to. He may need IPR at discharge.       Edna Bradford RN
Cardiac Rehab: MI education folder, with catheterization brochure, to bedside of Fatimah Kimble. Met with the pt. and his sister to provide education. Educated using teach back method. Reviewed MI diagnosis definition and purpose of intervention. Discussed risk factors for CAD to include the following: family history, elevated BMI, hyperlipidemia, hypertension, diabetes, stress, and smoking. Discussed Heart Healthy/Low Sodium (2000 mg) diet. Reviewed the importance of medication compliance, the purpose of metoprolol, and potential side effects. Discussed follow up appointments with cardiologist, signs and symptoms of angina, and what to report to physician after discharge. Emphasized the value of cardiac rehab. Discussed Cardiac Rehab Program format, benefits, and encouraged enrollment to assist with risk modification and management. Will follow for cath results and educate as needed. Fatimah Kimble verbalized understanding with questions answered.   Neida Correia RN
no

## 2020-01-09 NOTE — PROGRESS NOTES
Patient visited by [facility] Partner Volunteer on Oncology Unit on 1/9/2020.     Rev.  Colin Jerry MDiv, [facility], [facility] paging service: [phone])

## 2020-01-09 NOTE — PROGRESS NOTES
Transition of Care Plan:    -Consult noted for pulmonary rehab-declined  -Home with family  -On home O2 3L- [facility] services  -Transportation-Self/TBD        Reason for Admission:   Patient in house due to shortness of breath. Patient alert and oriented. Patient lives with son, daughter in law, and grandchildren. Patient uses no assisting devices. He is on home oxygen 3L, serviced by [employer]. Patient drives his private vehicle to and from as needed and would like to transport himself home at discharge. Patient confirmed PCP Dr. Ema Mcginnis and uses [facility] location. CM noted consult for pulmonary rehab. Patient pleasantly declined pulmonary rehab per advised that he joined [employer] and goes 4-5 times a week. Patient explained that he practices the same exercises done at rehab (treadmill and chest exercises). Patient advised that he is leaving for [facility] with his girlfriend and plans to take his portable tank with him while he travels. Patient also advised that his hotel has a gym and he plans to go to the gym during vacation. RRAT Score:     18/moderate             Do you (patient/family) have any concerns for transition/discharge? None at this time. Plan for utilizing home health:   No plan for home health     Current Advanced Directive/Advance Care Plan:  Not on file, provided education, patient is not interested at this time. Full Code    Care Management Interventions  PCP Verified by CM: Yes  Mode of Transport at Discharge: Other (see comment)(Self transport if has portable O2)  Transition of Care Consult (CM Consult):  Other(Home with family-declined pulmonary rehab)  Discharge Durable Medical Equipment: No  Physical Therapy Consult: Yes  Occupational Therapy Consult: Yes  Speech Therapy Consult: No  Current Support Network: Family Lives Nearby(Lives with son, daughter in law, and grand children)  Confirm Follow Up Transport: Self  Freedom of Choice List was Provided with Basic Dialogue that Supports the Patient's Individualized Plan of Care/Goals, Treatment Preferences and Shares the Quality Data Associated with the Providers?: Yes  Discharge Location  Discharge Placement: Home      CM will follow     HUYEN Duran/PIO

## 2020-01-09 NOTE — PROGRESS NOTES
OCCUPATIONAL THERAPY EVALUATION/DISCHARGE  Patient: Keyla Davis (7 y.o. male)  Date: 1/9/2020  Primary Diagnosis: Pneumonia [J18.9]       Precautions: none       ASSESSMENT  Based on the objective data described below, the patient presents near functional baseline and does not require additional OT at this time. He mobilized to bathroom and performed toileting and standing grooming independently. He also ambulated throughout room and practiced reaching in overhead and low cabinets with intact balance, overall ~10 min standing activity. Patient with only mild DAHL and maintained SPO2 >93% on 3L O2 NC. He was receptive to education on energy conservation/ work simplification. He demonstrates good insight into deficits and respiratory history, is highly self- motivated to progress endurance, and declined need for additional OT or pulmonary rehab. Recommend d/c home when medically stable. Current Level of Function (ADLs/self-care): independent    Functional Outcome Measure: The patient scored 100/100 on the Barthel Index outcome measure which is indicative of 0% impairment in functional ADL performance. PLAN :    Recommendation for discharge: (in order for the patient to meet his/her long term goals)  No skilled occupational therapy/ follow up rehabilitation needs identified at this time. This discharge recommendation:  Has been made in collaboration with the attending provider and/or case management       SUBJECTIVE:   Patient stated I've been through this before.     OBJECTIVE DATA SUMMARY:   HISTORY:   Past Medical History:   Diagnosis Date    Asthma     Cancer ([facility] [address]     Lung Cancer    Chronic obstructive pulmonary disease (HCC)     Chronic pain     High cholesterol     Lung cancer ([facility] [address]      Past Surgical History:   Procedure Laterality Date    HX LOBECTOMY      Right lung    HX OTHER SURGICAL      Partial right lung removal       Prior Level of Function/Environment/Context: independent, uses supplementary O2 at night and PRN during the day, lives with family  Expanded or extensive additional review of patient history:   Home Situation  Home Environment: Private residence  # Steps to Enter: 0  One/Two Story Residence: One story  Living Alone: No  Support Systems: Child(gregorio), Friends \ neighbors  Patient Expects to be Discharged to[de-identified] Rehabilitation facility  Current DME Used/Available at Home: Oxygen, portable    EXAMINATION OF PERFORMANCE DEFICITS:  Cognitive/Behavioral Status:  Neurologic State: Alert  Orientation Level: Oriented X4  Cognition: Appropriate decision making; Appropriate safety awareness; Appropriate for age attention/concentration; Follows commands  Perception: Appears intact  Perseveration: No perseveration noted  Safety/Judgement: Awareness of environment; Insight into deficits    Skin: visible skin appears intact    Edema: none noted    Hearing: Auditory  Auditory Impairment: None    Vision/Perceptual:                           Acuity: Within Defined Limits         Range of Motion:    AROM: Within functional limits  PROM: Within functional limits                      Strength:    Strength: Within functional limits                Coordination:  Coordination: Within functional limits  Fine Motor Skills-Upper: Left Intact; Right Intact    Gross Motor Skills-Upper: Left Intact; Right Intact    Tone & Sensation:    Tone: Normal  Sensation: Intact                      Balance:  Sitting: Intact  Standing: Intact; Without support    Functional Mobility and Transfers for ADLs:  Bed Mobility:  Supine to Sit: Independent    Transfers:  Sit to Stand: Independent  Stand to Sit: Independent  Toilet Transfer : Independent    ADL Assessment:  Feeding: Independent    Oral Facial Hygiene/Grooming: Independent    Bathing: Independent    Upper Body Dressing: Independent    Lower Body Dressing: Independent    Toileting: Independent                ADL Intervention and task modifications: Cognitive Retraining  Safety/Judgement: Awareness of environment; Insight into deficits      Functional Measure:  Barthel Index:    Bathin  Bladder: 10  Bowels: 10  Groomin  Dressing: 10  Feeding: 10  Mobility: 15  Stairs: 10  Toilet Use: 10  Transfer (Bed to Chair and Back): 15  Total: 100/100        The Barthel ADL Index: Guidelines  1. The index should be used as a record of what a patient does, not as a record of what a patient could do. 2. The main aim is to establish degree of independence from any help, physical or verbal, however minor and for whatever reason. 3. The need for supervision renders the patient not independent. 4. A patient's performance should be established using the best available evidence. Asking the patient, friends/relatives and nurses are the usual sources, but direct observation and common sense are also important. However direct testing is not needed. 5. Usually the patient's performance over the preceding 24-48 hours is important, but occasionally longer periods will be relevant. 6. Middle categories imply that the patient supplies over 50 per cent of the effort. 7. Use of aids to be independent is allowed. Briana Sarmiento., Barthel, D.W. (6888). Functional evaluation: the Barthel Index. [facility] (14)2. Niki Ban evita [place], J.J.M.F, Vivek Gutiérrez, Mumtaz Muñoz, [place], [address] [place] [address] (). Measuring the change indisability after inpatient rehabilitation; comparison of the responsiveness of the Barthel Index and Functional [place] Measure. Journal of Neurology, Neurosurgery, and Psychiatry, 66(4), 810-161. Ryan Gomez, N.J.A, MARI Allen, & Aleksandar Maldonado MSunnyA. (2004.) Assessment of post-stroke quality of life in cost-effectiveness studies: The usefulness of the Barthel Index and the EuroQoL-5D.  Quality of Life Research, 15, 209-39         Occupational Therapy Evaluation Charge Determination   History Examination Decision-Making   LOW Complexity : Brief history review  LOW Complexity : 1-3 performance deficits relating to physical, cognitive , or psychosocial skils that result in activity limitations and / or participation restrictions  MEDIUM Complexity : Patient may present with comorbidities that affect occupational performnce. Miniml to moderate modification of tasks or assistance (eg, physical or verbal ) with assesment(s) is necessary to enable patient to complete evaluation       Based on the above components, the patient evaluation is determined to be of the following complexity level: LOW   Pain Rating:  Patient did not report pain     Activity Tolerance:     Patient with only mild DAHL and maintained SPO2 >93% on 3L O2 NC. After treatment patient left in no apparent distress:    Supine in bed and Call bell within reach    COMMUNICATION/EDUCATION:   The patients plan of care was discussed with: Physical Therapist and Registered Nurse.     Thank you for this referral.  Cassandra Lemos OT  Time Calculation: 24 mins

## 2020-01-10 ENCOUNTER — APPOINTMENT (OUTPATIENT)
Dept: CT IMAGING | Age: 70
DRG: 194 | End: 2020-01-10
Attending: INTERNAL MEDICINE
Payer: MEDICARE

## 2020-01-10 PROCEDURE — 74011636637 HC RX REV CODE- 636/637: Performed by: INTERNAL MEDICINE

## 2020-01-10 PROCEDURE — 74011000250 HC RX REV CODE- 250: Performed by: INTERNAL MEDICINE

## 2020-01-10 PROCEDURE — 74011250636 HC RX REV CODE- 250/636: Performed by: EMERGENCY MEDICINE

## 2020-01-10 PROCEDURE — 74011636320 HC RX REV CODE- 636/320: Performed by: RADIOLOGY

## 2020-01-10 PROCEDURE — 94664 DEMO&/EVAL PT USE INHALER: CPT

## 2020-01-10 PROCEDURE — 74011636320 HC RX REV CODE- 636/320: Performed by: INTERNAL MEDICINE

## 2020-01-10 PROCEDURE — 74011000258 HC RX REV CODE- 258: Performed by: RADIOLOGY

## 2020-01-10 PROCEDURE — 71275 CT ANGIOGRAPHY CHEST: CPT

## 2020-01-10 PROCEDURE — 74011000258 HC RX REV CODE- 258: Performed by: EMERGENCY MEDICINE

## 2020-01-10 PROCEDURE — 74011000258 HC RX REV CODE- 258: Performed by: INTERNAL MEDICINE

## 2020-01-10 PROCEDURE — 65270000029 HC RM PRIVATE

## 2020-01-10 PROCEDURE — 94640 AIRWAY INHALATION TREATMENT: CPT

## 2020-01-10 PROCEDURE — 74011250637 HC RX REV CODE- 250/637: Performed by: INTERNAL MEDICINE

## 2020-01-10 PROCEDURE — 77010033678 HC OXYGEN DAILY

## 2020-01-10 PROCEDURE — 87070 CULTURE OTHR SPECIMN AEROBIC: CPT

## 2020-01-10 RX ORDER — IPRATROPIUM BROMIDE AND ALBUTEROL SULFATE 2.5; .5 MG/3ML; MG/3ML
3 SOLUTION RESPIRATORY (INHALATION)
Status: DISCONTINUED | OUTPATIENT
Start: 2020-01-10 | End: 2020-01-12 | Stop reason: HOSPADM

## 2020-01-10 RX ORDER — SODIUM CHLORIDE 0.9 % (FLUSH) 0.9 %
10 SYRINGE (ML) INJECTION
Status: COMPLETED | OUTPATIENT
Start: 2020-01-10 | End: 2020-01-10

## 2020-01-10 RX ADMIN — IOPAMIDOL 80 ML: 755 INJECTION, SOLUTION INTRAVENOUS at 01:00

## 2020-01-10 RX ADMIN — Medication 10 ML: at 14:13

## 2020-01-10 RX ADMIN — IPRATROPIUM BROMIDE AND ALBUTEROL SULFATE 3 ML: .5; 3 SOLUTION RESPIRATORY (INHALATION) at 09:19

## 2020-01-10 RX ADMIN — PREDNISONE 20 MG: 20 TABLET ORAL at 16:35

## 2020-01-10 RX ADMIN — IPRATROPIUM BROMIDE AND ALBUTEROL SULFATE 3 ML: .5; 3 SOLUTION RESPIRATORY (INHALATION) at 15:32

## 2020-01-10 RX ADMIN — Medication 10 ML: at 13:40

## 2020-01-10 RX ADMIN — Medication 10 ML: at 01:00

## 2020-01-10 RX ADMIN — IOPAMIDOL 0.8 ML: 755 INJECTION, SOLUTION INTRAVENOUS at 13:39

## 2020-01-10 RX ADMIN — BUDESONIDE 500 MCG: 0.5 INHALANT RESPIRATORY (INHALATION) at 09:19

## 2020-01-10 RX ADMIN — IPRATROPIUM BROMIDE AND ALBUTEROL SULFATE 3 ML: .5; 3 SOLUTION RESPIRATORY (INHALATION) at 22:11

## 2020-01-10 RX ADMIN — SODIUM CHLORIDE 100 ML: 900 INJECTION, SOLUTION INTRAVENOUS at 01:00

## 2020-01-10 RX ADMIN — SODIUM CHLORIDE 100 ML: 900 INJECTION, SOLUTION INTRAVENOUS at 13:40

## 2020-01-10 RX ADMIN — CEFTRIAXONE SODIUM 2 G: 2 INJECTION, POWDER, FOR SOLUTION INTRAMUSCULAR; INTRAVENOUS at 11:38

## 2020-01-10 RX ADMIN — PREDNISONE 20 MG: 20 TABLET ORAL at 07:03

## 2020-01-10 RX ADMIN — AZITHROMYCIN MONOHYDRATE 500 MG: 500 INJECTION, POWDER, LYOPHILIZED, FOR SOLUTION INTRAVENOUS at 11:37

## 2020-01-10 RX ADMIN — BUDESONIDE 500 MCG: 0.5 INHALANT RESPIRATORY (INHALATION) at 22:11

## 2020-01-10 RX ADMIN — Medication 10 ML: at 07:04

## 2020-01-10 RX ADMIN — ASPIRIN 81 MG: 81 TABLET, COATED ORAL at 09:23

## 2020-01-10 NOTE — CONSULTS
PULMONARY/CRITICAL CARE/SLEEP MEDICINE    Initial Physician Consultation Note    Name: Malik Milner   : 1950   MRN: [id number]   Date: 1/10/2020      Subjective:   Consult Note: 1/10/2020   Requesting Physician: Dr. Diana Puga  Reason for consult: Hemoptysis    Medical records and data reviewed. Patient is a 77 y.o. male who presented to the hospital with fever, dyspnea and cough. Patient has an underlying history of COPD on chronic bronchodilator therapy and reports he started noticing fever for several days prior to admission. This was ultimately associated with worsening shortness of breath that prompted his arrival to the ER for evaluation. He does not report sick contacts. Chest imaging has suggested presence of bibasilar airspace disease, left more than right. He brought presence of hemoptysis to attention this morning which is small in quantity. He denies pleuritic chest pain. Patient has had a history of lung cancer resected in . Subsequent fine-needle aspiration biopsy is present in the system have been negative for malignancy.   No new PET scan is available for review    Review of Systems:     A comprehensive 12 system review of systems was negative except for as documented in HPI    Assessment:   Acute on chronic hypoxic pulmonary insufficiency, he uses home oxygen at night  Hemoptysis, most likely related to left lower lobe pneumonia and associated bronchiectasis  Underlying COPD with mild exacerbation  History of lung cancer, resected in  would benefit from surveillance PET scan in 6 to 8 weeks after resolution of acute problems      Recommendations:   Wean oxygen as tolerated  Send sputum culture  Monitor quantity of hemoptysis, no acute intervention is needed if hemoptysis is mild, this is most likely related to necrotizing pneumonia and should improve with antibiotic therapy  On steroids  On antibiotics  Unclear who he follows with for previous diagnosis of lung cancer but PET scan could be planned at the time of surveillance imaging in 6 to 8 weeks  On bronchodilators-resume home regimen on discharge  We are available to see him for acute issues over the weekend and will check back on Monday. If discharged over the weekend, outpatient hospital follow-up will be arranged with      Active Problem List:     Problem List  Date Reviewed: 1/9/2019          Codes Class    Pneumonia ICD-10-CM: J18.9  ICD-9-CM: 486         SOB (shortness of breath) ICD-10-CM: R06.02  ICD-9-CM: 786.05         Malnutrition ([facility] ICD-10-CM: E46  ICD-9-CM: 263.9         Respiratory failure with hypoxia (HCC) ICD-10-CM: J96.91  ICD-9-CM: 518.81         Aspiration pneumonia (HCC) ICD-10-CM: J69.0  ICD-9-CM: 882.5         Systolic CHF, acute (HCC) ICD-10-CM: I50.21  ICD-9-CM: 428.21, 428.0         Shortness of breath ICD-10-CM: R06.02  ICD-9-CM: 786.05         COPD exacerbation (HCC) ICD-10-CM: J44.1  ICD-9-CM: 491.21               Past Medical History:      has a past medical history of Asthma, Cancer ([facility] Chronic obstructive pulmonary disease ([facility] Chronic pain, High cholesterol, and Lung cancer ([facility] Past Surgical History:      has a past surgical history that includes hx other surgical and hx lobectomy. Home Medications:     Prior to Admission medications    Medication Sig Start Date End Date Taking? Authorizing Provider   albuterol (PROVENTIL VENTOLIN) 2.5 mg /3 mL (0.083 %) nebu 2.5 mg by Nebulization route every six (6) hours as needed for Wheezing or Shortness of Breath. Yes Provider, Historical   fluticasone-umeclidinium-vilanterol (TRELEGY ELLIPTA) 100-62.5-25 mcg inhaler Take 1 Puff by inhalation daily. Yes Provider, Historical   albuterol (PROVENTIL HFA, VENTOLIN HFA, PROAIR HFA) 90 mcg/actuation inhaler Take 1-2 Puffs by inhalation every four (4) hours as needed for Wheezing.    Yes Provider, Historical   oxyCODONE IR (ROXICODONE) 20 mg immediate release tablet Take 40 mg by mouth every eight (8) hours as needed for Pain (severe pain). Yes Provider, Historical   aspirin delayed-release 81 mg tablet Take 1 Tab by mouth daily. 18  Yes Yomaira Bazzi NP       Allergies/Social/Family History:     No Known Allergies   Social History     Tobacco Use    Smoking status: Former Smoker     Packs/day: 1.00     Years: 45.00     Pack years: 45.00     Last attempt to quit: 2009     Years since quittin.0    Smokeless tobacco: Never Used   Substance Use Topics    Alcohol use: No     Comment: rarely/social       History reviewed. No pertinent family history. Objective:   Vital Signs:  Visit Vitals  /77 (BP 1 Location: Left arm, BP Patient Position: At rest)   Pulse 69   Temp 98 °F (36.7 °C)   Resp 18   Ht 5' 11\" (1.803 m)   Wt 69.2 kg (152 lb 8 oz)   SpO2 96%   BMI 21.27 kg/m²    O2 Flow Rate (L/min): 3.5 l/min O2 Device: Nasal cannula Temp (24hrs), Av.9 °F (36.6 °C), Min:97.7 °F (36.5 °C), Max:98 °F (36.7 °C)           Intake/Output:     Intake/Output Summary (Last 24 hours) at 1/10/2020 1459  Last data filed at 1/10/2020 1414  Gross per 24 hour   Intake 480 ml   Output 800 ml   Net -320 ml       Physical Exam:   General:  Alert, cooperative, no distress, appears stated age. Head:  Normocephalic, without obvious abnormality, atraumatic. Eyes:  Conjunctivae/corneas clear. PERRL   Neck: Supple, symmetrical, no adenopathy, no carotid bruit and no JVD. Lungs:   Decreased BS at bases with crepitations   Chest wall:  No tenderness or deformity. Heart:  Regular rate and rhythm, S1-S2 normal, no murmur, no click, rub or gallop. Abdomen:   Soft, non-tender. Bowel sounds present. No masses,  No organomegaly. Extremities: Atraumatic, no cyanosis or edema.    Pulses: Palpable   Skin: No rashes or lesions   Neurologic: Grossly nonfocal         LABS AND  DATA: Personally reviewed  Recent Labs     20  0348   WBC 8.8   HGB 12.2   HCT 38.9        Recent Labs 01/09/20  0348      K 4.2      CO2 26   BUN 13   CREA 1.04   *   CA 8.7     No results for input(s): SGOT, GPT, AP, TBIL, TP, ALB, GLOB, AML, LPSE in the last 72 hours. No lab exists for component: AMYP  No results for input(s): INR, PTP, APTT, INREXT in the last 72 hours. No results for input(s): PHI, PCO2I, PO2I, FIO2I in the last 72 hours. No results for input(s): CPK, CKMB, TROIQ, BNPP in the last 72 hours. MEDS: Reviewed    Chest Imaging: personally reviewed and report checked    Tele- reviewed    Medical decision making:   I have reviewed the flowsheet and previous day's notes  Patient has acute or chronic illness that poses a threat to life or bodily function  Review and order of Clinical lab tests  Review and Order of Radiology tests  Independent visualization of Image  High Risk Drug therapy requiring intensive monitoring for toxicity: eg steroids, pressors, antibiotics        Thank you for allowing me to participate in this patient's care.     Shirley Ferrara MD      [facility]

## 2020-01-10 NOTE — PROGRESS NOTES
Physical Therapy Note    Patient declines PT at this time stating he just completed ambulating in his room. RN confirms patient has been up ad-payam and mobilizing.     Thank you,  Elsa LOPEZT

## 2020-01-10 NOTE — PROGRESS NOTES
[address] [facility] Adult  Hospitalist Group                                                                                          Hospitalist Progress Note  Noe Frederick MD  Answering service: [phone] OR [phone] from in house phone        Date of Service:  1/10/2020  NAME:  Roxana Deluca  :  1950  MRN:  [id number]      Admission Summary:      72-year-old male with past medical history COPD, chronic respiratory failure, CAD, presenting to [facility] with progressive shortness of breath and generally feeling unwell for 3 days. Patient reports febrile at home 102.4, worsening shortness of breath, and generally feeling unwell. He came to [facility] for further evaluation. Patient intermittently uses oxygen during the day, always 3 L at night. He was found to be hypoxic on arrival and required 3 L. Patient also endorses chills, chronic intermittent cough. Denies sick contacts. Uses Tylenol at home for fever.     In the ED, chest x-ray concerning for left lower lobe pneumonia. WBC 13.4, tachycardic, tachypneic, hypoxic. Lactic acid elevated 2.9. Negative for flu. Code sepsis alerted, treated with IV antibiotics and steroids. Hospitalist consulted for evaluation and treatment/admission.       Interval history / Subjective:       1/10/2020 :  Has had hemptysis x 1.5 days, only now brings to care team attention, ; will request both Pul consult and a CTA       Assessment & Plan:     PNA, on ab x, ivf. Improved over-all but pt feel poorly 1/10/2020 ; cont on abx, for pt /ot eval ,; cont abx iv and home in am - pt choice as he feels stronger ( declines inpt rehab)     Hemoptysis: 1/10/2020 : will request both Pul consult and a CTA      Chronic hypoxic resp failure on home 02 at 3 LPM, ( currently on 4 LPM NC)   Sirs response to PNA, resolved. COPD exacerbation, cont nebs, po steroids. Steroids daily reduction in dose.    H/o lung cancer with  rt lower lung resection Code status: full   DVT prophylaxis: Lovenox     Care Plan discussed with: Patient/Family  Disposition: Home w/Family and TBD     Hospital Problems  Date Reviewed: 1/9/2019          Codes Class Noted POA    Pneumonia ICD-10-CM: J18.9  ICD-9-CM: 939  1/6/2020 Unknown                Review of Systems:   Hemoptysis x 1 day; No diarrhea, feels stronger, no cp, breathing better. Vital Signs:    Last 24hrs VS reviewed since prior progress note. Most recent are:  Visit Vitals  /69 (BP 1 Location: Left arm, BP Patient Position: At rest)   Pulse 71   Temp 98 °F (36.7 °C)   Resp 18   Ht 5' 11\" (1.803 m)   Wt 69.2 kg (152 lb 8 oz)   SpO2 96%   BMI 21.27 kg/m²         Intake/Output Summary (Last 24 hours) at 1/10/2020 0829  Last data filed at 1/10/2020 0815  Gross per 24 hour   Intake 716 ml   Output 900 ml   Net -184 ml        Physical Examination:             Constitutional:  No acute distress, cooperative, pleasant    ENT:  Oral mucous moist, oropharynx benign. Resp:  clear lungs , no rhonchi/rales. No accessory muscle use   CV:  Regular rhythm, normal rate, no murmurs, gallops, rubs    GI:  Soft, non distended, non tender. normoactive bowel sounds, no hepatosplenomegaly     Musculoskeletal:  No edema, warm, 1+ pulses throughout    Neurologic:  Moves all extremities. AAOx3, CN II-XII reviewed     Psych:  Good insight, Not anxious nor agitated. Skin:  Good turgor, no rashes or ulcers       Data Review:    Review and/or order of clinical lab test      Labs:     Recent Labs     01/09/20  0348   WBC 8.8   HGB 12.2   HCT 38.9        Recent Labs     01/09/20  0348      K 4.2      CO2 26   BUN 13   CREA 1.04   *   CA 8.7     No results for input(s): SGOT, GPT, ALT, AP, TBIL, TBILI, TP, ALB, GLOB, GGT, AML, LPSE in the last 72 hours. No lab exists for component: AMYP, HLPSE  No results for input(s): INR, PTP, APTT, INREXT, INREXT in the last 72 hours.    No results for input(s): FE, TIBC, PSAT, FERR in the last 72 hours. No results found for: FOL, RBCF   No results for input(s): PH, PCO2, PO2 in the last 72 hours. No results for input(s): CPK, CKNDX, TROIQ in the last 72 hours.     No lab exists for component: CPKMB  Lab Results   Component Value Date/Time    Cholesterol, total 106 02/20/2018 04:54 AM    HDL Cholesterol 45 02/20/2018 04:54 AM    LDL, calculated 46 02/20/2018 04:54 AM    Triglyceride 75 02/20/2018 04:54 AM    CHOL/HDL Ratio 2.4 02/20/2018 04:54 AM     Lab Results   Component Value Date/Time    Glucose (POC) 100 09/14/2015 12:14 PM     Lab Results   Component Value Date/Time    Color YELLOW/STRAW 01/09/2019 05:00 PM    Appearance CLEAR 01/09/2019 05:00 PM    Specific gravity 1.014 01/09/2019 05:00 PM    pH (UA) 7.5 01/09/2019 05:00 PM    Protein NEGATIVE  01/09/2019 05:00 PM    Glucose NEGATIVE  01/09/2019 05:00 PM    Ketone TRACE (A) 01/09/2019 05:00 PM    Bilirubin NEGATIVE  01/09/2019 05:00 PM    Urobilinogen 0.2 01/09/2019 05:00 PM    Nitrites NEGATIVE  01/09/2019 05:00 PM    Leukocyte Esterase NEGATIVE  01/09/2019 05:00 PM    Epithelial cells FEW 01/09/2019 05:00 PM    Bacteria NEGATIVE  01/09/2019 05:00 PM    WBC 0-4 01/09/2019 05:00 PM    RBC 0-5 01/09/2019 05:00 PM         Medications Reviewed:     Current Facility-Administered Medications   Medication Dose Route Frequency    predniSONE (DELTASONE) tablet 20 mg  20 mg Oral BID WITH MEALS    cefTRIAXone (ROCEPHIN) 2 g in 0.9% sodium chloride (MBP/ADV) 50 mL  2 g IntraVENous Q24H    azithromycin (ZITHROMAX) 500 mg in 0.9% sodium chloride (MBP/ADV) 250 mL  500 mg IntraVENous Q24H    sodium chloride (NS) flush 5-40 mL  5-40 mL IntraVENous Q8H    sodium chloride (NS) flush 5-40 mL  5-40 mL IntraVENous PRN    acetaminophen (TYLENOL) tablet 650 mg  650 mg Oral Q4H PRN    diphenhydrAMINE (BENADRYL) injection 12.5 mg  12.5 mg IntraVENous Q4H PRN    ondansetron (ZOFRAN) injection 4 mg  4 mg IntraVENous Q4H PRN    enoxaparin (LOVENOX) injection 40 mg  40 mg SubCUTAneous Q24H    aspirin delayed-release tablet 81 mg  81 mg Oral DAILY    oxyCODONE IR (ROXICODONE) tablet 10 mg  10 mg Oral Q8H PRN    arformoterol (BROVANA) neb solution 15 mcg  15 mcg Nebulization BID RT    budesonide (PULMICORT) 500 mcg/2 ml nebulizer suspension  500 mcg Nebulization BID RT    albuterol-ipratropium (DUO-NEB) 2.5 MG-0.5 MG/3 ML  3 mL Nebulization TID RT    influenza vaccine 2019-20 (6 mos+)(PF) (FLUARIX/FLULAVAL/FLUZONE QUAD) injection 0.5 mL  0.5 mL IntraMUSCular PRIOR TO DISCHARGE     ______________________________________________________________________  EXPECTED LENGTH OF STAY: 4d 19h  ACTUAL LENGTH OF STAY:          4                 Nicolás Santos MD

## 2020-01-11 LAB
BACTERIA SPEC CULT: NORMAL
BACTERIA SPEC CULT: NORMAL
SERVICE CMNT-IMP: NORMAL
SERVICE CMNT-IMP: NORMAL

## 2020-01-11 PROCEDURE — 74011000250 HC RX REV CODE- 250: Performed by: INTERNAL MEDICINE

## 2020-01-11 PROCEDURE — 74011000258 HC RX REV CODE- 258: Performed by: EMERGENCY MEDICINE

## 2020-01-11 PROCEDURE — 94640 AIRWAY INHALATION TREATMENT: CPT

## 2020-01-11 PROCEDURE — 65270000029 HC RM PRIVATE

## 2020-01-11 PROCEDURE — 74011636637 HC RX REV CODE- 636/637: Performed by: INTERNAL MEDICINE

## 2020-01-11 PROCEDURE — 74011250636 HC RX REV CODE- 250/636: Performed by: EMERGENCY MEDICINE

## 2020-01-11 PROCEDURE — 94664 DEMO&/EVAL PT USE INHALER: CPT

## 2020-01-11 PROCEDURE — 74011250637 HC RX REV CODE- 250/637: Performed by: INTERNAL MEDICINE

## 2020-01-11 RX ADMIN — CEFTRIAXONE SODIUM 2 G: 2 INJECTION, POWDER, FOR SOLUTION INTRAMUSCULAR; INTRAVENOUS at 11:12

## 2020-01-11 RX ADMIN — Medication 10 ML: at 13:39

## 2020-01-11 RX ADMIN — ASPIRIN 81 MG: 81 TABLET, COATED ORAL at 09:15

## 2020-01-11 RX ADMIN — AZITHROMYCIN MONOHYDRATE 500 MG: 500 INJECTION, POWDER, LYOPHILIZED, FOR SOLUTION INTRAVENOUS at 12:23

## 2020-01-11 RX ADMIN — BUDESONIDE 500 MCG: 0.5 INHALANT RESPIRATORY (INHALATION) at 07:35

## 2020-01-11 RX ADMIN — BUDESONIDE 500 MCG: 0.5 INHALANT RESPIRATORY (INHALATION) at 21:43

## 2020-01-11 RX ADMIN — Medication 10 ML: at 21:43

## 2020-01-11 RX ADMIN — ARFORMOTEROL TARTRATE 15 MCG: 15 SOLUTION RESPIRATORY (INHALATION) at 21:43

## 2020-01-11 RX ADMIN — Medication 10 ML: at 07:22

## 2020-01-11 RX ADMIN — PREDNISONE 20 MG: 20 TABLET ORAL at 16:14

## 2020-01-11 RX ADMIN — IPRATROPIUM BROMIDE AND ALBUTEROL SULFATE 3 ML: .5; 3 SOLUTION RESPIRATORY (INHALATION) at 21:43

## 2020-01-11 RX ADMIN — IPRATROPIUM BROMIDE AND ALBUTEROL SULFATE 3 ML: .5; 3 SOLUTION RESPIRATORY (INHALATION) at 07:35

## 2020-01-11 RX ADMIN — PREDNISONE 20 MG: 20 TABLET ORAL at 07:22

## 2020-01-11 NOTE — PROGRESS NOTES
Problem: Falls - Risk of  Goal: *Absence of Falls  Description  Document Ernestine Latin Fall Risk and appropriate interventions in the flowsheet.   Outcome: Progressing Towards Goal  Note: Fall Risk Interventions:            Medication Interventions: Patient to call before getting OOB, Teach patient to arise slowly

## 2020-01-11 NOTE — PROGRESS NOTES
[address] [facility] Adult  Hospitalist Group                                                                                          Hospitalist Progress Note  Nicolás Santos MD  Answering service: [phone] OR [phone] from in house phone        Date of Service:  2020  NAME:  Bulmaro Gandhi  :  1950  MRN:  [id number]      Admission Summary:      27-year-old male with past medical history COPD, chronic respiratory failure, CAD, presenting to [facility] with progressive shortness of breath and generally feeling unwell for 3 days. Patient reports febrile at home 102.4, worsening shortness of breath, and generally feeling unwell. He came to [facility] for further evaluation. Patient intermittently uses oxygen during the day, always 3 L at night. He was found to be hypoxic on arrival and required 3 L. Patient also endorses chills, chronic intermittent cough. Denies sick contacts. Uses Tylenol at home for fever.     In the ED, chest x-ray concerning for left lower lobe pneumonia. WBC 13.4, tachycardic, tachypneic, hypoxic. Lactic acid elevated 2.9. Negative for flu. Code sepsis alerted, treated with IV antibiotics and steroids. Hospitalist consulted for evaluation and treatment/admission.       Interval history / Subjective:       2020 :  Patient had good night. Less hemoptysis. Seen by pulmonary medicine. The plan is conservative follow-up management. PET scan in 2 months. Expect discharge in a.m. Explained all to patient he is in agreement. Assessment & Plan:     PNA, on ab x, ivf. Improved over-all but pt feel poorly 2020 ; cont on abx, for pt /ot eval ,; cont abx iv and home in am - pt choice as he feels stronger ( declines inpt rehab)     Hemoptysis: 2020 : will request both Pul consult and a CTA  ; CTA negative for acute findings other than some necrotic pulmonary infectious process within it within the consolidated area of pneumonia. Will need close follow-up. Chronic hypoxic resp failure on home 02 at 3 LPM, ( currently on 4 LPM NC)   Sirs response to PNA, resolved. COPD exacerbation, cont nebs, po steroids. Steroids daily reduction in dose. H/o lung cancer with 2009 rt lower lung resection   Code status: full   DVT prophylaxis: Lovenox     Care Plan discussed with: Patient/Family  Disposition: Home w/Family and TBD     Hospital Problems  Date Reviewed: 1/9/2019          Codes Class Noted POA    Pneumonia ICD-10-CM: J18.9  ICD-9-CM: 486  1/6/2020 Unknown                Review of Systems:   Hemoptysis x 1 day; No diarrhea, feels stronger, no cp, breathing better. Vital Signs:    Last 24hrs VS reviewed since prior progress note. Most recent are:  Visit Vitals  /59 (BP 1 Location: Left arm, BP Patient Position: At rest)   Pulse 94   Temp 97.7 °F (36.5 °C)   Resp 17   Ht 5' 11\" (1.803 m)   Wt 68.1 kg (150 lb 3.2 oz)   SpO2 95%   BMI 20.95 kg/m²         Intake/Output Summary (Last 24 hours) at 1/11/2020 1205  Last data filed at 1/10/2020 2212  Gross per 24 hour   Intake 180 ml   Output 800 ml   Net -620 ml        Physical Examination:             Constitutional:  No acute distress, cooperative, pleasant    ENT:  Oral mucous moist, oropharynx benign. Resp:  clear lungs , no rhonchi/rales. No accessory muscle use   CV:  Regular rhythm, normal rate, no murmurs, gallops, rubs    GI:  Soft, non distended, non tender. normoactive bowel sounds, no hepatosplenomegaly     Musculoskeletal:  No edema, warm, 1+ pulses throughout    Neurologic:  Moves all extremities. AAOx3, CN II-XII reviewed     Psych:  Good insight, Not anxious nor agitated.   Skin:  Good turgor, no rashes or ulcers       Data Review:    Review and/or order of clinical lab test      Labs:     Recent Labs     01/09/20  0348   WBC 8.8   HGB 12.2   HCT 38.9        Recent Labs     01/09/20  0348      K 4.2      CO2 26   BUN 13   CREA 1.04   *   CA 8.7     No results for input(s): SGOT, GPT, ALT, AP, TBIL, TBILI, TP, ALB, GLOB, GGT, AML, LPSE in the last 72 hours. No lab exists for component: AMYP, HLPSE  No results for input(s): INR, PTP, APTT, INREXT, INREXT in the last 72 hours. No results for input(s): FE, TIBC, PSAT, FERR in the last 72 hours. No results found for: FOL, RBCF   No results for input(s): PH, PCO2, PO2 in the last 72 hours. No results for input(s): CPK, CKNDX, TROIQ in the last 72 hours.     No lab exists for component: CPKMB  Lab Results   Component Value Date/Time    Cholesterol, total 106 02/20/2018 04:54 AM    HDL Cholesterol 45 02/20/2018 04:54 AM    LDL, calculated 46 02/20/2018 04:54 AM    Triglyceride 75 02/20/2018 04:54 AM    CHOL/HDL Ratio 2.4 02/20/2018 04:54 AM     Lab Results   Component Value Date/Time    Glucose (POC) 100 09/14/2015 12:14 PM     Lab Results   Component Value Date/Time    Color YELLOW/STRAW 01/09/2019 05:00 PM    Appearance CLEAR 01/09/2019 05:00 PM    Specific gravity 1.014 01/09/2019 05:00 PM    pH (UA) 7.5 01/09/2019 05:00 PM    Protein NEGATIVE  01/09/2019 05:00 PM    Glucose NEGATIVE  01/09/2019 05:00 PM    Ketone TRACE (A) 01/09/2019 05:00 PM    Bilirubin NEGATIVE  01/09/2019 05:00 PM    Urobilinogen 0.2 01/09/2019 05:00 PM    Nitrites NEGATIVE  01/09/2019 05:00 PM    Leukocyte Esterase NEGATIVE  01/09/2019 05:00 PM    Epithelial cells FEW 01/09/2019 05:00 PM    Bacteria NEGATIVE  01/09/2019 05:00 PM    WBC 0-4 01/09/2019 05:00 PM    RBC 0-5 01/09/2019 05:00 PM         Medications Reviewed:     Current Facility-Administered Medications   Medication Dose Route Frequency    albuterol-ipratropium (DUO-NEB) 2.5 MG-0.5 MG/3 ML  3 mL Nebulization BID RT    albuterol-ipratropium (DUO-NEB) 2.5 MG-0.5 MG/3 ML  3 mL Nebulization Q6H PRN    predniSONE (DELTASONE) tablet 20 mg  20 mg Oral BID WITH MEALS    cefTRIAXone (ROCEPHIN) 2 g in 0.9% sodium chloride (MBP/ADV) 50 mL  2 g IntraVENous Q24H    azithromycin (ZITHROMAX) 500 mg in 0.9% sodium chloride (MBP/ADV) 250 mL  500 mg IntraVENous Q24H    sodium chloride (NS) flush 5-40 mL  5-40 mL IntraVENous Q8H    sodium chloride (NS) flush 5-40 mL  5-40 mL IntraVENous PRN    acetaminophen (TYLENOL) tablet 650 mg  650 mg Oral Q4H PRN    diphenhydrAMINE (BENADRYL) injection 12.5 mg  12.5 mg IntraVENous Q4H PRN    ondansetron (ZOFRAN) injection 4 mg  4 mg IntraVENous Q4H PRN    enoxaparin (LOVENOX) injection 40 mg  40 mg SubCUTAneous Q24H    aspirin delayed-release tablet 81 mg  81 mg Oral DAILY    oxyCODONE IR (ROXICODONE) tablet 10 mg  10 mg Oral Q8H PRN    arformoterol (BROVANA) neb solution 15 mcg  15 mcg Nebulization BID RT    budesonide (PULMICORT) 500 mcg/2 ml nebulizer suspension  500 mcg Nebulization BID RT    influenza vaccine 2019-20 (6 mos+)(PF) (FLUARIX/FLULAVAL/FLUZONE QUAD) injection 0.5 mL  0.5 mL IntraMUSCular PRIOR TO DISCHARGE     ______________________________________________________________________  EXPECTED LENGTH OF STAY: 4d 19h  ACTUAL LENGTH OF STAY:          5                 Julio C Cooper MD

## 2020-01-12 VITALS
SYSTOLIC BLOOD PRESSURE: 106 MMHG | RESPIRATION RATE: 18 BRPM | TEMPERATURE: 97.7 F | OXYGEN SATURATION: 95 % | HEART RATE: 89 BPM | WEIGHT: 150.3 LBS | DIASTOLIC BLOOD PRESSURE: 60 MMHG | BODY MASS INDEX: 21.04 KG/M2 | HEIGHT: 71 IN

## 2020-01-12 PROBLEM — J18.9 PNEUMONIA: Status: RESOLVED | Noted: 2020-01-06 | Resolved: 2020-01-12

## 2020-01-12 LAB
ANION GAP SERPL CALC-SCNC: 5 MMOL/L (ref 5–15)
BACTERIA SPEC CULT: NORMAL
BASOPHILS # BLD: 0 K/UL (ref 0–0.1)
BASOPHILS NFR BLD: 0 % (ref 0–1)
BUN SERPL-MCNC: 19 MG/DL (ref 6–20)
BUN/CREAT SERPL: 16 (ref 12–20)
CALCIUM SERPL-MCNC: 9.6 MG/DL (ref 8.5–10.1)
CHLORIDE SERPL-SCNC: 101 MMOL/L (ref 97–108)
CO2 SERPL-SCNC: 29 MMOL/L (ref 21–32)
CREAT SERPL-MCNC: 1.17 MG/DL (ref 0.7–1.3)
DIFFERENTIAL METHOD BLD: ABNORMAL
EOSINOPHIL # BLD: 0 K/UL (ref 0–0.4)
EOSINOPHIL NFR BLD: 0 % (ref 0–7)
ERYTHROCYTE [DISTWIDTH] IN BLOOD BY AUTOMATED COUNT: 13.2 % (ref 11.5–14.5)
GLUCOSE SERPL-MCNC: 110 MG/DL (ref 65–100)
GRAM STN SPEC: NORMAL
HCT VFR BLD AUTO: 41.2 % (ref 36.6–50.3)
HGB BLD-MCNC: 13.1 G/DL (ref 12.1–17)
IMM GRANULOCYTES # BLD AUTO: 0 K/UL
IMM GRANULOCYTES NFR BLD AUTO: 0 %
LYMPHOCYTES # BLD: 2.6 K/UL (ref 0.8–3.5)
LYMPHOCYTES NFR BLD: 12 % (ref 12–49)
MCH RBC QN AUTO: 29.6 PG (ref 26–34)
MCHC RBC AUTO-ENTMCNC: 31.8 G/DL (ref 30–36.5)
MCV RBC AUTO: 93.2 FL (ref 80–99)
METAMYELOCYTES NFR BLD MANUAL: 2 %
MONOCYTES # BLD: 0.9 K/UL (ref 0–1)
MONOCYTES NFR BLD: 4 % (ref 5–13)
NEUTS SEG # BLD: 17.5 K/UL (ref 1.8–8)
NEUTS SEG NFR BLD: 82 % (ref 32–75)
NRBC # BLD: 0 K/UL (ref 0–0.01)
NRBC BLD-RTO: 0 PER 100 WBC
PLATELET # BLD AUTO: 416 K/UL (ref 150–400)
PMV BLD AUTO: 9.3 FL (ref 8.9–12.9)
POTASSIUM SERPL-SCNC: 4.7 MMOL/L (ref 3.5–5.1)
RBC # BLD AUTO: 4.42 M/UL (ref 4.1–5.7)
RBC MORPH BLD: ABNORMAL
SERVICE CMNT-IMP: NORMAL
SODIUM SERPL-SCNC: 135 MMOL/L (ref 136–145)
WBC # BLD AUTO: 21.4 K/UL (ref 4.1–11.1)

## 2020-01-12 PROCEDURE — 74011250637 HC RX REV CODE- 250/637: Performed by: INTERNAL MEDICINE

## 2020-01-12 PROCEDURE — 74011000258 HC RX REV CODE- 258: Performed by: EMERGENCY MEDICINE

## 2020-01-12 PROCEDURE — 90686 IIV4 VACC NO PRSV 0.5 ML IM: CPT | Performed by: INTERNAL MEDICINE

## 2020-01-12 PROCEDURE — 74011000250 HC RX REV CODE- 250: Performed by: INTERNAL MEDICINE

## 2020-01-12 PROCEDURE — 85025 COMPLETE CBC W/AUTO DIFF WBC: CPT

## 2020-01-12 PROCEDURE — 74011636637 HC RX REV CODE- 636/637: Performed by: INTERNAL MEDICINE

## 2020-01-12 PROCEDURE — 90471 IMMUNIZATION ADMIN: CPT

## 2020-01-12 PROCEDURE — 94640 AIRWAY INHALATION TREATMENT: CPT

## 2020-01-12 PROCEDURE — 80048 BASIC METABOLIC PNL TOTAL CA: CPT

## 2020-01-12 PROCEDURE — 36415 COLL VENOUS BLD VENIPUNCTURE: CPT

## 2020-01-12 PROCEDURE — 74011250636 HC RX REV CODE- 250/636: Performed by: INTERNAL MEDICINE

## 2020-01-12 PROCEDURE — 74011250636 HC RX REV CODE- 250/636: Performed by: EMERGENCY MEDICINE

## 2020-01-12 PROCEDURE — 94664 DEMO&/EVAL PT USE INHALER: CPT

## 2020-01-12 RX ORDER — CEFUROXIME AXETIL 500 MG/1
500 TABLET ORAL 2 TIMES DAILY
Qty: 6 TAB | Refills: 0 | Status: SHIPPED | OUTPATIENT
Start: 2020-01-12 | End: 2020-01-12 | Stop reason: SDUPTHER

## 2020-01-12 RX ORDER — CEFUROXIME AXETIL 500 MG/1
500 TABLET ORAL 2 TIMES DAILY
Qty: 6 TAB | Refills: 0 | Status: SHIPPED | OUTPATIENT
Start: 2020-01-12 | End: 2020-01-15

## 2020-01-12 RX ORDER — SODIUM CHLORIDE 0.9 % (FLUSH) 0.9 %
10 SYRINGE (ML) INJECTION
Status: COMPLETED | OUTPATIENT
Start: 2020-01-12 | End: 2020-01-10

## 2020-01-12 RX ADMIN — ASPIRIN 81 MG: 81 TABLET, COATED ORAL at 09:33

## 2020-01-12 RX ADMIN — AZITHROMYCIN MONOHYDRATE 500 MG: 500 INJECTION, POWDER, LYOPHILIZED, FOR SOLUTION INTRAVENOUS at 10:57

## 2020-01-12 RX ADMIN — Medication 10 ML: at 06:59

## 2020-01-12 RX ADMIN — BUDESONIDE 500 MCG: 0.5 INHALANT RESPIRATORY (INHALATION) at 07:13

## 2020-01-12 RX ADMIN — IPRATROPIUM BROMIDE AND ALBUTEROL SULFATE 3 ML: .5; 3 SOLUTION RESPIRATORY (INHALATION) at 07:13

## 2020-01-12 RX ADMIN — CEFTRIAXONE SODIUM 2 G: 2 INJECTION, POWDER, FOR SOLUTION INTRAMUSCULAR; INTRAVENOUS at 10:56

## 2020-01-12 RX ADMIN — PREDNISONE 20 MG: 20 TABLET ORAL at 07:00

## 2020-01-12 RX ADMIN — INFLUENZA VIRUS VACCINE 0.5 ML: 15; 15; 15; 15 SUSPENSION INTRAMUSCULAR at 13:23

## 2020-01-12 NOTE — DISCHARGE SUMMARY
Discharge Summary       PATIENT ID: Carissa Keane  MRN: [id number]   YOB: 1950    DATE OF ADMISSION: 1/6/2020 10:35 AM    DATE OF DISCHARGE: 1/12/2020    PRIMARY CARE PROVIDER: Skyler Peace MD     ATTENDING PHYSICIAN: Katherin Roldan MD   DISCHARGING PROVIDER: Katherin Roldan MD    To contact this individual call [phone] and ask the  to page. If unavailable ask to be transferred the Adult Hospitalist Department. CONSULTATIONS: IP CONSULT TO PULMONOLOGY    PROCEDURES/SURGERIES: * No surgery found *    ADMITTING [address] [place] [address] COURSE:   Per recent notes:per pul med:    Subjective:   Consult Note: 1/10/2020   Requesting Physician: Dr. Shukri Kothari  Reason for consult: Hemoptysis     Medical records and data reviewed. Patient is a 77 y.o. male who presented to the hospital with fever, dyspnea and cough. Patient has an underlying history of COPD on chronic bronchodilator therapy and reports he started noticing fever for several days prior to admission. This was ultimately associated with worsening shortness of breath that prompted his arrival to the ER for evaluation. He does not report sick contacts. Chest imaging has suggested presence of bibasilar airspace disease, left more than right. He brought presence of hemoptysis to attention this morning which is small in quantity. He denies pleuritic chest pain.     Patient has had a history of lung cancer resected in 2011. Subsequent fine-needle aspiration biopsy is present in the system have been negative for malignancy.   No new PET scan is available for review     Review of Systems:      A comprehensive 12 system review of systems was negative except for as documented in HPI     Assessment:   Acute on chronic hypoxic pulmonary insufficiency, he uses home oxygen at night  Hemoptysis, most likely related to left lower lobe pneumonia and associated bronchiectasis  Underlying COPD with mild exacerbation  History of lung cancer, resected in 2011 would benefit from surveillance PET scan in 6 to 8 weeks after resolution of acute problems        Recommendations:   Wean oxygen as tolerated  Send sputum culture  Monitor quantity of hemoptysis, no acute intervention is needed if hemoptysis is mild, this is most likely related to necrotizing pneumonia and should improve with antibiotic therapy  On steroids  On antibiotics  Unclear who he follows with for previous diagnosis of lung cancer but PET scan could be planned at the time of surveillance imaging in 6 to 8 weeks  On bronchodilators-resume home regimen on discharge          Admission Summary:      60-year-old male with past medical history COPD, chronic respiratory failure, CAD, presenting to [facility] with progressive shortness of breath and generally feeling unwell for 3 days.  Patient reports febrile at home 102.4, worsening shortness of breath, and generally feeling unwell.  He came to [facility] for further evaluation.   Patient intermittently uses oxygen during the day, always 3 L at night.  He was found to be hypoxic on arrival and required 3 L.  Patient also endorses chills, chronic intermittent cough.  Denies sick contacts.  Uses Tylenol at home for fever.     In the ED, chest x-ray concerning for left lower lobe pneumonia.  WBC 13.4, tachycardic, tachypneic, hypoxic.  Lactic acid elevated 2.9.  Negative for flu.  Code sepsis alerted, treated with IV antibiotics and steroids.  Hospitalist consulted for evaluation and treatment/admission.        Interval history / Subjective:       1/11/2020 :  Patient had good night. Less hemoptysis. Seen by pulmonary medicine. The plan is conservative follow-up management. PET scan in 2 months. Expect discharge in a.m. Explained all to patient he is in agreement.      Assessment & Plan:      PNA, on ab x, ivf.  Improved over-all but pt feel poorly 1/11/2020 ; cont on abx, for pt /ot eval ,; cont abx iv and home in am - pt choice as he feels stronger ( declines inpt rehab)      Hemoptysis: 1/11/2020 : will request both Pul consult and a CTA  ; CTA negative for acute findings other than some necrotic pulmonary infectious process within it within the consolidated area of pneumonia. Will need close follow-up. See pul note above,      Chronic hypoxic resp failure on home 02 at 3 LPM, ( currently on 4 LPM NC)   Sirs response to PNA, resolved. COPD exacerbation, cont nebs, po steroids. Steroids daily reduction in dose. H/o lung cancer with 2009 rt lower lung resection   Code status: full   DVT prophylaxis: Lovenox      Care Plan discussed with: Patient/Family  Disposition: Home w/Family and TBD                DISCHARGE DIAGNOSES / PLAN:      1.  as above      ADDITIONAL CARE RECOMMENDATIONS:   na     PENDING TEST RESULTS:   At the time of discharge the following test results are still pending: na    FOLLOW UP APPOINTMENTS:    Follow-up Information     Follow up With Specialties Details Why Contact Dayne Bai MD Internal Medicine  recommend PET in 8 weeks  Ifrahjarvisarlyn CERDA [address]  [phone]               DIET: Resume previous diet     ACTIVITY: Activity as tolerated    WOUND CARE: na    EQUIPMENT needed: na      DISCHARGE MEDICATIONS:  Current Discharge Medication List      START taking these medications    Details   cefUROXime (CEFTIN) 500 mg tablet Take 1 Tab by mouth two (2) times a day for 3 days. Qty: 6 Tab, Refills: 0         CONTINUE these medications which have NOT CHANGED    Details   albuterol (PROVENTIL VENTOLIN) 2.5 mg /3 mL (0.083 %) nebu 2.5 mg by Nebulization route every six (6) hours as needed for Wheezing or Shortness of Breath. fluticasone-umeclidinium-vilanterol (TRELEGY ELLIPTA) 100-62.5-25 mcg inhaler Take 1 Puff by inhalation daily.       albuterol (PROVENTIL HFA, VENTOLIN HFA, PROAIR HFA) 90 mcg/actuation inhaler Take 1-2 Puffs by inhalation every four (4) hours as needed for Wheezing. oxyCODONE IR (ROXICODONE) 20 mg immediate release tablet Take 40 mg by mouth every eight (8) hours as needed for Pain (severe pain). aspirin delayed-release 81 mg tablet Take 1 Tab by mouth daily. Qty: 30 Tab, Refills: 0               NOTIFY YOUR PHYSICIAN FOR ANY OF THE FOLLOWING:   Fever over 101 degrees for 24 hours. Chest pain, shortness of breath, fever, chills, nausea, vomiting, diarrhea, change in mentation, falling, weakness, bleeding. Severe pain or pain not relieved by medications. Or, any other signs or symptoms that you may have questions about. DISPOSITION:    Home With:   OT  PT  HH  RN       Long term SNF/Inpatient Rehab   x Independent/assisted living    Hospice    Other:       PATIENT CONDITION AT DISCHARGE:     Functional status    Poor     Deconditioned    x Independent      Cognition   x  Lucid     Forgetful     Dementia      Catheters/lines (plus indication)    Burch     PICC     PEG    x None      Code status   x  Full code     DNR      PHYSICAL EXAMINATION AT DISCHARGE:  General:          Alert, cooperative, no distress, appears stated age. HEENT:           Atraumatic, anicteric sclerae, pink conjunctivae                          No oral ulcers, mucosa moist, throat clear, dentition fair  Neck:               Supple, symmetrical  Lungs:             Clear to auscultation bilaterally. No Wheezing or Rhonchi. No rales. Chest wall:      No tenderness  No Accessory muscle use. Heart:              Regular  rhythm,  No  murmur   No edema  Abdomen:        Soft, non-tender. Not distended. Bowel sounds normal  Extremities:     No cyanosis. No clubbing,                            Skin turgor normal, Capillary refill normal  Skin:                Not pale. Not Jaundiced  No rashes   Psych:             Not anxious or agitated.   Neurologic:      Alert, moves all extremities, answers questions appropriately and responds to commands       [address] DIAGNOSES:  Problem List as of 1/12/2020 Date Reviewed: 1/9/2019          Codes Class Noted - Resolved    SOB (shortness of breath) ICD-10-CM: R06.02  ICD-9-CM: 786.05  1/9/2019 - Present        Malnutrition ([facility] ICD-10-CM: E46  ICD-9-CM: 263.9  4/10/2018 - Present        Respiratory failure with hypoxia ([facility] ICD-10-CM: J96.91  ICD-9-CM: 518.81  4/10/2018 - Present        Aspiration pneumonia ([facility] ICD-10-CM: J69.0  ICD-9-CM: 507.0  4/4/2018 - Present        Systolic CHF, acute ([facility] ICD-10-CM: I50.21  ICD-9-CM: 428.21, 428.0  2/22/2018 - Present        Shortness of breath ICD-10-CM: R06.02  ICD-9-CM: 786.05  2/17/2018 - Present        COPD exacerbation ([facility] ICD-10-CM: J44.1  ICD-9-CM: 491.21  8/10/2015 - Present        RESOLVED: Pneumonia ICD-10-CM: J18.9  ICD-9-CM: 486  1/6/2020 - 1/12/2020        RESOLVED: NSTEMI (non-ST elevated myocardial infarction) ([facility] ICD-10-CM: I21.4  ICD-9-CM: 410.70  2/22/2018 - 2/22/2018        RESOLVED: COPD exacerbation ([facility] (Chronic) ICD-10-CM: J44.1  ICD-9-CM: 491.21  9/5/2015 - 2/22/2018              Greater than  20  minutes were spent with the patient on counseling and coordination of care    Signed:   Samanta Carreno MD  1/12/2020  11:17 AM

## 2020-01-12 NOTE — PROGRESS NOTES
Patient requesting his scheduled breathing treatment. Respiratory therapist called and they are not able to come up for awhile. Informed therapist that primary RN was going to administer for patient. Verified that Brovana 15 mcg/2ml, Ipratropium Bromide 0.5 mg and Albuterol Sulfate 3 mg, Budesonide 0.5 mg/2ml are compatiable to give combined on 8L oxygen.

## 2020-01-12 NOTE — DISCHARGE INSTRUCTIONS
Discharge Instructions       PATIENT ID: Rian Diana  MRN: [id number]   YOB: 1950    DATE OF ADMISSION: 1/6/2020 10:35 AM    DATE OF DISCHARGE: 1/12/2020    PRIMARY CARE PROVIDER: Tahira Tomlinson MD     ATTENDING PHYSICIAN: Sean Rosa MD  DISCHARGING PROVIDER: Nisa Peterson MD    To contact this individual call [phone] and ask the  to page. If unavailable ask to be transferred the Adult Hospitalist Department. DISCHARGE DIAGNOSES pna    CONSULTATIONS: IP CONSULT TO PULMONOLOGY    PROCEDURES/SURGERIES: * No surgery found *    PENDING TEST RESULTS:   At the time of discharge the following test results are still pending: na    FOLLOW UP APPOINTMENTS:   Follow-up Information     Follow up With Specialties Details Why Aminah Feliciano MD Internal Medicine  recommend PET in 8 weeks  [address]  [phone]             ADDITIONAL CARE RECOMMENDATIONS: na    DIET: Resume previous diet     ACTIVITY: Activity as tolerated    WOUND CARE: na    EQUIPMENT needed: na      DISCHARGE MEDICATIONS:   See Medication Reconciliation Form    · It is important that you take the medication exactly as they are prescribed. · Keep your medication in the bottles provided by the pharmacist and keep a list of the medication names, dosages, and times to be taken in your wallet. · Do not take other medications without consulting your doctor. NOTIFY YOUR PHYSICIAN FOR ANY OF THE FOLLOWING:   Fever over 101 degrees for 24 hours. Chest pain, shortness of breath, fever, chills, nausea, vomiting, diarrhea, change in mentation, falling, weakness, bleeding. Severe pain or pain not relieved by medications. Or, any other signs or symptoms that you may have questions about.       DISPOSITION:    Home With:   OT  PT  [facility]  RN       SNF/Inpatient Rehab/LTAC   x Independent/assisted living    Hospice    Other:          Signed:   Nisa Peterson MD  1/12/2020  11:17 AM

## 2020-01-12 NOTE — PROGRESS NOTES
PCP HOWARD appt scheduled with Count includes the [facility] to see PT in 24-48 hours after discharge at 9:00am. Appt added to [address],  Specialist    CMS is unable to establish hospital follow up appointments for patient due to PCP office being closed. Please advise patient to call Monday to schedule follow up appointments.  Value Base Diagnosis, high risk patients, with high numbers of readmission, or bridge appointment between pcp or specialist follow up will receive a Count includes the [facility] referral.

## 2022-01-21 ENCOUNTER — TRANSCRIBE ORDER (OUTPATIENT)
Dept: SCHEDULING | Age: 72
End: 2022-01-21

## 2022-01-21 DIAGNOSIS — I27.20 PULMONARY HTN (HCC): Primary | ICD-10-CM

## 2022-03-18 PROBLEM — E46 MALNUTRITION (HCC): Status: ACTIVE | Noted: 2018-04-10

## 2022-03-19 PROBLEM — I50.21 SYSTOLIC CHF, ACUTE (HCC): Status: ACTIVE | Noted: 2018-02-22

## 2022-03-19 PROBLEM — R06.02 SHORTNESS OF BREATH: Status: ACTIVE | Noted: 2018-02-17

## 2022-03-19 PROBLEM — J69.0 ASPIRATION PNEUMONIA (HCC): Status: ACTIVE | Noted: 2018-04-04

## 2022-03-20 PROBLEM — J96.91 RESPIRATORY FAILURE WITH HYPOXIA (HCC): Status: ACTIVE | Noted: 2018-04-10

## 2022-03-20 PROBLEM — R06.02 SOB (SHORTNESS OF BREATH): Status: ACTIVE | Noted: 2019-01-09

## 2022-07-07 NOTE — PROGRESS NOTES
"Chronic and ongoing problem.  She is doing well with reduction of her 30-day supply of tramadol to 80 tablets, consistently uses 2 tabs daily but sometimes more if she has breakthrough pain.    Obtained and reviewed patient utilization report from [facility] pharmacy database on 7/7/2022 4:18 PM  prior to writing prescription for controlled substance II, III or IV per [place] bill . Based on assessment of the report, the prescription is medically necessary.     Patient understands this prescription is a controlled substance which is potentially habit-forming and its use is regulated by the [employer]. We also discussed the new \"black box\" warning regarding the lethal combination of opioids and benzodiazepines. Refills are subject to terms of a controlled substance agreement and patient has an updated one on file. Most recent UDS is appropriate. Any refill requires an office visit. Narcotics have may adverse effects and the risks of addiction, accidental overdose and death were emphasized. Provided prescriptions for the next three months.  " Hospitalist Progress Note  Carlos Doshi NP  Answering service: [phone] OR [phone] from in house phone  Cell: [phone]   Date of Service:  2018  NAME:  Vanesa Carmona  :  1950  MRN:  [id number]    Admission Summary:   Pt presented to the ED with shortness of breath, cough and worsening weakness for the past 3 days. Reported he could not walk few steps without getting SOB. Has a chornic cough but recently it is getting worse and is productive. Pt was seen at [facility] recently and had a negative workup. Interval history / Subjective:   Pt in bed, no new complaints - says he is fatigued and short of breath. Just a bit better than he felt on admit. Denies ever feeling any chest pressure or pain. Does not recall having any form of cardiac work up but receives all his care at [facility] since      Assessment & Plan:     Possible Pneumonia (POA):  - CTA of chest: Chronic lung disease, left lower lobe infiltrate (which was present in 2016)  - continue with levaquin  - continue nebs  - leukocytosis improved, afebrile    COPD exacerbation due to above:    - wheezing at the time of presentation in ED but this has resolved  - on nebs/abx   - po steroids x 5 doses  - elevated D dimer but no pulmonary embolus seen on Chest CTA    Hx Chronic Respiratory Failure:   - hx of lung cancer with lobectomy   - on home oxygen (3 L NC at max and does not wear continuously)    Elevated troponin:  - denied chest pain  - serial cardiac enzymes: peak of 8.81 ->5.35 -> 3.75  - TTE has been ordered  - Cardiology has been consulted - spoke with Dr. Jose Garcia today  - on Lovenox until R/O ACS     Hx Hyperlipidemia: continue statin.       Code status: Full  DVT prophylaxis: Lovenox  Care Plan discussed with: patient, attending MD, cardiologist  Disposition: Lives at home alone     Hospital Problems  Date Reviewed: 10/5/2015          Codes Class Noted POA    * (Principal)Shortness of breath ICD-10-CM: R06.02  ICD-9-CM: 786.05  2/17/2018 Yes        COPD exacerbation (HCC) (Chronic) ICD-10-CM: J44.1  ICD-9-CM: 491.21  9/5/2015 Yes            Review of Systems:   Denies HA. No chest pain or pressure. + SOB and fatigues easily on exertion. No GI complaints (other than weight loss and decreased appetite at times). No N/V. Vital Signs:    Last 24hrs VS reviewed since prior progress note. Most recent are:  Visit Vitals    /74 (BP 1 Location: Right arm, BP Patient Position: At rest)    Pulse 88    Temp 97.5 °F (36.4 °C)    Resp 16    Wt 56.5 kg (124 lb 8 oz)    SpO2 94%    BMI 17.36 kg/m2       Intake/Output Summary (Last 24 hours) at 02/18/18 0859  Last data filed at 02/18/18 0430   Gross per 24 hour   Intake              950 ml   Output              250 ml   Net              700 ml      Physical Examination:         Constitutional:  No acute distress, cooperative, pleasant. Very thin/emaciated appearance    ENT:  Oral mucous membranes moist   Resp:  Diminished but no wheezing/rhonchi/rales. On 2 L NC   CV:  Regular rhythm, normal rate, no murmurs. SR on tele review with some PVCs    GI:  Soft, non distended, non tender. normoactive bowel sounds    Musculoskeletal:  No edema, warm, 2+ pulses throughout    Neurologic:  Moves all extremities.  AAOx3       Data Review:   Review and/or order of clinical lab test  Review and/or order of tests in the radiology section of CPT  Review and/or order of tests in the medicine section of CPT    Labs:     Recent Labs      02/18/18   0530  02/17/18   1502   WBC  12.2*  17.0*   HGB  13.8  16.2   HCT  41.2  47.6   PLT  216  271     Recent Labs      02/18/18   0530  02/17/18   1502   NA  138  140   K  4.1  4.2   CL  104  101   CO2  26  28   BUN  24*  30*   CREA  0.99  1.21   GLU  141*  106*   CA  8.8  9.3   MG   --   2.1     Recent Labs      02/18/18   0530  02/17/18   1502   SGOT  36  64*   ALT  23  28   AP  76  91   TBILI  0.5 0.9   TP  6.8  8.1   ALB  2.4*  3.1*   GLOB  4.4*  5.0*     No results for input(s): INR, PTP, APTT in the last 72 hours. No lab exists for component: INREXT   No results for input(s): FE, TIBC, PSAT, FERR in the last 72 hours. No results found for: FOL, RBCF   No results for input(s): PH, PCO2, PO2 in the last 72 hours.   Recent Labs      02/18/18   0530  02/17/18   2253  02/17/18   1502   TROIQ  3.75*  5.35*  8.81*     Lab Results   Component Value Date/Time    Cholesterol, total 170 09/06/2015 02:42 AM    HDL Cholesterol 62 09/06/2015 02:42 AM    LDL, calculated 96 09/06/2015 02:42 AM    Triglyceride 60 09/06/2015 02:42 AM    CHOL/HDL Ratio 2.7 09/06/2015 02:42 AM     Lab Results   Component Value Date/Time    Glucose (POC) 100 09/14/2015 12:14 PM     Lab Results   Component Value Date/Time    Color YELLOW/STRAW 11/28/2016 04:44 PM    Appearance CLEAR 11/28/2016 04:44 PM    Specific gravity 1.020 11/28/2016 04:44 PM    pH (UA) 5.0 11/28/2016 04:44 PM    Protein TRACE (A) 11/28/2016 04:44 PM    Glucose NEGATIVE  11/28/2016 04:44 PM    Ketone TRACE (A) 11/28/2016 04:44 PM    Bilirubin NEGATIVE  11/28/2016 04:44 PM    Urobilinogen 0.2 11/28/2016 04:44 PM    Nitrites NEGATIVE  11/28/2016 04:44 PM    Leukocyte Esterase TRACE (A) 11/28/2016 04:44 PM    Epithelial cells FEW 11/28/2016 04:44 PM    Bacteria NEGATIVE  11/28/2016 04:44 PM    WBC 5-10 11/28/2016 04:44 PM    RBC 0-5 11/28/2016 04:44 PM     Medications Reviewed:     Current Facility-Administered Medications   Medication Dose Route Frequency    levoFLOXacin (LEVAQUIN) 500 mg in D5W IVPB  500 mg IntraVENous Q24H    albuterol (PROVENTIL VENTOLIN) nebulizer solution 0.63 mg  0.63 mg Nebulization Q6HWA RT    albuterol-ipratropium (DUO-NEB) 2.5 MG-0.5 MG/3 ML  3 mL Nebulization Q4H PRN    budesonide (PULMICORT) 500 mcg/2 ml nebulizer suspension  500 mcg Nebulization BID    oxyCODONE IR (ROXICODONE) tablet 40 mg  40 mg Oral Q8H    oxyCODONE IR (ROXICODONE) tablet 40 mg  40 mg Oral Q8H PRN    simvastatin (ZOCOR) tablet 40 mg  40 mg Oral QHS    sodium chloride (NS) flush 5-10 mL  5-10 mL IntraVENous Q8H    sodium chloride (NS) flush 5-10 mL  5-10 mL IntraVENous PRN    predniSONE (DELTASONE) tablet 40 mg  40 mg Oral DAILY WITH BREAKFAST    enoxaparin (LOVENOX) injection 60 mg  60 mg SubCUTAneous Q12H    dextrose 5% and 0.9% NaCl infusion  100 mL/hr IntraVENous CONTINUOUS   ______________________________________________________________________  EXPECTED LENGTH OF STAY: - - -  ACTUAL LENGTH OF STAY:          1              Lawence JEROD Grace

## 2022-10-26 ENCOUNTER — APPOINTMENT (OUTPATIENT)
Dept: GENERAL RADIOLOGY | Age: 72
DRG: 871 | End: 2022-10-26
Attending: EMERGENCY MEDICINE
Payer: MEDICARE

## 2022-10-26 ENCOUNTER — HOSPITAL ENCOUNTER (INPATIENT)
Age: 72
LOS: 30 days | Discharge: HOME HEALTH | DRG: 871 | End: 2022-11-25
Attending: EMERGENCY MEDICINE | Admitting: FAMILY MEDICINE
Payer: MEDICARE

## 2022-10-26 DIAGNOSIS — J18.9 PNEUMONIA OF BOTH LUNGS DUE TO INFECTIOUS ORGANISM, UNSPECIFIED PART OF LUNG: Primary | ICD-10-CM

## 2022-10-26 DIAGNOSIS — C34.90 MALIGNANT NEOPLASM OF LUNG, UNSPECIFIED LATERALITY, UNSPECIFIED PART OF LUNG (HCC): ICD-10-CM

## 2022-10-26 DIAGNOSIS — J44.9 CHRONIC OBSTRUCTIVE PULMONARY DISEASE, UNSPECIFIED COPD TYPE (HCC): ICD-10-CM

## 2022-10-26 PROBLEM — A41.9 SEPSIS (HCC): Status: ACTIVE | Noted: 2022-10-26

## 2022-10-26 LAB
ALBUMIN SERPL-MCNC: 3 G/DL (ref 3.5–5)
ALBUMIN/GLOB SERPL: 0.6 {RATIO} (ref 1.1–2.2)
ALP SERPL-CCNC: 97 U/L (ref 45–117)
ALT SERPL-CCNC: 14 U/L (ref 12–78)
ANION GAP SERPL CALC-SCNC: 13 MMOL/L (ref 5–15)
ARTERIAL PATENCY WRIST A: ABNORMAL
ARTERIAL PATENCY WRIST A: POSITIVE
AST SERPL-CCNC: 11 U/L (ref 15–37)
B PERT DNA SPEC QL NAA+PROBE: NOT DETECTED
BASE DEFICIT BLD-SCNC: 5.5 MMOL/L
BASE DEFICIT BLD-SCNC: 7.9 MMOL/L
BASOPHILS # BLD: 0 K/UL (ref 0–0.1)
BASOPHILS NFR BLD: 0 % (ref 0–1)
BDY SITE: ABNORMAL
BDY SITE: ABNORMAL
BILIRUB SERPL-MCNC: 1.6 MG/DL (ref 0.2–1)
BORDETELLA PARAPERTUSSIS PCR, BORPAR: NOT DETECTED
BUN SERPL-MCNC: 35 MG/DL (ref 6–20)
BUN/CREAT SERPL: 17 (ref 12–20)
C PNEUM DNA SPEC QL NAA+PROBE: NOT DETECTED
CALCIUM SERPL-MCNC: 10 MG/DL (ref 8.5–10.1)
CHLORIDE SERPL-SCNC: 104 MMOL/L (ref 97–108)
CO2 SERPL-SCNC: 21 MMOL/L (ref 21–32)
COMMENT, HOLDF: NORMAL
COVID-19 RAPID TEST, COVR: NOT DETECTED
CREAT SERPL-MCNC: 2.04 MG/DL (ref 0.7–1.3)
DIFFERENTIAL METHOD BLD: ABNORMAL
EOSINOPHIL # BLD: 0.2 K/UL (ref 0–0.4)
EOSINOPHIL NFR BLD: 1 % (ref 0–7)
ERYTHROCYTE [DISTWIDTH] IN BLOOD BY AUTOMATED COUNT: 13.7 % (ref 11.5–14.5)
FLUAV SUBTYP SPEC NAA+PROBE: NOT DETECTED
FLUBV RNA SPEC QL NAA+PROBE: NOT DETECTED
GAS FLOW.O2 O2 DELIVERY SYS: ABNORMAL L/MIN
GAS FLOW.O2 O2 DELIVERY SYS: ABNORMAL L/MIN
GAS FLOW.O2 SETTING OXYMISER: 4 BPM
GLOBULIN SER CALC-MCNC: 5.4 G/DL (ref 2–4)
GLUCOSE SERPL-MCNC: 152 MG/DL (ref 65–100)
HADV DNA SPEC QL NAA+PROBE: NOT DETECTED
HCO3 BLD-SCNC: 18.5 MMOL/L (ref 22–26)
HCO3 BLD-SCNC: 19.1 MMOL/L (ref 22–26)
HCOV 229E RNA SPEC QL NAA+PROBE: NOT DETECTED
HCOV HKU1 RNA SPEC QL NAA+PROBE: NOT DETECTED
HCOV NL63 RNA SPEC QL NAA+PROBE: NOT DETECTED
HCOV OC43 RNA SPEC QL NAA+PROBE: NOT DETECTED
HCT VFR BLD AUTO: 53.8 % (ref 36.6–50.3)
HGB BLD-MCNC: 17.2 G/DL (ref 12.1–17)
HMPV RNA SPEC QL NAA+PROBE: NOT DETECTED
HPIV1 RNA SPEC QL NAA+PROBE: NOT DETECTED
HPIV2 RNA SPEC QL NAA+PROBE: NOT DETECTED
HPIV3 RNA SPEC QL NAA+PROBE: NOT DETECTED
HPIV4 RNA SPEC QL NAA+PROBE: NOT DETECTED
IMM GRANULOCYTES # BLD AUTO: 0 K/UL
IMM GRANULOCYTES NFR BLD AUTO: 0 %
LACTATE BLD-SCNC: 4.4 MMOL/L (ref 0.4–2)
LACTATE SERPL-SCNC: 6.6 MMOL/L (ref 0.4–2)
LYMPHOCYTES # BLD: 0.9 K/UL (ref 0.8–3.5)
LYMPHOCYTES NFR BLD: 4 % (ref 12–49)
M PNEUMO DNA SPEC QL NAA+PROBE: NOT DETECTED
MCH RBC QN AUTO: 30.4 PG (ref 26–34)
MCHC RBC AUTO-ENTMCNC: 32 G/DL (ref 30–36.5)
MCV RBC AUTO: 95.2 FL (ref 80–99)
METAMYELOCYTES NFR BLD MANUAL: 4 %
MONOCYTES # BLD: 1.8 K/UL (ref 0–1)
MONOCYTES NFR BLD: 8 % (ref 5–13)
NEUTS BAND NFR BLD MANUAL: 13 % (ref 0–6)
NEUTS SEG # BLD: 19 K/UL (ref 1.8–8)
NEUTS SEG NFR BLD: 70 % (ref 32–75)
NRBC # BLD: 0 K/UL (ref 0–0.01)
NRBC BLD-RTO: 0 PER 100 WBC
O2/TOTAL GAS SETTING VFR VENT: 70 %
PCO2 BLD: 34.1 MMHG (ref 35–45)
PCO2 BLD: 40.1 MMHG (ref 35–45)
PEEP RESPIRATORY: 8 CMH2O
PEEP RESPIRATORY: 8 CMH2O
PH BLD: 7.27 [PH] (ref 7.35–7.45)
PH BLD: 7.36 [PH] (ref 7.35–7.45)
PIP ISTAT,IPIP: 14
PLATELET # BLD AUTO: 343 K/UL (ref 150–400)
PMV BLD AUTO: 10.6 FL (ref 8.9–12.9)
PO2 BLD: 78 MMHG (ref 80–100)
PO2 BLD: 87 MMHG (ref 80–100)
POTASSIUM SERPL-SCNC: 4.3 MMOL/L (ref 3.5–5.1)
PRESSURE SUPPORT SETTING VENT: 4 CMH2O
PRESSURE SUPPORT SETTING VENT: 6 CMH2O
PROCALCITONIN SERPL-MCNC: 22.7 NG/ML
PROT SERPL-MCNC: 8.4 G/DL (ref 6.4–8.2)
RBC # BLD AUTO: 5.65 M/UL (ref 4.1–5.7)
RBC MORPH BLD: ABNORMAL
RSV RNA SPEC QL NAA+PROBE: NOT DETECTED
RV+EV RNA SPEC QL NAA+PROBE: NOT DETECTED
SAMPLES BEING HELD,HOLD: NORMAL
SAO2 % BLD: 95.1 % (ref 92–97)
SAO2 % BLD: 95.2 % (ref 92–97)
SARS-COV-2 PCR, COVPCR: NOT DETECTED
SODIUM SERPL-SCNC: 138 MMOL/L (ref 136–145)
SOURCE, COVRS: NORMAL
SPECIMEN TYPE: ABNORMAL
SPECIMEN TYPE: ABNORMAL
VENTILATION MODE VENT: ABNORMAL
WBC # BLD AUTO: 22.9 K/UL (ref 4.1–11.1)

## 2022-10-26 PROCEDURE — 80053 COMPREHEN METABOLIC PANEL: CPT

## 2022-10-26 PROCEDURE — 74011000258 HC RX REV CODE- 258: Performed by: INTERNAL MEDICINE

## 2022-10-26 PROCEDURE — 82803 BLOOD GASES ANY COMBINATION: CPT

## 2022-10-26 PROCEDURE — 87449 NOS EACH ORGANISM AG IA: CPT

## 2022-10-26 PROCEDURE — 94761 N-INVAS EAR/PLS OXIMETRY MLT: CPT

## 2022-10-26 PROCEDURE — 36415 COLL VENOUS BLD VENIPUNCTURE: CPT

## 2022-10-26 PROCEDURE — 74011000250 HC RX REV CODE- 250: Performed by: INTERNAL MEDICINE

## 2022-10-26 PROCEDURE — 74011000250 HC RX REV CODE- 250: Performed by: EMERGENCY MEDICINE

## 2022-10-26 PROCEDURE — 71045 X-RAY EXAM CHEST 1 VIEW: CPT

## 2022-10-26 PROCEDURE — 94664 DEMO&/EVAL PT USE INHALER: CPT

## 2022-10-26 PROCEDURE — 93005 ELECTROCARDIOGRAM TRACING: CPT

## 2022-10-26 PROCEDURE — C9113 INJ PANTOPRAZOLE SODIUM, VIA: HCPCS | Performed by: INTERNAL MEDICINE

## 2022-10-26 PROCEDURE — 74011250636 HC RX REV CODE- 250/636: Performed by: EMERGENCY MEDICINE

## 2022-10-26 PROCEDURE — 87635 SARS-COV-2 COVID-19 AMP PRB: CPT

## 2022-10-26 PROCEDURE — 94660 CPAP INITIATION&MGMT: CPT

## 2022-10-26 PROCEDURE — 0202U NFCT DS 22 TRGT SARS-COV-2: CPT

## 2022-10-26 PROCEDURE — 83605 ASSAY OF LACTIC ACID: CPT

## 2022-10-26 PROCEDURE — 36600 WITHDRAWAL OF ARTERIAL BLOOD: CPT

## 2022-10-26 PROCEDURE — 94640 AIRWAY INHALATION TREATMENT: CPT

## 2022-10-26 PROCEDURE — 74011000250 HC RX REV CODE- 250: Performed by: STUDENT IN AN ORGANIZED HEALTH CARE EDUCATION/TRAINING PROGRAM

## 2022-10-26 PROCEDURE — 85025 COMPLETE CBC W/AUTO DIFF WBC: CPT

## 2022-10-26 PROCEDURE — 84145 PROCALCITONIN (PCT): CPT

## 2022-10-26 PROCEDURE — 65660000001 HC RM ICU INTERMED STEPDOWN

## 2022-10-26 PROCEDURE — 74011250636 HC RX REV CODE- 250/636: Performed by: STUDENT IN AN ORGANIZED HEALTH CARE EDUCATION/TRAINING PROGRAM

## 2022-10-26 PROCEDURE — 74011250636 HC RX REV CODE- 250/636: Performed by: INTERNAL MEDICINE

## 2022-10-26 PROCEDURE — 99285 EMERGENCY DEPT VISIT HI MDM: CPT

## 2022-10-26 PROCEDURE — 87040 BLOOD CULTURE FOR BACTERIA: CPT

## 2022-10-26 PROCEDURE — 74011250636 HC RX REV CODE- 250/636: Performed by: FAMILY MEDICINE

## 2022-10-26 PROCEDURE — 5A09357 ASSISTANCE WITH RESPIRATORY VENTILATION, LESS THAN 24 CONSECUTIVE HOURS, CONTINUOUS POSITIVE AIRWAY PRESSURE: ICD-10-PCS | Performed by: FAMILY MEDICINE

## 2022-10-26 RX ORDER — SODIUM CHLORIDE 0.9 % (FLUSH) 0.9 %
5-40 SYRINGE (ML) INJECTION AS NEEDED
Status: DISCONTINUED | OUTPATIENT
Start: 2022-10-26 | End: 2022-11-25 | Stop reason: HOSPADM

## 2022-10-26 RX ORDER — OXYCODONE HYDROCHLORIDE 5 MG/1
20 TABLET ORAL
Status: DISCONTINUED | OUTPATIENT
Start: 2022-10-26 | End: 2022-11-25 | Stop reason: HOSPADM

## 2022-10-26 RX ORDER — BUDESONIDE 0.5 MG/2ML
500 INHALANT ORAL 2 TIMES DAILY
COMMUNITY

## 2022-10-26 RX ORDER — ASPIRIN 81 MG/1
81 TABLET ORAL DAILY
Status: DISCONTINUED | OUTPATIENT
Start: 2022-10-27 | End: 2022-11-25 | Stop reason: HOSPADM

## 2022-10-26 RX ORDER — ACETAMINOPHEN 650 MG/1
650 SUPPOSITORY RECTAL
Status: DISCONTINUED | OUTPATIENT
Start: 2022-10-26 | End: 2022-11-25 | Stop reason: HOSPADM

## 2022-10-26 RX ORDER — ACETAMINOPHEN 325 MG/1
650 TABLET ORAL
Status: DISCONTINUED | OUTPATIENT
Start: 2022-10-26 | End: 2022-11-25 | Stop reason: HOSPADM

## 2022-10-26 RX ORDER — SODIUM CHLORIDE 0.9 % (FLUSH) 0.9 %
5-40 SYRINGE (ML) INJECTION EVERY 8 HOURS
Status: DISCONTINUED | OUTPATIENT
Start: 2022-10-26 | End: 2022-11-25 | Stop reason: HOSPADM

## 2022-10-26 RX ORDER — OXYCODONE HYDROCHLORIDE 5 MG/1
10 TABLET ORAL
Status: DISCONTINUED | OUTPATIENT
Start: 2022-10-26 | End: 2022-11-25 | Stop reason: HOSPADM

## 2022-10-26 RX ORDER — ONDANSETRON 4 MG/1
4 TABLET, ORALLY DISINTEGRATING ORAL
Status: DISCONTINUED | OUTPATIENT
Start: 2022-10-26 | End: 2022-11-25 | Stop reason: HOSPADM

## 2022-10-26 RX ORDER — POLYETHYLENE GLYCOL 3350 17 G/17G
17 POWDER, FOR SOLUTION ORAL DAILY PRN
Status: DISCONTINUED | OUTPATIENT
Start: 2022-10-26 | End: 2022-11-25 | Stop reason: HOSPADM

## 2022-10-26 RX ORDER — BUDESONIDE 0.5 MG/2ML
500 INHALANT ORAL 2 TIMES DAILY
Status: DISCONTINUED | OUTPATIENT
Start: 2022-10-26 | End: 2022-11-25 | Stop reason: HOSPADM

## 2022-10-26 RX ORDER — ATORVASTATIN CALCIUM 40 MG/1
40 TABLET, FILM COATED ORAL DAILY
Status: DISCONTINUED | OUTPATIENT
Start: 2022-10-27 | End: 2022-11-25 | Stop reason: HOSPADM

## 2022-10-26 RX ORDER — ATORVASTATIN CALCIUM 40 MG/1
40 TABLET, FILM COATED ORAL DAILY
COMMUNITY

## 2022-10-26 RX ORDER — VANCOMYCIN/0.9 % SOD CHLORIDE 1.5G/250ML
1500 PLASTIC BAG, INJECTION (ML) INTRAVENOUS ONCE
Status: COMPLETED | OUTPATIENT
Start: 2022-10-26 | End: 2022-10-26

## 2022-10-26 RX ORDER — ALBUTEROL SULFATE 0.83 MG/ML
2.5 SOLUTION RESPIRATORY (INHALATION)
Status: DISCONTINUED | OUTPATIENT
Start: 2022-10-26 | End: 2022-10-26 | Stop reason: SDUPTHER

## 2022-10-26 RX ORDER — ENOXAPARIN SODIUM 100 MG/ML
30 INJECTION SUBCUTANEOUS DAILY
Status: DISCONTINUED | OUTPATIENT
Start: 2022-10-26 | End: 2022-10-27

## 2022-10-26 RX ORDER — ARFORMOTEROL TARTRATE 15 UG/2ML
15 SOLUTION RESPIRATORY (INHALATION)
Status: DISCONTINUED | OUTPATIENT
Start: 2022-10-26 | End: 2022-11-25 | Stop reason: HOSPADM

## 2022-10-26 RX ORDER — ARFORMOTEROL TARTRATE 15 UG/2ML
15 SOLUTION RESPIRATORY (INHALATION) 2 TIMES DAILY
COMMUNITY

## 2022-10-26 RX ORDER — KETOCONAZOLE 20 MG/ML
SHAMPOO, SUSPENSION TOPICAL AS NEEDED
COMMUNITY

## 2022-10-26 RX ORDER — IPRATROPIUM BROMIDE AND ALBUTEROL SULFATE 2.5; .5 MG/3ML; MG/3ML
3 SOLUTION RESPIRATORY (INHALATION)
Status: DISCONTINUED | OUTPATIENT
Start: 2022-10-26 | End: 2022-10-27

## 2022-10-26 RX ORDER — ONDANSETRON 2 MG/ML
4 INJECTION INTRAMUSCULAR; INTRAVENOUS
Status: DISCONTINUED | OUTPATIENT
Start: 2022-10-26 | End: 2022-11-25 | Stop reason: HOSPADM

## 2022-10-26 RX ADMIN — IPRATROPIUM BROMIDE AND ALBUTEROL SULFATE 3 ML: .5; 3 SOLUTION RESPIRATORY (INHALATION) at 16:56

## 2022-10-26 RX ADMIN — SODIUM CHLORIDE, PRESERVATIVE FREE 10 ML: 5 INJECTION INTRAVENOUS at 14:06

## 2022-10-26 RX ADMIN — SODIUM CHLORIDE 1000 ML: 9 INJECTION, SOLUTION INTRAVENOUS at 07:06

## 2022-10-26 RX ADMIN — METHYLPREDNISOLONE SODIUM SUCCINATE 60 MG: 125 INJECTION, POWDER, FOR SOLUTION INTRAMUSCULAR; INTRAVENOUS at 10:06

## 2022-10-26 RX ADMIN — METHYLPREDNISOLONE SODIUM SUCCINATE 60 MG: 40 INJECTION, POWDER, FOR SOLUTION INTRAMUSCULAR; INTRAVENOUS at 22:37

## 2022-10-26 RX ADMIN — SODIUM CHLORIDE, PRESERVATIVE FREE 2 G: 5 INJECTION INTRAVENOUS at 07:12

## 2022-10-26 RX ADMIN — ENOXAPARIN SODIUM 30 MG: 100 INJECTION SUBCUTANEOUS at 10:16

## 2022-10-26 RX ADMIN — METHYLPREDNISOLONE SODIUM SUCCINATE 60 MG: 40 INJECTION, POWDER, FOR SOLUTION INTRAMUSCULAR; INTRAVENOUS at 16:28

## 2022-10-26 RX ADMIN — PIPERACILLIN AND TAZOBACTAM 4.5 G: 4; .5 INJECTION, POWDER, FOR SOLUTION INTRAVENOUS at 14:06

## 2022-10-26 RX ADMIN — IPRATROPIUM BROMIDE AND ALBUTEROL SULFATE 3 ML: .5; 3 SOLUTION RESPIRATORY (INHALATION) at 21:59

## 2022-10-26 RX ADMIN — PIPERACILLIN AND TAZOBACTAM 3.38 G: 3; .375 INJECTION, POWDER, FOR SOLUTION INTRAVENOUS at 22:38

## 2022-10-26 RX ADMIN — SODIUM CHLORIDE, PRESERVATIVE FREE 10 ML: 5 INJECTION INTRAVENOUS at 22:37

## 2022-10-26 RX ADMIN — BUDESONIDE 500 MCG: 0.5 INHALANT RESPIRATORY (INHALATION) at 16:56

## 2022-10-26 RX ADMIN — VANCOMYCIN HYDROCHLORIDE 1500 MG: 10 INJECTION, POWDER, LYOPHILIZED, FOR SOLUTION INTRAVENOUS at 14:42

## 2022-10-26 RX ADMIN — AZITHROMYCIN DIHYDRATE 500 MG: 500 INJECTION, POWDER, LYOPHILIZED, FOR SOLUTION INTRAVENOUS at 07:06

## 2022-10-26 RX ADMIN — ARFORMOTEROL TARTRATE 15 MCG: 15 SOLUTION RESPIRATORY (INHALATION) at 21:58

## 2022-10-26 RX ADMIN — SODIUM CHLORIDE 1000 ML: 9 INJECTION, SOLUTION INTRAVENOUS at 11:27

## 2022-10-26 RX ADMIN — SODIUM CHLORIDE 905 ML: 9 INJECTION, SOLUTION INTRAVENOUS at 07:06

## 2022-10-26 RX ADMIN — PANTOPRAZOLE SODIUM 40 MG: 40 INJECTION, POWDER, FOR SOLUTION INTRAVENOUS at 10:15

## 2022-10-26 RX ADMIN — ALBUTEROL SULFATE 2.5 MG: 2.5 SOLUTION RESPIRATORY (INHALATION) at 10:51

## 2022-10-26 NOTE — PROGRESS NOTES
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 Fount to be 79% by EMS, placed on bipap on arrival. Called for evaluation by [facility] for respiratory distress    Visit Vitals  BP (!) 117/59   Pulse (!) 116   Temp 97.3 °F (36.3 °C)   Resp (!) 39   Ht 5' 11\" (1.803 m)   Wt 63.5 kg (140 lb)   SpO2 98%   BMI 19.53 kg/m²       PE  Cachetic, temporal wasting  On NC using accesorry mucles, thickened secretions, weakened cough. Phlegm in posteriori oropharynx Course rales b/l, mild wheeze  Tachycardia, sinus  Abd nontender, no guarding no rebound  No peripheral edema    Reviewed Labs, Imaging from this admission, and most recent CT Chest    A/P    AHRF  Sepsis  COPD Exacerbation  ? Viral PNA    - Transition to HFNC, increased flow will help break cycle of breathlessness  - Give 1L NS  - schedule dounebs, LABA/ICS  - Agree with steroid IV for COPD exacerbation  - Covering for CAP, also send respiratory viral panel for alternative viral etiologies  - if failing to improve over next 48 hours and clinically stable, consider CT PE to eval for thrombosis and other etiologies of lung pathologies    30minutes after evaluation, patietn on HFNC and looking improved. Speaking full sentences. Plan of care updated to RT, bedside RN, patient, and [facility] MD Concepción Jamils   Staff Intensivist  [facility]  10/26/2022       CRITICAL CARE DOCUMENTATION  I had a face to face encounter with the patient, reviewed and interpreted patient data including clinical events, labs, images, vital signs, I/O's, and examined patient.   I have discussed the case and the plan and management of the patient's care with the consulting services, the bedside nurses and the respiratory therapist.      NOTE OF PERSONAL INVOLVEMENT IN CARE   This patient has a high probability of imminent, clinically significant deterioration, which requires the highest level of preparedness to intervene urgently. I participated in the decision-making and personally managed or directed the management of the following life and organ supporting interventions that required my frequent assessment to treat or prevent imminent deterioration. I personally spent 30 minutes of critical care time. This is time spent at this critically ill patient's bedside actively involved in patient care as well as the coordination of care. This does not include any procedural time which has been billed separately.

## 2022-10-26 NOTE — H&P
[facility] Adult  Hospitalist Group  History and Physical    Date of Service:  10/26/2022  Primary Care Provider: Kristine Perez MD  Source of information: The patient and Chart review    Chief Complaint: Respiratory Distress      History of Presenting Illness:   Tk Coffman is a 71 y.o. male with a PMHx of lung cancer, COPD, chronic hypoxic respiratory failure, on home O2 at 3 liters/min, who presents with Chief Complaint of Shortness of breath/ Dyspnea on exertion, progressively worsening for the past 3 days. Patient denies any fevers or chills. He used increased doses of inhalers, but it did not help. He called 911. Upon EMS arrival, his O2 saturation was 79%. Patient was placed on a BIPAP upon arrival.    At the time of my evaluation, patient is on BIPAP. He appears uncomfortable, he is tachypneic, with a prolonged expiratory phase. He uses accessory muscles to breathe. He is emaciated, and there is diffuse loss of subcutaneous adipose tissue and loss of muscle mass. Limited history was obtained from the patient himself due to respiratory distress; he is able to nod and shake his head, but not able to speak. Work-up in the ED showed an elevated WBC of 22.9, erythrocytosis with an H&H of 17.2 & 53.8, an elevated BUN and Cr of 35 and 2.04 respectively. Initial lactic acid was 4.4. CXR showed emphysema, with Left lower lobe and Right upper lobe infiltrates. The Hospitalist team has been consulted for admission.      The patient denies any headache, blurry vision, sore throat, trouble swallowing, trouble with speech, chest pain, fever, chills, N/V/D, abd pain, urinary symptoms, constipation, recent travels, sick contacts, focal or generalized neurological symptoms, falls, injuries, rashes, contact with COVID-19 diagnosed patients, hematemesis, melena, hemoptysis, hematuria, rashes, denies starting any new medications and denies any other concerns or problems besides as mentioned above. REVIEW OF SYSTEMS:  A comprehensive review of systems was negative except for that written in the History of Present Illness. Past Medical History:   Diagnosis Date    Asthma     Cancer ([address]     Lung Cancer    Chronic obstructive pulmonary disease (HCC)     Chronic pain     High cholesterol     Lung cancer (HCC)       Past Surgical History:   Procedure Laterality Date    HX LOBECTOMY      Right lung    HX OTHER SURGICAL      Partial right lung removal     Prior to Admission medications    Medication Sig Start Date End Date Taking? Authorizing Provider   albuterol (PROVENTIL VENTOLIN) 2.5 mg /3 mL (0.083 %) nebu 2.5 mg by Nebulization route every six (6) hours as needed for Wheezing or Shortness of Breath. Provider, Historical   fluticasone-umeclidinium-vilanterol (TRELEGY ELLIPTA) 100-62.5-25 mcg inhaler Take 1 Puff by inhalation daily. Provider, Historical   albuterol (PROVENTIL HFA, VENTOLIN HFA, PROAIR HFA) 90 mcg/actuation inhaler Take 1-2 Puffs by inhalation every four (4) hours as needed for Wheezing. Provider, Historical   oxyCODONE IR (ROXICODONE) 20 mg immediate release tablet Take 40 mg by mouth every eight (8) hours as needed for Pain (severe pain). Provider, Historical   aspirin delayed-release 81 mg tablet Take 1 Tab by mouth daily. 2/23/18   Julieta Hester NP     No Known Allergies   No family history on file. Social History:  reports that he quit smoking about 13 years ago. He has a 45.00 pack-year smoking history. He has never used smokeless tobacco. He reports that he does not drink alcohol and does not use drugs. Family and social history were personally reviewed, all pertinent and relevant details are outlined as above.     Objective:   Visit Vitals  /88   Pulse (!) 114   Temp 97.3 °F (36.3 °C)   Resp (!) 37   Ht 5' 11\" (1.803 m)   Wt 63.5 kg (140 lb)   SpO2 96%   BMI 19.53 kg/m²      O2 Device: BIPAP    PHYSICAL EXAM:   General: 72 yo M, Awake, sitting up on a stretcher, in marked resp distress; HEENT:  EOMI, no scleroicterus  Neck: Supple, no JVD; Chest: patient on BIPAP, diminished breath sounds throughout, use of accessory muscles, tachypneic;  CVS: RRR, S1 S2 heard, no murmurs/rubs/gallops, tachycardic;   Abd: Soft, non-tender, non-distended, +bowel sounds   Ext: No clubbing, no cyanosis, no edema  Neuro/Psych: CN 2-12 grossly intact, Strength 5/5 in all extremities, no focal neuro deficits;  Skin: Warm, dry, without rashes or lesions    Data Review: All diagnostic labs and studies have been reviewed. Abnormal Labs Reviewed   CBC WITH AUTOMATED DIFF - Abnormal; Notable for the following components:       Result Value    WBC 22.9 (*)     HGB 17.2 (*)     HCT 53.8 (*)     BAND NEUTROPHILS 13 (*)     LYMPHOCYTES 4 (*)     METAMYELOCYTES 4 (*)     ABS. NEUTROPHILS 19.0 (*)     ABS.  MONOCYTES 1.8 (*)     All other components within normal limits   METABOLIC PANEL, COMPREHENSIVE - Abnormal; Notable for the following components:    Glucose 152 (*)     BUN 35 (*)     Creatinine 2.04 (*)     eGFR 34 (*)     Bilirubin, total 1.6 (*)     AST (SGOT) 11 (*)     Protein, total 8.4 (*)     Albumin 3.0 (*)     Globulin 5.4 (*)     A-G Ratio 0.6 (*)     All other components within normal limits   LACTIC ACID - Abnormal; Notable for the following components:    Lactic acid 6.6 (*)     All other components within normal limits   POC G3 - PUL - Abnormal; Notable for the following components:    pCO2 (POC) 34.1 (*)     pO2 (POC) 78 (*)     HCO3 (POC) 19.1 (*)     All other components within normal limits   POC LACTIC ACID - Abnormal; Notable for the following components:    Lactic Acid (POC) 4.40 (*)     All other components within normal limits       All Micro Results       Procedure Component Value Units Date/Time    CULTURE, RESPIRATORY/SPUTUM/BRONCH Cathie Aden [[id number]]     Order Status: Sent Specimen: Sputum     RESPIRATORY VIRUS PANEL W/COVID-19, PCR [[id number]]     Order Status: Sent Specimen: NASOPHARYNGEAL SWAB     CULTURE, BLOOD, PAIRED [[id number]] Collected: 10/26/22 0635    Order Status: Sent Specimen: Blood Updated: 10/26/22 0830    COVID-19 RAPID TEST [[id number]] Collected: 10/26/22 0630    Order Status: Completed Specimen: Nasopharyngeal Updated: 10/26/22 0715     Specimen source Nasopharyngeal        COVID-19 rapid test Not detected        Comment: Rapid Abbott ID Now       Rapid NAAT:  The specimen is NEGATIVE for SARS-CoV-2, the novel coronavirus associated with COVID-19. Negative results should be treated as presumptive and, if inconsistent with clinical signs and symptoms or necessary for patient management, should be tested with an alternative molecular assay. Negative results do not preclude SARS-CoV-2 infection and should not be used as the sole basis for patient management decisions. This test has been authorized by the FDA under an Emergency Use Authorization (EUA) for use by authorized laboratories. Fact sheet for Healthcare Providers:  http://www.della.benjamin/  Fact sheet for Patients: http://www.della.benjamin/       Methodology: Isothermal Nucleic Acid Amplification                 IMAGING:   XR CHEST PORT   Final Result      Emphysema.    Left lower lobe and right upper lobe infiltrates   Stable bilateral small pleural effusions         XR CHEST PORT    (Results Pending)        ECG/ECHO:    Results for orders placed or performed during the hospital encounter of 01/06/20   EKG, 12 LEAD, INITIAL   Result Value Ref Range    Ventricular Rate 105 BPM    Atrial Rate 105 BPM    P-R Interval 140 ms    QRS Duration 76 ms    Q-T Interval 306 ms    QTC Calculation (Bezet) 404 ms    Calculated P Axis 85 degrees    Calculated R Axis 82 degrees    Calculated T Axis 84 degrees    Diagnosis       Sinus tachycardia  Biatrial enlargement  When compared with ECG of 09-JAN-2019 12:10,  premature supraventricular complexes are no longer present  Nonspecific T wave abnormality no longer evident in Inferior leads  Confirmed by Laura Lucas M.D., Yanet Jerez ([id number]) on 1/6/2020 5:00:26 PM          Assessment:   Given the patient's current clinical presentation, there is a high level of concern for decompensation if discharged from the emergency department. Complex decision making was performed, which includes reviewing the patient's available past medical records, laboratory results, and imaging studies. Active Problems:    COPD (chronic obstructive pulmonary disease) ([facility] [address] (10/26/2022)      Pneumonia (10/26/2022)      Malignant neoplasm of lung, unspecified laterality, unspecified part of lung ([facility] [address] (10/26/2022)      Sepsis ([facility] (10/26/2022)    Acute on chronic respiratory failure with hypoxia;     PENNY; Erythrocytosis; Severe sepsis;     Bilateral/ Multilobar pneumonia; Hx of Right lung lobectomy; Chronic pain syndrome;      Plan:     Continue BIPAP;   - patient is at high risk of further deterioration and intubation;  - Full code;  IV Abx:  continue Rocephin and Zithromax;   DuoNebs;  IV steroids;   Pulmonary consult;   ICU consult;   Trend Lactate; Resp viral panel;  Sputum Gram stain and culture;   IVFs;   DVT prophylaxis;         DIET: NPO for now, ADULT DIET Regular when able    CODE STATUS: Full Code   DVT PROPHYLAXIS: Lovenox    EARLY MOBILITY ASSESSMENT: Recommend an assessment from physical therapy and/or occupational therapy  ANTICIPATED DISCHARGE: Greater than 48 hours. ANTICIPATED DISPOSITION: Home with Home Healthcare  EMERGENCY CONTACT/SURROGATE DECISION MAKER: Kassandra Tinsley , life partner;    CRITICAL CARE WAS PERFORMED FOR THIS ENCOUNTER: YES. I had a face to face encounter with the patient, reviewed and interpreted patient data including clinical events, labs, images, vital signs, I/O's, and examined patient.   I have discussed the case and the plan and management of the patient's care with the consulting services, the bedside nurses and necessary ancillary providers. This patient has a high probability of imminent, clinically significant deterioration, which requires the highest level of preparedness to intervene urgently. I participated in the decision-making and personally managed or directed the management of the following life and organ supporting interventions that required my frequent assessment to treat or prevent imminent deterioration. I personally spent 45 minutes of critical care time. This is time spent at this critically ill patient's bedside actively involved in patient care as well as the coordination of care and discussions with the patient's family. This does not include any procedural time which has been billed separately. Signed By: Kelly Temple MD     October 26, 2022         Please note that this dictation may have been completed with Dragon, the computer voice recognition software. Quite often unanticipated grammatical, syntax, homophones, and other interpretive errors are inadvertently transcribed by the computer software. Please disregard these errors. Please excuse any errors that have escaped final proofreading.

## 2022-10-26 NOTE — ED PROVIDER NOTES
69-year-old male with history of lung cancer, COPD presents with complaints of shortness of breath. Per EMS patient with oxygen saturation 79% on their arrival which improved with CPAP in the field and DuoNeb. Patient reports he started to feel short of breath 2 days ago. Denies fever. Vaccinated for Swati. Baseline 3 L nasal cannula oxygen. Denies nausea, vomiting, diarrhea, constipation. History limited by respiratory distress. Former smoker, denies alcohol and drug use  Pmd-romina       Past Medical History:   Diagnosis Date    Asthma     Cancer ([facility] [address]     Lung Cancer    Chronic obstructive pulmonary disease ([facility] [address]     Chronic pain     High cholesterol     [facility]        Past Surgical History:   Procedure Laterality Date    HX LOBECTOMY      Right lung    HX OTHER SURGICAL      Partial right lung removal         No family history on file. Social History     Socioeconomic History    Marital status: LIFE PARTNER     Spouse name: Not on file    Number of children: Not on file    Years of education: Not on file    Highest education level: Not on file   Occupational History    Not on file   Tobacco Use    Smoking status: Former     Packs/day: 1.00     Years: 45.00     Pack years: 45.00     Types: Cigarettes     Quit date: 2009     Years since quittin.8    Smokeless tobacco: Never   Substance and Sexual Activity    Alcohol use: No     Comment: rarely/social     Drug use: No    Sexual activity: Not on file   Other Topics Concern    Not on file   Social History Narrative    ** Merged History Encounter **          Social Determinants of Health     Financial Resource Strain: Not on file   Food Insecurity: Not on file   Transportation Needs: Not on file   Physical Activity: Not on file   Stress: Not on file   Social Connections: Not on file   Intimate Partner Violence: Not on file   Housing Stability: Not on file         ALLERGIES: Patient has no known allergies.     Review of Systems   Unable to perform ROS: Acuity of condition   Constitutional:  Positive for fatigue. Negative for chills and fever. HENT:  Negative for congestion, nosebleeds and sore throat. Eyes:  Negative for pain and discharge. Respiratory:  Positive for cough, chest tightness and shortness of breath. Cardiovascular:  Negative for chest pain and palpitations. Gastrointestinal:  Negative for abdominal pain, constipation, nausea and vomiting. Genitourinary:  Negative for decreased urine volume, dysuria, flank pain and urgency. Musculoskeletal:  Negative for gait problem and myalgias. Skin:  Negative for rash and wound. Neurological:  Negative for seizures and syncope. Hematological:  Does not bruise/bleed easily. Psychiatric/Behavioral:  Negative for confusion, self-injury and suicidal ideas. Vitals:    10/26/22 0547   BP: (!) 156/58   Resp: (!) 37   Temp: 97.3 °F (36.3 °C)   SpO2: 92%            Physical Exam  Vitals and nursing note reviewed. Constitutional:       Appearance: He is well-developed. HENT:      Head: Normocephalic and atraumatic. Eyes:      Pupils: Pupils are equal, round, and reactive to light. Cardiovascular:      Rate and Rhythm: Normal rate and regular rhythm. Heart sounds: Normal heart sounds. Pulmonary:      Effort: Tachypnea, accessory muscle usage and respiratory distress present. Breath sounds: Rhonchi and rales present. No wheezing. Abdominal:      General: Bowel sounds are normal.      Palpations: Abdomen is soft. Tenderness: There is no abdominal tenderness. There is no guarding or rebound. Musculoskeletal:         General: Normal range of motion. Cervical back: Normal range of motion and neck supple. Skin:     General: Skin is warm and dry. Neurological:      Mental Status: He is alert and oriented to person, place, and time.         MDM  Number of Diagnoses or Management Options  Chronic obstructive pulmonary disease, unspecified COPD type ([facility] [address]  Malignant neoplasm of lung, unspecified laterality, unspecified part of lung ([address]  Pneumonia of both lungs due to infectious organism, unspecified part of lung  Diagnosis management comments: 23-year-old male with history of lung cancer, COPD presents with complaints of 2 days worsening shortness of breath. Patient hypoxic with oxygen saturation in 70s on his baseline 3 L oxygen nasal cannula. Patient ports somewhat improved after DuoNeb and starting CPAP in the field. Patient with increased work of breathing, tachypneic, tachycardic, accessory muscle use, unable to speak in complete sentences. Plan-labs, ABG, oxygen therapy, CBC/CMP, chest x-ray, lactate, blood cultures, COVID test, calcitonin. Chest x-ray shows bibasilar infiltrates  Lactate 4.4       Amount and/or Complexity of Data Reviewed  Clinical lab tests: ordered and reviewed  Tests in the radiology section of CPT®: ordered and reviewed      ED Course as of 10/26/22 0604   Wed Oct 26, 2022   0600 ED EKG interpretation:  Rhythm: sinus tachycardia; and regular . Rate (approx.): 125; Axis: normal; P wave: normal; QRS interval: normal ; ST/T wave: normal; Other findings: no ischemia, low voltage. This EKG was interpreted by Gerard Garibay MD,ED Provider.   [LA]      ED Course User Index  [LA] Cruz Claudio MD       Procedures    Perfect Serve Consult for Admission  6:51 AM    ED Room Number: ER09/09  Patient Name and age:  Khoa Tucker 71 y.o.  male  Working Diagnosis:   1. Pneumonia of both lungs due to infectious organism, unspecified part of lung    2. Malignant neoplasm of lung, unspecified laterality, unspecified part of lung ([facility] [address]    3.  Chronic obstructive pulmonary disease, unspecified COPD type ([address]        COVID-19 Suspicion:  yes  Sepsis present:  yes  Reassessment needed: yes  Code Status:  Full Code  Readmission: no  Isolation Requirements:  yes  Recommended Level of Care:  step down  Department:[facility] Adult ED - [phone]

## 2022-10-26 NOTE — PROGRESS NOTES
10/26/22 1657   Oxygen Therapy   O2 Sat (%) 100 %   Pulse via Oximetry 119 beats per minute   O2 Device Heated; Hi flow nasal cannula   O2 Flow Rate (L/min) 40 l/min  (found on)   O2 Temperature 113 °F (45 °C)   FIO2 (%) 40 %  (weaned)

## 2022-10-26 NOTE — PROGRESS NOTES
Admission Medication Reconciliation:    Information obtained from:  Patient, RxQuery  RxQuery data available¹:  YES    Comments/Recommendations: Updated PTA meds/reviewed patient's allergies. 1)  Spoke to patient at bedside who was a good historian, able to name which medications he takes and the frequency    2)  Medication changes (since last review): Added  - Budesonide 0.5mg neb, 1 vial BID (sometimes TID)  - Arformoterol neb, 1 vial BID (sometimes TID)  - Atorvastatin 40mg    Adjusted  - Oxycodone 20mg --> 2 tab q12h prn     Removed  - Trelegy (uses when he has free samples, but from chart review, appears to have switched to routine nebs due to cost)  - Albuterol inhaler     ¹RxQuery pharmacy benefit data reflects medications filled and processed through the patient's insurance, however   this data does NOT capture whether the medication was picked up or is currently being taken by the patient. Allergies:  Patient has no known allergies. Significant PMH/Disease States:   Past Medical History:   Diagnosis Date    Asthma     Cancer ([facility] [address]     Lung Cancer    Chronic obstructive pulmonary disease ([facility] [address]     Chronic pain     High cholesterol     Lung cancer [facility])      Chief Complaint for this Admission:    Chief Complaint   Patient presents with    Respiratory Distress     Prior to Admission Medications:   Prior to Admission Medications   Prescriptions Last Dose Informant Taking? albuterol (PROVENTIL VENTOLIN) 2.5 mg /3 mL (0.083 %) nebu  Self Yes   Si.5 mg by Nebulization route every six (6) hours as needed for Wheezing or Shortness of Breath. arformoteroL (BROVANA) 15 mcg/2 mL nebu neb solution  Self Yes   Sig: 15 mcg by Nebulization route two (2) times a day. Sometimes uses TID   aspirin delayed-release 81 mg tablet  Self Yes   Sig: Take 1 Tab by mouth daily. atorvastatin (LIPITOR) 40 mg tablet  Self Yes   Sig: Take 40 mg by mouth daily.    budesonide (PULMICORT) 0.5 mg/2 mL nbsp  Self Yes   Sig: 500 mcg by Nebulization route two (2) times a day. Sometimes uses TID   ketoconazole (NIZORAL) 2 % shampoo  Self No   Sig: Apply  to affected area as needed for Itching. oxyCODONE IR (ROXICODONE) 20 mg immediate release tablet  Self Yes   Sig: Take 40 mg by mouth every twelve (12) hours as needed for Pain (severe pain). Facility-Administered Medications: None     Please contact the main inpatient pharmacy with any questions or concerns at [phone] and we will direct you to the clinical pharmacist covering this patient's care while in-house.    Arpit Harding, COMFORTD

## 2022-10-26 NOTE — ED NOTES
Pt asking to take bipap off. I explained to pt that he still needs it and that we will remove it as soon as it is safe to do so.

## 2022-10-26 NOTE — PROGRESS NOTES
Patient's significant other Ander Hopkinss) requested for updates. RN called and left voicemail for her.     Ander Hopkinss: [phone]

## 2022-10-26 NOTE — PROGRESS NOTES
Physical Therapy  Orders received and chart reviewed. Spoke with nurse and patient is not appropriate for evaluation at this time. Resting heart rate 125 and high oxygen flow. Will follow up tomorrow as appropriate.   Satish Solis, PT

## 2022-10-26 NOTE — PROGRESS NOTES
Pharmacist Note - Vancomycin Dosing    Consult provided for this 71 y.o. male for indication of CAP  -hx RLL lobectomy for NSCLC in   Antibiotic regimen(s): Vanc + Zosyn + Zithromax  Patient on vancomycin PTA? NO     Recent Labs     10/26/22  0600   WBC 22.9*   CREA 2.04*   BUN 35*     Frequency of BMP: Tomorrow AM  Height: 180.3 cm  Weight: 63.5 kg  Est CrCl: ~25-30 ml/min  Temp (24hrs), Av.8 °F (36.6 °C), Min:97.3 °F (36.3 °C), Max:98.2 °F (36.8 °C)    Cultures:  10/26 Blood- in process  10/26 MRSA swab- in process    MRSA Swab ordered (if applicable)? YES    The plan below is expected to result in a target range of AUC/JEWELL 400-600    Therapy will be initiated with a loading dose of 1500 mg IV x 1 to be followed by a maintenance dose of to be determined pending review of renal function tomorrow morning.

## 2022-10-26 NOTE — CONSULTS
Pulmonary, Critical Care, and Sleep Medicine    Initial Patient Consult    Name: Kalyn Robison MRN: [id number]   : 1950 [facility]: [facility]    Date: 10/26/2022        IMPRESSION:   Acute hypercapnic resp failure- Moderate COPD ( 3lpm baseline + trelegy + Dr. Otilio Mendoza) + multilobar CAP- PCT > 22. At risk for MDROs and atypicals consider ESBLS. H/o RLL lobectomy at [facility]  for NSCLC  COPD  Sepsis- from PNA      RECOMMENDATIONS:   Pt at high risk for requiring invasive ventilation  Change abx top zosyn/azithro and vanco  IV steroids  nebs  Place on NIPPV / rate 8- repeat ABG in 1 hour and reassess WOB. If not improvement - move to ICU  Pt is critically ill CCT EOP 30 min     Subjective:     Patient is a 71 y.o. male with COPD ( FEV1 in office  53% folows with Dr. Otilio Mendoza) who is s/p RLL lobectomy for NSCLC at [facility] . Pt with inc SOB and WOB last 2-3 days and presented to ED with SOB/hypercapnic resp failure. On HFNC currently and gettign ready top go to [facility]. Seen in ED. C/o productive cough. No hemoptysis      Past Medical History:   Diagnosis Date    Asthma     Cancer ([facility] [address]     Lung Cancer    Chronic obstructive pulmonary disease (HCC)     Chronic pain     High cholesterol     Lung cancer ([facility] [address]       Past Surgical History:   Procedure Laterality Date    HX LOBECTOMY      Right lung    HX OTHER SURGICAL      Partial right lung removal      Prior to Admission medications    Medication Sig Start Date End Date Taking? Authorizing Provider   arformoteroL (BROVANA) 15 mcg/2 mL nebu neb solution 15 mcg by Nebulization route two (2) times a day. Sometimes uses TID   Yes Provider, Historical   budesonide (PULMICORT) 0.5 mg/2 mL nbsp 500 mcg by Nebulization route two (2) times a day. Sometimes uses TID   Yes Provider, Historical   atorvastatin (LIPITOR) 40 mg tablet Take 40 mg by mouth daily.    Yes Provider, Historical   albuterol (PROVENTIL VENTOLIN) 2.5 mg /3 mL (0.083 %) nebu 2.5 mg by Nebulization route every six (6) hours as needed for Wheezing or Shortness of Breath. Yes Provider, Historical   oxyCODONE IR (ROXICODONE) 20 mg immediate release tablet Take 40 mg by mouth every twelve (12) hours as needed for Pain (severe pain). Yes Provider, Historical   aspirin delayed-release 81 mg tablet Take 1 Tab by mouth daily. 18  Yes Aaron Johnson NP   ketoconazole (NIZORAL) 2 % shampoo Apply  to affected area as needed for Itching. Provider, Historical     No Known Allergies   Social History     Tobacco Use    Smoking status: Former     Packs/day: 1.00     Years: 45.00     Pack years: 45.00     Types: Cigarettes     Quit date: 2009     Years since quittin.8    Smokeless tobacco: Never   Substance Use Topics    Alcohol use: No     Comment: rarely/social       No family history on file. Current Facility-Administered Medications   Medication Dose Route Frequency    cefTRIAXone (ROCEPHIN) 2 g in 0.9% sodium chloride 20 mL IV syringe  2 g IntraVENous Q24H    azithromycin (ZITHROMAX) 500 mg in 0.9% sodium chloride 250 mL ([id number])  500 mg IntraVENous Q24H    sodium chloride (NS) flush 5-40 mL  5-40 mL IntraVENous Q8H    enoxaparin (LOVENOX) injection 30 mg  30 mg SubCUTAneous DAILY    pantoprazole (PROTONIX) 40 mg in 0.9% sodium chloride 10 mL injection  40 mg IntraVENous Q24H    methylPREDNISolone (PF) (SOLU-MEDROL) injection 40 mg  40 mg IntraVENous Q8H    albuterol (PROVENTIL VENTOLIN) nebulizer solution 2.5 mg  2.5 mg Nebulization Q4H RT       Review of Systems:  Review of systems not obtained due to patient factors.     Objective:   Vital Signs:    Visit Vitals  BP (!) 141/81 (BP 1 Location: Right upper arm, BP Patient Position: At rest;Semi fowlers)   Pulse (!) 125   Temp 98.2 °F (36.8 °C)   Resp (!) 40   Ht 5' 11\" (1.803 m)   Wt 63.5 kg (140 lb)   SpO2 100%   BMI 19.53 kg/m²       O2 Device: Hi flow nasal cannula, Heated   O2 Flow Rate (L/min): 50 l/min   Temp (24hrs), Av.8 °F (36.6 °C), Min:97.3 °F (36.3 °C), Max:98.2 °F (36.8 °C)       Intake/Output:   Last shift:      No intake/output data recorded. Last 3 shifts: No intake/output data recorded. No intake or output data in the 24 hours ending 10/26/22 1320   Physical Exam:   Alert on HFNC  + moderate WOB with mild accessory use and word dyspnea no sternal retractions  No abd paradox  Diffuse ronchi and LLL rales   Heart : tachy/reg no murmur  Trachea midline  Abd soft/NT no organomegaly  EX no Cc/e  Data review:     Recent Results (from the past 24 hour(s))   CBC WITH AUTOMATED DIFF    Collection Time: 10/26/22  6:00 AM   Result Value Ref Range    WBC 22.9 (H) 4.1 - 11.1 K/uL    RBC 5.65 4.10 - 5.70 M/uL    HGB 17.2 (H) 12.1 - 17.0 g/dL    HCT 53.8 (H) 36.6 - 50.3 %    MCV 95.2 80.0 - 99.0 FL    MCH 30.4 26.0 - 34.0 PG    MCHC 32.0 30.0 - 36.5 g/dL    RDW 13.7 11.5 - 14.5 %    PLATELET 497 576 - 088 K/uL    MPV 10.6 8.9 - 12.9 FL    NRBC 0.0 0  WBC    ABSOLUTE NRBC 0.00 0.00 - 0.01 K/uL    NEUTROPHILS 70 32 - 75 %    BAND NEUTROPHILS 13 (H) 0 - 6 %    LYMPHOCYTES 4 (L) 12 - 49 %    MONOCYTES 8 5 - 13 %    EOSINOPHILS 1 0 - 7 %    BASOPHILS 0 0 - 1 %    METAMYELOCYTES 4 (H) 0 %    IMMATURE GRANULOCYTES 0 %    ABS. NEUTROPHILS 19.0 (H) 1.8 - 8.0 K/UL    ABS. LYMPHOCYTES 0.9 0.8 - 3.5 K/UL    ABS. MONOCYTES 1.8 (H) 0.0 - 1.0 K/UL    ABS. EOSINOPHILS 0.2 0.0 - 0.4 K/UL    ABS. BASOPHILS 0.0 0.0 - 0.1 K/UL    ABS. IMM.  GRANS. 0.0 K/UL    DF MANUAL      RBC COMMENTS MACROCYTOSIS  1+       METABOLIC PANEL, COMPREHENSIVE    Collection Time: 10/26/22  6:00 AM   Result Value Ref Range    Sodium 138 136 - 145 mmol/L    Potassium 4.3 3.5 - 5.1 mmol/L    Chloride 104 97 - 108 mmol/L    CO2 21 21 - 32 mmol/L    Anion gap 13 5 - 15 mmol/L    Glucose 152 (H) 65 - 100 mg/dL    BUN 35 (H) 6 - 20 MG/DL    Creatinine 2.04 (H) 0.70 - 1.30 MG/DL    BUN/Creatinine ratio 17 12 - 20      eGFR 34 (L) >60 ml/min/1.73m2    Calcium 10.0 8.5 - 10.1 MG/DL    Bilirubin, total 1.6 (H) 0.2 - 1.0 MG/DL    ALT (SGPT) 14 12 - 78 U/L    AST (SGOT) 11 (L) 15 - 37 U/L    Alk. phosphatase 97 45 - 117 U/L    Protein, total 8.4 (H) 6.4 - 8.2 g/dL    Albumin 3.0 (L) 3.5 - 5.0 g/dL    Globulin 5.4 (H) 2.0 - 4.0 g/dL    A-G Ratio 0.6 (L) 1.1 - 2.2     SAMPLES BEING HELD    Collection Time: 10/26/22  6:00 AM   Result Value Ref Range    SAMPLES BEING HELD 1red 1blue     COMMENT        Add-on orders for these samples will be processed based on acceptable specimen integrity and analyte stability, which may vary by analyte.    POC G3 - PUL    Collection Time: 10/26/22  6:18 AM   Result Value Ref Range    FIO2 (POC) 70 %    pH (POC) 7.36 7.35 - 7.45      pCO2 (POC) 34.1 (L) 35.0 - 45.0 MMHG    pO2 (POC) 78 (L) 80 - 100 MMHG    HCO3 (POC) 19.1 (L) 22 - 26 MMOL/L    sO2 (POC) 95.1 92 - 97 %    Base deficit (POC) 5.5 mmol/L    Site LEFT BRACHIAL      Device: NASAL CANNULA      Mode NASAL CPAP/CPAP      PEEP/CPAP (POC) 8 cmH2O    PIP (POC) 14      Pressure support 6 cmH2O    Allens test (POC) NOT APPLICABLE      Specimen type (POC) ARTERIAL     POC LACTIC ACID    Collection Time: 10/26/22  6:19 AM   Result Value Ref Range    Lactic Acid (POC) 4.40 (HH) 0.40 - 2.00 mmol/L   COVID-19 RAPID TEST    Collection Time: 10/26/22  6:30 AM   Result Value Ref Range    Specimen source Nasopharyngeal      COVID-19 rapid test Not detected NOTD     LACTIC ACID    Collection Time: 10/26/22  8:26 AM   Result Value Ref Range    Lactic acid 6.6 (HH) 0.4 - 2.0 MMOL/L   PROCALCITONIN    Collection Time: 10/26/22  8:26 AM   Result Value Ref Range    Procalcitonin 22.70 ng/mL   POC G3 - PUL    Collection Time: 10/26/22 10:00 AM   Result Value Ref Range    pH (POC) 7.27 (L) 7.35 - 7.45      pCO2 (POC) 40.1 35.0 - 45.0 MMHG    pO2 (POC) 87 80 - 100 MMHG    HCO3 (POC) 18.5 (L) 22 - 26 MMOL/L    sO2 (POC) 95.2 92 - 97 %    Base deficit (POC) 7.9 mmol/L    Site LEFT RADIAL      Device: BIPAP MASK      Set Rate 4 bpm    PEEP/CPAP (POC) 8 cmH2O    Pressure support 4 cmH2O    Allens test (POC) Positive      Specimen type (POC) ARTERIAL         Imaging:  I have personally reviewed the patients radiographs and have reviewed the reports:  PCXR with LLL and right mid lung new ASD        Darshan Alberto MD

## 2022-10-26 NOTE — PROGRESS NOTES
Pharmacy Note     Zosyn 3.375 gm IV q6h ordered for treatment of pneumonia. Per [facility] Renal / Extended Infusion B Lactam Policy, Zosyn will be changed to 4.5 gm IV once then 3.375 gm IV q8h. Estimated Creatinine Clearance: Estimated Creatinine Clearance: 29.8 mL/min (A) (based on SCr of 2.04 mg/dL (H)). Dialysis Status, PENNY, CKD: PENNY    BMI:  Body mass index is 19.53 kg/m². Recent Labs     10/26/22  0600   WBC 22.9*     Temp (24hrs), Av.8 °F (36.6 °C), Min:97.3 °F (36.3 °C), Max:98.2 °F (36.8 °C)      Rationale for Adjustment:  Renal dosing is because drug is renally eliminated./ Extended infusion dosing strategy aims to enhance microbiology and clinical efficacy. Pharmacy will continue to monitor and adjust dose as necessary. Please call with any questions.     Thank you,  Isauro Keane, PHARMD

## 2022-10-26 NOTE — PROGRESS NOTES
Code sepsis was called on [facility]    Sepsis present due to SIRS at least 2/4 HR >90 and RR>20  Sepsis Source  Pneumonia  Lactic Acid Greater > 2, Repeat Lactic Acid ordered within 4 hrs YES  Severe? Yes lactic > 2 and acute respiratory failure  Shock present? Yes Lactic > 4  Sepsis OrderSet Used?  YES    Sepsis Re-Assessment Documentation:     Date: 10/26/2022   Time: 11:37 AM    The sepsis reassessment was performed at 3 time

## 2022-10-26 NOTE — PROGRESS NOTES
Clinical Pharmacy Note: Stress Ulcer Prophylaxis Protocol (Non-ICU patients)    Please note that stress ulcer prophylaxis is not indicated for patients outside of the ICU. Zeyad Victoria has an order for pantoprazole that has been ordered with an indication of stress ulcer prophylaxis.   No other indication for this medication has been noted in the patients chart and therefore it has been discontinued per the [facility] Stress Ulcer Prophylaxis Protocol for eligible patients. Please call with any questions.

## 2022-10-26 NOTE — PROGRESS NOTES
Occupational Therapy:     Orders received and chart reviewed in prep for OT evaluation and tx. Discussed pt case with RN who states pt is not yet medically appropriate for participation in therapy services d/t tenuous respiratory status (50L HFNC, 70% FiO2, RR 35-40) and tachycardia (120's at rest). Will hold and follow up as able and medically appropriate.      Thank you,   Beatriz Light, OTD, OTR/L

## 2022-10-26 NOTE — ED TRIAGE NOTES
Patient arrives via EMS with complaint of respiratory distress. Patient had O2 sat of 79 when EMS arrived, placed on CPAP in field. Patient had Duo-neb during EMS transport, patient reported some relief. Patient has auditory crackles upon arrival to ED and patient is working to breathe. Patient denies pain upon arrival.  Patient has hx of CHF, COPD, and lung cancer. Patient is typically on 3L O2 at home.   A&Ox4

## 2022-10-27 ENCOUNTER — APPOINTMENT (OUTPATIENT)
Dept: GENERAL RADIOLOGY | Age: 72
DRG: 871 | End: 2022-10-27
Attending: INTERNAL MEDICINE
Payer: MEDICARE

## 2022-10-27 PROBLEM — E44.0 MODERATE PROTEIN-CALORIE MALNUTRITION (HCC): Status: ACTIVE | Noted: 2022-10-27

## 2022-10-27 LAB
ALBUMIN SERPL-MCNC: 2.2 G/DL (ref 3.5–5)
ALBUMIN/GLOB SERPL: 0.5 {RATIO} (ref 1.1–2.2)
ALP SERPL-CCNC: 68 U/L (ref 45–117)
ALT SERPL-CCNC: 15 U/L (ref 12–78)
ANION GAP SERPL CALC-SCNC: 7 MMOL/L (ref 5–15)
AST SERPL-CCNC: 23 U/L (ref 15–37)
ATRIAL RATE: 125 BPM
BACTERIA SPEC CULT: NORMAL
BACTERIA SPEC CULT: NORMAL
BILIRUB SERPL-MCNC: 0.8 MG/DL (ref 0.2–1)
BUN SERPL-MCNC: 37 MG/DL (ref 6–20)
BUN/CREAT SERPL: 38 (ref 12–20)
CALCIUM SERPL-MCNC: 9.1 MG/DL (ref 8.5–10.1)
CALCULATED P AXIS, ECG09: 84 DEGREES
CALCULATED R AXIS, ECG10: 75 DEGREES
CALCULATED T AXIS, ECG11: 76 DEGREES
CHLORIDE SERPL-SCNC: 111 MMOL/L (ref 97–108)
CO2 SERPL-SCNC: 22 MMOL/L (ref 21–32)
CREAT SERPL-MCNC: 0.98 MG/DL (ref 0.7–1.3)
DIAGNOSIS, 93000: NORMAL
ERYTHROCYTE [DISTWIDTH] IN BLOOD BY AUTOMATED COUNT: 13.5 % (ref 11.5–14.5)
GLOBULIN SER CALC-MCNC: 4.4 G/DL (ref 2–4)
GLUCOSE SERPL-MCNC: 133 MG/DL (ref 65–100)
HCT VFR BLD AUTO: 43.4 % (ref 36.6–50.3)
HGB BLD-MCNC: 14.2 G/DL (ref 12.1–17)
LACTATE SERPL-SCNC: 1.9 MMOL/L (ref 0.4–2)
MAGNESIUM SERPL-MCNC: 2.2 MG/DL (ref 1.6–2.4)
MCH RBC QN AUTO: 30.1 PG (ref 26–34)
MCHC RBC AUTO-ENTMCNC: 32.7 G/DL (ref 30–36.5)
MCV RBC AUTO: 91.9 FL (ref 80–99)
NRBC # BLD: 0 K/UL (ref 0–0.01)
NRBC BLD-RTO: 0 PER 100 WBC
P-R INTERVAL, ECG05: 130 MS
PLATELET # BLD AUTO: 274 K/UL (ref 150–400)
PMV BLD AUTO: 10.3 FL (ref 8.9–12.9)
POTASSIUM SERPL-SCNC: 3.8 MMOL/L (ref 3.5–5.1)
PROT SERPL-MCNC: 6.6 G/DL (ref 6.4–8.2)
Q-T INTERVAL, ECG07: 314 MS
QRS DURATION, ECG06: 82 MS
QTC CALCULATION (BEZET), ECG08: 453 MS
RBC # BLD AUTO: 4.72 M/UL (ref 4.1–5.7)
SERVICE CMNT-IMP: NORMAL
SODIUM SERPL-SCNC: 140 MMOL/L (ref 136–145)
VENTRICULAR RATE, ECG03: 125 BPM
WBC # BLD AUTO: 11.6 K/UL (ref 4.1–11.1)

## 2022-10-27 PROCEDURE — 83605 ASSAY OF LACTIC ACID: CPT

## 2022-10-27 PROCEDURE — 74011000250 HC RX REV CODE- 250: Performed by: FAMILY MEDICINE

## 2022-10-27 PROCEDURE — 65660000001 HC RM ICU INTERMED STEPDOWN

## 2022-10-27 PROCEDURE — 36415 COLL VENOUS BLD VENIPUNCTURE: CPT

## 2022-10-27 PROCEDURE — 71045 X-RAY EXAM CHEST 1 VIEW: CPT

## 2022-10-27 PROCEDURE — 74011250636 HC RX REV CODE- 250/636: Performed by: INTERNAL MEDICINE

## 2022-10-27 PROCEDURE — 80053 COMPREHEN METABOLIC PANEL: CPT

## 2022-10-27 PROCEDURE — 77010033678 HC OXYGEN DAILY

## 2022-10-27 PROCEDURE — 74011250637 HC RX REV CODE- 250/637: Performed by: INTERNAL MEDICINE

## 2022-10-27 PROCEDURE — 83735 ASSAY OF MAGNESIUM: CPT

## 2022-10-27 PROCEDURE — 74011000250 HC RX REV CODE- 250: Performed by: INTERNAL MEDICINE

## 2022-10-27 PROCEDURE — 74011250636 HC RX REV CODE- 250/636: Performed by: EMERGENCY MEDICINE

## 2022-10-27 PROCEDURE — 74011000258 HC RX REV CODE- 258: Performed by: INTERNAL MEDICINE

## 2022-10-27 PROCEDURE — 94640 AIRWAY INHALATION TREATMENT: CPT

## 2022-10-27 PROCEDURE — 85027 COMPLETE CBC AUTOMATED: CPT

## 2022-10-27 PROCEDURE — 74011250636 HC RX REV CODE- 250/636: Performed by: HOSPITALIST

## 2022-10-27 PROCEDURE — 93005 ELECTROCARDIOGRAM TRACING: CPT

## 2022-10-27 PROCEDURE — 74011000258 HC RX REV CODE- 258: Performed by: FAMILY MEDICINE

## 2022-10-27 PROCEDURE — 74011000250 HC RX REV CODE- 250

## 2022-10-27 RX ORDER — ENOXAPARIN SODIUM 100 MG/ML
40 INJECTION SUBCUTANEOUS DAILY
Status: DISCONTINUED | OUTPATIENT
Start: 2022-10-28 | End: 2022-10-29

## 2022-10-27 RX ORDER — IPRATROPIUM BROMIDE AND ALBUTEROL SULFATE 2.5; .5 MG/3ML; MG/3ML
3 SOLUTION RESPIRATORY (INHALATION)
Status: DISCONTINUED | OUTPATIENT
Start: 2022-10-27 | End: 2022-11-09 | Stop reason: SDUPTHER

## 2022-10-27 RX ORDER — DILTIAZEM HYDROCHLORIDE 5 MG/ML
INJECTION INTRAVENOUS
Status: COMPLETED
Start: 2022-10-27 | End: 2022-10-27

## 2022-10-27 RX ORDER — DILTIAZEM HYDROCHLORIDE 5 MG/ML
15 INJECTION INTRAVENOUS ONCE
Status: COMPLETED | OUTPATIENT
Start: 2022-10-27 | End: 2022-10-27

## 2022-10-27 RX ORDER — IPRATROPIUM BROMIDE AND ALBUTEROL SULFATE 2.5; .5 MG/3ML; MG/3ML
3 SOLUTION RESPIRATORY (INHALATION)
Status: DISCONTINUED | OUTPATIENT
Start: 2022-10-27 | End: 2022-11-09

## 2022-10-27 RX ORDER — HYDRALAZINE HYDROCHLORIDE 20 MG/ML
20 INJECTION INTRAMUSCULAR; INTRAVENOUS
Status: DISCONTINUED | OUTPATIENT
Start: 2022-10-27 | End: 2022-11-25 | Stop reason: HOSPADM

## 2022-10-27 RX ADMIN — ENOXAPARIN SODIUM 30 MG: 100 INJECTION SUBCUTANEOUS at 08:35

## 2022-10-27 RX ADMIN — DILTIAZEM HYDROCHLORIDE 15 MG: 5 INJECTION INTRAVENOUS at 22:24

## 2022-10-27 RX ADMIN — PIPERACILLIN AND TAZOBACTAM 3.38 G: 3; .375 INJECTION, POWDER, FOR SOLUTION INTRAVENOUS at 21:09

## 2022-10-27 RX ADMIN — METHYLPREDNISOLONE SODIUM SUCCINATE 60 MG: 40 INJECTION, POWDER, FOR SOLUTION INTRAMUSCULAR; INTRAVENOUS at 08:34

## 2022-10-27 RX ADMIN — Medication 15 MG/HR: at 23:53

## 2022-10-27 RX ADMIN — BUDESONIDE 500 MCG: 0.5 INHALANT RESPIRATORY (INHALATION) at 20:42

## 2022-10-27 RX ADMIN — SODIUM CHLORIDE, PRESERVATIVE FREE 10 ML: 5 INJECTION INTRAVENOUS at 21:09

## 2022-10-27 RX ADMIN — SODIUM CHLORIDE, PRESERVATIVE FREE 10 ML: 5 INJECTION INTRAVENOUS at 16:05

## 2022-10-27 RX ADMIN — VANCOMYCIN HYDROCHLORIDE 1250 MG: 1.25 INJECTION, POWDER, LYOPHILIZED, FOR SOLUTION INTRAVENOUS at 16:05

## 2022-10-27 RX ADMIN — ACETAMINOPHEN 650 MG: 325 TABLET ORAL at 00:20

## 2022-10-27 RX ADMIN — ASPIRIN 81 MG: 81 TABLET, COATED ORAL at 08:35

## 2022-10-27 RX ADMIN — METHYLPREDNISOLONE SODIUM SUCCINATE 60 MG: 40 INJECTION, POWDER, FOR SOLUTION INTRAMUSCULAR; INTRAVENOUS at 05:00

## 2022-10-27 RX ADMIN — ATORVASTATIN CALCIUM 40 MG: 40 TABLET, FILM COATED ORAL at 08:35

## 2022-10-27 RX ADMIN — PIPERACILLIN AND TAZOBACTAM 3.38 G: 3; .375 INJECTION, POWDER, FOR SOLUTION INTRAVENOUS at 12:44

## 2022-10-27 RX ADMIN — METHYLPREDNISOLONE SODIUM SUCCINATE 60 MG: 40 INJECTION, POWDER, FOR SOLUTION INTRAMUSCULAR; INTRAVENOUS at 16:05

## 2022-10-27 RX ADMIN — Medication 10 MG/HR: at 23:22

## 2022-10-27 RX ADMIN — PIPERACILLIN AND TAZOBACTAM 3.38 G: 3; .375 INJECTION, POWDER, FOR SOLUTION INTRAVENOUS at 06:32

## 2022-10-27 RX ADMIN — ARFORMOTEROL TARTRATE 15 MCG: 15 SOLUTION RESPIRATORY (INHALATION) at 20:42

## 2022-10-27 RX ADMIN — DILTIAZEM HYDROCHLORIDE 15 MG: 5 INJECTION, SOLUTION INTRAVENOUS at 22:24

## 2022-10-27 RX ADMIN — ARFORMOTEROL TARTRATE 15 MCG: 15 SOLUTION RESPIRATORY (INHALATION) at 09:36

## 2022-10-27 RX ADMIN — AZITHROMYCIN DIHYDRATE 500 MG: 500 INJECTION, POWDER, LYOPHILIZED, FOR SOLUTION INTRAVENOUS at 05:35

## 2022-10-27 RX ADMIN — SODIUM CHLORIDE, PRESERVATIVE FREE 10 ML: 5 INJECTION INTRAVENOUS at 05:33

## 2022-10-27 RX ADMIN — IPRATROPIUM BROMIDE AND ALBUTEROL SULFATE 3 ML: .5; 3 SOLUTION RESPIRATORY (INHALATION) at 20:42

## 2022-10-27 RX ADMIN — BUDESONIDE 500 MCG: 0.5 INHALANT RESPIRATORY (INHALATION) at 09:36

## 2022-10-27 RX ADMIN — IPRATROPIUM BROMIDE AND ALBUTEROL SULFATE 3 ML: .5; 3 SOLUTION RESPIRATORY (INHALATION) at 09:36

## 2022-10-27 RX ADMIN — METHYLPREDNISOLONE SODIUM SUCCINATE 60 MG: 40 INJECTION, POWDER, FOR SOLUTION INTRAMUSCULAR; INTRAVENOUS at 21:09

## 2022-10-27 RX ADMIN — Medication 5 MG/HR: at 22:52

## 2022-10-27 NOTE — PROGRESS NOTES
Chart checked, pt cleared by nursing (HR in low 100s). Attempted the eval. Pt politely declined reporting that he did not feel able to participate, that he was having difficulty with breathing after HOB having been elevated for him to be elevated to eat his breakfast. Note significant work of breathing and pt only able to verbalize a few syllables at a time. Pt was on heated high flow at 40 liters and 45% Fi02 and Sp02 stable at or near 100% with RR in the high 20s and briefly to 31. PT will defer, follow, and see as able and appropriate.  Thank you, Oneil Giraldo, PT

## 2022-10-27 NOTE — PROGRESS NOTES
Problem: Falls - Risk of  Goal: *Absence of Falls  Description: Document [place] Fall Risk and appropriate interventions in the flowsheet.   Outcome: Progressing Towards Goal  Note: Fall Risk Interventions:  Mobility Interventions: Bed/chair exit alarm, Patient to call before getting OOB                             Problem: Patient Education: Go to Patient Education Activity  Goal: Patient/Family Education  Outcome: Progressing Towards Goal     Problem: Pain  Goal: *Control of Pain  Outcome: Progressing Towards Goal     Problem: Patient Education: Go to Patient Education Activity  Goal: Patient/Family Education  Outcome: Progressing Towards Goal

## 2022-10-27 NOTE — PROGRESS NOTES
Pulmonary, Critical Care, and Sleep Medicine    Progress Note    Name: Tk Coffman MRN: [id number]   : 1950 Hospital: [facility]    Date: 10/27/2022        IMPRESSION:   Acute hypercapnic resp failure- Moderate COPD ( 3lpm baseline + trelegy + Dr. Demarco Andres) + multilobar CAP- PCT > 22. At risk for MDROs and atypicals consider ESBLS. H/o RLL lobectomy at [address] [place] [address]  for NSCLC  COPD  Sepsis- from PNA      RECOMMENDATIONS:   Pt at high risk for requiring invasive ventilation  Azithromycin/zosyn/vanc. Narrow soon   IV steroids, start reducing tomorrow   Nebs  Dvt proph   NIV as needed during the day for WOB  On High flow, maintaining saturations   O2 titration above 90%   Pt is critically ill CCT EOP 35 min     Subjective:     60/71  Metabolic acidosis   Down to 30lpm and 50% FiO2  Feeling a little better     Patient is a 71 y.o. male with COPD ( FEV1 in office  53% folows with Dr. Demarco Andres) who is s/p RLL lobectomy for NSCLC at [address] [place] [address] . Pt with inc SOB and WOB last 2-3 days and presented to ED with SOB/hypercapnic resp failure. On HFNC currently and gettign ready top go to [facility]. Seen in ED. C/o productive cough. No hemoptysis      Past Medical History:   Diagnosis Date    Asthma     Cancer ([facility] [address]     Lung Cancer    Chronic obstructive pulmonary disease (HCC)     Chronic pain     High cholesterol     Lung cancer ([facility] [address]       Past Surgical History:   Procedure Laterality Date    HX LOBECTOMY      Right lung    HX OTHER SURGICAL      Partial right lung removal      Prior to Admission medications    Medication Sig Start Date End Date Taking? Authorizing Provider   arformoteroL (BROVANA) 15 mcg/2 mL nebu neb solution 15 mcg by Nebulization route two (2) times a day. Sometimes uses TID   Yes Provider, Historical   budesonide (PULMICORT) 0.5 mg/2 mL nbsp 500 mcg by Nebulization route two (2) times a day.  Sometimes uses TID   Yes Provider, Historical   atorvastatin (LIPITOR) 40 mg tablet Take 40 mg by mouth daily. Yes Provider, Historical   albuterol (PROVENTIL VENTOLIN) 2.5 mg /3 mL (0.083 %) nebu 2.5 mg by Nebulization route every six (6) hours as needed for Wheezing or Shortness of Breath. Yes Provider, Historical   oxyCODONE IR (ROXICODONE) 20 mg immediate release tablet Take 40 mg by mouth every twelve (12) hours as needed for Pain (severe pain). Yes Provider, Historical   aspirin delayed-release 81 mg tablet Take 1 Tab by mouth daily. 18  Yes Wendy Baird NP   ketoconazole (NIZORAL) 2 % shampoo Apply  to affected area as needed for Itching. Provider, Historical     No Known Allergies   Social History     Tobacco Use    Smoking status: Former     Packs/day: 1.00     Years: 45.00     Pack years: 45.00     Types: Cigarettes     Quit date: 2009     Years since quittin.8    Smokeless tobacco: Never   Substance Use Topics    Alcohol use: No     Comment: rarely/social       No family history on file.      Current Facility-Administered Medications   Medication Dose Route Frequency    albuterol-ipratropium (DUO-NEB) 2.5 MG-0.5 MG/3 ML  3 mL Nebulization BID RT    azithromycin (ZITHROMAX) 500 mg in 0.9% sodium chloride 250 mL ([id number])  500 mg IntraVENous Q24H    sodium chloride (NS) flush 5-40 mL  5-40 mL IntraVENous Q8H    enoxaparin (LOVENOX) injection 30 mg  30 mg SubCUTAneous DAILY    methylPREDNISolone (PF) (SOLU-MEDROL) injection 60 mg  60 mg IntraVENous Q6H    piperacillin-tazobactam (ZOSYN) 3.375 g in 0.9% sodium chloride (MBP/ADV) 100 mL MBP  3.375 g IntraVENous Q8H    Vancomycin- Pharmacy Dosing   Other Rx Dosing/Monitoring    arformoteroL (BROVANA) neb solution 15 mcg  15 mcg Nebulization BID RT    aspirin delayed-release tablet 81 mg  81 mg Oral DAILY    atorvastatin (LIPITOR) tablet 40 mg  40 mg Oral DAILY    budesonide (PULMICORT) 500 mcg/2 ml nebulizer suspension  500 mcg Nebulization BID       Review of Systems:  Review of systems not obtained due to patient factors. Objective:   Vital Signs:    Visit Vitals  BP (!) 160/108 (BP 1 Location: Left upper arm, BP Patient Position: At rest)   Pulse (!) 108   Temp 98.9 °F (37.2 °C)   Resp 17   Ht 5' 11\" (1.803 m)   Wt 63.5 kg (140 lb)   SpO2 95%   BMI 19.53 kg/m²       O2 Device: Heated, Hi flow nasal cannula   O2 Flow Rate (L/min): 40 l/min   Temp (24hrs), Av.8 °F (37.1 °C), Min:98.2 °F (36.8 °C), Max:99 °F (37.2 °C)       Intake/Output:   Last shift:      No intake/output data recorded. Last 3 shifts: No intake/output data recorded. No intake or output data in the 24 hours ending 10/27/22 1152   Physical Exam:   Alert on HFNC  + moderate WOB with mild accessory use and word dyspnea no sternal retractions  No abd paradox  Diffuse ronchi and LLL rales   Heart : tachy/reg no murmur  Trachea midline  Abd soft/NT no organomegaly  EX no Cc/e  Data review:     No results found for this or any previous visit (from the past 24 hour(s)).       Imaging:  I have personally reviewed the patients radiographs and have reviewed the reports:  PCXR with LLL and right mid lung new ASD        Mundo Saravia PA-C

## 2022-10-27 NOTE — PROGRESS NOTES
Lovenox Monitoring  Indication: DVT Prophylaxis  Recent Labs     10/27/22  1144 10/26/22  0600   HGB 14.2 17.2*    343   CREA 0.98 2.04*     Current Weight: 63.5 kg  Est. CrCl = 62.1 ml/min  Current Dose: 30 mg subcutaneously every 24 hours. Plan: Change to 40 mg subcutaneously every 24 hours per [facility] P&T Committee Protocol with respect to renal function. Pharmacy will continue to monitor patient daily and will make dosage adjustments based upon changing renal function.

## 2022-10-27 NOTE — PROGRESS NOTES
Occupational Therapy  10/27/2022    Chart reviewed and spoke with nursing in preparation for evaluation. Pt cleared for light activity. Pt received supine in bed on 40L high flow/45% FiO2. Pt declining activity reporting difficulty breathing propped upright in bed in preparation for breakfast. Pt with increased work of breathing during conversation. Will hold evaluation and continue to follow.      Thank you,   Sanjana Pringle, OTR/L

## 2022-10-27 NOTE — PROGRESS NOTES
HOWARD PLAN;  RUR-11%  Disposition-Home vs TBD  Transportation-by significant other  F/U with PCP/Specialist    Medicare pt has received, reviewed, and signed 1 st IM letter informing them of their right to appeal the discharge. Signed copied has been placed on pt bedside chart. Awaiting PT/OT Evals    Home O2 at 3 L via NC from [address]    Reason for Admission:  SOB, COPD, Acute hypercapnic resp failure                     RUR Score:   11%                  Plan for utilizing home health:      TBD    PCP: First and Last name:  Niecy Wasserman MD     Name of Practice:    Are you a current patient: Yes/No: Yes   Approximate date of last visit: 2 months ago   Can you participate in a virtual visit with your PCP: Yes                    Current Advanced Directive/Advance Care Plan: Full Code      Healthcare Decision Maker: Abbe Schwab Partner  Click here to complete Yoics including selection of the Healthcare Decision Maker Relationship (ie \"Primary\")                             Transition of Care Plan:  CM met with patient to discuss discharge planning. Patient alert and oriented x 4, on BIPAP. Patient said he lives with his life partner in their private residence. He was independent without any assistive devices. He has home O2 at 3 L via [place]. He verified his demographic information and did not voice any discharge barriers. Patient is being followed yearly for his hx of lung cancer by an oncologist that he said he could not remember her name. CM will continue to follow patient for appropriate disposition. Dianna Del Rio MSA, RN, CM    Care Management Interventions  PCP Verified by CM: Yes  Palliative Care Criteria Met (RRAT>21 & CHF Dx)?: No  Mode of Transport at Discharge:  Other (see comment)  Transition of Care Consult (CM Consult): Discharge Planning  MyChart Signup: No  Discharge Durable Medical Equipment: No  Health Maintenance Reviewed: Yes  Physical Therapy Consult: Yes  Occupational Therapy Consult: Yes  Speech Therapy Consult: No  Support Systems: Spouse/Significant Other  Confirm Follow Up Transport: Family  Discharge Location  Patient Expects to be Discharged to[de-identified] Unable to determine at this time

## 2022-10-27 NOTE — PROGRESS NOTES
Comprehensive Nutrition Assessment    Type and Reason for Visit: Initial (low BMI)    Nutrition Recommendations/Plan:   Continue regular diet. Added high kcal/ high protein ONS with all meals. Obtain standing scale weight       Malnutrition Assessment:  Malnutrition Status: Moderate malnutrition (10/27/22 1148)    Context:  Chronic illness     Findings of the 6 clinical characteristics of malnutrition:   Energy Intake:  Mild decrease in energy intake (specify) (reports decreased PO/ appetite since past few days PTA)  Weight Loss:  Mild weight loss (specify amount and time period) (wt fluctuates, was 145 lb in August 2022)     Body Fat Loss:  Mild body fat loss (assessed as mild d/t pt's report of relatively stable wt in order to keep dx more moderate), Orbital   Muscle Mass Loss:  Severe muscle mass loss, Clavicles (pectoralis &deltoids), Temples (temporalis)  Fluid Accumulation:  No significant fluid accumulation,     Strength:  Not performed        Nutrition Assessment:    Past Medical History:   Diagnosis Date    Asthma     Cancer ([facility]     Lung Cancer    Chronic obstructive pulmonary disease (HCC)     Chronic pain     High cholesterol     Lung cancer ([facility]        Pt admitted with Sepsis ([facility] [A41.9]  Pneumonia [J18.9]  Malignant neoplasm of lung, unspecified laterality, unspecified part of lung ([facility] [C34.90]  COPD (chronic obstructive pulmonary disease) ([facility] [J44.9]    Pt screened d/t low BMI for age. Visited in room, on high flow. States his appetite/ intake has been poor for past few days, but unsure of any weight loss. When I told him weight listed at 140 lb, he states \"that's about where I stay\" - confirmed with bed weight, but he was 145 lb at MD visit in August per review of chart. Wt fluctuates per hx below and it is unclear over how long this wt loss occurred. He states he drinks Ensure when he can afford them. Likes chocolate. Would appreciate them coming with meals while admitted. Denies chewing/ swallowing difficulty. Pt does have significant clavicle, temporal wasting and orbital fat loss. However, given weight stability, suspect malnutrition is chronic and therefore, more moderate in nature. However, with current illness and decreased PO intake, could meet acute, severe PCM criteria (he is certainly at risk he doesn't currently). Pt has baseline COPD, on 3 L O2 at home. Also with hx of NSCLC s/p RLL lobectomy in 2009. Unclear if active issue. Regardless, COPD itself puts pt at risk for higher EEN. Nutritionally Significant Medications:  Lipitor, Zithromax, solumedrol, zosyn      Estimated Daily Nutrient Needs:  Energy Requirements Based On: Kcal/kg  Weight Used for Energy Requirements: Current (63.5 kg)  Energy (kcal/day): 2200+ (35+ kcal/kg)  Weight Used for Protein Requirements: Current  Protein (g/day): 110 (1.7+ gm/kg or at least 20%)     Fluid (ml/day): 1900 (30 mL/kg)    Nutrition Related Findings:   Edema: No data recorded    Last BM:  ,      Wounds: None      Current Nutrition Therapies:  Diet: regular  Supplements: none at this time  Meal intake: No data found. Supplement intake: No data found. Anthropometric Measures:  Height: 5' 11\" (180.3 cm)  Ideal Body Weight (IBW): 172 lbs (78 kg)     Current Body Wt:  63.5 kg (140 lb), 81.4 % IBW.  Not specified  Current BMI (kg/m2): 19.5  Usual Body Weight: 65.8 kg (145 lb)  % Weight Change (Calculated): -3.4  Weight Adjustment: No adjustment                 BMI Category: Underweight (BMI less than 22) age over 72    Wt Readings from Last 15 Encounters:   10/26/22 63.5 kg (140 lb)   01/12/20 68.2 kg (150 lb 4.8 oz)   01/13/19 66.8 kg (147 lb 4.8 oz)   11/26/18 71.5 kg (157 lb 10.1 oz)   06/05/18 66.7 kg (147 lb)   06/05/18 66.7 kg (147 lb)   04/10/18 62.3 kg (137 lb 4.8 oz)   02/23/18 57.6 kg (126 lb 15.8 oz)   11/28/16 71.2 kg (157 lb)   10/05/15 66.2 kg (146 lb)   09/15/15 66.5 kg (146 lb 11.2 oz)   08/11/15 68 kg (149 lb 14.6 oz)       Nutrition Diagnosis:   Inadequate oral intake related to impaired respiratory function, increased demand for energy/nutrients as evidenced by BMI, Criteria as identified in malnutrition assessment    Nutrition Interventions:   Food and/or Nutrient Delivery: Continue current diet, Start oral nutrition supplement  Nutrition Education/Counseling: No recommendations at this time  Coordination of Nutrition Care: Continue to monitor while inpatient       Goals:     Goals: other (specify)  Specify Other Goals: PO intake >50% of meals with 1-3 ONS daily within 5-7 days    Nutrition Monitoring and Evaluation:   Behavioral-Environmental Outcomes: None identified  Food/Nutrient Intake Outcomes: Food and nutrient intake, Supplement intake  Physical Signs/Symptoms Outcomes: Biochemical data, Meal time behavior, Nutrition focused physical findings, Weight, GI status    Discharge Planning:    Continue current diet, Continue oral nutrition supplement    Recent Labs     10/27/22  1144 10/26/22  0600   * 152*   BUN 37* 35*   CREA 0.98 2.04*    138   K 3.8 4.3   * 104   CO2 22 21   CA 9.1 10.0   MG 2.2  --        Recent Labs     10/27/22  1144 10/26/22  0600   ALT 15 14   AST 23 11*   AP 68 97   TBILI 0.8 1.6*   TP 6.6 8.4*   ALB 2.2* 3.0*   GLOB 4.4* 5.4*       Recent Labs     10/26/22  0826   LAC 6.6*       Recent Labs     10/27/22  1144 10/26/22  0600   WBC 11.6* 22.9*   HGB 14.2 17.2*   HCT 43.4 53.8*    343       Kasandra Malone RD  Available via [address] [place] [address]

## 2022-10-28 ENCOUNTER — APPOINTMENT (OUTPATIENT)
Dept: NON INVASIVE DIAGNOSTICS | Age: 72
DRG: 871 | End: 2022-10-28
Attending: HOSPITALIST
Payer: MEDICARE

## 2022-10-28 LAB
ANION GAP SERPL CALC-SCNC: 8 MMOL/L (ref 5–15)
BUN SERPL-MCNC: 35 MG/DL (ref 6–20)
BUN/CREAT SERPL: 36 (ref 12–20)
CALCIUM SERPL-MCNC: 8.5 MG/DL (ref 8.5–10.1)
CHLORIDE SERPL-SCNC: 110 MMOL/L (ref 97–108)
CO2 SERPL-SCNC: 24 MMOL/L (ref 21–32)
CREAT SERPL-MCNC: 0.96 MG/DL (ref 0.7–1.3)
ECHO AO ROOT DIAM: 3.2 CM
ECHO AO ROOT INDEX: 1.74 CM/M2
ECHO AV AREA PEAK VELOCITY: 2.1 CM2
ECHO AV AREA/BSA PEAK VELOCITY: 1.1 CM2/M2
ECHO AV PEAK GRADIENT: 3 MMHG
ECHO AV PEAK VELOCITY: 0.9 M/S
ECHO AV VELOCITY RATIO: 0.78
ECHO EST RA PRESSURE: 10 MMHG
ECHO LA DIAMETER INDEX: 2.39 CM/M2
ECHO LA DIAMETER: 4.4 CM
ECHO LA TO AORTIC ROOT RATIO: 1.38
ECHO LV FRACTIONAL SHORTENING: 24 % (ref 28–44)
ECHO LV INTERNAL DIMENSION DIASTOLE INDEX: 2.45 CM/M2
ECHO LV INTERNAL DIMENSION DIASTOLIC: 4.5 CM (ref 4.2–5.9)
ECHO LV INTERNAL DIMENSION SYSTOLIC INDEX: 1.85 CM/M2
ECHO LV INTERNAL DIMENSION SYSTOLIC: 3.4 CM
ECHO LV IVSD: 1 CM (ref 0.6–1)
ECHO LV MASS 2D: 132.8 G (ref 88–224)
ECHO LV MASS INDEX 2D: 72.2 G/M2 (ref 49–115)
ECHO LV POSTERIOR WALL DIASTOLIC: 0.8 CM (ref 0.6–1)
ECHO LV RELATIVE WALL THICKNESS RATIO: 0.36
ECHO LVOT AREA: 2.5 CM2
ECHO LVOT DIAM: 1.8 CM
ECHO LVOT PEAK GRADIENT: 2 MMHG
ECHO LVOT PEAK VELOCITY: 0.7 M/S
ECHO MV A VELOCITY: 0.34 M/S
ECHO MV AREA PHT: 6.1 CM2
ECHO MV E DECELERATION TIME (DT): 123.9 MS
ECHO MV E VELOCITY: 0.8 M/S
ECHO MV E VELOCITY: 0.84 M/S
ECHO MV PRESSURE HALF TIME (PHT): 35.9 MS
ECHO RIGHT VENTRICULAR SYSTOLIC PRESSURE (RVSP): 25 MMHG
ECHO RV FREE WALL PEAK S': 8 CM/S
ECHO RV INTERNAL DIMENSION: 4.5 CM
ECHO RV TAPSE: 1.1 CM (ref 1.7–?)
ECHO TV REGURGITANT MAX VELOCITY: 1.95 M/S
ECHO TV REGURGITANT PEAK GRADIENT: 15 MMHG
ERYTHROCYTE [DISTWIDTH] IN BLOOD BY AUTOMATED COUNT: 13.4 % (ref 11.5–14.5)
GLUCOSE SERPL-MCNC: 152 MG/DL (ref 65–100)
HCT VFR BLD AUTO: 41.5 % (ref 36.6–50.3)
HGB BLD-MCNC: 13.6 G/DL (ref 12.1–17)
L PNEUMO1 AG UR QL IA: NEGATIVE
MCH RBC QN AUTO: 29.8 PG (ref 26–34)
MCHC RBC AUTO-ENTMCNC: 32.8 G/DL (ref 30–36.5)
MCV RBC AUTO: 91 FL (ref 80–99)
NRBC # BLD: 0 K/UL (ref 0–0.01)
NRBC BLD-RTO: 0 PER 100 WBC
PLATELET # BLD AUTO: 305 K/UL (ref 150–400)
PMV BLD AUTO: 10.8 FL (ref 8.9–12.9)
POTASSIUM SERPL-SCNC: 4.1 MMOL/L (ref 3.5–5.1)
PROCALCITONIN SERPL-MCNC: 12.92 NG/ML
RBC # BLD AUTO: 4.56 M/UL (ref 4.1–5.7)
SODIUM SERPL-SCNC: 142 MMOL/L (ref 136–145)
SPECIMEN SOURCE: NORMAL
WBC # BLD AUTO: 15.3 K/UL (ref 4.1–11.1)

## 2022-10-28 PROCEDURE — 93306 TTE W/DOPPLER COMPLETE: CPT

## 2022-10-28 PROCEDURE — 36415 COLL VENOUS BLD VENIPUNCTURE: CPT

## 2022-10-28 PROCEDURE — 97166 OT EVAL MOD COMPLEX 45 MIN: CPT

## 2022-10-28 PROCEDURE — 74011250636 HC RX REV CODE- 250/636: Performed by: HOSPITALIST

## 2022-10-28 PROCEDURE — 97165 OT EVAL LOW COMPLEX 30 MIN: CPT

## 2022-10-28 PROCEDURE — 65660000001 HC RM ICU INTERMED STEPDOWN

## 2022-10-28 PROCEDURE — 93306 TTE W/DOPPLER COMPLETE: CPT | Performed by: SPECIALIST

## 2022-10-28 PROCEDURE — 97161 PT EVAL LOW COMPLEX 20 MIN: CPT

## 2022-10-28 PROCEDURE — 74011250637 HC RX REV CODE- 250/637: Performed by: INTERNAL MEDICINE

## 2022-10-28 PROCEDURE — 74011250636 HC RX REV CODE- 250/636: Performed by: EMERGENCY MEDICINE

## 2022-10-28 PROCEDURE — 80048 BASIC METABOLIC PNL TOTAL CA: CPT

## 2022-10-28 PROCEDURE — 84145 PROCALCITONIN (PCT): CPT

## 2022-10-28 PROCEDURE — 74011000258 HC RX REV CODE- 258: Performed by: INTERNAL MEDICINE

## 2022-10-28 PROCEDURE — 94640 AIRWAY INHALATION TREATMENT: CPT

## 2022-10-28 PROCEDURE — 74011250636 HC RX REV CODE- 250/636: Performed by: INTERNAL MEDICINE

## 2022-10-28 PROCEDURE — 74011000250 HC RX REV CODE- 250: Performed by: INTERNAL MEDICINE

## 2022-10-28 PROCEDURE — 77010033711 HC HIGH FLOW OXYGEN

## 2022-10-28 PROCEDURE — 74011000250 HC RX REV CODE- 250: Performed by: FAMILY MEDICINE

## 2022-10-28 PROCEDURE — 74011000250 HC RX REV CODE- 250: Performed by: PHYSICIAN ASSISTANT

## 2022-10-28 PROCEDURE — 97530 THERAPEUTIC ACTIVITIES: CPT

## 2022-10-28 PROCEDURE — 85027 COMPLETE CBC AUTOMATED: CPT

## 2022-10-28 PROCEDURE — 97535 SELF CARE MNGMENT TRAINING: CPT

## 2022-10-28 PROCEDURE — 74011000258 HC RX REV CODE- 258: Performed by: FAMILY MEDICINE

## 2022-10-28 RX ORDER — ACETYLCYSTEINE 200 MG/ML
2 SOLUTION ORAL; RESPIRATORY (INHALATION) 3 TIMES DAILY
Status: COMPLETED | OUTPATIENT
Start: 2022-10-28 | End: 2022-10-29

## 2022-10-28 RX ORDER — AZITHROMYCIN 250 MG/1
500 TABLET, FILM COATED ORAL DAILY
Status: COMPLETED | OUTPATIENT
Start: 2022-10-29 | End: 2022-10-30

## 2022-10-28 RX ADMIN — METHYLPREDNISOLONE SODIUM SUCCINATE 60 MG: 40 INJECTION, POWDER, FOR SOLUTION INTRAMUSCULAR; INTRAVENOUS at 09:19

## 2022-10-28 RX ADMIN — IPRATROPIUM BROMIDE AND ALBUTEROL SULFATE 3 ML: .5; 3 SOLUTION RESPIRATORY (INHALATION) at 22:07

## 2022-10-28 RX ADMIN — ARFORMOTEROL TARTRATE 15 MCG: 15 SOLUTION RESPIRATORY (INHALATION) at 22:08

## 2022-10-28 RX ADMIN — Medication 5 MG/HR: at 23:16

## 2022-10-28 RX ADMIN — BUDESONIDE 500 MCG: 0.5 INHALANT RESPIRATORY (INHALATION) at 22:08

## 2022-10-28 RX ADMIN — ENOXAPARIN SODIUM 40 MG: 100 INJECTION SUBCUTANEOUS at 09:13

## 2022-10-28 RX ADMIN — PIPERACILLIN AND TAZOBACTAM 3.38 G: 3; .375 INJECTION, POWDER, FOR SOLUTION INTRAVENOUS at 23:15

## 2022-10-28 RX ADMIN — Medication 15 MG/HR: at 06:39

## 2022-10-28 RX ADMIN — ACETYLCYSTEINE 400 MG: 200 INHALANT RESPIRATORY (INHALATION) at 15:21

## 2022-10-28 RX ADMIN — SODIUM CHLORIDE, PRESERVATIVE FREE 10 ML: 5 INJECTION INTRAVENOUS at 23:15

## 2022-10-28 RX ADMIN — PIPERACILLIN AND TAZOBACTAM 3.38 G: 3; .375 INJECTION, POWDER, FOR SOLUTION INTRAVENOUS at 13:24

## 2022-10-28 RX ADMIN — SODIUM CHLORIDE, PRESERVATIVE FREE 10 ML: 5 INJECTION INTRAVENOUS at 16:07

## 2022-10-28 RX ADMIN — IPRATROPIUM BROMIDE AND ALBUTEROL SULFATE 3 ML: .5; 3 SOLUTION RESPIRATORY (INHALATION) at 08:59

## 2022-10-28 RX ADMIN — METHYLPREDNISOLONE SODIUM SUCCINATE 60 MG: 40 INJECTION, POWDER, FOR SOLUTION INTRAMUSCULAR; INTRAVENOUS at 23:15

## 2022-10-28 RX ADMIN — ATORVASTATIN CALCIUM 40 MG: 40 TABLET, FILM COATED ORAL at 09:17

## 2022-10-28 RX ADMIN — PIPERACILLIN AND TAZOBACTAM 3.38 G: 3; .375 INJECTION, POWDER, FOR SOLUTION INTRAVENOUS at 03:34

## 2022-10-28 RX ADMIN — METHYLPREDNISOLONE SODIUM SUCCINATE 60 MG: 40 INJECTION, POWDER, FOR SOLUTION INTRAMUSCULAR; INTRAVENOUS at 16:06

## 2022-10-28 RX ADMIN — Medication 15 MG/HR: at 16:06

## 2022-10-28 RX ADMIN — ACETYLCYSTEINE 400 MG: 200 INHALANT RESPIRATORY (INHALATION) at 22:07

## 2022-10-28 RX ADMIN — BUDESONIDE 500 MCG: 0.5 INHALANT RESPIRATORY (INHALATION) at 08:59

## 2022-10-28 RX ADMIN — ARFORMOTEROL TARTRATE 15 MCG: 15 SOLUTION RESPIRATORY (INHALATION) at 08:59

## 2022-10-28 RX ADMIN — AZITHROMYCIN DIHYDRATE 500 MG: 500 INJECTION, POWDER, LYOPHILIZED, FOR SOLUTION INTRAVENOUS at 06:45

## 2022-10-28 RX ADMIN — ASPIRIN 81 MG: 81 TABLET, COATED ORAL at 09:17

## 2022-10-28 RX ADMIN — METHYLPREDNISOLONE SODIUM SUCCINATE 60 MG: 40 INJECTION, POWDER, FOR SOLUTION INTRAMUSCULAR; INTRAVENOUS at 03:34

## 2022-10-28 NOTE — PROGRESS NOTES
Physician Progress Note      Courtney Davey  CSN #:                  [id number]  :                       1950  ADMIT DATE:       10/26/2022 5:44 AM  [address] [place] DATE:  RESPONDING  PROVIDER #:        Lorelei Plummer MD          QUERY TEXT:    Dear attending,    Per the dietary assessment of 10/27, the patient meets criteria for moderate malnutrition. If possible, please document in progress notes and discharge summary if you are evaluating and /or treating any of the following: The medical record reflects the following:  Risk Factors: COPD on home O2  Clinical Indicators: 10/27-Per dietary- Pt does have significant clavicle, temporal wasting and orbital fat loss. However, given weight stability, suspect malnutrition is chronic and therefore, more moderate in nature. However, with current illness and decreased PO intake, could meet acute, severe PCM criteria (he is certainly at risk he doesn't currently). Pt has baseline COPD, on 3 L O2 at home. Malnutrition Status:   Moderate malnutrition (10/27/22 1148)  Context:  Chronic illness  Findings of the 6 clinical characteristics of malnutrition:  Energy Intake:  Mild decrease in energy intake (specify) (reports decreased PO/ appetite since past few days PTA)  Weight Loss:  Mild weight loss (specify amount and time period) (wt fluctuates, was 145 lb in 2022)  Body Fat Loss:  Mild body fat loss (assessed as mild d/t pt's report of relatively stable wt in order to keep dx more moderate), Orbital  Muscle Mass Loss:  Severe muscle mass loss, Clavicles (pectoralis &deltoids), Temples (temporalis)  Fluid Accumulation:  No significant fluid accumulation,   Strength:  Not performed  Nutrition Diagnosis: Inadequate oral intake related to impaired respiratory function, increased demand for energy/nutrients as evidenced by BMI, Criteria as identified in malnutrition assessment  Treatment: Dietary consult, monitor weight    ASPEN Criteria:  https://aspenjournals. onlinelibrary. jose. com/doi/full/10.1177/0478689848382883    Thank you,  Norma Bueno RN, BSN, [facility], [address] Documentation Improvement  [phone] or via [employer]  Options provided:  -- malnutrition moderate  -- malnutrition, please specify degree. -- Other - I will add my own diagnosis  -- Disagree - Not applicable / Not valid  -- Disagree - Clinically unable to determine / Unknown  -- Refer to Clinical Documentation Reviewer    PROVIDER RESPONSE TEXT:    This patient has moderate malnutrition.     Query created by: Jon Antunez on 10/28/2022 12:22 PM      Electronically signed by:  Kassy Romero MD 10/28/2022 2:56 PM

## 2022-10-28 NOTE — PROGRESS NOTES
Hospitalist Night Cover     Name: Justina Miranda  YOB: 1950      Overnight update:        Justina Miranda is a 71 y.o. male with a PMHx of lung cancer, COPD, chronic hypoxic respiratory failure, on home O2 at 3 liters/min, who presents with chief complaint of shortness of breath/dyspnea on exertion, progressively worsening and was admitted for acute hypercapnic respiratory failure due to COPD pneumonia. Rapid response called at 2212 for rapid heart rate. Hospitalist team arrived at bedside, patient denies chest pain or increased SOB- states he is always SOB due to COPD and lung cancer. Afib w/ RVR:  -Initial EKG showing afib w/ RVR  -Diltiazem 15 mg bolus and gtt started  -Frequent BP checks     Discussed with Dr. Bambi Coleman who saw the patient at bedside. Critical Care Attestation: This patient is unstable and critically ill. Due to a high probability of clinically significant, life threatening deterioration, the patient required my highest level of preparedness to intervene emergently and I personally spent this critical care time directly and personally managing the patient. This critical care time included obtaining a history; examining the patient; pulse oximetry; ordering and review of studies; arranging urgent treatment with development of a management plan; evaluation of patient's response to treatment; frequent reassessment; and, discussions with other providers and/or family. This critical care time was performed to assess and manage the high probability of imminent, life-threatening deterioration that could result in multi-organ failure and death. It was exclusive of separately billable procedures, treating other patients, and teaching time. Time Spent:   I personally spent 35 minutes in providing critical care time.     Marixa Vazquez [facility]-NP

## 2022-10-28 NOTE — PROGRESS NOTES
Pulmonary, Critical Care, and Sleep Medicine    Progress Note    Name: Mikki Andres MRN: [id number]   : 1950 Hospital: [facility]    Date: 10/28/2022        IMPRESSION:   Acute hypercapnic resp failure- Moderate COPD ( 3lpm baseline + trelegy + Dr. Merlin Frees) + multilobar CAP- PCT > 22. At risk for MDROs and atypicals consider ESBLS. H/o RLL lobectomy at [facility]  for NSCLC  COPD  Sepsis- from PNA      RECOMMENDATIONS:   Pt at high risk for requiring invasive ventilation  Azithromycin/zosyn/vanc. Narrow vanc as MRSA culture negative   IV steroids  Nebs  Dvt proph   NIV as needed during the day for WOB  On High flow, maintaining saturations   O2 titration above 90%   Add mucomyst  Prn over the weekend       Subjective:     10/28  Inability to tolerate below 25lpm and 85% FiO2 today  Sounds congested   Feels about the same as yesterday   Elevation of WBC might be from steroids       Metabolic acidosis   Down to 30lpm and 50% FiO2  Feeling a little better     Patient is a 71 y.o. male with COPD ( FEV1 in office  53% folows with Dr. Merlin Frees) who is s/p RLL lobectomy for NSCLC at [facility] . Pt with inc SOB and WOB last 2-3 days and presented to ED with SOB/hypercapnic resp failure. On HFNC currently and gettign ready top go to [facility]. Seen in ED. C/o productive cough. No hemoptysis      Past Medical History:   Diagnosis Date    Asthma     Cancer ([address]     Lung Cancer    Chronic obstructive pulmonary disease (HCC)     Chronic pain     High cholesterol     Lung cancer ([facility]       Past Surgical History:   Procedure Laterality Date    HX LOBECTOMY      Right lung    HX OTHER SURGICAL      Partial right lung removal      Prior to Admission medications    Medication Sig Start Date End Date Taking? Authorizing Provider   arformoteroL (BROVANA) 15 mcg/2 mL nebu neb solution 15 mcg by Nebulization route two (2) times a day.  Sometimes uses TID   Yes Provider, Historical   budesonide (PULMICORT) 0.5 mg/2 mL nbsp 500 mcg by Nebulization route two (2) times a day. Sometimes uses TID   Yes Provider, Historical   atorvastatin (LIPITOR) 40 mg tablet Take 40 mg by mouth daily. Yes Provider, Historical   albuterol (PROVENTIL VENTOLIN) 2.5 mg /3 mL (0.083 %) nebu 2.5 mg by Nebulization route every six (6) hours as needed for Wheezing or Shortness of Breath. Yes Provider, Historical   oxyCODONE IR (ROXICODONE) 20 mg immediate release tablet Take 40 mg by mouth every twelve (12) hours as needed for Pain (severe pain). Yes Provider, Historical   aspirin delayed-release 81 mg tablet Take 1 Tab by mouth daily. 18  Yes Jerome Adhikari, JEROD   ketoconazole (NIZORAL) 2 % shampoo Apply  to affected area as needed for Itching. Provider, Historical     No Known Allergies   Social History     Tobacco Use    Smoking status: Former     Packs/day: 1.00     Years: 45.00     Pack years: 45.00     Types: Cigarettes     Quit date: 2009     Years since quittin.8    Smokeless tobacco: Never   Substance Use Topics    Alcohol use: No     Comment: rarely/social       No family history on file.      Current Facility-Administered Medications   Medication Dose Route Frequency    acetylcysteine (MUCOMYST) 200 mg/mL (20 %) solution 400 mg  2 mL Nebulization TID    albuterol-ipratropium (DUO-NEB) 2.5 MG-0.5 MG/3 ML  3 mL Nebulization BID RT    vancomycin (VANCOCIN) 1,250 mg in 0.9% sodium chloride 250 mL ([id number])  1,250 mg IntraVENous Q24H    enoxaparin (LOVENOX) injection 40 mg  40 mg SubCUTAneous DAILY    dilTIAZem (CARDIZEM) 125 mg in dextrose 5% 125 mL infusion  0-15 mg/hr IntraVENous TITRATE    azithromycin (ZITHROMAX) 500 mg in 0.9% sodium chloride 250 mL ([id number])  500 mg IntraVENous Q24H    sodium chloride (NS) flush 5-40 mL  5-40 mL IntraVENous Q8H    methylPREDNISolone (PF) (SOLU-MEDROL) injection 60 mg  60 mg IntraVENous Q6H    piperacillin-tazobactam (ZOSYN) 3.375 g in 0.9% sodium chloride (MBP/ADV) 100 mL MBP  3.375 g IntraVENous Q8H    Vancomycin- Pharmacy Dosing   Other Rx Dosing/Monitoring    arformoteroL (BROVANA) neb solution 15 mcg  15 mcg Nebulization BID RT    aspirin delayed-release tablet 81 mg  81 mg Oral DAILY    atorvastatin (LIPITOR) tablet 40 mg  40 mg Oral DAILY    budesonide (PULMICORT) 500 mcg/2 ml nebulizer suspension  500 mcg Nebulization BID       Review of Systems:  Review of systems not obtained due to patient factors. Objective:   Vital Signs:    Visit Vitals  /68 (BP 1 Location: Left upper arm, BP Patient Position: At rest)   Pulse (!) 106   Temp 99 °F (37.2 °C)   Resp 29   Ht 5' 11\" (1.803 m)   Wt 66.6 kg (146 lb 13.2 oz)   SpO2 94%   BMI 20.48 kg/m²       O2 Device: Heated, Hi flow nasal cannula   O2 Flow Rate (L/min): 25 l/min   Temp (24hrs), Av.5 °F (36.9 °C), Min:97.6 °F (36.4 °C), Max:99 °F (37.2 °C)       Intake/Output:   Last shift:      No intake/output data recorded.   Last 3 shifts: 10/26 1901 - 10/28 0700  In: 500 [I.V.:500]  Out: 400 [Urine:400]    Intake/Output Summary (Last 24 hours) at 10/28/2022 1115  Last data filed at 10/28/2022 0400  Gross per 24 hour   Intake 500 ml   Output 400 ml   Net 100 ml        Physical Exam:   Alert on HFNC  + moderate WOB with mild accessory use and word dyspnea no sternal retractions  No abd paradox  Diffuse ronchi and LLL rales   Heart : tachy/reg no murmur  Trachea midline  Abd soft/NT no organomegaly  EX no Cc/e  Data review:     Recent Results (from the past 24 hour(s))   METABOLIC PANEL, COMPREHENSIVE    Collection Time: 10/27/22 11:44 AM   Result Value Ref Range    Sodium 140 136 - 145 mmol/L    Potassium 3.8 3.5 - 5.1 mmol/L    Chloride 111 (H) 97 - 108 mmol/L    CO2 22 21 - 32 mmol/L    Anion gap 7 5 - 15 mmol/L    Glucose 133 (H) 65 - 100 mg/dL    BUN 37 (H) 6 - 20 MG/DL    Creatinine 0.98 0.70 - 1.30 MG/DL    BUN/Creatinine ratio 38 (H) 12 - 20      eGFR >60 >60 ml/min/1.73m2    Calcium 9.1 8.5 - 10.1 MG/DL    Bilirubin, total 0.8 0.2 - 1.0 MG/DL    ALT (SGPT) 15 12 - 78 U/L    AST (SGOT) 23 15 - 37 U/L    Alk.  phosphatase 68 45 - 117 U/L    Protein, total 6.6 6.4 - 8.2 g/dL    Albumin 2.2 (L) 3.5 - 5.0 g/dL    Globulin 4.4 (H) 2.0 - 4.0 g/dL    A-G Ratio 0.5 (L) 1.1 - 2.2     MAGNESIUM    Collection Time: 10/27/22 11:44 AM   Result Value Ref Range    Magnesium 2.2 1.6 - 2.4 mg/dL   CBC W/O DIFF    Collection Time: 10/27/22 11:44 AM   Result Value Ref Range    WBC 11.6 (H) 4.1 - 11.1 K/uL    RBC 4.72 4.10 - 5.70 M/uL    HGB 14.2 12.1 - 17.0 g/dL    HCT 43.4 36.6 - 50.3 %    MCV 91.9 80.0 - 99.0 FL    MCH 30.1 26.0 - 34.0 PG    MCHC 32.7 30.0 - 36.5 g/dL    RDW 13.5 11.5 - 14.5 %    PLATELET 139 645 - 032 K/uL    MPV 10.3 8.9 - 12.9 FL    NRBC 0.0 0  WBC    ABSOLUTE NRBC 0.00 0.00 - 0.01 K/uL   LACTIC ACID    Collection Time: 10/27/22  6:52 PM   Result Value Ref Range    Lactic acid 1.9 0.4 - 2.0 MMOL/L   CBC W/O DIFF    Collection Time: 10/28/22  3:48 AM   Result Value Ref Range    WBC 15.3 (H) 4.1 - 11.1 K/uL    RBC 4.56 4.10 - 5.70 M/uL    HGB 13.6 12.1 - 17.0 g/dL    HCT 41.5 36.6 - 50.3 %    MCV 91.0 80.0 - 99.0 FL    MCH 29.8 26.0 - 34.0 PG    MCHC 32.8 30.0 - 36.5 g/dL    RDW 13.4 11.5 - 14.5 %    PLATELET 863 459 - 560 K/uL    MPV 10.8 8.9 - 12.9 FL    NRBC 0.0 0  WBC    ABSOLUTE NRBC 0.00 0.00 - 5.07 K/uL   METABOLIC PANEL, BASIC    Collection Time: 10/28/22  3:48 AM   Result Value Ref Range    Sodium 142 136 - 145 mmol/L    Potassium 4.1 3.5 - 5.1 mmol/L    Chloride 110 (H) 97 - 108 mmol/L    CO2 24 21 - 32 mmol/L    Anion gap 8 5 - 15 mmol/L    Glucose 152 (H) 65 - 100 mg/dL    BUN 35 (H) 6 - 20 MG/DL    Creatinine 0.96 0.70 - 1.30 MG/DL    BUN/Creatinine ratio 36 (H) 12 - 20      eGFR >60 >60 ml/min/1.73m2    Calcium 8.5 8.5 - 10.1 MG/DL   PROCALCITONIN    Collection Time: 10/28/22  3:48 AM   Result Value Ref Range    Procalcitonin 12.92 ng/mL         Imaging:  I have personally reviewed the patients radiographs and have reviewed the reports:  PCXR with LLL and right mid lung new ASD        Mundo Cotto, PAAltonC

## 2022-10-28 NOTE — PROGRESS NOTES
Problem: Self Care Deficits Care Plan (Adult)  Goal: *Acute Goals and Plan of Care (Insert Text)  Description: FUNCTIONAL STATUS PRIOR TO ADMISSION: Patient was modified independent using a extra time for functional mobility, denies any AD. Patient was modified independent for basic and instrumental ADLs. Patient stated he bathes at baseline but also limits his activity due to fatigue on occasion. HOME SUPPORT: The patient lived with his girlfriend but did not require assist per the patient. Occupational Therapy Goals  Initiated 10/28/2022  1. Patient will perform grooming seated EOB with set/up within 7 day(s). 2.  Patient will perform lower body dressing with supervision/set-up within 7 day(s). 3.  Patient will perform toilet transfers with supervision/set-up within 7 day(s). 4.  Patient will perform all aspects of toileting with modified independence within 7 day(s). 5.  Patient will participate in upper extremity therapeutic exercise/activities and breathing exercises with supervision/set-up for 5 minutes within 7 day(s). 6.  Patient will utilize energy conservation techniques during functional activities with verbal and visual cues within 7 day(s). Outcome: Not Met     OCCUPATIONAL THERAPY EVALUATION  Patient: Scheryl Seip (40 y.o. male)  Date: 10/28/2022  Primary Diagnosis: Sepsis ([facility] [A41.9]  Pneumonia [J18.9]  Malignant neoplasm of lung, unspecified laterality, unspecified part of lung ([facility] [C34.90]  COPD (chronic obstructive pulmonary disease) ([facility] [J44.9]       Precautions:   Fall    ASSESSMENT  Based on the objective data described below, the patient presents with decreased stamina, strength and balance following admission to the hospital for above. He is AAOx4, initially disagreeable to therapy but then agreeable with minimal encouragement.  The patient was able to tolerate sitting EOB for a bout 10 to 15 minutes prior to increase in RR and HR, otherwise VSS on 25 l/min of high flow. At baseline the patient stated he was at 3-5 liters depending on activity. The patient tolerated one sit<>stand and progress to head of bed with noted work of breathing and fatigue following. Anticipate he would be unable to tolerate most ADLs, especially when completed together. Recommend IPR v HHOT depending on progress and activity tolerance at DC. Acute care OT will continue to follow. Current Level of Function Impacting Discharge (ADLs/self-care): min to mod A    Functional Outcome Measure: The patient scored Total: 55/100 on the Barthel Index outcome measure which is indicative of being moderate impairment in basic self-care. Other factors to consider for discharge: supportive home life? Higher PLOF     Patient will benefit from skilled therapy intervention to address the above noted impairments. PLAN :  Recommendations and Planned Interventions: self care training, functional mobility training, therapeutic exercise, balance training, therapeutic activities, cognitive retraining, endurance activities, neuromuscular re-education, patient education, home safety training, and family training/education    Frequency/Duration: Patient will be followed by occupational therapy 5 times a week to address goals. Recommendation for discharge: (in order for the patient to meet his/her long term goals)  To be determined: If the patient remains as limited in endurance as today he will need IPR prior to dc home to be back at baseline, v home with family support and HHOT    This discharge recommendation:  Has not yet been discussed the attending provider and/or case management    IF patient discharges home will need the following DME: TBD       SUBJECTIVE:   Patient stated I usually go to rehab after these things happen.     OBJECTIVE DATA SUMMARY:   HISTORY:   Past Medical History:   Diagnosis Date    Asthma     [facility]     Lung Cancer    Chronic obstructive pulmonary disease ([facility]     Chronic pain     High cholesterol     Lung cancer (HCC)      Past Surgical History:   Procedure Laterality Date    HX LOBECTOMY      Right lung    HX OTHER SURGICAL      Partial right lung removal       Expanded or extensive additional review of patient history:     Home Situation  Home Environment: Private residence  # Steps to Enter: 4  Hand Rails : Bilateral  One/Two Story Residence: One story  Living Alone: No  Support Systems: Spouse/Significant Other  Patient Expects to be Discharged to[de-identified] Unable to determine at this time  Current DME Used/Available at Home:  (home 02 and pulse ox)  Tub or Shower Type: Tub    Hand dominance: Right    EXAMINATION OF PERFORMANCE DEFICITS:  Cognitive/Behavioral Status:  Neurologic State: Alert  Orientation Level: Oriented X4  Cognition: Appropriate decision making; Appropriate for age attention/concentration  Perception: Appears intact  Perseveration: No perseveration noted  Safety/Judgement: Decreased awareness of need for assistance    Skin: visible areas intact    Edema: none noted     Hearing:   WFL    Vision/Perceptual:                           Acuity: Within Defined Limits         Range of Motion:    AROM: Within functional limits                         Strength:    Strength: Within functional limits                Coordination:        BUEs WFL       Tone & Sensation:       Sensation: Intact                      Balance:  Sitting: Intact; Without support  Standing: Impaired; With support  Standing - Static: Constant support;Good  Standing - Dynamic : Constant support;Good    Functional Mobility and Transfers for ADLs:  Bed Mobility:  Supine to Sit: Stand-by assistance (for line management)  Sit to Supine: Stand-by assistance (for line management)    Transfers:  Sit to Stand: Contact guard assistance  Stand to Sit: Contact guard assistance    ADL Assessment:  Feeding: Setup    Oral Facial Hygiene/Grooming: Setup (anticipate many rest breaks with multiple tasks)    Lower Body Dressing: Maximum assistance    Toileting: Setup (urinal)     ADL Intervention and task modifications:   Provided general education for energy conservation and importance of OOB and EOB activity with RN while here. Pt verbalized understanding. Sitting EOB worked with pt on breathing as well as tolerance of EOB in prep for seated grooming and feeding tasks     Cognitive Retraining  Safety/Judgement: Decreased awareness of need for assistance       Functional Measure:    Barthel Index:  Bathin  Bladder: 10  Bowels: 10  Groomin  Dressin  Feeding: 10  Mobility: 0  Stairs: 0  Toilet Use: 5  Transfer (Bed to Chair and Back): 10  Total: 55/100      The Barthel ADL Index: Guidelines  1. The index should be used as a record of what a patient does, not as a record of what a patient could do. 2. The main aim is to establish degree of independence from any help, physical or verbal, however minor and for whatever reason. 3. The need for supervision renders the patient not independent. 4. A patient's performance should be established using the best available evidence. Asking the patient, friends/relatives and nurses are the usual sources, but direct observation and common sense are also important. However direct testing is not needed. 5. Usually the patient's performance over the preceding 24-48 hours is important, but occasionally longer periods will be relevant. 6. Middle categories imply that the patient supplies over 50 per cent of the effort. 7. Use of aids to be independent is allowed. Score Interpretation (from [address] [address])    Independent   60-79 Minimally independent   40-59 Partially dependent   20-39 Very dependent   <20 Totally dependent     -Amor Erwin., Barthel, D.W. (1965). Functional evaluation: the Barthel Index. [address] [facility] ([address] (1997). The Barthel activities of daily living index: self-reporting versus actual performance in the old (> or = 75 years). Journal [address] 45(3), [facility], [place]., [facility]., Erlin Aguilar (1999). Measuring the change in disability after inpatient rehabilitation; comparison of the responsiveness of the Barthel Index and Functional [place] Measure. Journal of Neurology, Neurosurgery, and Psychiatry, 66(4), 336-320. LORAINE Petty, MARI Allen, & Coby Bergeron M.A. (2004) Assessment of post-stroke quality of life in cost-effectiveness studies: The usefulness of the Barthel Index and the EuroQoL-5D. Quality of Life Research, 15, 548-46     Occupational Therapy Evaluation Charge Determination   History Examination Decision-Making   MEDIUM Complexity : Expanded review of history including physical, cognitive and psychosocial  history  MEDIUM Complexity : 3-5 performance deficits relating to physical, cognitive , or psychosocial skils that result in activity limitations and / or participation restrictions MEDIUM Complexity : Patient may present with comorbidities that affect occupational performnce. Miniml to moderate modification of tasks or assistance (eg, physical or verbal ) with assesment(s) is necessary to enable patient to complete evaluation       Based on the above components, the patient evaluation is determined to be of the following complexity level: MEDIUM  Pain Rating:  None rated    Activity Tolerance:   Poor, requires frequent rest breaks, and observed SOB with activity    After treatment patient left in no apparent distress:    Supine in bed, Call bell within reach, and Side rails x 3    COMMUNICATION/EDUCATION:   The patients plan of care was discussed with: Physical therapist and Registered nurse. Home safety education was provided and the patient/caregiver indicated understanding., Patient/family have participated as able in goal setting and plan of care. , and Patient/family agree to work toward stated goals and plan of care.     This patients plan of care is appropriate for delegation to [facility].     Thank you for this referral.  Unruly Clark  Time Calculation: 31 mins

## 2022-10-28 NOTE — PROGRESS NOTES
Clinical Pharmacy Note: IV to PO Automatic Conversion  Please note: Nilo Neisha medication- azithromycin has been changed from IV to PO based on the following critiera:    Patient is tolerating oral medications  Patient is tolerating a diet more advanced than clear liquids  Patient is not requiring vasopressors    This IV to PO conversion is based on the [employer] approved automatic conversion policy for eligible patients. Please call with questions.

## 2022-10-28 NOTE — PROGRESS NOTES
Orders received, chart reviewed and patient evaluated by physical therapy. Pending progression with skilled acute physical therapy, recommend:  Therapy 3 hours per day 5-7 days per week vs HHPT pending progress    Recommend with nursing OOB to chair 3x/day as tolerated, if not then EOB, if not then bed to modified chair position. Thank you for completing as able in order to maintain patient strength, endurance and independence. Full evaluation to follow. Delma Smith, PT    Vitals:      10/28/22 1122 10/28/22 1127   BP:   111/68 108/77   BP 1 Location:   Left upper arm Left upper arm   BP Patient Position:   Semi fowlers; At rest Sitting  Comment: EOB   Pulse:   (!) 105 (!) 121   Temp:       Resp:   (!) 34 27   Height:       Weight:       SpO2:on heated high flow at 25 liters and 72% Fi02   95% 97%

## 2022-10-28 NOTE — PROGRESS NOTES
[facility] Adult  Hospitalist Group                                                                                          Hospitalist Progress Note  Donovan Yang MD  Answering service: [phone] OR [phone] from in house phone        Date of Service:  10/28/2022  NAME:  Juju Stacy  :  1950  MRN:  [id number]      Admission Summary:   Juju Stacy is a 71 y.o. male with a PMHx of lung cancer, COPD, chronic hypoxic respiratory failure, on home O2 at 3 liters/min, who presents with Chief Complaint of Shortness of breath/ Dyspnea on exertion, progressively worsening for the past 3 days. Patient denies any fevers or chills. He used increased doses of inhalers, but it did not help. He called 911. Upon EMS arrival, his O2 saturation was 79%.   Patient was placed on a BIPAP upon arrival.      Interval history / Subjective:   Patient seen and examined on high flow nasal cannula 25 L/min  Also status post RRT yesterday for atrial fibrillation with RVR now on Cardizem drip  Discussed with patient and RN at bedside       Assessment & Plan:     Acute hypercapnic respiratory failure due to COPD pneumonia  Acute on chronic hypoxic respiratory failure due to COPD  Sepsis due to pneumonia multilobar pneumonia  --Initially patient was on BiPAP ABG reviewed pulmonary on board  --Patient weaned off to high flow nasal cannula  --We will do nasal cannula as tolerated  --Continue nebs steroid antibiotics follow cultures    A. fib with RVR in the setting of respiratory failure  --Continue Cardizem drip wean off as tolerates  --Add oral Cardizem  --Formal echocardiogram will be requested and evaluate for anticoagulation  --[id number] score--     Malignant neoplasm of the lung status post lobectomy for non-small cell lung cancer  --Follow-up as an outpatient  --History of right lung lobectomy    PENNY POA currently improving with IV fluid    Hyperlipidemia continue statin    Uncontrolled hypertension--currently on as needed hydralazine    Code status: Full code  DVT prophylaxis: Lovenox    Care Plan discussed with: Patient/Family and Nurse  Anticipated Disposition: Home w/Family  Anticipated Discharge: 24 hours to 48 hours    CRITICAL CARE ATTESTATION:  I had a face to face encounter with the patient, reviewed and interpreted patient data including clinical events, labs, images, vital signs, I/O's, and examined patient. I have discussed the case and the plan and management of the patient's care with the consulting services, the bedside nurses and necessary ancillary providers. NOTE OF PERSONAL INVOLVEMENT IN CARE   This patient has a high probability of imminent, clinically significant deterioration, which requires the highest level of preparedness to intervene urgently. I participated in the decision-making and personally managed or directed the management of the following life and organ supporting interventions that required my frequent assessment to treat or prevent imminent deterioration. I personally spent 45 minutes of critical care time. This is time spent at this critically ill patient's bedside actively involved in patient care as well as the coordination of care and discussions with the patient's family. This does not include any procedural time which has been billed separately.       Hospital Problems  Date Reviewed: 1/9/2019            Codes Class Noted POA    Moderate protein-calorie malnutrition ([facility] ICD-10-CM: E44.0  ICD-9-CM: 263.0  10/27/2022 Yes        COPD (chronic obstructive pulmonary disease) ([facility] ICD-10-CM: J44.9  ICD-9-CM: 908  10/26/2022 Unknown        Pneumonia ICD-10-CM: J18.9  ICD-9-CM: 393  10/26/2022 Unknown        Malignant neoplasm of lung, unspecified laterality, unspecified part of lung ([facility] ICD-10-CM: C34.90  ICD-9-CM: 162.9  10/26/2022 Unknown        Sepsis ([facility] ICD-10-CM: A41.9  ICD-9-CM: 038.9, 995.91  10/26/2022 Unknown           Review of Systems:   A comprehensive review of systems was negative. Vital Signs:    Last 24hrs VS reviewed since prior progress note. Most recent are:  Visit Vitals  /77 (BP 1 Location: Left upper arm, BP Patient Position: Sitting)   Pulse (!) 121   Temp 99 °F (37.2 °C)   Resp 27   Ht 5' 11\" (1.803 m)   Wt 66.6 kg (146 lb 13.2 oz)   SpO2 97%   BMI 20.48 kg/m²         Intake/Output Summary (Last 24 hours) at 10/28/2022 1151  Last data filed at 10/28/2022 0400  Gross per 24 hour   Intake 500 ml   Output 400 ml   Net 100 ml          Physical Examination:     I had a face to face encounter with this patient and independently examined them on 10/28/2022 as outlined below:          General : alert x 3, awake, no acute distress, resting in bed, pleasant \  HEENT: PEERL, EOMI, moist mucus membrane, TM clear  Neck: supple, no JVD, no meningeal signs  Chest: Coarse breath sound scattered wheezing  CVS: S1 S2 heard, Capillary refill less than 2 seconds  Abd: soft/ Non tender, non distended, BS physiological,   Ext: no clubbing, no cyanosis, no edema, brisk 2+ DP pulses  Neuro/Psych: pleasant mood and affect, CN 2-12 grossly intact, sensory grossly within normal limit, Strength 5/5 in all extremities, DTR 1+ x 4  Skin: warm     Data Review:    I personally reviewed  Image and labs      Labs:     Recent Labs     10/28/22  0348 10/27/22  1144   WBC 15.3* 11.6*   HGB 13.6 14.2   HCT 41.5 43.4    274       Recent Labs     10/28/22  0348 10/27/22  1144 10/26/22  0600    140 138   K 4.1 3.8 4.3   * 111* 104   CO2 24 22 21   BUN 35* 37* 35*   CREA 0.96 0.98 2.04*   * 133* 152*   CA 8.5 9.1 10.0   MG  --  2.2  --        Recent Labs     10/27/22  1144 10/26/22  0600   ALT 15 14   AP 68 97   TBILI 0.8 1.6*   TP 6.6 8.4*   ALB 2.2* 3.0*   GLOB 4.4* 5.4*       No results for input(s): INR, PTP, APTT, INREXT, INREXT in the last 72 hours. No results for input(s): FE, TIBC, PSAT, FERR in the last 72 hours.    No results found for: FOL, RBCF   No results for input(s): PH, PCO2, PO2 in the last 72 hours. No results for input(s): CPK, CKNDX, TROIQ in the last 72 hours.     No lab exists for component: CPKMB  Lab Results   Component Value Date/Time    Cholesterol, total 106 02/20/2018 04:54 AM    HDL Cholesterol 45 02/20/2018 04:54 AM    LDL, calculated 46 02/20/2018 04:54 AM    Triglyceride 75 02/20/2018 04:54 AM    CHOL/HDL Ratio 2.4 02/20/2018 04:54 AM     Lab Results   Component Value Date/Time    Glucose (POC) 100 09/14/2015 12:14 PM     Lab Results   Component Value Date/Time    Color YELLOW/STRAW 01/09/2019 05:00 PM    Appearance CLEAR 01/09/2019 05:00 PM    Specific gravity 1.014 01/09/2019 05:00 PM    pH (UA) 7.5 01/09/2019 05:00 PM    Protein NEGATIVE 01/09/2019 05:00 PM    Glucose NEGATIVE 01/09/2019 05:00 PM    Ketone TRACE (A) 01/09/2019 05:00 PM    Bilirubin NEGATIVE 01/09/2019 05:00 PM    Urobilinogen 0.2 01/09/2019 05:00 PM    Nitrites NEGATIVE 01/09/2019 05:00 PM    Leukocyte Esterase NEGATIVE 01/09/2019 05:00 PM    Epithelial cells FEW 01/09/2019 05:00 PM    Bacteria NEGATIVE 01/09/2019 05:00 PM    WBC 0-4 01/09/2019 05:00 PM    RBC 0-5 01/09/2019 05:00 PM         Medications Reviewed:     Current Facility-Administered Medications   Medication Dose Route Frequency    acetylcysteine (MUCOMYST) 200 mg/mL (20 %) solution 400 mg  2 mL Nebulization TID    albuterol-ipratropium (DUO-NEB) 2.5 MG-0.5 MG/3 ML  3 mL Nebulization BID RT    hydrALAZINE (APRESOLINE) 20 mg/mL injection 20 mg  20 mg IntraVENous Q6H PRN    enoxaparin (LOVENOX) injection 40 mg  40 mg SubCUTAneous DAILY    dilTIAZem (CARDIZEM) 125 mg in dextrose 5% 125 mL infusion  0-15 mg/hr IntraVENous TITRATE    albuterol-ipratropium (DUO-NEB) 2.5 MG-0.5 MG/3 ML  3 mL Nebulization Q4H PRN    azithromycin (ZITHROMAX) 500 mg in 0.9% sodium chloride 250 mL ([id number])  500 mg IntraVENous Q24H    sodium chloride (NS) flush 5-40 mL  5-40 mL IntraVENous Q8H sodium chloride (NS) flush 5-40 mL  5-40 mL IntraVENous PRN    acetaminophen (TYLENOL) tablet 650 mg  650 mg Oral Q6H PRN    Or    acetaminophen (TYLENOL) suppository 650 mg  650 mg Rectal Q6H PRN    ondansetron (ZOFRAN ODT) tablet 4 mg  4 mg Oral Q8H PRN    Or    ondansetron (ZOFRAN) injection 4 mg  4 mg IntraVENous Q6H PRN    polyethylene glycol (MIRALAX) packet 17 g  17 g Oral DAILY PRN    methylPREDNISolone (PF) (SOLU-MEDROL) injection 60 mg  60 mg IntraVENous Q6H    piperacillin-tazobactam (ZOSYN) 3.375 g in 0.9% sodium chloride (MBP/ADV) 100 mL MBP  3.375 g IntraVENous Q8H    arformoteroL (BROVANA) neb solution 15 mcg  15 mcg Nebulization BID RT    aspirin delayed-release tablet 81 mg  81 mg Oral DAILY    atorvastatin (LIPITOR) tablet 40 mg  40 mg Oral DAILY    budesonide (PULMICORT) 500 mcg/2 ml nebulizer suspension  500 mcg Nebulization BID    oxyCODONE IR (ROXICODONE) tablet 20 mg  20 mg Oral Q6H PRN    oxyCODONE IR (ROXICODONE) tablet 10 mg  10 mg Oral Q6H PRN     ______________________________________________________________________  EXPECTED LENGTH OF STAY: - - -  ACTUAL LENGTH OF STAY:          2      Please note that this dictation was completed with "[employer] the [employer] voice recognition software. Quite often unanticipated grammatical, syntax, homophones, and other interpretive errors are inadvertently transcribed by the computer software. Please disregard these errors. Please excuse any errors that have escaped final proofreading.                 Tarsha Ramirez MD

## 2022-10-28 NOTE — PROGRESS NOTES
Problem: Mobility Impaired (Adult and Pediatric)  Goal: *Acute Goals and Plan of Care (Insert Text)  Description: FUNCTIONAL STATUS PRIOR TO ADMISSION: Patient was independent and active without use of DME. At baseline patient uses 2-4 liters of supplemental oxygen. He reports using a portable pulse ox. When asked he reported no falls in the last 12 months. HOME SUPPORT PRIOR TO ADMISSION: The patient lived with his significant other but did not require assist.    Physical Therapy Goals  Initiated 10/28/2022  1. Patient will move from supine to sit and sit to supine , scoot up and down, and roll side to side in bed with independence within 7 day(s). 2.  Patient will transfer from bed to chair and chair to bed with independence using the least restrictive device within 7 day(s). 3.  Patient will perform sit to stand with independence within 7 day(s). 4.  Patient will ambulate with independence for 300 feet with the least restrictive device within 7 day(s). 5.  Patient will ascend/descend 4 stairs with one handrail(s) with modified independence within 7 day(s). Outcome: Progressing Towards Goal   PHYSICAL THERAPY EVALUATION  Patient: Alfred Minaya (30 y.o. male)  Date: 10/28/2022  Primary Diagnosis: Sepsis ([facility] [A41.9]  Pneumonia [J18.9]  Malignant neoplasm of lung, unspecified laterality, unspecified part of lung ([facility] [C34.90]  COPD [facility]chronic obstructive [facility] disease) ([facility] [J44.9]       Precautions:   Fall    ASSESSMENT  Based on the objective data described below, the patient presents with impaired activity tolerance limiting mobility. He required short frequent rest breaks (DAHL) but was able to achieve sitting at the EOB and standing and sidestepping up to the [facility]. He was on heated high flow at 25 liters and 72% Fi02, and Sp02 was stable, see chart below. He did get tachycardic with activity into the 120s with recovery when resting.  His standing balance was slightly impaired and he presents not at reported baseline of independent. He was admitted with sepsis and PNA. Anticipate slow steady gains as his respiratory status allows. Recommend rehab at [facility] level with a pulmonary focus. .    Vitals:         10/28/22 1122 10/28/22 1127   BP:     111/68 108/77   BP 1 Location:     Left upper arm Left upper arm   BP Patient Position:     Semi fowlers; At rest Sitting  Comment: EOB   Pulse:     (!) 105 (!) 121   Temp:           Resp:     (!) 34 27   Height:           Weight:           SpO2:on heated high flow at 25 liters and 72% Fi02     95% 97%              Current Level of Function Impacting Discharge (mobility/balance): up to min assist provided    Functional Outcome Measure: The patient scored 0 on the TUG outcome measure which is indicative of increased fall risk. .      Other factors to consider for discharge: medically complex: lung CA (s/p lobectomy), asthma, COPD, and CHF     Patient will benefit from skilled therapy intervention to address the above noted impairments. PLAN :  Recommendations and Planned Interventions: bed mobility training, transfer training, gait training, therapeutic exercises, patient and family training/education, and therapeutic activities      Frequency/Duration: Patient will be followed by physical therapy:  5 times a week to address goals. Recommendation for discharge: (in order for the patient to meet his/her long term goals)  Therapy 3 hours per day 5-7 days per week vs HHPT pending progress    This discharge recommendation:  A follow-up discussion with the attending provider and/or case management is planned    IF patient discharges home will need the following DME: to be determined (TBD), none anticipated at this time. SUBJECTIVE:   Patient stated it's usually about 95%, referring to his pulse ox readings at home.     OBJECTIVE DATA SUMMARY:   Consult received, chart reviewed, pt cleared by nursing  HISTORY:    Past Medical History:   Diagnosis Date    Asthma     Cancer [facility])     Lung Cancer    Chronic obstructive pulmonary disease (HCC)     Chronic pain     High cholesterol     Lung cancer (HCC)      Past Surgical History:   Procedure Laterality Date    HX LOBECTOMY      Right lung    HX OTHER SURGICAL      Partial right lung removal       Personal factors and/or comorbidities impacting plan of care: medically complex: lung CA (s/p lobectomy), asthma, COPD, and CHF    Home Situation  Home Environment: Private residence  # Steps to Enter: 4  Hand Rails : Bilateral  One/Two Story Residence: One story  Living Alone: No  Support Systems: Spouse/Significant Other  Patient Expects to be Discharged to[de-identified] Unable to determine at this time  Current DME Used/Available at Home:  (home 02 and pulse ox)  Tub or Shower Type: Tub    EXAMINATION/PRESENTATION/DECISION MAKING:   Critical Behavior:  Neurologic State: Alert  Orientation Level: Appropriate for age     Safety/Judgement: Decreased awareness of environment  Hearing:     Skin:  refer to MD and nursing notes  Edema: none noted  Range Of Motion:  AROM: Within functional limits                       Strength:    Strength: Within functional limits                    Tone & Sensation:                  Sensation: Intact               Coordination:     Vision:      Functional Mobility:  Bed Mobility:     Supine to Sit: Stand-by assistance (for line management)  Sit to Supine: Stand-by assistance (for line management)     Transfers:  Sit to Stand: Contact guard assistance  Stand to Sit: Contact guard assistance                       Balance:   Sitting: Intact; Without support  Standing: Impaired; With support  Standing - Static: Constant support;Good  Standing - Dynamic : Constant support;Good  Ambulation/Gait Training:  Distance (ft): 2 Feet (ft)  Assistive Device: Gait belt (pt holding onto my elbows for support)  Ambulation - Level of Assistance: Minimal assistance;Assist x1        Gait Abnormalities: Decreased step clearance        Base of Support: Narrowed     Speed/Maite: Slow  Step Length: Left shortened;Right shortened                     Stairs: Therapeutic Exercises:   Pursed lip breathing    Functional Measure:  Timed up and go:    Timed Get Up And Go Test: 0 (pt unable to complete)       < than 10 seconds=Normal  Greater then 13.5 seconds (in elderly)=Increased fall risk   Tracey RYDER, Solomon Amezquita. Predicting the probability for falls in community dwelling older adults using the Timed Up and Go Test. Phys Ther. 2000;80:896-903. Physical Therapy Evaluation Charge Determination   History Examination Presentation Decision-Making   HIGH Complexity :3+ comorbidities / personal factors will impact the outcome/ POC  MEDIUM Complexity : 3 Standardized tests and measures addressing body structure, function, activity limitation and / or participation in recreation  LOW Complexity : Stable, uncomplicated  LOW Complexity : FOTO score of       Based on the above components, the patient evaluation is determined to be of the following complexity level: LOW     Pain Rating:  None rated    Activity Tolerance:   Fair, requires rest breaks, and see assessment    After treatment patient left in no apparent distress:   Call bell within reach, Side rails x 3, and semi fowlers in the bed    COMMUNICATION/EDUCATION:   The patients plan of care was discussed with: Occupational therapist and Registered nurse. Fall prevention education was provided and the patient/caregiver indicated understanding., Patient/family have participated as able in goal setting and plan of care. , and Patient/family agree to work toward stated goals and plan of care.     Thank you for this referral.  Linh Blackwell   Time Calculation: 20 mins

## 2022-10-29 LAB
ANION GAP SERPL CALC-SCNC: 7 MMOL/L (ref 5–15)
ATRIAL RATE: 312 BPM
BUN SERPL-MCNC: 44 MG/DL (ref 6–20)
BUN/CREAT SERPL: 42 (ref 12–20)
CALCIUM SERPL-MCNC: 8.5 MG/DL (ref 8.5–10.1)
CALCULATED R AXIS, ECG10: 46 DEGREES
CALCULATED T AXIS, ECG11: 53 DEGREES
CHLORIDE SERPL-SCNC: 109 MMOL/L (ref 97–108)
CO2 SERPL-SCNC: 28 MMOL/L (ref 21–32)
CREAT SERPL-MCNC: 1.04 MG/DL (ref 0.7–1.3)
DIAGNOSIS, 93000: NORMAL
ERYTHROCYTE [DISTWIDTH] IN BLOOD BY AUTOMATED COUNT: 13.7 % (ref 11.5–14.5)
GLUCOSE SERPL-MCNC: 136 MG/DL (ref 65–100)
HCT VFR BLD AUTO: 42.1 % (ref 36.6–50.3)
HGB BLD-MCNC: 13.5 G/DL (ref 12.1–17)
MCH RBC QN AUTO: 30.1 PG (ref 26–34)
MCHC RBC AUTO-ENTMCNC: 32.1 G/DL (ref 30–36.5)
MCV RBC AUTO: 93.8 FL (ref 80–99)
NRBC # BLD: 0 K/UL (ref 0–0.01)
NRBC BLD-RTO: 0 PER 100 WBC
PLATELET # BLD AUTO: 287 K/UL (ref 150–400)
PMV BLD AUTO: 10.6 FL (ref 8.9–12.9)
POTASSIUM SERPL-SCNC: 4.6 MMOL/L (ref 3.5–5.1)
Q-T INTERVAL, ECG07: 240 MS
QRS DURATION, ECG06: 82 MS
QTC CALCULATION (BEZET), ECG08: 417 MS
RBC # BLD AUTO: 4.49 M/UL (ref 4.1–5.7)
SODIUM SERPL-SCNC: 144 MMOL/L (ref 136–145)
VENTRICULAR RATE, ECG03: 182 BPM
WBC # BLD AUTO: 12.5 K/UL (ref 4.1–11.1)

## 2022-10-29 PROCEDURE — 80048 BASIC METABOLIC PNL TOTAL CA: CPT

## 2022-10-29 PROCEDURE — 74011000250 HC RX REV CODE- 250: Performed by: PHYSICIAN ASSISTANT

## 2022-10-29 PROCEDURE — 36415 COLL VENOUS BLD VENIPUNCTURE: CPT

## 2022-10-29 PROCEDURE — 74011250637 HC RX REV CODE- 250/637: Performed by: INTERNAL MEDICINE

## 2022-10-29 PROCEDURE — 94640 AIRWAY INHALATION TREATMENT: CPT

## 2022-10-29 PROCEDURE — 74011000250 HC RX REV CODE- 250: Performed by: FAMILY MEDICINE

## 2022-10-29 PROCEDURE — 74011250637 HC RX REV CODE- 250/637: Performed by: HOSPITALIST

## 2022-10-29 PROCEDURE — 85027 COMPLETE CBC AUTOMATED: CPT

## 2022-10-29 PROCEDURE — 74011000258 HC RX REV CODE- 258: Performed by: INTERNAL MEDICINE

## 2022-10-29 PROCEDURE — 74011000250 HC RX REV CODE- 250: Performed by: INTERNAL MEDICINE

## 2022-10-29 PROCEDURE — 65660000001 HC RM ICU INTERMED STEPDOWN

## 2022-10-29 PROCEDURE — 74011250636 HC RX REV CODE- 250/636: Performed by: INTERNAL MEDICINE

## 2022-10-29 RX ORDER — METOPROLOL SUCCINATE 25 MG/1
25 TABLET, EXTENDED RELEASE ORAL DAILY
Status: DISCONTINUED | OUTPATIENT
Start: 2022-10-29 | End: 2022-11-25 | Stop reason: HOSPADM

## 2022-10-29 RX ADMIN — METHYLPREDNISOLONE SODIUM SUCCINATE 60 MG: 40 INJECTION, POWDER, FOR SOLUTION INTRAMUSCULAR; INTRAVENOUS at 16:02

## 2022-10-29 RX ADMIN — ACETYLCYSTEINE 400 MG: 200 INHALANT RESPIRATORY (INHALATION) at 09:40

## 2022-10-29 RX ADMIN — IPRATROPIUM BROMIDE AND ALBUTEROL SULFATE 3 ML: .5; 3 SOLUTION RESPIRATORY (INHALATION) at 09:40

## 2022-10-29 RX ADMIN — AZITHROMYCIN MONOHYDRATE 500 MG: 250 TABLET ORAL at 10:01

## 2022-10-29 RX ADMIN — ATORVASTATIN CALCIUM 40 MG: 40 TABLET, FILM COATED ORAL at 10:02

## 2022-10-29 RX ADMIN — METHYLPREDNISOLONE SODIUM SUCCINATE 60 MG: 40 INJECTION, POWDER, FOR SOLUTION INTRAMUSCULAR; INTRAVENOUS at 04:09

## 2022-10-29 RX ADMIN — APIXABAN 5 MG: 5 TABLET, FILM COATED ORAL at 23:15

## 2022-10-29 RX ADMIN — SODIUM CHLORIDE, PRESERVATIVE FREE 10 ML: 5 INJECTION INTRAVENOUS at 06:01

## 2022-10-29 RX ADMIN — AMIODARONE HYDROCHLORIDE 1 MG/MIN: 50 INJECTION, SOLUTION INTRAVENOUS at 14:30

## 2022-10-29 RX ADMIN — METHYLPREDNISOLONE SODIUM SUCCINATE 60 MG: 40 INJECTION, POWDER, FOR SOLUTION INTRAMUSCULAR; INTRAVENOUS at 23:10

## 2022-10-29 RX ADMIN — SODIUM CHLORIDE, PRESERVATIVE FREE 10 ML: 5 INJECTION INTRAVENOUS at 23:11

## 2022-10-29 RX ADMIN — ACETYLCYSTEINE 400 MG: 200 INHALANT RESPIRATORY (INHALATION) at 15:48

## 2022-10-29 RX ADMIN — ARFORMOTEROL TARTRATE 15 MCG: 15 SOLUTION RESPIRATORY (INHALATION) at 21:28

## 2022-10-29 RX ADMIN — AMIODARONE HYDROCHLORIDE 150 MG: 50 INJECTION, SOLUTION INTRAVENOUS at 13:51

## 2022-10-29 RX ADMIN — ASPIRIN 81 MG: 81 TABLET, COATED ORAL at 10:01

## 2022-10-29 RX ADMIN — PIPERACILLIN AND TAZOBACTAM 3.38 G: 3; .375 INJECTION, POWDER, FOR SOLUTION INTRAVENOUS at 06:01

## 2022-10-29 RX ADMIN — METHYLPREDNISOLONE SODIUM SUCCINATE 60 MG: 40 INJECTION, POWDER, FOR SOLUTION INTRAMUSCULAR; INTRAVENOUS at 10:01

## 2022-10-29 RX ADMIN — AMIODARONE HYDROCHLORIDE 0.5 MG/MIN: 50 INJECTION, SOLUTION INTRAVENOUS at 23:10

## 2022-10-29 RX ADMIN — ARFORMOTEROL TARTRATE 15 MCG: 15 SOLUTION RESPIRATORY (INHALATION) at 09:40

## 2022-10-29 RX ADMIN — IPRATROPIUM BROMIDE AND ALBUTEROL SULFATE 3 ML: .5; 3 SOLUTION RESPIRATORY (INHALATION) at 15:48

## 2022-10-29 RX ADMIN — SODIUM CHLORIDE, PRESERVATIVE FREE 10 ML: 5 INJECTION INTRAVENOUS at 14:10

## 2022-10-29 RX ADMIN — BUDESONIDE 500 MCG: 0.5 INHALANT RESPIRATORY (INHALATION) at 21:28

## 2022-10-29 RX ADMIN — ACETYLCYSTEINE 400 MG: 200 INHALANT RESPIRATORY (INHALATION) at 21:28

## 2022-10-29 RX ADMIN — Medication 5 MG/HR: at 03:39

## 2022-10-29 RX ADMIN — PIPERACILLIN AND TAZOBACTAM 3.38 G: 3; .375 INJECTION, POWDER, FOR SOLUTION INTRAVENOUS at 23:11

## 2022-10-29 RX ADMIN — APIXABAN 5 MG: 5 TABLET, FILM COATED ORAL at 10:01

## 2022-10-29 RX ADMIN — METOPROLOL SUCCINATE 25 MG: 25 TABLET, FILM COATED, EXTENDED RELEASE ORAL at 13:49

## 2022-10-29 RX ADMIN — IPRATROPIUM BROMIDE AND ALBUTEROL SULFATE 3 ML: .5; 3 SOLUTION RESPIRATORY (INHALATION) at 21:28

## 2022-10-29 RX ADMIN — PIPERACILLIN AND TAZOBACTAM 3.38 G: 3; .375 INJECTION, POWDER, FOR SOLUTION INTRAVENOUS at 16:01

## 2022-10-29 RX ADMIN — BUDESONIDE 500 MCG: 0.5 INHALANT RESPIRATORY (INHALATION) at 09:40

## 2022-10-29 NOTE — PROGRESS NOTES
[address] [facility] Adult  Hospitalist Group                                                                                          Hospitalist Progress Note  Nicole Fitzpatrick MD  Answering service: [phone] or 36 from in house phone        Date of Service:  10/29/2022  NAME:  Gilles Magaña  :  1950  MRN:  [id number]      Admission Summary:   Gilles Magaña is a 71 y.o. male with a PMHx of lung cancer, COPD, chronic hypoxic respiratory failure, on home O2 at 3 liters/min, who presents with Chief Complaint of Shortness of breath/ Dyspnea on exertion, progressively worsening for the past 3 days. Patient denies any fevers or chills. He used increased doses of inhalers, but it did not help. He called 911. Upon EMS arrival, his O2 saturation was 79%.   Patient was placed on a BIPAP upon arrival.      Interval history / Subjective:   Patient seen and examined earlier still on HFNC 25 L/min  Was on cardizem gtt switched to IV amio by cardio        Assessment & Plan:     Acute hypercapnic respiratory failure due to COPD pneumonia  Acute on chronic hypoxic respiratory failure due to COPD  Sepsis due to pneumonia multilobar pneumonia  --Initially patient was on BiPAP ABG reviewed pulmonary on board  --Patient weaned off to high flow nasal cannula, wean as tolerated   --Continue nebs steroid antibiotics follow cultures      A. 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% unclear etiology  --Continue amio gtt   -TTE EF 25-30%   -on eliquis   -appreciate cardio, ef could just be do to rvr, sepsis with pna     Malignant neoplasm of the lung status post lobectomy for non-small cell lung cancer  --Follow-up as an outpatient  --History of right lung lobectomy    PENNY POA currently improving with IV fluid    Hyperlipidemia continue statin    Uncontrolled hypertension--currently on as needed hydralazine    Code status: dnr discussed with patient  DVT prophylaxis: Lovenox    Care Plan discussed with: Patient/Family and Nurse  Anticipated Disposition: Home w/Family  Anticipated Discharge: 24 hours to 48 hours    Critically ill high risk for decompensation. CCT time 35 minutes     Hospital Problems  Date Reviewed: 1/9/2019            Codes Class Noted POA    Moderate protein-calorie malnutrition ([facility] ICD-10-CM: E44.0  ICD-9-CM: 263.0  10/27/2022 Yes        COPD (chronic obstructive pulmonary disease) ([facility] ICD-10-CM: J44.9  ICD-9-CM: 496  10/26/2022 Unknown        Pneumonia ICD-10-CM: J18.9  ICD-9-CM: 918  10/26/2022 Unknown        Malignant neoplasm of lung, unspecified laterality, unspecified part of lung ([facility] ICD-10-CM: C34.90  ICD-9-CM: 162.9  10/26/2022 Unknown        Sepsis ([facility] ICD-10-CM: A41.9  ICD-9-CM: 038.9, 995.91  10/26/2022 Unknown         Review of Systems:   A comprehensive review of systems was negative. Vital Signs:    Last 24hrs VS reviewed since prior progress note.  Most recent are:  Visit Vitals  /75 (BP 1 Location: Left upper arm, BP Patient Position: Semi fowlers)   Pulse (!) 103   Temp 97.9 °F (36.6 °C)   Resp 25   Ht 5' 11\" (1.803 m)   Wt 66.2 kg (146 lb)   SpO2 96%   BMI 20.36 kg/m²         Intake/Output Summary (Last 24 hours) at 10/29/2022 1151  Last data filed at 10/29/2022 3938  Gross per 24 hour   Intake --   Output 900 ml   Net -900 ml          Physical Examination:     I had a face to face encounter with this patient and independently examined them on 10/29/2022 as outlined below:          General : alert x 3, awake, no acute distress, resting in bed, pleasant   HEENT: PEERL, EOMI, moist mucus membrane, TM clear  Neck: supple, no JVD, no meningeal signs  Chest: Coarse breath sound scattered wheezing  CVS: S1 S2 heard, Capillary refill less than 2 seconds  Abd: soft/ Non tender, non distended, BS physiological,   Ext: no clubbing, no cyanosis, no edema, brisk 2+ DP pulses  Neuro/Psych: pleasant mood and affect, CN 2-12 grossly intact, sensory grossly within normal limit, Strength 5/5 in all extremities, DTR 1+ x 4  Skin: warm     Data Review:    I personally reviewed  Image and labs      Labs:     Recent Labs     10/29/22  0330 10/28/22  0348   WBC 12.5* 15.3*   HGB 13.5 13.6   HCT 42.1 41.5    305       Recent Labs     10/29/22  0330 10/28/22  0348 10/27/22  1144    142 140   K 4.6 4.1 3.8   * 110* 111*   CO2 28 24 22   BUN 44* 35* 37*   CREA 1.04 0.96 0.98   * 152* 133*   CA 8.5 8.5 9.1   MG  --   --  2.2       Recent Labs     10/27/22  1144   ALT 15   AP 68   TBILI 0.8   TP 6.6   ALB 2.2*   GLOB 4.4*       No results for input(s): INR, PTP, APTT, INREXT, INREXT in the last 72 hours. No results for input(s): FE, TIBC, PSAT, FERR in the last 72 hours. No results found for: FOL, RBCF   No results for input(s): PH, PCO2, PO2 in the last 72 hours. No results for input(s): CPK, CKNDX, TROIQ in the last 72 hours.     No lab exists for component: CPKMB  Lab Results   Component Value Date/Time    Cholesterol, total 106 02/20/2018 04:54 AM    HDL Cholesterol 45 02/20/2018 04:54 AM    LDL, calculated 46 02/20/2018 04:54 AM    Triglyceride 75 02/20/2018 04:54 AM    CHOL/HDL Ratio 2.4 02/20/2018 04:54 AM     Lab Results   Component Value Date/Time    Glucose (POC) 100 09/14/2015 12:14 PM     Lab Results   Component Value Date/Time    Color YELLOW/STRAW 01/09/2019 05:00 PM    Appearance CLEAR 01/09/2019 05:00 PM    Specific gravity 1.014 01/09/2019 05:00 PM    pH (UA) 7.5 01/09/2019 05:00 PM    Protein NEGATIVE 01/09/2019 05:00 PM    Glucose NEGATIVE 01/09/2019 05:00 PM    Ketone TRACE (A) 01/09/2019 05:00 PM    Bilirubin NEGATIVE 01/09/2019 05:00 PM    Urobilinogen 0.2 01/09/2019 05:00 PM    Nitrites NEGATIVE 01/09/2019 05:00 PM    Leukocyte Esterase NEGATIVE 01/09/2019 05:00 PM    Epithelial cells FEW 01/09/2019 05:00 PM    Bacteria NEGATIVE 01/09/2019 05:00 PM    WBC 0-4 01/09/2019 05:00 PM    RBC 0-5 01/09/2019 05:00 PM         Medications Reviewed:     Current Facility-Administered Medications   Medication Dose Route Frequency    apixaban (ELIQUIS) tablet 5 mg  5 mg Oral Q12H    amiodarone (CORDARONE) 150 mg in dextrose 5% 100 mL bolus infusion  150 mg IntraVENous ONCE    amiodarone (CORDARONE) 375 mg/250 mL D5W infusion  0.5-1 mg/min IntraVENous CONTINUOUS    metoprolol succinate (TOPROL-XL) XL tablet 25 mg  25 mg Oral DAILY    acetylcysteine (MUCOMYST) 200 mg/mL (20 %) solution 400 mg  2 mL Nebulization TID    azithromycin (ZITHROMAX) tablet 500 mg  500 mg Oral DAILY    albuterol-ipratropium (DUO-NEB) 2.5 MG-0.5 MG/3 ML  3 mL Nebulization BID RT    hydrALAZINE (APRESOLINE) 20 mg/mL injection 20 mg  20 mg IntraVENous Q6H PRN    albuterol-ipratropium (DUO-NEB) 2.5 MG-0.5 MG/3 ML  3 mL Nebulization Q4H PRN    sodium chloride (NS) flush 5-40 mL  5-40 mL IntraVENous Q8H    sodium chloride (NS) flush 5-40 mL  5-40 mL IntraVENous PRN    acetaminophen (TYLENOL) tablet 650 mg  650 mg Oral Q6H PRN    Or    acetaminophen (TYLENOL) suppository 650 mg  650 mg Rectal Q6H PRN    ondansetron (ZOFRAN ODT) tablet 4 mg  4 mg Oral Q8H PRN    Or    ondansetron (ZOFRAN) injection 4 mg  4 mg IntraVENous Q6H PRN    polyethylene glycol (MIRALAX) packet 17 g  17 g Oral DAILY PRN    methylPREDNISolone (PF) (SOLU-MEDROL) injection 60 mg  60 mg IntraVENous Q6H    piperacillin-tazobactam (ZOSYN) 3.375 g in 0.9% sodium chloride (MBP/ADV) 100 mL MBP  3.375 g IntraVENous Q8H    arformoteroL (BROVANA) neb solution 15 mcg  15 mcg Nebulization BID RT    aspirin delayed-release tablet 81 mg  81 mg Oral DAILY    atorvastatin (LIPITOR) tablet 40 mg  40 mg Oral DAILY    budesonide (PULMICORT) 500 mcg/2 ml nebulizer suspension  500 mcg Nebulization BID    oxyCODONE IR (ROXICODONE) tablet 20 mg  20 mg Oral Q6H PRN    oxyCODONE IR (ROXICODONE) tablet 10 mg  10 mg Oral Q6H PRN     ______________________________________________________________________  EXPECTED LENGTH OF STAY: 4d 19h  ACTUAL LENGTH OF STAY:          3      Please note that this dictation was completed with [employer], the computer voice recognition software. Quite often unanticipated grammatical, syntax, homophones, and other interpretive errors are inadvertently transcribed by the computer software. Please disregard these errors. Please excuse any errors that have escaped final proofreading.                 Rajiv Iglesias MD

## 2022-10-29 NOTE — CONSULTS
S Cardiology Consult Note    Date of consult:  10/29/22  Date of admission: 10/26/2022  Primary Cardiologist: Previously Dr Emily Florez  Physician Requesting consult: Dr Ravin Ruth / Reason for consult: afib, low EF    History of the presenting illness:  Chitra Joyner is a 71 y.o.  M with a history of COPD and remote h/o NSCLC s/p RLL lobectomy, presenting to the ER on 10/26/22 with worsening shortness of breath an hypoxia. He denies cough / chest pain / palpitations. He was diagnosed with COPD exacerbation and PNA and has been treated with Abx and breathing treatments along with IV methyprednisolone. He was noted to go into afib with RVR on 10/27/22 which appears to be a new issue for him. He was clearly in NSR on arrival. Tele shows a.fib began around 10 pm on the 27th. He has been started on a diltiazem drip with some improvement in heart rate control. He has a h/o CAD. Homer Will did a LHC on him in 2018 and he was found to have 2 vessel CAD - he underwent stenting of a D1 branch and mid LCx. He tells me he has not followed up with a Cardiologist since Dr Emily Florez retired. Past Medical History:   Diagnosis Date    Asthma     Cancer ([facility]     Lung Cancer    Chronic obstructive pulmonary disease (HCC)     Chronic pain     High cholesterol     Lung cancer ([facility]        Prior to Admission medications    Medication Sig Start Date End Date Taking? Authorizing Provider   arformoteroL (BROVANA) 15 mcg/2 mL nebu neb solution 15 mcg by Nebulization route two (2) times a day. Sometimes uses TID   Yes Provider, Historical   budesonide (PULMICORT) 0.5 mg/2 mL nbsp 500 mcg by Nebulization route two (2) times a day. Sometimes uses TID   Yes Provider, Historical   atorvastatin (LIPITOR) 40 mg tablet Take 40 mg by mouth daily. Yes Provider, Historical   albuterol (PROVENTIL VENTOLIN) 2.5 mg /3 mL (0.083 %) nebu 2.5 mg by Nebulization route every six (6) hours as needed for Wheezing or Shortness of Breath. Yes Provider, Historical   oxyCODONE IR (ROXICODONE) 20 mg immediate release tablet Take 40 mg by mouth every twelve (12) hours as needed for Pain (severe pain). Yes Provider, Historical   aspirin delayed-release 81 mg tablet Take 1 Tab by mouth daily. 18  Yes Nima Foot, NP   ketoconazole (NIZORAL) 2 % shampoo Apply  to affected area as needed for Itching. Provider, Historical       No Known Allergies     No family history on file. No pertinent CV family history    Social History     Socioeconomic History    Marital status: LIFE PARTNER     Spouse name: Not on file    Number of children: Not on file    Years of education: Not on file    Highest education level: Not on file   Occupational History    Not on file   Tobacco Use    Smoking status: Former     Packs/day: 1.00     Years: 45.00     Pack years: 45.00     Types: Cigarettes     Quit date: 2009     Years since quittin.8    Smokeless tobacco: Never   Substance and Sexual Activity    Alcohol use: No     Comment: rarely/social     Drug use: No    Sexual activity: Not on file   Other Topics Concern    Not on file   Social History Narrative    ** Merged History Encounter **          Social Determinants of Health     Financial Resource Strain: Not on file   Food Insecurity: Not on file   Transportation Needs: Not on file   Physical Activity: Not on file   Stress: Not on file   Social Connections: Not on file   Intimate Partner Violence: Not on file   Housing Stability: Not on file       Review of Systems   Unable to perform ROS: Acuity of condition     Visit Vitals  /78 (BP 1 Location: Left upper arm, BP Patient Position: Reclining)   Pulse 93   Temp 97.9 °F (36.6 °C)   Resp (!) 31   Ht 5' 11\" (1.803 m)   Wt 66.2 kg (146 lb)   SpO2 97%   BMI 20.36 kg/m²     Physical Exam  Constitutional:       General: He is not in acute distress. Appearance: He is cachectic. He is ill-appearing. HENT:      Head: Normocephalic and atraumatic. Nose: Nose normal.   Eyes:      General: No scleral icterus. Conjunctiva/sclera: Conjunctivae normal.   Cardiovascular:      Rate and Rhythm: Tachycardia present. Rhythm irregularly irregular. Heart sounds: Normal heart sounds. No murmur heard. No gallop. Pulmonary:      Effort: Pulmonary effort is normal. No respiratory distress. Breath sounds: Normal breath sounds. No stridor. No wheezing. Abdominal:      General: There is no distension. Palpations: Abdomen is soft. Musculoskeletal:         General: No deformity. Normal range of motion. Skin:     General: Skin is warm and dry. Neurological:      General: No focal deficit present. Mental Status: He is alert. Lab review:  BMP:   Lab Results   Component Value Date/Time     10/29/2022 03:30 AM    K 4.6 10/29/2022 03:30 AM     (H) 10/29/2022 03:30 AM    CO2 28 10/29/2022 03:30 AM    AGAP 7 10/29/2022 03:30 AM     (H) 10/29/2022 03:30 AM    BUN 44 (H) 10/29/2022 03:30 AM    CREA 1.04 10/29/2022 03:30 AM        CBC:  Lab Results   Component Value Date/Time    WBC 12.5 (H) 10/29/2022 03:30 AM    HGB 13.5 10/29/2022 03:30 AM    HCT 42.1 10/29/2022 03:30 AM    PLATELET 173 71/10/0834 03:30 AM    MCV 93.8 10/29/2022 03:30 AM           Lab Results   Component Value Date/Time    Cholesterol, total 106 02/20/2018 04:54 AM    HDL Cholesterol 45 02/20/2018 04:54 AM    LDL, calculated 46 02/20/2018 04:54 AM    VLDL, calculated 15 02/20/2018 04:54 AM    Triglyceride 75 02/20/2018 04:54 AM    CHOL/HDL Ratio 2.4 02/20/2018 04:54 AM        Data review:  EKG tracing personally reviewed:   10/26/22: sinus tachycardia  10/27/22: A.fib rate 182 bpm    Telemetry reviewed: onset of a.fib appears to have been 10/27/22 at around 10pm, initiated by a PVC. Sustained since then.     Echocardiogram:  10/26/22    ECHO ADULT COMPLETE 10/28/2022 10/28/2022    Interpretation Summary    Left Ventricle: Severely reduced left ventricular systolic function with a visually estimated EF of 25 - 30%. Not well visualized. Left ventricle size is normal. Normal wall thickness. Unable to assess wall motion. Abnormal diastolic function. Right Ventricle: Not well visualized. Reduced systolic function. TAPSE is abnormal. TAPSE is 1.1 cm. Mitral Valve: Mild regurgitation. Left Atrium: Left atrium is mildly dilated. Right Atrium: Right atrium is mildly dilated. Signed by: Graham Villarreal MD on 10/28/2022  4:48 PM    CXR: 10/26  Left lower lobe and RUL infiltrates  Stable bilateral small pleural effusions      Assessment & Recommendations / Plan:    PAF: new onset evening of 10/27. Likely provoked by COPD exacerbation / PNA  First episode known.    -d/c diltiazem and start IV amiodarone   -adding Eliquis this AM (within 48hrs of onset)  -trying to add metoprolol as well    -could consider cardioversion at some point if remains in a.fib, but would want to see respiratory status improve before we would consider procedure that would need brief anesthesia / sedation    Acute respiratory failure  Imroving    Acute exacerbation of COPD    Pneumonia    Cardiomyopathy: EF 25-30%  Etiology not clear. May be due to his a.fib with RVR, could also be related to sepsis / PNA. H/o lung cancer remotely s/p RLL lobectomy    CAD: h/o prior PCI  Continue ASA, statin      Signed:  Mo SerranoFriedheim  Interventional Cardiology  10/29/22

## 2022-10-29 NOTE — PROGRESS NOTES
Bedside shift change report given to LINUS Hernandez (oncoming nurse) by Brent Turner RN (offgoing nurse). Report included the following information SBAR, Kardex, Intake/Output, MAR, Recent Results, and Cardiac Rhythm AF .

## 2022-10-29 NOTE — ACP (ADVANCE CARE PLANNING)
Advance Care Planning     Advance Care Planning (ACP) Physician/NP/PA Conversation      Date of Conversation: 10/26/2022  Conducted with: Patient with Decision Making Capacity    Healthcare Decision Maker:     Primary Decision Maker: Kimi Fallon - Life Partner - [phone]    Secondary Decision Maker: Paz Boles - Sister - [phone]  Click here to complete [address] including selection of the Healthcare Decision Maker Relationship (ie \"Primary\")    Today we documented Decision Maker(s) consistent with Legal Next of Kin hierarchy. Care Preferences:    Hospitalization: \"If your health worsens and it becomes clear that your chance of recovery is unlikely, what would be your preference regarding hospitalization? \"  The patient is unsure. Ventilation: \"If you were unable to breathe on your own and your chance of recovery was unlikely, what would be your preference about the use of a ventilator (breathing machine) if it was available to you? \"   The patient would NOT desire the use of a ventilator. Resuscitation: \"In the event your heart stopped as a result of an underlying serious health condition, would you want attempts to be made to restart your heart, or would you prefer a natural death? \"   No, do NOT attempt to resuscitate.     Additional topics discussed: treatment goals, benefit/burden of treatment options, ventilation preferences, hospitalization preferences, resuscitation preferences, and end of life care preferences (vegetative state/imminent death)    Conversation Outcomes / Follow-Up Plan:   ACP complete - no further action today  Reviewed DNR/DNI and patient confirms current DNR status - completed forms on file (place new order if needed)     Length of Voluntary ACP Conversation in minutes:  16 minutes    Charity Sandoval MD

## 2022-10-30 LAB
ANION GAP SERPL CALC-SCNC: 5 MMOL/L (ref 5–15)
ARTERIAL PATENCY WRIST A: YES
BASE EXCESS BLDA CALC-SCNC: 4.4 MMOL/L
BDY SITE: ABNORMAL
BUN SERPL-MCNC: 42 MG/DL (ref 6–20)
BUN/CREAT SERPL: 46 (ref 12–20)
CALCIUM SERPL-MCNC: 8.4 MG/DL (ref 8.5–10.1)
CHLORIDE SERPL-SCNC: 108 MMOL/L (ref 97–108)
CO2 SERPL-SCNC: 28 MMOL/L (ref 21–32)
CREAT SERPL-MCNC: 0.91 MG/DL (ref 0.7–1.3)
ERYTHROCYTE [DISTWIDTH] IN BLOOD BY AUTOMATED COUNT: 13.4 % (ref 11.5–14.5)
FIO2 ON VENT: 75 %
GAS FLOW.O2 O2 DELIVERY SYS: 45 L/MIN
GLUCOSE SERPL-MCNC: 148 MG/DL (ref 65–100)
HCO3 BLDA-SCNC: 30 MMOL/L (ref 22–26)
HCT VFR BLD AUTO: 40.9 % (ref 36.6–50.3)
HGB BLD-MCNC: 13.1 G/DL (ref 12.1–17)
MCH RBC QN AUTO: 29.8 PG (ref 26–34)
MCHC RBC AUTO-ENTMCNC: 32 G/DL (ref 30–36.5)
MCV RBC AUTO: 93 FL (ref 80–99)
NRBC # BLD: 0 K/UL (ref 0–0.01)
NRBC BLD-RTO: 0 PER 100 WBC
PCO2 BLDA: 48 MMHG (ref 35–45)
PH BLDA: 7.41 [PH] (ref 7.35–7.45)
PLATELET # BLD AUTO: 286 K/UL (ref 150–400)
PMV BLD AUTO: 10.6 FL (ref 8.9–12.9)
PO2 BLDA: 106 MMHG (ref 80–100)
POTASSIUM SERPL-SCNC: 4.9 MMOL/L (ref 3.5–5.1)
RBC # BLD AUTO: 4.4 M/UL (ref 4.1–5.7)
SAO2 % BLD: 98 % (ref 92–97)
SAO2% DEVICE SAO2% SENSOR NAME: ABNORMAL
SODIUM SERPL-SCNC: 141 MMOL/L (ref 136–145)
SPECIMEN SITE: ABNORMAL
WBC # BLD AUTO: 16.9 K/UL (ref 4.1–11.1)

## 2022-10-30 PROCEDURE — 74011250637 HC RX REV CODE- 250/637: Performed by: INTERNAL MEDICINE

## 2022-10-30 PROCEDURE — 94640 AIRWAY INHALATION TREATMENT: CPT

## 2022-10-30 PROCEDURE — 80048 BASIC METABOLIC PNL TOTAL CA: CPT

## 2022-10-30 PROCEDURE — 36415 COLL VENOUS BLD VENIPUNCTURE: CPT

## 2022-10-30 PROCEDURE — 65660000001 HC RM ICU INTERMED STEPDOWN

## 2022-10-30 PROCEDURE — 74011250636 HC RX REV CODE- 250/636: Performed by: INTERNAL MEDICINE

## 2022-10-30 PROCEDURE — 74011000258 HC RX REV CODE- 258: Performed by: INTERNAL MEDICINE

## 2022-10-30 PROCEDURE — 74011250637 HC RX REV CODE- 250/637: Performed by: HOSPITALIST

## 2022-10-30 PROCEDURE — 74011000250 HC RX REV CODE- 250: Performed by: NURSE PRACTITIONER

## 2022-10-30 PROCEDURE — 74011000250 HC RX REV CODE- 250: Performed by: INTERNAL MEDICINE

## 2022-10-30 PROCEDURE — 36600 WITHDRAWAL OF ARTERIAL BLOOD: CPT

## 2022-10-30 PROCEDURE — 85027 COMPLETE CBC AUTOMATED: CPT

## 2022-10-30 PROCEDURE — 82803 BLOOD GASES ANY COMBINATION: CPT

## 2022-10-30 RX ORDER — AMIODARONE HYDROCHLORIDE 200 MG/1
400 TABLET ORAL 2 TIMES DAILY
Status: DISCONTINUED | OUTPATIENT
Start: 2022-10-30 | End: 2022-11-03

## 2022-10-30 RX ADMIN — AZITHROMYCIN MONOHYDRATE 500 MG: 250 TABLET ORAL at 11:18

## 2022-10-30 RX ADMIN — METOPROLOL SUCCINATE 25 MG: 25 TABLET, FILM COATED, EXTENDED RELEASE ORAL at 11:19

## 2022-10-30 RX ADMIN — IPRATROPIUM BROMIDE AND ALBUTEROL SULFATE 3 ML: .5; 3 SOLUTION RESPIRATORY (INHALATION) at 21:08

## 2022-10-30 RX ADMIN — ARFORMOTEROL TARTRATE 15 MCG: 15 SOLUTION RESPIRATORY (INHALATION) at 08:08

## 2022-10-30 RX ADMIN — PIPERACILLIN AND TAZOBACTAM 3.38 G: 3; .375 INJECTION, POWDER, FOR SOLUTION INTRAVENOUS at 14:01

## 2022-10-30 RX ADMIN — PIPERACILLIN AND TAZOBACTAM 3.38 G: 3; .375 INJECTION, POWDER, FOR SOLUTION INTRAVENOUS at 07:40

## 2022-10-30 RX ADMIN — APIXABAN 5 MG: 5 TABLET, FILM COATED ORAL at 11:18

## 2022-10-30 RX ADMIN — METHYLPREDNISOLONE SODIUM SUCCINATE 60 MG: 40 INJECTION, POWDER, FOR SOLUTION INTRAMUSCULAR; INTRAVENOUS at 15:55

## 2022-10-30 RX ADMIN — IPRATROPIUM BROMIDE AND ALBUTEROL SULFATE 3 ML: .5; 3 SOLUTION RESPIRATORY (INHALATION) at 12:56

## 2022-10-30 RX ADMIN — IPRATROPIUM BROMIDE AND ALBUTEROL SULFATE 3 ML: .5; 3 SOLUTION RESPIRATORY (INHALATION) at 02:26

## 2022-10-30 RX ADMIN — APIXABAN 5 MG: 5 TABLET, FILM COATED ORAL at 21:14

## 2022-10-30 RX ADMIN — ATORVASTATIN CALCIUM 40 MG: 40 TABLET, FILM COATED ORAL at 11:18

## 2022-10-30 RX ADMIN — SACUBITRIL AND VALSARTAN 1 TABLET: 24; 26 TABLET, FILM COATED ORAL at 11:19

## 2022-10-30 RX ADMIN — SACUBITRIL AND VALSARTAN 1 TABLET: 24; 26 TABLET, FILM COATED ORAL at 21:20

## 2022-10-30 RX ADMIN — METHYLPREDNISOLONE SODIUM SUCCINATE 60 MG: 40 INJECTION, POWDER, FOR SOLUTION INTRAMUSCULAR; INTRAVENOUS at 11:17

## 2022-10-30 RX ADMIN — AMIODARONE HYDROCHLORIDE 400 MG: 200 TABLET ORAL at 11:19

## 2022-10-30 RX ADMIN — ASPIRIN 81 MG: 81 TABLET, COATED ORAL at 11:18

## 2022-10-30 RX ADMIN — METHYLPREDNISOLONE SODIUM SUCCINATE 60 MG: 40 INJECTION, POWDER, FOR SOLUTION INTRAMUSCULAR; INTRAVENOUS at 21:14

## 2022-10-30 RX ADMIN — ARFORMOTEROL TARTRATE 15 MCG: 15 SOLUTION RESPIRATORY (INHALATION) at 21:08

## 2022-10-30 RX ADMIN — METHYLPREDNISOLONE SODIUM SUCCINATE 60 MG: 40 INJECTION, POWDER, FOR SOLUTION INTRAMUSCULAR; INTRAVENOUS at 04:14

## 2022-10-30 RX ADMIN — IPRATROPIUM BROMIDE AND ALBUTEROL SULFATE 3 ML: .5; 3 SOLUTION RESPIRATORY (INHALATION) at 08:08

## 2022-10-30 RX ADMIN — BUDESONIDE 500 MCG: 0.5 INHALANT RESPIRATORY (INHALATION) at 21:08

## 2022-10-30 RX ADMIN — SODIUM CHLORIDE, PRESERVATIVE FREE 10 ML: 5 INJECTION INTRAVENOUS at 04:15

## 2022-10-30 RX ADMIN — SODIUM CHLORIDE, PRESERVATIVE FREE 10 ML: 5 INJECTION INTRAVENOUS at 14:01

## 2022-10-30 RX ADMIN — SODIUM CHLORIDE, PRESERVATIVE FREE 10 ML: 5 INJECTION INTRAVENOUS at 21:14

## 2022-10-30 RX ADMIN — PIPERACILLIN AND TAZOBACTAM 3.38 G: 3; .375 INJECTION, POWDER, FOR SOLUTION INTRAVENOUS at 23:11

## 2022-10-30 RX ADMIN — AMIODARONE HYDROCHLORIDE 400 MG: 200 TABLET ORAL at 17:43

## 2022-10-30 RX ADMIN — BUDESONIDE 500 MCG: 0.5 INHALANT RESPIRATORY (INHALATION) at 08:08

## 2022-10-30 NOTE — PROGRESS NOTES
Patient had a fair day, per RN assessment, patient stable for duration of shift, alert and oriented to person, place, time and situation, coughs intermittently, decreased pain level, takes pills without complications, tolerated IV medications, ate all his meals. Remains free from injury throughout shift.

## 2022-10-30 NOTE — PROGRESS NOTES
[facility]ist [facility]                                                                                          Hospitalist Progress Note  Ramon Jewell MD  Answering service: [phone] OR [phone] from in house phone        Date of Service:  10/30/2022  NAME:  Lupe Sol  :  1950  MRN:  [id number]      Admission Summary:   Lupe Sol is a 71 y.o. male with a PMHx of lung cancer, COPD, chronic hypoxic respiratory failure, on home O2 at 3 liters/min, who presents with Chief Complaint of Shortness of breath/ Dyspnea on exertion, progressively worsening for the past 3 days. Patient denies any fevers or chills. He used increased doses of inhalers, but it did not help. He called 911. Upon EMS arrival, his O2 saturation was 79%.   Patient was placed on a BIPAP upon arrival.      Interval history / Subjective:   Patient seen and examined earlier today  Remains on HFNC  Was on cardizem gtt switched to IV amio by cardio with good results  Respiratory status about the same       Assessment & Plan:     Acute hypercapnic respiratory failure due to COPD pneumonia  Acute on chronic hypoxic respiratory failure due to COPD  Sepsis due to pneumonia multilobar pneumonia  --Initially patient was on BiPAP A  --Pulmonary on board  --Continue nebs, steroid, antibiotics   - follow cultures  Monitor respiratory status  CXR in the AM    A. 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% unclear etiology  --Received amio gtt   -TTE EF 25-30% - unclear etiology  -on eliquis   Seen by cardiology    Malignant neoplasm of the lung status post lobectomy for non-small cell lung cancer  --Follow-up as an outpatient  --History of right lung lobectomy    PENNY POA currently improving with IV fluid  Monitor     Hyperlipidemia continue statin    Uncontrolled hypertension--currently on as needed hydralazine    Code status: dnr discussed with patient  DVT prophylaxis: Lovenox    Care Plan discussed with: Patient/Family and Nurse  Anticipated Disposition: Home w/Family  Anticipated Discharge: 24 hours to 48 hours    Critically ill high risk for decompensation. CCT time 35 minutes     Hospital Problems  Date Reviewed: 1/9/2019            Codes Class Noted POA    Moderate protein-calorie malnutrition ([facility] ICD-10-CM: E44.0  ICD-9-CM: 263.0  10/27/2022 Yes        COPD (chronic obstructive pulmonary disease) ([facility] ICD-10-CM: J44.9  ICD-9-CM: 496  10/26/2022 Unknown        Pneumonia ICD-10-CM: J18.9  ICD-9-CM: 868  10/26/2022 Unknown        Malignant neoplasm of lung, unspecified laterality, unspecified part of lung ([facility] ICD-10-CM: C34.90  ICD-9-CM: 162.9  10/26/2022 Unknown        Sepsis ([facility] ICD-10-CM: A41.9  ICD-9-CM: 038.9, 995.91  10/26/2022 Unknown         Review of Systems:   A comprehensive review of systems was negative. Vital Signs:    Last 24hrs VS reviewed since prior progress note.  Most recent are:  Visit Vitals  BP (!) 146/94   Pulse 95   Temp 97.7 °F (36.5 °C)   Resp 24   Ht 5' 11\" (1.803 m)   Wt 65.5 kg (144 lb 6.4 oz)   SpO2 90%   BMI 20.14 kg/m²         Intake/Output Summary (Last 24 hours) at 10/30/2022 1011  Last data filed at 10/29/2022 2320  Gross per 24 hour   Intake --   Output 750 ml   Net -750 ml          Physical Examination:     I had a face to face encounter with this patient and independently examined them on 10/30/2022 as outlined below:          General : alert x 3, awake, no acute distress, resting in bed, pleasant   HEENT: PEERL, EOMI, moist mucus membrane, TM clear  Neck: supple, no JVD, no meningeal signs  Chest: Coarse breath sounds, scattered wheezing  CVS: S1 S2 heard, Capillary refill less than 2 seconds  Abd: soft/ Non tender, non distended  Ext: no clubbing, no cyanosis, no edema, brisk 2+ DP pulses  Neuro/Psych: pleasant mood and affect,  sensory grossly within normal limit, Strength 5/5 in all extremities  Skin: warm Data Review:    I personally reviewed  Image and labs      Labs:     Recent Labs     10/30/22  0407 10/29/22  0330   WBC 16.9* 12.5*   HGB 13.1 13.5   HCT 40.9 42.1    287       Recent Labs     10/30/22  0407 10/29/22  0330 10/28/22  0348 10/27/22  1144    144 142 140   K 4.9 4.6 4.1 3.8    109* 110* 111*   CO2 28 28 24 22   BUN 42* 44* 35* 37*   CREA 0.91 1.04 0.96 0.98   * 136* 152* 133*   CA 8.4* 8.5 8.5 9.1   MG  --   --   --  2.2       Recent Labs     10/27/22  1144   ALT 15   AP 68   TBILI 0.8   TP 6.6   ALB 2.2*   GLOB 4.4*       No results for input(s): INR, PTP, APTT, INREXT, INREXT in the last 72 hours. No results for input(s): FE, TIBC, PSAT, FERR in the last 72 hours. No results found for: FOL, RBCF   No results for input(s): PH, PCO2, PO2 in the last 72 hours. No results for input(s): CPK, CKNDX, TROIQ in the last 72 hours.     No lab exists for component: CPKMB  Lab Results   Component Value Date/Time    Cholesterol, total 106 02/20/2018 04:54 AM    HDL Cholesterol 45 02/20/2018 04:54 AM    LDL, calculated 46 02/20/2018 04:54 AM    Triglyceride 75 02/20/2018 04:54 AM    CHOL/HDL Ratio 2.4 02/20/2018 04:54 AM     Lab Results   Component Value Date/Time    Glucose (POC) 100 09/14/2015 12:14 PM     Lab Results   Component Value Date/Time    Color YELLOW/STRAW 01/09/2019 05:00 PM    Appearance CLEAR 01/09/2019 05:00 PM    Specific gravity 1.014 01/09/2019 05:00 PM    pH (UA) 7.5 01/09/2019 05:00 PM    Protein NEGATIVE 01/09/2019 05:00 PM    Glucose NEGATIVE 01/09/2019 05:00 PM    Ketone TRACE (A) 01/09/2019 05:00 PM    Bilirubin NEGATIVE 01/09/2019 05:00 PM    Urobilinogen 0.2 01/09/2019 05:00 PM    Nitrites NEGATIVE 01/09/2019 05:00 PM    Leukocyte Esterase NEGATIVE 01/09/2019 05:00 PM    Epithelial cells FEW 01/09/2019 05:00 PM    Bacteria NEGATIVE 01/09/2019 05:00 PM    WBC 0-4 01/09/2019 05:00 PM    RBC 0-5 01/09/2019 05:00 PM         Medications Reviewed:     Current Facility-Administered Medications   Medication Dose Route Frequency    amiodarone (CORDARONE) tablet 400 mg  400 mg Oral BID    sacubitriL-valsartan (ENTRESTO) 24-26 mg tablet 1 Tablet  1 Tablet Oral Q12H    apixaban (ELIQUIS) tablet 5 mg  5 mg Oral Q12H    metoprolol succinate (TOPROL-XL) XL tablet 25 mg  25 mg Oral DAILY    azithromycin (ZITHROMAX) tablet 500 mg  500 mg Oral DAILY    albuterol-ipratropium (DUO-NEB) 2.5 MG-0.5 MG/3 ML  3 mL Nebulization BID RT    hydrALAZINE (APRESOLINE) 20 mg/mL injection 20 mg  20 mg IntraVENous Q6H PRN    albuterol-ipratropium (DUO-NEB) 2.5 MG-0.5 MG/3 ML  3 mL Nebulization Q4H PRN    sodium chloride (NS) flush 5-40 mL  5-40 mL IntraVENous Q8H    sodium chloride (NS) flush 5-40 mL  5-40 mL IntraVENous PRN    acetaminophen (TYLENOL) tablet 650 mg  650 mg Oral Q6H PRN    Or    acetaminophen (TYLENOL) suppository 650 mg  650 mg Rectal Q6H PRN    ondansetron (ZOFRAN ODT) tablet 4 mg  4 mg Oral Q8H PRN    Or    ondansetron (ZOFRAN) injection 4 mg  4 mg IntraVENous Q6H PRN    polyethylene glycol (MIRALAX) packet 17 g  17 g Oral DAILY PRN    methylPREDNISolone (PF) (SOLU-MEDROL) injection 60 mg  60 mg IntraVENous Q6H    piperacillin-tazobactam (ZOSYN) 3.375 g in 0.9% sodium chloride (MBP/ADV) 100 mL MBP  3.375 g IntraVENous Q8H    arformoteroL (BROVANA) neb solution 15 mcg  15 mcg Nebulization BID RT    aspirin delayed-release tablet 81 mg  81 mg Oral DAILY    atorvastatin (LIPITOR) tablet 40 mg  40 mg Oral DAILY    budesonide (PULMICORT) 500 mcg/2 ml nebulizer suspension  500 mcg Nebulization BID    oxyCODONE IR (ROXICODONE) tablet 20 mg  20 mg Oral Q6H PRN    oxyCODONE IR (ROXICODONE) tablet 10 mg  10 mg Oral Q6H PRN     ______________________________________________________________________  EXPECTED LENGTH OF STAY: 4d 19h  ACTUAL LENGTH OF STAY:          4      Please note that this dictation was completed with [employer], the computer voice recognition software.   Quite often unanticipated grammatical, syntax, homophones, and other interpretive errors are inadvertently transcribed by the computer software. Please disregard these errors. Please excuse any errors that have escaped final proofreading.                 James Capone MD

## 2022-10-30 NOTE — PROGRESS NOTES
[id number]: Discussed increased DAHL, increased work of breathing, and increase in O2 requirement (see RT previous note) with Dr. Danny Diaz. MD will order an ABG. Also, discussed diet order with MD, as pt is on Hi-flow w/ increasing O2 requirement. MD states that he is okay with patient continuing current diet, as long as he is not having major de-saturations with meals. Pt has been tolerating meals without desaturations, but has had desaturation events with movement and other physical activity. 1650: Called respiratory therapist Martin Pandya to inform that ABG had been ordered and needs to be drawn. Will continue to monitor patient.

## 2022-10-30 NOTE — PROGRESS NOTES
[facility] Cardiology Progress Note    Date of service: 10/30/2022    Subjective:   Converted to NSR on amiodarone, just before 6am today. Doesn't feel any different    Objective:    Visit Vitals  BP (!) 140/91   Pulse (!) 118   Temp 97.7 °F (36.5 °C)   Resp 22   Ht 5' 11\" (1.803 m)   Wt 65.5 kg (144 lb 6.4 oz)   SpO2 90%   BMI 20.14 kg/m²       Physical Exam  Constitutional:       Appearance: He is underweight. Interventions: Nasal cannula in place. HENT:      Head: Normocephalic and atraumatic. Eyes:      General: No scleral icterus. Conjunctiva/sclera: Conjunctivae normal.   Neck:      Vascular: No JVD. Cardiovascular:      Rate and Rhythm: Normal rate and regular rhythm. Heart sounds: Normal heart sounds. No murmur heard. No gallop. Pulmonary:      Effort: Pulmonary effort is normal. No respiratory distress. Breath sounds: Normal breath sounds. No stridor. No wheezing or rales. Abdominal:      General: There is no distension. Palpations: Abdomen is soft. Musculoskeletal:         General: No deformity. Skin:     General: Skin is warm and dry. Neurological:      Mental Status: He is alert and oriented to person, place, and time. Psychiatric:         Behavior: Behavior normal.       Data reviewed:  Recent Results (from the past 12 hour(s))   CBC W/O DIFF    Collection Time: 10/30/22  4:07 AM   Result Value Ref Range    WBC 16.9 (H) 4.1 - 11.1 K/uL    RBC 4.40 4. 10 - 5.70 M/uL    HGB 13.1 12.1 - 17.0 g/dL    HCT 40.9 36.6 - 50.3 %    MCV 93.0 80.0 - 99.0 FL    MCH 29.8 26.0 - 34.0 PG    MCHC 32.0 30.0 - 36.5 g/dL    RDW 13.4 11.5 - 14.5 %    PLATELET 852 135 - 944 K/uL    MPV 10.6 8.9 - 12.9 FL    NRBC 0.0 0  WBC    ABSOLUTE NRBC 0.00 0.00 - 1.28 K/uL   METABOLIC PANEL, BASIC    Collection Time: 10/30/22  4:07 AM   Result Value Ref Range    Sodium 141 136 - 145 mmol/L    Potassium 4.9 3.5 - 5.1 mmol/L    Chloride 108 97 - 108 mmol/L    CO2 28 21 - 32 mmol/L    Anion gap 5 5 - 15 mmol/L    Glucose 148 (H) 65 - 100 mg/dL    BUN 42 (H) 6 - 20 MG/DL    Creatinine 0.91 0.70 - 1.30 MG/DL    BUN/Creatinine ratio 46 (H) 12 - 20      eGFR >60 >60 ml/min/1.73m2    Calcium 8.4 (L) 8.5 - 10.1 MG/DL       Assessment and Plan:    PAF: new onset evening of 10/27. Likely provoked by COPD exacerbation / PNA  First episode known. Converted to NSR on amiodarone - early 10/30. Doesn't feel any different.     -switch amiodarone to 400mg bid while inpatient, probably stop when he's nearing discharge.  -continue Eliquis  -continue PO metoprolol     Acute respiratory failure  Imroving     Acute exacerbation of COPD     Pneumonia     Cardiomyopathy: EF 25-30%  Etiology not clear. May be due to his a.fib with RVR, could also be related to sepsis / PNA. - metoprolol as above  - add Entresto low dose      H/o lung cancer remotely s/p RLL lobectomy     CAD: h/o prior PCI  Continue ASA, statin      Signed:  Mo Miller MD  Interventional Cardiology  10/30/2022

## 2022-10-30 NOTE — PROGRESS NOTES
10/30/22 1250   Oxygen Therapy   O2 Sat (%) 92 %   Pulse via Oximetry 96 beats per minute   O2 Device Hi flow nasal cannula; Heated   O2 Flow Rate (L/min) 45 l/min   O2 Temperature 98.6 °F (37 °C)   FIO2 (%) 74 %       Patient was SOB, Desat below 88.  Give breathing treatment and increase HFNC Setting

## 2022-10-30 NOTE — PROGRESS NOTES
Care Plan reviewed and interventions implemented, as appropriate. Problem: Falls - Risk of  Goal: *Absence of Falls  Description: Document Phong Blanton Fall Risk and appropriate interventions in the flowsheet.   Outcome: Progressing Towards Goal  Note: Fall Risk Interventions:  Mobility Interventions: Bed/chair exit alarm         Medication Interventions: Assess postural VS orthostatic hypotension    Elimination Interventions: Call light in reach              Problem: Patient Education: Go to Patient Education Activity  Goal: Patient/Family Education  Outcome: Progressing Towards Goal     Problem: Pain  Goal: *Control of Pain  Outcome: Progressing Towards Goal     Problem: Patient Education: Go to Patient Education Activity  Goal: Patient/Family Education  Outcome: Progressing Towards Goal     Problem: Nutrition Deficit  Goal: *Optimize nutritional status  Outcome: Progressing Towards Goal     Problem: Patient Education: Go to Patient Education Activity  Goal: Patient/Family Education  Outcome: Progressing Towards Goal     Problem: Patient Education: Go to Patient Education Activity  Goal: Patient/Family Education  Outcome: Progressing Towards Goal

## 2022-10-31 ENCOUNTER — APPOINTMENT (OUTPATIENT)
Dept: GENERAL RADIOLOGY | Age: 72
DRG: 871 | End: 2022-10-31
Attending: INTERNAL MEDICINE
Payer: MEDICARE

## 2022-10-31 LAB
BACTERIA SPEC CULT: NORMAL
BNP SERPL-MCNC: ABNORMAL PG/ML
ERYTHROCYTE [SEDIMENTATION RATE] IN BLOOD: 23 MM/HR (ref 0–20)
PROCALCITONIN SERPL-MCNC: 0.74 NG/ML
SERVICE CMNT-IMP: NORMAL

## 2022-10-31 PROCEDURE — 77010033678 HC OXYGEN DAILY

## 2022-10-31 PROCEDURE — 74011250636 HC RX REV CODE- 250/636: Performed by: PHYSICIAN ASSISTANT

## 2022-10-31 PROCEDURE — 85652 RBC SED RATE AUTOMATED: CPT

## 2022-10-31 PROCEDURE — 83880 ASSAY OF NATRIURETIC PEPTIDE: CPT

## 2022-10-31 PROCEDURE — 77010033711 HC HIGH FLOW OXYGEN

## 2022-10-31 PROCEDURE — 94640 AIRWAY INHALATION TREATMENT: CPT

## 2022-10-31 PROCEDURE — 74011250636 HC RX REV CODE- 250/636: Performed by: INTERNAL MEDICINE

## 2022-10-31 PROCEDURE — 74011250637 HC RX REV CODE- 250/637: Performed by: INTERNAL MEDICINE

## 2022-10-31 PROCEDURE — 65660000001 HC RM ICU INTERMED STEPDOWN

## 2022-10-31 PROCEDURE — 71045 X-RAY EXAM CHEST 1 VIEW: CPT

## 2022-10-31 PROCEDURE — 74011000250 HC RX REV CODE- 250: Performed by: INTERNAL MEDICINE

## 2022-10-31 PROCEDURE — 84145 PROCALCITONIN (PCT): CPT

## 2022-10-31 PROCEDURE — 74011000258 HC RX REV CODE- 258: Performed by: INTERNAL MEDICINE

## 2022-10-31 PROCEDURE — 36415 COLL VENOUS BLD VENIPUNCTURE: CPT

## 2022-10-31 RX ADMIN — BUDESONIDE 500 MCG: 0.5 INHALANT RESPIRATORY (INHALATION) at 20:41

## 2022-10-31 RX ADMIN — PIPERACILLIN AND TAZOBACTAM 3.38 G: 3; .375 INJECTION, POWDER, FOR SOLUTION INTRAVENOUS at 06:27

## 2022-10-31 RX ADMIN — SODIUM CHLORIDE, PRESERVATIVE FREE 10 ML: 5 INJECTION INTRAVENOUS at 22:01

## 2022-10-31 RX ADMIN — IPRATROPIUM BROMIDE AND ALBUTEROL SULFATE 3 ML: .5; 3 SOLUTION RESPIRATORY (INHALATION) at 07:29

## 2022-10-31 RX ADMIN — ARFORMOTEROL TARTRATE 15 MCG: 15 SOLUTION RESPIRATORY (INHALATION) at 07:30

## 2022-10-31 RX ADMIN — APIXABAN 5 MG: 5 TABLET, FILM COATED ORAL at 09:35

## 2022-10-31 RX ADMIN — PIPERACILLIN AND TAZOBACTAM 3.38 G: 3; .375 INJECTION, POWDER, FOR SOLUTION INTRAVENOUS at 22:00

## 2022-10-31 RX ADMIN — ARFORMOTEROL TARTRATE 15 MCG: 15 SOLUTION RESPIRATORY (INHALATION) at 20:41

## 2022-10-31 RX ADMIN — SACUBITRIL AND VALSARTAN 1 TABLET: 24; 26 TABLET, FILM COATED ORAL at 11:05

## 2022-10-31 RX ADMIN — METHYLPREDNISOLONE SODIUM SUCCINATE 40 MG: 40 INJECTION, POWDER, FOR SOLUTION INTRAMUSCULAR; INTRAVENOUS at 15:45

## 2022-10-31 RX ADMIN — SACUBITRIL AND VALSARTAN 1 TABLET: 24; 26 TABLET, FILM COATED ORAL at 21:57

## 2022-10-31 RX ADMIN — METOPROLOL SUCCINATE 25 MG: 25 TABLET, FILM COATED, EXTENDED RELEASE ORAL at 09:35

## 2022-10-31 RX ADMIN — SODIUM CHLORIDE, PRESERVATIVE FREE 10 ML: 5 INJECTION INTRAVENOUS at 06:27

## 2022-10-31 RX ADMIN — PIPERACILLIN AND TAZOBACTAM 3.38 G: 3; .375 INJECTION, POWDER, FOR SOLUTION INTRAVENOUS at 15:46

## 2022-10-31 RX ADMIN — ASPIRIN 81 MG: 81 TABLET, COATED ORAL at 09:35

## 2022-10-31 RX ADMIN — SODIUM CHLORIDE, PRESERVATIVE FREE 10 ML: 5 INJECTION INTRAVENOUS at 14:55

## 2022-10-31 RX ADMIN — ATORVASTATIN CALCIUM 40 MG: 40 TABLET, FILM COATED ORAL at 09:34

## 2022-10-31 RX ADMIN — AMIODARONE HYDROCHLORIDE 400 MG: 200 TABLET ORAL at 09:35

## 2022-10-31 RX ADMIN — APIXABAN 5 MG: 5 TABLET, FILM COATED ORAL at 21:57

## 2022-10-31 RX ADMIN — METHYLPREDNISOLONE SODIUM SUCCINATE 60 MG: 40 INJECTION, POWDER, FOR SOLUTION INTRAMUSCULAR; INTRAVENOUS at 05:00

## 2022-10-31 RX ADMIN — AMIODARONE HYDROCHLORIDE 400 MG: 200 TABLET ORAL at 18:03

## 2022-10-31 RX ADMIN — BUDESONIDE 500 MCG: 0.5 INHALANT RESPIRATORY (INHALATION) at 07:30

## 2022-10-31 RX ADMIN — METHYLPREDNISOLONE SODIUM SUCCINATE 60 MG: 40 INJECTION, POWDER, FOR SOLUTION INTRAMUSCULAR; INTRAVENOUS at 09:36

## 2022-10-31 RX ADMIN — IPRATROPIUM BROMIDE AND ALBUTEROL SULFATE 3 ML: .5; 3 SOLUTION RESPIRATORY (INHALATION) at 20:41

## 2022-10-31 RX ADMIN — METHYLPREDNISOLONE SODIUM SUCCINATE 40 MG: 40 INJECTION, POWDER, FOR SOLUTION INTRAMUSCULAR; INTRAVENOUS at 21:57

## 2022-10-31 NOTE — PROGRESS NOTES
Pulmonary, Critical Care, and Sleep Medicine    Progress Note    Name: Rosina Mata MRN: [id number]   : 1950 Hospital: [place] [address]   Date: 10/31/2022        IMPRESSION:   Acute hypercapnic resp failure- Moderate COPD ( 3lpm baseline + trelegy + Dr. Valente Esteban) + multilobar CAP- PCT > 22. At risk for MDROs and atypicals consider ESBLS. H/o RLL lobectomy at [facility]  for NSCLC  COPD  Sepsis- from PNA  A fib. On amiodarone       RECOMMENDATIONS:   Pt at high risk for requiring invasive ventilation  Zosyn   IV steroids  Nebs  Dvt proph   NIV as needed during the day for WOB  On High flow, titrate as able   O2 titration above 90%   Chest x-ray   Check ESR, procal and probnp      Subjective:     10/31  Back up to 35lpm and 80% FIO2  Had a rough weekend   + wheeze today     ECHO from 10/28  Result status: Final result       Left Ventricle: Severely reduced left ventricular systolic function with a visually estimated EF of 25 - 30%. Not well visualized. Left ventricle size is normal. Normal wall thickness. Unable to assess wall motion. Abnormal diastolic function. Right Ventricle: Not well visualized. Reduced systolic function. TAPSE is abnormal. TAPSE is 1.1 cm. Mitral Valve: Mild regurgitation. Left Atrium: Left atrium is mildly dilated. Right Atrium: Right atrium is mildly dilated. 10/28  Inability to tolerate below 25lpm and 85% FiO2 today  Sounds congested   Feels about the same as yesterday   Elevation of WBC might be from steroids       Metabolic acidosis   Down to 30lpm and 50% FiO2  Feeling a little better     Patient is a 71 y.o. male with COPD ( FEV1 in office  53% folows with Dr. Valente Esteban) who is s/p RLL lobectomy for NSCLC at [facility] . Pt with inc SOB and WOB last 2-3 days and presented to ED with SOB/hypercapnic resp failure. On HFNC currently and gettign ready top go to [facility]. Seen in ED. C/o productive cough.  No hemoptysis      Past Medical History:   Diagnosis Date Asthma     Cancer ([facility]     Lung Cancer    Chronic obstructive pulmonary disease (HCC)     Chronic pain     High cholesterol     Lung cancer (HCC)       Past Surgical History:   Procedure Laterality Date    HX LOBECTOMY      Right lung    HX OTHER SURGICAL      Partial right lung removal      Prior to Admission medications    Medication Sig Start Date End Date Taking? Authorizing Provider   arformoteroL (BROVANA) 15 mcg/2 mL nebu neb solution 15 mcg by Nebulization route two (2) times a day. Sometimes uses TID   Yes Provider, Historical   budesonide (PULMICORT) 0.5 mg/2 mL nbsp 500 mcg by Nebulization route two (2) times a day. Sometimes uses TID   Yes Provider, Historical   atorvastatin (LIPITOR) 40 mg tablet Take 40 mg by mouth daily. Yes Provider, Historical   albuterol (PROVENTIL VENTOLIN) 2.5 mg /3 mL (0.083 %) nebu 2.5 mg by Nebulization route every six (6) hours as needed for Wheezing or Shortness of Breath. Yes Provider, Historical   oxyCODONE IR (ROXICODONE) 20 mg immediate release tablet Take 40 mg by mouth every twelve (12) hours as needed for Pain (severe pain). Yes Provider, Historical   aspirin delayed-release 81 mg tablet Take 1 Tab by mouth daily. 18  Yes Ivana Wilkes NP   ketoconazole (NIZORAL) 2 % shampoo Apply  to affected area as needed for Itching. Provider, Historical     No Known Allergies   Social History     Tobacco Use    Smoking status: Former     Packs/day: 1.00     Years: 45.00     Pack years: 45.00     Types: Cigarettes     Quit date: 2009     Years since quittin.8    Smokeless tobacco: Never   Substance Use Topics    Alcohol use: No     Comment: rarely/social       No family history on file.      Current Facility-Administered Medications   Medication Dose Route Frequency    amiodarone (CORDARONE) tablet 400 mg  400 mg Oral BID    sacubitriL-valsartan (ENTRESTO) 24-26 mg tablet 1 Tablet  1 Tablet Oral Q12H    apixaban (ELIQUIS) tablet 5 mg  5 mg Oral Q12H metoprolol succinate (TOPROL-XL) XL tablet 25 mg  25 mg Oral DAILY    albuterol-ipratropium (DUO-NEB) 2.5 MG-0.5 MG/3 ML  3 mL Nebulization BID RT    sodium chloride (NS) flush 5-40 mL  5-40 mL IntraVENous Q8H    methylPREDNISolone (PF) (SOLU-MEDROL) injection 60 mg  60 mg IntraVENous Q6H    piperacillin-tazobactam (ZOSYN) 3.375 g in 0.9% sodium chloride (MBP/ADV) 100 mL MBP  3.375 g IntraVENous Q8H    arformoteroL (BROVANA) neb solution 15 mcg  15 mcg Nebulization BID RT    aspirin delayed-release tablet 81 mg  81 mg Oral DAILY    atorvastatin (LIPITOR) tablet 40 mg  40 mg Oral DAILY    budesonide (PULMICORT) 500 mcg/2 ml nebulizer suspension  500 mcg Nebulization BID       Review of Systems:  Review of systems not obtained due to patient factors. Objective:   Vital Signs:    Visit Vitals  /80   Pulse 87   Temp 98.7 °F (37.1 °C)   Resp (!) 35   Ht 5' 11\" (1.803 m)   Wt 67 kg (147 lb 11.3 oz)   SpO2 96%   BMI 20.60 kg/m²       O2 Device: Hi flow nasal cannula, Heated   O2 Flow Rate (L/min): 40 l/min   Temp (24hrs), Av.7 °F (37.1 °C), Min:98.6 °F (37 °C), Max:98.8 °F (37.1 °C)       Intake/Output:   Last shift:      No intake/output data recorded.   Last 3 shifts: 10/29 1901 - 10/31 0700  In: -   Out: 725 [Urine:725]    Intake/Output Summary (Last 24 hours) at 10/31/2022 1320  Last data filed at 10/30/2022 1745  Gross per 24 hour   Intake --   Output 475 ml   Net -475 ml        Physical Exam:   Alert on HFNC  + moderate WOB with mild accessory use and word dyspnea no sternal retractions  No abd paradox  Diffuse wheeze  Heart : tachy/reg no murmur  Trachea midline  Abd soft/NT no organomegaly  EX no Cc/e  Data review:     Recent Results (from the past 24 hour(s))   BLOOD GAS, ARTERIAL    Collection Time: 10/30/22  5:14 PM   Result Value Ref Range    pH 7.41 7.35 - 7.45      PCO2 48 (H) 35 - 45 mmHg    PO2 106 (H) 80 - 100 mmHg    O2 SAT 98 (H) 92 - 97 %    BICARBONATE 30 (H) 22 - 26 mmol/L    BASE EXCESS 4.4 mmol/L    O2 METHOD High Flow Nasal Cannula      O2 FLOW RATE 45.00 L/min    FIO2 75 %    Sample source ARTERIAL      SITE RIGHT RADIAL      FATMATA'S TEST YES           Imaging:  I have personally reviewed the patients radiographs and have reviewed the reports:  PCXR with LLL and right mid lung new ASD     CC > 35 minutes excluding procedures    Mundo Mckeon PA-C

## 2022-10-31 NOTE — PROGRESS NOTES
Chart checked, pt cleared by nursing. Attempted to work with pt but he politely yet adamantly declined stating that he has had diarrhea today and that he is exhausted from the bed <> bedside commode transfers. Of note, he is presently on heated high flow at 35 liters and 69% Fi02. Sp02 stable in the mid to upper 90s. PT will follow and see as able and appropriate.  Thank you, Karen Rogers, PT

## 2022-10-31 NOTE — PROGRESS NOTES
Problem: Falls - Risk of  Goal: *Absence of Falls  Description: Document Diego Patricia Fall Risk and appropriate interventions in the flowsheet.   Outcome: Progressing Towards Goal  Note: Fall Risk Interventions:  Mobility Interventions: Bed/chair exit alarm, Patient to call before getting OOB         Medication Interventions: Assess postural VS orthostatic hypotension, Bed/chair exit alarm, Evaluate medications/consider consulting pharmacy, Patient to call before getting OOB, Teach patient to arise slowly    Elimination Interventions: Call light in reach, Bed/chair exit alarm, Patient to call for help with toileting needs, Toilet paper/wipes in reach, Urinal in reach              Problem: Patient Education: Go to Patient Education Activity  Goal: Patient/Family Education  Outcome: Progressing Towards Goal     Problem: Patient Education: Go to Patient Education Activity  Goal: Patient/Family Education  Outcome: Progressing Towards Goal     Problem: Nutrition Deficit  Goal: *Optimize nutritional status  Outcome: Progressing Towards Goal

## 2022-10-31 NOTE — ADT AUTH CERT NOTES
Pneumonia - Care Day 4 (10/29/2022) by Joe Ragland       Review Status Review Entered   Completed 10/31/2022 1413       [facility]   [facility]      Criteria Review      Care Day: 4 Care Date: 10/29/2022 Level of Care: Intermediate Care    Guideline Day 3    Level Of Care    ( ) Floor to discharge    10/31/2022 14:13:10 EDT by Esteban Councilman      Intermediate care bed    Clinical Status    ( ) * Hemodynamic stability    10/31/2022 14:13:10 EDT by Esteban Councilman        /92  MAP 99    (X) * Afebrile or temperature acceptable for next level of care    10/31/2022 14:13:10 EDT by Sari Salinas: 97.7 °F (36.5 °C)    ( ) * Tachypnea absent    10/31/2022 14:13:10 EDT by Esteban Councilman      22-31 rpm    (X) * Hypoxemia absent    10/31/2022 14:13:10 EDT by Esteban Councilman      SpO2 96% 20 lpm hfnc    (X) * Mental status at baseline    10/31/2022 14:13:10 EDT by Esteban Councilman      alert x 3, awake, no acute distress, resting in bed, pleasant    ( ) * Antibiotic regimen acceptable for next level of care    ( ) * Discharge plans and education understood    Activity    (X) * Ambulatory or acceptable for next level of care    10/31/2022 14:13:10 EDT by Esteban Councilman      Activity as tolerated w/ assist    Routes    (X) * Oral hydration    (X) * Oral medications or regimen acceptable for next level of care    10/31/2022 14:13:10 EDT by Esteban Councilman      (ELIQUIS) 5mg po q12  aspirin 81mg po daily  (LIPITOR) 40mg po daily  (TOPROL-XL) 25mg po daily    (X) * Oral diet or acceptable for next level of care    10/31/2022 14:13:10 EDT by Esteban Councilman      Adult diet regular    Interventions    ( ) * Oxygen absent or at baseline need    10/31/2022 14:13:10 EDT by Esteban Councilman      20 lpm hfnc    (X) * Isolation not indicated, or is performable at next level of care    10/31/2022 14:13:10 EDT by Esteban Councilman      Not indicated    (X) WBC    10/31/2022 14:13:10 EDT by Esteban Councilman      WBC: 12.5 (H)    Medications (X) Oral antibiotics    10/31/2022 14:13:10 EDT by Kathryn moore [facility]) tablet 500 mg po daily    * Milestone   Additional Notes    10/29      Pertinent Updates:   -Patient seen and examined earlier still on HFNC 25 L/min   -Was on cardizem gtt switched to IV amio by cardio       Vitals: 97.7 °F (36.5 °C) 108 114/92 24 96% 20 lpm hfnc      Abnl/Pertinent Labs/Radiology/Diagnostic Studies:   WBC: 12.5 (H)   Chloride: 109 (H)   Glucose: 136 (H)   BUN: 44 (H)   BUN/Creatinine ratio: 42 (H)      Physical Exam:   General : alert x 3, awake, no acute distress, resting in bed, pleasant    HEENT: PEERL, EOMI, moist mucus membrane, TM clear   Neck: supple, no JVD, no meningeal signs   Chest: Coarse breath sound scattered wheezing   CVS: S1 S2 heard, Capillary refill less than 2 seconds   Abd: soft/ Non tender, non distended, BS physiological,    Ext: no clubbing, no cyanosis, no edema, brisk 2+ DP pulses   Neuro/Psych: pleasant mood and affect, CN 2-12 grossly intact, sensory grossly within normal limit, Strength 5/5 in all extremities, DTR 1+ x 4   Skin: warm       MD Consults/Assessments & Plans:   *Acute hypercapnic respiratory failure due to COPD pneumonia   *Acute on chronic hypoxic respiratory failure due to COPD   *Sepsis due to pneumonia multilobar pneumonia   --Initially patient was on BiPAP ABG reviewed pulmonary on board   --Patient weaned off to high flow nasal cannula   --Continue nebs steroid antibiotics follow cultures       *A. fib with RVR in the setting of respiratory failure   Hfref newly diagnosed EF 25-30% unclear etiology   --Continue amio gtt    -TTE EF 25-30%    -on eliquis    -appreciate cardio, ef could just be do to rvr, sepsis with pna       Cardio notes:   *PAF: new onset evening of 10/27.    Likely provoked by COPD exacerbation / PNA   First episode known.   -d/c diltiazem and start IV amiodarone    -adding Eliquis this AM (within 48hrs of onset)   -trying to add metoprolol as well   -could consider cardioversion at some point if remains in a.fib, but would want to see respiratory status improve before we would consider procedure that would need brief anesthesia / sedation   *Acute respiratory failure   Imroving   *Acute exacerbation of COPD   *Pneumonia   *Cardiomyopathy: EF 25-30%   Etiology not clear. May be due to his a.fib with RVR, could also be related to sepsis / PNA. Medications:   (DUO-NEB) 2.5 MG-0.5MG/3 ML neb bid   arformoteroL (BROVANA) neb solution 15 mcg neb bid   budesonide (PULMICORT) 500 mcg/2 ml nebulizer suspension neb bid   piperacillin-tazobactam (ZOSYN) 3.375 g in 0.9% sodium chloride 100 mL iv q8   acetylcysteine (MUCOMYST) 200 mg/mL (20 %) solution 400 mg neb tid   amiodarone (CORDARONE) 150 mg in dextrose 5% 100 mL bolus infusion iv once   amiodarone (CORDARONE) 375 mg/250 mL D5W infusion 20-40ml/hr iv cont.    dilTIAZem (CARDIZEM) 125 mg in dextrose 5% 125 mL infusion iv titrate   methylPREDNISolone (PF) (SOLU-MEDROL) injection 60 mg iv q6      Orders:   Monitor I and O   Monitor vital signs   Weigh patient daily              Pneumonia - Care Day 3 (10/28/2022) by Justen Love       Review Status Review Entered   Completed 10/31/2022 1347       Created By   Justen Lvoe      Criteria Review      Care Day: 3 Care Date: 10/28/2022 Level of Care: Intermediate Care    Guideline Day 2    Level Of Care    (X) Floor    10/31/2022 13:47:19 EDT by Bonnie De La Cruz      Intermediate care bed    Clinical Status    (X) * No CO2 retention or acidosis    10/31/2022 13:47:19 EDT by Bonnie De La Cruz      Not indicated    (X) * No requirement for mechanical ventilation    10/31/2022 13:47:19 EDT by Bonnie De La Cruz      Not indicated    (X) * Hypotension absent    10/31/2022 13:47:19 EDT by Bonnie De La Cruz      /85  MAP 94    (X) * Afebrile or fever improved    10/31/2022 13:47:19 EDT by Sheldon Ragsdale: 98.6 (37)    (X) * No hypoxia on room air or oxygenation improved    10/31/2022 13:47:19 EDT by Xuan Oliva      SpO2 98% 30 lpm hfnc    (X) * Mental status improved or at baseline    10/31/2022 13:47:19 EDT by Xuan Oliva      alert x 3    Activity    (X) * Increased activity    10/31/2022 13:47:19 EDT by Xuan Oliva      Activity as tolerated w/ assist    Routes    (X) Oral hydration    (X) Oral medications    10/31/2022 13:47:19 EDT by Xuan Oliva      aspirin 81mg po daily  LIPITOR 40mg po daily    (X) Usual diet    10/31/2022 13:47:19 EDT by Xuan Oliva      Adult diet regular    Interventions    (X) Pulse oximetry    10/31/2022 13:47:19 EDT by Xuan Oliva      RT--OXIMETRY, CONTINUOUS    (X) Head of bed at 30 degrees    10/31/2022 13:47:19 EDT by Xuan Oliva      ELEVATE HEAD OF BED cont. (X) Possible oxygen    Medications    (X) IV or oral antibiotics    10/31/2022 13:47:19 EDT by Xuan Oliva      piperacillin-tazobactam (ZOSYN) 3.375 g in 0.9% sodium chloride 100 mL iv q8  azithromycin (ZITHROMAX) 500 mg in 0.9% sodium chloride 250 mL iv q24    * Milestone   Additional Notes    10/28      Pertinent Updates:   -Patient seen and examined on high flow nasal cannula 25 L/min   -Inability to tolerate below 25lpm and 85% FiO2 today   -Also status post RRT yesterday for atrial fibrillation with RVR now on Cardizem drip   -Sounds congested    -Elevation of WBC might be from steroids       Vitals: 98.6 (37) 141 113/85 28 98% 30 lpm hfnc       Abnl/Pertinent Labs/Radiology/Diagnostic Studies:   WBC: 15.3 (H)   Chloride: 110 (H)   Glucose: 152 (H)   BUN: 35 (H)   BUN/Creatinine ratio: 36 (H)      ECHO ADULT COMPLETE   Final result   ·  Left Ventricle: Severely reduced left ventricular systolic function with a visually estimated EF of 25 - 30%. Not well visualized. Left ventricle size is normal. Normal wall thickness. Unable to assess wall motion. Abnormal diastolic function. ·  Right Ventricle: Not well visualized. Reduced systolic function.  TAPSE is abnormal. TAPSE is 1.1 cm. ·  Mitral Valve: Mild regurgitation. ·  Left Atrium: Left atrium is mildly dilated. ·  Right Atrium: Right atrium is mildly dilated. Physical Exam:   General : alert x 3, awake, no acute distress, resting in bed, pleasant \   HEENT: PEERL, EOMI, moist mucus membrane, TM clear   Neck: supple, no JVD, no meningeal signs   Chest: Coarse breath sound scattered wheezing   CVS: S1 S2 heard, Capillary refill less than 2 seconds   Abd: soft/ Non tender, non distended, BS physiological,    Ext: no clubbing, no cyanosis, no edema, brisk 2+ DP pulses   Neuro/Psych: pleasant mood and affect, CN 2-12 grossly intact, sensory grossly within normal limit, Strength 5/5 in all extremities, DTR 1+ x 4   Skin: warm       MD Consults/Assessments & Plans:   *Acute hypercapnic respiratory failure due to COPD pneumonia   *Acute on chronic hypoxic respiratory failure due to COPD   *Sepsis due to pneumonia multilobar pneumonia   --Initially patient was on BiPAP ABG reviewed pulmonary on board   --Patient weaned off to high flow nasal cannula   --We will do nasal cannula as tolerated   --Continue nebs steroid antibiotics follow cultures       *A. fib with RVR in the setting of respiratory failure   --Continue Cardizem drip wean off as tolerates   --Add oral Cardizem   --Formal echocardiogram will be requested and evaluate for anticoagulation   --[id number] score--       Pulmo notes:   -Pt at high risk for requiring invasive ventilation   -Azithromycin/zosyn/vanc.  Narrow vanc as MRSA culture negative    -IV steroids   -Nebs   -Dvt proph    -NIV as needed during the day for WOB   -On High flow, maintaining saturations    -O2 titration above 90%    -Add mucomyst   -Prn over the weekend        Medications:   albuterol-ipratropium (DUO-NEB) 2.5 MG-0.5 MG/3 ML neb bid   arformoteroL (BROVANA) neb solution 15 mcg neb bid   budesonide (PULMICORT) 500 mcg/2 ml nebulizer suspension neb bid   methylPREDNISolone (PF) (SOLU-MEDROL) injection 60 mg iv q6   acetylcysteine (MUCOMYST) 200 mg/mL (20 %) solution 400 mg neb tid   dilTIAZem (CARDIZEM) 125 mg in dextrose 5% 125 mL infusion iv titrate   enoxaparin (LOVENOX) injection 40 mg sc daily      Orders:   Monitor I and O   Monitor vital signs   Weigh patient daily      PT Assessments or Notes:   -Recommendations and Planned Interventions: bed mobility training, transfer training, gait training, therapeutic exercises, patient and family training/education, and therapeutic activities     -Frequency/Duration: Patient will be followed by physical therapy: 5 times a week to address goals. -Recommendation for discharge: Therapy 3 hours per day 5-7 days per week vs HHPT pending progress      OT notes:   -Recommendations and Planned Interventions: self care training, functional mobility training, therapeutic exercise, balance training, therapeutic activities, cognitive retraining, endurance activities, neuromuscular re-education, patient education, home safety training, and family training/education   -Frequency/Duration: Patient will be followed by occupational therapy 5 times a week to address goals. -Recommendation for discharge:  To be determined: If the patient remains as limited in endurance as today he will need IPR prior to dc home to be back at baseline, v home with family support and [address] [place] [address]

## 2022-10-31 NOTE — PROGRESS NOTES
[facility] Adult  Hospitalist Group                                                                                          Hospitalist Progress Note  Chidi Anguiano MD  Answering service: [phone] OR [phone] from in house phone        Date of Service:  10/31/2022  NAME:  Austin Fuentes  :  1950  MRN:  [id number]      Admission Summary:   Austin Fuentes is a 71 y.o. male with a PMHx of lung cancer, COPD, chronic hypoxic respiratory failure, on home O2 at 3 liters/min, who presents with Chief Complaint of Shortness of breath/ Dyspnea on exertion, progressively worsening for the past 3 days. Patient denies any fevers or chills. He used increased doses of inhalers, but it did not help. He called 911. Upon EMS arrival, his O2 saturation was 79%. Patient was placed on a BIPAP upon arrival.      Interval history / Subjective:     Remains on high flow nasal cannula  Echo showing cardiomyopathy EF of 25%  Discussed with patient and caregiver at bedside  Not doing all high risk for deterioration seen by cardiology and pulmonary as well  Patient is DNR       Assessment & Plan:     Acute hypercapnic respiratory failure due to COPD pneumonia  Acute on chronic hypoxic respiratory failure due to COPD  Sepsis due to pneumonia multilobar pneumonia  --Initially patient was on BiPAP now on high flow nasal cannula 40 L  --Pulmonary on board  --Continue nebs, steroid, antibiotics   - follow cultures  --Monitor respiratory status  --CXR 10/31-- Decreasing bilateral areas of pneumonia. Follow-up to resolution is  suggested.      A. fib with RVR in the setting of respiratory failure  HFrEF newly diagnosed EF 25-30% unclear etiology  --Received amio gtt   -TTE EF 25-30% - unclear etiology  - on eliquis on Entresto as well  Seen by cardiology    Malignant neoplasm of the lung status post lobectomy for non-small cell lung cancer  --Follow-up as an outpatient  --History of right lung lobectomy    PENNY POA --monitor avoid nephrotoxin    Hyperlipidemia continue statin    Uncontrolled hypertension--currently on as needed hydralazine    Code status: DNR  DVT prophylaxis: Lovenox    Care Plan discussed with: Patient/Family and Nurse  Anticipated Disposition: Home w/Family  Anticipated Discharge: 24 hours to 48 hours    CRITICAL CARE ATTESTATION:  I had a face to face encounter with the patient, reviewed and interpreted patient data including clinical events, labs, images, vital signs, I/O's, and examined patient. I have discussed the case and the plan and management of the patient's care with the consulting services, the bedside nurses and necessary ancillary providers. NOTE OF PERSONAL INVOLVEMENT IN CARE   This patient has a high probability of imminent, clinically significant deterioration, which requires the highest level of preparedness to intervene urgently. I participated in the decision-making and personally managed or directed the management of the following life and organ supporting interventions that required my frequent assessment to treat or prevent imminent deterioration. I personally spent 45 minutes of critical care time. This is time spent at this critically ill patient's bedside actively involved in patient care as well as the coordination of care and discussions with the patient's family. This does not include any procedural time which has been billed separately.       Hospital Problems  Date Reviewed: 1/9/2019            Codes Class Noted POA    Moderate protein-calorie malnutrition ([facility] ICD-10-CM: E44.0  ICD-9-CM: 263.0  10/27/2022 Yes        COPD (chronic obstructive pulmonary disease) ([facility] ICD-10-CM: J44.9  ICD-9-CM: 308  10/26/2022 Unknown        Pneumonia ICD-10-CM: J18.9  ICD-9-CM: 629  10/26/2022 Unknown        Malignant neoplasm of lung, unspecified laterality, unspecified part of lung ([facility] ICD-10-CM: C34.90  ICD-9-CM: 162.9  10/26/2022 Unknown        Sepsis ([facility] ICD-10-CM: A41.9  ICD-9-CM: 038.9, 995.91  10/26/2022 Unknown       Review of Systems:   A comprehensive review of systems was negative. Vital Signs:    Last 24hrs VS reviewed since prior progress note. Most recent are:  Visit Vitals  /73   Pulse 96   Temp 98.7 °F (37.1 °C)   Resp 23   Ht 5' 11\" (1.803 m)   Wt 67 kg (147 lb 11.3 oz)   SpO2 97%   BMI 20.60 kg/m²         Intake/Output Summary (Last 24 hours) at 10/31/2022 1536  Last data filed at 10/30/2022 1745  Gross per 24 hour   Intake --   Output 275 ml   Net -275 ml          Physical Examination:     I had a face to face encounter with this patient and independently examined them on 10/31/2022 as outlined below:          General : alert x 3, awake, no acute distress, resting in bed, pleasant   HEENT: PEERL, EOMI, moist mucus membrane, TM clear  Neck: supple, no JVD, no meningeal signs  Chest: Coarse breath sounds, scattered wheezing  CVS: S1 S2 heard, Capillary refill less than 2 seconds  Abd: soft/ Non tender, non distended  Ext: no clubbing, no cyanosis, no edema, brisk 2+ DP pulses  Neuro/Psych: pleasant mood and affect,  sensory grossly within normal limit, Strength 5/5 in all extremities  Skin: warm     Data Review:    I personally reviewed  Image and labs      Labs:     Recent Labs     10/30/22  0407 10/29/22  0330   WBC 16.9* 12.5*   HGB 13.1 13.5   HCT 40.9 42.1    287       Recent Labs     10/30/22  0407 10/29/22  0330    144   K 4.9 4.6    109*   CO2 28 28   BUN 42* 44*   CREA 0.91 1.04   * 136*   CA 8.4* 8.5       No results for input(s): ALT, AP, TBIL, TBILI, TP, ALB, GLOB, GGT, AML, LPSE in the last 72 hours. No lab exists for component: SGOT, GPT, AMYP, HLPSE    No results for input(s): INR, PTP, APTT, INREXT, INREXT in the last 72 hours. No results for input(s): FE, TIBC, PSAT, FERR in the last 72 hours.    No results found for: FOL, RBCF   Recent Labs     10/30/22  1714   PH 7.41   PCO2 48*   PO2 106*     No results for input(s): CPK, CKNDX, TROIQ in the last 72 hours.     No lab exists for component: CPKMB  Lab Results   Component Value Date/Time    Cholesterol, total 106 02/20/2018 04:54 AM    HDL Cholesterol 45 02/20/2018 04:54 AM    LDL, calculated 46 02/20/2018 04:54 AM    Triglyceride 75 02/20/2018 04:54 AM    CHOL/HDL Ratio 2.4 02/20/2018 04:54 AM     Lab Results   Component Value Date/Time    Glucose (POC) 100 09/14/2015 12:14 PM     Lab Results   Component Value Date/Time    Color YELLOW/STRAW 01/09/2019 05:00 PM    Appearance CLEAR 01/09/2019 05:00 PM    Specific gravity 1.014 01/09/2019 05:00 PM    pH (UA) 7.5 01/09/2019 05:00 PM    Protein NEGATIVE 01/09/2019 05:00 PM    Glucose NEGATIVE 01/09/2019 05:00 PM    Ketone TRACE (A) 01/09/2019 05:00 PM    Bilirubin NEGATIVE 01/09/2019 05:00 PM    Urobilinogen 0.2 01/09/2019 05:00 PM    Nitrites NEGATIVE 01/09/2019 05:00 PM    Leukocyte Esterase NEGATIVE 01/09/2019 05:00 PM    Epithelial cells FEW 01/09/2019 05:00 PM    Bacteria NEGATIVE 01/09/2019 05:00 PM    WBC 0-4 01/09/2019 05:00 PM    RBC 0-5 01/09/2019 05:00 PM         Medications Reviewed:     Current Facility-Administered Medications   Medication Dose Route Frequency    methylPREDNISolone (PF) (SOLU-MEDROL) injection 40 mg  40 mg IntraVENous Q6H    amiodarone (CORDARONE) tablet 400 mg  400 mg Oral BID    sacubitriL-valsartan (ENTRESTO) 24-26 mg tablet 1 Tablet  1 Tablet Oral Q12H    apixaban (ELIQUIS) tablet 5 mg  5 mg Oral Q12H    metoprolol succinate (TOPROL-XL) XL tablet 25 mg  25 mg Oral DAILY    albuterol-ipratropium (DUO-NEB) 2.5 MG-0.5 MG/3 ML  3 mL Nebulization BID RT    hydrALAZINE (APRESOLINE) 20 mg/mL injection 20 mg  20 mg IntraVENous Q6H PRN    albuterol-ipratropium (DUO-NEB) 2.5 MG-0.5 MG/3 ML  3 mL Nebulization Q4H PRN    sodium chloride (NS) flush 5-40 mL  5-40 mL IntraVENous Q8H    sodium chloride (NS) flush 5-40 mL  5-40 mL IntraVENous PRN    acetaminophen (TYLENOL) tablet 650 mg  650 mg Oral Q6H PRN    Or    acetaminophen (TYLENOL) suppository 650 mg  650 mg Rectal Q6H PRN    ondansetron (ZOFRAN ODT) tablet 4 mg  4 mg Oral Q8H PRN    Or    ondansetron (ZOFRAN) injection 4 mg  4 mg IntraVENous Q6H PRN    polyethylene glycol (MIRALAX) packet 17 g  17 g Oral DAILY PRN    piperacillin-tazobactam (ZOSYN) 3.375 g in 0.9% sodium chloride (MBP/ADV) 100 mL MBP  3.375 g IntraVENous Q8H    arformoteroL (BROVANA) neb solution 15 mcg  15 mcg Nebulization BID RT    aspirin delayed-release tablet 81 mg  81 mg Oral DAILY    atorvastatin (LIPITOR) tablet 40 mg  40 mg Oral DAILY    budesonide (PULMICORT) 500 mcg/2 ml nebulizer suspension  500 mcg Nebulization BID    oxyCODONE IR (ROXICODONE) tablet 20 mg  20 mg Oral Q6H PRN    oxyCODONE IR (ROXICODONE) tablet 10 mg  10 mg Oral Q6H PRN     ______________________________________________________________________  EXPECTED LENGTH OF STAY: 4d 19h  ACTUAL LENGTH OF STAY:          5      Please note that this dictation was completed with [employer], the computer voice recognition software. Quite often unanticipated grammatical, syntax, homophones, and other interpretive errors are inadvertently transcribed by the computer software. Please disregard these errors. Please excuse any errors that have escaped final proofreading.                 Princess Weir MD

## 2022-10-31 NOTE — PROGRESS NOTES
[facility] Cardiology Progress Note    Date of service: 10/31/2022    Subjective:   Remains in NSR  Feels about the same    Objective:    Visit Vitals  /78 (BP 1 Location: Left upper arm, BP Patient Position: Semi fowlers)   Pulse 83   Temp 98.7 °F (37.1 °C)   Resp 26   Ht 5' 11\" (1.803 m)   Wt 65.5 kg (144 lb 6.4 oz)   SpO2 97%   BMI 20.14 kg/m²       Physical Exam  Constitutional:       Appearance: He is underweight. Interventions: Nasal cannula in place. HENT:      Head: Normocephalic and atraumatic. Eyes:      General: No scleral icterus. Conjunctiva/sclera: Conjunctivae normal.   Neck:      Vascular: No JVD. Cardiovascular:      Rate and Rhythm: Normal rate and regular rhythm. Heart sounds: Normal heart sounds. No murmur heard. No gallop. Pulmonary:      Effort: Pulmonary effort is normal. No respiratory distress. Breath sounds: Normal breath sounds. No stridor. No wheezing or rales. Abdominal:      General: There is no distension. Palpations: Abdomen is soft. Musculoskeletal:         General: No deformity. Skin:     General: Skin is warm and dry. Neurological:      Mental Status: He is alert and oriented to person, place, and time. Psychiatric:         Behavior: Behavior normal.       Data reviewed:  No results found for this or any previous visit (from the past 12 hour(s)). Telemetry: NSR    Assessment and Plan:    PAF: new onset evening of 10/27. Likely provoked by COPD exacerbation / PNA  First episode known. Converted to NSR on amiodarone - early 10/30. Doesn't feel any different.     -continue amiodarone to 400mg bid while inpatient, probably stop when he's nearing discharge as long as he doesn't have recurrent episodes  -continue Eliquis  -continue PO metoprolol     Acute respiratory failure  Imroving     Acute exacerbation of COPD  Improving     Pneumonia  On Abx    Cardiomyopathy: EF 25-30%  Etiology not clear.  May be due to his a.fib with RVR, could also be related to sepsis / PNA. - metoprolol as above  - continue Entresto low dose   - avoid aldactone given relatively high K     H/o lung cancer remotely s/p RLL lobectomy     CAD: h/o prior PCI  Continue ASA, statin      Signed:  Mo Miller MD  Interventional Cardiology  10/31/2022

## 2022-10-31 NOTE — PROGRESS NOTES
0700  Bedside report given to Alyssa Dias RN by Sb San RN. Report included SBAR, ED Report, Labs, and Cardiac Rhythm NSR. Patient in bed resting well. Vitals are stable.

## 2022-11-01 PROCEDURE — 94640 AIRWAY INHALATION TREATMENT: CPT

## 2022-11-01 PROCEDURE — 74011000250 HC RX REV CODE- 250: Performed by: PHYSICIAN ASSISTANT

## 2022-11-01 PROCEDURE — 74011250636 HC RX REV CODE- 250/636: Performed by: INTERNAL MEDICINE

## 2022-11-01 PROCEDURE — 65660000001 HC RM ICU INTERMED STEPDOWN

## 2022-11-01 PROCEDURE — 74011250636 HC RX REV CODE- 250/636: Performed by: PHYSICIAN ASSISTANT

## 2022-11-01 PROCEDURE — 74011250637 HC RX REV CODE- 250/637: Performed by: HOSPITALIST

## 2022-11-01 PROCEDURE — 74011000250 HC RX REV CODE- 250: Performed by: NURSE PRACTITIONER

## 2022-11-01 PROCEDURE — 74011250637 HC RX REV CODE- 250/637: Performed by: INTERNAL MEDICINE

## 2022-11-01 PROCEDURE — 74011000250 HC RX REV CODE- 250: Performed by: INTERNAL MEDICINE

## 2022-11-01 PROCEDURE — 74011000258 HC RX REV CODE- 258: Performed by: INTERNAL MEDICINE

## 2022-11-01 RX ORDER — LOPERAMIDE HYDROCHLORIDE 2 MG/1
4 CAPSULE ORAL
Status: DISCONTINUED | OUTPATIENT
Start: 2022-11-01 | End: 2022-11-04

## 2022-11-01 RX ORDER — ACETYLCYSTEINE 200 MG/ML
2 SOLUTION ORAL; RESPIRATORY (INHALATION)
Status: COMPLETED | OUTPATIENT
Start: 2022-11-01 | End: 2022-11-02

## 2022-11-01 RX ADMIN — ACETYLCYSTEINE 400 MG: 200 INHALANT RESPIRATORY (INHALATION) at 21:00

## 2022-11-01 RX ADMIN — METHYLPREDNISOLONE SODIUM SUCCINATE 40 MG: 40 INJECTION, POWDER, FOR SOLUTION INTRAMUSCULAR; INTRAVENOUS at 05:00

## 2022-11-01 RX ADMIN — ARFORMOTEROL TARTRATE 15 MCG: 15 SOLUTION RESPIRATORY (INHALATION) at 07:45

## 2022-11-01 RX ADMIN — METHYLPREDNISOLONE SODIUM SUCCINATE 40 MG: 40 INJECTION, POWDER, FOR SOLUTION INTRAMUSCULAR; INTRAVENOUS at 22:54

## 2022-11-01 RX ADMIN — BUDESONIDE 500 MCG: 0.5 INHALANT RESPIRATORY (INHALATION) at 21:00

## 2022-11-01 RX ADMIN — SODIUM CHLORIDE, PRESERVATIVE FREE 10 ML: 5 INJECTION INTRAVENOUS at 22:55

## 2022-11-01 RX ADMIN — ATORVASTATIN CALCIUM 40 MG: 40 TABLET, FILM COATED ORAL at 09:26

## 2022-11-01 RX ADMIN — ARFORMOTEROL TARTRATE 15 MCG: 15 SOLUTION RESPIRATORY (INHALATION) at 21:00

## 2022-11-01 RX ADMIN — PIPERACILLIN AND TAZOBACTAM 3.38 G: 3; .375 INJECTION, POWDER, FOR SOLUTION INTRAVENOUS at 14:55

## 2022-11-01 RX ADMIN — IPRATROPIUM BROMIDE AND ALBUTEROL SULFATE 3 ML: .5; 3 SOLUTION RESPIRATORY (INHALATION) at 13:18

## 2022-11-01 RX ADMIN — AMIODARONE HYDROCHLORIDE 400 MG: 200 TABLET ORAL at 17:44

## 2022-11-01 RX ADMIN — SACUBITRIL AND VALSARTAN 1 TABLET: 24; 26 TABLET, FILM COATED ORAL at 23:04

## 2022-11-01 RX ADMIN — SODIUM CHLORIDE, PRESERVATIVE FREE 10 ML: 5 INJECTION INTRAVENOUS at 06:28

## 2022-11-01 RX ADMIN — SODIUM CHLORIDE, PRESERVATIVE FREE 10 ML: 5 INJECTION INTRAVENOUS at 17:45

## 2022-11-01 RX ADMIN — METHYLPREDNISOLONE SODIUM SUCCINATE 40 MG: 40 INJECTION, POWDER, FOR SOLUTION INTRAMUSCULAR; INTRAVENOUS at 14:54

## 2022-11-01 RX ADMIN — BUDESONIDE 500 MCG: 0.5 INHALANT RESPIRATORY (INHALATION) at 07:45

## 2022-11-01 RX ADMIN — PIPERACILLIN AND TAZOBACTAM 3.38 G: 3; .375 INJECTION, POWDER, FOR SOLUTION INTRAVENOUS at 22:55

## 2022-11-01 RX ADMIN — METOPROLOL SUCCINATE 25 MG: 25 TABLET, FILM COATED, EXTENDED RELEASE ORAL at 09:26

## 2022-11-01 RX ADMIN — APIXABAN 5 MG: 5 TABLET, FILM COATED ORAL at 09:26

## 2022-11-01 RX ADMIN — AMIODARONE HYDROCHLORIDE 400 MG: 200 TABLET ORAL at 09:27

## 2022-11-01 RX ADMIN — PIPERACILLIN AND TAZOBACTAM 3.38 G: 3; .375 INJECTION, POWDER, FOR SOLUTION INTRAVENOUS at 06:27

## 2022-11-01 RX ADMIN — LOPERAMIDE HYDROCHLORIDE 4 MG: 2 CAPSULE ORAL at 14:54

## 2022-11-01 RX ADMIN — APIXABAN 5 MG: 5 TABLET, FILM COATED ORAL at 22:54

## 2022-11-01 RX ADMIN — ASPIRIN 81 MG: 81 TABLET, COATED ORAL at 09:26

## 2022-11-01 RX ADMIN — METHYLPREDNISOLONE SODIUM SUCCINATE 40 MG: 40 INJECTION, POWDER, FOR SOLUTION INTRAMUSCULAR; INTRAVENOUS at 09:26

## 2022-11-01 RX ADMIN — IPRATROPIUM BROMIDE AND ALBUTEROL SULFATE 3 ML: .5; 3 SOLUTION RESPIRATORY (INHALATION) at 21:00

## 2022-11-01 RX ADMIN — IPRATROPIUM BROMIDE AND ALBUTEROL SULFATE 3 ML: .5; 3 SOLUTION RESPIRATORY (INHALATION) at 07:45

## 2022-11-01 RX ADMIN — ACETYLCYSTEINE 400 MG: 200 INHALANT RESPIRATORY (INHALATION) at 13:18

## 2022-11-01 RX ADMIN — SACUBITRIL AND VALSARTAN 1 TABLET: 24; 26 TABLET, FILM COATED ORAL at 09:26

## 2022-11-01 NOTE — PROGRESS NOTES
HOWARD PLAN;  RUR-11%  Disposition-Home vs TBD  Transportation-by significant other  F/U with PCP/Specialist     Pt is on high flow, maintaining saturations. He has still not been willing to work with PT. Will follow for therapy recommendations and medical progression. Of note, CM noticed that pt's face sheet reflected that he had [employer] this morning. However, it reflects no insurance at this time. CM went to meet with pt, but unable to complete visit.     Josh Bridges

## 2022-11-01 NOTE — PROGRESS NOTES
[facility]                                                                                          Hospitalist Progress Note  Nadine Palomino MD  Answering service: [phone] or [phone] from in house phone        Date of Service:  2022  NAME:  Neri Rothman  :  1950  MRN:  [id number]      Admission Summary:   Neri Rothman is a 71 y.o. male with a PMHx of lung cancer, COPD, chronic hypoxic respiratory failure, on home O2 at 3 liters/min, who presents with Chief Complaint of Shortness of breath/ Dyspnea on exertion, progressively worsening for the past 3 days. Patient denies any fevers or chills. He used increased doses of inhalers, but it did not help. He called 911. Upon EMS arrival, his O2 saturation was 79%. Patient was placed on a BIPAP upon arrival.      Interval history / Subjective: Follow-up for respiratory failure and new onset PAF and heart failure as well on 20 L wean off as tolerates seen by cardiology and pulmonary appreciated help    No new issues have some loose bowel movement       Assessment & Plan:     Acute hypercapnic respiratory failure due to COPD pneumonia  Acute on chronic hypoxic respiratory failure due to COPD  Sepsis due to pneumonia multilobar pneumonia  --Initially patient was on BiPAP now on high flow nasal cannula 20 L  --Pulmonary on board  --Continue nebs, steroid, antibiotics   - follow cultures  --Monitor respiratory status  --CXR 10/31-- Decreasing bilateral areas of pneumonia. Follow-up to resolution is  suggested. PAF: new onset evening of 10/27. In the setting of respiratory failure  HFrEF newly diagnosed EF 25-30% unclear etiology    --Received amio gtt continue oral amiodarone  -TTE EF 25-30% - unclear etiology  --on eliquis on Entresto as well  --Seen by cardiology    Malignant neoplasm of the lung status post lobectomy for non-small cell lung cancer  --Follow-up as an outpatient  --History of right lung lobectomy    PENNY POA --monitor avoid nephrotoxin    Hyperlipidemia continue statin    Uncontrolled hypertension--currently on as needed hydralazine    Code status: DNR  DVT prophylaxis: Lovenox    Care Plan discussed with: Patient/Family and Nurse  Anticipated Disposition: Home w/Family  Anticipated Discharge: 24 hours to 48 hours    CRITICAL CARE ATTESTATION:  I had a face to face encounter with the patient, reviewed and interpreted patient data including clinical events, labs, images, vital signs, I/O's, and examined patient. I have discussed the case and the plan and management of the patient's care with the consulting services, the bedside nurses and necessary ancillary providers. NOTE OF PERSONAL INVOLVEMENT IN CARE   This patient has a high probability of imminent, clinically significant deterioration, which requires the highest level of preparedness to intervene urgently. I participated in the decision-making and personally managed or directed the management of the following life and organ supporting interventions that required my frequent assessment to treat or prevent imminent deterioration. I personally spent 45 minutes of critical care time. This is time spent at this critically ill patient's bedside actively involved in patient care as well as the coordination of care and discussions with the patient's family. This does not include any procedural time which has been billed separately.       Hospital Problems  Date Reviewed: 1/9/2019            Codes Class Noted POA    Moderate protein-calorie malnutrition ([facility] ICD-10-CM: E44.0  ICD-9-CM: 263.0  10/27/2022 Yes        COPD (chronic obstructive pulmonary disease) ([facility] ICD-10-CM: J44.9  ICD-9-CM: 828  10/26/2022 Unknown        Pneumonia ICD-10-CM: J18.9  ICD-9-CM: 315  10/26/2022 Unknown        Malignant neoplasm of lung, unspecified laterality, unspecified part of lung ([facility] ICD-10-CM: C34.90  ICD-9-CM: 162.9  10/26/2022 Unknown Sepsis ([facility] ICD-10-CM: A41.9  ICD-9-CM: 038.9, 995.91  10/26/2022 Unknown       Review of Systems:   A comprehensive review of systems was negative. Vital Signs:    Last 24hrs VS reviewed since prior progress note. Most recent are:  Visit Vitals  /81 (BP 1 Location: Left upper arm, BP Patient Position: At rest)   Pulse 69   Temp 97.6 °F (36.4 °C)   Resp 21   Ht 5' 11\" (1.803 m)   Wt 67 kg (147 lb 11.3 oz)   SpO2 98%   BMI 20.60 kg/m²         Intake/Output Summary (Last 24 hours) at 11/1/2022 1242  Last data filed at 10/31/2022 2205  Gross per 24 hour   Intake 450 ml   Output 1400 ml   Net -950 ml          Physical Examination:     I had a face to face encounter with this patient and independently examined them on 11/1/2022 as outlined below:          General : alert x 3, awake, no acute distress, resting in bed, pleasant   HEENT: PEERL, EOMI, moist mucus membrane, TM clear  Neck: supple, no JVD, no meningeal signs  Chest: Coarse breath sounds, scattered wheezing  CVS: S1 S2 heard, Capillary refill less than 2 seconds  Abd: soft/ Non tender, non distended  Ext: no clubbing, no cyanosis, no edema, brisk 2+ DP pulses  Neuro/Psych: pleasant mood and affect,  sensory grossly within normal limit, Strength 5/5 in all extremities  Skin: warm     Data Review:    I personally reviewed  Image and labs      Labs:     Recent Labs     10/30/22  0407   WBC 16.9*   HGB 13.1   HCT 40.9          Recent Labs     10/30/22  0407      K 4.9      CO2 28   BUN 42*   CREA 0.91   *   CA 8.4*       No results for input(s): ALT, AP, TBIL, TBILI, TP, ALB, GLOB, GGT, AML, LPSE in the last 72 hours. No lab exists for component: SGOT, GPT, AMYP, HLPSE    No results for input(s): INR, PTP, APTT, INREXT, INREXT in the last 72 hours. No results for input(s): FE, TIBC, PSAT, FERR in the last 72 hours.    No results found for: FOL, RBCF   Recent Labs     10/30/22  1714   PH 7.41   PCO2 48*   PO2 106*       No results for input(s): CPK, CKNDX, TROIQ in the last 72 hours.     No lab exists for component: CPKMB  Lab Results   Component Value Date/Time    Cholesterol, total 106 02/20/2018 04:54 AM    HDL Cholesterol 45 02/20/2018 04:54 AM    LDL, calculated 46 02/20/2018 04:54 AM    Triglyceride 75 02/20/2018 04:54 AM    CHOL/HDL Ratio 2.4 02/20/2018 04:54 AM     Lab Results   Component Value Date/Time    Glucose (POC) 100 09/14/2015 12:14 PM     Lab Results   Component Value Date/Time    Color YELLOW/STRAW 01/09/2019 05:00 PM    Appearance CLEAR 01/09/2019 05:00 PM    Specific gravity 1.014 01/09/2019 05:00 PM    pH (UA) 7.5 01/09/2019 05:00 PM    Protein NEGATIVE 01/09/2019 05:00 PM    Glucose NEGATIVE 01/09/2019 05:00 PM    Ketone TRACE (A) 01/09/2019 05:00 PM    Bilirubin NEGATIVE 01/09/2019 05:00 PM    Urobilinogen 0.2 01/09/2019 05:00 PM    Nitrites NEGATIVE 01/09/2019 05:00 PM    Leukocyte Esterase NEGATIVE 01/09/2019 05:00 PM    Epithelial cells FEW 01/09/2019 05:00 PM    Bacteria NEGATIVE 01/09/2019 05:00 PM    WBC 0-4 01/09/2019 05:00 PM    RBC 0-5 01/09/2019 05:00 PM         Medications Reviewed:     Current Facility-Administered Medications   Medication Dose Route Frequency    acetylcysteine (MUCOMYST) 200 mg/mL (20 %) solution 400 mg  2 mL Nebulization TID RT    methylPREDNISolone (PF) (SOLU-MEDROL) injection 40 mg  40 mg IntraVENous Q8H    amiodarone (CORDARONE) tablet 400 mg  400 mg Oral BID    sacubitriL-valsartan (ENTRESTO) 24-26 mg tablet 1 Tablet  1 Tablet Oral Q12H    apixaban (ELIQUIS) tablet 5 mg  5 mg Oral Q12H    metoprolol succinate (TOPROL-XL) XL tablet 25 mg  25 mg Oral DAILY    albuterol-ipratropium (DUO-NEB) 2.5 MG-0.5 MG/3 ML  3 mL Nebulization BID RT    hydrALAZINE (APRESOLINE) 20 mg/mL injection 20 mg  20 mg IntraVENous Q6H PRN    albuterol-ipratropium (DUO-NEB) 2.5 MG-0.5 MG/3 ML  3 mL Nebulization Q4H PRN    sodium chloride (NS) flush 5-40 mL  5-40 mL IntraVENous Q8H    sodium chloride (NS) flush 5-40 mL  5-40 mL IntraVENous PRN    acetaminophen (TYLENOL) tablet 650 mg  650 mg Oral Q6H PRN    Or    acetaminophen (TYLENOL) suppository 650 mg  650 mg Rectal Q6H PRN    ondansetron (ZOFRAN ODT) tablet 4 mg  4 mg Oral Q8H PRN    Or    ondansetron (ZOFRAN) injection 4 mg  4 mg IntraVENous Q6H PRN    polyethylene glycol (MIRALAX) packet 17 g  17 g Oral DAILY PRN    piperacillin-tazobactam (ZOSYN) 3.375 g in 0.9% sodium chloride (MBP/ADV) 100 mL MBP  3.375 g IntraVENous Q8H    arformoteroL (BROVANA) neb solution 15 mcg  15 mcg Nebulization BID RT    aspirin delayed-release tablet 81 mg  81 mg Oral DAILY    atorvastatin (LIPITOR) tablet 40 mg  40 mg Oral DAILY    budesonide (PULMICORT) 500 mcg/2 ml nebulizer suspension  500 mcg Nebulization BID    oxyCODONE IR (ROXICODONE) tablet 20 mg  20 mg Oral Q6H PRN    oxyCODONE IR (ROXICODONE) tablet 10 mg  10 mg Oral Q6H PRN     ______________________________________________________________________  EXPECTED LENGTH OF STAY: 4d 19h  ACTUAL LENGTH OF STAY:          6      Please note that this dictation was completed with [employer], the computer voice recognition software. Quite often unanticipated grammatical, syntax, homophones, and other interpretive errors are inadvertently transcribed by the computer software. Please disregard these errors. Please excuse any errors that have escaped final proofreading.                 Haja Mosley MD

## 2022-11-01 NOTE — PROGRESS NOTES
Occupational Therapy: Approached patient for OT services this afternoon. Patient received in bed and reported he had done quite a bit today on his own including OOB to [facility] and chair, FRIEDA cruz.  Patient requesting a rest. Will follow up tomorrow as able  Diann Akbar OTR/AMI

## 2022-11-01 NOTE — PROGRESS NOTES
[place] [facility] Cardiology Progress Note    Date of service: 11/1/2022    Subjective:   Remains in NSR  Feels about the same    Objective:    Visit Vitals  /82   Pulse 79   Temp 97.7 °F (36.5 °C)   Resp 28   Ht 5' 11\" (1.803 m)   Wt 67 kg (147 lb 11.3 oz)   SpO2 98%   BMI 20.60 kg/m²       Physical Exam  Constitutional:       Appearance: He is underweight. Interventions: Nasal cannula in place. HENT:      Head: Normocephalic and atraumatic. Eyes:      General: No scleral icterus. Conjunctiva/sclera: Conjunctivae normal.   Neck:      Vascular: No JVD. Cardiovascular:      Rate and Rhythm: Normal rate and regular rhythm. Heart sounds: Normal heart sounds. No murmur heard. No gallop. Pulmonary:      Effort: Pulmonary effort is normal. No respiratory distress. Breath sounds: Normal breath sounds. No stridor. No wheezing or rales. Abdominal:      General: There is no distension. Palpations: Abdomen is soft. Musculoskeletal:         General: No deformity. Skin:     General: Skin is warm and dry. Neurological:      Mental Status: He is alert and oriented to person, place, and time. Psychiatric:         Behavior: Behavior normal.       Data reviewed:  No results found for this or any previous visit (from the past 12 hour(s)). Telemetry: NSR    Assessment and Plan:    PAF: new onset evening of 10/27. Likely provoked by COPD exacerbation / PNA  First episode known. Converted to NSR on amiodarone - early 10/30. Doesn't feel any different.     -continue amiodarone to 400mg bid while inpatient, probably stop when he's nearing discharge as long as he doesn't have recurrent episodes  -continue Eliquis  -continue PO metoprolol     Acute respiratory failure  Imroving     Acute exacerbation of COPD  Improving     Pneumonia  On Abx    Cardiomyopathy: EF 25-30%  Etiology not clear. May be due to his a.fib with RVR, could also be related to sepsis / PNA.    - metoprolol as above  - continue Entresto low dose   - avoid aldactone given relatively high K     H/o lung cancer remotely s/p RLL lobectomy     CAD: h/o prior PCI  Continue ASA, statin    Signing off for now  Available to see as needed upon request while inpatient. Signed:  Leonila Moctezuma MD  Interventional Cardiology  11/1/2022

## 2022-11-01 NOTE — PROGRESS NOTES
Pulmonary, Critical Care, and Sleep Medicine    Progress Note    Name: Skinny Justice MRN: [id number]   : 1950 Hospital: [facility]   Date: 2022        IMPRESSION:   Acute hypercapnic resp failure- Moderate COPD ( 3lpm baseline + trelegy + Dr. Todd Arboleda) + multilobar CAP- PCT > 22. At risk for MDROs and atypicals consider ESBLS. Decom HFrEF  H/o RLL lobectomy at [address] [place] [address]  for NSCLC  COPD  Sepsis- from PNA  A fib. On amiodarone       RECOMMENDATIONS:   Pt at high risk for requiring invasive ventilation  Zosyn   IV steroids, reduce   Nebs  Dvt proph  Cards involved    Add mucomyst for congestion   NIV as needed during the day for WOB  On High flow, can move to midflow. Maybe tomorrow  O2 titration above 90%      Subjective:       Sounds a little congested   Chest film shows some improvement   Down to 20lpm and 90% FiO2  ESR 23, probnp 81054, procal 0.74    10/31  Back up to 35lpm and 80% FIO2  Had a rough weekend   + wheeze today     ECHO from 10/28  Result status: Final result       Left Ventricle: Severely reduced left ventricular systolic function with a visually estimated EF of 25 - 30%. Not well visualized. Left ventricle size is normal. Normal wall thickness. Unable to assess wall motion. Abnormal diastolic function. Right Ventricle: Not well visualized. Reduced systolic function. TAPSE is abnormal. TAPSE is 1.1 cm. Mitral Valve: Mild regurgitation. Left Atrium: Left atrium is mildly dilated. Right Atrium: Right atrium is mildly dilated. 10/28  Inability to tolerate below 25lpm and 85% FiO2 today  Sounds congested   Feels about the same as yesterday   Elevation of WBC might be from steroids     10/70  Metabolic acidosis   Down to 30lpm and 50% FiO2  Feeling a little better     Patient is a 71 y.o. male with COPD ( FEV1 in office  53% folows with Dr. Todd Arboleda) who is s/p RLL lobectomy for NSCLC at [address] [place] [address] .  Pt with inc SOB and WOB last 2-3 days and presented to ED with SOB/hypercapnic resp failure. On HFNC currently and gettign ready top go to [facility]. Seen in ED. C/o productive cough. No hemoptysis      Past Medical History:   Diagnosis Date    Asthma     Cancer ([facility]     Lung Cancer    Chronic obstructive pulmonary disease (HCC)     Chronic pain     High cholesterol     Lung cancer ([facility]       Past Surgical History:   Procedure Laterality Date    HX LOBECTOMY      Right lung    HX OTHER SURGICAL      Partial right lung removal      Prior to Admission medications    Medication Sig Start Date End Date Taking? Authorizing Provider   arformoteroL (BROVANA) 15 mcg/2 mL nebu neb solution 15 mcg by Nebulization route two (2) times a day. Sometimes uses TID   Yes Provider, Historical   budesonide (PULMICORT) 0.5 mg/2 mL nbsp 500 mcg by Nebulization route two (2) times a day. Sometimes uses TID   Yes Provider, Historical   atorvastatin (LIPITOR) 40 mg tablet Take 40 mg by mouth daily. Yes Provider, Historical   albuterol (PROVENTIL VENTOLIN) 2.5 mg /3 mL (0.083 %) nebu 2.5 mg by Nebulization route every six (6) hours as needed for Wheezing or Shortness of Breath. Yes Provider, Historical   oxyCODONE IR (ROXICODONE) 20 mg immediate release tablet Take 40 mg by mouth every twelve (12) hours as needed for Pain (severe pain). Yes Provider, Historical   aspirin delayed-release 81 mg tablet Take 1 Tab by mouth daily. 18  Yes Flor Jacob NP   ketoconazole (NIZORAL) 2 % shampoo Apply  to affected area as needed for Itching. Provider, Historical     No Known Allergies   Social History     Tobacco Use    Smoking status: Former     Packs/day: 1.00     Years: 45.00     Pack years: 45.00     Types: Cigarettes     Quit date: 2009     Years since quittin.8    Smokeless tobacco: Never   Substance Use Topics    Alcohol use: No     Comment: rarely/social       No family history on file.      Current Facility-Administered Medications   Medication Dose Route Frequency methylPREDNISolone (PF) (SOLU-MEDROL) injection 40 mg  40 mg IntraVENous Q6H    amiodarone (CORDARONE) tablet 400 mg  400 mg Oral BID    sacubitriL-valsartan (ENTRESTO) 24-26 mg tablet 1 Tablet  1 Tablet Oral Q12H    apixaban (ELIQUIS) tablet 5 mg  5 mg Oral Q12H    metoprolol succinate (TOPROL-XL) XL tablet 25 mg  25 mg Oral DAILY    albuterol-ipratropium (DUO-NEB) 2.5 MG-0.5 MG/3 ML  3 mL Nebulization BID RT    sodium chloride (NS) flush 5-40 mL  5-40 mL IntraVENous Q8H    piperacillin-tazobactam (ZOSYN) 3.375 g in 0.9% sodium chloride (MBP/ADV) 100 mL MBP  3.375 g IntraVENous Q8H    arformoteroL (BROVANA) neb solution 15 mcg  15 mcg Nebulization BID RT    aspirin delayed-release tablet 81 mg  81 mg Oral DAILY    atorvastatin (LIPITOR) tablet 40 mg  40 mg Oral DAILY    budesonide (PULMICORT) 500 mcg/2 ml nebulizer suspension  500 mcg Nebulization BID       Review of Systems:  Review of systems not obtained due to patient factors. Objective:   Vital Signs:    Visit Vitals  /81 (BP 1 Location: Left upper arm, BP Patient Position: At rest)   Pulse 74   Temp 97.6 °F (36.4 °C)   Resp 21   Ht 5' 11\" (1.803 m)   Wt 67 kg (147 lb 11.3 oz)   SpO2 98%   BMI 20.60 kg/m²       O2 Device: Hi flow nasal cannula, Heated   O2 Flow Rate (L/min): 20 l/min (weaned)   Temp (24hrs), Av °F (36.7 °C), Min:97.6 °F (36.4 °C), Max:98.6 °F (37 °C)       Intake/Output:   Last shift:      No intake/output data recorded.   Last 3 shifts: 10/30 1901 -  0700  In: 850 [P.O.:850]  Out: 1400 [Urine:1400]    Intake/Output Summary (Last 24 hours) at 2022 1202  Last data filed at 10/31/2022 2205  Gross per 24 hour   Intake 450 ml   Output 1400 ml   Net -950 ml        Physical Exam:   Alert on HFNC  + moderate WOB with mild accessory use and word dyspnea no sternal retractions  No abd paradox  Diffuse wheeze  Heart : tachy/reg no murmur  Trachea midline  Abd soft/NT no organomegaly  EX no Cc/e  Data review:     Recent Results (from the past 24 hour(s))   SED RATE (ESR)    Collection Time: 10/31/22  2:56 PM   Result Value Ref Range    Sed rate, automated 23 (H) 0 - 20 mm/hr   NT-PRO BNP    Collection Time: 10/31/22  2:56 PM   Result Value Ref Range    NT pro-BNP 12,890 (H) <125 PG/ML   PROCALCITONIN    Collection Time: 10/31/22  2:56 PM   Result Value Ref Range    Procalcitonin 0.74 ng/mL         Imaging:  I have personally reviewed the patients radiographs and have reviewed the reports:  PCXR with LLL and right mid lung new ASD     CC > 35 minutes excluding procedures    Mundo Vuong PA-C

## 2022-11-01 NOTE — PROGRESS NOTES
Chart checked, pt cleared by nursing. Attempted to work with pt but he politely yet adamantly declined stating that he did not feel up to it, having not too long ago gotten off the bedside commode. PT will follow and see as able, appropriate, and as pt is willing. Pt stated that he prefers a mid morning session, if possible.  Prema Moreno, PT

## 2022-11-01 NOTE — PROGRESS NOTES
Spiritual Care Assessment/Progress Note  [facility]      NAME: Caitlin Ramos      MRN: [id number]  AGE: 71 y.o.  SEX: male  Caodaism Affiliation: Orthodoxy   Language: English     11/1/2022     Total Time (in minutes): 12     Spiritual Assessment begun in [facility] through conversation with:         [x]Patient        [] Family    [] Friend(s)        Reason for Consult: Initial/Spiritual assessment, patient floor     Spiritual beliefs: (Please include comment if needed)     [x] Identifies with a brittney tradition:         [] Supported by a brittney community:            [] Claims no spiritual orientation:           [] Seeking spiritual identity:                [] Adheres to an individual form of spirituality:           [] Not able to assess:                           Identified resources for coping:      [] Prayer                               [] Music                  [] Guided Imagery     [x] Family/friends                 [] Pet visits     [] Devotional reading                         [] Unknown     [] Other:                                               Interventions offered during this visit: (See comments for more details)    Patient Interventions: Initial visit, Caodaism beliefs/image of God discussed, Coping skills reviewed/reinforced           Plan of Care:     [] Support spiritual and/or cultural needs    [] Support AMD and/or advance care planning process      [] Support grieving process   [] Coordinate Rites and/or Rituals    [] Coordination with community clergy   [x] No spiritual needs identified at this time   [] Detailed Plan of Care below (See Comments)  [] Make referral to Music Therapy  [] Make referral to Pet Therapy     [] Make referral to Addiction services  [] Make referral to [facility]  [] Make referral to Spiritual Care Partner  [] No future visits requested        [] Contact Spiritual Care for further referrals     Comments: Referral source:  initiated visit as a result Ashley Koenig length of stay [address] [facility] IMCU. I reviewed the patient's medical record prior to this encounter. Assessment: Managing the distress of current hospitalization    I cultivated a relationship of support through expressions of care and concern; explored spiritual needs, hopes and related resources. I offered words of encouragement and blessing for continued healing. Outcome:  Juju Stacy expressed gratitude for supportive relationships with friends and family. Plan of Care: No further spiritual needs were identified during this visit. Please contact Spiritual Care services for any additional needs ([phone])    _________________________________________  Rev.  Dominguez Fuentes, Staff MD [place], [place], [facility]

## 2022-11-02 PROCEDURE — 74011250637 HC RX REV CODE- 250/637: Performed by: INTERNAL MEDICINE

## 2022-11-02 PROCEDURE — 74011000250 HC RX REV CODE- 250: Performed by: INTERNAL MEDICINE

## 2022-11-02 PROCEDURE — 74011250637 HC RX REV CODE- 250/637: Performed by: HOSPITALIST

## 2022-11-02 PROCEDURE — 74011000258 HC RX REV CODE- 258: Performed by: INTERNAL MEDICINE

## 2022-11-02 PROCEDURE — 74011000250 HC RX REV CODE- 250: Performed by: PHYSICIAN ASSISTANT

## 2022-11-02 PROCEDURE — 94640 AIRWAY INHALATION TREATMENT: CPT

## 2022-11-02 PROCEDURE — 65660000001 HC RM ICU INTERMED STEPDOWN

## 2022-11-02 PROCEDURE — 74011250636 HC RX REV CODE- 250/636: Performed by: PHYSICIAN ASSISTANT

## 2022-11-02 PROCEDURE — 74011250636 HC RX REV CODE- 250/636: Performed by: INTERNAL MEDICINE

## 2022-11-02 RX ADMIN — ACETYLCYSTEINE 400 MG: 200 INHALANT RESPIRATORY (INHALATION) at 16:14

## 2022-11-02 RX ADMIN — PIPERACILLIN AND TAZOBACTAM 3.38 G: 3; .375 INJECTION, POWDER, FOR SOLUTION INTRAVENOUS at 15:34

## 2022-11-02 RX ADMIN — AMIODARONE HYDROCHLORIDE 400 MG: 200 TABLET ORAL at 08:23

## 2022-11-02 RX ADMIN — PIPERACILLIN AND TAZOBACTAM 3.38 G: 3; .375 INJECTION, POWDER, FOR SOLUTION INTRAVENOUS at 07:03

## 2022-11-02 RX ADMIN — SODIUM CHLORIDE, PRESERVATIVE FREE 10 ML: 5 INJECTION INTRAVENOUS at 15:35

## 2022-11-02 RX ADMIN — APIXABAN 5 MG: 5 TABLET, FILM COATED ORAL at 21:57

## 2022-11-02 RX ADMIN — ASPIRIN 81 MG: 81 TABLET, COATED ORAL at 08:23

## 2022-11-02 RX ADMIN — SODIUM CHLORIDE, PRESERVATIVE FREE 10 ML: 5 INJECTION INTRAVENOUS at 07:04

## 2022-11-02 RX ADMIN — METHYLPREDNISOLONE SODIUM SUCCINATE 40 MG: 40 INJECTION, POWDER, FOR SOLUTION INTRAMUSCULAR; INTRAVENOUS at 21:58

## 2022-11-02 RX ADMIN — SODIUM CHLORIDE, PRESERVATIVE FREE 10 ML: 5 INJECTION INTRAVENOUS at 21:58

## 2022-11-02 RX ADMIN — ACETYLCYSTEINE 400 MG: 200 INHALANT RESPIRATORY (INHALATION) at 08:53

## 2022-11-02 RX ADMIN — IPRATROPIUM BROMIDE AND ALBUTEROL SULFATE 3 ML: .5; 3 SOLUTION RESPIRATORY (INHALATION) at 08:53

## 2022-11-02 RX ADMIN — ATORVASTATIN CALCIUM 40 MG: 40 TABLET, FILM COATED ORAL at 08:23

## 2022-11-02 RX ADMIN — BUDESONIDE 500 MCG: 0.5 INHALANT RESPIRATORY (INHALATION) at 20:49

## 2022-11-02 RX ADMIN — IPRATROPIUM BROMIDE AND ALBUTEROL SULFATE 3 ML: .5; 3 SOLUTION RESPIRATORY (INHALATION) at 16:15

## 2022-11-02 RX ADMIN — BUDESONIDE 500 MCG: 0.5 INHALANT RESPIRATORY (INHALATION) at 08:53

## 2022-11-02 RX ADMIN — SACUBITRIL AND VALSARTAN 1 TABLET: 24; 26 TABLET, FILM COATED ORAL at 08:23

## 2022-11-02 RX ADMIN — ACETYLCYSTEINE 400 MG: 200 INHALANT RESPIRATORY (INHALATION) at 20:49

## 2022-11-02 RX ADMIN — LOPERAMIDE HYDROCHLORIDE 4 MG: 2 CAPSULE ORAL at 21:57

## 2022-11-02 RX ADMIN — AMIODARONE HYDROCHLORIDE 400 MG: 200 TABLET ORAL at 17:38

## 2022-11-02 RX ADMIN — ARFORMOTEROL TARTRATE 15 MCG: 15 SOLUTION RESPIRATORY (INHALATION) at 20:49

## 2022-11-02 RX ADMIN — IPRATROPIUM BROMIDE AND ALBUTEROL SULFATE 3 ML: .5; 3 SOLUTION RESPIRATORY (INHALATION) at 20:49

## 2022-11-02 RX ADMIN — APIXABAN 5 MG: 5 TABLET, FILM COATED ORAL at 08:23

## 2022-11-02 RX ADMIN — METOPROLOL SUCCINATE 25 MG: 25 TABLET, FILM COATED, EXTENDED RELEASE ORAL at 08:23

## 2022-11-02 RX ADMIN — LOPERAMIDE HYDROCHLORIDE 4 MG: 2 CAPSULE ORAL at 15:34

## 2022-11-02 RX ADMIN — METHYLPREDNISOLONE SODIUM SUCCINATE 40 MG: 40 INJECTION, POWDER, FOR SOLUTION INTRAMUSCULAR; INTRAVENOUS at 15:34

## 2022-11-02 RX ADMIN — LOPERAMIDE HYDROCHLORIDE 4 MG: 2 CAPSULE ORAL at 08:23

## 2022-11-02 RX ADMIN — METHYLPREDNISOLONE SODIUM SUCCINATE 40 MG: 40 INJECTION, POWDER, FOR SOLUTION INTRAMUSCULAR; INTRAVENOUS at 07:03

## 2022-11-02 RX ADMIN — SACUBITRIL AND VALSARTAN 1 TABLET: 24; 26 TABLET, FILM COATED ORAL at 21:57

## 2022-11-02 RX ADMIN — ARFORMOTEROL TARTRATE 15 MCG: 15 SOLUTION RESPIRATORY (INHALATION) at 08:53

## 2022-11-02 NOTE — PROGRESS NOTES
Problem: Falls - Risk of  Goal: *Absence of Falls  Description: Document Justina Clark Fall Risk and appropriate interventions in the flowsheet.   Outcome: Progressing Towards Goal  Note: Fall Risk Interventions:  Mobility Interventions: PT Consult for mobility concerns, Patient to call before getting OOB, PT Consult for assist device competence         Medication Interventions: Assess postural VS orthostatic hypotension, Bed/chair exit alarm, Patient to call before getting OOB, Teach patient to arise slowly    Elimination Interventions: Urinal in reach, Call light in reach              Problem: Patient Education: Go to Patient Education Activity  Goal: Patient/Family Education  Outcome: Progressing Towards Goal     Problem: Pain  Goal: *Control of Pain  Outcome: Progressing Towards Goal     Problem: Patient Education: Go to Patient Education Activity  Goal: Patient/Family Education  Outcome: Progressing Towards Goal     Problem: Nutrition Deficit  Goal: *Optimize nutritional status  Outcome: Progressing Towards Goal     Problem: Patient Education: Go to Patient Education Activity  Goal: Patient/Family Education  Outcome: Progressing Towards Goal     Problem: Patient Education: Go to Patient Education Activity  Goal: Patient/Family Education  Outcome: Progressing Towards Goal

## 2022-11-02 NOTE — PROGRESS NOTES
Transition Plan of Care  RUR 12%-Med  Disposition-at baseline he resides at home and was independent with all ADLs. He is oxygen dependent at 2-3 lpm provided by [address]. He has not worked with therapy during this admit. Moving back and forth between high flow and HFNC. Will likely discharge home with possible home health.

## 2022-11-02 NOTE — PROGRESS NOTES
Occupational Therapy    Chart reviewed, attempted to see patient. Pt refusing, waiting for his breakfast and reporting he is experiencing diarrhea this morning. Will attempt to follow up as able.     [employer], O[employer], OTR/L

## 2022-11-02 NOTE — PROGRESS NOTES
Problem: Falls - Risk of  Goal: *Absence of Falls  Description: Document Meghan Magana Fall Risk and appropriate interventions in the flowsheet. Outcome: Progressing Towards Goal  Note: Fall Risk Interventions:  Mobility Interventions: Patient to call before getting OOB, PT Consult for mobility concerns, PT Consult for assist device competence         Medication Interventions: Patient to call before getting OOB, Teach patient to arise slowly    Elimination Interventions: Urinal in reach, Toileting schedule/hourly rounds, Call light in reach              Problem: Patient Education: Go to Patient Education Activity  Goal: Patient/Family Education  Outcome: Progressing Towards Goal     Problem: Pain  Goal: *Control of Pain  Outcome: Progressing Towards Goal     Problem: Patient Education: Go to Patient Education Activity  Goal: Patient/Family Education  Outcome: Progressing Towards Goal     Problem: Nutrition Deficit  Goal: *Optimize nutritional status  Outcome: Progressing Towards Goal     Problem: Patient Education: Go to Patient Education Activity  Goal: Patient/Family Education  Outcome: Progressing Towards Goal     Problem: Patient Education: Go to Patient Education Activity  Goal: Patient/Family Education  Outcome: Progressing Towards Goal     Problem: Pressure Injury - Risk of  Goal: *Prevention of pressure injury  Description: Document Ramos Scale and appropriate interventions in the flowsheet.   Outcome: Progressing Towards Goal  Note: Pressure Injury Interventions:       Moisture Interventions: Absorbent underpads, Minimize layers    Activity Interventions: Increase time out of bed, Pressure redistribution bed/mattress(bed type), PT/OT evaluation    Mobility Interventions: PT/OT evaluation, Pressure redistribution bed/mattress (bed type)    Nutrition Interventions: Document food/fluid/supplement intake                     Problem: Patient Education: Go to Patient Education Activity  Goal: Patient/Family Education  Outcome: Progressing Towards Goal

## 2022-11-02 NOTE — ADT AUTH CERT NOTES
Pneumonia - Care Day 7 (11/1/2022) by Jai Pugh       Review Status   Completed       Created By   Jai Pugh      Criteria Review      Care Day: 7 Care Date: 11/1/2022 Level of Care: Intermediate Care    Guideline Day 3    Level Of Care    ( ) Floor to discharge    11/2/2022 15:32:45 EDT by Jai Pugh      IP/IMCU    Clinical Status    (X) * Hemodynamic stability    11/2/2022 15:32:45 EDT by Shiv Spindle:  78  BP: 141/75    (X) * Afebrile or temperature acceptable for next level of care    11/2/2022 15:32:45 EDT by Shiv Spindle: 98.3 °F  (afebrile)    ( ) * Tachypnea absent    11/2/2022 15:32:45 EDT by Jai Pugh      RR: 20-24    ( ) * Hypoxemia absent    11/2/2022 15:32:45 EDT by Jai Pugh      stable sats on O2 supplement (HFNC)    (X) * Mental status at baseline    ( ) * Antibiotic regimen acceptable for next level of care    11/2/2022 15:32:45 EDT by Jai Pugh      still on IV antibiotics    ( ) * Discharge plans and education understood    Activity    (X) * Ambulatory or acceptable for next level of care    Routes    (X) * Oral hydration    (X) * Oral medications or regimen acceptable for next level of care    11/2/2022 15:32:45 EDT by Jai Pugh      -Cordarone 400 mg BID PO, Eliquis 5mg Q12 PO, ASA 81 mg DAILY PO, Lipitor 40 mg DAILY PO, Imodium 4 mg Q6 PRN PO x1, Toprol-XL 25 mg DAILY PO, Entresto 24-26 mg  Q12 PO    (X) * Oral diet or acceptable for next level of care    11/2/2022 15:32:45 EDT by Jai Pugh      DIET Regular w/ Oral supplement x3; Standard High Calorie/High Protein    Interventions    ( ) * Oxygen absent or at baseline need    11/2/2022 15:32:45 EDT by Jai Pugh      SpO2: 96 %   Hi-flow nasal cannula 15 l/min    (X) * Isolation not indicated, or is performable at next level of care    11/2/2022 15:32:45 EDT by Jai Pugh      None active    Medications    ( ) Oral antibiotics    11/2/2022 15:32:45 EDT by Lizzie Gerard, Dior      Zosyn 3.375 g Q8 IV    * Milestone   Additional Notes    DATE: 11-01      Pertinent Updates:   -Pt still on high flow, maintaining saturations. not been willing to work with PT./OT requesting rest today   -Per Pulmo: sounds a little congested ; down to 20lpm and 90% FiO2; ESR 23, probnp [id number], procal 0.74   -Chest film shows some improvement             Physical Exam:   Alert on HFNC   Congested ; no sternal retractions   No abd paradox   Diffuse wheeze   Trachea midline      MD Consults/Assessments & Plans:         **Pulmo Notes**   Decom HFrEF      Plan:    RECOMMENDATIONS:   -Zosyn    -IV steroids, reduce    -Nebs    -Add mucomyst for congestion    -NIV as needed during the day for WOB   -On High flow, can move to midflow tomorrow         **IM Notes**   Assessment & Plan:       Acute hypercapnic respiratory failure due to COPD pneumonia   Acute on chronic hypoxic respiratory failure due to COPD   Sepsis due to pneumonia multilobar pneumonia   --on high flow nasal cannula 20 L   --Continue nebs, steroid, antibiotics       PAF: new onset evening of 10/27. In the setting of respiratory failure   HFrEF newly diagnosed EF 25-30% unclear etiology   -continue oral amiodarone         Medications:   -Mucomyst 400mg TID NEBULIZATION x2   -Duo-neb  3 mL Q4 PRN NEBULIZATION x1   then 3 mL BID NEBULIZATION   -Brovana 15 mcg BID NEBULIZATION   -Pulmicort 500 mcg BID NEBULIZATION   -Solu-medrol 40 mg Q8 IV x2 then 40 mg   Q6 IV x2         **CM Notes**   following for therapy recommendations and medical progression. Of note, CM noticed that pt's face sheet reflected that he had Zettics this morning. However, it reflects no insurance at this time. CM unable to complete visit.

## 2022-11-02 NOTE — PROGRESS NOTES
0800: Bedside and Verbal shift change report given to Kush RN (oncoming nurse) by Joseph Cronin RN (offgoing nurse). Report included the following information SBAR, Kardex, MAR, Recent Results, and Cardiac Rhythm SR.       2000: Bedside and Verbal shift change report given to Joseph Cronin RN (oncoming nurse) by Alvaro Escalante RN (offgoing nurse).  Report included the following information SBAR, Kardex, MAR, Recent Results, and Cardiac Rhythm SR.

## 2022-11-02 NOTE — PROGRESS NOTES
Pulmonary, Critical Care, and Sleep Medicine    Progress Note    Name: Rosina Mata MRN: [id number]   [facility] 1950 [facility]   Date: [facility]        IMPRESSION:   Acute hypercapnic resp failure- Moderate COPD ( 3lpm baseline + trelegy + Dr. Valente Esteban) + multilobar CAP- PCT > 22. At risk for MDROs and atypicals consider ESBLS. Decom HFrEF  H/o RLL lobectomy at [facility]  for NSCLC  COPD  Sepsis- from PNA  A fib. On amiodarone       RECOMMENDATIONS:   Pt at high risk for requiring invasive ventilation  Zosyn   IV steroids, reduce to PO  Nebs  Dvt proph  Cards involved    mucomyst for congestion   NIV as needed during the day for WOB  Might need inpatient rehab at discharge   O2 titration above 90%      Subjective:       Moving back and forth with high flow and HFNC  Less congested today  Mobilizing more today       Sounds a little congested   Chest film shows some improvement   Down to 20lpm and 90% FiO2  ESR 23, probnp [id number], procal 0.74    10/31  Back up to 35lpm and 80% FIO2  Had a rough weekend   + wheeze today     ECHO from 10/28  Result status: Final result       Left Ventricle: Severely reduced left ventricular systolic function with a visually estimated EF of 25 - 30%. Not well visualized. Left ventricle size is normal. Normal wall thickness. Unable to assess wall motion. Abnormal diastolic function. Right Ventricle: Not well visualized. Reduced systolic function. TAPSE is abnormal. TAPSE is 1.1 cm. Mitral Valve: Mild regurgitation. Left Atrium: Left atrium is mildly dilated. Right Atrium: Right atrium is mildly dilated.     10/28  Inability to tolerate below 25lpm and 85% FiO2 today  Sounds congested   Feels about the same as yesterday   Elevation of WBC might be from steroids       Metabolic acidosis   Down to 30lpm and 50% FiO2  Feeling a little better     Patient is a 71 y.o. male with COPD ( FEV1 in office  53% folows with Dr. Valente Esteban) who is s/p RLL lobectomy for NSCLC at [facility] . Pt with inc SOB and WOB last 2-3 days and presented to ED with SOB/hypercapnic resp failure. On HFNC currently and gettign ready top go to [facility]. Seen in ED. C/o productive cough. No hemoptysis      Past Medical History:   Diagnosis Date    Asthma     Cancer ([facility] [address]     Lung Cancer    Chronic obstructive pulmonary disease (HCC)     Chronic pain     High cholesterol     Lung cancer ([facility]       Past Surgical History:   Procedure Laterality Date    HX LOBECTOMY      Right lung    HX OTHER SURGICAL      Partial right lung removal      Prior to Admission medications    Medication Sig Start Date End Date Taking? Authorizing Provider   arformoteroL (BROVANA) 15 mcg/2 mL nebu neb solution 15 mcg by Nebulization route two (2) times a day. Sometimes uses TID   Yes Provider, Historical   budesonide (PULMICORT) 0.5 mg/2 mL nbsp 500 mcg by Nebulization route two (2) times a day. Sometimes uses TID   Yes Provider, Historical   atorvastatin (LIPITOR) 40 mg tablet Take 40 mg by mouth daily. Yes Provider, Historical   albuterol (PROVENTIL VENTOLIN) 2.5 mg /3 mL (0.083 %) nebu 2.5 mg by Nebulization route every six (6) hours as needed for Wheezing or Shortness of Breath. Yes Provider, Historical   oxyCODONE IR (ROXICODONE) 20 mg immediate release tablet Take 40 mg by mouth every twelve (12) hours as needed for Pain (severe pain). Yes Provider, Historical   aspirin delayed-release 81 mg tablet Take 1 Tab by mouth daily. 18  Yes Ysamine Redd NP   ketoconazole (NIZORAL) 2 % shampoo Apply  to affected area as needed for Itching. Provider, Historical     No Known Allergies   Social History     Tobacco Use    Smoking status: Former     Packs/day: 1.00     Years: 45.00     Pack years: 45.00     Types: Cigarettes     Quit date: 2009     Years since quittin.8    Smokeless tobacco: Never   Substance Use Topics    Alcohol use: No     Comment: rarely/social       No family history on file. Current Facility-Administered Medications   Medication Dose Route Frequency    acetylcysteine (MUCOMYST) 200 mg/mL (20 %) solution 400 mg  2 mL Nebulization TID RT    methylPREDNISolone (PF) (SOLU-MEDROL) injection 40 mg  40 mg IntraVENous Q8H    amiodarone (CORDARONE) tablet 400 mg  400 mg Oral BID    sacubitriL-valsartan (ENTRESTO) 24-26 mg tablet 1 Tablet  1 Tablet Oral Q12H    apixaban (ELIQUIS) tablet 5 mg  5 mg Oral Q12H    metoprolol succinate (TOPROL-XL) XL tablet 25 mg  25 mg Oral DAILY    albuterol-ipratropium (DUO-NEB) 2.5 MG-0.5 MG/3 ML  3 mL Nebulization BID RT    sodium chloride (NS) flush 5-40 mL  5-40 mL IntraVENous Q8H    piperacillin-tazobactam (ZOSYN) 3.375 g in 0.9% sodium chloride (MBP/ADV) 100 mL MBP  3.375 g IntraVENous Q8H    arformoteroL (BROVANA) neb solution 15 mcg  15 mcg Nebulization BID RT    aspirin delayed-release tablet 81 mg  81 mg Oral DAILY    atorvastatin (LIPITOR) tablet 40 mg  40 mg Oral DAILY    budesonide (PULMICORT) 500 mcg/2 ml nebulizer suspension  500 mcg Nebulization BID       Review of Systems:  Review of systems not obtained due to patient factors. Objective:   Vital Signs:    Visit Vitals  /68   Pulse 75   Temp 98.6 °F (37 °C)   Resp 20   Ht 5' 11\" (1.803 m)   Wt 67 kg (147 lb 11.3 oz)   SpO2 94%   BMI 20.60 kg/m²       O2 Device: Hi flow nasal cannula   O2 Flow Rate (L/min): 20 l/min   Temp (24hrs), Av.2 °F (36.8 °C), Min:97.6 °F (36.4 °C), Max:98.6 °F (37 °C)       Intake/Output:   Last shift:      No intake/output data recorded.   Last 3 shifts: 10/31 1901 -  0700  In: 150 [P.O.:150]  Out: 200 [Urine:200]  No intake or output data in the 24 hours ending 22 1102     Physical Exam:   Alert on HFNC  + moderate WOB with mild accessory use and word dyspnea no sternal retractions  No abd paradox  Diffuse wheeze  Heart : tachy/reg no murmur  Trachea midline  Abd soft/NT no organomegaly  EX no Cc/e  Data review:     No results found for this or any previous visit (from the past 24 hour(s)).         Imaging:  I have personally reviewed the patients radiographs and have reviewed the reports:  PCXR with LLL and right mid lung new ASD     CC > 35 minutes excluding procedures    Flower AMI Gonzalez PA-C

## 2022-11-02 NOTE — PROGRESS NOTES
[facility] Adult  Hospitalist Group                                                                                          Hospitalist Progress Note  Jhon Arshad MD  Answering service: [phone] from in house phone        Date of Service:  2022  NAME:  Scheryl Seip  :  1950  MRN:  [id number]      Admission Summary:   Scheryl Seip is a 71 y.o. male with a PMHx of lung cancer, COPD, chronic hypoxic respiratory failure, on home O2 at 3 liters/min, who presents with Chief Complaint of Shortness of breath/ Dyspnea on exertion, progressively worsening for the past 3 days. Patient denies any fevers or chills. He used increased doses of inhalers, but it did not help. He called 911. Upon EMS arrival, his O2 saturation was 79%. Patient was placed on a BIPAP upon arrival.      Interval history / Subjective: Follow-up for respiratory failure/new onset PAF and heart failure  Feels slightly better    No new issues have some loose bowel movement       Assessment & Plan:     Acute hypercapnic respiratory failure due to COPD pneumonia  Acute on chronic hypoxic respiratory failure due to COPD  Sepsis due to pneumonia multilobar pneumonia  --Initially patient was on BiPAP now on high flow nasal cannula 20 L  --Pulmonary on board  --Continue nebs, steroid, antibiotics   - follow cultures  --Monitor respiratory status  --CXR 10/31-- Decreasing bilateral areas of pneumonia. Follow-up to resolution is  suggested. PAF: new onset evening of 10/27. In the setting of respiratory failure  HFrEF newly diagnosed EF 25-30% unclear etiology    --Received amio gtt continue oral amiodarone  -TTE EF 25-30% - unclear etiology  --on eliquis on Entresto as well  --Seen by cardiology    Malignant neoplasm of the lung status post lobectomy for non-small cell lung cancer  --Follow-up as an outpatient  --History of right lung lobectomy    PENNY POA --monitor avoid nephrotoxin    Hyperlipidemia continue statin    Uncontrolled hypertension--currently on as needed hydralazine    Code status: DNR  DVT prophylaxis: Lovenox    Care Plan discussed with: Patient/Family and Nurse  Anticipated Disposition: Home w/Family  Anticipated Discharge: 24 hours to 48 hours    CRITICAL CARE ATTESTATION:  I had a face to face encounter with the patient, reviewed and interpreted patient data including clinical events, labs, images, vital signs, I/O's, and examined patient. I have discussed the case and the plan and management of the patient's care with the consulting services, the bedside nurses and necessary ancillary providers. NOTE OF PERSONAL INVOLVEMENT IN CARE   This patient has a high probability of imminent, clinically significant deterioration, which requires the highest level of preparedness to intervene urgently. I participated in the decision-making and personally managed or directed the management of the following life and organ supporting interventions that required my frequent assessment to treat or prevent imminent deterioration. I personally spent 45 minutes of critical care time. This is time spent at this critically ill patient's bedside actively involved in patient care as well as the coordination of care and discussions with the patient's family. This does not include any procedural time which has been billed separately.       Hospital Problems  Date Reviewed: 1/9/2019            Codes Class Noted POA    Moderate protein-calorie malnutrition ([facility] ICD-10-CM: E44.0  ICD-9-CM: 263.0  10/27/2022 Yes        COPD (chronic obstructive pulmonary disease) ([facility] ICD-10-CM: J44.9  ICD-9-CM: 496  10/26/2022 Unknown        Pneumonia ICD-10-CM: J18.9  ICD-9-CM: 087  10/26/2022 Unknown        Malignant neoplasm of lung, unspecified laterality, unspecified part of lung ([facility] ICD-10-CM: C34.90  ICD-9-CM: 162.9  10/26/2022 Unknown        Sepsis ([facility] ICD-10-CM: A41.9  ICD-9-CM: 038.9, 995.91  10/26/2022 Unknown Review of Systems:   A comprehensive review of systems was negative. Vital Signs:    Last 24hrs VS reviewed since prior progress note. Most recent are:  Visit Vitals  BP (!) 119/59 (BP 1 Location: Left upper arm, BP Patient Position: At rest)   Pulse (!) 59   Temp 97.8 °F (36.6 °C)   Resp 18   Ht 5' 11\" (1.803 m)   Wt 67 kg (147 lb 11.3 oz)   SpO2 96%   BMI 20.60 kg/m²       No intake or output data in the 24 hours ending 11/02/22 1341       Physical Examination:     I had a face to face encounter with this patient and independently examined them on 11/2/2022 as outlined below:          General : alert x 3, awake, no acute distress, resting in bed, pleasant   HEENT: PEERL, EOMI, moist mucus membrane, TM clear  Neck: supple, no JVD, no meningeal signs  Chest: Coarse breath sounds, scattered wheezing  CVS: S1 S2 heard, Capillary refill less than 2 seconds  Abd: soft/ Non tender, non distended  Ext: no clubbing, no cyanosis, no edema, brisk 2+ DP pulses  Neuro/Psych: pleasant mood and affect,  sensory grossly within normal limit, Strength 5/5 in all extremities  Skin: warm     Data Review:    I personally reviewed  Image and labs      Labs:     No results for input(s): WBC, HGB, HCT, PLT, HGBEXT, HCTEXT, PLTEXT, HGBEXT, HCTEXT, PLTEXT in the last 72 hours. No results for input(s): NA, K, CL, CO2, BUN, CREA, GLU, CA, MG, PHOS, URICA in the last 72 hours. No results for input(s): ALT, AP, TBIL, TBILI, TP, ALB, GLOB, GGT, AML, LPSE in the last 72 hours. No lab exists for component: SGOT, GPT, AMYP, HLPSE    No results for input(s): INR, PTP, APTT, INREXT, INREXT in the last 72 hours. No results for input(s): FE, TIBC, PSAT, FERR in the last 72 hours. No results found for: FOL, RBCF   Recent Labs     10/30/22  1714   PH 7.41   PCO2 48*   PO2 106*       No results for input(s): CPK, CKNDX, TROIQ in the last 72 hours.     No lab exists for component: CPKMB  Lab Results   Component Value Date/Time Cholesterol, total 106 02/20/2018 04:54 AM    HDL Cholesterol 45 02/20/2018 04:54 AM    LDL, calculated 46 02/20/2018 04:54 AM    Triglyceride 75 02/20/2018 04:54 AM    CHOL/HDL Ratio 2.4 02/20/2018 04:54 AM     Lab Results   Component Value Date/Time    Glucose (POC) 100 09/14/2015 12:14 PM     Lab Results   Component Value Date/Time    Color YELLOW/STRAW 01/09/2019 05:00 PM    Appearance CLEAR 01/09/2019 05:00 PM    Specific gravity 1.014 01/09/2019 05:00 PM    pH (UA) 7.5 01/09/2019 05:00 PM    Protein NEGATIVE 01/09/2019 05:00 PM    Glucose NEGATIVE 01/09/2019 05:00 PM    Ketone TRACE (A) 01/09/2019 05:00 PM    Bilirubin NEGATIVE 01/09/2019 05:00 PM    Urobilinogen 0.2 01/09/2019 05:00 PM    Nitrites NEGATIVE 01/09/2019 05:00 PM    Leukocyte Esterase NEGATIVE 01/09/2019 05:00 PM    Epithelial cells FEW 01/09/2019 05:00 PM    Bacteria NEGATIVE 01/09/2019 05:00 PM    WBC 0-4 01/09/2019 05:00 PM    RBC 0-5 01/09/2019 05:00 PM         Medications Reviewed:     Current Facility-Administered Medications   Medication Dose Route Frequency    acetylcysteine (MUCOMYST) 200 mg/mL (20 %) solution 400 mg  2 mL Nebulization TID RT    methylPREDNISolone (PF) (SOLU-MEDROL) injection 40 mg  40 mg IntraVENous Q8H    loperamide (IMODIUM) capsule 4 mg  4 mg Oral Q6H PRN    amiodarone (CORDARONE) tablet 400 mg  400 mg Oral BID    sacubitriL-valsartan (ENTRESTO) 24-26 mg tablet 1 Tablet  1 Tablet Oral Q12H    apixaban (ELIQUIS) tablet 5 mg  5 mg Oral Q12H    metoprolol succinate (TOPROL-XL) XL tablet 25 mg  25 mg Oral DAILY    albuterol-ipratropium (DUO-NEB) 2.5 MG-0.5 MG/3 ML  3 mL Nebulization BID RT    hydrALAZINE (APRESOLINE) 20 mg/mL injection 20 mg  20 mg IntraVENous Q6H PRN    albuterol-ipratropium (DUO-NEB) 2.5 MG-0.5 MG/3 ML  3 mL Nebulization Q4H PRN    sodium chloride (NS) flush 5-40 mL  5-40 mL IntraVENous Q8H    sodium chloride (NS) flush 5-40 mL  5-40 mL IntraVENous PRN    acetaminophen (TYLENOL) tablet 650 mg  650 mg Oral Q6H PRN    Or    acetaminophen (TYLENOL) suppository 650 mg  650 mg Rectal Q6H PRN    ondansetron (ZOFRAN ODT) tablet 4 mg  4 mg Oral Q8H PRN    Or    ondansetron (ZOFRAN) injection 4 mg  4 mg IntraVENous Q6H PRN    polyethylene glycol (MIRALAX) packet 17 g  17 g Oral DAILY PRN    piperacillin-tazobactam (ZOSYN) 3.375 g in 0.9% sodium chloride (MBP/ADV) 100 mL MBP  3.375 g IntraVENous Q8H    arformoteroL (BROVANA) neb solution 15 mcg  15 mcg Nebulization BID RT    aspirin delayed-release tablet 81 mg  81 mg Oral DAILY    atorvastatin (LIPITOR) tablet 40 mg  40 mg Oral DAILY    budesonide (PULMICORT) 500 mcg/2 ml nebulizer suspension  500 mcg Nebulization BID    oxyCODONE IR (ROXICODONE) tablet 20 mg  20 mg Oral Q6H PRN    oxyCODONE IR (ROXICODONE) tablet 10 mg  10 mg Oral Q6H PRN     ______________________________________________________________________  EXPECTED LENGTH OF STAY: 4d 19h  ACTUAL LENGTH OF STAY:          7      Please note that this dictation was completed with [employer], the computer voice recognition software. Quite often unanticipated grammatical, syntax, homophones, and other interpretive errors are inadvertently transcribed by the computer software. Please disregard these errors. Please excuse any errors that have escaped final proofreading.                 Mitchell eD La Cruz MD

## 2022-11-02 NOTE — PROGRESS NOTES
0800: Bedside and Verbal shift change report given to Kush RN (oncoming nurse) by Clarke Carcamo RN (offgoing nurse). Report included the following information SBAR, Kardex, [facility], Recent Results, and Cardiac Rhythm SR .       2000: Bedside and Verbal shift change report given to Carolyne Rose LPN (oncoming nurse) by Terry Bishop RN (offgoing nurse).  Report included the following information SBAR, Kardex, MAR, Recent Results, and Cardiac Rhythm SR.

## 2022-11-03 ENCOUNTER — APPOINTMENT (OUTPATIENT)
Dept: GENERAL RADIOLOGY | Age: 72
DRG: 871 | End: 2022-11-03
Attending: PHYSICIAN ASSISTANT
Payer: MEDICARE

## 2022-11-03 LAB
ANION GAP SERPL CALC-SCNC: 5 MMOL/L (ref 5–15)
BUN SERPL-MCNC: 28 MG/DL (ref 6–20)
BUN/CREAT SERPL: 34 (ref 12–20)
CALCIUM SERPL-MCNC: 8 MG/DL (ref 8.5–10.1)
CHLORIDE SERPL-SCNC: 107 MMOL/L (ref 97–108)
CO2 SERPL-SCNC: 30 MMOL/L (ref 21–32)
CREAT SERPL-MCNC: 0.83 MG/DL (ref 0.7–1.3)
ERYTHROCYTE [DISTWIDTH] IN BLOOD BY AUTOMATED COUNT: 13.1 % (ref 11.5–14.5)
GLUCOSE SERPL-MCNC: 122 MG/DL (ref 65–100)
HCT VFR BLD AUTO: 40.3 % (ref 36.6–50.3)
HGB BLD-MCNC: 13 G/DL (ref 12.1–17)
MCH RBC QN AUTO: 29.7 PG (ref 26–34)
MCHC RBC AUTO-ENTMCNC: 32.3 G/DL (ref 30–36.5)
MCV RBC AUTO: 92 FL (ref 80–99)
NRBC # BLD: 0 K/UL (ref 0–0.01)
NRBC BLD-RTO: 0 PER 100 WBC
PLATELET # BLD AUTO: 324 K/UL (ref 150–400)
PMV BLD AUTO: 10 FL (ref 8.9–12.9)
POTASSIUM SERPL-SCNC: 3.9 MMOL/L (ref 3.5–5.1)
RBC # BLD AUTO: 4.38 M/UL (ref 4.1–5.7)
SODIUM SERPL-SCNC: 142 MMOL/L (ref 136–145)
WBC # BLD AUTO: 24.1 K/UL (ref 4.1–11.1)

## 2022-11-03 PROCEDURE — 94760 N-INVAS EAR/PLS OXIMETRY 1: CPT

## 2022-11-03 PROCEDURE — 71045 X-RAY EXAM CHEST 1 VIEW: CPT

## 2022-11-03 PROCEDURE — 74011250636 HC RX REV CODE- 250/636: Performed by: PHYSICIAN ASSISTANT

## 2022-11-03 PROCEDURE — 77010033711 HC HIGH FLOW OXYGEN

## 2022-11-03 PROCEDURE — 74011250637 HC RX REV CODE- 250/637: Performed by: HOSPITALIST

## 2022-11-03 PROCEDURE — 97535 SELF CARE MNGMENT TRAINING: CPT

## 2022-11-03 PROCEDURE — 74011250637 HC RX REV CODE- 250/637: Performed by: INTERNAL MEDICINE

## 2022-11-03 PROCEDURE — 94640 AIRWAY INHALATION TREATMENT: CPT

## 2022-11-03 PROCEDURE — 74011000250 HC RX REV CODE- 250: Performed by: INTERNAL MEDICINE

## 2022-11-03 PROCEDURE — 85027 COMPLETE CBC AUTOMATED: CPT

## 2022-11-03 PROCEDURE — 36415 COLL VENOUS BLD VENIPUNCTURE: CPT

## 2022-11-03 PROCEDURE — 97530 THERAPEUTIC ACTIVITIES: CPT

## 2022-11-03 PROCEDURE — 80048 BASIC METABOLIC PNL TOTAL CA: CPT

## 2022-11-03 PROCEDURE — 74011250637 HC RX REV CODE- 250/637: Performed by: FAMILY MEDICINE

## 2022-11-03 PROCEDURE — 74011250636 HC RX REV CODE- 250/636: Performed by: FAMILY MEDICINE

## 2022-11-03 PROCEDURE — 65660000001 HC RM ICU INTERMED STEPDOWN

## 2022-11-03 RX ORDER — AMIODARONE HYDROCHLORIDE 200 MG/1
200 TABLET ORAL 2 TIMES DAILY
Status: DISCONTINUED | OUTPATIENT
Start: 2022-11-03 | End: 2022-11-07

## 2022-11-03 RX ORDER — FUROSEMIDE 10 MG/ML
20 INJECTION INTRAMUSCULAR; INTRAVENOUS 2 TIMES DAILY
Status: DISCONTINUED | OUTPATIENT
Start: 2022-11-03 | End: 2022-11-06

## 2022-11-03 RX ADMIN — SACUBITRIL AND VALSARTAN 1 TABLET: 24; 26 TABLET, FILM COATED ORAL at 10:26

## 2022-11-03 RX ADMIN — IPRATROPIUM BROMIDE AND ALBUTEROL SULFATE 3 ML: .5; 3 SOLUTION RESPIRATORY (INHALATION) at 07:20

## 2022-11-03 RX ADMIN — IPRATROPIUM BROMIDE AND ALBUTEROL SULFATE 3 ML: .5; 3 SOLUTION RESPIRATORY (INHALATION) at 20:41

## 2022-11-03 RX ADMIN — APIXABAN 5 MG: 5 TABLET, FILM COATED ORAL at 21:46

## 2022-11-03 RX ADMIN — SODIUM CHLORIDE, PRESERVATIVE FREE 10 ML: 5 INJECTION INTRAVENOUS at 06:58

## 2022-11-03 RX ADMIN — LOPERAMIDE HYDROCHLORIDE 4 MG: 2 CAPSULE ORAL at 21:45

## 2022-11-03 RX ADMIN — BUDESONIDE 500 MCG: 0.5 INHALANT RESPIRATORY (INHALATION) at 20:41

## 2022-11-03 RX ADMIN — FUROSEMIDE 20 MG: 10 INJECTION INTRAMUSCULAR; INTRAVENOUS at 14:57

## 2022-11-03 RX ADMIN — LOPERAMIDE HYDROCHLORIDE 4 MG: 2 CAPSULE ORAL at 15:06

## 2022-11-03 RX ADMIN — SODIUM CHLORIDE, PRESERVATIVE FREE 10 ML: 5 INJECTION INTRAVENOUS at 14:28

## 2022-11-03 RX ADMIN — ATORVASTATIN CALCIUM 40 MG: 40 TABLET, FILM COATED ORAL at 10:26

## 2022-11-03 RX ADMIN — METHYLPREDNISOLONE SODIUM SUCCINATE 40 MG: 40 INJECTION, POWDER, FOR SOLUTION INTRAMUSCULAR; INTRAVENOUS at 21:46

## 2022-11-03 RX ADMIN — METHYLPREDNISOLONE SODIUM SUCCINATE 40 MG: 40 INJECTION, POWDER, FOR SOLUTION INTRAMUSCULAR; INTRAVENOUS at 06:58

## 2022-11-03 RX ADMIN — ASPIRIN 81 MG: 81 TABLET, COATED ORAL at 10:26

## 2022-11-03 RX ADMIN — METOPROLOL SUCCINATE 25 MG: 25 TABLET, FILM COATED, EXTENDED RELEASE ORAL at 10:26

## 2022-11-03 RX ADMIN — ARFORMOTEROL TARTRATE 15 MCG: 15 SOLUTION RESPIRATORY (INHALATION) at 20:41

## 2022-11-03 RX ADMIN — APIXABAN 5 MG: 5 TABLET, FILM COATED ORAL at 10:26

## 2022-11-03 RX ADMIN — AMIODARONE HYDROCHLORIDE 200 MG: 200 TABLET ORAL at 10:26

## 2022-11-03 RX ADMIN — LOPERAMIDE HYDROCHLORIDE 4 MG: 2 CAPSULE ORAL at 06:58

## 2022-11-03 RX ADMIN — FUROSEMIDE 20 MG: 10 INJECTION INTRAMUSCULAR; INTRAVENOUS at 10:26

## 2022-11-03 RX ADMIN — BUDESONIDE 500 MCG: 0.5 INHALANT RESPIRATORY (INHALATION) at 07:20

## 2022-11-03 RX ADMIN — METHYLPREDNISOLONE SODIUM SUCCINATE 40 MG: 40 INJECTION, POWDER, FOR SOLUTION INTRAMUSCULAR; INTRAVENOUS at 14:57

## 2022-11-03 RX ADMIN — SODIUM CHLORIDE, PRESERVATIVE FREE 10 ML: 5 INJECTION INTRAVENOUS at 21:47

## 2022-11-03 RX ADMIN — ARFORMOTEROL TARTRATE 15 MCG: 15 SOLUTION RESPIRATORY (INHALATION) at 07:21

## 2022-11-03 RX ADMIN — AMIODARONE HYDROCHLORIDE 200 MG: 200 TABLET ORAL at 17:56

## 2022-11-03 RX ADMIN — SACUBITRIL AND VALSARTAN 1 TABLET: 24; 26 TABLET, FILM COATED ORAL at 21:45

## 2022-11-03 NOTE — PROGRESS NOTES
Comprehensive Nutrition Assessment    Type and Reason for Visit: Reassess    Nutrition Recommendations/Plan:     Continue regular diet with high kcal/high protein ONS at all meals    Obtain standing scale weight         Malnutrition Assessment:  Malnutrition Status: Moderate malnutrition (10/27/22 1148)    Context:  Chronic illness     Findings of the 6 clinical characteristics of malnutrition:   Energy Intake:  Mild decrease in energy intake (specify) (reports decreased PO/ appetite since past few days PTA)  Weight Loss:  Mild weight loss (specify amount and time period) (wt fluctuates, was 145 lb in August 2022)     Body Fat Loss:  Mild body fat loss (assessed as mild d/t pt's report of relatively stable wt in order to keep dx more moderate), Orbital   Muscle Mass Loss:  Severe muscle mass loss, Clavicles (pectoralis &deltoids), Temples (temporalis)  Fluid Accumulation:  No significant fluid accumulation,     Strength:  Not performed        Nutrition Assessment:    Past Medical History:   Diagnosis Date    Asthma     Cancer ([facility]     Lung Cancer    Chronic obstructive pulmonary disease (HCC)     Chronic pain     High cholesterol     Lung cancer ([facility]        Pt admitted with Sepsis ([facility] [A41.9]  Pneumonia [J18.9]  Malignant neoplasm of lung, unspecified laterality, unspecified part of lung ([facility] [C34.90]  COPD (chronic obstructive pulmonary disease) ([facility] [J44.9]      11/3: follow-up. Visited with pt in room. He states his appetite is better and eating more. Drinking 2-3 Ensure/day. Denied any complaints. Currently on 35 L high flow. Weight appears to be stable, but difficult to assess with bed scales and up and down readings. Labs OK. Looks like he is getting PRN imodium frequently, so ?if having diarrhea. Last BM documented as formed.   Last 3 Recorded Weights in this Encounter    10/30/22 0412 10/31/22 0932 11/03/22 0314   Weight: 65.5 kg (144 lb 6.4 oz) 67 kg (147 lb 11.3 oz) 65 kg (143 lb 4.8 oz) 10/27: Pt screened d/t low BMI for age. Visited in room, on high flow. States his appetite/ intake has been poor for past few days, but unsure of any weight loss. When I told him weight listed at 140 lb, he states \"that's about where I stay\" - confirmed with bed weight, but he was 145 lb at MD visit in August per review of chart. Wt fluctuates per hx below and it is unclear over how long this wt loss occurred. He states he drinks Ensure when he can afford them. Likes chocolate. Would appreciate them coming with meals while admitted. Denies chewing/ swallowing difficulty. Pt does have significant clavicle, temporal wasting and orbital fat loss. However, given weight stability, suspect malnutrition is chronic and therefore, more moderate in nature. However, with current illness and decreased PO intake, could meet acute, severe PCM criteria (he is certainly at risk he doesn't currently). Pt has baseline COPD, on 3 L O2 at home. Also with hx of NSCLC s/p RLL lobectomy in 2009. Unclear if active issue. Regardless, COPD itself puts pt at risk for higher EEN. Nutritionally Significant Medications:  Lipitor, lasix,  Imodium PRN, solumedrol      Estimated Daily Nutrient Needs:  Energy Requirements Based On: Kcal/kg  Weight Used for Energy Requirements: Current (63.5 kg)  Energy (kcal/day): 2200+ (35+ kcal/kg)  Weight Used for Protein Requirements: Current  Protein (g/day): 110 (1.7+ gm/kg or at least 20%)     Fluid (ml/day): 1900 (30 mL/kg)    Nutrition Related Findings:   Edema: No data recorded    Last BM: 11/02/22, Formed    Wounds: None      Current Nutrition Therapies:  Diet: regular  Supplements: Ensure Plus TID with meals  Meal intake: Patient Vitals for the past 168 hrs:   % Diet Eaten   10/31/22 1914 26 - 50%   10/31/22 1400 26 - 50%   10/31/22 0932 26 - 50%     Supplement intake: No data found.         Anthropometric Measures:  Height: 5' 11\" (180.3 cm)  Ideal Body Weight (IBW): 172 lbs (78 kg)     Current Body Wt:  65 kg (143 lb 4.8 oz), 83.3 % IBW.  Bed scale  Current BMI (kg/m2): 20  Usual Body Weight: 65.8 kg (145 lb)  % Weight Change (Calculated): -3.4  Weight Adjustment: No adjustment                 BMI Category: Underweight (BMI less than 22) age over 72    Wt Readings from Last 15 Encounters:   10/26/22 63.5 kg (140 lb)   01/12/20 68.2 kg (150 lb 4.8 oz)   01/13/19 66.8 kg (147 lb 4.8 oz)   11/26/18 71.5 kg (157 lb 10.1 oz)   06/05/18 66.7 kg (147 lb)   06/05/18 66.7 kg (147 lb)   04/10/18 62.3 kg (137 lb 4.8 oz)   02/23/18 57.6 kg (126 lb 15.8 oz)   11/28/16 71.2 kg (157 lb)   10/05/15 66.2 kg (146 lb)   09/15/15 66.5 kg (146 lb 11.2 oz)   08/11/15 68 kg (149 lb 14.6 oz)     Last 3 Recorded Weights in this Encounter    10/30/22 0412 10/31/22 0932 11/03/22 0314   Weight: 65.5 kg (144 lb 6.4 oz) 67 kg (147 lb 11.3 oz) 65 kg (143 lb 4.8 oz)           Nutrition Diagnosis:   Inadequate oral intake related to impaired respiratory function, increased demand for energy/nutrients as evidenced by BMI, Criteria as identified in malnutrition assessment    Nutrition Interventions:   Food and/or Nutrient Delivery: Continue current diet, Continue oral nutrition supplement  Nutrition Education/Counseling: No recommendations at this time  Coordination of Nutrition Care: Continue to monitor while inpatient       Goals:     Goals: other (specify)  Specify Other Goals: continued PO intake >/=50% of meals with 2-3 ONS daily over next 7 days    Nutrition Monitoring and Evaluation:   Behavioral-Environmental Outcomes: None identified  Food/Nutrient Intake Outcomes: Food and nutrient intake, Supplement intake  Physical Signs/Symptoms Outcomes: Biochemical data, Meal time behavior, Nutrition focused physical findings, Weight, GI status    Discharge Planning:    Continue current diet, Continue oral nutrition supplement    Recent Labs     11/03/22  0404   *   BUN 28*   CREA 0.83      K 3.9    CO2 30   CA 8.0*       No results for input(s): ALT, AST, AP, TBIL, TBILI, TP, ALB, GLOB, GGT, AML, LPSE in the last 72 hours. No lab exists for component: SGOT, GPT, AMYP, HLPSE      No results for input(s): LAC in the last 72 hours.       Recent Labs     11/03/22  0404   WBC 24.1*   HGB 13.0   HCT 40.3          Kasandra Mathews RD  Available via [employer]

## 2022-11-03 NOTE — PROGRESS NOTES
Pulmonary, Critical Care, and Sleep Medicine    Progress Note    Name: Tawnya Richter MRN: [id number]   : 1950 Hospital: [facility]   Date: 11/3/2022        IMPRESSION:   Acute hypercapnic resp failure- Moderate COPD ( 3lpm baseline + trelegy + Dr. Yang David) + multilobar CAP- PCT > 22. At risk for MDROs and atypicals consider ESBLS. Decom HFrEF  H/o RLL lobectomy at [facility]  for NSCLC  COPD  Sepsis- from PNA  A fib. On amiodarone       RECOMMENDATIONS:   Zosyn   Wean stroids as able  Nebs  Dvt proph  Amio/eliquis  Consider increasing diuretics a bit  mucomyst for congestion   NIV as needed during the day for WOB- has not needed recently  Might need inpatient rehab at discharge   O2 titration above 90%      Subjective:     11/3  On high flow this morning 35L 60%. Says he has alternated with midflow at times  No longer coughing up mucous  Has been getting up to the bathroom      Moving back and forth with high flow and HFNC  Less congested today  Mobilizing more today       Sounds a little congested   Chest film shows some improvement   Down to 20lpm and 90% FiO2  ESR 23, probnp 68071, procal 0.74    10/31  Back up to 35lpm and 80% FIO2  Had a rough weekend   + wheeze today     ECHO from 10/28  Result status: Final result       Left Ventricle: Severely reduced left ventricular systolic function with a visually estimated EF of 25 - 30%. Not well visualized. Left ventricle size is normal. Normal wall thickness. Unable to assess wall motion. Abnormal diastolic function. Right Ventricle: Not well visualized. Reduced systolic function. TAPSE is abnormal. TAPSE is 1.1 cm. Mitral Valve: Mild regurgitation. Left Atrium: Left atrium is mildly dilated. Right Atrium: Right atrium is mildly dilated.     10/28  Inability to tolerate below 25lpm and 85% FiO2 today  Sounds congested   Feels about the same as yesterday   Elevation of WBC might be from steroids       Metabolic acidosis Down to 30lpm and 50% FiO2  Feeling a little better     Patient is a 71 y.o. male with COPD ( FEV1 in office 2022 53% folows with Dr. Ivan Diggs) who is s/p RLL lobectomy for NSCLC at [facility] 2009. Pt with inc SOB and WOB last 2-3 days and presented to ED with SOB/hypercapnic resp failure. On HFNC currently and gettign ready top go to THE [facility]. Seen in ED. C/o productive cough. No hemoptysis      Past Medical History:   Diagnosis Date    Asthma     Cancer ([facility]     Lung Cancer    Chronic obstructive pulmonary disease (HCC)     Chronic pain     High cholesterol     Lung cancer ([facility]       Past Surgical History:   Procedure Laterality Date    HX LOBECTOMY      Right lung    HX OTHER SURGICAL      Partial right lung removal      Prior to Admission medications    Medication Sig Start Date End Date Taking? Authorizing Provider   arformoteroL (BROVANA) 15 mcg/2 mL nebu neb solution 15 mcg by Nebulization route two (2) times a day. Sometimes uses TID   Yes Provider, Historical   budesonide (PULMICORT) 0.5 mg/2 mL nbsp 500 mcg by Nebulization route two (2) times a day. Sometimes uses TID   Yes Provider, Historical   atorvastatin (LIPITOR) 40 mg tablet Take 40 mg by mouth daily. Yes Provider, Historical   albuterol (PROVENTIL VENTOLIN) 2.5 mg /3 mL (0.083 %) nebu 2.5 mg by Nebulization route every six (6) hours as needed for Wheezing or Shortness of Breath. Yes Provider, Historical   oxyCODONE IR (ROXICODONE) 20 mg immediate release tablet Take 40 mg by mouth every twelve (12) hours as needed for Pain (severe pain). Yes Provider, Historical   aspirin delayed-release 81 mg tablet Take 1 Tab by mouth daily. 2/23/18  Yes Harish Biggs NP   ketoconazole (NIZORAL) 2 % shampoo Apply  to affected area as needed for Itching.     Provider, Historical     No Known Allergies   Social History     Tobacco Use    Smoking status: Former     Packs/day: 1.00     Years: 45.00     Pack years: 45.00     Types: Cigarettes     Quit date: 1/1/2009 Years since quittin.8    Smokeless tobacco: Never   Substance Use Topics    Alcohol use: No     Comment: rarely/social       No family history on file. Current Facility-Administered Medications   Medication Dose Route Frequency    furosemide (LASIX) injection 20 mg  20 mg IntraVENous BID    amiodarone (CORDARONE) tablet 200 mg  200 mg Oral BID    methylPREDNISolone (PF) (SOLU-MEDROL) injection 40 mg  40 mg IntraVENous Q8H    sacubitriL-valsartan (ENTRESTO) 24-26 mg tablet 1 Tablet  1 Tablet Oral Q12H    apixaban (ELIQUIS) tablet 5 mg  5 mg Oral Q12H    metoprolol succinate (TOPROL-XL) XL tablet 25 mg  25 mg Oral DAILY    albuterol-ipratropium (DUO-NEB) 2.5 MG-0.5 MG/3 ML  3 mL Nebulization BID RT    sodium chloride (NS) flush 5-40 mL  5-40 mL IntraVENous Q8H    arformoteroL (BROVANA) neb solution 15 mcg  15 mcg Nebulization BID RT    aspirin delayed-release tablet 81 mg  81 mg Oral DAILY    atorvastatin (LIPITOR) tablet 40 mg  40 mg Oral DAILY    budesonide (PULMICORT) 500 mcg/2 ml nebulizer suspension  500 mcg Nebulization BID       Review of Systems:  Review of systems not obtained due to patient factors. Objective:   Vital Signs:    Visit Vitals  /69 (BP 1 Location: Left upper arm, BP Patient Position: At rest)   Pulse 64   Temp 97 °F (36.1 °C)   Resp 20   Ht 5' 11\" (1.803 m)   Wt 65 kg (143 lb 4.8 oz)   SpO2 97%   BMI 19.99 kg/m²       O2 Device: Hi flow nasal cannula, Heated   O2 Flow Rate (L/min): 35 l/min (WEANED)   Temp (24hrs), Av °F (36.1 °C), Min:96 °F (35.6 °C), Max:97.8 °F (36.6 °C)       Intake/Output:   Last shift:      No intake/output data recorded.   Last 3 shifts:  1901 -  0700  In: -   Out: 225 [Urine:225]    Intake/Output Summary (Last 24 hours) at 11/3/2022 0858  Last data filed at 2022  Gross per 24 hour   Intake --   Output 225 ml   Net -225 ml        Physical Exam:   Alert on HFNC  + moderate WOB with mild accessory use and word dyspnea no sternal retractions  No abd paradox  Diffuse wheeze  Heart : tachy/reg no murmur  Trachea midline  Abd soft/NT no organomegaly  EX no Cc/e  Data review:     Recent Results (from the past 24 hour(s))   CBC W/O DIFF    Collection Time: 11/03/22  4:04 AM   Result Value Ref Range    WBC 24.1 (H) 4.1 - 11.1 K/uL    RBC 4.38 4.10 - 5.70 M/uL    HGB 13.0 12.1 - 17.0 g/dL    HCT 40.3 36.6 - 50.3 %    MCV 92.0 80.0 - 99.0 FL    MCH 29.7 26.0 - 34.0 PG    MCHC 32.3 30.0 - 36.5 g/dL    RDW 13.1 11.5 - 14.5 %    PLATELET 165 216 - 614 K/uL    MPV 10.0 8.9 - 12.9 FL    NRBC 0.0 0  WBC    ABSOLUTE NRBC 0.00 0.00 - 9.92 K/uL   METABOLIC PANEL, BASIC    Collection Time: 11/03/22  4:04 AM   Result Value Ref Range    Sodium 142 136 - 145 mmol/L    Potassium 3.9 3.5 - 5.1 mmol/L    Chloride 107 97 - 108 mmol/L    CO2 30 21 - 32 mmol/L    Anion gap 5 5 - 15 mmol/L    Glucose 122 (H) 65 - 100 mg/dL    BUN 28 (H) 6 - 20 MG/DL    Creatinine 0.83 0.70 - 1.30 MG/DL    BUN/Creatinine ratio 34 (H) 12 - 20      eGFR >60 >60 ml/min/1.73m2    Calcium 8.0 (L) 8.5 - 10.1 MG/DL           Imaging:  I have personally reviewed the patients radiographs and have reviewed the reports:  PCXR with LLL and right mid lung new ASD     CC > 35 minutes excluding procedures    Army Marly NP

## 2022-11-03 NOTE — PROGRESS NOTES
Problem: Mobility Impaired (Adult and Pediatric)  Goal: *Acute Goals and Plan of Care (Insert Text)  Description: FUNCTIONAL STATUS PRIOR TO ADMISSION: Patient was independent and active without use of DME. At baseline patient uses 2-4 liters of supplemental oxygen. He reports using a portable pulse ox. When asked he reported no falls in the last 12 months. HOME SUPPORT PRIOR TO ADMISSION: The patient lived with his significant other but did not require assist.    Physical Therapy Goals  Initiated 10/28/2022  1. Patient will move from supine to sit and sit to supine , scoot up and down, and roll side to side in bed with independence within 7 day(s). 2.  Patient will transfer from bed to chair and chair to bed with independence using the least restrictive device within 7 day(s). 3.  Patient will perform sit to stand with independence within 7 day(s). 4.  Patient will ambulate with independence for 300 feet with the least restrictive device within 7 day(s). 5.  Patient will ascend/descend 4 stairs with one handrail(s) with modified independence within 7 day(s). Outcome: Progressing Towards Goal   PHYSICAL THERAPY TREATMENT  Patient: Milana Vasquez (63 y.o. male)  Date: 11/3/2022  Diagnosis: Sepsis ([facility] [A41.9]  Pneumonia [J18.9]  Malignant neoplasm of lung, unspecified laterality, unspecified part of lung ([facility] [C34.90]  COPD (chronic obstructive [facility] disease) ([facility] [J44.9] <principal problem not specified>      Precautions: Fall  Chart, physical therapy assessment, plan of care and goals were reviewed. ASSESSMENT  Patient continues with skilled PT services and is slowly progressing towards goals. He remains limited primarily by impaired activity tolerance (and also by impaired standing balance during marching task). He was on heated high flow at 35 liters and 62% Fi02 and resting Sp02 was mostly 92-94%.  He did participate in sitting at the EOB and sit to stand X 2 reps and marching in standing at the EOB 10 reps X 2 sets. Lowest Sp02 during activity was 88-89% but he recovered quickly to 92-94% with rest and pursed lip breathing. Also of note, his LUE was edematous, hospitalist present and observed (removed the BP cuff). Pt  remains far from his baseline and continue to recommend IPR. Current Level of Function Impacting Discharge (mobility/balance): up to min assist    Other factors to consider for discharge: medically complex: lung CA (s/p lobectomy), asthma, COPD, and CHF         PLAN :  Patient continues to benefit from skilled intervention to address the above impairments. Continue treatment per established plan of care. to address goals. Recommendation for discharge: (in order for the patient to meet his/her long term goals)  Therapy 3 hours per day 5-7 days per week    This discharge recommendation:  A follow-up discussion with the attending provider and/or case management is planned    IF patient discharges home will need the following DME: to be determined (TBD)       SUBJECTIVE:   Patient stated I've been up and down to that commode.     OBJECTIVE DATA SUMMARY:   Chart checked, pt cleared by nursing  Critical Behavior:  Neurologic State: Alert  Orientation Level: Oriented X4  Cognition: Follows commands  Safety/Judgement: Decreased awareness of need for assistance  Functional Mobility Training:  Bed Mobility:     Supine to Sit: Supervision;Bed Modified; Adaptive equipment              Transfers:  Sit to Stand: Stand-by assistance  Stand to Sit: Stand-by assistance                             Balance:  Sitting: Intact; Without support  Standing: Impaired  Standing - Static: Constant support;Good  Standing - Dynamic : Constant support;Good  Ambulation/Gait Training:                                                        Stairs:               Therapeutic Exercises:   Marching in place (min assist for balance) and pursed lip breathing  Pain Rating:  None rated    Activity Tolerance: Fair, requires rest breaks, and see assessment    After treatment patient left in no apparent distress:   Sitting at the EOB with OT present    COMMUNICATION/COLLABORATION:   The patients plan of care was discussed with: Occupational therapist, Registered nurse, and Physician.      Roxi Montiel   Time Calculation: 28 mins

## 2022-11-03 NOTE — PROGRESS NOTES
Problem: Self Care Deficits Care Plan (Adult)  Goal: *Acute Goals and Plan of Care (Insert Text)  Description: FUNCTIONAL STATUS PRIOR TO ADMISSION: Patient was modified independent using a extra time for functional mobility, denies any AD. Patient was modified independent for basic and instrumental ADLs. Patient stated he bathes at baseline but also limits his activity due to fatigue on occasion. HOME SUPPORT: The patient lived with his girlfriend but did not require assist per the patient. Occupational Therapy Goals  Initiated 10/28/2022  1. Patient will perform grooming seated EOB with set/up within 7 day(s). 2.  Patient will perform lower body dressing with supervision/set-up within 7 day(s). 3.  Patient will perform toilet transfers with supervision/set-up within 7 day(s). 4.  Patient will perform all aspects of toileting with modified independence within 7 day(s). 5.  Patient will participate in upper extremity therapeutic exercise/activities and breathing exercises with supervision/set-up for 5 minutes within 7 day(s). 6.  Patient will utilize energy conservation techniques during functional activities with verbal and visual cues within 7 day(s). Outcome: Progressing Towards Goal     OCCUPATIONAL THERAPY TREATMENT  Patient: Skinny Justice (59 y.o. male)  Date: 11/3/2022  Diagnosis: Sepsis ([facility] [A41.9]  Pneumonia [J18.9]  Malignant neoplasm of lung, unspecified laterality, unspecified [facility] lung ([facility] [C34.90]  COPD (chronic obstructive pulmonary disease) ([facility] [J44.9] <principal problem not specified>      Precautions: Fall  Chart, occupational therapy assessment, plan of care, and goals were reviewed. ASSESSMENT  Patient continues with skilled OT services and is progressing towards goals. Per NP note, patient fluctuating between high flow and HFNC over the past two days. On 35L high flow, 62% now.  Patient presents seated EOB working with physical therapy, agreeable to OT intervention to follow. Patient set up for simple grooming tasks (face washing, oral hygiene, shaving) seated EOB and independently performed without difficulty. Oxygen remained above 93% on supplemental oxygen. Donned a clean gown with min A. Patient is highly motivated and participatory in OT session today. He endorses feeling \"much better\" and is anxious to go to rehab. He continues to show limitations in endurance, activity tolerance, and weakness. Patient is a great candidate for IPR and can tolerate 3 hours/day. OT will continue to follow to maximize independence and address the above impairments. Current Level of Function Impacting Discharge (ADLs): ind w/ set up with minimal rest break    Other factors to consider for discharge: PMH, DME         PLAN :  Patient continues to benefit from skilled intervention to address the above impairments. Continue treatment per established plan of care to address goals. Recommend with staff: up to chair 3x/day, to [facility] as able with SBA    Recommend next OT session: standing ADLs, toileting, increase mobility     Recommendation for discharge: (in order for the patient to meet his/her long term goals)  Therapy 3 hours per day 5-7 days per week    This discharge recommendation:  Has been made in collaboration with the attending provider and/or case management    IF patient discharges home will need the following DME: TBD       SUBJECTIVE:   Patient stated I'm actually feeling better today.     OBJECTIVE DATA SUMMARY:   Cognitive/Behavioral Status:  Neurologic State: Alert  Orientation Level: Oriented X4  Cognition: Follows commands    Functional Mobility and Transfers for ADLs:  Transfers:  Sit to Stand: Stand-by assistance  Balance:  Sitting: Intact; Without support  Standing: Impaired  Standing - Static: Constant support;Good  Standing - Dynamic : Constant support;Good    ADL Intervention:  Feeding  Container Management: Independent (opened ice cream lid and soda can tab)  Food to Mouth: Independent; Set-up  Drink to Mouth: Independent; Set-up    Grooming  Position Performed: Seated edge of bed  Washing Face: Independent; Set-up  Shaving: Independent; Set-up    Upper [address]: Minimum  assistance (min to snap and tie back)    Pain:  0/10 reported     Activity Tolerance:   Fair    After treatment patient left in no apparent distress:   Supine in bed and Call bell within reach    COMMUNICATION/COLLABORATION:   The patients plan of care was discussed with: Physical therapist and Registered nurse.      Annetta Cui OT  Time Calculation: 25 mins

## 2022-11-04 ENCOUNTER — APPOINTMENT (OUTPATIENT)
Dept: VASCULAR SURGERY | Age: 72
DRG: 871 | End: 2022-11-04
Attending: FAMILY MEDICINE
Payer: MEDICARE

## 2022-11-04 LAB
ANION GAP SERPL CALC-SCNC: 1 MMOL/L (ref 5–15)
BUN SERPL-MCNC: 34 MG/DL (ref 6–20)
BUN/CREAT SERPL: 40 (ref 12–20)
CALCIUM SERPL-MCNC: 7.6 MG/DL (ref 8.5–10.1)
CHLORIDE SERPL-SCNC: 104 MMOL/L (ref 97–108)
CO2 SERPL-SCNC: 35 MMOL/L (ref 21–32)
CREAT SERPL-MCNC: 0.85 MG/DL (ref 0.7–1.3)
ERYTHROCYTE [DISTWIDTH] IN BLOOD BY AUTOMATED COUNT: 13.2 % (ref 11.5–14.5)
GLUCOSE BLD STRIP.AUTO-MCNC: 150 MG/DL (ref 65–117)
GLUCOSE SERPL-MCNC: 117 MG/DL (ref 65–100)
HCT VFR BLD AUTO: 41.4 % (ref 36.6–50.3)
HGB BLD-MCNC: 13.4 G/DL (ref 12.1–17)
MCH RBC QN AUTO: 29.9 PG (ref 26–34)
MCHC RBC AUTO-ENTMCNC: 32.4 G/DL (ref 30–36.5)
MCV RBC AUTO: 92.4 FL (ref 80–99)
NRBC # BLD: 0 K/UL (ref 0–0.01)
NRBC BLD-RTO: 0 PER 100 WBC
PLATELET # BLD AUTO: 340 K/UL (ref 150–400)
PMV BLD AUTO: 9.9 FL (ref 8.9–12.9)
POTASSIUM SERPL-SCNC: 4.2 MMOL/L (ref 3.5–5.1)
RBC # BLD AUTO: 4.48 M/UL (ref 4.1–5.7)
SERVICE CMNT-IMP: ABNORMAL
SODIUM SERPL-SCNC: 140 MMOL/L (ref 136–145)
WBC # BLD AUTO: 26.4 K/UL (ref 4.1–11.1)

## 2022-11-04 PROCEDURE — 74011250636 HC RX REV CODE- 250/636: Performed by: PHYSICIAN ASSISTANT

## 2022-11-04 PROCEDURE — 74011250637 HC RX REV CODE- 250/637: Performed by: INTERNAL MEDICINE

## 2022-11-04 PROCEDURE — 93971 EXTREMITY STUDY: CPT

## 2022-11-04 PROCEDURE — 74011250637 HC RX REV CODE- 250/637: Performed by: FAMILY MEDICINE

## 2022-11-04 PROCEDURE — 82962 GLUCOSE BLOOD TEST: CPT

## 2022-11-04 PROCEDURE — 97530 THERAPEUTIC ACTIVITIES: CPT

## 2022-11-04 PROCEDURE — 77010033711 HC HIGH FLOW OXYGEN

## 2022-11-04 PROCEDURE — 94640 AIRWAY INHALATION TREATMENT: CPT

## 2022-11-04 PROCEDURE — 85027 COMPLETE CBC AUTOMATED: CPT

## 2022-11-04 PROCEDURE — 36415 COLL VENOUS BLD VENIPUNCTURE: CPT

## 2022-11-04 PROCEDURE — 74011250637 HC RX REV CODE- 250/637: Performed by: HOSPITALIST

## 2022-11-04 PROCEDURE — 94760 N-INVAS EAR/PLS OXIMETRY 1: CPT

## 2022-11-04 PROCEDURE — 97116 GAIT TRAINING THERAPY: CPT

## 2022-11-04 PROCEDURE — 80048 BASIC METABOLIC PNL TOTAL CA: CPT

## 2022-11-04 PROCEDURE — 65660000001 HC RM ICU INTERMED STEPDOWN

## 2022-11-04 PROCEDURE — 74011250636 HC RX REV CODE- 250/636: Performed by: FAMILY MEDICINE

## 2022-11-04 PROCEDURE — 74011000250 HC RX REV CODE- 250: Performed by: INTERNAL MEDICINE

## 2022-11-04 RX ORDER — LOPERAMIDE HYDROCHLORIDE 2 MG/1
4 CAPSULE ORAL 3 TIMES DAILY
Status: DISCONTINUED | OUTPATIENT
Start: 2022-11-04 | End: 2022-11-07

## 2022-11-04 RX ORDER — PREDNISONE 20 MG/1
40 TABLET ORAL
Status: DISCONTINUED | OUTPATIENT
Start: 2022-11-05 | End: 2022-11-07

## 2022-11-04 RX ADMIN — APIXABAN 5 MG: 5 TABLET, FILM COATED ORAL at 09:39

## 2022-11-04 RX ADMIN — IPRATROPIUM BROMIDE AND ALBUTEROL SULFATE 3 ML: .5; 3 SOLUTION RESPIRATORY (INHALATION) at 19:39

## 2022-11-04 RX ADMIN — FUROSEMIDE 20 MG: 10 INJECTION INTRAMUSCULAR; INTRAVENOUS at 09:39

## 2022-11-04 RX ADMIN — SACUBITRIL AND VALSARTAN 1 TABLET: 24; 26 TABLET, FILM COATED ORAL at 09:38

## 2022-11-04 RX ADMIN — IPRATROPIUM BROMIDE AND ALBUTEROL SULFATE 3 ML: .5; 3 SOLUTION RESPIRATORY (INHALATION) at 09:15

## 2022-11-04 RX ADMIN — AMIODARONE HYDROCHLORIDE 200 MG: 200 TABLET ORAL at 09:39

## 2022-11-04 RX ADMIN — SODIUM CHLORIDE, PRESERVATIVE FREE 10 ML: 5 INJECTION INTRAVENOUS at 22:13

## 2022-11-04 RX ADMIN — LOPERAMIDE HYDROCHLORIDE 4 MG: 2 CAPSULE ORAL at 04:13

## 2022-11-04 RX ADMIN — METHYLPREDNISOLONE SODIUM SUCCINATE 40 MG: 40 INJECTION, POWDER, FOR SOLUTION INTRAMUSCULAR; INTRAVENOUS at 22:13

## 2022-11-04 RX ADMIN — METOPROLOL SUCCINATE 25 MG: 25 TABLET, FILM COATED, EXTENDED RELEASE ORAL at 09:39

## 2022-11-04 RX ADMIN — ARFORMOTEROL TARTRATE 15 MCG: 15 SOLUTION RESPIRATORY (INHALATION) at 09:14

## 2022-11-04 RX ADMIN — METHYLPREDNISOLONE SODIUM SUCCINATE 40 MG: 40 INJECTION, POWDER, FOR SOLUTION INTRAMUSCULAR; INTRAVENOUS at 14:34

## 2022-11-04 RX ADMIN — SODIUM CHLORIDE, PRESERVATIVE FREE 10 ML: 5 INJECTION INTRAVENOUS at 14:40

## 2022-11-04 RX ADMIN — BUDESONIDE 500 MCG: 0.5 INHALANT RESPIRATORY (INHALATION) at 19:40

## 2022-11-04 RX ADMIN — LOPERAMIDE HYDROCHLORIDE 4 MG: 2 CAPSULE ORAL at 22:12

## 2022-11-04 RX ADMIN — SACUBITRIL AND VALSARTAN 1 TABLET: 24; 26 TABLET, FILM COATED ORAL at 22:30

## 2022-11-04 RX ADMIN — SODIUM CHLORIDE, PRESERVATIVE FREE 10 ML: 5 INJECTION INTRAVENOUS at 06:32

## 2022-11-04 RX ADMIN — METHYLPREDNISOLONE SODIUM SUCCINATE 40 MG: 40 INJECTION, POWDER, FOR SOLUTION INTRAMUSCULAR; INTRAVENOUS at 06:32

## 2022-11-04 RX ADMIN — ATORVASTATIN CALCIUM 40 MG: 40 TABLET, FILM COATED ORAL at 09:38

## 2022-11-04 RX ADMIN — BUDESONIDE 500 MCG: 0.5 INHALANT RESPIRATORY (INHALATION) at 09:16

## 2022-11-04 RX ADMIN — ASPIRIN 81 MG: 81 TABLET, COATED ORAL at 09:39

## 2022-11-04 RX ADMIN — LOPERAMIDE HYDROCHLORIDE 4 MG: 2 CAPSULE ORAL at 14:34

## 2022-11-04 RX ADMIN — APIXABAN 5 MG: 5 TABLET, FILM COATED ORAL at 22:13

## 2022-11-04 NOTE — PROGRESS NOTES
Occupational Therapy   11.04.2022    Chart reviewed in prep for OT treatment. Patient received in bed and reporting he completed ADLs in the AM and had just returned to bed, requesting to defer OT for the day. Will defer and follow-up as able and medically appropriate. Thank you. Arlette Tijerina MS, OTR/L

## 2022-11-04 NOTE — PROGRESS NOTES
Pulmonary, Critical Care, and Sleep Medicine    Progress Note    Name: Tawnya Richter MRN: [id number]   : 1950 Hospital: [place] [address]   Date: 2022        IMPRESSION:   Acute hypercapnic resp failure- Moderate COPD ( 3lpm baseline + trelegy + Dr. Yang David) + multilobar CAP- PCT > 22. At risk for MDROs and atypicals consider ESBLS. Decom HFrEF  H/o RLL lobectomy at [address] [place] [address]  for NSCLC  COPD  Sepsis- from PNA  A fib. On amiodarone       RECOMMENDATIONS:   Wean stroids as able, to PO  Nebs  Dvt proph  Amio/eliquis  Diuretics   congestion control   NIV as needed during the day for WOB- has not needed recently  inpatient rehab at discharge   O2 titration above 90%      Subjective:       Down to midflow and feeling better     11/3  On high flow this morning 35L 60%. Says he has alternated with midflow at times  No longer coughing up mucous  Has been getting up to the bathroom      Moving back and forth with high flow and HFNC  Less congested today  Mobilizing more today       Sounds a little congested   Chest film shows some improvement   Down to 20lpm and 90% FiO2  ESR 23, probnp [id number], procal 0.74    10/31  Back up to 35lpm and 80% FIO2  Had a rough weekend   + wheeze today     ECHO from 10/28  Result status: Final result       Left Ventricle: Severely reduced left ventricular systolic function with a visually estimated EF of 25 - 30%. Not well visualized. Left ventricle size is normal. Normal wall thickness. Unable to assess wall motion. Abnormal diastolic function. Right Ventricle: Not well visualized. Reduced systolic function. TAPSE is abnormal. TAPSE is 1.1 cm. Mitral Valve: Mild regurgitation. Left Atrium: Left atrium is mildly dilated. Right Atrium: Right atrium is mildly dilated.     10/28  Inability to tolerate below 25lpm and 85% FiO2 today  Sounds congested   Feels about the same as yesterday   Elevation of WBC might be from steroids       Metabolic acidosis Down to 30lpm and 50% FiO2  Feeling a little better     Patient is a 71 y.o. male with COPD ( FEV1 in office 2022 53% folows with Dr. Demarco Andres) who is s/p RLL lobectomy for NSCLC at [facility] 2009. Pt with inc SOB and WOB last 2-3 days and presented to ED with SOB/hypercapnic resp failure. On HFNC currently and gettign ready top go to [facility]. Seen in ED. C/o productive cough. No hemoptysis      Past Medical History:   Diagnosis Date    Asthma     Cancer ([facility]     Lung Cancer    Chronic obstructive pulmonary disease (HCC)     Chronic pain     High cholesterol     Lung cancer ([facility]       Past Surgical History:   Procedure Laterality Date    HX LOBECTOMY      Right lung    HX OTHER SURGICAL      Partial right lung removal      Prior to Admission medications    Medication Sig Start Date End Date Taking? Authorizing Provider   arformoteroL (BROVANA) 15 mcg/2 mL nebu neb solution 15 mcg by Nebulization route two (2) times a day. Sometimes uses TID   Yes Provider, Historical   budesonide (PULMICORT) 0.5 mg/2 mL nbsp 500 mcg by Nebulization route two (2) times a day. Sometimes uses TID   Yes Provider, Historical   atorvastatin (LIPITOR) 40 mg tablet Take 40 mg by mouth daily. Yes Provider, Historical   albuterol (PROVENTIL VENTOLIN) 2.5 mg /3 mL (0.083 %) nebu 2.5 mg by Nebulization route every six (6) hours as needed for Wheezing or Shortness of Breath. Yes Provider, Historical   oxyCODONE IR (ROXICODONE) 20 mg immediate release tablet Take 40 mg by mouth every twelve (12) hours as needed for Pain (severe pain). Yes Provider, Historical   aspirin delayed-release 81 mg tablet Take 1 Tab by mouth daily. 2/23/18  Yes Nima Foot, NP   ketoconazole (NIZORAL) 2 % shampoo Apply  to affected area as needed for Itching.     Provider, Historical     No Known Allergies   Social History     Tobacco Use    Smoking status: Former     Packs/day: 1.00     Years: 45.00     Pack years: 45.00     Types: Cigarettes     Quit date: 1/1/2009 Years since quittin.8    Smokeless tobacco: Never   Substance Use Topics    Alcohol use: No     Comment: rarely/social       No family history on file. Current Facility-Administered Medications   Medication Dose Route Frequency    furosemide (LASIX) injection 20 mg  20 mg IntraVENous BID    amiodarone (CORDARONE) tablet 200 mg  200 mg Oral BID    methylPREDNISolone (PF) (SOLU-MEDROL) injection 40 mg  40 mg IntraVENous Q8H    sacubitriL-valsartan (ENTRESTO) 24-26 mg tablet 1 Tablet  1 Tablet Oral Q12H    apixaban (ELIQUIS) tablet 5 mg  5 mg Oral Q12H    metoprolol succinate (TOPROL-XL) XL tablet 25 mg  25 mg Oral DAILY    albuterol-ipratropium (DUO-NEB) 2.5 MG-0.5 MG/3 ML  3 mL Nebulization BID RT    sodium chloride (NS) flush 5-40 mL  5-40 mL IntraVENous Q8H    arformoteroL (BROVANA) neb solution 15 mcg  15 mcg Nebulization BID RT    aspirin delayed-release tablet 81 mg  81 mg Oral DAILY    atorvastatin (LIPITOR) tablet 40 mg  40 mg Oral DAILY    budesonide (PULMICORT) 500 mcg/2 ml nebulizer suspension  500 mcg Nebulization BID       Review of Systems:  Review of systems not obtained due to patient factors.     Objective:   Vital Signs:    Visit Vitals  BP (!) 105/39   Pulse 68   Temp 97.7 °F (36.5 °C)   Resp 21   Ht 5' 11\" (1.803 m)   Wt 64.3 kg (141 lb 12.1 oz)   SpO2 97%   BMI 19.77 kg/m²       O2 Device: Hi flow nasal cannula (midflow)   O2 Flow Rate (L/min): 10 l/min   Temp (24hrs), Av.5 °F (36.4 °C), Min:97.4 °F (36.3 °C), Max:97.9 °F (36.6 °C)       Intake/Output:   Last shift:      701 - 1900  In: -   Out: 275 [Urine:275]  Last 3 shifts: 1901 -  07  In: -   Out: 575 [Urine:575]    Intake/Output Summary (Last 24 hours) at 2022 1110  Last data filed at 2022 1040  Gross per 24 hour   Intake --   Output 625 ml   Net -625 ml        Physical Exam:   Alert on HFNC  + moderate WOB with mild accessory use and word dyspnea no sternal retractions  No abd paradox  Diffuse wheeze  Heart : tachy/reg no murmur  Trachea midline  Abd soft/NT no organomegaly  EX no Cc/e  Data review:     Recent Results (from the past 24 hour(s))   CBC W/O DIFF    Collection Time: 11/04/22  3:48 AM   Result Value Ref Range    WBC 26.4 (H) 4.1 - 11.1 K/uL    RBC 4.48 4.10 - 5.70 M/uL    HGB 13.4 12.1 - 17.0 g/dL    HCT 41.4 36.6 - 50.3 %    MCV 92.4 80.0 - 99.0 FL    MCH 29.9 26.0 - 34.0 PG    MCHC 32.4 30.0 - 36.5 g/dL    RDW 13.2 11.5 - 14.5 %    PLATELET 705 970 - 082 K/uL    MPV 9.9 8.9 - 12.9 FL    NRBC 0.0 0  WBC    ABSOLUTE NRBC 0.00 0.00 - 9.45 K/uL   METABOLIC PANEL, BASIC    Collection Time: 11/04/22  3:48 AM   Result Value Ref Range    Sodium 140 136 - 145 mmol/L    Potassium 4.2 3.5 - 5.1 mmol/L    Chloride 104 97 - 108 mmol/L    CO2 35 (H) 21 - 32 mmol/L    Anion gap 1 (L) 5 - 15 mmol/L    Glucose 117 (H) 65 - 100 mg/dL    BUN 34 (H) 6 - 20 MG/DL    Creatinine 0.85 0.70 - 1.30 MG/DL    BUN/Creatinine ratio 40 (H) 12 - 20      eGFR >60 >60 ml/min/1.73m2    Calcium 7.6 (L) 8.5 - 10.1 MG/DL           Imaging:  I have personally reviewed the patients radiographs and have reviewed the reports:  PCXR with LLL and right mid lung new ASD     CC > 35 minutes excluding procedures    Mundo Delong PA-C

## 2022-11-04 NOTE — PROGRESS NOTES
Bedside shift change report given to Flakita Diana LPN (oncoming nurse) by Justo Cobb RN (offgoing nurse). Report included the following information SBAR, Kardex, Intake/Output, MAR, Recent Results, and Cardiac Rhythm NSR/ST .

## 2022-11-04 NOTE — PROGRESS NOTES
[address] [facility]  Hospitalist Group                                                                                          Hospitalist Progress Note  Parke Cogan, MD  Answering service: [phone] OR [phone] from in house phone        Date of Service:  11/3/2022  NAME:  Zeyad Victoria  :  1950  MRN:  [id number]      Admission Summary:   Zeyad Victoria is a 71 y.o. male with a PMHx of lung cancer, COPD, chronic hypoxic respiratory failure, on home O2 at 3 liters/min, who presents with Chief Complaint of Shortness of breath/ Dyspnea on exertion, progressively worsening for the past 3 days. Patient denies any fevers or chills. He used increased doses of inhalers, but it did not help. He called 911. Upon EMS arrival, his O2 saturation was 79%. Patient was placed on a BIPAP upon arrival.      Interval history / Subjective: Follow-up for respiratory failure/new onset PAF and heart failure  He remains on high Flow O2  No new issues have some loose bowel movement       Assessment & Plan:     Acute hypercapnic respiratory failure due to COPD pneumonia  Acute on chronic hypoxic respiratory failure due to COPD  Sepsis due to pneumonia multilobar pneumonia  --Initially patient was on BiPAP now on high flow nasal cannula 35 L/minOn high flow O2  --Pulmonary on board  --Continue nebs, steroid, antibiotics - changed to PO prednisone  - follow cultures  --Monitor respiratory status  --CXR 10/31-- Decreasing bilateral areas of pneumonia. Follow-up to resolution is  suggested. PAF: new onset evening of 10/27. In the setting of respiratory failure  HFrEF newly diagnosed EF 25-30% unclear etiology    --Received amio gtt continue oral amiodarone- reduced dose  -TTE EF 25-30% - added BID diuretics today  --on eliquis on Entresto as well  Cardiology notes reviewed    Malignant neoplasm of the lung status post lobectomy for non-small cell lung cancer  --Follow-up as an outpatient  --History of right lung lobectomy    PENNY POA --monitor avoid nephrotoxin    Hyperlipidemia continue statin    Uncontrolled hypertension--currently on as needed hydralazine    Left arm swelling- possible DVT- already in eliquis- may be due to persistent BP cuff positioning  Monitor with removal of BP cuff    Code status: DNR  DVT prophylaxis: eliquis    Care Plan discussed with: Patient/Family and Nurse  Anticipated Disposition: Home w/Family  Anticipated Discharge: 24 hours to 48 hours         Hospital Problems  Date Reviewed: 1/9/2019            Codes Class Noted POA    Moderate protein-calorie malnutrition ([facility] ICD-10-CM: E44.0  ICD-9-CM: 263.0  10/27/2022 Yes        COPD (chronic obstructive pulmonary disease) ([facility] ICD-10-CM: J44.9  ICD-9-CM: 496  10/26/2022 Unknown        Pneumonia ICD-10-CM: J18.9  ICD-9-CM: 486  10/26/2022 Unknown        Malignant neoplasm of lung, unspecified laterality, unspecified part of lung ([facility] ICD-10-CM: C34.90  ICD-9-CM: 162.9  10/26/2022 Unknown        Sepsis ([facility] ICD-10-CM: A41.9  ICD-9-CM: 038.9, 995.91  10/26/2022 Unknown       Review of Systems:   A comprehensive review of systems was negative. Vital Signs:    Last 24hrs VS reviewed since prior progress note.  Most recent are:  Visit Vitals  /61   Pulse 73   Temp 97.9 °F (36.6 °C)   Resp 28   Ht 5' 11\" (1.803 m)   Wt 65 kg (143 lb 4.8 oz)   SpO2 95%   BMI 19.99 kg/m²   Patient Vitals for the past 24 hrs:   Temp Pulse Resp BP SpO2   11/03/22 2200 -- 73 -- -- --   11/03/22 2145 -- 70 -- 108/61 --   11/03/22 2041 -- -- -- -- 95 %   11/03/22 2000 -- 89 -- -- --   11/03/22 1927 97.9 °F (36.6 °C) 73 28 (!) 106/57 93 %   11/03/22 1825 -- 70 -- -- --   11/03/22 1600 97.4 °F (36.3 °C) 72 19 108/67 96 %   11/03/22 1421 -- 80 -- -- --   11/03/22 1253 -- 99 -- -- --   11/03/22 1200 97.4 °F (36.3 °C) 73 18 104/61 94 %   11/03/22 1029 -- 78 -- -- --   11/03/22 0800 97.3 °F (36.3 °C) 64 19 124/70 95 %   11/03/22 0722 -- -- -- -- 97 % 11/03/22 0700 97 °F (36.1 °C) 64 20 123/69 95 %   11/03/22 0600 -- 72 -- -- --   11/03/22 0400 97 °F (36.1 °C) 64 16 113/62 96 %   11/03/22 0314 -- 65 19 108/75 96 %   11/03/22 0200 -- 65 -- -- --   11/02/22 2335 (!) 96 °F (35.6 °C) 74 20 132/73 95 %   11/02/22 2300 97.4 °F (36.3 °C) 63 18 119/67 95 %        No intake or output data in the 24 hours ending 11/03/22 2232       Physical Examination:     I had a face to face encounter with this patient and independently examined them on 11/3/2022 as outlined below:          General : alert x 3, awake, no acute distress, resting in bed, pleasant   HEENT: PEERL, EOMI, moist mucus membrane, TM clear  Neck: supple, no JVD, no meningeal signs  Chest: Coarse breath sounds, scattered wheezing  CVS: S1 S2 heard, Capillary refill less than 2 seconds  Abd: soft/ Non tender, non distended  Ext: no clubbing, no cyanosis, no edema, brisk 2+ DP pulses  Neuro/Psych: pleasant mood and affect,  sensory grossly within normal limit, Strength 5/5 in all extremities  Skin: warm     Data Review:    I personally reviewed  Image and labs      Labs:     Recent Labs     11/03/22  0404   WBC 24.1*   HGB 13.0   HCT 40.3          Recent Labs     11/03/22  0404      K 3.9      CO2 30   BUN 28*   CREA 0.83   *   CA 8.0*       No results for input(s): ALT, AP, TBIL, TBILI, TP, ALB, GLOB, GGT, AML, LPSE in the last 72 hours. No lab exists for component: SGOT, GPT, AMYP, HLPSE    No results for input(s): INR, PTP, APTT, INREXT, INREXT in the last 72 hours. No results for input(s): FE, TIBC, PSAT, FERR in the last 72 hours. No results found for: FOL, RBCF   No results for input(s): PH, PCO2, PO2 in the last 72 hours. No results for input(s): CPK, CKNDX, TROIQ in the last 72 hours.     No lab exists for component: CPKMB  Lab Results   Component Value Date/Time    Cholesterol, total 106 02/20/2018 04:54 AM    HDL Cholesterol 45 02/20/2018 04:54 AM    LDL, calculated 46 02/20/2018 04:54 AM    Triglyceride 75 02/20/2018 04:54 AM    CHOL/HDL Ratio 2.4 02/20/2018 04:54 AM     Lab Results   Component Value Date/Time    Glucose (POC) 100 09/14/2015 12:14 PM     Lab Results   Component Value Date/Time    Color YELLOW/STRAW 01/09/2019 05:00 PM    Appearance CLEAR 01/09/2019 05:00 PM    Specific gravity 1.014 01/09/2019 05:00 PM    pH (UA) 7.5 01/09/2019 05:00 PM    Protein NEGATIVE 01/09/2019 05:00 PM    Glucose NEGATIVE 01/09/2019 05:00 PM    Ketone TRACE (A) 01/09/2019 05:00 PM    Bilirubin NEGATIVE 01/09/2019 05:00 PM    Urobilinogen 0.2 01/09/2019 05:00 PM    Nitrites NEGATIVE 01/09/2019 05:00 PM    Leukocyte Esterase NEGATIVE 01/09/2019 05:00 PM    Epithelial cells FEW 01/09/2019 05:00 PM    Bacteria NEGATIVE 01/09/2019 05:00 PM    WBC 0-4 01/09/2019 05:00 PM    RBC 0-5 01/09/2019 05:00 PM         Medications Reviewed:     Current Facility-Administered Medications   Medication Dose Route Frequency    furosemide (LASIX) injection 20 mg  20 mg IntraVENous BID    amiodarone (CORDARONE) tablet 200 mg  200 mg Oral BID    methylPREDNISolone (PF) (SOLU-MEDROL) injection 40 mg  40 mg IntraVENous Q8H    loperamide (IMODIUM) capsule 4 mg  4 mg Oral Q6H PRN    sacubitriL-valsartan (ENTRESTO) 24-26 mg tablet 1 Tablet  1 Tablet Oral Q12H    apixaban (ELIQUIS) tablet 5 mg  5 mg Oral Q12H    metoprolol succinate (TOPROL-XL) XL tablet 25 mg  25 mg Oral DAILY    albuterol-ipratropium (DUO-NEB) 2.5 MG-0.5 MG/3 ML  3 mL Nebulization BID RT    hydrALAZINE (APRESOLINE) 20 mg/mL injection 20 mg  20 mg IntraVENous Q6H PRN    albuterol-ipratropium (DUO-NEB) 2.5 MG-0.5 MG/3 ML  3 mL Nebulization Q4H PRN    sodium chloride (NS) flush 5-40 mL  5-40 mL IntraVENous Q8H    sodium chloride (NS) flush 5-40 mL  5-40 mL IntraVENous PRN    acetaminophen (TYLENOL) tablet 650 mg  650 mg Oral Q6H PRN    Or    acetaminophen (TYLENOL) suppository 650 mg  650 mg Rectal Q6H PRN    ondansetron (ZOFRAN ODT) tablet 4 mg  4 mg Oral Q8H PRN    Or    ondansetron (ZOFRAN) injection 4 mg  4 mg IntraVENous Q6H PRN    polyethylene glycol (MIRALAX) packet 17 g  17 g Oral DAILY PRN    arformoteroL (BROVANA) neb solution 15 mcg  15 mcg Nebulization BID RT    aspirin delayed-release tablet 81 mg  81 mg Oral DAILY    atorvastatin (LIPITOR) tablet 40 mg  40 mg Oral DAILY    budesonide (PULMICORT) 500 mcg/2 ml nebulizer suspension  500 mcg Nebulization BID    oxyCODONE IR (ROXICODONE) tablet 20 mg  20 mg Oral Q6H PRN    oxyCODONE IR (ROXICODONE) tablet 10 mg  10 mg Oral Q6H PRN     ______________________________________________________________________  EXPECTED LENGTH OF STAY: 4d 19h  ACTUAL LENGTH OF STAY:          8      Please note that this dictation was completed with [employer], the computer voice recognition software. Quite often unanticipated grammatical, syntax, homophones, and other interpretive errors are inadvertently transcribed by the computer software. Please disregard these errors. Please excuse any errors that have escaped final proofreading.                 Lizbeth Montiel MD

## 2022-11-04 NOTE — PROGRESS NOTES
Bedside shift change report given to Selene Kenney (oncoming nurse) by Deonna LEAHY (offgoing nurse). Report included the following information SBAR, Kardex, ED Summary, Intake/Output, MAR, and Cardiac Rhythm NSR .

## 2022-11-04 NOTE — PROGRESS NOTES
Problem: Mobility Impaired (Adult and Pediatric)  Goal: *Acute Goals and Plan of Care (Insert Text)  Description: FUNCTIONAL STATUS PRIOR TO ADMISSION: Patient was independent and active without use of DME. At baseline patient uses 2-4 liters of supplemental oxygen. He reports using a portable pulse ox. When asked he reported no falls in the last 12 months. HOME SUPPORT PRIOR TO ADMISSION: The patient lived with his significant other but did not require assist.    Physical Therapy Goals  Revisited 11/4/2022, goals not met but remain appropriate so carry over         Physical Therapy Goals  Initiated 10/28/2022  1. Patient will move from supine to sit and sit to supine , scoot up and down, and roll side to side in bed with independence within 7 day(s). 2.  Patient will transfer from bed to chair and chair to bed with independence using the least restrictive device within 7 day(s). 3.  Patient will perform sit to stand with independence within 7 day(s). 4.  Patient will ambulate with independence for 300 feet with the least restrictive device within 7 day(s). 5.  Patient will ascend/descend 4 stairs with one handrail(s) with modified independence within 7 day(s). Outcome: Progressing Towards Goal   PHYSICAL THERAPY TREATMENT: WEEKLY REASSESSMENT  Patient: Alfred Minaya (50 y.o. male)  Date: 11/4/2022  Primary Diagnosis: Sepsis ([facility] [A41.9]  Pneumonia [J18.9]  Malignant neoplasm of lung, unspecified laterality, unspecified part of lung ([facility] [C34.90]  COPD (chronic obstructive pulmonary disease) ([facility] [J44.9]       Precautions:   Fall      ASSESSMENT  Patient continues with skilled PT services and is progressing towards goals. Pt remains limited by impaired standing balance as well as need for supplemental 02 greater than what he uses at baseline (reported as 3-4 liters). Today he was received on high flow at 9 liters (weaned from heated high flow) and was able to participate in some amb.  Lowest noted Sp02 today was 91% during amb. Post amb he participated in use of the incentive spirometer with good technique and remained up to the chair. Encouraged pt to have a goal of up to the chair for meals. Also of note, LUE remains edematous, discussed with hospitalist. Pt remains far from reported baseline and anticipate slow steady gains and likely need for rehab. For the weekend, recommend patient to complete as able in order to maintain strength, endurance and independence with nursing: OOB to chair 3x/day with assist for line management. PT to follow-up with patient after the weekend. Patient's progression toward goals since last assessment: some gains made, goals not met but remain appropriate so will carryover    Current Level of Function Impacting Discharge (mobility/balance): min assist to stand by assist    Functional Outcome Measure: The patient scored 50/100 on the Barthel Index outcome measure which is indicative of partial dependence for self care and mobility. Other factors to consider for discharge: medically complex: lung CA (s/p lobectomy), asthma, COPD, and CHF         PLAN :  Goals have been updated based on progression since last assessment. Patient continues to benefit from skilled intervention to address the above impairments. Recommendations and Planned Interventions: bed mobility training, transfer training, gait training, therapeutic exercises, patient and family training/education, and therapeutic activities      Frequency/Duration: Patient will be followed by physical therapy:  5 times a week to address goals.     Recommendation for discharge: (in order for the patient to meet his/her long term goals)  Therapy 3 hours per day 5-7 days per week    This discharge recommendation:  A follow-up discussion with the attending provider and/or case management is planned    IF patient discharges home will need the following DME: to be determined (TBD)         SUBJECTIVE:   Patient stated I'll try.     OBJECTIVE DATA SUMMARY:   Chart checked, pt cleared by nursing  HISTORY:    Past Medical History:   Diagnosis Date    Asthma     Cancer ([facility]     Lung Cancer    Chronic obstructive pulmonary disease (HCC)     Chronic pain     High cholesterol     Lung cancer ([facility]      Past Surgical History:   Procedure Laterality Date    HX LOBECTOMY      Right lung    HX OTHER SURGICAL      Partial right lung removal       Personal factors and/or comorbidities impacting plan of care: medically complex: lung CA (s/p lobectomy), asthma, COPD, and CHF    Home Situation  Home Environment: Private residence  # Steps to Enter: 4  Hand Rails : Bilateral  One/Two Story Residence: One story  Living Alone: No  Support Systems: Spouse/Significant Other  Patient Expects to be Discharged to[de-identified] Skilled nursing facility  Current DME Used/Available at Home:  (home 02 and pulse ox)  Tub or Shower Type: Tub    EXAMINATION/PRESENTATION/DECISION MAKING:   Critical Behavior:  Neurologic State: Alert  Orientation Level: Oriented to person, Oriented to place, Oriented to situation (nearly oriented to time)  Cognition: Follows commands  Safety/Judgement: Decreased awareness of need for assistance  Hearing:     Skin:  refer to MD and nursing notes  Edema: LUE remains edematous, discussed with hospitalist.  Range Of Motion:  AROM: Within functional limits                       Strength:    Strength: Within functional limits                    Tone & Sensation:                  Sensation: Intact               Coordination:     Vision:      Functional Mobility:  Bed Mobility:     Supine to Sit: Stand-by assistance (for line management)        Transfers:  Sit to Stand: Stand-by assistance  Stand to Sit: Stand-by assistance                       Balance:   Sitting: Intact; Without support  Standing: Impaired; Without support  Standing - Static: Good  Standing - Dynamic : Fair  Ambulation/Gait Training:  Distance (ft): 16 Feet (ft)  Assistive Device: Gait belt  Ambulation - Level of Assistance: Minimal assistance        Gait Abnormalities: Decreased step clearance;Trunk sway increased        Base of Support: Narrowed; Center of gravity altered     Speed/Maite: Slow  Step Length: Left shortened;Right shortened                     Stairs: Therapeutic Exercises:   Pursed lip breathing and use of incentive spirometer    Functional Measure:  Barthel Index:    Bathin  Bladder: 10  Bowels: 5  Groomin  Dressin  Feeding: 10  Mobility: 0  Stairs: 0  Toilet Use: 5  Transfer (Bed to Chair and Back): 10  Total: 50/100       The Barthel ADL Index: Guidelines  1. The index should be used as a record of what a patient does, not as a record of what a patient could do. 2. The main aim is to establish degree of independence from any help, physical or verbal, however minor and for whatever reason. 3. The need for supervision renders the patient not independent. 4. A patient's performance should be established using the best available evidence. Asking the patient, friends/relatives and nurses are the usual sources, but direct observation and common sense are also important. However direct testing is not needed. 5. Usually the patient's performance over the preceding 24-48 hours is important, but occasionally longer periods will be relevant. 6. Middle categories imply that the patient supplies over 50 per cent of the effort. 7. Use of aids to be independent is allowed. Score Interpretation (from [address] [place] [address])    Independent   60-79 Minimally independent   40-59 Partially dependent   20-39 Very dependent   <20 Totally dependent     -Amor Erwin., Barthel, D.W. (1965). Functional evaluation: the Barthel Index. [facility] ([address] (1997). The Barthel activities of daily living index: self-reporting versus actual performance in the old (> or = 75 years).  Journal of [address] [place] [address] 45(7), Lise Mahmood evita RANCHO Granados, Lou Brito., Елена Nicolas., Bandar Barahona. (1999). Measuring the change in disability after inpatient rehabilitation; comparison of the responsiveness of the Barthel Index and Functional [place] Measure. Journal of Neurology, Neurosurgery, and Psychiatry, 66(4), 588-300. LORAINE Marcum, MARI Allen, & Augusta Mccullough M.A. (2004) Assessment of post-stroke quality of life in cost-effectiveness studies: The usefulness of the Barthel Index and the EuroQoL-5D. Quality of Life Research, 13, 427-43          Pain Rating:  None rated    Activity Tolerance:   Good, SpO2 stable on high flow at 9 liters, requires rest breaks, and see assessment    After treatment patient left in no apparent distress:   Sitting in chair and Call bell within reach    COMMUNICATION/EDUCATION:   The patients plan of care was discussed with: Occupational therapist, Registered nurse, and Physician. Fall prevention education was provided and the patient/caregiver indicated understanding., Patient/family have participated as able in goal setting and plan of care. , and Patient/family agree to work toward stated goals and plan of care.     Thank you for this referral.  Fredricka Holstein   Time Calculation: 23 mins

## 2022-11-05 LAB
ANION GAP SERPL CALC-SCNC: 4 MMOL/L (ref 5–15)
APPEARANCE UR: CLEAR
BACTERIA URNS QL MICRO: NEGATIVE /HPF
BILIRUB UR QL: NEGATIVE
BUN SERPL-MCNC: 32 MG/DL (ref 6–20)
BUN/CREAT SERPL: 38 (ref 12–20)
CALCIUM SERPL-MCNC: 7.7 MG/DL (ref 8.5–10.1)
CHLORIDE SERPL-SCNC: 103 MMOL/L (ref 97–108)
CO2 SERPL-SCNC: 34 MMOL/L (ref 21–32)
COLOR UR: ABNORMAL
CREAT SERPL-MCNC: 0.84 MG/DL (ref 0.7–1.3)
EPITH CASTS URNS QL MICRO: ABNORMAL /LPF
ERYTHROCYTE [DISTWIDTH] IN BLOOD BY AUTOMATED COUNT: 13.2 % (ref 11.5–14.5)
GLUCOSE SERPL-MCNC: 113 MG/DL (ref 65–100)
GLUCOSE UR STRIP.AUTO-MCNC: NEGATIVE MG/DL
HCT VFR BLD AUTO: 39.7 % (ref 36.6–50.3)
HGB BLD-MCNC: 13.1 G/DL (ref 12.1–17)
HGB UR QL STRIP: ABNORMAL
HYALINE CASTS URNS QL MICRO: ABNORMAL /LPF (ref 0–5)
KETONES UR QL STRIP.AUTO: NEGATIVE MG/DL
LEUKOCYTE ESTERASE UR QL STRIP.AUTO: NEGATIVE
MCH RBC QN AUTO: 29.8 PG (ref 26–34)
MCHC RBC AUTO-ENTMCNC: 33 G/DL (ref 30–36.5)
MCV RBC AUTO: 90.4 FL (ref 80–99)
NITRITE UR QL STRIP.AUTO: NEGATIVE
NRBC # BLD: 0 K/UL (ref 0–0.01)
NRBC BLD-RTO: 0 PER 100 WBC
PH UR STRIP: 6.5 [PH] (ref 5–8)
PLATELET # BLD AUTO: 336 K/UL (ref 150–400)
PMV BLD AUTO: 9.8 FL (ref 8.9–12.9)
POTASSIUM SERPL-SCNC: 4.6 MMOL/L (ref 3.5–5.1)
PROT UR STRIP-MCNC: NEGATIVE MG/DL
RBC # BLD AUTO: 4.39 M/UL (ref 4.1–5.7)
RBC #/AREA URNS HPF: ABNORMAL /HPF (ref 0–5)
SODIUM SERPL-SCNC: 141 MMOL/L (ref 136–145)
SP GR UR REFRACTOMETRY: 1.02 (ref 1–1.03)
UROBILINOGEN UR QL STRIP.AUTO: 0.2 EU/DL (ref 0.2–1)
WBC # BLD AUTO: 25.8 K/UL (ref 4.1–11.1)
WBC URNS QL MICRO: ABNORMAL /HPF (ref 0–4)

## 2022-11-05 PROCEDURE — 80048 BASIC METABOLIC PNL TOTAL CA: CPT

## 2022-11-05 PROCEDURE — 94640 AIRWAY INHALATION TREATMENT: CPT

## 2022-11-05 PROCEDURE — 74011000250 HC RX REV CODE- 250: Performed by: FAMILY MEDICINE

## 2022-11-05 PROCEDURE — 85027 COMPLETE CBC AUTOMATED: CPT

## 2022-11-05 PROCEDURE — 74011250637 HC RX REV CODE- 250/637: Performed by: FAMILY MEDICINE

## 2022-11-05 PROCEDURE — 74011000250 HC RX REV CODE- 250: Performed by: INTERNAL MEDICINE

## 2022-11-05 PROCEDURE — 65660000001 HC RM ICU INTERMED STEPDOWN

## 2022-11-05 PROCEDURE — 74011250636 HC RX REV CODE- 250/636: Performed by: FAMILY MEDICINE

## 2022-11-05 PROCEDURE — 81001 URINALYSIS AUTO W/SCOPE: CPT

## 2022-11-05 PROCEDURE — 36415 COLL VENOUS BLD VENIPUNCTURE: CPT

## 2022-11-05 PROCEDURE — 74011250637 HC RX REV CODE- 250/637: Performed by: INTERNAL MEDICINE

## 2022-11-05 PROCEDURE — 94760 N-INVAS EAR/PLS OXIMETRY 1: CPT

## 2022-11-05 PROCEDURE — 74011636637 HC RX REV CODE- 636/637: Performed by: PHYSICIAN ASSISTANT

## 2022-11-05 PROCEDURE — 77010033678 HC OXYGEN DAILY

## 2022-11-05 PROCEDURE — 94664 DEMO&/EVAL PT USE INHALER: CPT

## 2022-11-05 RX ADMIN — ASPIRIN 81 MG: 81 TABLET, COATED ORAL at 08:11

## 2022-11-05 RX ADMIN — APIXABAN 5 MG: 5 TABLET, FILM COATED ORAL at 21:53

## 2022-11-05 RX ADMIN — PREDNISONE 40 MG: 20 TABLET ORAL at 08:11

## 2022-11-05 RX ADMIN — SODIUM CHLORIDE, PRESERVATIVE FREE 10 ML: 5 INJECTION INTRAVENOUS at 21:54

## 2022-11-05 RX ADMIN — ARFORMOTEROL TARTRATE 15 MCG: 15 SOLUTION RESPIRATORY (INHALATION) at 21:32

## 2022-11-05 RX ADMIN — LOPERAMIDE HYDROCHLORIDE 4 MG: 2 CAPSULE ORAL at 08:10

## 2022-11-05 RX ADMIN — METOPROLOL SUCCINATE 25 MG: 25 TABLET, FILM COATED, EXTENDED RELEASE ORAL at 08:11

## 2022-11-05 RX ADMIN — APIXABAN 5 MG: 5 TABLET, FILM COATED ORAL at 08:11

## 2022-11-05 RX ADMIN — SACUBITRIL AND VALSARTAN 1 TABLET: 24; 26 TABLET, FILM COATED ORAL at 21:52

## 2022-11-05 RX ADMIN — ARFORMOTEROL TARTRATE 15 MCG: 15 SOLUTION RESPIRATORY (INHALATION) at 07:25

## 2022-11-05 RX ADMIN — AMIODARONE HYDROCHLORIDE 200 MG: 200 TABLET ORAL at 17:24

## 2022-11-05 RX ADMIN — BUDESONIDE 500 MCG: 0.5 INHALANT RESPIRATORY (INHALATION) at 07:25

## 2022-11-05 RX ADMIN — LOPERAMIDE HYDROCHLORIDE 4 MG: 2 CAPSULE ORAL at 21:53

## 2022-11-05 RX ADMIN — IPRATROPIUM BROMIDE AND ALBUTEROL SULFATE 3 ML: .5; 3 SOLUTION RESPIRATORY (INHALATION) at 21:32

## 2022-11-05 RX ADMIN — LOPERAMIDE HYDROCHLORIDE 4 MG: 2 CAPSULE ORAL at 14:08

## 2022-11-05 RX ADMIN — SODIUM CHLORIDE 1 G: 9 INJECTION INTRAMUSCULAR; INTRAVENOUS; SUBCUTANEOUS at 14:09

## 2022-11-05 RX ADMIN — SODIUM CHLORIDE, PRESERVATIVE FREE 10 ML: 5 INJECTION INTRAVENOUS at 06:43

## 2022-11-05 RX ADMIN — ATORVASTATIN CALCIUM 40 MG: 40 TABLET, FILM COATED ORAL at 08:11

## 2022-11-05 RX ADMIN — SACUBITRIL AND VALSARTAN 1 TABLET: 24; 26 TABLET, FILM COATED ORAL at 08:11

## 2022-11-05 RX ADMIN — FUROSEMIDE 20 MG: 10 INJECTION INTRAMUSCULAR; INTRAVENOUS at 08:11

## 2022-11-05 RX ADMIN — FUROSEMIDE 20 MG: 10 INJECTION INTRAMUSCULAR; INTRAVENOUS at 14:09

## 2022-11-05 RX ADMIN — IPRATROPIUM BROMIDE AND ALBUTEROL SULFATE 3 ML: .5; 3 SOLUTION RESPIRATORY (INHALATION) at 07:25

## 2022-11-05 RX ADMIN — BUDESONIDE 500 MCG: 0.5 INHALANT RESPIRATORY (INHALATION) at 21:32

## 2022-11-05 RX ADMIN — AMIODARONE HYDROCHLORIDE 200 MG: 200 TABLET ORAL at 08:11

## 2022-11-05 NOTE — PROGRESS NOTES
Bedside shift change report given to Georgette Campbell LPN (oncoming nurse) by Luann Vickers RN (offgoing nurse). Report included the following information SBAR, Kardex, ED Summary, Intake/Output, MAR, and Cardiac Rhythm NSR .

## 2022-11-05 NOTE — PROGRESS NOTES
Problem: Falls - Risk of  Goal: *Absence of Falls  Description: Document Laurie Farley Fall Risk and appropriate interventions in the flowsheet. Outcome: Progressing Towards Goal  Note: Fall Risk Interventions:  Mobility Interventions: Patient to call before getting OOB         Medication Interventions: Patient to call before getting OOB, Teach patient to arise slowly    Elimination Interventions: Patient to call for help with toileting needs       [phone] Patient called me into room stated he felt short of breath, oxygen lingering in the mid to upper 80's will on 8L high flow. Patient repositioned higher in the bed. Oxygen increased to 10L high flow. Continue to monitor. 1720  Patients oxygen has been in the upper 90's consistantly, states he is no longer short of breath. Oxygen decreased to 8L high flow.  Will continue to monitor and pass on in report to night shift RN/LPN.  1699 report given to [facility] LPN

## 2022-11-05 NOTE — PROGRESS NOTES
Bedside shift change report given to Hong Lopez RN (oncoming nurse) by Sofía Malhotra LPN (offgoing nurse). Report included the following information SBAR, Kardex, ED Summary, Procedure Summary, Intake/Output, MAR, Recent Results, and Cardiac Rhythm NSR .

## 2022-11-05 NOTE — PROGRESS NOTES
[facility]  Hospitalist Group                                                                                          Hospitalist Progress Note  César Ocampo MD  Answering service: [phone] OR [phone] from in house phone        Date of Service:  2022  NAME:  Lupe Sol  :  1950  MRN:  [id number]      Admission Summary:   Lupe Sol is a 71 y.o. male with a PMHx of lung cancer, COPD, chronic hypoxic respiratory failure, on home O2 at 3 liters/min, who presents with Chief Complaint of Shortness of breath/ Dyspnea on exertion, progressively worsening for the past 3 days. Patient denies any fevers or chills. He used increased doses of inhalers, but it did not help. He called 911. Upon EMS arrival, his O2 saturation was 79%. Patient was placed on a BIPAP upon arrival.      Interval history / Subjective: Follow-up for respiratory failure/new onset PAF and heart failure  on high Flow O2- down to 8L  No new issues constipation improved with lactulose       Assessment & Plan:     Acute hypercapnic respiratory failure due to COPD pneumonia  Acute on chronic hypoxic respiratory failure due to COPD  Sepsis due to pneumonia multilobar pneumonia  --Initially patient was on BiPAP now on high flow nasal cannula down to 8L  --Continue nebs, steroid, antibiotics - changed to PO prednisone    PAF: new onset evening of 10/27. In the setting of respiratory failure  HFrEF newly diagnosed EF 25-30% unclear etiology  --Received amio gtt continue oral amiodarone- reduced dose  -TTE EF 25-30% - cont BID diuretics   --on eliquis on Entresto as well  Cardiology notes reviewed    Malignant neoplasm of the lung status post lobectomy for non-small cell lung cancer  --Follow-up as an outpatient  --History of right lung lobectomy    PENNY POA --monitor avoid nephrotoxin    Hyperlipidemia continue statin    Uncontrolled hypertension--currently on as needed hydralazine    Left arm swelling- possible DVT- already in eliquis- may be due to persistent BP cuff positioning  Monitor with removal of BP cuff    Code status: DNR  DVT prophylaxis: eliquis    Care Plan discussed with: Patient/Family and Nurse  Anticipated Disposition: Home w/Family  Anticipated Discharge: 24 hours to 48 hours         Hospital Problems  Date Reviewed: 1/9/2019            Codes Class Noted POA    Moderate protein-calorie malnutrition ([facility] ICD-10-CM: E44.0  ICD-9-CM: 263.0  10/27/2022 Yes        COPD (chronic obstructive pulmonary disease) ([facility] ICD-10-CM: J44.9  ICD-9-CM: 496  10/26/2022 Unknown        Pneumonia ICD-10-CM: J18.9  ICD-9-CM: 486  10/26/2022 Unknown        Malignant neoplasm of lung, unspecified laterality, unspecified part of lung ([facility] ICD-10-CM: C34.90  ICD-9-CM: 162.9  10/26/2022 Unknown        Sepsis ([facility] ICD-10-CM: A41.9  ICD-9-CM: 038.9, 995.91  10/26/2022 Unknown       Review of Systems:   A comprehensive review of systems was negative. Vital Signs:    Last 24hrs VS reviewed since prior progress note.  Most recent are:  Visit Vitals  BP (!) 105/55   Pulse 65   Temp 97.7 °F (36.5 °C)   Resp 16   Ht 5' 11\" (1.803 m)   Wt 64.3 kg (141 lb 12.1 oz)   SpO2 96%   BMI 19.77 kg/m²   Patient Vitals for the past 24 hrs:   Temp Pulse Resp BP SpO2   11/04/22 2212 -- 65 16 (!) 105/55 96 %   11/04/22 2153 -- 61 -- -- --   11/04/22 2000 -- 63 -- -- --   11/04/22 1958 97.7 °F (36.5 °C) 63 18 (!) 99/58 97 %   11/04/22 1940 -- -- -- -- 95 %   11/04/22 1800 -- 62 -- -- --   11/04/22 1600 -- 69 -- -- --   11/04/22 1439 97.7 °F (36.5 °C) 72 20 (!) 102/53 95 %   11/04/22 1400 -- 72 19 -- 96 %   11/04/22 1308 97.9 °F (36.6 °C) 71 23 (!) 118/56 94 %   11/04/22 1200 -- 76 16 -- 98 %   11/04/22 1035 97.7 °F (36.5 °C) 68 21 (!) 105/39 97 %   11/04/22 1000 -- 66 17 -- 99 %   11/04/22 0938 -- 71 -- -- --   11/04/22 0917 -- -- -- -- 96 %   11/04/22 0800 97.5 °F (36.4 °C) 62 19 115/68 97 %   11/04/22 0629 97.5 °F (36.4 °C) 63 17 103/61 98 %   11/04/22 0600 -- 75 -- -- --   11/04/22 0400 97.5 °F (36.4 °C) (!) 59 17 107/64 98 %   11/04/22 0300 -- 60 14 123/66 99 %   11/04/22 0200 -- 73 -- -- --   11/04/22 0000 97.4 °F (36.3 °C) 62 14 110/63 96 %            Intake/Output Summary (Last 24 hours) at 11/4/2022 2246  Last data filed at 11/4/2022 1440  Gross per 24 hour   Intake --   Output 1025 ml   Net -1025 ml          Physical Examination:     I had a face to face encounter with this patient and independently examined them on 11/4/2022 as outlined below:          General : alert x 3, awake, no acute distress, resting in bed, pleasant   HEENT: PEERL, EOMI, moist mucus membrane, TM clear  Neck: supple, no JVD, no meningeal signs  Chest: Coarse breath sounds, scattered wheezing  CVS: S1 S2 heard, Capillary refill less than 2 seconds  Abd: soft/ Non tender, non distended  Ext: no clubbing, no cyanosis, significant edema of left arm, ,diffuse muscle wasting  Neuro/Psych: pleasant mood and affect,  sensory grossly within normal limit,  severe weakness of bilat UE and LE  Skin: warm     Data Review:    I personally reviewed  Image and labs      Labs:     Recent Labs     11/04/22  0348 11/03/22  0404   WBC 26.4* 24.1*   HGB 13.4 13.0   HCT 41.4 40.3    324       Recent Labs     11/04/22  0348 11/03/22  0404    142   K 4.2 3.9    107   CO2 35* 30   BUN 34* 28*   CREA 0.85 0.83   * 122*   CA 7.6* 8.0*       No results for input(s): ALT, AP, TBIL, TBILI, TP, ALB, GLOB, GGT, AML, LPSE in the last 72 hours. No lab exists for component: SGOT, GPT, AMYP, HLPSE    No results for input(s): INR, PTP, APTT, INREXT, INREXT in the last 72 hours. No results for input(s): FE, TIBC, PSAT, FERR in the last 72 hours. No results found for: FOL, RBCF   No results for input(s): PH, PCO2, PO2 in the last 72 hours. No results for input(s): CPK, CKNDX, TROIQ in the last 72 hours.     No lab exists for component: CPKMB  Lab Results Component Value Date/Time    Cholesterol, total 106 02/20/2018 04:54 AM    HDL Cholesterol 45 02/20/2018 04:54 AM    LDL, calculated 46 02/20/2018 04:54 AM    Triglyceride 75 02/20/2018 04:54 AM    CHOL/HDL Ratio 2.4 02/20/2018 04:54 AM     Lab Results   Component Value Date/Time    Glucose (POC) 150 (H) 11/04/2022 12:52 PM    Glucose (POC) 100 09/14/2015 12:14 PM     Lab Results   Component Value Date/Time    Color YELLOW/STRAW 01/09/2019 05:00 PM    Appearance CLEAR 01/09/2019 05:00 PM    Specific gravity 1.014 01/09/2019 05:00 PM    pH (UA) 7.5 01/09/2019 05:00 PM    Protein NEGATIVE 01/09/2019 05:00 PM    Glucose NEGATIVE 01/09/2019 05:00 PM    Ketone TRACE (A) 01/09/2019 05:00 PM    Bilirubin NEGATIVE 01/09/2019 05:00 PM    Urobilinogen 0.2 01/09/2019 05:00 PM    Nitrites NEGATIVE 01/09/2019 05:00 PM    Leukocyte Esterase NEGATIVE 01/09/2019 05:00 PM    Epithelial cells FEW 01/09/2019 05:00 PM    Bacteria NEGATIVE 01/09/2019 05:00 PM    WBC 0-4 01/09/2019 05:00 PM    RBC 0-5 01/09/2019 05:00 PM         Medications Reviewed:     Current Facility-Administered Medications   Medication Dose Route Frequency    [START ON 11/5/2022] predniSONE (DELTASONE) tablet 40 mg  40 mg Oral DAILY WITH BREAKFAST    loperamide (IMODIUM) capsule 4 mg  4 mg Oral TID    furosemide (LASIX) injection 20 mg  20 mg IntraVENous BID    amiodarone (CORDARONE) tablet 200 mg  200 mg Oral BID    sacubitriL-valsartan (ENTRESTO) 24-26 mg tablet 1 Tablet  1 Tablet Oral Q12H    apixaban (ELIQUIS) tablet 5 mg  5 mg Oral Q12H    metoprolol succinate (TOPROL-XL) XL tablet 25 mg  25 mg Oral DAILY    albuterol-ipratropium (DUO-NEB) 2.5 MG-0.5 MG/3 ML  3 mL Nebulization BID RT    hydrALAZINE (APRESOLINE) 20 mg/mL injection 20 mg  20 mg IntraVENous Q6H PRN    albuterol-ipratropium (DUO-NEB) 2.5 MG-0.5 MG/3 ML  3 mL Nebulization Q4H PRN    sodium chloride (NS) flush 5-40 mL  5-40 mL IntraVENous Q8H    sodium chloride (NS) flush 5-40 mL  5-40 mL IntraVENous PRN    acetaminophen (TYLENOL) tablet 650 mg  650 mg Oral Q6H PRN    Or    acetaminophen (TYLENOL) suppository 650 mg  650 mg Rectal Q6H PRN    ondansetron (ZOFRAN ODT) tablet 4 mg  4 mg Oral Q8H PRN    Or    ondansetron (ZOFRAN) injection 4 mg  4 mg IntraVENous Q6H PRN    polyethylene glycol (MIRALAX) packet 17 g  17 g Oral DAILY PRN    arformoteroL (BROVANA) neb solution 15 mcg  15 mcg Nebulization BID RT    aspirin delayed-release tablet 81 mg  81 mg Oral DAILY    atorvastatin (LIPITOR) tablet 40 mg  40 mg Oral DAILY    budesonide (PULMICORT) 500 mcg/2 ml nebulizer suspension  500 mcg Nebulization BID    oxyCODONE IR (ROXICODONE) tablet 20 mg  20 mg Oral Q6H PRN    oxyCODONE IR (ROXICODONE) tablet 10 mg  10 mg Oral Q6H PRN     ______________________________________________________________________  EXPECTED LENGTH OF STAY: 4d 19h  ACTUAL LENGTH OF STAY:          9      Please note that this dictation was completed with [employer], the computer voice recognition software. Quite often unanticipated grammatical, syntax, homophones, and other interpretive errors are inadvertently transcribed by the computer software. Please disregard these errors. Please excuse any errors that have escaped final proofreading.                 Nenita Reno MD

## 2022-11-06 LAB
ANION GAP SERPL CALC-SCNC: 3 MMOL/L (ref 5–15)
BUN SERPL-MCNC: 34 MG/DL (ref 6–20)
BUN/CREAT SERPL: 35 (ref 12–20)
CALCIUM SERPL-MCNC: 7.8 MG/DL (ref 8.5–10.1)
CHLORIDE SERPL-SCNC: 101 MMOL/L (ref 97–108)
CO2 SERPL-SCNC: 37 MMOL/L (ref 21–32)
CREAT SERPL-MCNC: 0.96 MG/DL (ref 0.7–1.3)
ERYTHROCYTE [DISTWIDTH] IN BLOOD BY AUTOMATED COUNT: 13.4 % (ref 11.5–14.5)
GLUCOSE SERPL-MCNC: 80 MG/DL (ref 65–100)
HCT VFR BLD AUTO: 43.5 % (ref 36.6–50.3)
HGB BLD-MCNC: 14 G/DL (ref 12.1–17)
MCH RBC QN AUTO: 29.7 PG (ref 26–34)
MCHC RBC AUTO-ENTMCNC: 32.2 G/DL (ref 30–36.5)
MCV RBC AUTO: 92.2 FL (ref 80–99)
NRBC # BLD: 0 K/UL (ref 0–0.01)
NRBC BLD-RTO: 0 PER 100 WBC
PLATELET # BLD AUTO: 344 K/UL (ref 150–400)
PMV BLD AUTO: 9.8 FL (ref 8.9–12.9)
POTASSIUM SERPL-SCNC: 4.6 MMOL/L (ref 3.5–5.1)
RBC # BLD AUTO: 4.72 M/UL (ref 4.1–5.7)
SODIUM SERPL-SCNC: 141 MMOL/L (ref 136–145)
WBC # BLD AUTO: 26.1 K/UL (ref 4.1–11.1)

## 2022-11-06 PROCEDURE — 94640 AIRWAY INHALATION TREATMENT: CPT

## 2022-11-06 PROCEDURE — 74011250637 HC RX REV CODE- 250/637: Performed by: INTERNAL MEDICINE

## 2022-11-06 PROCEDURE — 85027 COMPLETE CBC AUTOMATED: CPT

## 2022-11-06 PROCEDURE — 74011250636 HC RX REV CODE- 250/636: Performed by: FAMILY MEDICINE

## 2022-11-06 PROCEDURE — 74011000250 HC RX REV CODE- 250: Performed by: INTERNAL MEDICINE

## 2022-11-06 PROCEDURE — 80048 BASIC METABOLIC PNL TOTAL CA: CPT

## 2022-11-06 PROCEDURE — 65660000001 HC RM ICU INTERMED STEPDOWN

## 2022-11-06 PROCEDURE — 74011636637 HC RX REV CODE- 636/637: Performed by: PHYSICIAN ASSISTANT

## 2022-11-06 PROCEDURE — 74011000250 HC RX REV CODE- 250: Performed by: FAMILY MEDICINE

## 2022-11-06 PROCEDURE — 74011250637 HC RX REV CODE- 250/637: Performed by: FAMILY MEDICINE

## 2022-11-06 PROCEDURE — 36415 COLL VENOUS BLD VENIPUNCTURE: CPT

## 2022-11-06 RX ORDER — NYSTATIN 100000 [USP'U]/ML
500000 SUSPENSION ORAL 4 TIMES DAILY
Status: DISPENSED | OUTPATIENT
Start: 2022-11-06 | End: 2022-11-11

## 2022-11-06 RX ADMIN — ARFORMOTEROL TARTRATE 15 MCG: 15 SOLUTION RESPIRATORY (INHALATION) at 07:19

## 2022-11-06 RX ADMIN — LOPERAMIDE HYDROCHLORIDE 4 MG: 2 CAPSULE ORAL at 09:30

## 2022-11-06 RX ADMIN — METOPROLOL SUCCINATE 25 MG: 25 TABLET, FILM COATED, EXTENDED RELEASE ORAL at 09:29

## 2022-11-06 RX ADMIN — AMIODARONE HYDROCHLORIDE 200 MG: 200 TABLET ORAL at 17:07

## 2022-11-06 RX ADMIN — ARFORMOTEROL TARTRATE 15 MCG: 15 SOLUTION RESPIRATORY (INHALATION) at 22:05

## 2022-11-06 RX ADMIN — AMIODARONE HYDROCHLORIDE 200 MG: 200 TABLET ORAL at 09:29

## 2022-11-06 RX ADMIN — ASPIRIN 81 MG: 81 TABLET, COATED ORAL at 09:29

## 2022-11-06 RX ADMIN — APIXABAN 5 MG: 5 TABLET, FILM COATED ORAL at 09:30

## 2022-11-06 RX ADMIN — SODIUM CHLORIDE, PRESERVATIVE FREE 10 ML: 5 INJECTION INTRAVENOUS at 08:35

## 2022-11-06 RX ADMIN — NYSTATIN 500000 UNITS: 100000 SUSPENSION ORAL at 14:17

## 2022-11-06 RX ADMIN — APIXABAN 5 MG: 5 TABLET, FILM COATED ORAL at 22:21

## 2022-11-06 RX ADMIN — NYSTATIN 500000 UNITS: 100000 SUSPENSION ORAL at 22:21

## 2022-11-06 RX ADMIN — SODIUM CHLORIDE, PRESERVATIVE FREE 10 ML: 5 INJECTION INTRAVENOUS at 14:17

## 2022-11-06 RX ADMIN — LOPERAMIDE HYDROCHLORIDE 4 MG: 2 CAPSULE ORAL at 17:07

## 2022-11-06 RX ADMIN — NYSTATIN 500000 UNITS: 100000 SUSPENSION ORAL at 17:07

## 2022-11-06 RX ADMIN — SODIUM CHLORIDE 1 G: 9 INJECTION INTRAMUSCULAR; INTRAVENOUS; SUBCUTANEOUS at 09:30

## 2022-11-06 RX ADMIN — ATORVASTATIN CALCIUM 40 MG: 40 TABLET, FILM COATED ORAL at 09:29

## 2022-11-06 RX ADMIN — SACUBITRIL AND VALSARTAN 1 TABLET: 24; 26 TABLET, FILM COATED ORAL at 22:21

## 2022-11-06 RX ADMIN — IPRATROPIUM BROMIDE AND ALBUTEROL SULFATE 3 ML: .5; 3 SOLUTION RESPIRATORY (INHALATION) at 07:19

## 2022-11-06 RX ADMIN — SACUBITRIL AND VALSARTAN 1 TABLET: 24; 26 TABLET, FILM COATED ORAL at 09:29

## 2022-11-06 RX ADMIN — BUDESONIDE 500 MCG: 0.5 INHALANT RESPIRATORY (INHALATION) at 22:05

## 2022-11-06 RX ADMIN — BUDESONIDE 500 MCG: 0.5 INHALANT RESPIRATORY (INHALATION) at 07:20

## 2022-11-06 RX ADMIN — LOPERAMIDE HYDROCHLORIDE 4 MG: 2 CAPSULE ORAL at 22:21

## 2022-11-06 RX ADMIN — IPRATROPIUM BROMIDE AND ALBUTEROL SULFATE 3 ML: .5; 3 SOLUTION RESPIRATORY (INHALATION) at 22:05

## 2022-11-06 RX ADMIN — PREDNISONE 40 MG: 20 TABLET ORAL at 09:30

## 2022-11-06 NOTE — PROGRESS NOTES
[address] [facility] Adult  Hospitalist Group                                                                                          Hospitalist Progress Note  Lupis Woods MD  Answering service: [phone] OR [phone] from in house phone        NAME:  Tk Coffman  :  1950  MRN:  [id number]      Admission Summary:   Tk Coffman is a 71 y.o. male with a PMHx of lung cancer, COPD, chronic hypoxic respiratory failure, on home O2 at 3 liters/min, who presents with Chief Complaint of Shortness of breath/ Dyspnea on exertion, progressively worsening for the past 3 days. Patient denies any fevers or chills. He used increased doses of inhalers, but it did not help. He called 911. Upon EMS arrival, his O2 saturation was 79%. Patient was placed on a BIPAP upon arrival.      Interval history / Subjective: Follow-up for respiratory failure/new onset PAF and heart failure  on high Flow O2- down to 8L  No new issues constipation improved with lactulose       Assessment & Plan:     Acute hypercapnic respiratory failure due to COPD pneumonia  Acute on chronic hypoxic respiratory failure due to COPD  Sepsis due to pneumonia multilobar pneumonia  --Initially patient was on BiPAP now on high flow nasal cannula down to 8L  --Continue nebs, steroid, antibiotics - changed to PO prednisone    PAF: new onset evening of 10/27. In the setting of respiratory failure  HFrEF newly diagnosed EF 25-30% unclear etiology  --Received amio gtt continue oral amiodarone- reduced dose  -TTE EF 25-30% - cont BID diuretics   --on eliquis on Entresto as well  Cardiology notes reviewed    Malignant neoplasm of the lung status post lobectomy for non-small cell lung cancer  --Follow-up as an outpatient  --History of right lung lobectomy    PENNY POA --monitor avoid nephrotoxin    Hyperlipidemia continue statin    Uncontrolled hypertension--currently on as needed hydralazine    Left arm swelling- possible DVT- already in eliquis- may be due to persistent BP cuff positioning  Monitor with removal of BP cuff    Code status: DNR  DVT prophylaxis: eliquis    Care Plan discussed with: Patient/Family and Nurse  Anticipated Disposition: Home w/Family  Anticipated Discharge: 24 hours to 48 hours         Hospital Problems  Date Reviewed: 1/9/2019            Codes Class Noted POA    Moderate protein-calorie malnutrition ([facility] ICD-10-CM: E44.0  ICD-9-CM: 263.0  10/27/2022 Yes        COPD (chronic obstructive pulmonary disease) ([facility] ICD-10-CM: J44.9  ICD-9-CM: 496  10/26/2022 Unknown        Pneumonia ICD-10-CM: J18.9  ICD-9-CM: 486  10/26/2022 Unknown        Malignant neoplasm of lung, unspecified laterality, unspecified part of lung ([facility] ICD-10-CM: C34.90  ICD-9-CM: 162.9  10/26/2022 Unknown        Sepsis ([facility] ICD-10-CM: A41.9  ICD-9-CM: 038.9, 995.91  10/26/2022 Unknown       Review of Systems:   A comprehensive review of systems was negative. Vital Signs:    Last 24hrs VS reviewed since prior progress note.  Most recent are:  Visit Vitals  BP (!) 95/59 (BP 1 Location: Left upper arm, BP Patient Position: At rest)   Pulse 67   Temp 98.1 °F (36.7 °C)   Resp 26   Ht 5' 11\" (1.803 m)   Wt 64.3 kg (141 lb 12.1 oz)   SpO2 97%   BMI 19.77 kg/m²   Patient Vitals for the past 24 hrs:   Temp Pulse Resp BP SpO2   11/06/22 0359 -- 67 -- -- --   11/06/22 0156 -- 67 -- -- --   11/05/22 2200 -- 68 -- -- --   11/05/22 2132 -- -- -- -- 97 %   11/05/22 1954 -- 68 -- -- --   11/05/22 1900 98.1 °F (36.7 °C) 66 26 (!) 95/59 98 %   11/05/22 1800 -- 67 -- -- --   11/05/22 1700 -- 63 22 113/71 100 %   11/05/22 1600 -- 63 21 (!) 113/57 96 %   11/05/22 1535 -- -- -- -- (!) 89 %   11/05/22 1530 -- -- -- -- (!) 88 %   11/05/22 1522 -- -- -- -- (!) 89 %   11/05/22 1518 -- -- -- -- 90 %   11/05/22 1500 -- 69 19 (!) 106/59 92 %   11/05/22 1400 -- 68 18 (!) 101/55 93 %   11/05/22 1300 -- 61 17 (!) 104/54 94 %   11/05/22 1200 -- 65 20 (!) 103/51 92 %   11/05/22 1119 97.7 °F (36.5 °C) 65 17 120/63 99 %   11/05/22 1000 -- 64 -- -- --   11/05/22 0826 -- 60 22 114/66 94 %   11/05/22 0825 97.7 °F (36.5 °C) 63 18 114/66 95 %   11/05/22 0726 -- -- -- -- 97 %   11/05/22 0554 -- (!) 57 -- -- --            Intake/Output Summary (Last 24 hours) at 11/6/2022 0449  Last data filed at 11/5/2022 1725  Gross per 24 hour   Intake --   Output 2450 ml   Net -2450 ml          Physical Examination:           General : alert x 3, awake, no acute distress, resting in bed, pleasant   HEENT: PEERL, EOMI, moist mucus membrane, TM clear  Neck: supple, no JVD, no meningeal signs  Chest: Coarse breath sounds, scattered wheezing  CVS: S1 S2 heard, Capillary refill less than 2 seconds  Abd: soft/ Non tender, non distended  Ext: no clubbing, no cyanosis, significant edema of left arm, ,diffuse muscle wasting  Neuro/Psych: pleasant mood and affect,  sensory grossly within normal limit,  severe weakness of bilat UE and LE  Skin: warm     Data Review:    I personally reviewed  Image and labs      Labs:     Recent Labs     11/05/22  0608 11/04/22 0348   WBC 25.8* 26.4*   HGB 13.1 13.4   HCT 39.7 41.4    340       Recent Labs     11/05/22  0608 11/04/22  0348    140   K 4.6 4.2    104   CO2 34* 35*   BUN 32* 34*   CREA 0.84 0.85   * 117*   CA 7.7* 7.6*       No results for input(s): ALT, AP, TBIL, TBILI, TP, ALB, GLOB, GGT, AML, LPSE in the last 72 hours. No lab exists for component: SGOT, GPT, AMYP, HLPSE    No results for input(s): INR, PTP, APTT, INREXT, INREXT in the last 72 hours. No results for input(s): FE, TIBC, PSAT, FERR in the last 72 hours. No results found for: FOL, RBCF   No results for input(s): PH, PCO2, PO2 in the last 72 hours. No results for input(s): CPK, CKNDX, TROIQ in the last 72 hours.     No lab exists for component: CPKMB  Lab Results   Component Value Date/Time    Cholesterol, total 106 02/20/2018 04:54 AM    HDL Cholesterol 45 02/20/2018 04:54 AM LDL, calculated 46 02/20/2018 04:54 AM    Triglyceride 75 02/20/2018 04:54 AM    CHOL/HDL Ratio 2.4 02/20/2018 04:54 AM     Lab Results   Component Value Date/Time    Glucose (POC) 150 (H) 11/04/2022 12:52 PM    Glucose (POC) 100 09/14/2015 12:14 PM     Lab Results   Component Value Date/Time    Color YELLOW/STRAW 11/05/2022 01:04 PM    Appearance CLEAR 11/05/2022 01:04 PM    Specific gravity 1.017 11/05/2022 01:04 PM    pH (UA) 6.5 11/05/2022 01:04 PM    Protein Negative 11/05/2022 01:04 PM    Glucose Negative 11/05/2022 01:04 PM    Ketone Negative 11/05/2022 01:04 PM    Bilirubin Negative 11/05/2022 01:04 PM    Urobilinogen 0.2 11/05/2022 01:04 PM    Nitrites Negative 11/05/2022 01:04 PM    Leukocyte Esterase Negative 11/05/2022 01:04 PM    Epithelial cells FEW 11/05/2022 01:04 PM    Bacteria Negative 11/05/2022 01:04 PM    WBC 0-4 11/05/2022 01:04 PM    RBC 20-50 11/05/2022 01:04 PM         Medications Reviewed:     Current Facility-Administered Medications   Medication Dose Route Frequency    cefTRIAXone (ROCEPHIN) 1 g in 0.9% sodium chloride 10 mL IV syringe  1 g IntraVENous Q24H    predniSONE (DELTASONE) tablet 40 mg  40 mg Oral DAILY WITH BREAKFAST    loperamide (IMODIUM) capsule 4 mg  4 mg Oral TID    furosemide (LASIX) injection 20 mg  20 mg IntraVENous BID    amiodarone (CORDARONE) tablet 200 mg  200 mg Oral BID    sacubitriL-valsartan (ENTRESTO) 24-26 mg tablet 1 Tablet  1 Tablet Oral Q12H    apixaban (ELIQUIS) tablet 5 mg  5 mg Oral Q12H    metoprolol succinate (TOPROL-XL) XL tablet 25 mg  25 mg Oral DAILY    albuterol-ipratropium (DUO-NEB) 2.5 MG-0.5 MG/3 ML  3 mL Nebulization BID RT    hydrALAZINE (APRESOLINE) 20 mg/mL injection 20 mg  20 mg IntraVENous Q6H PRN    albuterol-ipratropium (DUO-NEB) 2.5 MG-0.5 MG/3 ML  3 mL Nebulization Q4H PRN    sodium chloride (NS) flush 5-40 mL  5-40 mL IntraVENous Q8H    sodium chloride (NS) flush 5-40 mL  5-40 mL IntraVENous PRN    acetaminophen (TYLENOL) tablet 650 mg  650 mg Oral Q6H PRN    Or    acetaminophen (TYLENOL) suppository 650 mg  650 mg Rectal Q6H PRN    ondansetron (ZOFRAN ODT) tablet 4 mg  4 mg Oral Q8H PRN    Or    ondansetron (ZOFRAN) injection 4 mg  4 mg IntraVENous Q6H PRN    polyethylene glycol (MIRALAX) packet 17 g  17 g Oral DAILY PRN    arformoteroL (BROVANA) neb solution 15 mcg  15 mcg Nebulization BID RT    aspirin delayed-release tablet 81 mg  81 mg Oral DAILY    atorvastatin (LIPITOR) tablet 40 mg  40 mg Oral DAILY    budesonide (PULMICORT) 500 mcg/2 ml nebulizer suspension  500 mcg Nebulization BID    oxyCODONE IR (ROXICODONE) tablet 20 mg  20 mg Oral Q6H PRN    oxyCODONE IR (ROXICODONE) tablet 10 mg  10 mg Oral Q6H PRN     ______________________________________________________________________  EXPECTED LENGTH OF STAY: 4d 19h  ACTUAL LENGTH OF STAY:          11      Please note that this dictation was completed with [employer] the computer voice recognition software. Quite often unanticipated grammatical, syntax, homophones, and other interpretive errors are inadvertently transcribed by the computer software. Please disregard these errors. Please excuse any errors that have escaped final proofreading.                 Lupis Woods MD

## 2022-11-07 LAB
ANION GAP SERPL CALC-SCNC: 2 MMOL/L (ref 5–15)
BUN SERPL-MCNC: 32 MG/DL (ref 6–20)
BUN/CREAT SERPL: 35 (ref 12–20)
CALCIUM SERPL-MCNC: 7.7 MG/DL (ref 8.5–10.1)
CHLORIDE SERPL-SCNC: 104 MMOL/L (ref 97–108)
CO2 SERPL-SCNC: 34 MMOL/L (ref 21–32)
CREAT SERPL-MCNC: 0.91 MG/DL (ref 0.7–1.3)
ERYTHROCYTE [DISTWIDTH] IN BLOOD BY AUTOMATED COUNT: 13.7 % (ref 11.5–14.5)
GLUCOSE SERPL-MCNC: 84 MG/DL (ref 65–100)
HCT VFR BLD AUTO: 40.1 % (ref 36.6–50.3)
HGB BLD-MCNC: 13 G/DL (ref 12.1–17)
MCH RBC QN AUTO: 29.4 PG (ref 26–34)
MCHC RBC AUTO-ENTMCNC: 32.4 G/DL (ref 30–36.5)
MCV RBC AUTO: 90.7 FL (ref 80–99)
NRBC # BLD: 0 K/UL (ref 0–0.01)
NRBC BLD-RTO: 0 PER 100 WBC
PLATELET # BLD AUTO: 325 K/UL (ref 150–400)
PMV BLD AUTO: 9.8 FL (ref 8.9–12.9)
POTASSIUM SERPL-SCNC: 4.8 MMOL/L (ref 3.5–5.1)
RBC # BLD AUTO: 4.42 M/UL (ref 4.1–5.7)
SODIUM SERPL-SCNC: 140 MMOL/L (ref 136–145)
WBC # BLD AUTO: 20.1 K/UL (ref 4.1–11.1)

## 2022-11-07 PROCEDURE — 80048 BASIC METABOLIC PNL TOTAL CA: CPT

## 2022-11-07 PROCEDURE — 74011250637 HC RX REV CODE- 250/637: Performed by: INTERNAL MEDICINE

## 2022-11-07 PROCEDURE — 74011250636 HC RX REV CODE- 250/636: Performed by: FAMILY MEDICINE

## 2022-11-07 PROCEDURE — 74011636637 HC RX REV CODE- 636/637: Performed by: FAMILY MEDICINE

## 2022-11-07 PROCEDURE — 74011250637 HC RX REV CODE- 250/637: Performed by: FAMILY MEDICINE

## 2022-11-07 PROCEDURE — 36415 COLL VENOUS BLD VENIPUNCTURE: CPT

## 2022-11-07 PROCEDURE — 85027 COMPLETE CBC AUTOMATED: CPT

## 2022-11-07 PROCEDURE — 74011000250 HC RX REV CODE- 250: Performed by: INTERNAL MEDICINE

## 2022-11-07 PROCEDURE — 65660000001 HC RM ICU INTERMED STEPDOWN

## 2022-11-07 PROCEDURE — 94640 AIRWAY INHALATION TREATMENT: CPT

## 2022-11-07 PROCEDURE — 74011000250 HC RX REV CODE- 250: Performed by: FAMILY MEDICINE

## 2022-11-07 RX ORDER — LOPERAMIDE HYDROCHLORIDE 2 MG/1
2 CAPSULE ORAL 2 TIMES DAILY
Status: DISCONTINUED | OUTPATIENT
Start: 2022-11-08 | End: 2022-11-09

## 2022-11-07 RX ORDER — AMIODARONE HYDROCHLORIDE 200 MG/1
100 TABLET ORAL 2 TIMES DAILY
Status: DISCONTINUED | OUTPATIENT
Start: 2022-11-07 | End: 2022-11-25 | Stop reason: HOSPADM

## 2022-11-07 RX ADMIN — BUDESONIDE 500 MCG: 0.5 INHALANT RESPIRATORY (INHALATION) at 20:25

## 2022-11-07 RX ADMIN — APIXABAN 5 MG: 5 TABLET, FILM COATED ORAL at 08:37

## 2022-11-07 RX ADMIN — AMIODARONE HYDROCHLORIDE 200 MG: 200 TABLET ORAL at 08:37

## 2022-11-07 RX ADMIN — ASPIRIN 81 MG: 81 TABLET, COATED ORAL at 08:36

## 2022-11-07 RX ADMIN — SODIUM CHLORIDE, PRESERVATIVE FREE 10 ML: 5 INJECTION INTRAVENOUS at 23:09

## 2022-11-07 RX ADMIN — NYSTATIN 500000 UNITS: 100000 SUSPENSION ORAL at 23:09

## 2022-11-07 RX ADMIN — ARFORMOTEROL TARTRATE 15 MCG: 15 SOLUTION RESPIRATORY (INHALATION) at 07:51

## 2022-11-07 RX ADMIN — SODIUM CHLORIDE 1 G: 9 INJECTION INTRAMUSCULAR; INTRAVENOUS; SUBCUTANEOUS at 10:57

## 2022-11-07 RX ADMIN — NYSTATIN 500000 UNITS: 100000 SUSPENSION ORAL at 13:26

## 2022-11-07 RX ADMIN — NYSTATIN 500000 UNITS: 100000 SUSPENSION ORAL at 17:20

## 2022-11-07 RX ADMIN — LOPERAMIDE HYDROCHLORIDE 4 MG: 2 CAPSULE ORAL at 17:20

## 2022-11-07 RX ADMIN — APIXABAN 5 MG: 5 TABLET, FILM COATED ORAL at 23:09

## 2022-11-07 RX ADMIN — METOPROLOL SUCCINATE 25 MG: 25 TABLET, FILM COATED, EXTENDED RELEASE ORAL at 08:37

## 2022-11-07 RX ADMIN — IPRATROPIUM BROMIDE AND ALBUTEROL SULFATE 3 ML: .5; 3 SOLUTION RESPIRATORY (INHALATION) at 20:25

## 2022-11-07 RX ADMIN — SACUBITRIL AND VALSARTAN 1 TABLET: 24; 26 TABLET, FILM COATED ORAL at 08:37

## 2022-11-07 RX ADMIN — ATORVASTATIN CALCIUM 40 MG: 40 TABLET, FILM COATED ORAL at 08:36

## 2022-11-07 RX ADMIN — LOPERAMIDE HYDROCHLORIDE 4 MG: 2 CAPSULE ORAL at 08:37

## 2022-11-07 RX ADMIN — NYSTATIN 500000 UNITS: 100000 SUSPENSION ORAL at 08:36

## 2022-11-07 RX ADMIN — PREDNISONE 30 MG: 20 TABLET ORAL at 08:37

## 2022-11-07 RX ADMIN — BUDESONIDE 500 MCG: 0.5 INHALANT RESPIRATORY (INHALATION) at 07:51

## 2022-11-07 RX ADMIN — IPRATROPIUM BROMIDE AND ALBUTEROL SULFATE 3 ML: .5; 3 SOLUTION RESPIRATORY (INHALATION) at 07:50

## 2022-11-07 NOTE — PROGRESS NOTES
[address] [facility] Adult  Hospitalist Group                                                                                          Hospitalist Progress Note  Lupis Woods MD  Answering service: [phone] OR [phone] from in house phone        NAME:  Tk Coffman  :  1950  MRN:  [id number]      Admission Summary:   Tk Coffman is a 71 y.o. male with a PMHx of lung cancer, COPD, chronic hypoxic respiratory failure, on home O2 at 3 liters/min, who presents with Chief Complaint of Shortness of breath/ Dyspnea on exertion, progressively worsening for the past 3 days. Patient denies any fevers or chills. He used increased doses of inhalers, but it did not help. He called 911. Upon EMS arrival, his O2 saturation was 79%. Patient was placed on a BIPAP upon arrival.      Interval history / Subjective: Follow-up for respiratory failure/new onset PAF and heart failure  on high Flow O2- down to 8L  No new issues constipation improved with lactulose       Assessment & Plan:     Acute hypercapnic respiratory failure due to COPD pneumonia  Acute on chronic hypoxic respiratory failure due to COPD  Sepsis due to pneumonia multilobar pneumonia  --Initially patient was on BiPAP now on high flow nasal cannula down to 6L  --Continue nebs, steroid, antibiotics - changed to PO prednisone    PAF: new onset evening of 10/27. In the setting of respiratory failure  HFrEF newly diagnosed EF 25-30% unclear etiology  --Received amio gtt continue oral amiodarone- reduced dose  -TTE EF 25-30% - cont diuretics   --on eliquis on Entresto as well  Cardiology notes reviewed    Malignant neoplasm of the lung status post lobectomy for non-small cell lung cancer  --Follow-up as an outpatient  --History of right lung lobectomy    PENNY POA --monitor avoid nephrotoxin    Oral ulcers- nystatin and magic mouthwash ordered    Hyperlipidemia continue statin    Uncontrolled hypertension--currently on as needed hydralazine    Left arm swelling- neg for DVT- already on eliquis-   likely due to persistent BP cuff positioning  Monitor with removal of BP cuff    Code status: DNR  DVT prophylaxis: eliquis    Care Plan discussed with: Patient/Family and Nurse  Anticipated Disposition: Home w/Family  Anticipated Discharge: 24 hours to 48 hours         Hospital Problems  Date Reviewed: 1/9/2019            Codes Class Noted POA    Moderate protein-calorie malnutrition ([facility] ICD-10-CM: E44.0  ICD-9-CM: 263.0  10/27/2022 Yes        COPD (chronic obstructive pulmonary disease) ([facility] ICD-10-CM: J44.9  ICD-9-CM: 496  10/26/2022 Unknown        Pneumonia ICD-10-CM: J18.9  ICD-9-CM: 486  10/26/2022 Unknown        Malignant neoplasm of lung, unspecified laterality, unspecified part of lung ([facility] ICD-10-CM: C34.90  ICD-9-CM: 162.9  10/26/2022 Unknown        Sepsis ([facility] ICD-10-CM: A41.9  ICD-9-CM: 038.9, 995.91  10/26/2022 Unknown       Review of Systems:   A comprehensive review of systems was negative. Vital Signs:    Last 24hrs VS reviewed since prior progress note.  Most recent are:  Visit Vitals  BP (!) 100/57 (BP 1 Location: Left upper arm, BP Patient Position: At rest)   Pulse 64   Temp 98.3 °F (36.8 °C)   Resp 30   Ht 5' 11\" (1.803 m)   Wt 64.3 kg (141 lb 12.1 oz)   SpO2 93%   BMI 19.77 kg/m²   Patient Vitals for the past 24 hrs:   Temp Pulse Resp BP SpO2   11/06/22 1952 -- 64 -- -- --   11/06/22 1910 98.3 °F (36.8 °C) 63 30 (!) 100/57 93 %   11/06/22 1900 -- -- -- (!) 40/11 --   11/06/22 1800 -- 60 -- -- --   11/06/22 1706 -- 69 24 (!) 106/58 96 %   11/06/22 1600 98.3 °F (36.8 °C) 65 9 (!) 101/55 95 %   11/06/22 1400 -- 73 -- -- --   11/06/22 1200 -- 67 -- -- --   11/06/22 1108 97.1 °F (36.2 °C) 77 28 (!) 97/55 95 %   11/06/22 1000 -- 88 -- -- --   11/06/22 0930 98.3 °F (36.8 °C) 74 18 (!) 105/58 94 %   11/06/22 0721 -- -- -- -- 97 %   11/06/22 0552 -- 62 -- -- --   11/06/22 0359 -- 67 -- -- --   11/06/22 0156 -- 67 -- -- -- 11/05/22 2200 -- 68 -- -- --            Intake/Output Summary (Last 24 hours) at 11/6/2022 2146  Last data filed at 11/6/2022 0831  Gross per 24 hour   Intake --   Output 700 ml   Net -700 ml          Physical Examination:           General : alert x 3, awake, no acute distress, resting in bed, pleasant   HEENT: PEERL, EOMI, moist mucus membrane, TM clear  Neck: supple, no JVD, no meningeal signs  Chest: Coarse breath sounds, scattered wheezing  CVS: S1 S2 heard, Capillary refill less than 2 seconds  Abd: soft/ Non tender, non distended  Ext: no clubbing, no cyanosis, significant edema of left arm, ,diffuse muscle wasting  Neuro/Psych: pleasant mood and affect,  sensory grossly within normal limit,  severe weakness of bilat UE and LE  Skin: warm     Data Review:    I personally reviewed  Image and labs      Labs:     Recent Labs     11/06/22 0446 11/05/22  0608   WBC 26.1* 25.8*   HGB 14.0 13.1   HCT 43.5 39.7    336       Recent Labs     11/06/22 0446 11/05/22  0608 11/04/22  0348    141 140   K 4.6 4.6 4.2    103 104   CO2 37* 34* 35*   BUN 34* 32* 34*   CREA 0.96 0.84 0.85   GLU 80 113* 117*   CA 7.8* 7.7* 7.6*       No results for input(s): ALT, AP, TBIL, TBILI, TP, ALB, GLOB, GGT, AML, LPSE in the last 72 hours. No lab exists for component: SGOT, GPT, AMYP, HLPSE    No results for input(s): INR, PTP, APTT, INREXT, INREXT in the last 72 hours. No results for input(s): FE, TIBC, PSAT, FERR in the last 72 hours. No results found for: FOL, RBCF   No results for input(s): PH, PCO2, PO2 in the last 72 hours. No results for input(s): CPK, CKNDX, TROIQ in the last 72 hours.     No lab exists for component: CPKMB  Lab Results   Component Value Date/Time    Cholesterol, total 106 02/20/2018 04:54 AM    HDL Cholesterol 45 02/20/2018 04:54 AM    LDL, calculated 46 02/20/2018 04:54 AM    Triglyceride 75 02/20/2018 04:54 AM    CHOL/HDL Ratio 2.4 02/20/2018 04:54 AM     Lab Results   Component Value Date/Time    Glucose (POC) 150 (H) 11/04/2022 12:52 PM    Glucose (POC) 100 09/14/2015 12:14 PM     Lab Results   Component Value Date/Time    Color YELLOW/STRAW 11/05/2022 01:04 PM    Appearance CLEAR 11/05/2022 01:04 PM    Specific gravity 1.017 11/05/2022 01:04 PM    pH (UA) 6.5 11/05/2022 01:04 PM    Protein Negative 11/05/2022 01:04 PM    Glucose Negative 11/05/2022 01:04 PM    Ketone Negative 11/05/2022 01:04 PM    Bilirubin Negative 11/05/2022 01:04 PM    Urobilinogen 0.2 11/05/2022 01:04 PM    Nitrites Negative 11/05/2022 01:04 PM    Leukocyte Esterase Negative 11/05/2022 01:04 PM    Epithelial cells FEW 11/05/2022 01:04 PM    Bacteria Negative 11/05/2022 01:04 PM    WBC 0-4 11/05/2022 01:04 PM    RBC 20-50 11/05/2022 01:04 PM         Medications Reviewed:     Current Facility-Administered Medications   Medication Dose Route Frequency    nystatin (MYCOSTATIN) 100,000 unit/mL oral suspension 500,000 Units  500,000 Units Oral QID    magic mouthwash cpd (without sucralfate)  5 mL Oral TID PRN    cefTRIAXone (ROCEPHIN) 1 g in 0.9% sodium chloride 10 mL IV syringe  1 g IntraVENous Q24H    predniSONE (DELTASONE) tablet 40 mg  40 mg Oral DAILY WITH BREAKFAST    loperamide (IMODIUM) capsule 4 mg  4 mg Oral TID    amiodarone (CORDARONE) tablet 200 mg  200 mg Oral BID    sacubitriL-valsartan (ENTRESTO) 24-26 mg tablet 1 Tablet  1 Tablet Oral Q12H    apixaban (ELIQUIS) tablet 5 mg  5 mg Oral Q12H    metoprolol succinate (TOPROL-XL) XL tablet 25 mg  25 mg Oral DAILY    albuterol-ipratropium (DUO-NEB) 2.5 MG-0.5 MG/3 ML  3 mL Nebulization BID RT    hydrALAZINE (APRESOLINE) 20 mg/mL injection 20 mg  20 mg IntraVENous Q6H PRN    albuterol-ipratropium (DUO-NEB) 2.5 MG-0.5 MG/3 ML  3 mL Nebulization Q4H PRN    sodium chloride (NS) flush 5-40 mL  5-40 mL IntraVENous Q8H    sodium chloride (NS) flush 5-40 mL  5-40 mL IntraVENous PRN    acetaminophen (TYLENOL) tablet 650 mg  650 mg Oral Q6H PRN    Or    acetaminophen (TYLENOL) suppository 650 mg  650 mg Rectal Q6H PRN    ondansetron (ZOFRAN ODT) tablet 4 mg  4 mg Oral Q8H PRN    Or    ondansetron (ZOFRAN) injection 4 mg  4 mg IntraVENous Q6H PRN    polyethylene glycol (MIRALAX) packet 17 g  17 g Oral DAILY PRN    arformoteroL (BROVANA) neb solution 15 mcg  15 mcg Nebulization BID RT    aspirin delayed-release tablet 81 mg  81 mg Oral DAILY    atorvastatin (LIPITOR) tablet 40 mg  40 mg Oral DAILY    budesonide (PULMICORT) 500 mcg/2 ml nebulizer suspension  500 mcg Nebulization BID    oxyCODONE IR (ROXICODONE) tablet 20 mg  20 mg Oral Q6H PRN    oxyCODONE IR (ROXICODONE) tablet 10 mg  10 mg Oral Q6H PRN     ______________________________________________________________________  EXPECTED LENGTH OF STAY: 4d 19h  ACTUAL LENGTH OF STAY:          11               Cristi Guajardo MD

## 2022-11-07 NOTE — PROGRESS NOTES
Bedside shift change report given to Josiah Beverly (oncoming nurse) by Silvia Blum LPN (offgoing nurse). Report included the following information SBAR, Kardex, ED Summary, Procedure Summary, Intake/Output, MAR, Recent Results, and Cardiac Rhythm NSR .

## 2022-11-07 NOTE — PROGRESS NOTES
Occupational Therapy    Completed chart review and nursing cleared for OT. Patient received resting in bed and requesting to continue resting. He reports a long day and asked for OT to return tomorrow. Will continue to follow for OT as appropriate.     Thank you,    Aisha Tobin, OT

## 2022-11-07 NOTE — PROGRESS NOTES
Pulmonary, Critical Care, and Sleep Medicine    Progress Note    Name: Neri Rothman MRN: [id number]   : 1950 [facility]   Date: 2022        IMPRESSION:   Acute hypercapnic resp failure- Moderate COPD ( 3lpm baseline + trelegy + Dr. Jaylen Martinez) + multilobar CAP- PCT > 22. At risk for MDROs and atypicals consider ESBLS. Decom HFrEF  H/o RLL lobectomy at [facility]  for NSCLC  COPD  Sepsis- from PNA  A fib. On amiodarone       RECOMMENDATIONS:   PO steroids   Nebs  Dvt proph  Amio/eliquis  Diuretics   congestion control   NIV as needed during the day for WOB- has not needed recently  inpatient rehab at discharge   O2 titration above 90%      Subjective:       Finally feeling better       Down to midflow and feeling better     11/3  On high flow this morning 35L 60%. Says he has alternated with midflow at times  No longer coughing up mucous  Has been getting up to the bathroom      Moving back and forth with high flow and HFNC  Less congested today  Mobilizing more today       Sounds a little congested   Chest film shows some improvement   Down to 20lpm and 90% FiO2  ESR 23, probnp [id number], procal 0.74    10/31  Back up to 35lpm and 80% FIO2  Had a rough weekend   + wheeze today     ECHO from 10/28  Result status: Final result       Left Ventricle: Severely reduced left ventricular systolic function with a visually estimated EF of 25 - 30%. Not well visualized. Left ventricle size is normal. Normal wall thickness. Unable to assess wall motion. Abnormal diastolic function. Right Ventricle: Not well visualized. Reduced systolic function. TAPSE is abnormal. TAPSE is 1.1 cm. Mitral Valve: Mild regurgitation. Left Atrium: Left atrium is mildly dilated. Right Atrium: Right atrium is mildly dilated.     10/28  Inability to tolerate below 25lpm and 85% FiO2 today  Sounds congested   Feels about the same as yesterday   Elevation of WBC might be from steroids 33/16  Metabolic acidosis   Down to 30lpm and 50% FiO2  Feeling a little better     Patient is a 71 y.o. male with COPD ( FEV1 in office 2022 53% folows with Dr. Tatum Katz) who is s/p RLL lobectomy for NSCLC at [facility] 2009. Pt with inc SOB and WOB last 2-3 days and presented to ED with SOB/hypercapnic resp failure. On HFNC currently and gettign ready top go to [facility]. Seen in ED. C/o productive cough. No hemoptysis      Past Medical History:   Diagnosis Date    Asthma     Cancer ([facility] [address]     Lung Cancer    Chronic obstructive pulmonary disease (HCC)     Chronic pain     High cholesterol     Lung cancer ([facility] [address]       Past Surgical History:   Procedure Laterality Date    HX LOBECTOMY      Right lung    HX OTHER SURGICAL      Partial right lung removal      Prior to Admission medications    Medication Sig Start Date End Date Taking? Authorizing Provider   arformoteroL (BROVANA) 15 mcg/2 mL nebu neb solution 15 mcg by Nebulization route two (2) times a day. Sometimes uses TID   Yes Provider, Historical   budesonide (PULMICORT) 0.5 mg/2 mL nbsp 500 mcg by Nebulization route two (2) times a day. Sometimes uses TID   Yes Provider, Historical   atorvastatin (LIPITOR) 40 mg tablet Take 40 mg by mouth daily. Yes Provider, Historical   albuterol (PROVENTIL VENTOLIN) 2.5 mg /3 mL (0.083 %) nebu 2.5 mg by Nebulization route every six (6) hours as needed for Wheezing or Shortness of Breath. Yes Provider, Historical   oxyCODONE IR (ROXICODONE) 20 mg immediate release tablet Take 40 mg by mouth every twelve (12) hours as needed for Pain (severe pain). Yes Provider, Historical   aspirin delayed-release 81 mg tablet Take 1 Tab by mouth daily. 2/23/18  Yes Kala Willingham, NP   ketoconazole (NIZORAL) 2 % shampoo Apply  to affected area as needed for Itching.     Provider, Historical     No Known Allergies   Social History     Tobacco Use    Smoking status: Former     Packs/day: 1.00     Years: 45.00     Pack years: 45.00     Types: Cigarettes     Quit date: 2009     Years since quittin.8    Smokeless tobacco: Never   Substance Use Topics    Alcohol use: No     Comment: rarely/social       No family history on file. Current Facility-Administered Medications   Medication Dose Route Frequency    predniSONE (DELTASONE) tablet 30 mg  30 mg Oral DAILY WITH BREAKFAST    nystatin (MYCOSTATIN) 100,000 unit/mL oral suspension 500,000 Units  500,000 Units Oral QID    cefTRIAXone (ROCEPHIN) 1 g in 0.9% sodium chloride 10 mL IV syringe  1 g IntraVENous Q24H    loperamide (IMODIUM) capsule 4 mg  4 mg Oral TID    amiodarone (CORDARONE) tablet 200 mg  200 mg Oral BID    sacubitriL-valsartan (ENTRESTO) 24-26 mg tablet 1 Tablet  1 Tablet Oral Q12H    apixaban (ELIQUIS) tablet 5 mg  5 mg Oral Q12H    metoprolol succinate (TOPROL-XL) XL tablet 25 mg  25 mg Oral DAILY    albuterol-ipratropium (DUO-NEB) 2.5 MG-0.5 MG/3 ML  3 mL Nebulization BID RT    sodium chloride (NS) flush 5-40 mL  5-40 mL IntraVENous Q8H    arformoteroL (BROVANA) neb solution 15 mcg  15 mcg Nebulization BID RT    aspirin delayed-release tablet 81 mg  81 mg Oral DAILY    atorvastatin (LIPITOR) tablet 40 mg  40 mg Oral DAILY    budesonide (PULMICORT) 500 mcg/2 ml nebulizer suspension  500 mcg Nebulization BID       Review of Systems:  Review of systems not obtained due to patient factors. Objective:   Vital Signs:    Visit Vitals  BP (!) 115/55 (BP 1 Location: Left upper arm, BP Patient Position: At rest)   Pulse 69   Temp 97.9 °F (36.6 °C)   Resp 19   Ht 5' 11\" (1.803 m)   Wt 64.3 kg (141 lb 12.1 oz)   SpO2 96%   BMI 19.77 kg/m²       O2 Device: Hi flow nasal cannula (midflow)   O2 Flow Rate (L/min): 11 l/min (weaned)   Temp (24hrs), Av.8 °F (36.6 °C), Min:97.1 °F (36.2 °C), Max:98.3 °F (36.8 °C)       Intake/Output:   Last shift:      No intake/output data recorded.   Last 3 shifts:  1901 -  0700  In: -   Out: 700 [Urine:700]  No intake or output data in the 24 hours ending 11/07/22 1029     Physical Exam:   Alert on HFNC  + moderate WOB with mild accessory use and word dyspnea no sternal retractions  No abd paradox  Diffuse wheeze  Heart : tachy/reg no murmur  Trachea midline  Abd soft/NT no organomegaly  EX no Cc/e  Data review:     Recent Results (from the past 24 hour(s))   CBC W/O DIFF    Collection Time: 11/07/22  4:31 AM   Result Value Ref Range    WBC 20.1 (H) 4.1 - 11.1 K/uL    RBC 4.42 4.10 - 5.70 M/uL    HGB 13.0 12.1 - 17.0 g/dL    HCT 40.1 36.6 - 50.3 %    MCV 90.7 80.0 - 99.0 FL    MCH 29.4 26.0 - 34.0 PG    MCHC 32.4 30.0 - 36.5 g/dL    RDW 13.7 11.5 - 14.5 %    PLATELET 740 699 - 016 K/uL    MPV 9.8 8.9 - 12.9 FL    NRBC 0.0 0  WBC    ABSOLUTE NRBC 0.00 0.00 - 3.15 K/uL   METABOLIC PANEL, BASIC    Collection Time: 11/07/22  4:31 AM   Result Value Ref Range    Sodium 140 136 - 145 mmol/L    Potassium 4.8 3.5 - 5.1 mmol/L    Chloride 104 97 - 108 mmol/L    CO2 34 (H) 21 - 32 mmol/L    Anion gap 2 (L) 5 - 15 mmol/L    Glucose 84 65 - 100 mg/dL    BUN 32 (H) 6 - 20 MG/DL    Creatinine 0.91 0.70 - 1.30 MG/DL    BUN/Creatinine ratio 35 (H) 12 - 20      eGFR >60 >60 ml/min/1.73m2    Calcium 7.7 (L) 8.5 - 10.1 MG/DL           Imaging:  I have personally reviewed the patients radiographs and have reviewed the reports:  PCXR with LLL and right mid lung new ASD     CC > 35 minutes excluding procedures    Mundo Puckett PA-C

## 2022-11-07 NOTE — PROGRESS NOTES
Chart checked, pt cleared by nursing. Attempted to work with pt but he politely declined stating that he is too lightheaded. Took BP, see below. He stated that he has been up amb in the room. Above discussed with his nurse. Activated bed alarm. PT will follow and see as able and appropriate.  Thank you, Glendy Chamorro, PT      Visit Vitals  BP (!) 92/52 (BP 1 Location: Right upper arm, BP Patient Position: Semi fowlers)   Pulse 64   Temp 97.9 °F (36.6 °C)   Resp 19   Ht 5' 11\" (1.803 m)   Wt 64.3 kg (141 lb 12.1 oz)   SpO2 94%   BMI 19.77 kg/m²

## 2022-11-07 NOTE — PROGRESS NOTES
Bedside shift change report given to Emelia Conley RN (oncoming nurse) by Page Rajput RN (offgoing nurse). Report included the following information SBAR, Kardex, ED Summary, Intake/Output, MAR, and Cardiac Rhythm NSR .

## 2022-11-08 LAB
ANION GAP SERPL CALC-SCNC: 2 MMOL/L (ref 5–15)
BNP SERPL-MCNC: 777 PG/ML
BUN SERPL-MCNC: 32 MG/DL (ref 6–20)
BUN/CREAT SERPL: 32 (ref 12–20)
CALCIUM SERPL-MCNC: 8.1 MG/DL (ref 8.5–10.1)
CHLORIDE SERPL-SCNC: 104 MMOL/L (ref 97–108)
CO2 SERPL-SCNC: 34 MMOL/L (ref 21–32)
CREAT SERPL-MCNC: 0.99 MG/DL (ref 0.7–1.3)
ERYTHROCYTE [DISTWIDTH] IN BLOOD BY AUTOMATED COUNT: 13.9 % (ref 11.5–14.5)
GLUCOSE SERPL-MCNC: 81 MG/DL (ref 65–100)
HCT VFR BLD AUTO: 44.2 % (ref 36.6–50.3)
HGB BLD-MCNC: 14 G/DL (ref 12.1–17)
MAGNESIUM SERPL-MCNC: 2.4 MG/DL (ref 1.6–2.4)
MCH RBC QN AUTO: 29.7 PG (ref 26–34)
MCHC RBC AUTO-ENTMCNC: 31.7 G/DL (ref 30–36.5)
MCV RBC AUTO: 93.6 FL (ref 80–99)
NRBC # BLD: 0 K/UL (ref 0–0.01)
NRBC BLD-RTO: 0 PER 100 WBC
PLATELET # BLD AUTO: 342 K/UL (ref 150–400)
PMV BLD AUTO: 9.6 FL (ref 8.9–12.9)
POTASSIUM SERPL-SCNC: 4.9 MMOL/L (ref 3.5–5.1)
RBC # BLD AUTO: 4.72 M/UL (ref 4.1–5.7)
SODIUM SERPL-SCNC: 140 MMOL/L (ref 136–145)
WBC # BLD AUTO: 16.6 K/UL (ref 4.1–11.1)

## 2022-11-08 PROCEDURE — 83880 ASSAY OF NATRIURETIC PEPTIDE: CPT

## 2022-11-08 PROCEDURE — 77010033678 HC OXYGEN DAILY

## 2022-11-08 PROCEDURE — 74011250637 HC RX REV CODE- 250/637: Performed by: FAMILY MEDICINE

## 2022-11-08 PROCEDURE — 74011000250 HC RX REV CODE- 250: Performed by: INTERNAL MEDICINE

## 2022-11-08 PROCEDURE — 74011250637 HC RX REV CODE- 250/637: Performed by: INTERNAL MEDICINE

## 2022-11-08 PROCEDURE — 74011636637 HC RX REV CODE- 636/637: Performed by: FAMILY MEDICINE

## 2022-11-08 PROCEDURE — 85027 COMPLETE CBC AUTOMATED: CPT

## 2022-11-08 PROCEDURE — 65660000001 HC RM ICU INTERMED STEPDOWN

## 2022-11-08 PROCEDURE — 74011250636 HC RX REV CODE- 250/636: Performed by: FAMILY MEDICINE

## 2022-11-08 PROCEDURE — 80048 BASIC METABOLIC PNL TOTAL CA: CPT

## 2022-11-08 PROCEDURE — 36415 COLL VENOUS BLD VENIPUNCTURE: CPT

## 2022-11-08 PROCEDURE — 83735 ASSAY OF MAGNESIUM: CPT

## 2022-11-08 PROCEDURE — 74011000250 HC RX REV CODE- 250: Performed by: FAMILY MEDICINE

## 2022-11-08 PROCEDURE — 94640 AIRWAY INHALATION TREATMENT: CPT

## 2022-11-08 RX ORDER — FUROSEMIDE 20 MG/1
20 TABLET ORAL DAILY
Status: DISCONTINUED | OUTPATIENT
Start: 2022-11-08 | End: 2022-11-25 | Stop reason: HOSPADM

## 2022-11-08 RX ADMIN — SODIUM CHLORIDE, PRESERVATIVE FREE 10 ML: 5 INJECTION INTRAVENOUS at 17:27

## 2022-11-08 RX ADMIN — ARFORMOTEROL TARTRATE 15 MCG: 15 SOLUTION RESPIRATORY (INHALATION) at 21:52

## 2022-11-08 RX ADMIN — SODIUM CHLORIDE, PRESERVATIVE FREE 10 ML: 5 INJECTION INTRAVENOUS at 21:40

## 2022-11-08 RX ADMIN — LOPERAMIDE HYDROCHLORIDE 2 MG: 2 CAPSULE ORAL at 19:51

## 2022-11-08 RX ADMIN — SODIUM CHLORIDE, PRESERVATIVE FREE 10 ML: 5 INJECTION INTRAVENOUS at 07:32

## 2022-11-08 RX ADMIN — PREDNISONE 30 MG: 20 TABLET ORAL at 10:16

## 2022-11-08 RX ADMIN — LOPERAMIDE HYDROCHLORIDE 2 MG: 2 CAPSULE ORAL at 10:16

## 2022-11-08 RX ADMIN — ASPIRIN 81 MG: 81 TABLET, COATED ORAL at 10:16

## 2022-11-08 RX ADMIN — BUDESONIDE 500 MCG: 0.5 INHALANT RESPIRATORY (INHALATION) at 21:53

## 2022-11-08 RX ADMIN — NYSTATIN 500000 UNITS: 100000 SUSPENSION ORAL at 21:40

## 2022-11-08 RX ADMIN — NYSTATIN 500000 UNITS: 100000 SUSPENSION ORAL at 19:51

## 2022-11-08 RX ADMIN — IPRATROPIUM BROMIDE AND ALBUTEROL SULFATE 3 ML: .5; 3 SOLUTION RESPIRATORY (INHALATION) at 21:52

## 2022-11-08 RX ADMIN — FUROSEMIDE 20 MG: 20 TABLET ORAL at 17:27

## 2022-11-08 RX ADMIN — ARFORMOTEROL TARTRATE 15 MCG: 15 SOLUTION RESPIRATORY (INHALATION) at 09:29

## 2022-11-08 RX ADMIN — BUDESONIDE 500 MCG: 0.5 INHALANT RESPIRATORY (INHALATION) at 09:29

## 2022-11-08 RX ADMIN — METOPROLOL SUCCINATE 25 MG: 25 TABLET, FILM COATED, EXTENDED RELEASE ORAL at 10:17

## 2022-11-08 RX ADMIN — APIXABAN 5 MG: 5 TABLET, FILM COATED ORAL at 21:40

## 2022-11-08 RX ADMIN — ATORVASTATIN CALCIUM 40 MG: 40 TABLET, FILM COATED ORAL at 10:16

## 2022-11-08 RX ADMIN — AMIODARONE HYDROCHLORIDE 100 MG: 200 TABLET ORAL at 10:16

## 2022-11-08 RX ADMIN — APIXABAN 5 MG: 5 TABLET, FILM COATED ORAL at 10:19

## 2022-11-08 RX ADMIN — NYSTATIN 500000 UNITS: 100000 SUSPENSION ORAL at 10:19

## 2022-11-08 RX ADMIN — IPRATROPIUM BROMIDE AND ALBUTEROL SULFATE 3 ML: .5; 3 SOLUTION RESPIRATORY (INHALATION) at 09:29

## 2022-11-08 RX ADMIN — SODIUM CHLORIDE 1 G: 9 INJECTION INTRAMUSCULAR; INTRAVENOUS; SUBCUTANEOUS at 10:16

## 2022-11-08 NOTE — PROGRESS NOTES
Bedside shift change report given to Leslie Simon RN (oncoming nurse) by Luis Mullins RN (offgoing nurse). Report included the following information SBAR, Kardex, ED Summary, Intake/Output, MAR, and Cardiac Rhythm NSR .

## 2022-11-08 NOTE — PROGRESS NOTES
Chart checked, pt cleared by nursing. Attempted to work with pt but he adamantly declined stating that he had already done his exercises today and was feeling tired and weak. Educated him that if he discharges to [facility] that there will be an expectation that he participate in therapy at least 3 hours per day. He stated that he prefers therapy here from 10 am to noon. Educated him that at [facility] there may well be expectations that he participate in therapy in the afternoons. He verbalized understanding, kept watching TV, declining to work with me at this time. Will follow and see as able and as pt is willing.  Mercy Corley, PT

## 2022-11-08 NOTE — PROGRESS NOTES
[address] [facility]  Hospitalist Group                                                                                          Hospitalist Progress Note  Parke Cogan, MD  Answering service: [phone] OR [phone] from in house phone        NAME:  Zeyad Victoria  :  1950  MRN:  [id number]      Admission Summary:   Zeyad Victoria is a 71 y.o. male with a PMHx of lung cancer, COPD, chronic hypoxic respiratory failure, on home O2 at 3 liters/min, who presents with Chief Complaint of Shortness of breath/ Dyspnea on exertion, progressively worsening for the past 3 days. Patient denies any fevers or chills. He used increased doses of inhalers, but it did not help. He called 911. Upon EMS arrival, his O2 saturation was 79%. Patient was placed on a BIPAP upon arrival.      Interval history / Subjective: Follow-up for respiratory failure/new onset PAF and heart failure  on high Flow O2-   Some low BP today- holding diuretics and had to reduce amidarone dose too       Assessment & Plan:     Acute hypercapnic respiratory failure due to COPD pneumonia  Acute on chronic hypoxic respiratory failure due to COPD  Sepsis due to pneumonia multilobar pneumonia  --Initially patient was on BiPAP now on high flow nasal cannula down to 6L  --Continue nebs, steroid, antibiotics - changed to PO prednisone    PAF: new onset evening of 10/27. In the setting of respiratory failure  HFrEF newly diagnosed EF 25-30% unclear etiology  --Received amio gtt continue oral amiodarone- reduced dose  -TTE EF 25-30% - cont diuretics if tolerated by BP- currently low   --on eliquis,  holding Entresto due to low BP  Cardiology notes reviewed    Malignant neoplasm of the lung status post lobectomy for non-small cell lung cancer  --Follow-up as an outpatient  --History of right lung lobectomy    PENNY POA --monitor avoid nephrotoxin    Oral ulcers- nystatin and magic mouthwash ordered    Hyperlipidemia continue statin    Uncontrolled hypertension--currently on as needed hydralazine    Left arm swelling- neg for DVT- already on eliquis-   likely due to persistent BP cuff positioning  Monitor with removal of BP cuff    Code status: DNR  DVT prophylaxis: eliquis    Care Plan discussed with: Patient/Family and Nurse  Anticipated Disposition: Home w/Family  Anticipated Discharge: 24 hours to 48 hours         Hospital Problems  Date Reviewed: 1/9/2019            Codes Class Noted POA    Moderate protein-calorie malnutrition ([facility] ICD-10-CM: E44.0  ICD-9-CM: 263.0  10/27/2022 Yes        COPD (chronic obstructive pulmonary disease) ([facility] ICD-10-CM: J44.9  ICD-9-CM: 496  10/26/2022 Unknown        Pneumonia ICD-10-CM: J18.9  ICD-9-CM: 486  10/26/2022 Unknown        Malignant neoplasm of lung, unspecified laterality, unspecified part of lung ([facility] ICD-10-CM: C34.90  ICD-9-CM: 162.9  10/26/2022 Unknown        Sepsis ([facility] ICD-10-CM: A41.9  ICD-9-CM: 038.9, 995.91  10/26/2022 Unknown       Review of Systems:   A comprehensive review of systems was negative. Vital Signs:    Last 24hrs VS reviewed since prior progress note.  Most recent are:  Visit Vitals  /62 (BP 1 Location: Right upper arm, BP Patient Position: At rest)   Pulse 63   Temp 97.8 °F (36.6 °C)   Resp 17   Ht 5' 11\" (1.803 m)   Wt 64.3 kg (141 lb 12.1 oz)   SpO2 95%   BMI 19.77 kg/m²   Patient Vitals for the past 24 hrs:   Temp Pulse Resp BP SpO2   11/07/22 2145 -- 63 -- -- --   11/07/22 2026 -- -- -- -- 95 %   11/07/22 2010 97.8 °F (36.6 °C) 67 17 109/62 95 %   11/07/22 1945 -- 62 -- -- --   11/07/22 1800 -- 62 -- -- --   11/07/22 1717 97.9 °F (36.6 °C) 71 18 (!) 97/54 94 %   11/07/22 1400 -- 66 -- -- --   11/07/22 1200 -- 69 -- -- --   11/07/22 1135 97.2 °F (36.2 °C) -- -- -- --   11/07/22 1102 -- 64 -- (!) 92/52 94 %   11/07/22 1000 -- 69 -- -- --   11/07/22 0835 97.9 °F (36.6 °C) 62 19 (!) 115/55 96 %   11/07/22 0753 -- -- -- -- 96 %   11/07/22 0554 -- (!) 56 -- -- --   11/07/22 0400 -- (!) 56 -- -- --   11/07/22 0149 -- (!) 59 -- -- --          No intake or output data in the 24 hours ending 11/07/22 2239       Physical Examination:           General : alert x 3, awake, no acute distress, resting in bed, pleasant   HEENT: PEERL, EOMI, moist mucus membrane, TM clear  Neck: supple, no JVD, no meningeal signs  Chest: Coarse breath sounds, scattered wheezing  CVS: S1 S2 heard, Capillary refill less than 2 seconds  Abd: soft/ Non tender, non distended  Ext: no clubbing, no cyanosis, significant edema of left arm, ,diffuse muscle wasting  Neuro/Psych: pleasant mood and affect,  sensory grossly within normal limit,  severe weakness of bilat UE and LE  Skin: warm     Data Review:    I personally reviewed  Image and labs      Labs:     Recent Labs     11/07/22 0431 11/06/22 0446   WBC 20.1* 26.1*   HGB 13.0 14.0   HCT 40.1 43.5    344       Recent Labs     11/07/22 0431 11/06/22 0446 11/05/22  0608    141 141   K 4.8 4.6 4.6    101 103   CO2 34* 37* 34*   BUN 32* 34* 32*   CREA 0.91 0.96 0.84   GLU 84 80 113*   CA 7.7* 7.8* 7.7*       No results for input(s): ALT, AP, TBIL, TBILI, TP, ALB, GLOB, GGT, AML, LPSE in the last 72 hours. No lab exists for component: SGOT, GPT, AMYP, HLPSE    No results for input(s): INR, PTP, APTT, INREXT, INREXT in the last 72 hours. No results for input(s): FE, TIBC, PSAT, FERR in the last 72 hours. No results found for: FOL, RBCF   No results for input(s): PH, PCO2, PO2 in the last 72 hours. No results for input(s): CPK, CKNDX, TROIQ in the last 72 hours.     No lab exists for component: CPKMB  Lab Results   Component Value Date/Time    Cholesterol, total 106 02/20/2018 04:54 AM    HDL Cholesterol 45 02/20/2018 04:54 AM    LDL, calculated 46 02/20/2018 04:54 AM    Triglyceride 75 02/20/2018 04:54 AM    CHOL/HDL Ratio 2.4 02/20/2018 04:54 AM     Lab Results   Component Value Date/Time    Glucose (POC) 150 (H) 11/04/2022 12:52 PM    Glucose (POC) 100 09/14/2015 12:14 PM     Lab Results   Component Value Date/Time    Color YELLOW/STRAW 11/05/2022 01:04 PM    Appearance CLEAR 11/05/2022 01:04 PM    Specific gravity 1.017 11/05/2022 01:04 PM    pH (UA) 6.5 11/05/2022 01:04 PM    Protein Negative 11/05/2022 01:04 PM    Glucose Negative 11/05/2022 01:04 PM    Ketone Negative 11/05/2022 01:04 PM    Bilirubin Negative 11/05/2022 01:04 PM    Urobilinogen 0.2 11/05/2022 01:04 PM    Nitrites Negative 11/05/2022 01:04 PM    Leukocyte Esterase Negative 11/05/2022 01:04 PM    Epithelial cells FEW 11/05/2022 01:04 PM    Bacteria Negative 11/05/2022 01:04 PM    WBC 0-4 11/05/2022 01:04 PM    RBC 20-50 11/05/2022 01:04 PM         Medications Reviewed:     Current Facility-Administered Medications   Medication Dose Route Frequency    predniSONE (DELTASONE) tablet 30 mg  30 mg Oral DAILY WITH BREAKFAST    amiodarone (CORDARONE) tablet 100 mg  100 mg Oral BID    [START ON 11/8/2022] loperamide (IMODIUM) capsule 2 mg  2 mg Oral BID    nystatin (MYCOSTATIN) 100,000 unit/mL oral suspension 500,000 Units  500,000 Units Oral QID    magic mouthwash cpd (without sucralfate)  5 mL Oral TID PRN    cefTRIAXone (ROCEPHIN) 1 g in 0.9% sodium chloride 10 mL IV syringe  1 g IntraVENous Q24H    [Held by provider] sacubitriL-valsartan (ENTRESTO) 24-26 mg tablet 1 Tablet  1 Tablet Oral Q12H    apixaban (ELIQUIS) tablet 5 mg  5 mg Oral Q12H    metoprolol succinate (TOPROL-XL) XL tablet 25 mg  25 mg Oral DAILY    albuterol-ipratropium (DUO-NEB) 2.5 MG-0.5 MG/3 ML  3 mL Nebulization BID RT    hydrALAZINE (APRESOLINE) 20 mg/mL injection 20 mg  20 mg IntraVENous Q6H PRN    albuterol-ipratropium (DUO-NEB) 2.5 MG-0.5 MG/3 ML  3 mL Nebulization Q4H PRN    sodium chloride (NS) flush 5-40 mL  5-40 mL IntraVENous Q8H    sodium chloride (NS) flush 5-40 mL  5-40 mL IntraVENous PRN    acetaminophen (TYLENOL) tablet 650 mg  650 mg Oral Q6H PRN    Or    acetaminophen (TYLENOL) suppository 650 mg  650 mg Rectal Q6H PRN    ondansetron (ZOFRAN ODT) tablet 4 mg  4 mg Oral Q8H PRN    Or    ondansetron (ZOFRAN) injection 4 mg  4 mg IntraVENous Q6H PRN    polyethylene glycol (MIRALAX) packet 17 g  17 g Oral DAILY PRN    arformoteroL (BROVANA) neb solution 15 mcg  15 mcg Nebulization BID RT    aspirin delayed-release tablet 81 mg  81 mg Oral DAILY    atorvastatin (LIPITOR) tablet 40 mg  40 mg Oral DAILY    budesonide (PULMICORT) 500 mcg/2 ml nebulizer suspension  500 mcg Nebulization BID    oxyCODONE IR (ROXICODONE) tablet 20 mg  20 mg Oral Q6H PRN    oxyCODONE IR (ROXICODONE) tablet 10 mg  10 mg Oral Q6H PRN     ______________________________________________________________________  EXPECTED LENGTH OF STAY: 4d 19h  ACTUAL LENGTH OF STAY:          12               Sami Tillman MD

## 2022-11-08 NOTE — PROGRESS NOTES
Pulmonary, Critical Care, and Sleep Medicine    Progress Note    Name: Triston Herrera MRN: [id number]   : 1950 Hospital: [place] [address]   Date: 2022        IMPRESSION:   Acute hypercapnic resp failure- Moderate COPD ( 3lpm baseline + trelegy + Dr. Boston Cavazos) + multilobar CAP- PCT > 22. At risk for MDROs and atypicals consider ESBLS. Decom HFrEF  H/o RLL lobectomy at [facility]  for NSCLC  COPD  Sepsis- from PNA  A fib. On amiodarone       RECOMMENDATIONS:   PO steroids   Nebs  Dvt proph  Amio/eliquis  Diuretics   OOB as tolerated   inpatient rehab at discharge   O2 titration above 90%      Subjective:       O2 requirements down to discharge level  Feels week       Finally feeling better       Down to midflow and feeling better     11/3  On high flow this morning 35L 60%. Says he has alternated with midflow at times  No longer coughing up mucous  Has been getting up to the bathroom      Moving back and forth with high flow and HFNC  Less congested today  Mobilizing more today       Sounds a little congested   Chest film shows some improvement   Down to 20lpm and 90% FiO2  ESR 23, probnp 23983, procal 0.74    10/31  Back up to 35lpm and 80% FIO2  Had a rough weekend   + wheeze today     ECHO from 10/28  Result status: Final result       Left Ventricle: Severely reduced left ventricular systolic function with a visually estimated EF of 25 - 30%. Not well visualized. Left ventricle size is normal. Normal wall thickness. Unable to assess wall motion. Abnormal diastolic function. Right Ventricle: Not well visualized. Reduced systolic function. TAPSE is abnormal. TAPSE is 1.1 cm. Mitral Valve: Mild regurgitation. Left Atrium: Left atrium is mildly dilated. Right Atrium: Right atrium is mildly dilated.     10/28  Inability to tolerate below 25lpm and 85% FiO2 today  Sounds congested   Feels about the same as yesterday   Elevation of WBC might be from steroids 53/17  Metabolic acidosis   Down to 30lpm and 50% FiO2  Feeling a little better     Patient is a 71 y.o. male with COPD ( FEV1 in office 2022 53% folows with Dr. Petar Pinon) who is s/p RLL lobectomy for NSCLC at [facility] 2009. Pt with inc SOB and WOB last 2-3 days and presented to ED with SOB/hypercapnic resp failure. On HFNC currently and gettign ready top go to [facility]. Seen in ED. C/o productive cough. No hemoptysis      Past Medical History:   Diagnosis Date    Asthma     Cancer ([facility]     Lung Cancer    Chronic obstructive pulmonary disease (HCC)     Chronic pain     High cholesterol     Lung cancer ([facility]       Past Surgical History:   Procedure Laterality Date    HX LOBECTOMY      Right lung    HX OTHER SURGICAL      Partial right lung removal      Prior to Admission medications    Medication Sig Start Date End Date Taking? Authorizing Provider   arformoteroL (BROVANA) 15 mcg/2 mL nebu neb solution 15 mcg by Nebulization route two (2) times a day. Sometimes uses TID   Yes Provider, Historical   budesonide (PULMICORT) 0.5 mg/2 mL nbsp 500 mcg by Nebulization route two (2) times a day. Sometimes uses TID   Yes Provider, Historical   atorvastatin (LIPITOR) 40 mg tablet Take 40 mg by mouth daily. Yes Provider, Historical   albuterol (PROVENTIL VENTOLIN) 2.5 mg /3 mL (0.083 %) nebu 2.5 mg by Nebulization route every six (6) hours as needed for Wheezing or Shortness of Breath. Yes Provider, Historical   oxyCODONE IR (ROXICODONE) 20 mg immediate release tablet Take 40 mg by mouth every twelve (12) hours as needed for Pain (severe pain). Yes Provider, Historical   aspirin delayed-release 81 mg tablet Take 1 Tab by mouth daily. 2/23/18  Yes Seema Almaguer NP   ketoconazole (NIZORAL) 2 % shampoo Apply  to affected area as needed for Itching.     Provider, Historical     No Known Allergies   Social History     Tobacco Use    Smoking status: Former     Packs/day: 1.00     Years: 45.00     Pack years: 45.00     Types: Cigarettes     Quit date: 2009     Years since quittin.8    Smokeless tobacco: Never   Substance Use Topics    Alcohol use: No     Comment: rarely/social       No family history on file. Current Facility-Administered Medications   Medication Dose Route Frequency    predniSONE (DELTASONE) tablet 30 mg  30 mg Oral DAILY WITH BREAKFAST    amiodarone (CORDARONE) tablet 100 mg  100 mg Oral BID    loperamide (IMODIUM) capsule 2 mg  2 mg Oral BID    nystatin (MYCOSTATIN) 100,000 unit/mL oral suspension 500,000 Units  500,000 Units Oral QID    cefTRIAXone (ROCEPHIN) 1 g in 0.9% sodium chloride 10 mL IV syringe  1 g IntraVENous Q24H    [Held by provider] sacubitriL-valsartan (ENTRESTO) 24-26 mg tablet 1 Tablet  1 Tablet Oral Q12H    apixaban (ELIQUIS) tablet 5 mg  5 mg Oral Q12H    metoprolol succinate (TOPROL-XL) XL tablet 25 mg  25 mg Oral DAILY    albuterol-ipratropium (DUO-NEB) 2.5 MG-0.5 MG/3 ML  3 mL Nebulization BID RT    sodium chloride (NS) flush 5-40 mL  5-40 mL IntraVENous Q8H    arformoteroL (BROVANA) neb solution 15 mcg  15 mcg Nebulization BID RT    aspirin delayed-release tablet 81 mg  81 mg Oral DAILY    atorvastatin (LIPITOR) tablet 40 mg  40 mg Oral DAILY    budesonide (PULMICORT) 500 mcg/2 ml nebulizer suspension  500 mcg Nebulization BID       Review of Systems:  Review of systems not obtained due to patient factors. Objective:   Vital Signs:    Visit Vitals  /61 (BP 1 Location: Left upper arm, BP Patient Position: At rest)   Pulse 67   Temp 97.7 °F (36.5 °C)   Resp 17   Ht 5' 11\" (1.803 m)   Wt 62.5 kg (137 lb 12.6 oz)   SpO2 94%   BMI 19.22 kg/m²       O2 Device: Nasal cannula   O2 Flow Rate (L/min): 5 l/min   Temp (24hrs), Av.6 °F (36.4 °C), Min:97.2 °F (36.2 °C), Max:97.9 °F (36.6 °C)       Intake/Output:   Last shift:      No intake/output data recorded.   Last 3 shifts: 1901 -  0700  In: -   Out: 450 [Urine:450]    Intake/Output Summary (Last 24 hours) at 11/8/2022 1010  Last data filed at 11/7/2022 2311  Gross per 24 hour   Intake --   Output 450 ml   Net -450 ml        Physical Exam:   Alert on HFNC  + moderate WOB with mild accessory use and word dyspnea no sternal retractions  No abd paradox  Diffuse wheeze  Heart : tachy/reg no murmur  Trachea midline  Abd soft/NT no organomegaly  EX no Cc/e  Data review:     Recent Results (from the past 24 hour(s))   CBC W/O DIFF    Collection Time: 11/08/22  3:53 AM   Result Value Ref Range    WBC 16.6 (H) 4.1 - 11.1 K/uL    RBC 4.72 4.10 - 5.70 M/uL    HGB 14.0 12.1 - 17.0 g/dL    HCT 44.2 36.6 - 50.3 %    MCV 93.6 80.0 - 99.0 FL    MCH 29.7 26.0 - 34.0 PG    MCHC 31.7 30.0 - 36.5 g/dL    RDW 13.9 11.5 - 14.5 %    PLATELET 955 008 - 540 K/uL    MPV 9.6 8.9 - 12.9 FL    NRBC 0.0 0  WBC    ABSOLUTE NRBC 0.00 0.00 - 2.92 K/uL   METABOLIC PANEL, BASIC    Collection Time: 11/08/22  3:53 AM   Result Value Ref Range    Sodium 140 136 - 145 mmol/L    Potassium 4.9 3.5 - 5.1 mmol/L    Chloride 104 97 - 108 mmol/L    CO2 34 (H) 21 - 32 mmol/L    Anion gap 2 (L) 5 - 15 mmol/L    Glucose 81 65 - 100 mg/dL    BUN 32 (H) 6 - 20 MG/DL    Creatinine 0.99 0.70 - 1.30 MG/DL    BUN/Creatinine ratio 32 (H) 12 - 20      eGFR >60 >60 ml/min/1.73m2    Calcium 8.1 (L) 8.5 - 10.1 MG/DL   NT-PRO BNP    Collection Time: 11/08/22  3:53 AM   Result Value Ref Range    NT pro- (H) <125 PG/ML   MAGNESIUM    Collection Time: 11/08/22  3:53 AM   Result Value Ref Range    Magnesium 2.4 1.6 - 2.4 mg/dL       Imaging:  I have personally reviewed the patients radiographs and have reviewed the reports:  GLADYSXR with LLL and right mid lung new ASD        Barrett Jori Rinne, PA-C

## 2022-11-08 NOTE — PROGRESS NOTES
HOWARD:    RUR 11%    Disposition: IPR-Attending agrees to referral.  Referral sent to [facility] via [facility]. Insurance [employer] will be needed.     Transportation: [facility]    Follow up: PCP/Specialist    Primary contact: King Johny Partner) [phone]    Kiran Dumont RN/CRM  [phone]

## 2022-11-09 LAB
ANION GAP SERPL CALC-SCNC: 4 MMOL/L (ref 5–15)
BUN SERPL-MCNC: 32 MG/DL (ref 6–20)
BUN/CREAT SERPL: 29 (ref 12–20)
CALCIUM SERPL-MCNC: 8.3 MG/DL (ref 8.5–10.1)
CHLORIDE SERPL-SCNC: 101 MMOL/L (ref 97–108)
CO2 SERPL-SCNC: 34 MMOL/L (ref 21–32)
CREAT SERPL-MCNC: 1.1 MG/DL (ref 0.7–1.3)
ERYTHROCYTE [DISTWIDTH] IN BLOOD BY AUTOMATED COUNT: 13.9 % (ref 11.5–14.5)
GLUCOSE SERPL-MCNC: 84 MG/DL (ref 65–100)
HCT VFR BLD AUTO: 41.4 % (ref 36.6–50.3)
HGB BLD-MCNC: 13.4 G/DL (ref 12.1–17)
MCH RBC QN AUTO: 29.6 PG (ref 26–34)
MCHC RBC AUTO-ENTMCNC: 32.4 G/DL (ref 30–36.5)
MCV RBC AUTO: 91.6 FL (ref 80–99)
NRBC # BLD: 0 K/UL (ref 0–0.01)
NRBC BLD-RTO: 0 PER 100 WBC
PLATELET # BLD AUTO: 381 K/UL (ref 150–400)
PMV BLD AUTO: 10.1 FL (ref 8.9–12.9)
POTASSIUM SERPL-SCNC: 5.2 MMOL/L (ref 3.5–5.1)
RBC # BLD AUTO: 4.52 M/UL (ref 4.1–5.7)
SODIUM SERPL-SCNC: 139 MMOL/L (ref 136–145)
WBC # BLD AUTO: 17.3 K/UL (ref 4.1–11.1)

## 2022-11-09 PROCEDURE — 97530 THERAPEUTIC ACTIVITIES: CPT | Performed by: PHYSICAL THERAPIST

## 2022-11-09 PROCEDURE — 74011000250 HC RX REV CODE- 250: Performed by: INTERNAL MEDICINE

## 2022-11-09 PROCEDURE — 74011250637 HC RX REV CODE- 250/637: Performed by: FAMILY MEDICINE

## 2022-11-09 PROCEDURE — 65660000001 HC RM ICU INTERMED STEPDOWN

## 2022-11-09 PROCEDURE — 94640 AIRWAY INHALATION TREATMENT: CPT

## 2022-11-09 PROCEDURE — 74011250637 HC RX REV CODE- 250/637: Performed by: INTERNAL MEDICINE

## 2022-11-09 PROCEDURE — 97116 GAIT TRAINING THERAPY: CPT | Performed by: PHYSICAL THERAPIST

## 2022-11-09 PROCEDURE — P9047 ALBUMIN (HUMAN), 25%, 50ML: HCPCS | Performed by: NURSE PRACTITIONER

## 2022-11-09 PROCEDURE — 74011636637 HC RX REV CODE- 636/637: Performed by: FAMILY MEDICINE

## 2022-11-09 PROCEDURE — 85027 COMPLETE CBC AUTOMATED: CPT

## 2022-11-09 PROCEDURE — 74011250636 HC RX REV CODE- 250/636: Performed by: FAMILY MEDICINE

## 2022-11-09 PROCEDURE — 80048 BASIC METABOLIC PNL TOTAL CA: CPT

## 2022-11-09 PROCEDURE — 74011250636 HC RX REV CODE- 250/636: Performed by: NURSE PRACTITIONER

## 2022-11-09 PROCEDURE — 74011000250 HC RX REV CODE- 250: Performed by: FAMILY MEDICINE

## 2022-11-09 PROCEDURE — 97535 SELF CARE MNGMENT TRAINING: CPT

## 2022-11-09 PROCEDURE — 36415 COLL VENOUS BLD VENIPUNCTURE: CPT

## 2022-11-09 RX ORDER — IPRATROPIUM BROMIDE AND ALBUTEROL SULFATE 2.5; .5 MG/3ML; MG/3ML
3 SOLUTION RESPIRATORY (INHALATION)
Status: DISCONTINUED | OUTPATIENT
Start: 2022-11-09 | End: 2022-11-25 | Stop reason: HOSPADM

## 2022-11-09 RX ORDER — ALBUMIN HUMAN 250 G/1000ML
25 SOLUTION INTRAVENOUS ONCE
Status: COMPLETED | OUTPATIENT
Start: 2022-11-09 | End: 2022-11-09

## 2022-11-09 RX ORDER — LOPERAMIDE HYDROCHLORIDE 2 MG/1
2 CAPSULE ORAL
Status: DISCONTINUED | OUTPATIENT
Start: 2022-11-09 | End: 2022-11-25 | Stop reason: HOSPADM

## 2022-11-09 RX ADMIN — ASPIRIN 81 MG: 81 TABLET, COATED ORAL at 09:27

## 2022-11-09 RX ADMIN — ARFORMOTEROL TARTRATE 15 MCG: 15 SOLUTION RESPIRATORY (INHALATION) at 21:33

## 2022-11-09 RX ADMIN — SODIUM CHLORIDE, PRESERVATIVE FREE 10 ML: 5 INJECTION INTRAVENOUS at 05:25

## 2022-11-09 RX ADMIN — NYSTATIN 500000 UNITS: 100000 SUSPENSION ORAL at 19:19

## 2022-11-09 RX ADMIN — METOPROLOL SUCCINATE 25 MG: 25 TABLET, FILM COATED, EXTENDED RELEASE ORAL at 09:32

## 2022-11-09 RX ADMIN — ARFORMOTEROL TARTRATE 15 MCG: 15 SOLUTION RESPIRATORY (INHALATION) at 08:04

## 2022-11-09 RX ADMIN — ALBUMIN (HUMAN) 25 G: 0.25 INJECTION, SOLUTION INTRAVENOUS at 21:43

## 2022-11-09 RX ADMIN — SODIUM CHLORIDE, PRESERVATIVE FREE 10 ML: 5 INJECTION INTRAVENOUS at 21:02

## 2022-11-09 RX ADMIN — APIXABAN 5 MG: 5 TABLET, FILM COATED ORAL at 21:00

## 2022-11-09 RX ADMIN — APIXABAN 5 MG: 5 TABLET, FILM COATED ORAL at 09:27

## 2022-11-09 RX ADMIN — BUDESONIDE 500 MCG: 0.5 INHALANT RESPIRATORY (INHALATION) at 08:04

## 2022-11-09 RX ADMIN — NYSTATIN 500000 UNITS: 100000 SUSPENSION ORAL at 21:11

## 2022-11-09 RX ADMIN — BUDESONIDE 500 MCG: 0.5 INHALANT RESPIRATORY (INHALATION) at 21:33

## 2022-11-09 RX ADMIN — ATORVASTATIN CALCIUM 40 MG: 40 TABLET, FILM COATED ORAL at 09:27

## 2022-11-09 RX ADMIN — LOPERAMIDE HYDROCHLORIDE 2 MG: 2 CAPSULE ORAL at 09:26

## 2022-11-09 RX ADMIN — PREDNISONE 30 MG: 20 TABLET ORAL at 09:26

## 2022-11-09 RX ADMIN — SODIUM CHLORIDE 1 G: 9 INJECTION INTRAMUSCULAR; INTRAVENOUS; SUBCUTANEOUS at 09:26

## 2022-11-09 RX ADMIN — NYSTATIN 500000 UNITS: 100000 SUSPENSION ORAL at 09:26

## 2022-11-09 NOTE — PROGRESS NOTES
Occupational Therapy (Late Entry from 11/8 at [phone])    Attempted to work with pt but he adamantly declined stating that he had already done his exercises today and was feeling tired and weak. Educated him that if he discharges to [facility] that there will be an expectation that he participate in therapy at least 3 hours per day. He stated that he prefers therapy here from 10 am to noon. Educated him that at [facility] there may well be expectations that he participate in therapy in the afternoons. He verbalized understanding, kept watching TV, declining to work with me at this time. Will follow and see as able and as pt is willing. Thank you. Arlette Kemp MS, OTR/L

## 2022-11-09 NOTE — PROGRESS NOTES
PCCM:    VSS on 5lpm supplemental O2    HgB 13.4    Plan:  COPD   Continue current medications   Would benefit from inpatient rehab   Follow up in 1-2 wks    O2 titration above 90%    We will be available again to see if needed    Pedro You PA-C

## 2022-11-09 NOTE — PROGRESS NOTES
Problem: Self Care Deficits Care Plan (Adult)  Goal: *Acute Goals and Plan of Care (Insert Text)  Description: FUNCTIONAL STATUS PRIOR TO ADMISSION: Patient was modified independent using a extra time for functional mobility, denies any AD. Patient was modified independent for basic and instrumental ADLs. Patient stated he bathes at baseline but also limits his activity due to fatigue on occasion. HOME SUPPORT: The patient lived with his girlfriend but did not require assist per the patient. Occupational Therapy Goals  Initiated 10/28/2022  1. Patient will perform grooming seated EOB with set/up within 7 day(s). 2.  Patient will perform lower body dressing with supervision/set-up within 7 day(s). 3.  Patient will perform toilet transfers with supervision/set-up within 7 day(s). 4.  Patient will perform all aspects of toileting with modified independence within 7 day(s). 5.  Patient will participate in upper extremity therapeutic exercise/activities and breathing exercises with supervision/set-up for 5 minutes within 7 day(s). 6.  Patient will utilize energy conservation techniques during functional activities with verbal and visual cues within 7 day(s). Outcome: Progressing Towards Goal     OCCUPATIONAL THERAPY TREATMENT  Patient: Tk Coffman (7 y.o. male)  Date: 11/9/2022  Diagnosis: Sepsis ([facility] [A41.9]  Pneumonia [J18.9]  Malignant neoplasm of lung, unspecified laterality, unspecified part of lung ([facility] [C34.90]  COPD (chronic obstructive pulmonary disease) ([facility] [J44.9] <principal problem not specified>      Precautions: Fall  Chart, occupational therapy assessment, plan of care, and goals were reviewed. ASSESSMENT  Patient continues with skilled OT services and is progressing towards goals. He remains limited by decreased endurance, activity tolerance, generalized weakness, and energy level. Patient tolerates bed>chair short distance and is visibly fatigued.  On supplemental O2 7L NC with good pleath reading 98% at rest and 91% with activity. Educated on PLB technique. Verbalized understanding. Seated in chair patient performed combing his hair, shampooing his hair w/ a wash cloth, and managing his food/drink items independently. Min A to malvin clean gown due to line management. Patient would benefit from IPR to maximize independence as he is not safe to return home at this time. He would benefit from continued OT in acute setting to address the above impairments. Current Level of Function Impacting Discharge (ADLs): set-up/min A    Other factors to consider for discharge: requiring supplemental O2, home set up, well below baseline, social support, safety, fall risk          PLAN :  Patient continues to benefit from skilled intervention to address the above impairments. Continue treatment per established plan of care to address goals. Recommend with staff: up to chair 3x/day for meals, ADLs, toileting with supervision     Recommend next OT session: introduce UE therex, standing ADLs    Recommendation for discharge: (in order for the patient to meet his/her long term goals)  Therapy 3 hours per day 5-7 days per week    This discharge recommendation:  Has been made in collaboration with the attending provider and/or case management    IF patient discharges home will need the following DME: TBD       SUBJECTIVE:   Patient stated Whit Parker took my sheets and never brought back clean ones.     OBJECTIVE DATA SUMMARY:   Cognitive/Behavioral Status:  Neurologic State: Alert  Orientation Level: Oriented X4    Functional Mobility and Transfers for ADLs:  Bed Mobility:  Supine to Sit: Independent    Transfers:  Sit to Stand: Supervision  Bed to Chair: Supervision    Balance:  Sitting: Intact  Standing: Intact  Standing - Static: Good  Standing - Dynamic : Good    ADL Intervention:  Feeding  Container Management: Independent (opening ice cream container ind'ly)  Food to Mouth:  Independent  Drink to Mouth: Independent    Grooming  Brushing/Combing Hair: Independent; Set-up (shampoo'd his hair seated in chair with set up of supplies)    Upper Body [address]: Minimum  assistance (to tie and untie, assist to thread lines)    Pain:  0/10    Activity Tolerance:   Fair, Poor, desaturates with exertion and requires oxygen, and requires rest breaks    After treatment patient left in no apparent distress:   Sitting in chair and Call bell within reach    COMMUNICATION/COLLABORATION:   The patients plan of care was discussed with: Registered nurse.      Kae Arnold OT  Time Calculation: 16 mins

## 2022-11-09 NOTE — PROGRESS NOTES
[address] [facility]  Hospitalist Group                                                                                          Hospitalist Progress Note  César Ocampo MD  Answering service: [phone] OR [phone] from in house phone        NAME:  Lupe Sol  :  1950  MRN:  [id number]      Admission Summary:   Lupe Sol is a 71 y.o. male with a PMHx of lung cancer, COPD, chronic hypoxic respiratory failure, on home O2 at 3 liters/min, who presents with Chief Complaint of Shortness of breath/ Dyspnea on exertion, progressively worsening for the past 3 days. Patient denies any fevers or chills. He used increased doses of inhalers, but it did not help. He called 911. Upon EMS arrival, his O2 saturation was 79%. Patient was placed on a BIPAP upon arrival.      Interval history / Subjective: Follow-up for respiratory failure/new onset PAF and heart failure  Weaning oxygen as tolerated  Some low BP today- reduced amidarone dose  and holding entresto       Assessment & Plan:     Acute hypercapnic respiratory failure due to COPD pneumonia  Acute on chronic hypoxic respiratory failure due to COPD  Sepsis due to pneumonia multilobar pneumonia  --Initially patient was on BiPAP now on high flow nasal cannula down to 4L  --Continue nebs, steroid, antibiotics - changed to PO prednisone    PAF: new onset evening of 10/27. In the setting of respiratory failure  HFrEF newly diagnosed EF 25-30% unclear etiology  --Received amio gtt continue oral amiodarone- reduced dose  -TTE EF 25-30% - cont diuretics if tolerated by BP- currently low   --on eliquis,  holding Entresto due to low BP  Cardiology notes reviewed    Malignant neoplasm of the lung status post lobectomy for non-small cell lung cancer  --Follow-up as an outpatient  --History of right lung lobectomy    PENNY POA --monitor avoid nephrotoxin    Oral ulcers- nystatin and magic mouthwash ordered    Hyperlipidemia continue statin    Uncontrolled hypertension--resolved and now issues with hypotension    Left arm swelling- neg for DVT- already on eliquis-   likely due to persistent BP cuff positioning  Monitor with removal of BP cuff- improving    Code status: DNR  DVT prophylaxis: eliquis    Care Plan discussed with: Patient/Family and Nurse  Anticipated Disposition: Home w/Family  Anticipated Discharge: 24 hours to 48 hours         Hospital Problems  Date Reviewed: 1/9/2019            Codes Class Noted POA    Moderate protein-calorie malnutrition ([facility] ICD-10-CM: E44.0  ICD-9-CM: 263.0  10/27/2022 Yes        COPD (chronic obstructive pulmonary disease) ([facility] ICD-10-CM: J44.9  ICD-9-CM: 496  10/26/2022 Unknown        Pneumonia ICD-10-CM: J18.9  ICD-9-CM: 486  10/26/2022 Unknown        Malignant neoplasm of lung, unspecified laterality, unspecified part of lung ([facility] ICD-10-CM: C34.90  ICD-9-CM: 162.9  10/26/2022 Unknown        Sepsis ([facility] ICD-10-CM: A41.9  ICD-9-CM: 038.9, 995.91  10/26/2022 Unknown       Review of Systems:   A comprehensive review of systems was negative. Vital Signs:    Last 24hrs VS reviewed since prior progress note.  Most recent are:  Visit Vitals  BP (!) 100/57 (BP 1 Location: Left upper arm, BP Patient Position: At rest)   Pulse 71   Temp 98 °F (36.7 °C)   Resp 20   Ht 5' 11\" (1.803 m)   Wt 62.5 kg (137 lb 12.6 oz)   SpO2 93%   BMI 19.22 kg/m²   Patient Vitals for the past 24 hrs:   Temp Pulse Resp BP SpO2   11/08/22 1952 98 °F (36.7 °C) 71 20 (!) 100/57 93 %   11/08/22 1833 -- 67 20 (!) 111/56 97 %   11/08/22 1800 -- 67 -- -- --   11/08/22 1525 98.1 °F (36.7 °C) 64 17 (!) 97/56 94 %   11/08/22 1400 -- 66 -- -- --   11/08/22 1200 -- 69 -- -- --   11/08/22 1016 -- 69 29 126/62 92 %   11/08/22 1000 -- 67 -- -- --   11/08/22 0929 -- -- -- -- 94 %   11/08/22 0734 -- 60 16 113/69 98 %   11/08/22 0559 -- 69 -- -- --   11/08/22 0355 -- (!) 59 -- -- --   11/08/22 0353 97.7 °F (36.5 °C) 60 17 117/61 97 %   11/08/22 0146 -- 67 -- -- --   11/07/22 2310 97.5 °F (36.4 °C) 62 19 (!) 114/48 95 %   11/07/22 2145 -- 63 -- -- --            Intake/Output Summary (Last 24 hours) at 11/8/2022 2142  Last data filed at 11/7/2022 2311  Gross per 24 hour   Intake --   Output 200 ml   Net -200 ml          Physical Examination:           General : alert x 3, awake, no acute distress, resting in bed, pleasant   HEENT: PEERL, EOMI, moist mucus membrane, TM clear  Neck: supple, no JVD, no meningeal signs  Chest: Coarse breath sounds, scattered wheezing  CVS: S1 S2 heard, Capillary refill less than 2 seconds  Abd: soft/ Non tender, non distended  Ext: no clubbing, no cyanosis, significant edema of left arm, ,diffuse muscle wasting  Neuro/Psych: pleasant mood and affect,  sensory grossly within normal limit,  severe weakness of bilat UE and LE  Skin: warm     Data Review:    I personally reviewed  Image and labs      Labs:     Recent Labs     11/08/22 0353 11/07/22 0431   WBC 16.6* 20.1*   HGB 14.0 13.0   HCT 44.2 40.1    325       Recent Labs     11/08/22 0353 11/07/22 0431 11/06/22 0446    140 141   K 4.9 4.8 4.6    104 101   CO2 34* 34* 37*   BUN 32* 32* 34*   CREA 0.99 0.91 0.96   GLU 81 84 80   CA 8.1* 7.7* 7.8*   MG 2.4  --   --        No results for input(s): ALT, AP, TBIL, TBILI, TP, ALB, GLOB, GGT, AML, LPSE in the last 72 hours. No lab exists for component: SGOT, GPT, AMYP, HLPSE    No results for input(s): INR, PTP, APTT, INREXT, INREXT in the last 72 hours. No results for input(s): FE, TIBC, PSAT, FERR in the last 72 hours. No results found for: FOL, RBCF   No results for input(s): PH, PCO2, PO2 in the last 72 hours. No results for input(s): CPK, CKNDX, TROIQ in the last 72 hours.     No lab exists for component: CPKMB  Lab Results   Component Value Date/Time    Cholesterol, total 106 02/20/2018 04:54 AM    HDL Cholesterol 45 02/20/2018 04:54 AM    LDL, calculated 46 02/20/2018 04:54 AM    Triglyceride 75 02/20/2018 04:54 AM    CHOL/HDL Ratio 2.4 02/20/2018 04:54 AM     Lab Results   Component Value Date/Time    Glucose (POC) 150 (H) 11/04/2022 12:52 PM    Glucose (POC) 100 09/14/2015 12:14 PM     Lab Results   Component Value Date/Time    Color YELLOW/STRAW 11/05/2022 01:04 PM    Appearance CLEAR 11/05/2022 01:04 PM    Specific gravity 1.017 11/05/2022 01:04 PM    pH (UA) 6.5 11/05/2022 01:04 PM    Protein Negative 11/05/2022 01:04 PM    Glucose Negative 11/05/2022 01:04 PM    Ketone Negative 11/05/2022 01:04 PM    Bilirubin Negative 11/05/2022 01:04 PM    Urobilinogen 0.2 11/05/2022 01:04 PM    Nitrites Negative 11/05/2022 01:04 PM    Leukocyte Esterase Negative 11/05/2022 01:04 PM    Epithelial cells FEW 11/05/2022 01:04 PM    Bacteria Negative 11/05/2022 01:04 PM    WBC 0-4 11/05/2022 01:04 PM    RBC 20-50 11/05/2022 01:04 PM         Medications Reviewed:     Current Facility-Administered Medications   Medication Dose Route Frequency    furosemide (LASIX) tablet 20 mg  20 mg Oral DAILY    predniSONE (DELTASONE) tablet 30 mg  30 mg Oral DAILY WITH BREAKFAST    amiodarone (CORDARONE) tablet 100 mg  100 mg Oral BID    loperamide (IMODIUM) capsule 2 mg  2 mg Oral BID    nystatin (MYCOSTATIN) 100,000 unit/mL oral suspension 500,000 Units  500,000 Units Oral QID    magic mouthwash cpd (without sucralfate)  5 mL Oral TID PRN    cefTRIAXone (ROCEPHIN) 1 g in 0.9% sodium chloride 10 mL IV syringe  1 g IntraVENous Q24H    [Held by provider] sacubitriL-valsartan (ENTRESTO) 24-26 mg tablet 1 Tablet  1 Tablet Oral Q12H    apixaban (ELIQUIS) tablet 5 mg  5 mg Oral Q12H    metoprolol succinate (TOPROL-XL) XL tablet 25 mg  25 mg Oral DAILY    albuterol-ipratropium (DUO-NEB) 2.5 MG-0.5 MG/3 ML  3 mL Nebulization BID RT    hydrALAZINE (APRESOLINE) 20 mg/mL injection 20 mg  20 mg IntraVENous Q6H PRN    albuterol-ipratropium (DUO-NEB) 2.5 MG-0.5 MG/3 ML  3 mL Nebulization Q4H PRN    sodium chloride (NS) flush 5-40 mL  5-40 mL IntraVENous Q8H    sodium chloride (NS) flush 5-40 mL  5-40 mL IntraVENous PRN    acetaminophen (TYLENOL) tablet 650 mg  650 mg Oral Q6H PRN    Or    acetaminophen (TYLENOL) suppository 650 mg  650 mg Rectal Q6H PRN    ondansetron (ZOFRAN ODT) tablet 4 mg  4 mg Oral Q8H PRN    Or    ondansetron (ZOFRAN) injection 4 mg  4 mg IntraVENous Q6H PRN    polyethylene glycol (MIRALAX) packet 17 g  17 g Oral DAILY PRN    arformoteroL (BROVANA) neb solution 15 mcg  15 mcg Nebulization BID RT    aspirin delayed-release tablet 81 mg  81 mg Oral DAILY    atorvastatin (LIPITOR) tablet 40 mg  40 mg Oral DAILY    budesonide (PULMICORT) 500 mcg/2 ml nebulizer suspension  500 mcg Nebulization BID    oxyCODONE IR (ROXICODONE) tablet 20 mg  20 mg Oral Q6H PRN    oxyCODONE IR (ROXICODONE) tablet 10 mg  10 mg Oral Q6H PRN     ______________________________________________________________________  EXPECTED LENGTH OF STAY: 4d 19h  ACTUAL LENGTH OF STAY:          13               Mikael Ferrara MD

## 2022-11-09 NOTE — PROGRESS NOTES
Problem: Mobility Impaired (Adult and Pediatric)  Goal: *Acute Goals and Plan of Care (Insert Text)  Description: FUNCTIONAL STATUS PRIOR TO ADMISSION: Patient was independent and active without use of DME. At baseline patient uses 2-4 liters of supplemental oxygen. He reports using a portable pulse ox. When asked he reported no falls in the last 12 months. HOME SUPPORT PRIOR TO ADMISSION: The patient lived with his significant other but did not require assist.    Physical Therapy Goals  Revisited 11/4/2022, goals not met but remain appropriate so carry over         Physical Therapy Goals  Initiated 10/28/2022  1. Patient will move from supine to sit and sit to supine , scoot up and down, and roll side to side in bed with independence within 7 day(s). 2.  Patient will transfer from bed to chair and chair to bed with independence using the least restrictive device within 7 day(s). 3.  Patient will perform sit to stand with independence within 7 day(s). 4.  Patient will ambulate with independence for 300 feet with the least restrictive device within 7 day(s). 5.  Patient will ascend/descend 4 stairs with one handrail(s) with modified independence within 7 day(s). Outcome: Progressing Towards Goal    PHYSICAL THERAPY TREATMENT  Patient: Willy You (48 y.o. male)  Date: 11/9/2022  Diagnosis: Sepsis ([facility] [A41.9]  Pneumonia [J18.9]  Malignant neoplasm of lung, unspecified laterality, unspecified part of lung ([facility] [C34.90]  COPD (chronic obstructive pulmonary disease) ([facility] [J44.9] <principal problem not specified>      Precautions: Fall  Chart, physical therapy assessment, plan of care and goals were reviewed. ASSESSMENT  Patient continues with skilled PT services and is progressing towards goals. The patient is up ad payam in the room. He occasionally uses the RW due to his sciatica. Because of the sciatica which he relates is in the RLE he tends to ambulate with an antalgic wide based gait. He has never had the pain worked up but states it has been there for awhile. He is able to ambulate 100 feet with no AD with no balance loss. He scored 23/28 on the tinitti balance test which indicates a moderate fall risk. He wants to go home and considering his marked improvement in his mobility he is ok for disch home. No home needs identified. He is encouraged to get the sciatica worked up. Current Level of Function Impacting Discharge (mobility/balance): None    Other factors to consider for discharge: None         PLAN :  Patient continues to benefit from skilled intervention to address the above impairments. Continue treatment per established plan of care. to address goals. Recommendation for discharge: (in order for the patient to meet his/her long term goals)  No skilled physical therapy/ follow up rehabilitation needs identified at this time. This discharge recommendation:  Has been made in collaboration with the attending provider and/or case management    IF patient discharges home will need the following DME: patient owns DME required for discharge       SUBJECTIVE:   Patient stated I want to go home to see my kids.     OBJECTIVE DATA SUMMARY:   Critical Behavior:  Neurologic State: Alert  Orientation Level: Oriented X4  Cognition: Appropriate decision making, Appropriate for age attention/concentration, Appropriate safety awareness, Follows commands  Safety/Judgement: Decreased awareness of need for assistance  Functional Mobility Training:  Bed Mobility:     Supine to Sit: Independent  Sit to Supine: Independent           Transfers:  Sit to Stand: Independent                                Balance:  Sitting: Intact  Standing: Intact  Standing - Static: Good  Standing - Dynamic : Good  Ambulation/Gait Training:  Distance (ft): 100 Feet (ft)  Assistive Device: Gait belt  Ambulation - Level of Assistance: Independent        Gait Abnormalities: Decreased step clearance        Base of Support: Widened;Shift to left     Speed/Maite: Accelerated  Step Length: Left shortened;Right shortened                  Tinetti test:    Sitting Balance: 1  Arises: 2  Attempts to Rise: 2  Immediate Standing Balance: 2  Standing Balance: 2  Nudged: 2  Eyes Closed: 1  Turn 360 Degrees - Continuous/Discontinuous: 0  Turn 360 Degrees - Steady/Unsteady: 1  Sitting Down: 2  Balance Score: 15 Balance total score  Indication of Gait: 1  R Step Length/Height: 1  L Step Length/Height: 1  R Foot Clearance: 1  L Foot Clearance: 1  Step Symmetry: 0  Step Continuity: 0  Path: 1  Trunk: 2  Walking Time: 0  Gait Score: 8 Gait total score  Total Score: 23/28 Overall total score         Tinetti Tool Score Risk of Falls  <19 = High Fall Risk  19-24 = Moderate Fall Risk  25-28 = Low Fall Risk  Tinetti ME. Performance-Oriented Assessment of Mobility Problems in Elderly Patients. [facility]; [id number]. (Scoring Description: PT Bulletin Feb. 10, 1993)    Older adults: Jocy Suarez et al, 2009; n = 1000 [facility] elderly evaluated with ABC, ARMEN, ADL, and IADL)  · Mean ARMEN score for males aged 69-68 years = 26.21(3.40)  · Mean ARMEN score for females age 69-68 years = 25.16(4.30)  · Mean ARMEN score for males over 80 years = 23.29(6.02)  · Mean ARMEN score for females over 80 years = 17.20(8.32)                 Activity Tolerance:   Good    After treatment patient left in no apparent distress:   Sitting in chair and Call bell within reach    COMMUNICATION/COLLABORATION:   The patients plan of care was discussed with: Registered nurse.      Saleem Meraz, PT   Time Calculation: 25 mins

## 2022-11-09 NOTE — PROGRESS NOTES
Problem: Mobility Impaired (Adult and Pediatric)  Goal: *Acute Goals and Plan of Care (Insert Text)  Description: FUNCTIONAL STATUS PRIOR TO ADMISSION: Patient was independent and active without use of DME. At baseline patient uses 2-4 liters of supplemental oxygen. He reports using a portable pulse ox. When asked he reported no falls in the last 12 months. HOME SUPPORT PRIOR TO ADMISSION: The patient lived with his significant other but did not require assist.    Physical Therapy Goals  Revisited 11/4/2022, goals not met but remain appropriate so carry over         Physical Therapy Goals  Initiated 10/28/2022  1. Patient will move from supine to sit and sit to supine , scoot up and down, and roll side to side in bed with independence within 7 day(s). 2.  Patient will transfer from bed to chair and chair to bed with independence using the least restrictive device within 7 day(s). 3.  Patient will perform sit to stand with independence within 7 day(s). 4.  Patient will ambulate with independence for 300 feet with the least restrictive device within 7 day(s). 5.  Patient will ascend/descend 4 stairs with one handrail(s) with modified independence within 7 day(s). 11/9/2022 1223 by Laurie Long, PT  Outcome: Progressing Towards Goal  11/9/2022 1120 by Laurie Long, PT  Outcome: Progressing Towards Goal  PHYSICAL THERAPY TREATMENT  Patient: Juju Stacy (89 y.o. male)  Date: 11/9/2022  Diagnosis: Sepsis ([facility] [A41.9]  Pneumonia [J18.9]  Malignant neoplasm of lung, unspecified laterality, unspecified part of lung ([facility] [C34.90]  COPD (chronic obstructive pulmonary disease) ([facility] [J44.9] <principal problem not specified>      Precautions: Fall  Chart, physical therapy assessment, plan of care and goals were reviewed. ASSESSMENT  Patient continues with skilled PT services and is progressing towards goals.  The patient is in the bed resting on arrival.  He is able to come to sitting EOB independently and has good sitting balance. He sat for a bit to get his breathing settled and then he stood and took a few steps to the chair with O2 at 7L. He was able to stand for 75 seconds before he needed to sit down to rest.  PO2 was 91% with this activity. He declined standing another time but agreed to stay up in the chair for lunch. Current Level of Function Impacting Discharge (mobility/balance): low endurance. Other factors to consider for discharge: None         PLAN :  Patient continues to benefit from skilled intervention to address the above impairments. Continue treatment per established plan of care. to address goals. Recommendation for discharge: (in order for the patient to meet his/her long term goals)  Therapy 3 hours per day 5-7 days per week    This discharge recommendation:  Has been made in collaboration with the attending provider and/or case management    IF patient discharges home will need the following DME: patient owns DME required for discharge       SUBJECTIVE:   Patient stated I'd like to keep the oxygen up until I recover.     OBJECTIVE DATA SUMMARY:   Critical Behavior:  Neurologic State: Alert  Orientation Level: Oriented X4  Cognition: Appropriate decision making, Appropriate for age attention/concentration, Appropriate safety awareness, Follows commands  Safety/Judgement: Decreased awareness of need for assistance  Functional Mobility Training:  Bed Mobility:     Supine to Sit: Independent              Transfers:  Sit to Stand: Stand-by assistance  Stand to Sit: Stand-by assistance                             Balance:  Sitting: Intact  Standing: Impaired  Standing - Static: Good  Standing - Dynamic : Fair  Ambulation/Gait Training:  Distance (ft): 2 Feet (ft)  Assistive Device: Gait belt                    Base of Support: Narrowed     Speed/Maite: Slow                   Activity Tolerance:   Fair    After treatment patient left in no apparent distress: Sitting in chair and Call bell within reach    COMMUNICATION/COLLABORATION:   The patients plan of care was discussed with: Registered nurse and Rehabilitation technician.      Viviana Kiser, PT   Time Calculation: 15 mins

## 2022-11-10 LAB
ANION GAP SERPL CALC-SCNC: 6 MMOL/L (ref 5–15)
BUN SERPL-MCNC: 35 MG/DL (ref 6–20)
BUN/CREAT SERPL: 29 (ref 12–20)
CALCIUM SERPL-MCNC: 8.2 MG/DL (ref 8.5–10.1)
CHLORIDE SERPL-SCNC: 102 MMOL/L (ref 97–108)
CO2 SERPL-SCNC: 32 MMOL/L (ref 21–32)
CREAT SERPL-MCNC: 1.22 MG/DL (ref 0.7–1.3)
ERYTHROCYTE [DISTWIDTH] IN BLOOD BY AUTOMATED COUNT: 14 % (ref 11.5–14.5)
GLUCOSE SERPL-MCNC: 90 MG/DL (ref 65–100)
HCT VFR BLD AUTO: 37.3 % (ref 36.6–50.3)
HGB BLD-MCNC: 11.9 G/DL (ref 12.1–17)
MCH RBC QN AUTO: 29.9 PG (ref 26–34)
MCHC RBC AUTO-ENTMCNC: 31.9 G/DL (ref 30–36.5)
MCV RBC AUTO: 93.7 FL (ref 80–99)
NRBC # BLD: 0 K/UL (ref 0–0.01)
NRBC BLD-RTO: 0 PER 100 WBC
PLATELET # BLD AUTO: 358 K/UL (ref 150–400)
PMV BLD AUTO: 10 FL (ref 8.9–12.9)
POTASSIUM SERPL-SCNC: 5.2 MMOL/L (ref 3.5–5.1)
RBC # BLD AUTO: 3.98 M/UL (ref 4.1–5.7)
SODIUM SERPL-SCNC: 140 MMOL/L (ref 136–145)
WBC # BLD AUTO: 14.1 K/UL (ref 4.1–11.1)

## 2022-11-10 PROCEDURE — 36415 COLL VENOUS BLD VENIPUNCTURE: CPT

## 2022-11-10 PROCEDURE — 97530 THERAPEUTIC ACTIVITIES: CPT

## 2022-11-10 PROCEDURE — 74011250637 HC RX REV CODE- 250/637: Performed by: INTERNAL MEDICINE

## 2022-11-10 PROCEDURE — 74011250637 HC RX REV CODE- 250/637: Performed by: FAMILY MEDICINE

## 2022-11-10 PROCEDURE — 94640 AIRWAY INHALATION TREATMENT: CPT

## 2022-11-10 PROCEDURE — 77010033711 HC HIGH FLOW OXYGEN

## 2022-11-10 PROCEDURE — 74011636637 HC RX REV CODE- 636/637: Performed by: FAMILY MEDICINE

## 2022-11-10 PROCEDURE — 85027 COMPLETE CBC AUTOMATED: CPT

## 2022-11-10 PROCEDURE — 97116 GAIT TRAINING THERAPY: CPT

## 2022-11-10 PROCEDURE — 80048 BASIC METABOLIC PNL TOTAL CA: CPT

## 2022-11-10 PROCEDURE — 74011000250 HC RX REV CODE- 250: Performed by: INTERNAL MEDICINE

## 2022-11-10 PROCEDURE — 65660000001 HC RM ICU INTERMED STEPDOWN

## 2022-11-10 RX ORDER — MIDODRINE HYDROCHLORIDE 5 MG/1
5 TABLET ORAL
Status: DISCONTINUED | OUTPATIENT
Start: 2022-11-10 | End: 2022-11-12

## 2022-11-10 RX ADMIN — MIDODRINE HYDROCHLORIDE 5 MG: 5 TABLET ORAL at 12:57

## 2022-11-10 RX ADMIN — SODIUM CHLORIDE, PRESERVATIVE FREE 10 ML: 5 INJECTION INTRAVENOUS at 08:42

## 2022-11-10 RX ADMIN — APIXABAN 5 MG: 5 TABLET, FILM COATED ORAL at 22:05

## 2022-11-10 RX ADMIN — NYSTATIN 500000 UNITS: 100000 SUSPENSION ORAL at 18:01

## 2022-11-10 RX ADMIN — MIDODRINE HYDROCHLORIDE 5 MG: 5 TABLET ORAL at 18:01

## 2022-11-10 RX ADMIN — ARFORMOTEROL TARTRATE 15 MCG: 15 SOLUTION RESPIRATORY (INHALATION) at 07:36

## 2022-11-10 RX ADMIN — NYSTATIN 500000 UNITS: 100000 SUSPENSION ORAL at 12:57

## 2022-11-10 RX ADMIN — NYSTATIN 500000 UNITS: 100000 SUSPENSION ORAL at 08:45

## 2022-11-10 RX ADMIN — AMIODARONE HYDROCHLORIDE 100 MG: 200 TABLET ORAL at 18:01

## 2022-11-10 RX ADMIN — BUDESONIDE 500 MCG: 0.5 INHALANT RESPIRATORY (INHALATION) at 07:36

## 2022-11-10 RX ADMIN — SODIUM CHLORIDE, PRESERVATIVE FREE 10 ML: 5 INJECTION INTRAVENOUS at 15:06

## 2022-11-10 RX ADMIN — ARFORMOTEROL TARTRATE 15 MCG: 15 SOLUTION RESPIRATORY (INHALATION) at 22:02

## 2022-11-10 RX ADMIN — PREDNISONE 30 MG: 20 TABLET ORAL at 08:45

## 2022-11-10 RX ADMIN — APIXABAN 5 MG: 5 TABLET, FILM COATED ORAL at 08:45

## 2022-11-10 RX ADMIN — ASPIRIN 81 MG: 81 TABLET, COATED ORAL at 08:45

## 2022-11-10 RX ADMIN — ATORVASTATIN CALCIUM 40 MG: 40 TABLET, FILM COATED ORAL at 08:45

## 2022-11-10 RX ADMIN — SODIUM CHLORIDE, PRESERVATIVE FREE 10 ML: 5 INJECTION INTRAVENOUS at 22:05

## 2022-11-10 RX ADMIN — BUDESONIDE 500 MCG: 0.5 INHALANT RESPIRATORY (INHALATION) at 22:02

## 2022-11-10 RX ADMIN — NYSTATIN 500000 UNITS: 100000 SUSPENSION ORAL at 22:05

## 2022-11-10 NOTE — PROGRESS NOTES
Problem: Mobility Impaired (Adult and Pediatric)  Goal: *Acute Goals and Plan of Care (Insert Text)  Description: FUNCTIONAL STATUS PRIOR TO ADMISSION: Patient was independent and active without use of DME. At baseline patient uses 2-4 liters of supplemental oxygen. He reports using a portable pulse ox. When asked he reported no falls in the last 12 months. HOME SUPPORT PRIOR TO ADMISSION: The patient lived with his significant other but did not require assist.    Physical Therapy Goals  Revisited 11/4/2022, goals not met but remain appropriate so carry over         Physical Therapy Goals  Initiated 10/28/2022  1. Patient will move from supine to sit and sit to supine , scoot up and down, and roll side to side in bed with independence within 7 day(s). 2.  Patient will transfer from bed to chair and chair to bed with independence using the least restrictive device within 7 day(s). 3.  Patient will perform sit to stand with independence within 7 day(s). 4.  Patient will ambulate with independence for 300 feet with the least restrictive device within 7 day(s). 5.  Patient will ascend/descend 4 stairs with one handrail(s) with modified independence within 7 day(s). Outcome: Progressing Towards Goal   PHYSICAL THERAPY TREATMENT  Patient: Amos Dueñas (89 y.o. male)  Date: 11/10/2022  Diagnosis: Sepsis ([facility] [A41.9]  Pneumonia [J18.9]  Malignant neoplasm of lung, unspecified laterality, unspecified part of lung ([facility] [C34.90]  COPD (chronic obstructive pulmonary disease) ([facility] [J44.9] <principal problem not specified>      Precautions: Fall  Chart, physical therapy assessment, plan of care and goals were reviewed. ASSESSMENT  Patient continues with skilled PT services and is progressing towards goals. Pt remains limited primarily by decreased activity tolerance. He was received on 4 liters of 02 which was increased to 6 liters for activity.  He was able to amb 12 feet on 6 liters of 02 with stable Sp02 in the mid 90s and then when he sat to rest he desat to 85% and was unable to get his Sp02 to rise beyond 87% so increased to 7 liters and after a few minutes of seated rest and pursed lip breathing his Sp02 jenifer to the low 90s. Reviewed use of incentive spirometer. Was able to decreased his 02 to 4 liters with stable Sp02 in the low 90s and then it dipped to 88%. Increased it to 6 liters and it jenifer to 90% and left him up to the chair on the 6 liters. He remains far from reported baseline and recommend rehab with a pulmonary focus. Vitals:     11/10/22 1019 11/10/22 1020 11/10/22 1027   BP:  (!) 95/52  (!) 96/50   BP 1 Location:  Left upper arm  Left upper arm   BP Patient Position:  Semi fowlers; At rest Semi fowlers; At rest Sitting  Comment: EOB   Pulse:  70  77   Temp:       Resp:       Height:       Weight:       SpO2: on high flow nasal cannula  (!) 89% on 4 liters of 02 91% on 6 liters of 02 93% on 6 liters of 02        Current Level of Function Impacting Discharge (mobility/balance): provided contact guard to stand by assist    Other factors to consider for discharge: : medically complex: lung CA (s/p lobectomy), asthma, COPD, and CHF         PLAN :  Patient continues to benefit from skilled intervention to address the above impairments. Continue treatment per established plan of care. to address goals. Recommendation for discharge: (in order for the patient to meet his/her long term goals)  Therapy 3 hours per day 5-7 days per week, if not then SNF    This discharge recommendation:  A follow-up discussion with the attending provider and/or case management is planned    IF patient discharges home will need the following DME: to be determined (TBD)       SUBJECTIVE:   Patient stated that he was going to need to have the oxygen turned up for activity.     OBJECTIVE DATA SUMMARY:   Chart checked, pt cleared by nursing  Critical Behavior:  Neurologic State: Alert  Orientation Level: Oriented X4  Cognition: Appropriate for age attention/concentration  Safety/Judgement: Decreased awareness of need for assistance  Functional Mobility Training:  Bed Mobility:     Supine to Sit: Modified independent              Transfers:  Sit to Stand: Stand-by assistance  Stand to Sit: Stand-by assistance                             Balance:  Sitting: Intact; Without support  Standing: Impaired  Standing - Static: Good  Standing - Dynamic : Good  Ambulation/Gait Training:  Distance (ft): 12 Feet (ft)  Assistive Device: Gait belt  Ambulation - Level of Assistance: Contact guard assistance        Gait Abnormalities: Decreased step clearance;Trunk sway increased        Base of Support: Narrowed     Speed/Maite: Slow  Step Length: Left shortened;Right shortened                    Stairs: Therapeutic Exercises:   Pursed lip breathing and use of incentive spirometer   Pain Rating:  None rated    Activity Tolerance:   Fair, desaturates with exertion and requires oxygen, requires rest breaks, and see assessment    After treatment patient left in no apparent distress:   Sitting in chair and Call bell within reach    COMMUNICATION/COLLABORATION:   The patients plan of care was discussed with: Registered nurseSunny Wiseman   Time Calculation: 27 mins

## 2022-11-10 NOTE — PROGRESS NOTES
[facility] Adult  Hospitalist Group                                                                                          Hospitalist Progress Note  Malgorzata Mae MD  Answering service: [phone] from in house phone        Date of Service:  11/10/2022  NAME:  Mikki Andres  :  1950  MRN:  [id number]      Admission Summary:     Patient presents with acute on chronic respiratory failure due to COPD and pneumonia, also with new onset atrial fibrillation with new onset systolic heart failure. Interval history / Subjective:     Patient doing well, no acute complaint.      Assessment & Plan:     Acute respiratory failure  -Acute hypoxic and hypercapnic respiratory failure due to pneumonia and COPD  -Patient with baseline chronic respiratory failure due to COPD, as well as s/p lobectomy due to lung cancer, uses 3 L of oxygen at baseline  -Patient initially requiring BiPAP, weaning oxygen, on 4 L today    Sepsis  -Patient with tachycardia and leukocytosis, elevated procalcitonin level  -Sepsis due to multilobar pneumonia  -Sepsis reassessment completed, monitor hemodynamics    Multilobar pneumonia  -Blood cultures so far negative, COVID-negative, respiratory culture negative, Legionella negative, MRSA negative  -Patient has completed treatment with Rocephin and Zithromax    Acute exacerbation of COPD  -Continue bronchodilators, breathing treatment, Solu-Medrol was changed to prednisone  -Previously evaluated by pulmonology, weaning oxygen to 4 L today, at baseline patient uses 3 L    Paroxysmal atrial fibrillation  -Patient remaining in sinus rhythm  -Amiodarone drip was changed to p.o. amiodarone, on metoprolol and on Eliquis for anticoagulation    Acute systolic CHF  -New onset systolic CHF, NYHA class IV, echo shows EF of 25 to 30%  -Limited use of guideline directed medical therapy due to hypotension  -Previously evaluated by cardiology    Hypotension  -Patient has been treated with albumin, add midodrine for blood pressure support  -Continue monitor hemodynamic    PENNY  -Likely multifactorial including sepsis  -PENNY is resolved    Dyslipidemia  -Continue statin    Non-small cell lung cancer  -History of right lower lobe lobectomy at [facility] in 2009  -Follow-up outpatient     Code status: DNR  Prophylaxis: [address] discussed with: Patient  Anticipated Disposition: 24 to 48 hours, plan for rehab when oxygenation improved as well as hemodynamically more stable. Hospital Problems  Date Reviewed: 1/9/2019            Codes Class Noted POA    Moderate protein-calorie malnutrition ([facility] ICD-10-CM: E44.0  ICD-9-CM: 263.0  10/27/2022 Yes        COPD (chronic obstructive pulmonary disease) ([facility] ICD-10-CM: J44.9  ICD-9-CM: 835  10/26/2022 Unknown        Pneumonia ICD-10-CM: J18.9  ICD-9-CM: 486  10/26/2022 Unknown        Malignant neoplasm of lung, unspecified laterality, unspecified part of lung ([facility] ICD-10-CM: C34.90  ICD-9-CM: 162.9  10/26/2022 Unknown        Sepsis ([facility] ICD-10-CM: A41.9  ICD-9-CM: 038.9, 995.91  10/26/2022 Unknown             Review of Systems:   A comprehensive review of systems was negative except for that written in the HPI. Vital Signs:    Last 24hrs VS reviewed since prior progress note. Most recent are:  Visit Vitals  BP (!) 95/55 (BP 1 Location: Left upper arm, BP Patient Position: At rest)   Pulse (!) 59   Temp 97.8 °F (36.6 °C)   Resp 23   Ht 5' 11\" (1.803 m)   Wt (P) 60.2 kg (132 lb 11.5 oz)   SpO2 96%   BMI (P) 18.51 kg/m²       No intake or output data in the 24 hours ending 11/10/22 0851     Physical Examination:     I had a face to face encounter with this patient and independently examined them on 11/10/2022 as outlined below:          Constitutional:  No acute distress, cooperative, pleasant    ENT:  Oral mucosa moist, oropharynx benign. Resp:  CTA bilaterally. No wheezing/rhonchi/rales. No accessory muscle use.     CV:  Regular rhythm, normal rate, no murmurs, gallops, rubs    GI:  Soft, non distended, non tender. normoactive bowel sounds, no hepatosplenomegaly     Musculoskeletal:  No edema, warm, 2+ pulses throughout    Neurologic:  Moves all extremities. AAOx3, CN II-XII reviewed            Data Review:    Review and/or order of clinical lab test      Labs:     Recent Labs     11/10/22  0215 11/09/22  0517   WBC 14.1* 17.3*   HGB 11.9* 13.4   HCT 37.3 41.4    381     Recent Labs     11/10/22  0215 11/09/22  0517 11/08/22  0353    139 140   K 5.2* 5.2* 4.9    101 104   CO2 32 34* 34*   BUN 35* 32* 32*   CREA 1.22 1.10 0.99   GLU 90 84 81   CA 8.2* 8.3* 8.1*   MG  --   --  2.4     No results for input(s): ALT, AP, TBIL, TBILI, TP, ALB, GLOB, GGT, AML, LPSE in the last 72 hours. No lab exists for component: SGOT, GPT, AMYP, HLPSE  No results for input(s): INR, PTP, APTT, INREXT in the last 72 hours. No results for input(s): FE, TIBC, PSAT, FERR in the last 72 hours. No results found for: FOL, RBCF   No results for input(s): PH, PCO2, PO2 in the last 72 hours. No results for input(s): CPK, CKNDX, TROIQ in the last 72 hours.     No lab exists for component: CPKMB  Lab Results   Component Value Date/Time    Cholesterol, total 106 02/20/2018 04:54 AM    HDL Cholesterol 45 02/20/2018 04:54 AM    LDL, calculated 46 02/20/2018 04:54 AM    Triglyceride 75 02/20/2018 04:54 AM    CHOL/HDL Ratio 2.4 02/20/2018 04:54 AM     Lab Results   Component Value Date/Time    Glucose (POC) 150 (H) 11/04/2022 12:52 PM    Glucose (POC) 100 09/14/2015 12:14 PM     Lab Results   Component Value Date/Time    Color YELLOW/STRAW 11/05/2022 01:04 PM    Appearance CLEAR 11/05/2022 01:04 PM    Specific gravity 1.017 11/05/2022 01:04 PM    pH (UA) 6.5 11/05/2022 01:04 PM    Protein Negative 11/05/2022 01:04 PM    Glucose Negative 11/05/2022 01:04 PM    Ketone Negative 11/05/2022 01:04 PM    Bilirubin Negative 11/05/2022 01:04 PM    Urobilinogen 0.2 11/05/2022 01:04 PM    Nitrites Negative 11/05/2022 01:04 PM    Leukocyte Esterase Negative 11/05/2022 01:04 PM    Epithelial cells FEW 11/05/2022 01:04 PM    Bacteria Negative 11/05/2022 01:04 PM    WBC 0-4 11/05/2022 01:04 PM    RBC 20-50 11/05/2022 01:04 PM         Medications Reviewed:     Current Facility-Administered Medications   Medication Dose Route Frequency    albuterol-ipratropium (DUO-NEB) 2.5 MG-0.5 MG/3 ML  3 mL Nebulization Q4H PRN    loperamide (IMODIUM) capsule 2 mg  2 mg Oral Q4H PRN    [Held by provider] furosemide (LASIX) tablet 20 mg  20 mg Oral DAILY    predniSONE (DELTASONE) tablet 30 mg  30 mg Oral DAILY WITH BREAKFAST    amiodarone (CORDARONE) tablet 100 mg  100 mg Oral BID    nystatin (MYCOSTATIN) 100,000 unit/mL oral suspension 500,000 Units  500,000 Units Oral QID    magic mouthwash cpd (without sucralfate)  5 mL Oral TID PRN    [Held by provider] sacubitriL-valsartan (ENTRESTO) 24-26 mg tablet 1 Tablet  1 Tablet Oral Q12H    apixaban (ELIQUIS) tablet 5 mg  5 mg Oral Q12H    metoprolol succinate (TOPROL-XL) XL tablet 25 mg  25 mg Oral DAILY    hydrALAZINE (APRESOLINE) 20 mg/mL injection 20 mg  20 mg IntraVENous Q6H PRN    sodium chloride (NS) flush 5-40 mL  5-40 mL IntraVENous Q8H    sodium chloride (NS) flush 5-40 mL  5-40 mL IntraVENous PRN    acetaminophen (TYLENOL) tablet 650 mg  650 mg Oral Q6H PRN    Or    acetaminophen (TYLENOL) suppository 650 mg  650 mg Rectal Q6H PRN    ondansetron (ZOFRAN ODT) tablet 4 mg  4 mg Oral Q8H PRN    Or    ondansetron (ZOFRAN) injection 4 mg  4 mg IntraVENous Q6H PRN    polyethylene glycol (MIRALAX) packet 17 g  17 g Oral DAILY PRN    arformoteroL (BROVANA) neb solution 15 mcg  15 mcg Nebulization BID RT    aspirin delayed-release tablet 81 mg  81 mg Oral DAILY    atorvastatin (LIPITOR) tablet 40 mg  40 mg Oral DAILY    budesonide (PULMICORT) 500 mcg/2 ml nebulizer suspension  500 mcg Nebulization BID    oxyCODONE IR (ROXICODONE) tablet 20 mg  20 mg Oral Q6H PRN    oxyCODONE IR (ROXICODONE) tablet 10 mg  10 mg Oral Q6H PRN     ______________________________________________________________________  EXPECTED LENGTH OF STAY: 4d 19h  ACTUAL LENGTH OF STAY:          15                 Kathryn Hand MD

## 2022-11-10 NOTE — PROGRESS NOTES
Transition Plan of Care  RUR 11%-Low  Disposition-accepted to [facility]. Authorization started this morning.

## 2022-11-10 NOTE — PROGRESS NOTES
[facility]  Hospitalist Group                                                                                          Hospitalist Progress Note  Bart Salvador MD  Answering service: [phone] OR [phone] from in house phone        NAME:  Tawnya Richter  :  1950  MRN:  [id number]      Admission Summary:   Tawnya Richter is a 71 y.o. male with a PMHx of lung cancer, COPD, chronic hypoxic respiratory failure, on home O2 at 3 liters/min, who presents with Chief Complaint of Shortness of breath/ Dyspnea on exertion, progressively worsening for the past 3 days. Patient denies any fevers or chills. He used increased doses of inhalers, but it did not help. He called 911. Upon EMS arrival, his O2 saturation was 79%. Patient was placed on a BIPAP upon arrival.      Interval history / Subjective: Follow-up for respiratory failure/new onset PAF and heart failure  Weaning oxygen as tolerated  Some low BP today- reduced amidarone dose  and holding entresto       Assessment & Plan:     Acute hypercapnic respiratory failure due to COPD pneumonia  Acute on chronic hypoxic respiratory failure due to COPD  Sepsis due to pneumonia multilobar pneumonia  --Initially patient was on BiPAP now on high flow nasal cannula down to 5L  --Continue nebs, steroid, antibiotics - changed to PO prednisone    PAF: new onset evening of 10/27. In the setting of respiratory failure  HFrEF newly diagnosed EF 25-30% unclear etiology  --Received amio gtt continue oral amiodarone- reduced dose  -TTE EF 25-30% - cont diuretics if tolerated by BP- currently low   --on eliquis,  holding Entresto due to low BP  Cardiology notes reviewed    Malignant neoplasm of the lung status post lobectomy for non-small cell lung cancer  --Follow-up as an outpatient  --History of right lung lobectomy    PENNY POA --monitor avoid nephrotoxin    Oral ulcers- nystatin and magic mouthwash ordered    Hyperlipidemia continue statin    Uncontrolled hypertension--resolved and now issues with hypotension    Left arm swelling- neg for DVT- already on eliquis-   likely due to persistent BP cuff positioning  Monitor with removal of BP cuff- improving    Code status: DNR  DVT prophylaxis: eliquis    Care Plan discussed with: Patient/Family and Nurse  Anticipated Disposition: Home w/Family  Anticipated Discharge: 24 hours to 48 hours         Hospital Problems  Date Reviewed: 1/9/2019            Codes Class Noted POA    Moderate protein-calorie malnutrition ([facility] ICD-10-CM: E44.0  ICD-9-CM: 263.0  10/27/2022 Yes        COPD (chronic obstructive pulmonary disease) ([facility] ICD-10-CM: J44.9  ICD-9-CM: 496  10/26/2022 Unknown        Pneumonia ICD-10-CM: J18.9  ICD-9-CM: 486  10/26/2022 Unknown        Malignant neoplasm of lung, unspecified laterality, unspecified part of lung ([facility] ICD-10-CM: C34.90  ICD-9-CM: 162.9  10/26/2022 Unknown        Sepsis ([facility] ICD-10-CM: A41.9  ICD-9-CM: 038.9, 995.91  10/26/2022 Unknown       Review of Systems:   A comprehensive review of systems was negative. Vital Signs:    Last 24hrs VS reviewed since prior progress note.  Most recent are:  Visit Vitals  BP (!) 85/49   Pulse 70   Temp 98.1 °F (36.7 °C)   Resp 19   Ht 5' 11\" (1.803 m)   Wt 60.8 kg (134 lb 0.6 oz)   SpO2 98%   BMI 18.69 kg/m²   Patient Vitals for the past 24 hrs:   Temp Pulse Resp BP SpO2   11/09/22 2205 -- 70 -- -- --   11/09/22 2134 -- -- -- -- 98 %   11/09/22 2104 -- 74 19 (!) 85/49 94 %   11/09/22 2103 -- 76 27 (!) 83/55 93 %   11/09/22 2004 -- 73 -- -- --   11/09/22 1900 98.1 °F (36.7 °C) 66 19 (!) 83/54 95 %   11/09/22 1800 -- 66 -- -- --   11/09/22 1517 98 °F (36.7 °C) 67 20 (!) 92/47 92 %   11/09/22 1400 -- 65 -- -- --   11/09/22 1200 -- 70 -- -- --   11/09/22 1126 97.6 °F (36.4 °C) 73 19 (!) 89/50 90 %   11/09/22 1000 -- 74 -- -- --   11/09/22 0932 -- 69 -- (!) 93/58 --   11/09/22 0804 -- -- -- -- 94 %   11/09/22 0729 97.8 °F (36.6 °C) 67 23 90/62 95 %   11/09/22 0550 -- 62 -- -- --   11/09/22 0513 -- -- -- 106/62 --   11/09/22 0511 97.9 °F (36.6 °C) 67 21 106/62 98 %   11/09/22 0400 -- 61 -- -- --   11/09/22 0200 -- 61 -- -- --   11/08/22 2233 97.8 °F (36.6 °C) 75 25 (!) 96/57 94 %          No intake or output data in the 24 hours ending 11/09/22 2217       Physical Examination:           General : alert x 3, awake, no acute distress, resting in bed, pleasant   HEENT: PEERL, EOMI, moist mucus membrane, TM clear  Neck: supple, no JVD, no meningeal signs  Chest: Coarse breath sounds, scattered wheezing  CVS: S1 S2 heard, Capillary refill less than 2 seconds  Abd: soft/ Non tender, non distended  Ext: no clubbing, no cyanosis, significant edema of left arm, ,diffuse muscle wasting  Neuro/Psych: pleasant mood and affect,  sensory grossly within normal limit,  severe weakness of bilat UE and LE  Skin: warm     Data Review:    I personally reviewed  Image and labs      Labs:     Recent Labs     11/09/22 0517 11/08/22  0353   WBC 17.3* 16.6*   HGB 13.4 14.0   HCT 41.4 44.2    342       Recent Labs     11/09/22 0517 11/08/22  0353 11/07/22  0431    140 140   K 5.2* 4.9 4.8    104 104   CO2 34* 34* 34*   BUN 32* 32* 32*   CREA 1.10 0.99 0.91   GLU 84 81 84   CA 8.3* 8.1* 7.7*   MG  --  2.4  --        No results for input(s): ALT, AP, TBIL, TBILI, TP, ALB, GLOB, GGT, AML, LPSE in the last 72 hours. No lab exists for component: SGOT, GPT, AMYP, HLPSE    No results for input(s): INR, PTP, APTT, INREXT, INREXT in the last 72 hours. No results for input(s): FE, TIBC, PSAT, FERR in the last 72 hours. No results found for: FOL, RBCF   No results for input(s): PH, PCO2, PO2 in the last 72 hours. No results for input(s): CPK, CKNDX, TROIQ in the last 72 hours.     No lab exists for component: CPKMB  Lab Results   Component Value Date/Time    Cholesterol, total 106 02/20/2018 04:54 AM    HDL Cholesterol 45 02/20/2018 04:54 AM    LDL, calculated 46 02/20/2018 04:54 AM    Triglyceride 75 02/20/2018 04:54 AM    CHOL/HDL Ratio 2.4 02/20/2018 04:54 AM     Lab Results   Component Value Date/Time    Glucose (POC) 150 (H) 11/04/2022 12:52 PM    Glucose (POC) 100 09/14/2015 12:14 PM     Lab Results   Component Value Date/Time    Color YELLOW/STRAW 11/05/2022 01:04 PM    Appearance CLEAR 11/05/2022 01:04 PM    Specific gravity 1.017 11/05/2022 01:04 PM    pH (UA) 6.5 11/05/2022 01:04 PM    Protein Negative 11/05/2022 01:04 PM    Glucose Negative 11/05/2022 01:04 PM    Ketone Negative 11/05/2022 01:04 PM    Bilirubin Negative 11/05/2022 01:04 PM    Urobilinogen 0.2 11/05/2022 01:04 PM    Nitrites Negative 11/05/2022 01:04 PM    Leukocyte Esterase Negative 11/05/2022 01:04 PM    Epithelial cells FEW 11/05/2022 01:04 PM    Bacteria Negative 11/05/2022 01:04 PM    WBC 0-4 11/05/2022 01:04 PM    RBC 20-50 11/05/2022 01:04 PM         Medications Reviewed:     Current Facility-Administered Medications   Medication Dose Route Frequency    albuterol-ipratropium (DUO-NEB) 2.5 MG-0.5 MG/3 ML  3 mL Nebulization Q4H PRN    loperamide (IMODIUM) capsule 2 mg  2 mg Oral Q4H PRN    [Held by provider] furosemide (LASIX) tablet 20 mg  20 mg Oral DAILY    predniSONE (DELTASONE) tablet 30 mg  30 mg Oral DAILY WITH BREAKFAST    amiodarone (CORDARONE) tablet 100 mg  100 mg Oral BID    nystatin (MYCOSTATIN) 100,000 unit/mL oral suspension 500,000 Units  500,000 Units Oral QID    magic mouthwash cpd (without sucralfate)  5 mL Oral TID PRN    [Held by provider] sacubitriL-valsartan (ENTRESTO) 24-26 mg tablet 1 Tablet  1 Tablet Oral Q12H    apixaban (ELIQUIS) tablet 5 mg  5 mg Oral Q12H    metoprolol succinate (TOPROL-XL) XL tablet 25 mg  25 mg Oral DAILY    hydrALAZINE (APRESOLINE) 20 mg/mL injection 20 mg  20 mg IntraVENous Q6H PRN    sodium chloride (NS) flush 5-40 mL  5-40 mL IntraVENous Q8H    sodium chloride (NS) flush 5-40 mL  5-40 mL IntraVENous PRN    acetaminophen (TYLENOL) tablet 650 mg  650 mg Oral Q6H PRN    Or    acetaminophen (TYLENOL) suppository 650 mg  650 mg Rectal Q6H PRN    ondansetron (ZOFRAN ODT) tablet 4 mg  4 mg Oral Q8H PRN    Or    ondansetron (ZOFRAN) injection 4 mg  4 mg IntraVENous Q6H PRN    polyethylene glycol (MIRALAX) packet 17 g  17 g Oral DAILY PRN    arformoteroL (BROVANA) neb solution 15 mcg  15 mcg Nebulization BID RT    aspirin delayed-release tablet 81 mg  81 mg Oral DAILY    atorvastatin (LIPITOR) tablet 40 mg  40 mg Oral DAILY    budesonide (PULMICORT) 500 mcg/2 ml nebulizer suspension  500 mcg Nebulization BID    oxyCODONE IR (ROXICODONE) tablet 20 mg  20 mg Oral Q6H PRN    oxyCODONE IR (ROXICODONE) tablet 10 mg  10 mg Oral Q6H PRN     ______________________________________________________________________  EXPECTED LENGTH OF STAY: 4d 19h  ACTUAL LENGTH OF STAY:          14               Ronaldo Felty, MD

## 2022-11-10 NOTE — PROGRESS NOTES
Comprehensive Nutrition Assessment    Type and Reason for Visit: Reassess    Nutrition Recommendations/Plan:     Continue regular diet with high kcal/high protein ONS BID    Obtain standing scale weight       Malnutrition Assessment:  Malnutrition Status:  Severe malnutrition (11/10/22 1530)    Context:  Acute illness     Findings of the 6 clinical characteristics of malnutrition:   Energy Intake:  Unable to assess  Weight Loss:  Greater than 2% over 1 week     Body Fat Loss:  Mild body fat loss, Orbital, Triceps, Buccal region   Muscle Mass Loss: Moderate muscle mass loss, Temples (temporalis), Clavicles (pectoralis & deltoids)  Fluid Accumulation:  No significant fluid accumulation,     Strength:  Not performed        Nutrition Assessment:    Past Medical History:   Diagnosis Date    Asthma     Cancer ([facility]     Lung Cancer    Chronic obstructive pulmonary disease (HCC)     Chronic pain     High cholesterol     Lung cancer ([facility]        Pt admitted with Sepsis ([facility] [A41.9]  Pneumonia [J18.9]  Malignant neoplasm of lung, unspecified laterality, unspecified part of lung ([facility] [C34.90]  COPD (chronic obstructive pulmonary disease) ([facility] [J44.9]      11/10: follow-up. Weight consistently trending down. O2 requirements improving, down to 4 L NC. Noted newly dx CHF with EF 25-30%, unclear etiology. Lasix being held d/t low BP. Midodrine started. Scheduled imodium for several days d/t diarrhea, now PRN. Also with mouth ulcers- nystatin and Magic Mouthwash ordered. Weight could be down d/t diarrhea and diuresis. K+ trending high since lasix held. Temporal wasting seems worse - but already marked as severe. Fat loss was documented as mild since he had reported stable weight, but given wt loss, this is likely worse. Pt did not seem surprised that weight was down, but agrees would like to see standing scale weight when up with therapy.   He states he continues to eat fairly well at most meals, not doing as many ONS. Will decrease to BID. Last 3 Recorded Weights in this Encounter    11/08/22 0353 11/09/22 0729 11/10/22 0207   Weight: 62.5 kg (137 lb 12.6 oz) 60.8 kg (134 lb 0.6 oz) (P) 60.2 kg (132 lb 11.5 oz)           11/3: follow-up. Visited with pt in room. He states his appetite is better and eating more. Drinking 2-3 Ensure/day. Denied any complaints. Currently on 35 L high flow. Weight appears to be stable, but difficult to assess with bed scales and up and down readings. Labs OK. Looks like he is getting PRN imodium frequently, so ?if having diarrhea. Last BM documented as formed. Last 3 Recorded Weights in this Encounter    10/30/22 0412 10/31/22 0932 11/03/22 0314   Weight: 65.5 kg (144 lb 6.4 oz) 67 kg (147 lb 11.3 oz) 65 kg (143 lb 4.8 oz)           10/27: Pt screened d/t low BMI for age. Visited in room, on high flow. States his appetite/ intake has been poor for past few days, but unsure of any weight loss. When I told him weight listed at 140 lb, he states \"that's about where I stay\" - confirmed with bed weight, but he was 145 lb at MD visit in August per review of chart. Wt fluctuates per hx below and it is unclear over how long this wt loss occurred. He states he drinks Ensure when he can afford them. Likes chocolate. Would appreciate them coming with meals while admitted. Denies chewing/ swallowing difficulty. Pt does have significant clavicle, temporal wasting and orbital fat loss. However, given weight stability, suspect malnutrition is chronic and therefore, more moderate in nature. However, with current illness and decreased PO intake, could meet acute, severe PCM criteria (he is certainly at risk he doesn't currently). Pt has baseline COPD, on 3 L O2 at home. Also with hx of NSCLC s/p RLL lobectomy in 2009. Unclear if active issue. Regardless, COPD itself puts pt at risk for higher EEN.       Nutritionally Significant Medications:  Lipitor, lasix, Nystatin QID, midodrine, prednisone  PRN: imodium, Magic Mouthwash    Estimated Daily Nutrient Needs:  Energy Requirements Based On: Kcal/kg  Weight Used for Energy Requirements: Current (63.5 kg)  Energy (kcal/day): 2200+ (35+ kcal/kg)  Weight Used for Protein Requirements: Current  Protein (g/day): 110 (1.7+ gm/kg or at least 20%)     Fluid (ml/day): 1900 (30 mL/kg)    Nutrition Related Findings:   Edema: No data recorded    Last BM: 11/10/22, Soft    Wounds: None      Current Nutrition Therapies:  Diet: regular  Supplements: Ensure Plus TID with meals  Meal intake: No data found. Supplement intake: No data found. Anthropometric Measures:  Height: 5' 11\" (180.3 cm)  Ideal Body Weight (IBW): 172 lbs (78 kg)     Current Body Wt:  60.2 kg (132 lb 11.5 oz), 77.2 % IBW.  Bed scale  Current BMI (kg/m2): 18.5  Usual Body Weight: 65.8 kg (145 lb)  % Weight Change (Calculated): -3.4  Weight Adjustment: No adjustment                 BMI Category: Underweight (BMI less than 22) age over 72    Wt Readings from Last 15 Encounters:   10/26/22 63.5 kg (140 lb)   01/12/20 68.2 kg (150 lb 4.8 oz)   01/13/19 66.8 kg (147 lb 4.8 oz)   11/26/18 71.5 kg (157 lb 10.1 oz)   06/05/18 66.7 kg (147 lb)   06/05/18 66.7 kg (147 lb)   04/10/18 62.3 kg (137 lb 4.8 oz)   02/23/18 57.6 kg (126 lb 15.8 oz)   11/28/16 71.2 kg (157 lb)   10/05/15 66.2 kg (146 lb)   09/15/15 66.5 kg (146 lb 11.2 oz)   08/11/15 68 kg (149 lb 14.6 oz)     Last 3 Recorded Weights in this Encounter    10/30/22 0412 10/31/22 0932 11/03/22 0314   Weight: 65.5 kg (144 lb 6.4 oz) 67 kg (147 lb 11.3 oz) 65 kg (143 lb 4.8 oz)           Nutrition Diagnosis:   Inadequate oral intake related to impaired respiratory function, increased demand for energy/nutrients as evidenced by BMI, Criteria as identified in malnutrition assessment    Nutrition Interventions:   Food and/or Nutrient Delivery: Continue current diet, Continue oral nutrition supplement  Nutrition Education/Counseling: No recommendations at this time  Coordination of Nutrition Care: Continue to monitor while inpatient       Goals:     Goals: other (specify)  Specify Other Goals: continued PO intake >/=50% of meals with 2-3 ONS daily over next 7 days    Nutrition Monitoring and Evaluation:   Behavioral-Environmental Outcomes: None identified  Food/Nutrient Intake Outcomes: Food and nutrient intake, Supplement intake  Physical Signs/Symptoms Outcomes: Biochemical data, Meal time behavior, Nutrition focused physical findings, Weight, GI status    Discharge Planning:    Continue current diet, Continue oral nutrition supplement    Recent Labs     11/10/22  0215 11/09/22  0517 22  0353   GLU 90 84 81   BUN 35* 32* 32*   CREA 1.22 1.10 0.99    139 140   K 5.2* 5.2* 4.9    101 104   CO2 32 34* 34*   CA 8.2* 8.3* 8.1*   MG  --   --  2.4     Recent Labs     11/10/22  0215 11/09/22  0517   WBC 14.1* 17.3*   HGB 11.9* 13.4   HCT 37.3 41.4    381         Meghan R Romelle Apgar, RD  Available via [employer] ([employer])

## 2022-11-11 LAB
ANION GAP SERPL CALC-SCNC: 2 MMOL/L (ref 5–15)
BUN SERPL-MCNC: 29 MG/DL (ref 6–20)
BUN/CREAT SERPL: 27 (ref 12–20)
CALCIUM SERPL-MCNC: 8.2 MG/DL (ref 8.5–10.1)
CHLORIDE SERPL-SCNC: 105 MMOL/L (ref 97–108)
CO2 SERPL-SCNC: 31 MMOL/L (ref 21–32)
CREAT SERPL-MCNC: 1.06 MG/DL (ref 0.7–1.3)
ERYTHROCYTE [DISTWIDTH] IN BLOOD BY AUTOMATED COUNT: 14.1 % (ref 11.5–14.5)
GLUCOSE SERPL-MCNC: 81 MG/DL (ref 65–100)
HCT VFR BLD AUTO: 37.7 % (ref 36.6–50.3)
HGB BLD-MCNC: 12.1 G/DL (ref 12.1–17)
MCH RBC QN AUTO: 30 PG (ref 26–34)
MCHC RBC AUTO-ENTMCNC: 32.1 G/DL (ref 30–36.5)
MCV RBC AUTO: 93.3 FL (ref 80–99)
NRBC # BLD: 0 K/UL (ref 0–0.01)
NRBC BLD-RTO: 0 PER 100 WBC
PLATELET # BLD AUTO: 374 K/UL (ref 150–400)
PMV BLD AUTO: 9.8 FL (ref 8.9–12.9)
POTASSIUM SERPL-SCNC: 4.6 MMOL/L (ref 3.5–5.1)
RBC # BLD AUTO: 4.04 M/UL (ref 4.1–5.7)
SODIUM SERPL-SCNC: 138 MMOL/L (ref 136–145)
WBC # BLD AUTO: 14.6 K/UL (ref 4.1–11.1)

## 2022-11-11 PROCEDURE — 65660000001 HC RM ICU INTERMED STEPDOWN

## 2022-11-11 PROCEDURE — 74011000250 HC RX REV CODE- 250: Performed by: INTERNAL MEDICINE

## 2022-11-11 PROCEDURE — 74011250637 HC RX REV CODE- 250/637: Performed by: INTERNAL MEDICINE

## 2022-11-11 PROCEDURE — 94640 AIRWAY INHALATION TREATMENT: CPT

## 2022-11-11 PROCEDURE — 85027 COMPLETE CBC AUTOMATED: CPT

## 2022-11-11 PROCEDURE — 74011250637 HC RX REV CODE- 250/637: Performed by: FAMILY MEDICINE

## 2022-11-11 PROCEDURE — 74011636637 HC RX REV CODE- 636/637: Performed by: FAMILY MEDICINE

## 2022-11-11 PROCEDURE — 97530 THERAPEUTIC ACTIVITIES: CPT

## 2022-11-11 PROCEDURE — 36415 COLL VENOUS BLD VENIPUNCTURE: CPT

## 2022-11-11 PROCEDURE — 80048 BASIC METABOLIC PNL TOTAL CA: CPT

## 2022-11-11 PROCEDURE — 77010033711 HC HIGH FLOW OXYGEN

## 2022-11-11 PROCEDURE — 97116 GAIT TRAINING THERAPY: CPT

## 2022-11-11 RX ADMIN — APIXABAN 5 MG: 5 TABLET, FILM COATED ORAL at 09:52

## 2022-11-11 RX ADMIN — AMIODARONE HYDROCHLORIDE 100 MG: 200 TABLET ORAL at 18:19

## 2022-11-11 RX ADMIN — APIXABAN 5 MG: 5 TABLET, FILM COATED ORAL at 22:21

## 2022-11-11 RX ADMIN — NYSTATIN 500000 UNITS: 100000 SUSPENSION ORAL at 09:55

## 2022-11-11 RX ADMIN — ASPIRIN 81 MG: 81 TABLET, COATED ORAL at 09:52

## 2022-11-11 RX ADMIN — ARFORMOTEROL TARTRATE 15 MCG: 15 SOLUTION RESPIRATORY (INHALATION) at 20:06

## 2022-11-11 RX ADMIN — SODIUM CHLORIDE, PRESERVATIVE FREE 10 ML: 5 INJECTION INTRAVENOUS at 22:21

## 2022-11-11 RX ADMIN — BUDESONIDE 500 MCG: 0.5 INHALANT RESPIRATORY (INHALATION) at 20:06

## 2022-11-11 RX ADMIN — MIDODRINE HYDROCHLORIDE 5 MG: 5 TABLET ORAL at 09:58

## 2022-11-11 RX ADMIN — BUDESONIDE 500 MCG: 0.5 INHALANT RESPIRATORY (INHALATION) at 07:27

## 2022-11-11 RX ADMIN — ATORVASTATIN CALCIUM 40 MG: 40 TABLET, FILM COATED ORAL at 09:52

## 2022-11-11 RX ADMIN — MIDODRINE HYDROCHLORIDE 5 MG: 5 TABLET ORAL at 18:19

## 2022-11-11 RX ADMIN — SODIUM CHLORIDE, PRESERVATIVE FREE 10 ML: 5 INJECTION INTRAVENOUS at 05:44

## 2022-11-11 RX ADMIN — ARFORMOTEROL TARTRATE 15 MCG: 15 SOLUTION RESPIRATORY (INHALATION) at 07:27

## 2022-11-11 RX ADMIN — SODIUM CHLORIDE, PRESERVATIVE FREE 10 ML: 5 INJECTION INTRAVENOUS at 18:19

## 2022-11-11 RX ADMIN — METOPROLOL SUCCINATE 25 MG: 25 TABLET, FILM COATED, EXTENDED RELEASE ORAL at 09:52

## 2022-11-11 RX ADMIN — MIDODRINE HYDROCHLORIDE 5 MG: 5 TABLET ORAL at 14:30

## 2022-11-11 RX ADMIN — AMIODARONE HYDROCHLORIDE 100 MG: 200 TABLET ORAL at 09:52

## 2022-11-11 RX ADMIN — PREDNISONE 30 MG: 20 TABLET ORAL at 09:52

## 2022-11-11 RX ADMIN — LOPERAMIDE HYDROCHLORIDE 2 MG: 2 CAPSULE ORAL at 09:58

## 2022-11-11 NOTE — PROGRESS NOTES
Problem: Mobility Impaired (Adult and Pediatric)  Goal: *Acute Goals and Plan of Care (Insert Text)  Description: FUNCTIONAL STATUS PRIOR TO ADMISSION: Patient was independent and active without use of DME. At baseline patient uses 2-4 liters of supplemental oxygen. He reports using a portable pulse ox. When asked he reported no falls in the last 12 months. HOME SUPPORT PRIOR TO ADMISSION: The patient lived with his significant other but did not require assist.    Physical Therapy Goals  Revised 11/11/2022  1. Patient will move from supine to sit and sit to supine , scoot up and down, and roll side to side in bed with independence within 7 day(s). 2.  Patient will transfer from bed to chair and chair to bed with independence using the least restrictive device within 7 day(s). 3.  Patient will perform sit to stand with independence within 7 day(s). 4.  Patient will ambulate with independence for 50 feet with the least restrictive device within 7 day(s). 5.  Patient will ascend/descend 4 stairs with one handrail(s) with modified independence within 7 day(s). Physical Therapy Goals  Revisited 11/4/2022, goals not met but remain appropriate so carry over         Physical Therapy Goals  Initiated 10/28/2022  1. Patient will move from supine to sit and sit to supine , scoot up and down, and roll side to side in bed with independence within 7 day(s). 2.  Patient will transfer from bed to chair and chair to bed with independence using the least restrictive device within 7 day(s). 3.  Patient will perform sit to stand with independence within 7 day(s). 4.  Patient will ambulate with independence for 300 feet with the least restrictive device within 7 day(s). 5.  Patient will ascend/descend 4 stairs with one handrail(s) with modified independence within 7 day(s).    Outcome: Progressing Towards Goal   PHYSICAL THERAPY TREATMENT: WEEKLY REASSESSMENT  Patient: Kate Arechiga (76 y.o. male)  Date: 11/11/2022  Primary Diagnosis: Sepsis ([facility] [A41.9]  Pneumonia [J18.9]  Malignant neoplasm of lung, unspecified laterality, unspecified part of lung ([facility] [C34.90]  COPD (chronic obstructive pulmonary disease) ([facility] [J44.9]       Precautions:   Fall      ASSESSMENT  Patient continues with skilled PT services and is slowly progressing towards goals. Pt is limited primarily by impaired activity tolerance. He was received at rest on 3 liters of 02 and had to titrate up to 6 liters for activity and post session able to return him to 3 liters. He required one seated rest break to complete a Tinetti. Post session he remain up to the chair. Reviewed use of the incentive spirometer and he demonstrated good technique. Anticipate slow steady gains and likely need for rehab with a pulmonary focus. .     Patient's progression toward goals since last assessment: gains made but goals not met, were adjusted. Current Level of Function Impacting Discharge (mobility/balance): provided up to contact guard    Functional Outcome Measure: The patient scored 25/28 on the Tinetti outcome measure which is indicative of low fall risk. Other factors to consider for discharge: medically complex: lung CA (s/p lobectomy), asthma, COPD, and CHF         PLAN :  Goals have been updated based on progression since last assessment. Patient continues to benefit from skilled intervention to address the above impairments. Recommendations and Planned Interventions: bed mobility training, transfer training, gait training, therapeutic exercises, patient and family training/education, and therapeutic activities      Frequency/Duration: Patient will be followed by physical therapy:  5 times a week to address goals.     Recommendation for discharge: (in order for the patient to meet his/her long term goals)  Therapy 3 hours per day 5-7 days per week    This discharge recommendation:  A follow-up discussion with the attending provider and/or case management is planned    IF patient discharges home will need the following DME: to be determined (TBD)         SUBJECTIVE:   Patient stated I'll try.     OBJECTIVE DATA SUMMARY:   Chart checked, pt cleared by nursing  HISTORY:    Past Medical History:   Diagnosis Date    Asthma     Cancer ([facility]     Lung Cancer    Chronic obstructive pulmonary disease (HCC)     Chronic pain     High cholesterol     Lung cancer ([facility]      Past Surgical History:   Procedure Laterality Date    HX LOBECTOMY      Right lung    HX OTHER SURGICAL      Partial right lung removal       Personal factors and/or comorbidities impacting plan of care:  medically complex: lung CA (s/p lobectomy), asthma, COPD, and CHF    Home Situation  Home Environment: Private residence  # Steps to Enter: 4  Hand Rails : Bilateral  One/Two Story Residence: One story  Living Alone: No  Support Systems: Spouse/Significant Other  Patient Expects to be Discharged to[de-identified] Rehab hospital/unit acute  Current DME Used/Available at Home:  (home 02 and pulse ox)  Tub or Shower Type: Tub    EXAMINATION/PRESENTATION/DECISION MAKING:   Critical Behavior:  Neurologic State: Alert  Orientation Level: Oriented X4  Cognition: Appropriate for age attention/concentration  Safety/Judgement: Decreased awareness of need for assistance  Hearing:     Skin:  refer to MD and nursing notes  Edema: refer to MD and nursing notes (LUE much less edematous)     Range Of Motion:  AROM: Generally decreased, functional                       Strength:    Strength: Generally decreased, functional                    Tone & Sensation:                  Sensation: Intact               Coordination:     Vision:      Functional Mobility:  Bed Mobility:     Supine to Sit: Bed Modified        Transfers:  Sit to Stand: Stand-by assistance  Stand to Sit: Stand-by assistance                       Balance:   Sitting: Intact; Without support  Standing: Impaired  Standing - Static: Good  Standing - Dynamic : Good  Ambulation/Gait Training:  Distance (ft): 10 Feet (ft)  Assistive Device: Gait belt  Ambulation - Level of Assistance: Contact guard assistance        Gait Abnormalities: Decreased step clearance;Trunk sway increased        Base of Support: Narrowed; Center of gravity altered     Speed/Maite: Slow  Step Length: Left shortened;Right shortened                     Stairs: Therapeutic Exercises:   Pursed lip breathing and use of incentive spirometer     Functional Measure:  Tinetti test:    Sitting Balance: 1  Arises: 2  Attempts to Rise: 2  Immediate Standing Balance: 2  Standing Balance: 2  Nudged: 2  Eyes Closed: 1  Turn 360 Degrees - Continuous/Discontinuous: 1  Turn 360 Degrees - Steady/Unsteady: 1  Sitting Down: 1  Balance Score: 15 Balance total score  Indication of Gait: 1  R Step Length/Height: 1  L Step Length/Height: 1  R Foot Clearance: 1  L Foot Clearance: 1  Step Symmetry: 1  Step Continuity: 1  Path: 2  Trunk: 0  Walking Time: 1  Gait Score: 10 Gait total score  Total Score: 25/28 Overall total score         Tinetti Tool Score Risk of Falls  <19 = High Fall Risk  19-24 = Moderate Fall Risk  25-28 = Low Fall Risk  Tinetti ME. Performance-Oriented Assessment of Mobility Problems in Elderly Patients. [place] 67; [id number].  (Scoring Description: PT Bulletin Feb. 10, 1993)    Older adults: Shruthi Sommers et al, 2009; n = 1000 [facility] elderly evaluated with ABC, ARMEN, ADL, and IADL)  · Mean ARMEN score for males aged 69-68 years = 26.21(3.40)  · Mean ARMEN score for females age 69-68 years = 25.16(4.30)  · Mean ARMEN score for males over 80 years = 23.29(6.02)  · Mean ARMEN score for females over 80 years = 17.20(8.32)          Pain Rating:  None rated    Activity Tolerance:   Fair, desaturates with exertion and requires oxygen, requires rest breaks, and see assessment    After treatment patient left in no apparent distress:   Sitting in chair and Call bell within reach    COMMUNICATION/EDUCATION:   The patients plan of care was discussed with: Registered nurse. Fall prevention education was provided and the patient/caregiver indicated understanding., Patient/family have participated as able in goal setting and plan of care. , and Patient/family agree to work toward stated goals and plan of care.     Thank you for this referral.  Silvia Liu   Time Calculation: 24 mins

## 2022-11-11 NOTE — PROGRESS NOTES
Bedside shift change report given to LINUS Diallo (oncoming nurse) by Fidel Jugn RN (offgoing nurse). Report included the following information SBAR, Kardex, ED Summary, Intake/Output, MAR, Accordion, Recent Results, and Cardiac Rhythm NSR .

## 2022-11-11 NOTE — PROGRESS NOTES
Bedside shift change report given to Selene Kenney (oncoming nurse) by Duncan Perry RN (offgoing nurse). Report included the following information SBAR and MAR.

## 2022-11-11 NOTE — PROGRESS NOTES
Bedside shift change report given to Alli Mays RN (oncoming nurse) by Xavier Esposito RN (offgoing nurse). Report included the following information SBAR, Kardex, ED Summary, MAR, and Cardiac Rhythm paced .

## 2022-11-11 NOTE — PROGRESS NOTES
[facility] Adult  Hospitalist Group                                                                                          Hospitalist Progress Note  Ananth Jim MD  Answering service: [phone] OR [phone] from in house phone        Date of Service:  2022  NAME:  Cheyanne Ferreira  :  1950  MRN:  [id number]      Admission Summary:     Patient presents with acute on chronic respiratory failure due to COPD and pneumonia, also with new onset atrial fibrillation with new onset systolic heart failure. Interval history / Subjective:     Patient doing well, no acute complaint.      Assessment & Plan:     Acute respiratory failure  -Acute hypoxic and hypercapnic respiratory failure due to pneumonia and COPD  -Patient with baseline chronic respiratory failure due to COPD, as well as s/p lobectomy due to lung cancer, uses 3 L of oxygen at baseline  -Patient initially requiring BiPAP, weaning oxygen, on 3 L today  -Patient more hypoxic with exertion and working with PT    Sepsis  -Patient with tachycardia and leukocytosis, elevated procalcitonin level  -Sepsis due to multilobar pneumonia  -Sepsis reassessment completed, monitor hemodynamics    Multilobar pneumonia  -Blood cultures so far negative, COVID-negative, respiratory culture negative, Legionella negative, MRSA negative  -Patient has completed treatment with Rocephin and Zithromax    Acute exacerbation of COPD  -Continue bronchodilators, breathing treatment, Solu-Medrol was changed to prednisone  -Previously evaluated by pulmonology, weaning oxygen to 3 L today, at baseline patient uses 3 L    Paroxysmal atrial fibrillation  -Patient remaining in sinus rhythm  -Amiodarone drip was changed to p.o. amiodarone, on metoprolol and on Eliquis for anticoagulation    Acute systolic CHF  -New onset systolic CHF, NYHA class IV, echo shows EF of 25 to 30%  -Limited use of guideline directed medical therapy due to hypotension  -Previously evaluated by cardiology    Hypotension  -Patient has been treated with albumin, added midodrine for blood pressure support  -Continue monitor hemodynamic    PENNY  -Likely multifactorial including sepsis  -PENNY is resolved    Dyslipidemia  -Continue statin    Non-small cell lung cancer  -History of right lower lobe lobectomy at [facility] in 2009  -Follow-up outpatient     Code status: DNR  Prophylaxis: [address] [place] discussed with: Patient  Anticipated Disposition: 24 to 48 hours, plan for rehab when oxygenation improved as well as hemodynamically more stable. Hospital Problems  Date Reviewed: 1/9/2019            Codes Class Noted POA    Moderate protein-calorie malnutrition ([facility] ICD-10-CM: E44.0  ICD-9-CM: 263.0  10/27/2022 Yes        COPD (chronic obstructive pulmonary disease) ([facility] ICD-10-CM: J44.9  ICD-9-CM: 582  10/26/2022 Unknown        Pneumonia ICD-10-CM: J18.9  ICD-9-CM: 486  10/26/2022 Unknown        Malignant neoplasm of lung, unspecified laterality, unspecified part of lung ([facility] ICD-10-CM: C34.90  ICD-9-CM: 162.9  10/26/2022 Unknown        Sepsis ([facility] ICD-10-CM: A41.9  ICD-9-CM: 038.9, 995.91  10/26/2022 Unknown             Review of Systems:   A comprehensive review of systems was negative except for that written in the HPI. Vital Signs:    Last 24hrs VS reviewed since prior progress note.  Most recent are:  Visit Vitals  BP (!) 108/48 (BP 1 Location: Left upper arm, BP Patient Position: Semi fowlers)   Pulse (!) 59   Temp 97.9 °F (36.6 °C)   Resp 20   Ht 5' 11\" (1.803 m)   Wt (P) 60.2 kg (132 lb 11.5 oz)   SpO2 93%   BMI (P) 18.51 kg/m²         Intake/Output Summary (Last 24 hours) at 11/11/2022 1237  Last data filed at 11/11/2022 7382  Gross per 24 hour   Intake --   Output 350 ml   Net -350 ml        Physical Examination:     I had a face to face encounter with this patient and independently examined them on 11/11/2022 as outlined below:          Constitutional:  No acute distress, cooperative, pleasant    ENT:  Oral mucosa moist, oropharynx benign. Resp:  CTA bilaterally. No wheezing/rhonchi/rales. No accessory muscle use. CV:  Regular rhythm, normal rate, no murmurs, gallops, rubs    GI:  Soft, non distended, non tender. normoactive bowel sounds, no hepatosplenomegaly     Musculoskeletal:  No edema, warm, 2+ pulses throughout    Neurologic:  Moves all extremities. AAOx3, CN II-XII reviewed            Data Review:    Review and/or order of clinical lab test      Labs:     Recent Labs     11/11/22  0547 11/10/22  0215   WBC 14.6* 14.1*   HGB 12.1 11.9*   HCT 37.7 37.3    358     Recent Labs     11/11/22  0547 11/10/22  0215 11/09/22  0517    140 139   K 4.6 5.2* 5.2*    102 101   CO2 31 32 34*   BUN 29* 35* 32*   CREA 1.06 1.22 1.10   GLU 81 90 84   CA 8.2* 8.2* 8.3*     No results for input(s): ALT, AP, TBIL, TBILI, TP, ALB, GLOB, GGT, AML, LPSE in the last 72 hours. No lab exists for component: SGOT, GPT, AMYP, HLPSE  No results for input(s): INR, PTP, APTT, INREXT, INREXT in the last 72 hours. No results for input(s): FE, TIBC, PSAT, FERR in the last 72 hours. No results found for: FOL, RBCF   No results for input(s): PH, PCO2, PO2 in the last 72 hours. No results for input(s): CPK, CKNDX, TROIQ in the last 72 hours.     No lab exists for component: CPKMB  Lab Results   Component Value Date/Time    Cholesterol, total 106 02/20/2018 04:54 AM    HDL Cholesterol 45 02/20/2018 04:54 AM    LDL, calculated 46 02/20/2018 04:54 AM    Triglyceride 75 02/20/2018 04:54 AM    CHOL/HDL Ratio 2.4 02/20/2018 04:54 AM     Lab Results   Component Value Date/Time    Glucose (POC) 150 (H) 11/04/2022 12:52 PM    Glucose (POC) 100 09/14/2015 12:14 PM     Lab Results   Component Value Date/Time    Color YELLOW/STRAW 11/05/2022 01:04 PM    Appearance CLEAR 11/05/2022 01:04 PM    Specific gravity 1.017 11/05/2022 01:04 PM    pH (UA) 6.5 11/05/2022 01:04 PM    Protein Negative 11/05/2022 01:04 PM    Glucose Negative 11/05/2022 01:04 PM    Ketone Negative 11/05/2022 01:04 PM    Bilirubin Negative 11/05/2022 01:04 PM    Urobilinogen 0.2 11/05/2022 01:04 PM    Nitrites Negative 11/05/2022 01:04 PM    Leukocyte Esterase Negative 11/05/2022 01:04 PM    Epithelial cells FEW 11/05/2022 01:04 PM    Bacteria Negative 11/05/2022 01:04 PM    WBC 0-4 11/05/2022 01:04 PM    RBC 20-50 11/05/2022 01:04 PM         Medications Reviewed:     Current Facility-Administered Medications   Medication Dose Route Frequency    midodrine (PROAMATINE) tablet 5 mg  5 mg Oral TID WITH MEALS    albuterol-ipratropium (DUO-NEB) 2.5 MG-0.5 MG/3 ML  3 mL Nebulization Q4H PRN    loperamide (IMODIUM) capsule 2 mg  2 mg Oral Q4H PRN    [Held by provider] furosemide (LASIX) tablet 20 mg  20 mg Oral DAILY    predniSONE (DELTASONE) tablet 30 mg  30 mg Oral DAILY WITH BREAKFAST    amiodarone (CORDARONE) tablet 100 mg  100 mg Oral BID    nystatin (MYCOSTATIN) 100,000 unit/mL oral suspension 500,000 Units  500,000 Units Oral QID    magic mouthwash cpd (without sucralfate)  5 mL Oral TID PRN    [Held by provider] sacubitriL-valsartan (ENTRESTO) 24-26 mg tablet 1 Tablet  1 Tablet Oral Q12H    apixaban (ELIQUIS) tablet 5 mg  5 mg Oral Q12H    metoprolol succinate (TOPROL-XL) XL tablet 25 mg  25 mg Oral DAILY    hydrALAZINE (APRESOLINE) 20 mg/mL injection 20 mg  20 mg IntraVENous Q6H PRN    sodium chloride (NS) flush 5-40 mL  5-40 mL IntraVENous Q8H    sodium chloride (NS) flush 5-40 mL  5-40 mL IntraVENous PRN    acetaminophen (TYLENOL) tablet 650 mg  650 mg Oral Q6H PRN    Or    acetaminophen (TYLENOL) suppository 650 mg  650 mg Rectal Q6H PRN    ondansetron (ZOFRAN ODT) tablet 4 mg  4 mg Oral Q8H PRN    Or    ondansetron (ZOFRAN) injection 4 mg  4 mg IntraVENous Q6H PRN    polyethylene glycol (MIRALAX) packet 17 g  17 g Oral DAILY PRN    arformoteroL (BROVANA) neb solution 15 mcg  15 mcg Nebulization BID RT    aspirin delayed-release tablet 81 mg  81 mg Oral DAILY    atorvastatin (LIPITOR) tablet 40 mg  40 mg Oral DAILY    budesonide (PULMICORT) 500 mcg/2 ml nebulizer suspension  500 mcg Nebulization BID    oxyCODONE IR (ROXICODONE) tablet 20 mg  20 mg Oral Q6H PRN    oxyCODONE IR (ROXICODONE) tablet 10 mg  10 mg Oral Q6H PRN     ______________________________________________________________________  EXPECTED LENGTH OF STAY: 4d 19h  ACTUAL LENGTH OF STAY:          16                 Kathryn Hand MD

## 2022-11-12 ENCOUNTER — APPOINTMENT (OUTPATIENT)
Dept: GENERAL RADIOLOGY | Age: 72
DRG: 871 | End: 2022-11-12
Attending: FAMILY MEDICINE
Payer: MEDICARE

## 2022-11-12 PROCEDURE — 74011250637 HC RX REV CODE- 250/637: Performed by: FAMILY MEDICINE

## 2022-11-12 PROCEDURE — 74011250637 HC RX REV CODE- 250/637: Performed by: INTERNAL MEDICINE

## 2022-11-12 PROCEDURE — 94640 AIRWAY INHALATION TREATMENT: CPT

## 2022-11-12 PROCEDURE — 65660000001 HC RM ICU INTERMED STEPDOWN

## 2022-11-12 PROCEDURE — 71045 X-RAY EXAM CHEST 1 VIEW: CPT

## 2022-11-12 PROCEDURE — 74011000250 HC RX REV CODE- 250: Performed by: INTERNAL MEDICINE

## 2022-11-12 PROCEDURE — 74011636637 HC RX REV CODE- 636/637: Performed by: FAMILY MEDICINE

## 2022-11-12 RX ORDER — MIDODRINE HYDROCHLORIDE 5 MG/1
10 TABLET ORAL
Status: DISCONTINUED | OUTPATIENT
Start: 2022-11-12 | End: 2022-11-25 | Stop reason: HOSPADM

## 2022-11-12 RX ADMIN — SODIUM CHLORIDE, PRESERVATIVE FREE 10 ML: 5 INJECTION INTRAVENOUS at 21:20

## 2022-11-12 RX ADMIN — SODIUM CHLORIDE, PRESERVATIVE FREE 10 ML: 5 INJECTION INTRAVENOUS at 13:16

## 2022-11-12 RX ADMIN — ASPIRIN 81 MG: 81 TABLET, COATED ORAL at 09:51

## 2022-11-12 RX ADMIN — METOPROLOL SUCCINATE 25 MG: 25 TABLET, FILM COATED, EXTENDED RELEASE ORAL at 09:52

## 2022-11-12 RX ADMIN — MIDODRINE HYDROCHLORIDE 10 MG: 5 TABLET ORAL at 13:16

## 2022-11-12 RX ADMIN — APIXABAN 5 MG: 5 TABLET, FILM COATED ORAL at 21:20

## 2022-11-12 RX ADMIN — ARFORMOTEROL TARTRATE 15 MCG: 15 SOLUTION RESPIRATORY (INHALATION) at 19:58

## 2022-11-12 RX ADMIN — MIDODRINE HYDROCHLORIDE 5 MG: 5 TABLET ORAL at 10:01

## 2022-11-12 RX ADMIN — AMIODARONE HYDROCHLORIDE 100 MG: 200 TABLET ORAL at 18:27

## 2022-11-12 RX ADMIN — PREDNISONE 30 MG: 20 TABLET ORAL at 09:51

## 2022-11-12 RX ADMIN — AMIODARONE HYDROCHLORIDE 100 MG: 200 TABLET ORAL at 09:51

## 2022-11-12 RX ADMIN — ATORVASTATIN CALCIUM 40 MG: 40 TABLET, FILM COATED ORAL at 09:52

## 2022-11-12 RX ADMIN — APIXABAN 5 MG: 5 TABLET, FILM COATED ORAL at 09:52

## 2022-11-12 RX ADMIN — ARFORMOTEROL TARTRATE 15 MCG: 15 SOLUTION RESPIRATORY (INHALATION) at 08:00

## 2022-11-12 RX ADMIN — MIDODRINE HYDROCHLORIDE 10 MG: 5 TABLET ORAL at 18:27

## 2022-11-12 RX ADMIN — BUDESONIDE 500 MCG: 0.5 INHALANT RESPIRATORY (INHALATION) at 19:58

## 2022-11-12 RX ADMIN — BUDESONIDE 500 MCG: 0.5 INHALANT RESPIRATORY (INHALATION) at 08:00

## 2022-11-12 NOTE — PROGRESS NOTES
Bedside shift change report given to Arlin RN (oncoming nurse) by Steve Cameron RN (offgoing nurse). Report included the following information SBAR, MAR, and Cardiac Rhythm SR;SB .

## 2022-11-12 NOTE — PROGRESS NOTES
[facility] Adult  Hospitalist Group                                                                                          Hospitalist Progress Note  Nyla Nicole MD  Answering service: [phone] from in house phone        Date of Service:  2022  NAME:  Nieves Denny  :  1950  MRN:  [id number]      Admission Summary:     Patient presents with acute on chronic respiratory failure due to COPD and pneumonia, also with new onset atrial fibrillation with new onset systolic heart failure. Interval history / Subjective:     Patient doing well, no acute complaint.      Assessment & Plan:     Acute respiratory failure  -Acute hypoxic and hypercapnic respiratory failure due to pneumonia and COPD  -Patient with baseline chronic respiratory failure due to COPD, as well as s/p lobectomy due to lung cancer, uses 3 L of oxygen at baseline  -Patient initially requiring BiPAP, fluctuating oxygenation, currently at 6 L  -Patient more hypoxic with exertion and working with PT    Sepsis  -Patient with tachycardia and leukocytosis, elevated procalcitonin level  -Sepsis due to multilobar pneumonia  -Sepsis reassessment completed, monitor hemodynamics    Multilobar pneumonia  -Blood cultures so far negative, COVID-negative, respiratory culture negative, Legionella negative, MRSA negative  -Patient has completed treatment with Rocephin and Zithromax    Acute exacerbation of COPD  -Continue bronchodilators, breathing treatment, Solu-Medrol was changed to prednisone  -Previously evaluated by pulmonology, at baseline patient uses 3 L    Paroxysmal atrial fibrillation  -Patient remaining in sinus rhythm  -Amiodarone drip was changed to p.o. amiodarone, on metoprolol and on Eliquis for anticoagulation    Acute systolic CHF  -New onset systolic CHF, NYHA class IV, echo shows EF of 25 to 30%  -Limited use of guideline directed medical therapy due to hypotension  -Previously evaluated by cardiology    Hypotension  -Patient has been treated with albumin, added midodrine for blood pressure support  -Continue monitor hemodynamic    PENNY  -Likely multifactorial including sepsis  -PENNY is resolved    Dyslipidemia  -Continue statin    Non-small cell lung cancer  -History of right lower lobe lobectomy at [facility] in 2009  -Follow-up outpatient     Code status: DNR  Prophylaxis: [facility] discussed with: Patient  Anticipated Disposition: 24 to 48 hours, plan for rehab when oxygenation improved as well as hemodynamically more stable. Hospital Problems  Date Reviewed: 1/9/2019            Codes Class Noted POA    Moderate protein-calorie malnutrition ([facility] ICD-10-CM: E44.0  ICD-9-CM: 263.0  10/27/2022 Yes        COPD (chronic obstructive pulmonary disease) ([facility] ICD-10-CM: J44.9  ICD-9-CM: 493  10/26/2022 Unknown        Pneumonia ICD-10-CM: J18.9  ICD-9-CM: 486  10/26/2022 Unknown        Malignant neoplasm of lung, unspecified laterality, unspecified part of lung ([facility] ICD-10-CM: C34.90  ICD-9-CM: 162.9  10/26/2022 Unknown        Sepsis ([facility] ICD-10-CM: A41.9  ICD-9-CM: 038.9, 995.91  10/26/2022 Unknown             Review of Systems:   A comprehensive review of systems was negative except for that written in the HPI. Vital Signs:    Last 24hrs VS reviewed since prior progress note.  Most recent are:  Visit Vitals  BP (!) 109/55 (BP 1 Location: Left upper arm, BP Patient Position: At rest)   Pulse 63   Temp 97.9 °F (36.6 °C)   Resp 18   Ht 5' 11\" (1.803 m)   Wt (P) 60.2 kg (132 lb 11.5 oz)   SpO2 97%   BMI (P) 18.51 kg/m²         Intake/Output Summary (Last 24 hours) at 11/12/2022 1246  Last data filed at 11/11/2022 2351  Gross per 24 hour   Intake --   Output 200 ml   Net -200 ml        Physical Examination:     I had a face to face encounter with this patient and independently examined them on 11/12/2022 as outlined below:          Constitutional:  No acute distress, cooperative, pleasant    ENT: Oral mucosa moist, oropharynx benign. Resp:  CTA bilaterally. No wheezing/rhonchi/rales. No accessory muscle use. CV:  Regular rhythm, normal rate, no murmurs, gallops, rubs    GI:  Soft, non distended, non tender. normoactive bowel sounds, no hepatosplenomegaly     Musculoskeletal:  No edema, warm, 2+ pulses throughout    Neurologic:  Moves all extremities. AAOx3, CN II-XII reviewed            Data Review:    Review and/or order of clinical lab test      Labs:     Recent Labs     11/11/22  0547 11/10/22  0215   WBC 14.6* 14.1*   HGB 12.1 11.9*   HCT 37.7 37.3    358     Recent Labs     11/11/22  0547 11/10/22  0215    140   K 4.6 5.2*    102   CO2 31 32   BUN 29* 35*   CREA 1.06 1.22   GLU 81 90   CA 8.2* 8.2*     No results for input(s): ALT, AP, TBIL, TBILI, TP, ALB, GLOB, GGT, AML, LPSE in the last 72 hours. No lab exists for component: SGOT, GPT, AMYP, HLPSE  No results for input(s): INR, PTP, APTT, INREXT, INREXT in the last 72 hours. No results for input(s): FE, TIBC, PSAT, FERR in the last 72 hours. No results found for: FOL, RBCF   No results for input(s): PH, PCO2, PO2 in the last 72 hours. No results for input(s): CPK, CKNDX, TROIQ in the last 72 hours.     No lab exists for component: CPKMB  Lab Results   Component Value Date/Time    Cholesterol, total 106 02/20/2018 04:54 AM    HDL Cholesterol 45 02/20/2018 04:54 AM    LDL, calculated 46 02/20/2018 04:54 AM    Triglyceride 75 02/20/2018 04:54 AM    CHOL/HDL Ratio 2.4 02/20/2018 04:54 AM     Lab Results   Component Value Date/Time    Glucose (POC) 150 (H) 11/04/2022 12:52 PM    Glucose (POC) 100 09/14/2015 12:14 PM     Lab Results   Component Value Date/Time    Color YELLOW/STRAW 11/05/2022 01:04 PM    Appearance CLEAR 11/05/2022 01:04 PM    Specific gravity 1.017 11/05/2022 01:04 PM    pH (UA) 6.5 11/05/2022 01:04 PM    Protein Negative 11/05/2022 01:04 PM    Glucose Negative 11/05/2022 01:04 PM    Ketone Negative 11/05/2022 01:04 PM    Bilirubin Negative 11/05/2022 01:04 PM    Urobilinogen 0.2 11/05/2022 01:04 PM    Nitrites Negative 11/05/2022 01:04 PM    Leukocyte Esterase Negative 11/05/2022 01:04 PM    Epithelial cells FEW 11/05/2022 01:04 PM    Bacteria Negative 11/05/2022 01:04 PM    WBC 0-4 11/05/2022 01:04 PM    RBC 20-50 11/05/2022 01:04 PM         Medications Reviewed:     Current Facility-Administered Medications   Medication Dose Route Frequency    midodrine (PROAMATINE) tablet 10 mg  10 mg Oral TID WITH MEALS    albuterol-ipratropium (DUO-NEB) 2.5 MG-0.5 MG/3 ML  3 mL Nebulization Q4H PRN    loperamide (IMODIUM) capsule 2 mg  2 mg Oral Q4H PRN    [Held by provider] furosemide (LASIX) tablet 20 mg  20 mg Oral DAILY    predniSONE (DELTASONE) tablet 30 mg  30 mg Oral DAILY WITH BREAKFAST    amiodarone (CORDARONE) tablet 100 mg  100 mg Oral BID    magic mouthwash cpd (without sucralfate)  5 mL Oral TID PRN    [Held by provider] sacubitriL-valsartan (ENTRESTO) 24-26 mg tablet 1 Tablet  1 Tablet Oral Q12H    apixaban (ELIQUIS) tablet 5 mg  5 mg Oral Q12H    metoprolol succinate (TOPROL-XL) XL tablet 25 mg  25 mg Oral DAILY    hydrALAZINE (APRESOLINE) 20 mg/mL injection 20 mg  20 mg IntraVENous Q6H PRN    sodium chloride (NS) flush 5-40 mL  5-40 mL IntraVENous Q8H    sodium chloride (NS) flush 5-40 mL  5-40 mL IntraVENous PRN    acetaminophen (TYLENOL) tablet 650 mg  650 mg Oral Q6H PRN    Or    acetaminophen (TYLENOL) suppository 650 mg  650 mg Rectal Q6H PRN    ondansetron (ZOFRAN ODT) tablet 4 mg  4 mg Oral Q8H PRN    Or    ondansetron (ZOFRAN) injection 4 mg  4 mg IntraVENous Q6H PRN    polyethylene glycol (MIRALAX) packet 17 g  17 g Oral DAILY PRN    arformoteroL (BROVANA) neb solution 15 mcg  15 mcg Nebulization BID RT    aspirin delayed-release tablet 81 mg  81 mg Oral DAILY    atorvastatin (LIPITOR) tablet 40 mg  40 mg Oral DAILY    budesonide (PULMICORT) 500 mcg/2 ml nebulizer suspension  500 mcg Nebulization BID    oxyCODONE IR (ROXICODONE) tablet 20 mg  20 mg Oral Q6H PRN    oxyCODONE IR (ROXICODONE) tablet 10 mg  10 mg Oral Q6H PRN     ______________________________________________________________________  EXPECTED LENGTH OF STAY: 4d 19h  ACTUAL LENGTH OF STAY:          17                 Juwan Philippe MD

## 2022-11-12 NOTE — PROGRESS NOTES
Pt continues to dump coffee and pepsi in his urinal making his urine appear brown. This nurse explained that its important that we assess his urine's true color smell and consistency. Pt stated he understood. There is no output documented.

## 2022-11-13 LAB
ANION GAP SERPL CALC-SCNC: 4 MMOL/L (ref 5–15)
BUN SERPL-MCNC: 28 MG/DL (ref 6–20)
BUN/CREAT SERPL: 25 (ref 12–20)
CALCIUM SERPL-MCNC: 8.1 MG/DL (ref 8.5–10.1)
CHLORIDE SERPL-SCNC: 107 MMOL/L (ref 97–108)
CO2 SERPL-SCNC: 29 MMOL/L (ref 21–32)
CREAT SERPL-MCNC: 1.1 MG/DL (ref 0.7–1.3)
GLUCOSE SERPL-MCNC: 83 MG/DL (ref 65–100)
POTASSIUM SERPL-SCNC: 4.4 MMOL/L (ref 3.5–5.1)
SODIUM SERPL-SCNC: 140 MMOL/L (ref 136–145)

## 2022-11-13 PROCEDURE — 74011250637 HC RX REV CODE- 250/637: Performed by: INTERNAL MEDICINE

## 2022-11-13 PROCEDURE — 77010033678 HC OXYGEN DAILY

## 2022-11-13 PROCEDURE — 65660000001 HC RM ICU INTERMED STEPDOWN

## 2022-11-13 PROCEDURE — 36415 COLL VENOUS BLD VENIPUNCTURE: CPT

## 2022-11-13 PROCEDURE — 74011636637 HC RX REV CODE- 636/637: Performed by: FAMILY MEDICINE

## 2022-11-13 PROCEDURE — 94640 AIRWAY INHALATION TREATMENT: CPT

## 2022-11-13 PROCEDURE — 74011250637 HC RX REV CODE- 250/637: Performed by: FAMILY MEDICINE

## 2022-11-13 PROCEDURE — 74011000250 HC RX REV CODE- 250: Performed by: INTERNAL MEDICINE

## 2022-11-13 PROCEDURE — 80048 BASIC METABOLIC PNL TOTAL CA: CPT

## 2022-11-13 RX ADMIN — SODIUM CHLORIDE, PRESERVATIVE FREE 10 ML: 5 INJECTION INTRAVENOUS at 17:20

## 2022-11-13 RX ADMIN — APIXABAN 5 MG: 5 TABLET, FILM COATED ORAL at 22:16

## 2022-11-13 RX ADMIN — ATORVASTATIN CALCIUM 40 MG: 40 TABLET, FILM COATED ORAL at 08:45

## 2022-11-13 RX ADMIN — MIDODRINE HYDROCHLORIDE 10 MG: 5 TABLET ORAL at 08:45

## 2022-11-13 RX ADMIN — ARFORMOTEROL TARTRATE 15 MCG: 15 SOLUTION RESPIRATORY (INHALATION) at 19:25

## 2022-11-13 RX ADMIN — ASPIRIN 81 MG: 81 TABLET, COATED ORAL at 08:45

## 2022-11-13 RX ADMIN — PREDNISONE 30 MG: 20 TABLET ORAL at 08:44

## 2022-11-13 RX ADMIN — AMIODARONE HYDROCHLORIDE 100 MG: 200 TABLET ORAL at 17:19

## 2022-11-13 RX ADMIN — APIXABAN 5 MG: 5 TABLET, FILM COATED ORAL at 08:45

## 2022-11-13 RX ADMIN — MIDODRINE HYDROCHLORIDE 10 MG: 5 TABLET ORAL at 12:03

## 2022-11-13 RX ADMIN — MIDODRINE HYDROCHLORIDE 10 MG: 5 TABLET ORAL at 17:19

## 2022-11-13 RX ADMIN — ARFORMOTEROL TARTRATE 15 MCG: 15 SOLUTION RESPIRATORY (INHALATION) at 07:14

## 2022-11-13 RX ADMIN — SODIUM CHLORIDE, PRESERVATIVE FREE 10 ML: 5 INJECTION INTRAVENOUS at 22:16

## 2022-11-13 RX ADMIN — METOPROLOL SUCCINATE 25 MG: 25 TABLET, FILM COATED, EXTENDED RELEASE ORAL at 08:45

## 2022-11-13 RX ADMIN — AMIODARONE HYDROCHLORIDE 100 MG: 200 TABLET ORAL at 08:45

## 2022-11-13 RX ADMIN — BUDESONIDE 500 MCG: 0.5 INHALANT RESPIRATORY (INHALATION) at 19:25

## 2022-11-13 RX ADMIN — BUDESONIDE 500 MCG: 0.5 INHALANT RESPIRATORY (INHALATION) at 07:14

## 2022-11-13 NOTE — PROGRESS NOTES
[facility] [facility] Adult  Hospitalist Group                                                                                          Hospitalist Progress Note  Sara Holbrook MD  Answering service: [phone] from in house phone        Date of Service:  2022  NAME:  Delaney Martinez  :  1950  MRN:  [id number]      Admission Summary:     Patient presents with acute on chronic respiratory failure due to COPD and pneumonia, also with new onset atrial fibrillation with new onset systolic heart failure. Interval history / Subjective:     Patient doing well, no acute complaint.      Assessment & Plan:     Acute respiratory failure  -Acute hypoxic and hypercapnic respiratory failure due to pneumonia and COPD  -Patient with baseline chronic respiratory failure due to COPD, as well as s/p lobectomy due to lung cancer, uses 3 L of oxygen at baseline  -Patient initially requiring BiPAP, fluctuation in oxygen levels  -Patient more hypoxic with exertion and working with PT    Sepsis  -Patient with tachycardia and leukocytosis, elevated procalcitonin level  -Sepsis due to multilobar pneumonia  -Sepsis reassessment completed, monitor hemodynamics    Multilobar pneumonia  -Blood cultures so far negative, COVID-negative, respiratory culture negative, Legionella negative, MRSA negative  -Patient has completed treatment with Rocephin and Zithromax  -Repeat chest x-ray 2022 shows improvement    Acute exacerbation of COPD  -Continue bronchodilators, breathing treatment, Solu-Medrol was changed to prednisone  -Previously evaluated by pulmonology, at baseline patient uses 3 L    Paroxysmal atrial fibrillation  -Patient remaining in sinus rhythm  -Amiodarone drip was changed to p.o. amiodarone, on metoprolol and on Eliquis for anticoagulation    Acute systolic CHF  -New onset systolic CHF, NYHA class IV, echo shows EF of 25 to 30%  -Limited use of guideline directed medical therapy due to hypotension  -Previously evaluated by cardiology    Hypotension  -Patient has been treated with albumin, added midodrine for blood pressure support  -Continue monitor hemodynamic    PENNY  -Likely multifactorial including sepsis  -PENNY is resolved    Dyslipidemia  -Continue statin    Non-small cell lung cancer  -History of right lower lobe lobectomy at [facility] in 2009  -Follow-up outpatient     Code status: DNR  Prophylaxis: [address] discussed with: Patient  Anticipated Disposition: 24 to 48 hours, plan for rehab when oxygenation improved as well as hemodynamically more stable. Hospital Problems  Date Reviewed: 1/9/2019            Codes Class Noted POA    Moderate protein-calorie malnutrition ([facility] ICD-10-CM: E44.0  ICD-9-CM: 263.0  10/27/2022 Yes        COPD (chronic obstructive pulmonary disease) ([facility] ICD-10-CM: J44.9  ICD-9-CM: 787  10/26/2022 Unknown        Pneumonia ICD-10-CM: J18.9  ICD-9-CM: 486  10/26/2022 Unknown        Malignant neoplasm of lung, unspecified laterality, unspecified part of lung ([facility] ICD-10-CM: C34.90  ICD-9-CM: 162.9  10/26/2022 Unknown        Sepsis ([facility] ICD-10-CM: A41.9  ICD-9-CM: 038.9, 995.91  10/26/2022 Unknown             Review of Systems:   A comprehensive review of systems was negative except for that written in the HPI. Vital Signs:    Last 24hrs VS reviewed since prior progress note.  Most recent are:  Visit Vitals  BP (!) 103/54   Pulse 62   Temp 97.7 °F (36.5 °C)   Resp 18   Ht 5' 11\" (1.803 m)   Wt (P) 60.2 kg (132 lb 11.5 oz)   SpO2 95%   BMI (P) 18.51 kg/m²         Intake/Output Summary (Last 24 hours) at 11/13/2022 1309  Last data filed at 11/12/2022 2200  Gross per 24 hour   Intake --   Output 500 ml   Net -500 ml        Physical Examination:     I had a face to face encounter with this patient and independently examined them on 11/13/2022 as outlined below:          Constitutional:  No acute distress, cooperative, pleasant    ENT:  Oral mucosa moist, oropharynx benign. Resp:  CTA bilaterally. No wheezing/rhonchi/rales. No accessory muscle use. CV:  Regular rhythm, normal rate, no murmurs, gallops, rubs    GI:  Soft, non distended, non tender. normoactive bowel sounds, no hepatosplenomegaly     Musculoskeletal:  No edema, warm, 2+ pulses throughout    Neurologic:  Moves all extremities. AAOx3, CN II-XII reviewed            Data Review:    Review and/or order of clinical lab test      Labs:     Recent Labs     11/11/22  0547   WBC 14.6*   HGB 12.1   HCT 37.7        Recent Labs     11/13/22  0605 11/11/22  0547    138   K 4.4 4.6    105   CO2 29 31   BUN 28* 29*   CREA 1.10 1.06   GLU 83 81   CA 8.1* 8.2*     No results for input(s): ALT, AP, TBIL, TBILI, TP, ALB, GLOB, GGT, AML, LPSE in the last 72 hours. No lab exists for component: SGOT, GPT, AMYP, HLPSE  No results for input(s): INR, PTP, APTT, INREXT, INREXT in the last 72 hours. No results for input(s): FE, TIBC, PSAT, FERR in the last 72 hours. No results found for: FOL, RBCF   No results for input(s): PH, PCO2, PO2 in the last 72 hours. No results for input(s): CPK, CKNDX, TROIQ in the last 72 hours.     No lab exists for component: CPKMB  Lab Results   Component Value Date/Time    Cholesterol, total 106 02/20/2018 04:54 AM    HDL Cholesterol 45 02/20/2018 04:54 AM    LDL, calculated 46 02/20/2018 04:54 AM    Triglyceride 75 02/20/2018 04:54 AM    CHOL/HDL Ratio 2.4 02/20/2018 04:54 AM     Lab Results   Component Value Date/Time    Glucose (POC) 150 (H) 11/04/2022 12:52 PM    Glucose (POC) 100 09/14/2015 12:14 PM     Lab Results   Component Value Date/Time    Color YELLOW/STRAW 11/05/2022 01:04 PM    Appearance CLEAR 11/05/2022 01:04 PM    Specific gravity 1.017 11/05/2022 01:04 PM    pH (UA) 6.5 11/05/2022 01:04 PM    Protein Negative 11/05/2022 01:04 PM    Glucose Negative 11/05/2022 01:04 PM    Ketone Negative 11/05/2022 01:04 PM    Bilirubin Negative 11/05/2022 01:04 PM Urobilinogen 0.2 11/05/2022 01:04 PM    Nitrites Negative 11/05/2022 01:04 PM    Leukocyte Esterase Negative 11/05/2022 01:04 PM    Epithelial cells FEW 11/05/2022 01:04 PM    Bacteria Negative 11/05/2022 01:04 PM    WBC 0-4 11/05/2022 01:04 PM    RBC 20-50 11/05/2022 01:04 PM         Medications Reviewed:     Current Facility-Administered Medications   Medication Dose Route Frequency    midodrine (PROAMATINE) tablet 10 mg  10 mg Oral TID WITH MEALS    albuterol-ipratropium (DUO-NEB) 2.5 MG-0.5 MG/3 ML  3 mL Nebulization Q4H PRN    loperamide (IMODIUM) capsule 2 mg  2 mg Oral Q4H PRN    [Held by provider] furosemide (LASIX) tablet 20 mg  20 mg Oral DAILY    predniSONE (DELTASONE) tablet 30 mg  30 mg Oral DAILY WITH BREAKFAST    amiodarone (CORDARONE) tablet 100 mg  100 mg Oral BID    magic mouthwash cpd (without sucralfate)  5 mL Oral TID PRN    [Held by provider] sacubitriL-valsartan (ENTRESTO) 24-26 mg tablet 1 Tablet  1 Tablet Oral Q12H    apixaban (ELIQUIS) tablet 5 mg  5 mg Oral Q12H    metoprolol succinate (TOPROL-XL) XL tablet 25 mg  25 mg Oral DAILY    hydrALAZINE (APRESOLINE) 20 mg/mL injection 20 mg  20 mg IntraVENous Q6H PRN    sodium chloride (NS) flush 5-40 mL  5-40 mL IntraVENous Q8H    sodium chloride (NS) flush 5-40 mL  5-40 mL IntraVENous PRN    acetaminophen (TYLENOL) tablet 650 mg  650 mg Oral Q6H PRN    Or    acetaminophen (TYLENOL) suppository 650 mg  650 mg Rectal Q6H PRN    ondansetron (ZOFRAN ODT) tablet 4 mg  4 mg Oral Q8H PRN    Or    ondansetron (ZOFRAN) injection 4 mg  4 mg IntraVENous Q6H PRN    polyethylene glycol (MIRALAX) packet 17 g  17 g Oral DAILY PRN    arformoteroL (BROVANA) neb solution 15 mcg  15 mcg Nebulization BID RT    aspirin delayed-release tablet 81 mg  81 mg Oral DAILY    atorvastatin (LIPITOR) tablet 40 mg  40 mg Oral DAILY    budesonide (PULMICORT) 500 mcg/2 ml nebulizer suspension  500 mcg Nebulization BID    oxyCODONE IR (ROXICODONE) tablet 20 mg  20 mg Oral Q6H PRN    oxyCODONE IR (ROXICODONE) tablet 10 mg  10 mg Oral Q6H PRN     ______________________________________________________________________  EXPECTED LENGTH OF STAY: 4d 19h  ACTUAL LENGTH OF STAY:          18                 Khoa You MD

## 2022-11-14 LAB
ANION GAP SERPL CALC-SCNC: 2 MMOL/L (ref 5–15)
BUN SERPL-MCNC: 32 MG/DL (ref 6–20)
BUN/CREAT SERPL: 27 (ref 12–20)
CALCIUM SERPL-MCNC: 8.4 MG/DL (ref 8.5–10.1)
CHLORIDE SERPL-SCNC: 108 MMOL/L (ref 97–108)
CO2 SERPL-SCNC: 31 MMOL/L (ref 21–32)
CREAT SERPL-MCNC: 1.17 MG/DL (ref 0.7–1.3)
GLUCOSE SERPL-MCNC: 80 MG/DL (ref 65–100)
POTASSIUM SERPL-SCNC: 4.7 MMOL/L (ref 3.5–5.1)
SODIUM SERPL-SCNC: 141 MMOL/L (ref 136–145)

## 2022-11-14 PROCEDURE — 94640 AIRWAY INHALATION TREATMENT: CPT

## 2022-11-14 PROCEDURE — 74011636637 HC RX REV CODE- 636/637: Performed by: FAMILY MEDICINE

## 2022-11-14 PROCEDURE — 74011000250 HC RX REV CODE- 250: Performed by: INTERNAL MEDICINE

## 2022-11-14 PROCEDURE — 74011250637 HC RX REV CODE- 250/637: Performed by: INTERNAL MEDICINE

## 2022-11-14 PROCEDURE — 97535 SELF CARE MNGMENT TRAINING: CPT

## 2022-11-14 PROCEDURE — 80048 BASIC METABOLIC PNL TOTAL CA: CPT

## 2022-11-14 PROCEDURE — 77010033678 HC OXYGEN DAILY

## 2022-11-14 PROCEDURE — 36415 COLL VENOUS BLD VENIPUNCTURE: CPT

## 2022-11-14 PROCEDURE — 65660000001 HC RM ICU INTERMED STEPDOWN

## 2022-11-14 PROCEDURE — 74011250637 HC RX REV CODE- 250/637: Performed by: FAMILY MEDICINE

## 2022-11-14 PROCEDURE — 94762 N-INVAS EAR/PLS OXIMTRY CONT: CPT

## 2022-11-14 PROCEDURE — 94760 N-INVAS EAR/PLS OXIMETRY 1: CPT

## 2022-11-14 RX ADMIN — MIDODRINE HYDROCHLORIDE 10 MG: 5 TABLET ORAL at 16:57

## 2022-11-14 RX ADMIN — BUDESONIDE 500 MCG: 0.5 INHALANT RESPIRATORY (INHALATION) at 20:30

## 2022-11-14 RX ADMIN — MIDODRINE HYDROCHLORIDE 10 MG: 5 TABLET ORAL at 12:30

## 2022-11-14 RX ADMIN — AMIODARONE HYDROCHLORIDE 100 MG: 200 TABLET ORAL at 17:12

## 2022-11-14 RX ADMIN — APIXABAN 5 MG: 5 TABLET, FILM COATED ORAL at 21:51

## 2022-11-14 RX ADMIN — ARFORMOTEROL TARTRATE 15 MCG: 15 SOLUTION RESPIRATORY (INHALATION) at 08:31

## 2022-11-14 RX ADMIN — ATORVASTATIN CALCIUM 40 MG: 40 TABLET, FILM COATED ORAL at 08:49

## 2022-11-14 RX ADMIN — ARFORMOTEROL TARTRATE 15 MCG: 15 SOLUTION RESPIRATORY (INHALATION) at 20:30

## 2022-11-14 RX ADMIN — APIXABAN 5 MG: 5 TABLET, FILM COATED ORAL at 08:49

## 2022-11-14 RX ADMIN — PREDNISONE 30 MG: 20 TABLET ORAL at 08:49

## 2022-11-14 RX ADMIN — BUDESONIDE 500 MCG: 0.5 INHALANT RESPIRATORY (INHALATION) at 08:31

## 2022-11-14 RX ADMIN — MIDODRINE HYDROCHLORIDE 10 MG: 5 TABLET ORAL at 08:50

## 2022-11-14 RX ADMIN — SODIUM CHLORIDE, PRESERVATIVE FREE 10 ML: 5 INJECTION INTRAVENOUS at 21:51

## 2022-11-14 RX ADMIN — METOPROLOL SUCCINATE 25 MG: 25 TABLET, FILM COATED, EXTENDED RELEASE ORAL at 08:50

## 2022-11-14 RX ADMIN — ASPIRIN 81 MG: 81 TABLET, COATED ORAL at 08:50

## 2022-11-14 RX ADMIN — AMIODARONE HYDROCHLORIDE 100 MG: 200 TABLET ORAL at 08:46

## 2022-11-14 NOTE — PROGRESS NOTES
Bedside shift change report given to [facility], [address] [place] [address] (oncoming nurse) by Alejandra Arevalo RN (offgoing nurse). Report included the following information SBAR, Kardex, ED Summary, Intake/Output, MAR, and Cardiac Rhythm SB/NSR .

## 2022-11-14 NOTE — PROGRESS NOTES
Problem: Self Care Deficits Care Plan (Adult)  Goal: *Acute Goals and Plan of Care (Insert Text)  Description: FUNCTIONAL STATUS PRIOR TO ADMISSION: Patient was modified independent using a extra time for functional mobility, denies any AD. Patient was modified independent for basic and instrumental ADLs. Patient stated he bathes at baseline but also limits his activity due to fatigue on occasion. HOME SUPPORT: The patient lived with his girlfriend but did not require assist per the patient. Occupational Therapy Goals  Initiated 10/28/2022, reviewed and updated/continued at weekly re-assessment 11/14/2022  1. Patient will perform grooming seated EOB with set/up within 7 day(s). 2.  Patient will perform lower body dressing with supervision/set-up within 7 day(s). - MET, upgrade to modified independent  3. Patient will perform toilet transfers with supervision/set-up within 7 day(s). 4.  Patient will perform all aspects of toileting with modified independence within 7 day(s). 5.  Patient will participate in upper extremity therapeutic exercise/activities and breathing exercises with supervision/set-up for 5 minutes within 7 day(s). 6.  Patient will utilize energy conservation techniques during functional activities with verbal and visual cues within 7 day(s). Outcome: Progressing Towards Goal     OCCUPATIONAL THERAPY TREATMENT/WEEKLY RE-ASSESSMENT  Patient: Willy You (82 y.o. male)  Date: 11/14/2022  Diagnosis: Sepsis ([facility] [A41.9]  Pneumonia [J18.9]  Malignant neoplasm of lung, unspecified laterality, unspecified part of lung ([facility] [C34.90]  COPD (chronic obstructive pulmonary disease) ([facility] [J44.9] <principal problem not specified>      Precautions: Fall  Chart, occupational therapy assessment, plan of care, and goals were reviewed. ASSESSMENT  Patient continues with skilled OT services and is progressing towards goals.   Pt's performance of ADL/IADL tasks continues to be limited at this time by impaired standing balance, activity tolerance, and cardiopulmonary endurance. Pt received semi-supine and agreeable to participation in therapy session. He demonstrated bed mobility with mod I and once seated EOB completed LB dressing with set-up. SpO2 stable with seated activity. When ambulating to bathroom on 3.5L, pt desaturating to 88% however recovered with PLB and extended seated rest break. Standing grooming tasks performed with CGA and verbal cues for safety awareness. With fatigue, pt frequently reaching for external UB support when mobilizing back to EOB and requiring up to min A to maintain safety/prevent falls with multiple LOB. SpO2 dropped to 84% following standing ADLs and despite seated rest break and PLB, required increase in O2 to 5L to recover >90%. Pt left positioned for comfort and O2 titrated back to baseline 3.5L with all VSS. Continue to recommend d/c to rehab. Current Level of Function Impacting Discharge (ADLs): mod I to CGA functional transfers in prep for ADL tasks, set-up LB dressing, toileting supervision    Other factors to consider for discharge:  PLOF, O2 needs          PLAN :  Goals have been updated based on progression since last assessment. Patient continues to benefit from skilled intervention to address the above impairments. Continue to follow patient 4 times a week to address goals. Recommendation for discharge: (in order for the patient to meet his/her long term goals)  Therapy 3 hours per day 5-7 days per week    This discharge recommendation:  Has been made in collaboration with the attending provider and/or case management    IF patient discharges home will need the following DME: TBD       SUBJECTIVE:   Patient stated I'm doing okay.     OBJECTIVE DATA SUMMARY:   Cognitive/Behavioral Status:  Neurologic State: Alert  Orientation Level: Oriented X4  Cognition: Follows commands  Perception: Appears intact  Perseveration: No perseveration noted  Safety/Judgement: Awareness of environment    Functional Mobility and Transfers for ADLs:  Bed Mobility:  Supine to Sit: Modified independent;Bed Modified    Transfers:  Sit to Stand: Stand-by assistance  Functional Transfers  Bathroom Mobility: Minimum assistance (up to min A with fatigue and to recover with LOB)  Toilet Transfer : Contact guard assistance       Balance:  Sitting: Intact; Without support  Standing: Impaired; Without support  Standing - Static: Good  Standing - Dynamic : Good;Fair    ADL Intervention:       Grooming  Grooming Assistance: Contact guard assistance  Position Performed: Standing (at sink)  Washing Face: Contact guard assistance (for balance)  Washing Hands: Contact guard assistance (for balance)  Cues: Verbal cues provided (for environmental safety)        Lower Body Dressing Assistance  Socks: Set-up  Leg Crossed Method Used: Yes  Position Performed: Seated edge of bed    Toileting  Toileting Assistance: Supervision  Bladder Hygiene: Supervision  Bowel Hygiene: Supervision  Clothing Management: Supervision    Cognitive Retraining  Safety/Judgement: Awareness of environment      Pain:  None reported     Activity Tolerance:   Fair, desaturates with exertion and requires oxygen, and observed SOB with activity    After treatment patient left in no apparent distress:   Supine in bed, Call bell within reach, and Side rails x 3    COMMUNICATION/COLLABORATION:   The patients plan of care was discussed with: Registered nurse.      FILIPE sOei, OTR/L  Time Calculation: 24 mins

## 2022-11-14 NOTE — PROGRESS NOTES
2000 Received patient from Yudy martinez, [facility], [facility].    0218 Bedside shift change report given to Yudy martinez RN (oncoming nurse) by Vasyl Gould RN. Report included the following information SBAR, Kardex, MAR, Recent Results, and Cardiac Rhythm NSR/SB. Problem: Falls - Risk of  Goal: *Absence of Falls  Description: Document Kadiedegarde Counts Fall Risk and appropriate interventions in the flowsheet. Outcome: Progressing Towards Goal  Note: Fall Risk Interventions:  Mobility Interventions: Patient to call before getting OOB         Medication Interventions: Patient to call before getting OOB, Teach patient to arise slowly    Elimination Interventions: Patient to call for help with toileting needs              Problem: Nutrition Deficit  Goal: *Optimize nutritional status  Outcome: Progressing Towards Goal     Problem: Pain  Goal: *Control of Pain  Outcome: Progressing Towards Goal     Problem: Pressure Injury - Risk of  Goal: *Prevention of pressure injury  Description: Document Ramos Scale and appropriate interventions in the flowsheet.   Outcome: Progressing Towards Goal  Note: Pressure Injury Interventions:  Sensory Interventions: Assess changes in LOC, Assess need for specialty bed    Moisture Interventions: Absorbent underpads, Apply protective barrier, creams and emollients    Activity Interventions: Pressure redistribution bed/mattress(bed type)    Mobility Interventions: Pressure redistribution bed/mattress (bed type)    Nutrition Interventions: Document food/fluid/supplement intake

## 2022-11-14 NOTE — PROGRESS NOTES
[facility] [facility]  Hospitalist Group                                                                                          Hospitalist Progress Note  Rufus Bob MD  Answering service: [phone] from in house phone        Date of Service:  2022  NAME:  Eulalio Tobin  :  1950  MRN:  [id number]      Admission Summary:     Patient presents with acute on chronic respiratory failure due to COPD and pneumonia, also with new onset atrial fibrillation with new onset systolic heart failure. Interval history / Subjective:     Patient doing well, no acute complaint.      Assessment & Plan:     Acute respiratory failure  -Acute hypoxic and hypercapnic respiratory failure due to pneumonia and COPD  -Patient with baseline chronic respiratory failure due to COPD, as well as s/p lobectomy due to lung cancer, uses 3 L of oxygen at baseline  -Patient initially requiring BiPAP, fluctuation in oxygen levels  -Patient able to participate more with PT today without much shortness of breath    Sepsis  -Patient with tachycardia and leukocytosis, elevated procalcitonin level  -Sepsis due to multilobar pneumonia  -Sepsis reassessment completed, monitor hemodynamics    Multilobar pneumonia  -Blood cultures so far negative, COVID-negative, respiratory culture negative, Legionella negative, MRSA negative  -Patient has completed treatment with Rocephin and Zithromax  -Repeat chest x-ray 2022 shows improvement    Acute exacerbation of COPD  -Continue bronchodilators, breathing treatment, Solu-Medrol was changed to prednisone  -Previously evaluated by pulmonology, at baseline patient uses 3 L    Paroxysmal atrial fibrillation  -Patient remaining in sinus rhythm  -Amiodarone drip was changed to p.o. amiodarone, on metoprolol and on Eliquis for anticoagulation    Acute systolic CHF  -New onset systolic CHF, NYHA class IV, echo shows EF of 25 to 30%  -Limited use of guideline directed medical therapy due to hypotension  -Previously evaluated by cardiology    Hypotension  -Patient has been treated with albumin, added midodrine for blood pressure support  -Continue monitor hemodynamic    PENNY  -Likely multifactorial including sepsis  -PENNY is resolved    Dyslipidemia  -Continue statin    Non-small cell lung cancer  -History of right lower lobe lobectomy at [facility] in 2009  -Follow-up outpatient     Code status: DNR  Prophylaxis: [address] discussed with: Patient  Anticipated Disposition: 24 to 48 hours, plan for rehab when oxygenation improved as well as hemodynamically more stable. Hospital Problems  Date Reviewed: 1/9/2019            Codes Class Noted POA    Moderate protein-calorie malnutrition ([facility] ICD-10-CM: E44.0  ICD-9-CM: 263.0  10/27/2022 Yes        COPD (chronic obstructive pulmonary disease) ([facility] ICD-10-CM: J44.9  ICD-9-CM: 565  10/26/2022 Unknown        Pneumonia ICD-10-CM: J18.9  ICD-9-CM: 486  10/26/2022 Unknown        Malignant neoplasm of lung, unspecified laterality, unspecified part of lung ([facility].) ICD-10-CM: C34.90  ICD-9-CM: 162.9  10/26/2022 Unknown        Sepsis ([facility] ICD-10-CM: A41.9  ICD-9-CM: 038.9, 995.91  10/26/2022 Unknown             Review of Systems:   A comprehensive review of systems was negative except for that written in the HPI. Vital Signs:    Last 24hrs VS reviewed since prior progress note.  Most recent are:  Visit Vitals  BP (!) 113/56   Pulse 80   Temp 97.3 °F (36.3 °C)   Resp 24   Ht 5' 11\" (1.803 m)   Wt 60 kg (132 lb 4.4 oz)   SpO2 94%   BMI 18.45 kg/m²         Intake/Output Summary (Last 24 hours) at 11/14/2022 1131  Last data filed at 11/14/2022 0800  Gross per 24 hour   Intake --   Output 1600 ml   Net -1600 ml        Physical Examination:     I had a face to face encounter with this patient and independently examined them on 11/14/2022 as outlined below:          Constitutional:  No acute distress, cooperative, pleasant    ENT:  Oral mucosa moist, oropharynx benign. Resp:  CTA bilaterally. No wheezing/rhonchi/rales. No accessory muscle use. CV:  Regular rhythm, normal rate, no murmurs, gallops, rubs    GI:  Soft, non distended, non tender. normoactive bowel sounds, no hepatosplenomegaly     Musculoskeletal:  No edema, warm, 2+ pulses throughout    Neurologic:  Moves all extremities. AAOx3, CN II-XII reviewed            Data Review:    Review and/or order of clinical lab test      Labs:     No results for input(s): WBC, HGB, HCT, PLT, HGBEXT, HCTEXT, PLTEXT, HGBEXT, HCTEXT, PLTEXT in the last 72 hours. Recent Labs     11/14/22  0101 11/13/22  0605    140   K 4.7 4.4    107   CO2 31 29   BUN 32* 28*   CREA 1.17 1.10   GLU 80 83   CA 8.4* 8.1*     No results for input(s): ALT, AP, TBIL, TBILI, TP, ALB, GLOB, GGT, AML, LPSE in the last 72 hours. No lab exists for component: SGOT, GPT, AMYP, HLPSE  No results for input(s): INR, PTP, APTT, INREXT, INREXT in the last 72 hours. No results for input(s): FE, TIBC, PSAT, FERR in the last 72 hours. No results found for: FOL, RBCF   No results for input(s): PH, PCO2, PO2 in the last 72 hours. No results for input(s): CPK, CKNDX, TROIQ in the last 72 hours.     No lab exists for component: CPKMB  Lab Results   Component Value Date/Time    Cholesterol, total 106 02/20/2018 04:54 AM    HDL Cholesterol 45 02/20/2018 04:54 AM    LDL, calculated 46 02/20/2018 04:54 AM    Triglyceride 75 02/20/2018 04:54 AM    CHOL/HDL Ratio 2.4 02/20/2018 04:54 AM     Lab Results   Component Value Date/Time    Glucose (POC) 150 (H) 11/04/2022 12:52 PM    Glucose (POC) 100 09/14/2015 12:14 PM     Lab Results   Component Value Date/Time    Color YELLOW/STRAW 11/05/2022 01:04 PM    Appearance CLEAR 11/05/2022 01:04 PM    Specific gravity 1.017 11/05/2022 01:04 PM    pH (UA) 6.5 11/05/2022 01:04 PM    Protein Negative 11/05/2022 01:04 PM    Glucose Negative 11/05/2022 01:04 PM    Ketone Negative 11/05/2022 01:04 PM Bilirubin Negative 11/05/2022 01:04 PM    Urobilinogen 0.2 11/05/2022 01:04 PM    Nitrites Negative 11/05/2022 01:04 PM    Leukocyte Esterase Negative 11/05/2022 01:04 PM    Epithelial cells FEW 11/05/2022 01:04 PM    Bacteria Negative 11/05/2022 01:04 PM    WBC 0-4 11/05/2022 01:04 PM    RBC 20-50 11/05/2022 01:04 PM         Medications Reviewed:     Current Facility-Administered Medications   Medication Dose Route Frequency    midodrine (PROAMATINE) tablet 10 mg  10 mg Oral TID WITH MEALS    albuterol-ipratropium (DUO-NEB) 2.5 MG-0.5 MG/3 ML  3 mL Nebulization Q4H PRN    loperamide (IMODIUM) capsule 2 mg  2 mg Oral Q4H PRN    [Held by provider] furosemide (LASIX) tablet 20 mg  20 mg Oral DAILY    predniSONE (DELTASONE) tablet 30 mg  30 mg Oral DAILY WITH BREAKFAST    amiodarone (CORDARONE) tablet 100 mg  100 mg Oral BID    magic mouthwash cpd (without sucralfate)  5 mL Oral TID PRN    [Held by provider] sacubitriL-valsartan (ENTRESTO) 24-26 mg tablet 1 Tablet  1 Tablet Oral Q12H    apixaban (ELIQUIS) tablet 5 mg  5 mg Oral Q12H    metoprolol succinate (TOPROL-XL) XL tablet 25 mg  25 mg Oral DAILY    hydrALAZINE (APRESOLINE) 20 mg/mL injection 20 mg  20 mg IntraVENous Q6H PRN    sodium chloride (NS) flush 5-40 mL  5-40 mL IntraVENous Q8H    sodium chloride (NS) flush 5-40 mL  5-40 mL IntraVENous PRN    acetaminophen (TYLENOL) tablet 650 mg  650 mg Oral Q6H PRN    Or    acetaminophen (TYLENOL) suppository 650 mg  650 mg Rectal Q6H PRN    ondansetron (ZOFRAN ODT) tablet 4 mg  4 mg Oral Q8H PRN    Or    ondansetron (ZOFRAN) injection 4 mg  4 mg IntraVENous Q6H PRN    polyethylene glycol (MIRALAX) packet 17 g  17 g Oral DAILY PRN    arformoteroL (BROVANA) neb solution 15 mcg  15 mcg Nebulization BID RT    aspirin delayed-release tablet 81 mg  81 mg Oral DAILY    atorvastatin (LIPITOR) tablet 40 mg  40 mg Oral DAILY    budesonide (PULMICORT) 500 mcg/2 ml nebulizer suspension  500 mcg Nebulization BID    oxyCODONE IR (ROXICODONE) tablet 20 mg  20 mg Oral Q6H PRN    oxyCODONE IR (ROXICODONE) tablet 10 mg  10 mg Oral Q6H PRN     ______________________________________________________________________  EXPECTED LENGTH OF STAY: 4d 19h  ACTUAL LENGTH OF STAY:          19                 Kelin Jang MD

## 2022-11-14 NOTE — PROGRESS NOTES
Transition Plan of Care  RUR 12%-Low  Disposition-insurance has requested a P2P for admit to [facility]. Contact information sent to attending and he will do P2P today. Contact information [phone] reference number [id number]. Information also available in [address] [place] [address],[address]. . Await decision. Denial upheld-spoke with patient at bedside and he would like referral sent to [facility] and [facility] and also to [address] [place] [address]. Will still need authorization for SNF level.

## 2022-11-15 LAB
APPEARANCE UR: CLEAR
BACTERIA URNS QL MICRO: NEGATIVE /HPF
BILIRUB UR QL: NEGATIVE
COLOR UR: ABNORMAL
EPITH CASTS URNS QL MICRO: ABNORMAL /LPF
GLUCOSE UR STRIP.AUTO-MCNC: NEGATIVE MG/DL
HGB UR QL STRIP: ABNORMAL
KETONES UR QL STRIP.AUTO: NEGATIVE MG/DL
LEUKOCYTE ESTERASE UR QL STRIP.AUTO: NEGATIVE
NITRITE UR QL STRIP.AUTO: NEGATIVE
PH UR STRIP: 5.5 [PH] (ref 5–8)
PROT UR STRIP-MCNC: NEGATIVE MG/DL
RBC #/AREA URNS HPF: ABNORMAL /HPF (ref 0–5)
SP GR UR REFRACTOMETRY: 1.02 (ref 1–1.03)
UA: UC IF INDICATED,UAUC: ABNORMAL
UROBILINOGEN UR QL STRIP.AUTO: 0.2 EU/DL (ref 0.2–1)
WBC URNS QL MICRO: ABNORMAL /HPF (ref 0–4)
YEAST BUDDING URNS QL: PRESENT

## 2022-11-15 PROCEDURE — 74011250637 HC RX REV CODE- 250/637: Performed by: FAMILY MEDICINE

## 2022-11-15 PROCEDURE — 74011636637 HC RX REV CODE- 636/637: Performed by: FAMILY MEDICINE

## 2022-11-15 PROCEDURE — 74011250637 HC RX REV CODE- 250/637: Performed by: INTERNAL MEDICINE

## 2022-11-15 PROCEDURE — 81001 URINALYSIS AUTO W/SCOPE: CPT

## 2022-11-15 PROCEDURE — 94640 AIRWAY INHALATION TREATMENT: CPT

## 2022-11-15 PROCEDURE — 74011000250 HC RX REV CODE- 250: Performed by: INTERNAL MEDICINE

## 2022-11-15 PROCEDURE — 65660000001 HC RM ICU INTERMED STEPDOWN

## 2022-11-15 RX ADMIN — SODIUM CHLORIDE, PRESERVATIVE FREE 10 ML: 5 INJECTION INTRAVENOUS at 06:33

## 2022-11-15 RX ADMIN — MIDODRINE HYDROCHLORIDE 10 MG: 5 TABLET ORAL at 12:25

## 2022-11-15 RX ADMIN — AMIODARONE HYDROCHLORIDE 100 MG: 200 TABLET ORAL at 17:06

## 2022-11-15 RX ADMIN — METOPROLOL SUCCINATE 25 MG: 25 TABLET, FILM COATED, EXTENDED RELEASE ORAL at 08:14

## 2022-11-15 RX ADMIN — ATORVASTATIN CALCIUM 40 MG: 40 TABLET, FILM COATED ORAL at 08:14

## 2022-11-15 RX ADMIN — SODIUM CHLORIDE, PRESERVATIVE FREE 10 ML: 5 INJECTION INTRAVENOUS at 17:08

## 2022-11-15 RX ADMIN — MIDODRINE HYDROCHLORIDE 10 MG: 5 TABLET ORAL at 08:14

## 2022-11-15 RX ADMIN — BUDESONIDE 500 MCG: 0.5 INHALANT RESPIRATORY (INHALATION) at 21:30

## 2022-11-15 RX ADMIN — ARFORMOTEROL TARTRATE 15 MCG: 15 SOLUTION RESPIRATORY (INHALATION) at 21:30

## 2022-11-15 RX ADMIN — BUDESONIDE 500 MCG: 0.5 INHALANT RESPIRATORY (INHALATION) at 07:16

## 2022-11-15 RX ADMIN — APIXABAN 5 MG: 5 TABLET, FILM COATED ORAL at 08:14

## 2022-11-15 RX ADMIN — AMIODARONE HYDROCHLORIDE 100 MG: 200 TABLET ORAL at 08:14

## 2022-11-15 RX ADMIN — MIDODRINE HYDROCHLORIDE 10 MG: 5 TABLET ORAL at 17:06

## 2022-11-15 RX ADMIN — APIXABAN 5 MG: 5 TABLET, FILM COATED ORAL at 22:07

## 2022-11-15 RX ADMIN — ASPIRIN 81 MG: 81 TABLET, COATED ORAL at 08:14

## 2022-11-15 RX ADMIN — ARFORMOTEROL TARTRATE 15 MCG: 15 SOLUTION RESPIRATORY (INHALATION) at 07:16

## 2022-11-15 RX ADMIN — PREDNISONE 30 MG: 20 TABLET ORAL at 08:14

## 2022-11-15 RX ADMIN — SODIUM CHLORIDE, PRESERVATIVE FREE 10 ML: 5 INJECTION INTRAVENOUS at 22:00

## 2022-11-15 NOTE — PROGRESS NOTES
Bedside shift change report given to James Zuniga RN (oncoming nurse) by Jero Johnson RN (offgoing nurse). Report included the following information SBAR, Kardex, ED Summary, Procedure Summary, Intake/Output, MAR, and Cardiac Rhythm NSR/SB .

## 2022-11-15 NOTE — PROGRESS NOTES
Occupational Therapy: Approached patient for OT services. Patient received in bed eating lunch. He preferred to finish lunch and anticipates need fort toileting (he has complaints of ongoing diarrhea today) prior to therapy.  Will follow up later as able  Bayron Lambert OTR/AMI

## 2022-11-15 NOTE — PROGRESS NOTES
Bedside shift change report given to Ciarra Monk RN (oncoming nurse) by Bryson Morley RN (offgoing nurse).  Report included the following information SBAR, Kardex, ED Summary, Intake/Output, MAR, and Cardiac Rhythm SB .

## 2022-11-15 NOTE — PROGRESS NOTES
Chart checked, pt cleared by nursing. Attempted to work with pt today but he declined. PT will follow and see as pt is willing.  Emiyl Thomason, PT

## 2022-11-15 NOTE — PROGRESS NOTES
[address] [facility] Adult  Hospitalist Group                                                                                          Hospitalist Progress Note  Colin Lopez MD  Answering service: [phone] from in house phone        Date of Service:  11/15/2022  NAME:  Alfred Minaya  :  1950  MRN:  [id number]      Admission Summary:     Patient presents with acute on chronic respiratory failure due to COPD and pneumonia, also with new onset atrial fibrillation with new onset systolic heart failure. Interval history / Subjective:     Patient doing well, no acute complaint.      Assessment & Plan:     Acute respiratory failure  -Acute hypoxic and hypercapnic respiratory failure due to pneumonia and COPD  -Patient with baseline chronic respiratory failure due to COPD, as well as s/p lobectomy due to lung cancer, uses 3 L of oxygen at baseline  -Patient initially requiring BiPAP, fluctuation in oxygen levels  -Patient able to participate more with PT without much shortness of breath    Sepsis  -Patient with tachycardia and leukocytosis, elevated procalcitonin level  -Sepsis due to multilobar pneumonia  -Sepsis reassessment completed, monitor hemodynamics    Multilobar pneumonia  -Blood cultures so far negative, COVID-negative, respiratory culture negative, Legionella negative, MRSA negative  -Patient has completed treatment with Rocephin and Zithromax  -Repeat chest x-ray 2022 shows improvement    Acute exacerbation of COPD  -Continue bronchodilators, breathing treatment, Solu-Medrol was changed to prednisone  -Previously evaluated by pulmonology, at baseline patient uses 3 L    Paroxysmal atrial fibrillation  -Patient remaining in sinus rhythm  -Amiodarone drip was changed to p.o. amiodarone, on metoprolol and on Eliquis for anticoagulation    Acute systolic CHF  -New onset systolic CHF, NYHA class IV, echo shows EF of 25 to 30%  -Limited use of guideline directed medical therapy due to hypotension  -Previously evaluated by cardiology    Hypotension  -Patient has been treated with albumin, added midodrine for blood pressure support  -Continue monitor hemodynamic    PENNY  -Likely multifactorial including sepsis  -PENNY is resolved    Dyslipidemia  -Continue statin    Non-small cell lung cancer  -History of right lower lobe lobectomy at [facility] in 2009  -Follow-up outpatient     Code status: DNR  Prophylaxis: [address] discussed with: Patient  Anticipated Disposition: 24 to 48 hours, did P2P but did not for IPR. Plan for placement to SNF. Hospital Problems  Date Reviewed: 1/9/2019            Codes Class Noted POA    Moderate protein-calorie malnutrition ([facility] ICD-10-CM: E44.0  ICD-9-CM: 263.0  10/27/2022 Yes        COPD (chronic obstructive pulmonary disease) ([facility] ICD-10-CM: J44.9  ICD-9-CM: 177  10/26/2022 Unknown        Pneumonia ICD-10-CM: J18.9  ICD-9-CM: 486  10/26/2022 Unknown        Malignant neoplasm of lung, unspecified laterality, unspecified part of lung ([facility] ICD-10-CM: C34.90  ICD-9-CM: 162.9  10/26/2022 Unknown        Sepsis ([facility] ICD-10-CM: A41.9  ICD-9-CM: 038.9, 995.91  10/26/2022 Unknown             Review of Systems:   A comprehensive review of systems was negative except for that written in the HPI. Vital Signs:    Last 24hrs VS reviewed since prior progress note.  Most recent are:  Visit Vitals  BP (!) 109/56 (BP 1 Location: Left upper arm, BP Patient Position: At rest)   Pulse (!) 56   Temp 97.7 °F (36.5 °C)   Resp 20   Ht 5' 11\" (1.803 m)   Wt 60 kg (132 lb 4.4 oz)   SpO2 100%   BMI 18.45 kg/m²         Intake/Output Summary (Last 24 hours) at 11/15/2022 1123  Last data filed at 11/14/2022 1657  Gross per 24 hour   Intake --   Output 300 ml   Net -300 ml        Physical Examination:     I had a face to face encounter with this patient and independently examined them on 11/15/2022 as outlined below:          Constitutional:  No acute distress, cooperative, pleasant    ENT:  Oral mucosa moist, oropharynx benign. Resp:  CTA bilaterally. No wheezing/rhonchi/rales. No accessory muscle use. CV:  Regular rhythm, normal rate, no murmurs, gallops, rubs    GI:  Soft, non distended, non tender. normoactive bowel sounds, no hepatosplenomegaly     Musculoskeletal:  No edema, warm, 2+ pulses throughout    Neurologic:  Moves all extremities. AAOx3, CN II-XII reviewed            Data Review:    Review and/or order of clinical lab test      Labs:     No results for input(s): WBC, HGB, HCT, PLT, HGBEXT, HCTEXT, PLTEXT, HGBEXT, HCTEXT, PLTEXT in the last 72 hours. Recent Labs     11/14/22  0101 11/13/22  0605    140   K 4.7 4.4    107   CO2 31 29   BUN 32* 28*   CREA 1.17 1.10   GLU 80 83   CA 8.4* 8.1*     No results for input(s): ALT, AP, TBIL, TBILI, TP, ALB, GLOB, GGT, AML, LPSE in the last 72 hours. No lab exists for component: SGOT, GPT, AMYP, HLPSE  No results for input(s): INR, PTP, APTT, INREXT, INREXT in the last 72 hours. No results for input(s): FE, TIBC, PSAT, FERR in the last 72 hours. No results found for: FOL, RBCF   No results for input(s): PH, PCO2, PO2 in the last 72 hours. No results for input(s): CPK, CKNDX, TROIQ in the last 72 hours.     No lab exists for component: CPKMB  Lab Results   Component Value Date/Time    Cholesterol, total 106 02/20/2018 04:54 AM    HDL Cholesterol 45 02/20/2018 04:54 AM    LDL, calculated 46 02/20/2018 04:54 AM    Triglyceride 75 02/20/2018 04:54 AM    CHOL/HDL Ratio 2.4 02/20/2018 04:54 AM     Lab Results   Component Value Date/Time    Glucose (POC) 150 (H) 11/04/2022 12:52 PM    Glucose (POC) 100 09/14/2015 12:14 PM     Lab Results   Component Value Date/Time    Color YELLOW/STRAW 11/05/2022 01:04 PM    Appearance CLEAR 11/05/2022 01:04 PM    Specific gravity 1.017 11/05/2022 01:04 PM    pH (UA) 6.5 11/05/2022 01:04 PM    Protein Negative 11/05/2022 01:04 PM    Glucose Negative 11/05/2022 01:04 PM    Ketone Negative 11/05/2022 01:04 PM    Bilirubin Negative 11/05/2022 01:04 PM    Urobilinogen 0.2 11/05/2022 01:04 PM    Nitrites Negative 11/05/2022 01:04 PM    Leukocyte Esterase Negative 11/05/2022 01:04 PM    Epithelial cells FEW 11/05/2022 01:04 PM    Bacteria Negative 11/05/2022 01:04 PM    WBC 0-4 11/05/2022 01:04 PM    RBC 20-50 11/05/2022 01:04 PM         Medications Reviewed:     Current Facility-Administered Medications   Medication Dose Route Frequency    midodrine (PROAMATINE) tablet 10 mg  10 mg Oral TID WITH MEALS    albuterol-ipratropium (DUO-NEB) 2.5 MG-0.5 MG/3 ML  3 mL Nebulization Q4H PRN    loperamide (IMODIUM) capsule 2 mg  2 mg Oral Q4H PRN    [Held by provider] furosemide (LASIX) tablet 20 mg  20 mg Oral DAILY    predniSONE (DELTASONE) tablet 30 mg  30 mg Oral DAILY WITH BREAKFAST    amiodarone (CORDARONE) tablet 100 mg  100 mg Oral BID    magic mouthwash cpd (without sucralfate)  5 mL Oral TID PRN    [Held by provider] sacubitriL-valsartan (ENTRESTO) 24-26 mg tablet 1 Tablet  1 Tablet Oral Q12H    apixaban (ELIQUIS) tablet 5 mg  5 mg Oral Q12H    metoprolol succinate (TOPROL-XL) XL tablet 25 mg  25 mg Oral DAILY    hydrALAZINE (APRESOLINE) 20 mg/mL injection 20 mg  20 mg IntraVENous Q6H PRN    sodium chloride (NS) flush 5-40 mL  5-40 mL IntraVENous Q8H    sodium chloride (NS) flush 5-40 mL  5-40 mL IntraVENous PRN    acetaminophen (TYLENOL) tablet 650 mg  650 mg Oral Q6H PRN    Or    acetaminophen (TYLENOL) suppository 650 mg  650 mg Rectal Q6H PRN    ondansetron (ZOFRAN ODT) tablet 4 mg  4 mg Oral Q8H PRN    Or    ondansetron (ZOFRAN) injection 4 mg  4 mg IntraVENous Q6H PRN    polyethylene glycol (MIRALAX) packet 17 g  17 g Oral DAILY PRN    arformoteroL (BROVANA) neb solution 15 mcg  15 mcg Nebulization BID RT    aspirin delayed-release tablet 81 mg  81 mg Oral DAILY    atorvastatin (LIPITOR) tablet 40 mg  40 mg Oral DAILY    budesonide (PULMICORT) 500 mcg/2 ml nebulizer suspension  500 mcg Nebulization BID    oxyCODONE IR (ROXICODONE) tablet 20 mg  20 mg Oral Q6H PRN    oxyCODONE IR (ROXICODONE) tablet 10 mg  10 mg Oral Q6H PRN     ______________________________________________________________________  EXPECTED LENGTH OF STAY: 4d 19h  ACTUAL LENGTH OF STAY:          20                 Kelin Jang MD

## 2022-11-16 PROCEDURE — 94640 AIRWAY INHALATION TREATMENT: CPT

## 2022-11-16 PROCEDURE — 74011250637 HC RX REV CODE- 250/637: Performed by: FAMILY MEDICINE

## 2022-11-16 PROCEDURE — 94664 DEMO&/EVAL PT USE INHALER: CPT

## 2022-11-16 PROCEDURE — 74011000250 HC RX REV CODE- 250: Performed by: INTERNAL MEDICINE

## 2022-11-16 PROCEDURE — 97535 SELF CARE MNGMENT TRAINING: CPT

## 2022-11-16 PROCEDURE — 74011250637 HC RX REV CODE- 250/637: Performed by: INTERNAL MEDICINE

## 2022-11-16 PROCEDURE — 74011636637 HC RX REV CODE- 636/637: Performed by: FAMILY MEDICINE

## 2022-11-16 PROCEDURE — 77010033678 HC OXYGEN DAILY

## 2022-11-16 PROCEDURE — 65660000001 HC RM ICU INTERMED STEPDOWN

## 2022-11-16 PROCEDURE — 97116 GAIT TRAINING THERAPY: CPT

## 2022-11-16 RX ADMIN — MIDODRINE HYDROCHLORIDE 10 MG: 5 TABLET ORAL at 08:59

## 2022-11-16 RX ADMIN — ARFORMOTEROL TARTRATE 15 MCG: 15 SOLUTION RESPIRATORY (INHALATION) at 07:16

## 2022-11-16 RX ADMIN — ASPIRIN 81 MG: 81 TABLET, COATED ORAL at 08:59

## 2022-11-16 RX ADMIN — AMIODARONE HYDROCHLORIDE 100 MG: 200 TABLET ORAL at 09:00

## 2022-11-16 RX ADMIN — APIXABAN 5 MG: 5 TABLET, FILM COATED ORAL at 09:00

## 2022-11-16 RX ADMIN — MIDODRINE HYDROCHLORIDE 10 MG: 5 TABLET ORAL at 12:54

## 2022-11-16 RX ADMIN — ARFORMOTEROL TARTRATE 15 MCG: 15 SOLUTION RESPIRATORY (INHALATION) at 20:54

## 2022-11-16 RX ADMIN — AMIODARONE HYDROCHLORIDE 100 MG: 200 TABLET ORAL at 17:22

## 2022-11-16 RX ADMIN — Medication 5 ML: at 17:23

## 2022-11-16 RX ADMIN — BUDESONIDE 500 MCG: 0.5 INHALANT RESPIRATORY (INHALATION) at 07:16

## 2022-11-16 RX ADMIN — PREDNISONE 30 MG: 20 TABLET ORAL at 09:00

## 2022-11-16 RX ADMIN — MIDODRINE HYDROCHLORIDE 10 MG: 5 TABLET ORAL at 17:22

## 2022-11-16 RX ADMIN — ATORVASTATIN CALCIUM 40 MG: 40 TABLET, FILM COATED ORAL at 09:00

## 2022-11-16 RX ADMIN — METOPROLOL SUCCINATE 25 MG: 25 TABLET, FILM COATED, EXTENDED RELEASE ORAL at 09:00

## 2022-11-16 RX ADMIN — APIXABAN 5 MG: 5 TABLET, FILM COATED ORAL at 22:14

## 2022-11-16 RX ADMIN — BUDESONIDE 500 MCG: 0.5 INHALANT RESPIRATORY (INHALATION) at 20:54

## 2022-11-16 RX ADMIN — SODIUM CHLORIDE, PRESERVATIVE FREE 10 ML: 5 INJECTION INTRAVENOUS at 17:26

## 2022-11-16 RX ADMIN — SODIUM CHLORIDE, PRESERVATIVE FREE 10 ML: 5 INJECTION INTRAVENOUS at 22:14

## 2022-11-16 RX ADMIN — SODIUM CHLORIDE, PRESERVATIVE FREE 10 ML: 5 INJECTION INTRAVENOUS at 06:55

## 2022-11-16 NOTE — PROGRESS NOTES
[facility] [facility]  Hospitalist Group                                                                                          Hospitalist Progress Note  Kathryn Hand MD  Answering service: [phone] from in house phone        Date of Service:  2022  NAME:  Luiz Ziegler  :  1950  MRN:  [id number]      Admission Summary:     Patient presents with acute on chronic respiratory failure due to COPD and pneumonia, also with new onset atrial fibrillation with new onset systolic heart failure. Interval history / Subjective:     Patient doing well, no acute complaint.      Assessment & Plan:     Acute respiratory failure  -Acute hypoxic and hypercapnic respiratory failure due to pneumonia and COPD  -Patient with baseline chronic respiratory failure due to COPD, as well as s/p lobectomy due to lung cancer, uses 3 L of oxygen at baseline  -Patient initially requiring BiPAP, overall improving and able to participate more with PT    Sepsis  -Patient with tachycardia and leukocytosis, elevated procalcitonin level  -Sepsis due to multilobar pneumonia  -Sepsis reassessment completed, monitor hemodynamics    Multilobar pneumonia  -Blood cultures so far negative, COVID-negative, respiratory culture negative, Legionella negative, MRSA negative  -Patient has completed treatment with Rocephin and Zithromax  -Repeat chest x-ray 2022 shows improvement    Acute exacerbation of COPD  -Continue bronchodilators, breathing treatment, Solu-Medrol was changed to prednisone  -Previously evaluated by pulmonology, at baseline patient uses 3 L    Paroxysmal atrial fibrillation  -Patient remaining in sinus rhythm  -Amiodarone drip was changed to p.o. amiodarone, on metoprolol and on Eliquis for anticoagulation    Acute systolic CHF  -New onset systolic CHF, NYHA class IV, echo shows EF of 25 to 30%  -Limited use of guideline directed medical therapy due to hypotension  -Previously evaluated by cardiology  -May consider resuming Lasix and Entresto when patient is hemodynamically more improved    Hypotension  -Patient has been treated with albumin, added midodrine for blood pressure support  -Continue monitor hemodynamic    PENNY  -Likely multifactorial including sepsis  -PENNY is resolved    Dyslipidemia  -Continue statin    Non-small cell lung cancer  -History of right lower lobe lobectomy at [facility] in 2009  -Follow-up outpatient     Code status: DNR  Prophylaxis: [address] discussed with: Patient  Anticipated Disposition: Did P2P, denied for IPR. Plan for SNF. Medically stable, placement pending. Hospital Problems  Date Reviewed: 1/9/2019            Codes Class Noted POA    Moderate protein-calorie malnutrition ([facility] ICD-10-CM: E44.0  ICD-9-CM: 263.0  10/27/2022 Yes        COPD (chronic obstructive pulmonary disease) ([facility] ICD-10-CM: J44.9  ICD-9-CM: 263  10/26/2022 Unknown        Pneumonia ICD-10-CM: J18.9  ICD-9-CM: 486  10/26/2022 Unknown        Malignant neoplasm of lung, unspecified laterality, unspecified part of lung ([facility] ICD-10-CM: C34.90  ICD-9-CM: 162.9  10/26/2022 Unknown        Sepsis ([facility] ICD-10-CM: A41.9  ICD-9-CM: 038.9, 995.91  10/26/2022 Unknown             Review of Systems:   A comprehensive review of systems was negative except for that written in the HPI. Vital Signs:    Last 24hrs VS reviewed since prior progress note.  Most recent are:  Visit Vitals  BP (!) 105/58 (BP 1 Location: Left upper arm, BP Patient Position: At rest)   Pulse 61   Temp 97.9 °F (36.6 °C)   Resp 18   Ht 5' 11\" (1.803 m)   Wt 60.3 kg (132 lb 15 oz)   SpO2 96%   BMI 18.54 kg/m²       No intake or output data in the 24 hours ending 11/16/22 1205       Physical Examination:     I had a face to face encounter with this patient and independently examined them on 11/16/2022 as outlined below:          Constitutional:  No acute distress, cooperative, pleasant    ENT:  Oral mucosa moist, oropharynx benign. Resp:  CTA bilaterally. No wheezing/rhonchi/rales. No accessory muscle use. CV:  Regular rhythm, normal rate, no murmurs, gallops, rubs    GI:  Soft, non distended, non tender. normoactive bowel sounds, no hepatosplenomegaly     Musculoskeletal:  No edema, warm, 2+ pulses throughout    Neurologic:  Moves all extremities. AAOx3, CN II-XII reviewed            Data Review:    Review and/or order of clinical lab test      Labs:     No results for input(s): WBC, HGB, HCT, PLT, HGBEXT, HCTEXT, PLTEXT, HGBEXT, HCTEXT, PLTEXT in the last 72 hours. Recent Labs     11/14/22  0101      K 4.7      CO2 31   BUN 32*   CREA 1.17   GLU 80   CA 8.4*     No results for input(s): ALT, AP, TBIL, TBILI, TP, ALB, GLOB, GGT, AML, LPSE in the last 72 hours. No lab exists for component: SGOT, GPT, AMYP, HLPSE  No results for input(s): INR, PTP, APTT, INREXT, INREXT in the last 72 hours. No results for input(s): FE, TIBC, PSAT, FERR in the last 72 hours. No results found for: FOL, RBCF   No results for input(s): PH, PCO2, PO2 in the last 72 hours. No results for input(s): CPK, CKNDX, TROIQ in the last 72 hours.     No lab exists for component: CPKMB  Lab Results   Component Value Date/Time    Cholesterol, total 106 02/20/2018 04:54 AM    HDL Cholesterol 45 02/20/2018 04:54 AM    LDL, calculated 46 02/20/2018 04:54 AM    Triglyceride 75 02/20/2018 04:54 AM    CHOL/HDL Ratio 2.4 02/20/2018 04:54 AM     Lab Results   Component Value Date/Time    Glucose (POC) 150 (H) 11/04/2022 12:52 PM    Glucose (POC) 100 09/14/2015 12:14 PM     Lab Results   Component Value Date/Time    Color YELLOW/STRAW 11/15/2022 11:05 AM    Appearance CLEAR 11/15/2022 11:05 AM    Specific gravity 1.021 11/15/2022 11:05 AM    pH (UA) 5.5 11/15/2022 11:05 AM    Protein Negative 11/15/2022 11:05 AM    Glucose Negative 11/15/2022 11:05 AM    Ketone Negative 11/15/2022 11:05 AM    Bilirubin Negative 11/15/2022 11:05 AM    Urobilinogen 0.2 11/15/2022 11:05 AM    Nitrites Negative 11/15/2022 11:05 AM    Leukocyte Esterase Negative 11/15/2022 11:05 AM    Epithelial cells FEW 11/15/2022 11:05 AM    Bacteria Negative 11/15/2022 11:05 AM    WBC 0-4 11/15/2022 11:05 AM    RBC 0-5 11/15/2022 11:05 AM         Medications Reviewed:     Current Facility-Administered Medications   Medication Dose Route Frequency    midodrine (PROAMATINE) tablet 10 mg  10 mg Oral TID WITH MEALS    albuterol-ipratropium (DUO-NEB) 2.5 MG-0.5 MG/3 ML  3 mL Nebulization Q4H PRN    loperamide (IMODIUM) capsule 2 mg  2 mg Oral Q4H PRN    [Held by provider] furosemide (LASIX) tablet 20 mg  20 mg Oral DAILY    predniSONE (DELTASONE) tablet 30 mg  30 mg Oral DAILY WITH BREAKFAST    amiodarone (CORDARONE) tablet 100 mg  100 mg Oral BID    magic mouthwash cpd (without sucralfate)  5 mL Oral TID PRN    [Held by provider] sacubitriL-valsartan (ENTRESTO) 24-26 mg tablet 1 Tablet  1 Tablet Oral Q12H    apixaban (ELIQUIS) tablet 5 mg  5 mg Oral Q12H    metoprolol succinate (TOPROL-XL) XL tablet 25 mg  25 mg Oral DAILY    hydrALAZINE (APRESOLINE) 20 mg/mL injection 20 mg  20 mg IntraVENous Q6H PRN    sodium chloride (NS) flush 5-40 mL  5-40 mL IntraVENous Q8H    sodium chloride (NS) flush 5-40 mL  5-40 mL IntraVENous PRN    acetaminophen (TYLENOL) tablet 650 mg  650 mg Oral Q6H PRN    Or    acetaminophen (TYLENOL) suppository 650 mg  650 mg Rectal Q6H PRN    ondansetron (ZOFRAN ODT) tablet 4 mg  4 mg Oral Q8H PRN    Or    ondansetron (ZOFRAN) injection 4 mg  4 mg IntraVENous Q6H PRN    polyethylene glycol (MIRALAX) packet 17 g  17 g Oral DAILY PRN    arformoteroL (BROVANA) neb solution 15 mcg  15 mcg Nebulization BID RT    aspirin delayed-release tablet 81 mg  81 mg Oral DAILY    atorvastatin (LIPITOR) tablet 40 mg  40 mg Oral DAILY    budesonide (PULMICORT) 500 mcg/2 ml nebulizer suspension  500 mcg Nebulization BID    oxyCODONE IR (ROXICODONE) tablet 20 mg  20 mg Oral Q6H PRN    oxyCODONE IR (ROXICODONE) tablet 10 mg  10 mg Oral Q6H PRN     ______________________________________________________________________  EXPECTED LENGTH OF STAY: 4d 19h  ACTUAL LENGTH OF STAY:          21                 Olga Lidia Rose MD

## 2022-11-16 NOTE — PROGRESS NOTES
Problem: Self Care Deficits Care Plan (Adult)  Goal: *Acute Goals and Plan of Care (Insert Text)  Description: FUNCTIONAL STATUS PRIOR TO ADMISSION: Patient was modified independent using a extra time for functional mobility, denies any AD. Patient was modified independent for basic and instrumental ADLs. Patient stated he bathes at baseline but also limits his activity due to fatigue on occasion. HOME SUPPORT: The patient lived with his girlfriend but did not require assist per the patient. Occupational Therapy Goals  Initiated 10/28/2022, reviewed and updated/continued at weekly re-assessment 11/14/2022  1. Patient will perform grooming seated EOB with set/up within 7 day(s). 2.  Patient will perform lower body dressing with supervision/set-up within 7 day(s). - MET, upgrade to modified independent  3. Patient will perform toilet transfers with supervision/set-up within 7 day(s). 4.  Patient will perform all aspects of toileting with modified independence within 7 day(s). 5.  Patient will participate in upper extremity therapeutic exercise/activities and breathing exercises with supervision/set-up for 5 minutes within 7 day(s). 6.  Patient will utilize energy conservation techniques during functional activities with verbal and visual cues within 7 day(s). Outcome: Progressing Towards Goal   OCCUPATIONAL THERAPY TREATMENT  Patient: Gilles Magaña (43 y.o. male)  Date: 11/16/2022  Diagnosis: Sepsis ([facility] [A41.9]  Pneumonia [J18.9]  Malignant neoplasm of lung, unspecified laterality, unspecified part of lung ([facility] [C34.90]  COPD (chronic obstructive pulmonary disease) ([facility] [J44.9] <principal problem not specified>      Precautions: Fall  Chart, occupational therapy assessment, plan of care, and goals were reviewed. ASSESSMENT  Patient continues with skilled OT services and is progressing towards goals.   Pt's performance of ADL/IADL tasks continues to be limited at this time by impaired dynamic standing balance, activity tolerance, and cardiopulmonary endurance. Pt received semi-supine, on 5L NC, and agreeable to participation in therapy session. He demonstrated bed mobility with mod I, however with quick desaturation to 85%. Pt able to implement PLB independently, and with rest break before initiating ADL tasks SpO2 recovered to >90%. Pt declining participation in any standing ADLs, therefore all grooming and UB dressing completed in sitting. Frequent seated rest breaks required throughout ADLs to maintain SpO2 >90%. Pt left seated on EOB per pt request and with all needs met. VSS. RN notified of session. Continue to recommend d/c to rehab for continued therapy efforts and d/t deficits listed above. Current Level of Function Impacting Discharge (ADLs): set-up seated grooming routine, min A UB dressing     Other factors to consider for discharge: PLOF, O2 needs         PLAN :  Patient continues to benefit from skilled intervention to address the above impairments. Continue treatment per established plan of care to address goals. Recommendation for discharge: (in order for the patient to meet his/her long term goals)  Therapy 3 hours per day 5-7 days per week    This discharge recommendation:  Has been made in collaboration with the attending provider and/or case management    IF patient discharges home will need the following DME: TBD       SUBJECTIVE:   Patient stated I think I'd like to stay sitting here for a while.     OBJECTIVE DATA SUMMARY:   Cognitive/Behavioral Status:  Neurologic State: Alert  Orientation Level: Oriented X4  Cognition: Follows commands  Perception: Appears intact  Perseveration: No perseveration noted  Safety/Judgement: Awareness of environment    Functional Mobility and Transfers for ADLs:  Bed Mobility:  Supine to Sit: Modified independent;Bed Modified    Transfers:  Sit to Stand: Stand-by assistance          Balance:  Sitting: Intact; Without support  Standing: Impaired; Without support  Standing - Static: Good  Standing - Dynamic : Good;Fair    ADL Intervention:       Grooming  Grooming Assistance: Set-up  Position Performed: Seated edge of bed  Washing Face: Set-up  Washing Hands: Set-up  Shaving: Set-up  Brushing/Combing Hair: Set-up (to wash and comb hair)  Cues: Verbal cues provided (for activity pacing)                   Upper Body [address]: Minimum  assistance              Cognitive Retraining  Safety/Judgement: Awareness of environment      Pain:  None reported     Activity Tolerance:   Fair, desaturates with exertion and requires oxygen, and requires frequent rest breaks    After treatment patient left in no apparent distress:   Call bell within reach and Side rails x 3, seated EOB    COMMUNICATION/COLLABORATION:   The patients plan of care was discussed with: Physical therapy assistant and Registered nurse.      FILIPE Ibarra, OTR/L  Time Calculation: 29 mins

## 2022-11-16 NOTE — PROGRESS NOTES
Problem: Mobility Impaired (Adult and Pediatric)  Goal: *Acute Goals and Plan of Care (Insert Text)  Description: FUNCTIONAL STATUS PRIOR TO ADMISSION: Patient was independent and active without use of DME. At baseline patient uses 2-4 liters of supplemental oxygen. He reports using a portable pulse ox. When asked he reported no falls in the last 12 months. HOME SUPPORT PRIOR TO ADMISSION: The patient lived with his significant other but did not require assist.    Physical Therapy Goals  Revised 11/11/2022  1. Patient will move from supine to sit and sit to supine , scoot up and down, and roll side to side in bed with independence within 7 day(s). 2.  Patient will transfer from bed to chair and chair to bed with independence using the least restrictive device within 7 day(s). 3.  Patient will perform sit to stand with independence within 7 day(s). 4.  Patient will ambulate with independence for 50 feet with the least restrictive device within 7 day(s). 5.  Patient will ascend/descend 4 stairs with one handrail(s) with modified independence within 7 day(s). Physical Therapy Goals  Revisited 11/4/2022, goals not met but remain appropriate so carry over         Physical Therapy Goals  Initiated 10/28/2022  1. Patient will move from supine to sit and sit to supine , scoot up and down, and roll side to side in bed with independence within 7 day(s). 2.  Patient will transfer from bed to chair and chair to bed with independence using the least restrictive device within 7 day(s). 3.  Patient will perform sit to stand with independence within 7 day(s). 4.  Patient will ambulate with independence for 300 feet with the least restrictive device within 7 day(s). 5.  Patient will ascend/descend 4 stairs with one handrail(s) with modified independence within 7 day(s).    Outcome: Progressing Towards Goal    PHYSICAL THERAPY TREATMENT  Patient: Eulalio Tobin (71 y.o. male)  Date: 11/16/2022  Diagnosis: Sepsis ([facility] [A41.9]  Pneumonia [J18.9]  Malignant neoplasm of lung, unspecified laterality, unspecified part of lung ([facility] [C34.90]  COPD (chronic obstructive pulmonary disease) ([facility] [J44.9] <principal problem not specified>      Precautions: Fall  Chart, physical therapy assessment, plan of care and goals were reviewed. ASSESSMENT  Patient continues with skilled PT services and is progressing towards goals. Pt was able to increase gait tolerance. Noted decrease balance. Pt reaching out for objects to assist with balance. Pt SOB post gait. SpO2 89-95% on 5 L O2. Current Level of Function Impacting Discharge (mobility/balance): Min A     Other factors to consider for discharge: SOB, decrease independence          PLAN :  Patient continues to benefit from skilled intervention to address the above impairments. Continue treatment per established plan of care. to address goals. Recommendation for discharge: (in order for the patient to meet his/her long term goals)  Therapy 3 hours per day 5-7 days per week pt has been denied  then SNF     This discharge recommendation:  Has not yet been discussed the attending provider and/or case management    IF patient discharges home will need the following DME: to be determined (TBD)       SUBJECTIVE:   Patient stated I don't know, I guess I can do it.     OBJECTIVE DATA SUMMARY:   Critical Behavior:  Neurologic State: Alert  Orientation Level: Oriented X4  Cognition: Follows commands  Safety/Judgement: Awareness of environment  Functional Mobility Training:  Bed Mobility:     Supine to Sit: Modified independent;Bed Modified              Transfers:  Sit to Stand: Stand-by assistance  Stand to Sit: Stand-by assistance                             Balance:  Sitting: Intact; Without support  Standing: Impaired; Without support  Standing - Static: Good  Standing - Dynamic : Good;Fair  Ambulation/Gait Training:  Distance (ft): 20 Feet (ft) Ambulation - Level of Assistance: Contact guard assistance;Minimal assistance        Gait Abnormalities: Decreased step clearance; Path deviations        Base of Support: Center of gravity altered;Narrowed        Step Length: Right shortened;Left shortened                      Stairs:               Therapeutic Exercises:     Pain Rating:  none    Activity Tolerance:   observed SOB with activity    After treatment patient left in no apparent distress:   Call bell within reach and sitting EOB    COMMUNICATION/COLLABORATION:   The patients plan of care was discussed with: Occupational therapist.     Monica Gomez PTA   Time Calculation: 9 mins

## 2022-11-16 NOTE — PROGRESS NOTES
Bedside shift change report given to Katie Dos Santos RN (oncoming nurse) by Nicole Solitario RN (offgoing nurse). Report included the following information SBAR.

## 2022-11-17 PROCEDURE — 74011250637 HC RX REV CODE- 250/637: Performed by: FAMILY MEDICINE

## 2022-11-17 PROCEDURE — 74011636637 HC RX REV CODE- 636/637: Performed by: FAMILY MEDICINE

## 2022-11-17 PROCEDURE — 74011000250 HC RX REV CODE- 250: Performed by: INTERNAL MEDICINE

## 2022-11-17 PROCEDURE — 74011250637 HC RX REV CODE- 250/637: Performed by: INTERNAL MEDICINE

## 2022-11-17 PROCEDURE — 65660000001 HC RM ICU INTERMED STEPDOWN

## 2022-11-17 PROCEDURE — 74011000250 HC RX REV CODE- 250: Performed by: FAMILY MEDICINE

## 2022-11-17 PROCEDURE — 94664 DEMO&/EVAL PT USE INHALER: CPT

## 2022-11-17 PROCEDURE — 94640 AIRWAY INHALATION TREATMENT: CPT

## 2022-11-17 PROCEDURE — 77010033678 HC OXYGEN DAILY

## 2022-11-17 RX ADMIN — IPRATROPIUM BROMIDE AND ALBUTEROL SULFATE 3 ML: .5; 3 SOLUTION RESPIRATORY (INHALATION) at 16:25

## 2022-11-17 RX ADMIN — BUDESONIDE 500 MCG: 0.5 INHALANT RESPIRATORY (INHALATION) at 21:42

## 2022-11-17 RX ADMIN — SODIUM CHLORIDE, PRESERVATIVE FREE 10 ML: 5 INJECTION INTRAVENOUS at 21:02

## 2022-11-17 RX ADMIN — ASPIRIN 81 MG: 81 TABLET, COATED ORAL at 09:09

## 2022-11-17 RX ADMIN — ARFORMOTEROL TARTRATE 15 MCG: 15 SOLUTION RESPIRATORY (INHALATION) at 21:42

## 2022-11-17 RX ADMIN — AMIODARONE HYDROCHLORIDE 100 MG: 200 TABLET ORAL at 09:09

## 2022-11-17 RX ADMIN — LOPERAMIDE HYDROCHLORIDE 2 MG: 2 CAPSULE ORAL at 21:04

## 2022-11-17 RX ADMIN — MIDODRINE HYDROCHLORIDE 10 MG: 5 TABLET ORAL at 12:15

## 2022-11-17 RX ADMIN — SODIUM CHLORIDE, PRESERVATIVE FREE 10 ML: 5 INJECTION INTRAVENOUS at 14:00

## 2022-11-17 RX ADMIN — ARFORMOTEROL TARTRATE 15 MCG: 15 SOLUTION RESPIRATORY (INHALATION) at 08:11

## 2022-11-17 RX ADMIN — MIDODRINE HYDROCHLORIDE 10 MG: 5 TABLET ORAL at 17:02

## 2022-11-17 RX ADMIN — AMIODARONE HYDROCHLORIDE 100 MG: 200 TABLET ORAL at 18:55

## 2022-11-17 RX ADMIN — APIXABAN 5 MG: 5 TABLET, FILM COATED ORAL at 09:09

## 2022-11-17 RX ADMIN — APIXABAN 5 MG: 5 TABLET, FILM COATED ORAL at 21:02

## 2022-11-17 RX ADMIN — MIDODRINE HYDROCHLORIDE 10 MG: 5 TABLET ORAL at 09:12

## 2022-11-17 RX ADMIN — PREDNISONE 30 MG: 20 TABLET ORAL at 09:09

## 2022-11-17 RX ADMIN — ATORVASTATIN CALCIUM 40 MG: 40 TABLET, FILM COATED ORAL at 09:09

## 2022-11-17 RX ADMIN — METOPROLOL SUCCINATE 25 MG: 25 TABLET, FILM COATED, EXTENDED RELEASE ORAL at 09:09

## 2022-11-17 RX ADMIN — SODIUM CHLORIDE, PRESERVATIVE FREE 10 ML: 5 INJECTION INTRAVENOUS at 07:23

## 2022-11-17 RX ADMIN — BUDESONIDE 500 MCG: 0.5 INHALANT RESPIRATORY (INHALATION) at 08:11

## 2022-11-17 RX ADMIN — LOPERAMIDE HYDROCHLORIDE 2 MG: 2 CAPSULE ORAL at 09:16

## 2022-11-17 NOTE — PROGRESS NOTES
Physical Therapy    Reviewed chart and attempted to treat pt. Pt sitting on the side of the bed reports just returning from [facility]. Pt reports takes a lot out of him and just needs to recover. Pt declining mobility at this time. Will defer and continue to follow.

## 2022-11-17 NOTE — PROGRESS NOTES
Comprehensive Nutrition Assessment    Type and Reason for Visit: Reassess    Nutrition Recommendations/Plan:     Recommend starting a probiotic     Continue regular diet with high kcal/high protein ONS BID    Obtain standing scale weight       Malnutrition Assessment:  Malnutrition Status:  Severe malnutrition (11/10/22 1530)    Context:  Acute illness     Findings of the 6 clinical characteristics of malnutrition:   Energy Intake:  Unable to assess  Weight Loss:  Greater than 2% over 1 week     Body Fat Loss:  Mild body fat loss, Orbital, Triceps, Buccal region   Muscle Mass Loss: Moderate muscle mass loss, Temples (temporalis), Clavicles (pectoralis & deltoids)  Fluid Accumulation:  No significant fluid accumulation,     Strength:  Not performed        Nutrition Assessment:    Past Medical History:   Diagnosis Date    Asthma     Cancer ([facility]     Lung Cancer    Chronic obstructive pulmonary disease (HCC)     Chronic pain     High cholesterol     Lung cancer ([facility]        Pt admitted with Sepsis ([facility] [A41.9]  Pneumonia [J18.9]  Malignant neoplasm of lung, unspecified laterality, unspecified part of lung ([facility] [C34.90]  COPD (chronic obstructive pulmonary disease) ([facility] [J44.9]          11/17: follow-up. Weight holding stable at 132 lb since last week. On 3 L O2. Pt is medically stable for d/c, awaiting placement. Denied for IPR, plan for SNF. PO intake about the same, but c/o diarrhea with most everything he eats. Believes they are getting a little more formed. Has imodium ordered PRN. His white board indicated he also had Lomotil, but this is not currently ordered. Has been on IV abx. Discussed trying probiotic, pt receptive. Last 3 Recorded Weights in this Encounter    11/14/22 0049 11/16/22 0145 11/17/22 0348   Weight: 60 kg (132 lb 4.4 oz) 60.3 kg (132 lb 15 oz) 60.2 kg (132 lb 11.5 oz)         11/10: follow-up. Weight consistently trending down. O2 requirements improving, down to 4 L NC.   Noted newly dx CHF with EF 25-30%, unclear etiology. Lasix being held d/t low BP. Midodrine started. Scheduled imodium for several days d/t diarrhea, now PRN. Also with mouth ulcers- nystatin and Magic Mouthwash ordered. Weight could be down d/t diarrhea and diuresis. K+ trending high since lasix held. Temporal wasting seems worse - but already marked as severe. Fat loss was documented as mild since he had reported stable weight, but given wt loss, this is likely worse. Pt did not seem surprised that weight was down, but agrees would like to see standing scale weight when up with therapy. He states he continues to eat fairly well at most meals, not doing as many ONS. Will decrease to BID. Last 3 Recorded Weights in this Encounter    11/08/22 0353 11/09/22 0729 11/10/22 0207   Weight: 62.5 kg (137 lb 12.6 oz) 60.8 kg (134 lb 0.6 oz) (P) 60.2 kg (132 lb 11.5 oz)           11/3: follow-up. Visited with pt in room. He states his appetite is better and eating more. Drinking 2-3 Ensure/day. Denied any complaints. Currently on 35 L high flow. Weight appears to be stable, but difficult to assess with bed scales and up and down readings. Labs OK. Looks like he is getting PRN imodium frequently, so ?if having diarrhea. Last BM documented as formed. Last 3 Recorded Weights in this Encounter    10/30/22 0412 10/31/22 0932 11/03/22 0314   Weight: 65.5 kg (144 lb 6.4 oz) 67 kg (147 lb 11.3 oz) 65 kg (143 lb 4.8 oz)           10/27: Pt screened d/t low BMI for age. Visited in room, on high flow. States his appetite/ intake has been poor for past few days, but unsure of any weight loss. When I told him weight listed at 140 lb, he states \"that's about where I stay\" - confirmed with bed weight, but he was 145 lb at MD visit in August per review of chart. Wt fluctuates per hx below and it is unclear over how long this wt loss occurred. He states he drinks Ensure when he can afford them. Likes chocolate.   Would appreciate them coming with meals while admitted. Denies chewing/ swallowing difficulty. Pt does have significant clavicle, temporal wasting and orbital fat loss. However, given weight stability, suspect malnutrition is chronic and therefore, more moderate in nature. However, with current illness and decreased PO intake, could meet acute, severe PCM criteria (he is certainly at risk he doesn't currently). Pt has baseline COPD, on 3 L O2 at home. Also with hx of NSCLC s/p RLL lobectomy in 2009. Unclear if active issue. Regardless, COPD itself puts pt at risk for higher EEN. Nutritionally Significant Medications:  Lipitor, lasix (held), midodrine  PRN: imodium, Magic Mouthwas    Estimated Daily Nutrient Needs:  Energy Requirements Based On: Kcal/kg  Weight Used for Energy Requirements: Current (63.5 kg)  Energy (kcal/day): 2200+ (35+ kcal/kg)  Weight Used for Protein Requirements: Current  Protein (g/day): 110 (1.7+ gm/kg or at least 20%)     Fluid (ml/day): 1900 (30 mL/kg)    Nutrition Related Findings:   Edema: No data recorded    Last BM: 11/17/22, Formed    Wounds: None      Current Nutrition Therapies:  Diet: regular  Supplements: Ensure Plus BID with meals  Meal intake: No data found. Supplement intake: No data found. Anthropometric Measures:  Height: 5' 11\" (180.3 cm)  Ideal Body Weight (IBW): 172 lbs (78 kg)  Admission Body Weight: 146 lb 13.2 oz  Current Body Wt:  60.2 kg (132 lb 11.5 oz), 77.2 % IBW.  Bed scale  Current BMI (kg/m2): 18.5  Usual Body Weight: 65.8 kg (145 lb)  % Weight Change (Calculated): -3.4  Weight Adjustment: No adjustment                 BMI Category: Underweight (BMI less than 22) age over 72    Wt Readings from Last 15 Encounters:   11/17/22 60.2 kg (132 lb 11.5 oz) - bed   10/26/22 63.5 kg (140 lb)   01/12/20 68.2 kg (150 lb 4.8 oz)   01/13/19 66.8 kg (147 lb 4.8 oz)   11/26/18 71.5 kg (157 lb 10.1 oz)   06/05/18 66.7 kg (147 lb)   06/05/18 66.7 kg (147 lb) 04/10/18 62.3 kg (137 lb 4.8 oz)   02/23/18 57.6 kg (126 lb 15.8 oz)   11/28/16 71.2 kg (157 lb)   10/05/15 66.2 kg (146 lb)   09/15/15 66.5 kg (146 lb 11.2 oz)   08/11/15 68 kg (149 lb 14.6 oz)     Last 3 Recorded Weights in this Encounter    10/30/22 0412 10/31/22 0932 11/03/22 0314   Weight: 65.5 kg (144 lb 6.4 oz) 67 kg (147 lb 11.3 oz) 65 kg (143 lb 4.8 oz)           Nutrition Diagnosis:   Inadequate oral intake related to impaired respiratory function, increased demand for energy/nutrients as evidenced by BMI, Criteria as identified in malnutrition assessment    Nutrition Interventions:   Food and/or Nutrient Delivery: Continue current diet, Continue oral nutrition supplement  Nutrition Education/Counseling: No recommendations at this time  Coordination of Nutrition Care: Continue to monitor while inpatient       Goals:     Goals: other (specify)  Specify Other Goals: continued PO intake >/=50% of meals with 2-3 ONS daily over next 7 days    Nutrition Monitoring and Evaluation:   Behavioral-Environmental Outcomes: None identified  Food/Nutrient Intake Outcomes: Food and nutrient intake, Supplement intake  Physical Signs/Symptoms Outcomes: Biochemical data, Meal time behavior, Nutrition focused physical findings, Weight, GI status    Discharge Planning:    Continue current diet, Continue oral nutrition supplement    No results for input(s): GLU, BUN, CREA, NA, K, CL, CO2, CA, PHOS, MG in the last 72 hours. No results for input(s): WBC, HGB, HCT, PLT, HGBEXT, HCTEXT, PLTEXT, HGBEXT, HCTEXT, PLTEXT, HGBEXT, HCTEXT, PLTEXT in the last 72 hours.         Bridgett Madrid RD  Available via [employer]

## 2022-11-17 NOTE — PROGRESS NOTES
[facility]ist [facility]                                                                                          Hospitalist Progress Note  Rosa Gomez MD  Answering service: [phone] OR [phone] from in house phone        Date of Service:  2022  NAME:  Cheyanne Ferreira  :  1950  MRN:  [id number]      Admission Summary:     Patient presents with acute on chronic respiratory failure due to COPD and pneumonia, also with new onset atrial fibrillation with new onset systolic heart failure. Interval history / Subjective:     Patient doing well, no acute complaint.   Pending Auth for SNF no new complaints     Assessment & Plan:     Acute respiratory failure-- now on NC   -Acute hypoxic and hypercapnic respiratory failure due to pneumonia and COPD  -Patient with baseline chronic respiratory failure due to COPD, as well as s/p lobectomy due to lung cancer, uses 3 L of oxygen at baseline  -Patient initially requiring BiPAP, overall improving and able to participate more with PT    Sepsis-- resolved  -Patient with tachycardia and leukocytosis, elevated procalcitonin level  -Sepsis due to multilobar pneumonia  -Sepsis reassessment completed, monitor hemodynamics    Multilobar pneumonia-- resoled treated  -Blood cultures so far negative, COVID-negative, respiratory culture negative, Legionella negative, MRSA negative  -Patient has completed treatment with Rocephin and Zithromax  -Repeat chest x-ray 2022 shows improvement    Acute exacerbation of COPD  -Continue bronchodilators, breathing treatment, Solu-Medrol was changed to prednisone will taper on d/c  -Previously evaluated by pulmonology, at baseline patient uses 3 L    Paroxysmal atrial fibrillation  -Patient remaining in sinus rhythm  -Amiodarone drip was changed to p.o. amiodarone, on metoprolol and on Eliquis for anticoagulation    Acute systolic CHF  -New onset systolic CHF, NYHA class IV, echo shows EF of 25 to 30%  -Limited use of guideline directed medical therapy due to hypotension  -Previously evaluated by cardiology  -May consider resuming Lasix and Entresto when patient is hemodynamically more improved    Hypotension  -Patient has been treated with albumin, added midodrine for blood pressure support  -Continue monitor hemodynamic    PENNY  -Likely multifactorial including sepsis  -PENNY is resolved    Dyslipidemia  -Continue statin    Non-small cell lung cancer  -History of right lower lobe lobectomy at [facility] in 2009  -Follow-up outpatient     Code status: DNR  Prophylaxis: [address] discussed with: Patient  Anticipated Disposition: Did P2P, denied for IPR. Plan for SNF. Medically stable, placement pending. Hospital Problems  Date Reviewed: 1/9/2019            Codes Class Noted POA    Moderate protein-calorie malnutrition ([facility] ICD-10-CM: E44.0  ICD-9-CM: 263.0  10/27/2022 Yes        COPD (chronic obstructive pulmonary disease) ([facility] ICD-10-CM: J44.9  ICD-9-CM: 103  10/26/2022 Unknown        Pneumonia ICD-10-CM: J18.9  ICD-9-CM: 486  10/26/2022 Unknown        Malignant neoplasm of lung, unspecified laterality, unspecified part of lung ([facility] ICD-10-CM: C34.90  ICD-9-CM: 162.9  10/26/2022 Unknown        Sepsis ([facility] ICD-10-CM: A41.9  ICD-9-CM: 038.9, 995.91  10/26/2022 Unknown           Review of Systems:   A comprehensive review of systems was negative except for that written in the HPI. Vital Signs:    Last 24hrs VS reviewed since prior progress note.  Most recent are:  Visit Vitals  BP (!) 114/51 (BP 1 Location: Left upper arm, BP Patient Position: At rest)   Pulse 63   Temp 97.5 °F (36.4 °C)   Resp 18   Ht 5' 11\" (1.803 m)   Wt 60.2 kg (132 lb 11.5 oz)   SpO2 91%   BMI 18.51 kg/m²       No intake or output data in the 24 hours ending 11/17/22 1410       Physical Examination:     I had a face to face encounter with this patient and independently examined them on 11/17/2022 as outlined below:          Constitutional:  No acute distress, cooperative, pleasant    ENT:  Oral mucosa moist, oropharynx benign. Resp:  CTA bilaterally. No wheezing/rhonchi/rales. No accessory muscle use. CV:  Regular rhythm, normal rate, no murmurs, gallops, rubs    GI:  Soft, non distended, non tender. normoactive bowel sounds, no hepatosplenomegaly     Musculoskeletal:  No edema, warm, 2+ pulses throughout    Neurologic:  Moves all extremities. AAOx3, CN II-XII reviewed            Data Review:    Review and/or order of clinical lab test      Labs:     No results for input(s): WBC, HGB, HCT, PLT, HGBEXT, HCTEXT, PLTEXT, HGBEXT, HCTEXT, PLTEXT in the last 72 hours. No results for input(s): NA, K, CL, CO2, BUN, CREA, GLU, CA, MG, PHOS, URICA in the last 72 hours. No results for input(s): ALT, AP, TBIL, TBILI, TP, ALB, GLOB, GGT, AML, LPSE in the last 72 hours. No lab exists for component: SGOT, GPT, AMYP, HLPSE  No results for input(s): INR, PTP, APTT, INREXT, INREXT in the last 72 hours. No results for input(s): FE, TIBC, PSAT, FERR in the last 72 hours. No results found for: FOL, RBCF   No results for input(s): PH, PCO2, PO2 in the last 72 hours. No results for input(s): CPK, CKNDX, TROIQ in the last 72 hours.     No lab exists for component: CPKMB  Lab Results   Component Value Date/Time    Cholesterol, total 106 02/20/2018 04:54 AM    HDL Cholesterol 45 02/20/2018 04:54 AM    LDL, calculated 46 02/20/2018 04:54 AM    Triglyceride 75 02/20/2018 04:54 AM    CHOL/HDL Ratio 2.4 02/20/2018 04:54 AM     Lab Results   Component Value Date/Time    Glucose (POC) 150 (H) 11/04/2022 12:52 PM    Glucose (POC) 100 09/14/2015 12:14 PM     Lab Results   Component Value Date/Time    Color YELLOW/STRAW 11/15/2022 11:05 AM    Appearance CLEAR 11/15/2022 11:05 AM    Specific gravity 1.021 11/15/2022 11:05 AM    pH (UA) 5.5 11/15/2022 11:05 AM    Protein Negative 11/15/2022 11:05 AM    Glucose Negative 11/15/2022 11:05 AM    Ketone Negative 11/15/2022 11:05 AM    Bilirubin Negative 11/15/2022 11:05 AM    Urobilinogen 0.2 11/15/2022 11:05 AM    Nitrites Negative 11/15/2022 11:05 AM    Leukocyte Esterase Negative 11/15/2022 11:05 AM    Epithelial cells FEW 11/15/2022 11:05 AM    Bacteria Negative 11/15/2022 11:05 AM    WBC 0-4 11/15/2022 11:05 AM    RBC 0-5 11/15/2022 11:05 AM         Medications Reviewed:     Current Facility-Administered Medications   Medication Dose Route Frequency    midodrine (PROAMATINE) tablet 10 mg  10 mg Oral TID WITH MEALS    albuterol-ipratropium (DUO-NEB) 2.5 MG-0.5 MG/3 ML  3 mL Nebulization Q4H PRN    loperamide (IMODIUM) capsule 2 mg  2 mg Oral Q4H PRN    [Held by provider] furosemide (LASIX) tablet 20 mg  20 mg Oral DAILY    predniSONE (DELTASONE) tablet 30 mg  30 mg Oral DAILY WITH BREAKFAST    amiodarone (CORDARONE) tablet 100 mg  100 mg Oral BID    magic mouthwash cpd (without sucralfate)  5 mL Oral TID PRN    [Held by provider] sacubitriL-valsartan (ENTRESTO) 24-26 mg tablet 1 Tablet  1 Tablet Oral Q12H    apixaban (ELIQUIS) tablet 5 mg  5 mg Oral Q12H    metoprolol succinate (TOPROL-XL) XL tablet 25 mg  25 mg Oral DAILY    hydrALAZINE (APRESOLINE) 20 mg/mL injection 20 mg  20 mg IntraVENous Q6H PRN    sodium chloride (NS) flush 5-40 mL  5-40 mL IntraVENous Q8H    sodium chloride (NS) flush 5-40 mL  5-40 mL IntraVENous PRN    acetaminophen (TYLENOL) tablet 650 mg  650 mg Oral Q6H PRN    Or    acetaminophen (TYLENOL) suppository 650 mg  650 mg Rectal Q6H PRN    ondansetron (ZOFRAN ODT) tablet 4 mg  4 mg Oral Q8H PRN    Or    ondansetron (ZOFRAN) injection 4 mg  4 mg IntraVENous Q6H PRN    polyethylene glycol (MIRALAX) packet 17 g  17 g Oral DAILY PRN    arformoteroL (BROVANA) neb solution 15 mcg  15 mcg Nebulization BID RT    aspirin delayed-release tablet 81 mg  81 mg Oral DAILY    atorvastatin (LIPITOR) tablet 40 mg  40 mg Oral DAILY    budesonide (PULMICORT) 500 mcg/2 ml nebulizer suspension 500 mcg Nebulization BID    oxyCODONE IR (ROXICODONE) tablet 20 mg  20 mg Oral Q6H PRN    oxyCODONE IR (ROXICODONE) tablet 10 mg  10 mg Oral Q6H PRN     ______________________________________________________________________  EXPECTED LENGTH OF STAY: 4d 19h  ACTUAL LENGTH OF STAY:          Javier Pandya 51, MD

## 2022-11-18 LAB
ANION GAP SERPL CALC-SCNC: 6 MMOL/L (ref 5–15)
BUN SERPL-MCNC: 28 MG/DL (ref 6–20)
BUN/CREAT SERPL: 25 (ref 12–20)
CALCIUM SERPL-MCNC: 8.6 MG/DL (ref 8.5–10.1)
CHLORIDE SERPL-SCNC: 107 MMOL/L (ref 97–108)
CO2 SERPL-SCNC: 28 MMOL/L (ref 21–32)
CREAT SERPL-MCNC: 1.13 MG/DL (ref 0.7–1.3)
ERYTHROCYTE [DISTWIDTH] IN BLOOD BY AUTOMATED COUNT: 15 % (ref 11.5–14.5)
GLUCOSE SERPL-MCNC: 92 MG/DL (ref 65–100)
HCT VFR BLD AUTO: 34.6 % (ref 36.6–50.3)
HGB BLD-MCNC: 11.1 G/DL (ref 12.1–17)
MCH RBC QN AUTO: 30.1 PG (ref 26–34)
MCHC RBC AUTO-ENTMCNC: 32.1 G/DL (ref 30–36.5)
MCV RBC AUTO: 93.8 FL (ref 80–99)
NRBC # BLD: 0 K/UL (ref 0–0.01)
NRBC BLD-RTO: 0 PER 100 WBC
PLATELET # BLD AUTO: 204 K/UL (ref 150–400)
PMV BLD AUTO: 9.7 FL (ref 8.9–12.9)
POTASSIUM SERPL-SCNC: 4.7 MMOL/L (ref 3.5–5.1)
RBC # BLD AUTO: 3.69 M/UL (ref 4.1–5.7)
SODIUM SERPL-SCNC: 141 MMOL/L (ref 136–145)
WBC # BLD AUTO: 11.4 K/UL (ref 4.1–11.1)

## 2022-11-18 PROCEDURE — 74011250637 HC RX REV CODE- 250/637: Performed by: INTERNAL MEDICINE

## 2022-11-18 PROCEDURE — 80048 BASIC METABOLIC PNL TOTAL CA: CPT

## 2022-11-18 PROCEDURE — 74011000250 HC RX REV CODE- 250: Performed by: INTERNAL MEDICINE

## 2022-11-18 PROCEDURE — 65270000029 HC RM PRIVATE

## 2022-11-18 PROCEDURE — 94640 AIRWAY INHALATION TREATMENT: CPT

## 2022-11-18 PROCEDURE — 36415 COLL VENOUS BLD VENIPUNCTURE: CPT

## 2022-11-18 PROCEDURE — 85027 COMPLETE CBC AUTOMATED: CPT

## 2022-11-18 PROCEDURE — 74011250637 HC RX REV CODE- 250/637: Performed by: FAMILY MEDICINE

## 2022-11-18 PROCEDURE — 74011636637 HC RX REV CODE- 636/637: Performed by: FAMILY MEDICINE

## 2022-11-18 RX ORDER — PREDNISONE 10 MG/1
30 TABLET ORAL
Qty: 90 TABLET | Refills: 0 | Status: SHIPPED | OUTPATIENT
Start: 2022-11-19 | End: 2022-12-19

## 2022-11-18 RX ORDER — AMIODARONE HYDROCHLORIDE 100 MG/1
100 TABLET ORAL 2 TIMES DAILY
Qty: 60 TABLET | Refills: 0 | Status: SHIPPED | OUTPATIENT
Start: 2022-11-18 | End: 2022-12-18

## 2022-11-18 RX ORDER — FUROSEMIDE 20 MG/1
20 TABLET ORAL DAILY
Qty: 30 TABLET | Refills: 0 | Status: SHIPPED | OUTPATIENT
Start: 2022-11-18 | End: 2022-12-18

## 2022-11-18 RX ORDER — MIDODRINE HYDROCHLORIDE 10 MG/1
10 TABLET ORAL
Qty: 90 TABLET | Refills: 0 | Status: SHIPPED | OUTPATIENT
Start: 2022-11-18 | End: 2022-12-18

## 2022-11-18 RX ORDER — METOPROLOL SUCCINATE 25 MG/1
25 TABLET, EXTENDED RELEASE ORAL DAILY
Qty: 30 TABLET | Refills: 0 | Status: SHIPPED | OUTPATIENT
Start: 2022-11-19 | End: 2022-12-19

## 2022-11-18 RX ADMIN — METOPROLOL SUCCINATE 25 MG: 25 TABLET, FILM COATED, EXTENDED RELEASE ORAL at 10:18

## 2022-11-18 RX ADMIN — MIDODRINE HYDROCHLORIDE 10 MG: 5 TABLET ORAL at 19:38

## 2022-11-18 RX ADMIN — AMIODARONE HYDROCHLORIDE 100 MG: 200 TABLET ORAL at 19:38

## 2022-11-18 RX ADMIN — SODIUM CHLORIDE, PRESERVATIVE FREE 10 ML: 5 INJECTION INTRAVENOUS at 08:03

## 2022-11-18 RX ADMIN — LOPERAMIDE HYDROCHLORIDE 2 MG: 2 CAPSULE ORAL at 10:18

## 2022-11-18 RX ADMIN — MIDODRINE HYDROCHLORIDE 10 MG: 5 TABLET ORAL at 13:54

## 2022-11-18 RX ADMIN — APIXABAN 5 MG: 5 TABLET, FILM COATED ORAL at 10:19

## 2022-11-18 RX ADMIN — AMIODARONE HYDROCHLORIDE 100 MG: 200 TABLET ORAL at 10:19

## 2022-11-18 RX ADMIN — PREDNISONE 30 MG: 20 TABLET ORAL at 10:19

## 2022-11-18 RX ADMIN — APIXABAN 5 MG: 5 TABLET, FILM COATED ORAL at 21:35

## 2022-11-18 RX ADMIN — LOPERAMIDE HYDROCHLORIDE 2 MG: 2 CAPSULE ORAL at 19:40

## 2022-11-18 RX ADMIN — SODIUM CHLORIDE, PRESERVATIVE FREE 10 ML: 5 INJECTION INTRAVENOUS at 13:54

## 2022-11-18 RX ADMIN — ATORVASTATIN CALCIUM 40 MG: 40 TABLET, FILM COATED ORAL at 10:19

## 2022-11-18 RX ADMIN — ARFORMOTEROL TARTRATE 15 MCG: 15 SOLUTION RESPIRATORY (INHALATION) at 19:00

## 2022-11-18 RX ADMIN — ARFORMOTEROL TARTRATE 15 MCG: 15 SOLUTION RESPIRATORY (INHALATION) at 08:14

## 2022-11-18 RX ADMIN — BUDESONIDE 500 MCG: 0.5 INHALANT RESPIRATORY (INHALATION) at 19:00

## 2022-11-18 RX ADMIN — MIDODRINE HYDROCHLORIDE 10 MG: 5 TABLET ORAL at 10:23

## 2022-11-18 RX ADMIN — BUDESONIDE 500 MCG: 0.5 INHALANT RESPIRATORY (INHALATION) at 08:14

## 2022-11-18 RX ADMIN — ASPIRIN 81 MG: 81 TABLET, COATED ORAL at 09:00

## 2022-11-18 RX ADMIN — SODIUM CHLORIDE, PRESERVATIVE FREE 10 ML: 5 INJECTION INTRAVENOUS at 21:35

## 2022-11-18 NOTE — DISCHARGE INSTRUCTIONS
Download the Heart Failure [place] Bekah: Search in your [employer] (Android) or [employer] Bekah Store (Xencorphone): Search for- HF [place] Bekah.    [employer]    HF [place] is a brand-new phone bekah that helps you track daily symptoms, vitals, mood, energy level and more. You can even add your heart failure care team members to view your data and monitor your condition at home. HF [place] lets you:  Track symptoms, medications and more  Share health information with your health care team  Connect with others living with heart failure         Discharge Instructions       PATIENT ID: Earnestine Saez  MRN: [id number]   YOB: 1950    DATE OF ADMISSION: [unfilled]    DATE OF DISCHARGE: 11/18/2022    PRIMARY CARE PROVIDER: @PCP@     ATTENDING PHYSICIAN: [unfilled]  DISCHARGING PROVIDER: Jocy Gordon MD    To contact this individual call [phone] and ask the  to page. If unavailable ask to be transferred the Adult Hospitalist Department. DISCHARGE DIAGNOSES acute hypoxic respiratory failure and CHF    CONSULTATIONS: [unfilled]    PROCEDURES/SURGERIES: * No surgery found *    PENDING TEST RESULTS:   At the time of discharge the following test results are still pending:     [facility]LLOW UP AP[facility]MENTS:   [facility]     ADDITIONAL CARE RECOMMENDATIONS: Patient medication list contains Beba Vijay which is a heart failure medication but if patient's blood pressure is less than 90 x 60 can hold it and follow-up with PCP also due to low blood pressure.   Lasix was not added to regular medication can use as needed for atrial fibrillation patient is on Eliquis and amiodarone baseline oxygen and steroids will continue    DIET: {diet:14581}  Oral Nutritional Supplements: {RDSUPPLEMENTLIST:20094} {RDSUPPFREQUENCY:20080}     ACTIVITY: {discharge activity:75996}    WOUND CARE: ***    EQUIPMENT needed: ***      Radiology      DISCHARGE MEDICATIONS:   See Medication Reconciliation Form    It is important that you take the medication exactly as they are prescribed. Keep your medication in the bottles provided by the pharmacist and keep a list of the medication names, dosages, and times to be taken in your wallet. Do not take other medications without consulting your doctor. NOTIFY YOUR PHYSICIAN FOR ANY OF THE FOLLOWING:   Fever over 101 degrees for 24 hours. Chest pain, shortness of breath, fever, chills, nausea, vomiting, diarrhea, change in mentation, falling, weakness, bleeding. Severe pain or pain not relieved by medications. Or, any other signs or symptoms that you may have questions about.       DISPOSITION:    Home With:   OT  PT  HH  RN       SNF/Inpatient Rehab/LTAC    Independent/assisted living    Hospice    Other:     CDMP Checked:   Yes ***     PROBLEM LIST Updated:  Yes ***       Signed:   Chidi Anguiano MD  11/18/2022  11:29 AM

## 2022-11-18 NOTE — PROGRESS NOTES
Bedside shift change report given to Arlin RN (oncoming nurse) by Gary Whittington RN (offgoing nurse). Report included the following information SBAR, MAR, and Cardiac Rhythm NSR .

## 2022-11-18 NOTE — DISCHARGE SUMMARY
Discharge Summary       PATIENT ID: Kate Arechiga  MRN: [id number]   YOB: 1950    DATE OF ADMISSION: 10/26/2022  5:44 AM    DATE OF DISCHARGE: 11/18/22   PRIMARY CARE PROVIDER: Niecy Wasserman MD     ATTENDING PHYSICIAN: Trudy Maldonado  DISCHARGING PROVIDER: Lizz Dutton MD    To contact this individual call [phone] and ask the  to page. If unavailable ask to be transferred the Adult Hospitalist Department. CONSULTATIONS: IP CONSULT TO HOSPITALIST  IP CONSULT TO PULMONOLOGY  IP CONSULT TO INTENSIVIST  IP CONSULT TO CARDIOLOGY  IP CONSULT TO CARDIOLOGY    PROCEDURES/SURGERIES: * No surgery found *    DISCHARGE DIAGNOSES:   COPD/ CHF    Patient presents with acute on chronic respiratory failure due to COPD and pneumonia, also with new onset atrial fibrillation with new onset systolic heart failure. [address] [facility] COURSE:   Acute respiratory failure-- now on NC   COPD exacerbation with multilobar pneumonia with acute COPD exacerbation   Continue BiPAP continue steroids completed antibiotics currently on 3 L nasal cannula     Paroxysmal atrial fibrillation  -Patient remaining in sinus rhythm  Continue amiodarone and Eliquis      Acute systolic CHF discharged on Coreg Entresto Lasix  -New onset systolic CHF, NYHA class IV, echo shows EF of 25 to 30%  --Blood pressure remains low on midodrine if cannot tolerate hold Entresto and Lasix     Hypotension  -Patient has been treated with albumin, added midodrine f     PENNY  -Likely multifactorial including sepsis  -PENNY is resolved     Dyslipidemia  -Continue statin     Non-small cell lung cancer  -History of right lower lobe lobectomy at [facility] in 2009  -Follow-up outpatient       DISCHARGE DIAGNOSES / PLAN:      Discharge to SNF    BMI: Body mass index is 18.14 kg/m². . This patient: Has a BMI within normal limits.     PENDING TEST RESULTS:   At the time of discharge the following test results are still pending: ADDITIONAL CARE RECOMMENDATIONS:        NOTIFY YOUR PHYSICIAN FOR ANY OF THE FOLLOWING:   Fever over 101 degrees for 24 hours. Chest pain, shortness of breath, fever, chills, nausea, vomiting, diarrhea, change in mentation, falling, weakness, bleeding. Severe pain or pain not relieved by medications, as well as any other signs or symptoms that you may have questions about. FOLLOW UP APPOINTMENTS:    Follow-up Information       Follow up With Specialties Details Why Kaylee Grubbs MD Internal Medicine Physician Follow up in 1 week(s)  [address]  [facility] [place] [address] [phone]                 DIET: Cardiac Diet    ACTIVITY: Activity as tolerated    EQUIPMENT needed: oxygen    DISCHARGE MEDICATIONS:  Current Discharge Medication List        START taking these medications    Details   amiodarone (PACERONE) 100 mg tablet Take 1 Tablet by mouth two (2) times a day for 30 days. Qty: 60 Tablet, Refills: 0  Start date: 11/18/2022, End date: 12/18/2022      apixaban (ELIQUIS) 5 mg tablet Take 1 Tablet by mouth every twelve (12) hours for 30 days. Qty: 60 Tablet, Refills: 0  Start date: 11/18/2022, End date: 12/18/2022      metoprolol succinate (TOPROL-XL) 25 mg XL tablet Take 1 Tablet by mouth daily for 30 days. Qty: 30 Tablet, Refills: 0  Start date: 11/19/2022, End date: 12/19/2022      midodrine (PROAMATINE) 10 mg tablet Take 1 Tablet by mouth three (3) times daily (with meals) for 30 days. Qty: 90 Tablet, Refills: 0  Start date: 11/18/2022, End date: 12/18/2022      sacubitril-valsartan (ENTRESTO) 24 mg/26 mg tablet Take 1 Tablet by mouth every twelve (12) hours for 30 days. Qty: 60 Tablet, Refills: 0  Start date: 11/18/2022, End date: 12/18/2022      predniSONE (DELTASONE) 10 mg tablet Take 30 mg by mouth daily (with breakfast) for 30 days.   Qty: 90 Tablet, Refills: 0  Start date: 11/19/2022, End date: 12/19/2022      furosemide (LASIX) 20 mg tablet Take 1 Tablet by mouth daily for 30 days. Qty: 30 Tablet, Refills: 0  Start date: 11/18/2022, End date: 12/18/2022           CONTINUE these medications which have NOT CHANGED    Details   arformoteroL (BROVANA) 15 mcg/2 mL nebu neb solution 15 mcg by Nebulization route two (2) times a day. Sometimes uses TID      budesonide (PULMICORT) 0.5 mg/2 mL nbsp 500 mcg by Nebulization route two (2) times a day. Sometimes uses TID      atorvastatin (LIPITOR) 40 mg tablet Take 40 mg by mouth daily. albuterol (PROVENTIL VENTOLIN) 2.5 mg /3 mL (0.083 %) nebu 2.5 mg by Nebulization route every six (6) hours as needed for Wheezing or Shortness of Breath. oxyCODONE IR (ROXICODONE) 20 mg immediate release tablet Take 40 mg by mouth every twelve (12) hours as needed for Pain (severe pain). aspirin delayed-release 81 mg tablet Take 1 Tab by mouth daily. Qty: 30 Tab, Refills: 0      ketoconazole (NIZORAL) 2 % shampoo Apply  to affected area as needed for Itching. DISPOSITION:    Home With:   OT  PT  HH  RN      x Long term SNF/Inpatient Rehab    Independent/assisted living    Hospice    Other:       PATIENT CONDITION AT DISCHARGE:     Functional status   x Poor     Deconditioned     Independent      Cognition    x Lucid     Forgetful     Dementia      Catheters/lines (plus indication)    Burch     PICC     PEG    x None      Code status    x Full code     DNR      PHYSICAL EXAMINATION AT DISCHARGE:  General:          Alert, cooperative, no distress, appears stated age. HEENT:           Atraumatic, anicteric sclerae, pink conjunctivae                          No oral ulcers, mucosa moist, throat clear, dentition fair  Neck:               Supple, symmetrical  Lungs:             Clear to auscultation bilaterally. No Wheezing or Rhonchi. No rales. Chest wall:      No tenderness  No Accessory muscle use. Heart:              Regular  rhythm,  No  murmur   No edema  Abdomen:        Soft, non-tender. Not distended.   Bowel sounds normal  Extremities:     No cyanosis. No clubbing,                            Skin turgor normal, Capillary refill normal  Skin:                Not pale. Not Jaundiced  No rashes   Psych:             Not anxious or agitated.   Neurologic:      Alert, moves all extremities, answers questions appropriately and responds to commands       CHRONIC MEDICAL DIAGNOSES:  Problem List as of 11/18/2022 Date Reviewed: 1/9/2019            Codes Class Noted - Resolved    Moderate protein-calorie malnutrition ([facility] ICD-10-CM: E44.0  ICD-9-CM: 263.0  10/27/2022 - Present        COPD (chronic obstructive pulmonary disease) ([facility] ICD-10-CM: J44.9  ICD-9-CM: 496  10/26/2022 - Present        Pneumonia ICD-10-CM: J18.9  ICD-9-CM: 486  10/26/2022 - Present        Malignant neoplasm of lung, unspecified laterality, unspecified part of lung ([facility] ICD-10-CM: C34.90  ICD-9-CM: 162.9  10/26/2022 - Present        Sepsis ([facility] ICD-10-CM: A41.9  ICD-9-CM: 038.9, 995.91  10/26/2022 - Present        SOB (shortness of breath) ICD-10-CM: R06.02  ICD-9-CM: 786.05  1/9/2019 - Present        Malnutrition ([facility] ICD-10-CM: E46  ICD-9-CM: 263.9  4/10/2018 - Present        Respiratory failure with hypoxia ([facility] ICD-10-CM: J96.91  ICD-9-CM: 518.81  4/10/2018 - Present        Aspiration pneumonia ([facility] ICD-10-CM: J69.0  ICD-9-CM: 507.0  4/4/2018 - Present        Systolic CHF, acute ([facility] ICD-10-CM: I50.21  ICD-9-CM: 428.21, 428.0  2/22/2018 - Present        Shortness of breath ICD-10-CM: R06.02  ICD-9-CM: 786.05  2/17/2018 - Present        COPD exacerbation ([facility] ICD-10-CM: J44.1  ICD-9-CM: 491.21  8/10/2015 - Present        RESOLVED: Pneumonia ICD-10-CM: J18.9  ICD-9-CM: 486  1/6/2020 - 1/12/2020        RESOLVED: NSTEMI (non-ST elevated myocardial infarction) ([facility] ICD-10-CM: I21.4  ICD-9-CM: 410.70  2/22/2018 - 2/22/2018        RESOLVED: COPD exacerbation ([facility] (Chronic) ICD-10-CM: J44.1  ICD-9-CM: 491.21  9/5/2015 - 2/22/2018           Greater than 30  minutes were spent with the patient on counseling and coordination of care    Signed:   Donovan Yang MD  11/18/2022  11:32 AM

## 2022-11-18 NOTE — PROGRESS NOTES
2109: Patient inquired about discharge plan. This nurse spoke with the patient about Case Managements most recent documented note on 11/14/22 about his disposition. He would like to speak with CM tomorrow for an update about his plan for DC. Will pass this information along to the oncoming nurse.

## 2022-11-18 NOTE — PROGRESS NOTES
Transition Plan of Care  RUR 13%-Low  Disposition-awaiting authorization for admit to SNF. Has been accepted to [facility] [place], operated by [facility] and they have started authorization in hopes of receiving today. They had tried to start authorization with [facility] but he is not managed by [facility]. CM confirmed with Nelida Cuadra and updated Radha .

## 2022-11-19 LAB
ANION GAP SERPL CALC-SCNC: 3 MMOL/L (ref 5–15)
BUN SERPL-MCNC: 27 MG/DL (ref 6–20)
BUN/CREAT SERPL: 22 (ref 12–20)
CALCIUM SERPL-MCNC: 8.3 MG/DL (ref 8.5–10.1)
CHLORIDE SERPL-SCNC: 105 MMOL/L (ref 97–108)
CO2 SERPL-SCNC: 30 MMOL/L (ref 21–32)
CREAT SERPL-MCNC: 1.22 MG/DL (ref 0.7–1.3)
ERYTHROCYTE [DISTWIDTH] IN BLOOD BY AUTOMATED COUNT: 15.1 % (ref 11.5–14.5)
GLUCOSE SERPL-MCNC: 94 MG/DL (ref 65–100)
HCT VFR BLD AUTO: 35.9 % (ref 36.6–50.3)
HGB BLD-MCNC: 11.6 G/DL (ref 12.1–17)
MCH RBC QN AUTO: 30.4 PG (ref 26–34)
MCHC RBC AUTO-ENTMCNC: 32.3 G/DL (ref 30–36.5)
MCV RBC AUTO: 94 FL (ref 80–99)
NRBC # BLD: 0 K/UL (ref 0–0.01)
NRBC BLD-RTO: 0 PER 100 WBC
PLATELET # BLD AUTO: 206 K/UL (ref 150–400)
PMV BLD AUTO: 9.7 FL (ref 8.9–12.9)
POTASSIUM SERPL-SCNC: 4.1 MMOL/L (ref 3.5–5.1)
RBC # BLD AUTO: 3.82 M/UL (ref 4.1–5.7)
SODIUM SERPL-SCNC: 138 MMOL/L (ref 136–145)
WBC # BLD AUTO: 11.6 K/UL (ref 4.1–11.1)

## 2022-11-19 PROCEDURE — 94640 AIRWAY INHALATION TREATMENT: CPT

## 2022-11-19 PROCEDURE — 74011250637 HC RX REV CODE- 250/637: Performed by: FAMILY MEDICINE

## 2022-11-19 PROCEDURE — 74011000250 HC RX REV CODE- 250: Performed by: INTERNAL MEDICINE

## 2022-11-19 PROCEDURE — 65270000029 HC RM PRIVATE

## 2022-11-19 PROCEDURE — 74011250637 HC RX REV CODE- 250/637: Performed by: INTERNAL MEDICINE

## 2022-11-19 PROCEDURE — 80048 BASIC METABOLIC PNL TOTAL CA: CPT

## 2022-11-19 PROCEDURE — 74011636637 HC RX REV CODE- 636/637: Performed by: FAMILY MEDICINE

## 2022-11-19 PROCEDURE — 85027 COMPLETE CBC AUTOMATED: CPT

## 2022-11-19 PROCEDURE — 36415 COLL VENOUS BLD VENIPUNCTURE: CPT

## 2022-11-19 RX ADMIN — APIXABAN 5 MG: 5 TABLET, FILM COATED ORAL at 10:18

## 2022-11-19 RX ADMIN — APIXABAN 5 MG: 5 TABLET, FILM COATED ORAL at 21:17

## 2022-11-19 RX ADMIN — AMIODARONE HYDROCHLORIDE 100 MG: 200 TABLET ORAL at 10:18

## 2022-11-19 RX ADMIN — PREDNISONE 30 MG: 20 TABLET ORAL at 10:17

## 2022-11-19 RX ADMIN — SODIUM CHLORIDE, PRESERVATIVE FREE 10 ML: 5 INJECTION INTRAVENOUS at 17:12

## 2022-11-19 RX ADMIN — ASPIRIN 81 MG: 81 TABLET, COATED ORAL at 10:18

## 2022-11-19 RX ADMIN — ARFORMOTEROL TARTRATE 15 MCG: 15 SOLUTION RESPIRATORY (INHALATION) at 07:34

## 2022-11-19 RX ADMIN — ATORVASTATIN CALCIUM 40 MG: 40 TABLET, FILM COATED ORAL at 10:18

## 2022-11-19 RX ADMIN — SODIUM CHLORIDE, PRESERVATIVE FREE 10 ML: 5 INJECTION INTRAVENOUS at 08:44

## 2022-11-19 RX ADMIN — SODIUM CHLORIDE, PRESERVATIVE FREE 10 ML: 5 INJECTION INTRAVENOUS at 21:17

## 2022-11-19 RX ADMIN — BUDESONIDE 500 MCG: 0.5 INHALANT RESPIRATORY (INHALATION) at 07:34

## 2022-11-19 RX ADMIN — ARFORMOTEROL TARTRATE 15 MCG: 15 SOLUTION RESPIRATORY (INHALATION) at 19:19

## 2022-11-19 RX ADMIN — MIDODRINE HYDROCHLORIDE 10 MG: 5 TABLET ORAL at 17:12

## 2022-11-19 RX ADMIN — MIDODRINE HYDROCHLORIDE 10 MG: 5 TABLET ORAL at 10:17

## 2022-11-19 RX ADMIN — METOPROLOL SUCCINATE 25 MG: 25 TABLET, FILM COATED, EXTENDED RELEASE ORAL at 10:18

## 2022-11-19 RX ADMIN — AMIODARONE HYDROCHLORIDE 100 MG: 200 TABLET ORAL at 17:11

## 2022-11-19 RX ADMIN — BUDESONIDE 500 MCG: 0.5 INHALANT RESPIRATORY (INHALATION) at 19:19

## 2022-11-19 NOTE — PROGRESS NOTES
[facility]                                                                                          Hospitalist Progress Note  Lexie Vu MD  Answering service: [phone] from in house phone        Date of Service:  2022  NAME:  Khoa Tucker  :  1950  MRN:  [id number]      Admission Summary:     Patient presents with acute on chronic respiratory failure due to COPD and pneumonia, also with new onset atrial fibrillation with new onset systolic heart failure.     Interval history / Subjective:   Some cough reported pending Auth for SNF no new complaints     Assessment & Plan:     Acute respiratory failure-- now on NC   -Acute hypoxic and hypercapnic respiratory failure due to pneumonia and COPD  -Patient with baseline chronic respiratory failure due to COPD, as well as s/p lobectomy due to lung cancer, uses 3 L of oxygen at baseline  -Patient initially requiring BiPAP, overall improving and able to participate more with PT    Sepsis-- resolved  -Patient with tachycardia and leukocytosis, elevated procalcitonin level  -Sepsis due to multilobar pneumonia  -Sepsis reassessment completed, monitor hemodynamics    Multilobar pneumonia-- resoled treated  -Blood cultures so far negative, COVID-negative, respiratory culture negative, Legionella negative, MRSA negative  -Patient has completed treatment with Rocephin and Zithromax  -Repeat chest x-ray 2022 shows improvement    Acute exacerbation of COPD--resolved  -Continue bronchodilators, breathing treatment, Solu-Medrol was changed to prednisone will taper on d/c  -Previously evaluated by pulmonology, at baseline patient uses 3 L    Paroxysmal atrial fibrillation--rate controlled  -Patient remaining in sinus rhythm  -Amiodarone drip was changed to p.o. amiodarone, on metoprolol and on Eliquis for anticoagulation    Acute systolic CHF--NYHA IV on admission currently NYHA II stable  -New onset systolic CHF, NYHA class IV, echo shows EF of 25 to 30%  -Limited use of guideline directed medical therapy due to hypotension  -Previously evaluated by cardiology  -May consider resuming Lasix and Entresto when patient is hemodynamically more improved    Hypotension--well-controlled  -Patient has been treated with albumin, added midodrine for blood pressure support  -Continue monitor hemodynamic    PENNY--resolved  -Likely multifactorial including sepsis  -PENNY is resolved    Dyslipidemia  -Continue statin    Non-small cell lung cancer  -History of right lower lobe lobectomy at [facility] in 2009  -Follow-up outpatient     Code status: DNR  Prophylaxis: [address] discussed with: Patient  Anticipated Disposition: Did P2P, denied for IPR. Plan for SNF. Medically stable for discharge     Hospital Problems  Date Reviewed: 1/9/2019            Codes Class Noted POA    Moderate protein-calorie malnutrition ([facility] ICD-10-CM: E44.0  ICD-9-CM: 263.0  10/27/2022 Yes        COPD (chronic obstructive pulmonary disease) ([facility] ICD-10-CM: J44.9  ICD-9-CM: 496  10/26/2022 Unknown        Pneumonia ICD-10-CM: J18.9  ICD-9-CM: 486  10/26/2022 Unknown        Malignant neoplasm of lung, unspecified laterality, unspecified part of lung ([facility] ICD-10-CM: C34.90  ICD-9-CM: 162.9  10/26/2022 Unknown        Sepsis ([facility] ICD-10-CM: A41.9  ICD-9-CM: 038.9, 995.91  10/26/2022 Unknown         Review of Systems:   A comprehensive review of systems was negative except for that written in the HPI. Vital Signs:    Last 24hrs VS reviewed since prior progress note.  Most recent are:  Visit Vitals  BP (!) 109/56 (BP 1 Location: Left upper arm, BP Patient Position: At rest;Sitting)   Pulse 65   Temp 97.7 °F (36.5 °C)   Resp 20   Ht 5' 11\" (1.803 m)   Wt 59 kg (130 lb 1.1 oz)   SpO2 96%   BMI 18.14 kg/m²       No intake or output data in the 24 hours ending 11/19/22 1044       Physical Examination:     I had a face to face encounter with this patient and independently examined them on 11/19/2022 as outlined below:          Constitutional:  No acute distress, cooperative, pleasant    ENT:  Oral mucosa moist, oropharynx benign. Resp:  CTA bilaterally. No wheezing/rhonchi/rales. No accessory muscle use. CV:  Regular rhythm, normal rate, no murmurs, gallops, rubs    GI:  Soft, non distended, non tender. normoactive bowel sounds, no hepatosplenomegaly     Musculoskeletal:  No edema, warm, 2+ pulses throughout    Neurologic:  Moves all extremities. AAOx3, CN II-XII reviewed            Data Review:    Review and/or order of clinical lab test      Labs:     Recent Labs     11/19/22 0923 11/18/22 0459   WBC 11.6* 11.4*   HGB 11.6* 11.1*   HCT 35.9* 34.6*    204       Recent Labs     11/19/22 0923 11/18/22 0459    141   K 4.1 4.7    107   CO2 30 28   BUN 27* 28*   CREA 1.22 1.13   GLU 94 92   CA 8.3* 8.6       No results for input(s): ALT, AP, TBIL, TBILI, TP, ALB, GLOB, GGT, AML, LPSE in the last 72 hours. No lab exists for component: SGOT, GPT, AMYP, HLPSE  No results for input(s): INR, PTP, APTT, INREXT, INREXT in the last 72 hours. No results for input(s): FE, TIBC, PSAT, FERR in the last 72 hours. No results found for: FOL, RBCF   No results for input(s): PH, PCO2, PO2 in the last 72 hours. No results for input(s): CPK, CKNDX, TROIQ in the last 72 hours.     No lab exists for component: CPKMB  Lab Results   Component Value Date/Time    Cholesterol, total 106 02/20/2018 04:54 AM    HDL Cholesterol 45 02/20/2018 04:54 AM    LDL, calculated 46 02/20/2018 04:54 AM    Triglyceride 75 02/20/2018 04:54 AM    CHOL/HDL Ratio 2.4 02/20/2018 04:54 AM     Lab Results   Component Value Date/Time    Glucose (POC) 150 (H) 11/04/2022 12:52 PM    Glucose (POC) 100 09/14/2015 12:14 PM     Lab Results   Component Value Date/Time    Color YELLOW/STRAW 11/15/2022 11:05 AM    Appearance CLEAR 11/15/2022 11:05 AM    Specific gravity 1.021 11/15/2022 11:05 AM    pH (UA) 5.5 11/15/2022 11:05 AM    Protein Negative 11/15/2022 11:05 AM    Glucose Negative 11/15/2022 11:05 AM    Ketone Negative 11/15/2022 11:05 AM    Bilirubin Negative 11/15/2022 11:05 AM    Urobilinogen 0.2 11/15/2022 11:05 AM    Nitrites Negative 11/15/2022 11:05 AM    Leukocyte Esterase Negative 11/15/2022 11:05 AM    Epithelial cells FEW 11/15/2022 11:05 AM    Bacteria Negative 11/15/2022 11:05 AM    WBC 0-4 11/15/2022 11:05 AM    RBC 0-5 11/15/2022 11:05 AM         Medications Reviewed:     Current Facility-Administered Medications   Medication Dose Route Frequency    midodrine (PROAMATINE) tablet 10 mg  10 mg Oral TID WITH MEALS    albuterol-ipratropium (DUO-NEB) 2.5 MG-0.5 MG/3 ML  3 mL Nebulization Q4H PRN    loperamide (IMODIUM) capsule 2 mg  2 mg Oral Q4H PRN    furosemide (LASIX) tablet 20 mg  20 mg Oral DAILY    predniSONE (DELTASONE) tablet 30 mg  30 mg Oral DAILY WITH BREAKFAST    amiodarone (CORDARONE) tablet 100 mg  100 mg Oral BID    magic mouthwash cpd (without sucralfate)  5 mL Oral TID PRN    [Held by provider] sacubitriL-valsartan (ENTRESTO) 24-26 mg tablet 1 Tablet  1 Tablet Oral Q12H    apixaban (ELIQUIS) tablet 5 mg  5 mg Oral Q12H    metoprolol succinate (TOPROL-XL) XL tablet 25 mg  25 mg Oral DAILY    hydrALAZINE (APRESOLINE) 20 mg/mL injection 20 mg  20 mg IntraVENous Q6H PRN    sodium chloride (NS) flush 5-40 mL  5-40 mL IntraVENous Q8H    sodium chloride (NS) flush 5-40 mL  5-40 mL IntraVENous PRN    acetaminophen (TYLENOL) tablet 650 mg  650 mg Oral Q6H PRN    Or    acetaminophen (TYLENOL) suppository 650 mg  650 mg Rectal Q6H PRN    ondansetron (ZOFRAN ODT) tablet 4 mg  4 mg Oral Q8H PRN    Or    ondansetron (ZOFRAN) injection 4 mg  4 mg IntraVENous Q6H PRN    polyethylene glycol (MIRALAX) packet 17 g  17 g Oral DAILY PRN    arformoteroL (BROVANA) neb solution 15 mcg  15 mcg Nebulization BID RT    aspirin delayed-release tablet 81 mg  81 mg Oral DAILY    atorvastatin (LIPITOR) tablet 40 mg  40 mg Oral DAILY    budesonide (PULMICORT) 500 mcg/2 ml nebulizer suspension  500 mcg Nebulization BID    oxyCODONE IR (ROXICODONE) tablet 20 mg  20 mg Oral Q6H PRN    oxyCODONE IR (ROXICODONE) tablet 10 mg  10 mg Oral Q6H PRN     ______________________________________________________________________  EXPECTED LENGTH OF STAY: 4d 19h  ACTUAL LENGTH OF STAY:          24                 Chidi Anguiano MD

## 2022-11-19 NOTE — PROGRESS NOTES
[address] [facility] Adult  Hospitalist Group                                                                                          Hospitalist Progress Note  Michelle Cerrato MD  Answering service: [phone] OR [phone] from in house phone        Date of Service:  2022  NAME:  Lupe Sol  :  1950  MRN:  [id number]      Admission Summary:     Patient presents with acute on chronic respiratory failure due to COPD and pneumonia, also with new onset atrial fibrillation with new onset systolic heart failure.     Interval history / Subjective:   Some cough reported pending Auth for SNF no new complaints  Ortho started yesterday see case management note medically clear for discharge  Patient has some cough we will resume Lasix     Assessment & Plan:     Acute respiratory failure-- now on NC   -Acute hypoxic and hypercapnic respiratory failure due to pneumonia and COPD  -Patient with baseline chronic respiratory failure due to COPD, as well as s/p lobectomy due to lung cancer, uses 3 L of oxygen at baseline  -Patient initially requiring BiPAP, overall improving and able to participate more with PT    Sepsis-- resolved  -Patient with tachycardia and leukocytosis, elevated procalcitonin level  -Sepsis due to multilobar pneumonia  -Sepsis reassessment completed, monitor hemodynamics    Multilobar pneumonia-- resoled treated  -Blood cultures so far negative, COVID-negative, respiratory culture negative, Legionella negative, MRSA negative  -Patient has completed treatment with Rocephin and Zithromax  -Repeat chest x-ray 2022 shows improvement    Acute exacerbation of COPD--resolved  -Continue bronchodilators, breathing treatment, Solu-Medrol was changed to prednisone will taper on d/c  -Previously evaluated by pulmonology, at baseline patient uses 3 L    Paroxysmal atrial fibrillation--rate controlled  -Patient remaining in sinus rhythm  -Amiodarone drip was changed to p.o. amiodarone, on metoprolol and on Eliquis for anticoagulation    Acute systolic CHF--NYHA IV on admission currently NYHA II stable  -New onset systolic CHF, NYHA class IV, echo shows EF of 25 to 30%  -Limited use of guideline directed medical therapy due to hypotension  -Previously evaluated by cardiology  -May consider resuming Lasix and Entresto when patient is hemodynamically more improved    Hypotension--well-controlled  -Patient has been treated with albumin, added midodrine for blood pressure support  -Continue monitor hemodynamic    PENNY--resolved  -Likely multifactorial including sepsis  -PENNY is resolved    Dyslipidemia  -Continue statin    Non-small cell lung cancer  -History of right lower lobe lobectomy at [facility] in 2009  -Follow-up outpatient     Code status: DNR  Prophylaxis: [address] discussed with: Patient  Anticipated Disposition: Did P2P, denied for IPR. Plan for SNF. Medically stable for discharge     Hospital Problems  Date Reviewed: 1/9/2019            Codes Class Noted POA    Moderate protein-calorie malnutrition ([facility] ICD-10-CM: E44.0  ICD-9-CM: 263.0  10/27/2022 Yes        COPD (chronic obstructive pulmonary disease) ([facility] ICD-10-CM: J44.9  ICD-9-CM: 496  10/26/2022 Unknown        Pneumonia ICD-10-CM: J18.9  ICD-9-CM: 486  10/26/2022 Unknown        Malignant neoplasm of lung, unspecified laterality, unspecified part of lung ([facility].) ICD-10-CM: C34.90  ICD-9-CM: 162.9  10/26/2022 Unknown        Sepsis ([facility] ICD-10-CM: A41.9  ICD-9-CM: 038.9, 995.91  10/26/2022 Unknown         Review of Systems:   A comprehensive review of systems was negative except for that written in the HPI. Vital Signs:    Last 24hrs VS reviewed since prior progress note.  Most recent are:  Visit Vitals  BP (!) 109/56 (BP 1 Location: Left upper arm, BP Patient Position: At rest;Sitting)   Pulse 65   Temp 97.7 °F (36.5 °C)   Resp 20   Ht 5' 11\" (1.803 m)   Wt 59 kg (130 lb 1.1 oz)   SpO2 96%   BMI 18.14 kg/m²       No intake or output data in the 24 hours ending 11/19/22 1049       Physical Examination:     I had a face to face encounter with this patient and independently examined them on 11/19/2022 as outlined below:          Constitutional:  No acute distress, cooperative, pleasant    ENT:  Oral mucosa moist, oropharynx benign. Resp:  CTA bilaterally. No wheezing/rhonchi/rales. No accessory muscle use. CV:  Regular rhythm, normal rate, no murmurs, gallops, rubs    GI:  Soft, non distended, non tender. normoactive bowel sounds, no hepatosplenomegaly     Musculoskeletal:  No edema, warm, 2+ pulses throughout    Neurologic:  Moves all extremities. AAOx3, CN II-XII reviewed            Data Review:    Review and/or order of clinical lab test      Labs:     Recent Labs     11/19/22 0923 11/18/22 0459   WBC 11.6* 11.4*   HGB 11.6* 11.1*   HCT 35.9* 34.6*    204         Recent Labs     11/19/22 0923 11/18/22 0459    141   K 4.1 4.7    107   CO2 30 28   BUN 27* 28*   CREA 1.22 1.13   GLU 94 92   CA 8.3* 8.6       No results for input(s): ALT, AP, TBIL, TBILI, TP, ALB, GLOB, GGT, AML, LPSE in the last 72 hours. No lab exists for component: SGOT, GPT, AMYP, HLPSE  No results for input(s): INR, PTP, APTT, INREXT, INREXT in the last 72 hours. No results for input(s): FE, TIBC, PSAT, FERR in the last 72 hours. No results found for: FOL, RBCF   No results for input(s): PH, PCO2, PO2 in the last 72 hours. No results for input(s): CPK, CKNDX, TROIQ in the last 72 hours.     No lab exists for component: CPKMB  Lab Results   Component Value Date/Time    Cholesterol, total 106 02/20/2018 04:54 AM    HDL Cholesterol 45 02/20/2018 04:54 AM    LDL, calculated 46 02/20/2018 04:54 AM    Triglyceride 75 02/20/2018 04:54 AM    CHOL/HDL Ratio 2.4 02/20/2018 04:54 AM     Lab Results   Component Value Date/Time    Glucose (POC) 150 (H) 11/04/2022 12:52 PM    Glucose (POC) 100 09/14/2015 12:14 PM     Lab Results   Component Value Date/Time    Color YELLOW/STRAW 11/15/2022 11:05 AM    Appearance CLEAR 11/15/2022 11:05 AM    Specific gravity 1.021 11/15/2022 11:05 AM    pH (UA) 5.5 11/15/2022 11:05 AM    Protein Negative 11/15/2022 11:05 AM    Glucose Negative 11/15/2022 11:05 AM    Ketone Negative 11/15/2022 11:05 AM    Bilirubin Negative 11/15/2022 11:05 AM    Urobilinogen 0.2 11/15/2022 11:05 AM    Nitrites Negative 11/15/2022 11:05 AM    Leukocyte Esterase Negative 11/15/2022 11:05 AM    Epithelial cells FEW 11/15/2022 11:05 AM    Bacteria Negative 11/15/2022 11:05 AM    WBC 0-4 11/15/2022 11:05 AM    RBC 0-5 11/15/2022 11:05 AM         Medications Reviewed:     Current Facility-Administered Medications   Medication Dose Route Frequency    midodrine (PROAMATINE) tablet 10 mg  10 mg Oral TID WITH MEALS    albuterol-ipratropium (DUO-NEB) 2.5 MG-0.5 MG/3 ML  3 mL Nebulization Q4H PRN    loperamide (IMODIUM) capsule 2 mg  2 mg Oral Q4H PRN    furosemide (LASIX) tablet 20 mg  20 mg Oral DAILY    predniSONE (DELTASONE) tablet 30 mg  30 mg Oral DAILY WITH BREAKFAST    amiodarone (CORDARONE) tablet 100 mg  100 mg Oral BID    magic mouthwash cpd (without sucralfate)  5 mL Oral TID PRN    [Held by provider] sacubitriL-valsartan (ENTRESTO) 24-26 mg tablet 1 Tablet  1 Tablet Oral Q12H    apixaban (ELIQUIS) tablet 5 mg  5 mg Oral Q12H    metoprolol succinate (TOPROL-XL) XL tablet 25 mg  25 mg Oral DAILY    hydrALAZINE (APRESOLINE) 20 mg/mL injection 20 mg  20 mg IntraVENous Q6H PRN    sodium chloride (NS) flush 5-40 mL  5-40 mL IntraVENous Q8H    sodium chloride (NS) flush 5-40 mL  5-40 mL IntraVENous PRN    acetaminophen (TYLENOL) tablet 650 mg  650 mg Oral Q6H PRN    Or    acetaminophen (TYLENOL) suppository 650 mg  650 mg Rectal Q6H PRN    ondansetron (ZOFRAN ODT) tablet 4 mg  4 mg Oral Q8H PRN    Or    ondansetron (ZOFRAN) injection 4 mg  4 mg IntraVENous Q6H PRN    polyethylene glycol (MIRALAX) packet 17 g  17 g Oral DAILY PRN    arformoteroL (BROVANA) neb solution 15 mcg  15 mcg Nebulization BID RT    aspirin delayed-release tablet 81 mg  81 mg Oral DAILY    atorvastatin (LIPITOR) tablet 40 mg  40 mg Oral DAILY    budesonide (PULMICORT) 500 mcg/2 ml nebulizer suspension  500 mcg Nebulization BID    oxyCODONE IR (ROXICODONE) tablet 20 mg  20 mg Oral Q6H PRN    oxyCODONE IR (ROXICODONE) tablet 10 mg  10 mg Oral Q6H PRN     ______________________________________________________________________  EXPECTED LENGTH OF STAY: 4d 19h  ACTUAL LENGTH OF STAY:          24                 Donovan Yang MD

## 2022-11-19 NOTE — PROGRESS NOTES
Bedside shift change report given to Marielle Locke (oncoming nurse) by Rik Leventhal RN (offgoing nurse). Report included the following information SBAR, MAR, and Cardiac Rhythm SB/NSR .

## 2022-11-20 LAB
ANION GAP SERPL CALC-SCNC: 6 MMOL/L (ref 5–15)
BUN SERPL-MCNC: 29 MG/DL (ref 6–20)
BUN/CREAT SERPL: 25 (ref 12–20)
CALCIUM SERPL-MCNC: 8.4 MG/DL (ref 8.5–10.1)
CHLORIDE SERPL-SCNC: 108 MMOL/L (ref 97–108)
CO2 SERPL-SCNC: 27 MMOL/L (ref 21–32)
CREAT SERPL-MCNC: 1.16 MG/DL (ref 0.7–1.3)
ERYTHROCYTE [DISTWIDTH] IN BLOOD BY AUTOMATED COUNT: 14.9 % (ref 11.5–14.5)
GLUCOSE SERPL-MCNC: 139 MG/DL (ref 65–100)
HCT VFR BLD AUTO: 33.1 % (ref 36.6–50.3)
HGB BLD-MCNC: 10.7 G/DL (ref 12.1–17)
MCH RBC QN AUTO: 30.1 PG (ref 26–34)
MCHC RBC AUTO-ENTMCNC: 32.3 G/DL (ref 30–36.5)
MCV RBC AUTO: 93 FL (ref 80–99)
NRBC # BLD: 0 K/UL (ref 0–0.01)
NRBC BLD-RTO: 0 PER 100 WBC
PLATELET # BLD AUTO: 188 K/UL (ref 150–400)
PMV BLD AUTO: 10 FL (ref 8.9–12.9)
POTASSIUM SERPL-SCNC: 3.8 MMOL/L (ref 3.5–5.1)
RBC # BLD AUTO: 3.56 M/UL (ref 4.1–5.7)
SODIUM SERPL-SCNC: 141 MMOL/L (ref 136–145)
WBC # BLD AUTO: 9.4 K/UL (ref 4.1–11.1)

## 2022-11-20 PROCEDURE — 65270000046 HC RM TELEMETRY

## 2022-11-20 PROCEDURE — 36415 COLL VENOUS BLD VENIPUNCTURE: CPT

## 2022-11-20 PROCEDURE — 74011636637 HC RX REV CODE- 636/637: Performed by: FAMILY MEDICINE

## 2022-11-20 PROCEDURE — 74011250637 HC RX REV CODE- 250/637: Performed by: FAMILY MEDICINE

## 2022-11-20 PROCEDURE — 74011000250 HC RX REV CODE- 250: Performed by: INTERNAL MEDICINE

## 2022-11-20 PROCEDURE — 85027 COMPLETE CBC AUTOMATED: CPT

## 2022-11-20 PROCEDURE — 74011250637 HC RX REV CODE- 250/637: Performed by: INTERNAL MEDICINE

## 2022-11-20 PROCEDURE — 65270000032 HC RM SEMIPRIVATE

## 2022-11-20 PROCEDURE — 94640 AIRWAY INHALATION TREATMENT: CPT

## 2022-11-20 PROCEDURE — 80048 BASIC METABOLIC PNL TOTAL CA: CPT

## 2022-11-20 RX ADMIN — APIXABAN 5 MG: 5 TABLET, FILM COATED ORAL at 08:41

## 2022-11-20 RX ADMIN — BUDESONIDE 500 MCG: 0.5 INHALANT RESPIRATORY (INHALATION) at 07:19

## 2022-11-20 RX ADMIN — BUDESONIDE 500 MCG: 0.5 INHALANT RESPIRATORY (INHALATION) at 21:01

## 2022-11-20 RX ADMIN — MIDODRINE HYDROCHLORIDE 10 MG: 5 TABLET ORAL at 08:41

## 2022-11-20 RX ADMIN — SODIUM CHLORIDE, PRESERVATIVE FREE 10 ML: 5 INJECTION INTRAVENOUS at 07:24

## 2022-11-20 RX ADMIN — ASPIRIN 81 MG: 81 TABLET, COATED ORAL at 08:42

## 2022-11-20 RX ADMIN — SODIUM CHLORIDE, PRESERVATIVE FREE 10 ML: 5 INJECTION INTRAVENOUS at 22:36

## 2022-11-20 RX ADMIN — METOPROLOL SUCCINATE 25 MG: 25 TABLET, FILM COATED, EXTENDED RELEASE ORAL at 08:42

## 2022-11-20 RX ADMIN — FUROSEMIDE 20 MG: 20 TABLET ORAL at 08:41

## 2022-11-20 RX ADMIN — SODIUM CHLORIDE, PRESERVATIVE FREE 10 ML: 5 INJECTION INTRAVENOUS at 14:00

## 2022-11-20 RX ADMIN — PREDNISONE 30 MG: 20 TABLET ORAL at 08:41

## 2022-11-20 RX ADMIN — MIDODRINE HYDROCHLORIDE 10 MG: 5 TABLET ORAL at 11:29

## 2022-11-20 RX ADMIN — AMIODARONE HYDROCHLORIDE 100 MG: 200 TABLET ORAL at 08:41

## 2022-11-20 RX ADMIN — AMIODARONE HYDROCHLORIDE 100 MG: 200 TABLET ORAL at 17:47

## 2022-11-20 RX ADMIN — ARFORMOTEROL TARTRATE 15 MCG: 15 SOLUTION RESPIRATORY (INHALATION) at 21:01

## 2022-11-20 RX ADMIN — ARFORMOTEROL TARTRATE 15 MCG: 15 SOLUTION RESPIRATORY (INHALATION) at 07:18

## 2022-11-20 RX ADMIN — ATORVASTATIN CALCIUM 40 MG: 40 TABLET, FILM COATED ORAL at 08:41

## 2022-11-20 RX ADMIN — APIXABAN 5 MG: 5 TABLET, FILM COATED ORAL at 22:36

## 2022-11-20 RX ADMIN — MIDODRINE HYDROCHLORIDE 10 MG: 5 TABLET ORAL at 17:47

## 2022-11-20 NOTE — PROGRESS NOTES
Lasix added back today breathing well      [address] [facility] Adult  Hospitalist Group                                                                                          Hospitalist Progress Note  Fawn Irene MD  Answering service: [phone] from in house phone        Date of Service:  2022  NAME:  Tk Coffman  :  1950  MRN:  [id number]      Admission Summary:     Patient presents with acute on chronic respiratory failure due to COPD and pneumonia, also with new onset atrial fibrillation with new onset systolic heart failure.     Interval history / Subjective:   Some cough reported pending Auth for SNF no new complaints  Lasix was added back yesterday breathing well no new complaints     Assessment & Plan:     Acute respiratory failure-- now on NC   -Acute hypoxic and hypercapnic respiratory failure due to pneumonia and COPD  -Patient with baseline chronic respiratory failure due to COPD, as well as s/p lobectomy due to lung cancer, uses 3 L of oxygen at baseline  -Patient initially requiring BiPAP, overall improving and able to participate more with PT    Sepsis-- resolved  -Patient with tachycardia and leukocytosis, elevated procalcitonin level  -Sepsis due to multilobar pneumonia  -Sepsis reassessment completed, monitor hemodynamics    Multilobar pneumonia-- resoled treated  -Blood cultures so far negative, COVID-negative, respiratory culture negative, Legionella negative, MRSA negative  -Patient has completed treatment with Rocephin and Zithromax  -Repeat chest x-ray 2022 shows improvement    Acute exacerbation of COPD--resolved  -Continue bronchodilators, breathing treatment, Solu-Medrol was changed to prednisone will taper on d/c  -Previously evaluated by pulmonology, at baseline patient uses 3 L    Paroxysmal atrial fibrillation--rate controlled  -Patient remaining in sinus rhythm  -Amiodarone drip was changed to p.o. amiodarone, on metoprolol and on Eliquis for anticoagulation    Acute systolic CHF--NYHA IV on admission currently NYHA II stable  -New onset systolic CHF, NYHA class IV, echo shows EF of 25 to 30%  -Limited use of guideline directed medical therapy due to hypotension  -Previously evaluated by cardiology  -May consider resuming Lasix and Entresto when patient is hemodynamically more improved    Hypotension--well-controlled  -Patient has been treated with albumin, added midodrine for blood pressure support  -Continue monitor hemodynamic    PENNY--resolved  -Likely multifactorial including sepsis  -PENNY is resolved    Dyslipidemia  -Continue statin    Non-small cell lung cancer  -History of right lower lobe lobectomy at [facility] in 2009  -Follow-up outpatient     Code status: DNR  Prophylaxis: [address] discussed with: Patient  Anticipated Disposition: Did P2P, denied for IPR. Plan for SNF. Medically stable for discharge     Hospital Problems  Date Reviewed: 1/9/2019            Codes Class Noted POA    Moderate protein-calorie malnutrition ([facility] ICD-10-CM: E44.0  ICD-9-CM: 263.0  10/27/2022 Yes        COPD (chronic obstructive pulmonary disease) ([facility] ICD-10-CM: J44.9  ICD-9-CM: 496  10/26/2022 Unknown        Pneumonia ICD-10-CM: J18.9  ICD-9-CM: 486  10/26/2022 Unknown        Malignant neoplasm of lung, unspecified laterality, unspecified part of lung ([facility] ICD-10-CM: C34.90  ICD-9-CM: 162.9  10/26/2022 Unknown        Sepsis ([facility] ICD-10-CM: A41.9  ICD-9-CM: 038.9, 995.91  10/26/2022 Unknown       Review of Systems:   A comprehensive review of systems was negative except for that written in the HPI. Vital Signs:    Last 24hrs VS reviewed since prior progress note.  Most recent are:  Visit Vitals  BP (!) 107/56 (BP 1 Location: Right arm, BP Patient Position: At rest)   Pulse 60   Temp 97.4 °F (36.3 °C)   Resp 18   Ht 5' 11\" (1.803 m)   Wt 59 kg (130 lb 1.1 oz)   SpO2 97%   BMI 18.14 kg/m²         Intake/Output Summary (Last 24 hours) at 11/20/2022 [facility] filed at 11/20/2022 0945  Gross per 24 hour   Intake --   Output 700 ml   Net -700 ml          Physical Examination:     I had a face to face encounter with this patient and independently examined them on 11/20/2022 as outlined below:          Constitutional:  No acute distress, cooperative, pleasant    ENT:  Oral mucosa moist, oropharynx benign. Resp:  CTA bilaterally. No wheezing/rhonchi/rales. No accessory muscle use. CV:  Regular rhythm, normal rate, no murmurs, gallops, rubs    GI:  Soft, non distended, non tender. normoactive bowel sounds, no hepatosplenomegaly     Musculoskeletal:  No edema, warm, 2+ pulses throughout    Neurologic:  Moves all extremities. AAOx3, CN II-XII reviewed            Data Review:    Review and/or order of clinical lab test      Labs:     Recent Labs     11/20/22 0346 11/19/22 0923   WBC 9.4 11.6*   HGB 10.7* 11.6*   HCT 33.1* 35.9*    206         Recent Labs     11/20/22 0346 11/19/22 0923 11/18/22  0459    138 141   K 3.8 4.1 4.7    105 107   CO2 27 30 28   BUN 29* 27* 28*   CREA 1.16 1.22 1.13   * 94 92   CA 8.4* 8.3* 8.6       No results for input(s): ALT, AP, TBIL, TBILI, TP, ALB, GLOB, GGT, AML, LPSE in the last 72 hours. No lab exists for component: SGOT, GPT, AMYP, HLPSE  No results for input(s): INR, PTP, APTT, INREXT, INREXT in the last 72 hours. No results for input(s): FE, TIBC, PSAT, FERR in the last 72 hours. No results found for: FOL, RBCF   No results for input(s): PH, PCO2, PO2 in the last 72 hours. No results for input(s): CPK, CKNDX, TROIQ in the last 72 hours.     No lab exists for component: CPKMB  Lab Results   Component Value Date/Time    Cholesterol, total 106 02/20/2018 04:54 AM    HDL Cholesterol 45 02/20/2018 04:54 AM    LDL, calculated 46 02/20/2018 04:54 AM    Triglyceride 75 02/20/2018 04:54 AM    CHOL/HDL Ratio 2.4 02/20/2018 04:54 AM     Lab Results   Component Value Date/Time    Glucose (POC) 150 (H) 11/04/2022 12:52 PM    Glucose (POC) 100 09/14/2015 12:14 PM     Lab Results   Component Value Date/Time    Color YELLOW/STRAW 11/15/2022 11:05 AM    Appearance CLEAR 11/15/2022 11:05 AM    Specific gravity 1.021 11/15/2022 11:05 AM    pH (UA) 5.5 11/15/2022 11:05 AM    Protein Negative 11/15/2022 11:05 AM    Glucose Negative 11/15/2022 11:05 AM    Ketone Negative 11/15/2022 11:05 AM    Bilirubin Negative 11/15/2022 11:05 AM    Urobilinogen 0.2 11/15/2022 11:05 AM    Nitrites Negative 11/15/2022 11:05 AM    Leukocyte Esterase Negative 11/15/2022 11:05 AM    Epithelial cells FEW 11/15/2022 11:05 AM    Bacteria Negative 11/15/2022 11:05 AM    WBC 0-4 11/15/2022 11:05 AM    RBC 0-5 11/15/2022 11:05 AM         Medications Reviewed:     Current Facility-Administered Medications   Medication Dose Route Frequency    midodrine (PROAMATINE) tablet 10 mg  10 mg Oral TID WITH MEALS    albuterol-ipratropium (DUO-NEB) 2.5 MG-0.5 MG/3 ML  3 mL Nebulization Q4H PRN    loperamide (IMODIUM) capsule 2 mg  2 mg Oral Q4H PRN    furosemide (LASIX) tablet 20 mg  20 mg Oral DAILY    predniSONE (DELTASONE) tablet 30 mg  30 mg Oral DAILY WITH BREAKFAST    amiodarone (CORDARONE) tablet 100 mg  100 mg Oral BID    magic mouthwash cpd (without sucralfate)  5 mL Oral TID PRN    [Held by provider] sacubitriL-valsartan (ENTRESTO) 24-26 mg tablet 1 Tablet  1 Tablet Oral Q12H    apixaban (ELIQUIS) tablet 5 mg  5 mg Oral Q12H    metoprolol succinate (TOPROL-XL) XL tablet 25 mg  25 mg Oral DAILY    hydrALAZINE (APRESOLINE) 20 mg/mL injection 20 mg  20 mg IntraVENous Q6H PRN    sodium chloride (NS) flush 5-40 mL  5-40 mL IntraVENous Q8H    sodium chloride (NS) flush 5-40 mL  5-40 mL IntraVENous PRN    acetaminophen (TYLENOL) tablet 650 mg  650 mg Oral Q6H PRN    Or    acetaminophen (TYLENOL) suppository 650 mg  650 mg Rectal Q6H PRN    ondansetron (ZOFRAN ODT) tablet 4 mg  4 mg Oral Q8H PRN    Or    ondansetron (ZOFRAN) injection 4 mg  4 mg IntraVENous Q6H PRN    polyethylene glycol (MIRALAX) packet 17 g  17 g Oral DAILY PRN    arformoteroL (BROVANA) neb solution 15 mcg  15 mcg Nebulization BID RT    aspirin delayed-release tablet 81 mg  81 mg Oral DAILY    atorvastatin (LIPITOR) tablet 40 mg  40 mg Oral DAILY    budesonide (PULMICORT) 500 mcg/2 ml nebulizer suspension  500 mcg Nebulization BID    oxyCODONE IR (ROXICODONE) tablet 20 mg  20 mg Oral Q6H PRN    oxyCODONE IR (ROXICODONE) tablet 10 mg  10 mg Oral Q6H PRN     ______________________________________________________________________  EXPECTED LENGTH OF STAY: 4d 19h  ACTUAL LENGTH OF STAY:          25                 Joann Turner MD

## 2022-11-20 NOTE — PROGRESS NOTES
Problem: Falls - Risk of  Goal: *Absence of Falls  Description: Document Lettie Duverney Fall Risk and appropriate interventions in the flowsheet. Outcome: Progressing Towards Goal  Note: Fall Risk Interventions:  Mobility Interventions: Patient to call before getting OOB         Medication Interventions: Patient to call before getting OOB    Elimination Interventions: Call light in reach              Problem: Pain  Goal: *Control of Pain  Outcome: Progressing Towards Goal     Problem: Nutrition Deficit  Goal: *Optimize nutritional status  Outcome: Progressing Towards Goal     Problem: Pressure Injury - Risk of  Goal: *Prevention of pressure injury  Description: Document Ramos Scale and appropriate interventions in the flowsheet.   Outcome: Progressing Towards Goal  Note: Pressure Injury Interventions:  Sensory Interventions: Assess changes in LOC    Moisture Interventions: Absorbent underpads, Apply protective barrier, creams and emollients    Activity Interventions: Increase time out of bed    Mobility Interventions: PT/OT evaluation    Nutrition Interventions: Document food/fluid/supplement intake

## 2022-11-20 NOTE — PROGRESS NOTES
TRANSFER - IN REPORT:    Verbal report received from [facility]) (name) on Zeyad Victoria  being received from Woodrow Martin (Bianca Dias) for routine progression of care      Report consisted of patients Situation, Background, Assessment and   Recommendations(SBAR). Information from the following report(s) SBAR and Kardex was reviewed with the receiving nurse. Opportunity for questions and clarification was provided. Assessment completed upon patients arrival to unit and care assumed.

## 2022-11-21 LAB
ANION GAP SERPL CALC-SCNC: 7 MMOL/L (ref 5–15)
BUN SERPL-MCNC: 30 MG/DL (ref 6–20)
BUN/CREAT SERPL: 23 (ref 12–20)
CALCIUM SERPL-MCNC: 8.3 MG/DL (ref 8.5–10.1)
CHLORIDE SERPL-SCNC: 104 MMOL/L (ref 97–108)
CO2 SERPL-SCNC: 30 MMOL/L (ref 21–32)
CREAT SERPL-MCNC: 1.28 MG/DL (ref 0.7–1.3)
ERYTHROCYTE [DISTWIDTH] IN BLOOD BY AUTOMATED COUNT: 15.3 % (ref 11.5–14.5)
GLUCOSE SERPL-MCNC: 122 MG/DL (ref 65–100)
HCT VFR BLD AUTO: 34.2 % (ref 36.6–50.3)
HGB BLD-MCNC: 10.9 G/DL (ref 12.1–17)
MCH RBC QN AUTO: 29.7 PG (ref 26–34)
MCHC RBC AUTO-ENTMCNC: 31.9 G/DL (ref 30–36.5)
MCV RBC AUTO: 93.2 FL (ref 80–99)
NRBC # BLD: 0 K/UL (ref 0–0.01)
NRBC BLD-RTO: 0 PER 100 WBC
PLATELET # BLD AUTO: 205 K/UL (ref 150–400)
PMV BLD AUTO: 9.7 FL (ref 8.9–12.9)
POTASSIUM SERPL-SCNC: 4.2 MMOL/L (ref 3.5–5.1)
RBC # BLD AUTO: 3.67 M/UL (ref 4.1–5.7)
SODIUM SERPL-SCNC: 141 MMOL/L (ref 136–145)
WBC # BLD AUTO: 9.3 K/UL (ref 4.1–11.1)

## 2022-11-21 PROCEDURE — 94760 N-INVAS EAR/PLS OXIMETRY 1: CPT

## 2022-11-21 PROCEDURE — 94640 AIRWAY INHALATION TREATMENT: CPT

## 2022-11-21 PROCEDURE — 85027 COMPLETE CBC AUTOMATED: CPT

## 2022-11-21 PROCEDURE — 74011636637 HC RX REV CODE- 636/637: Performed by: FAMILY MEDICINE

## 2022-11-21 PROCEDURE — 74011250637 HC RX REV CODE- 250/637: Performed by: FAMILY MEDICINE

## 2022-11-21 PROCEDURE — 97116 GAIT TRAINING THERAPY: CPT

## 2022-11-21 PROCEDURE — 80048 BASIC METABOLIC PNL TOTAL CA: CPT

## 2022-11-21 PROCEDURE — 74011000250 HC RX REV CODE- 250: Performed by: INTERNAL MEDICINE

## 2022-11-21 PROCEDURE — 77010033678 HC OXYGEN DAILY

## 2022-11-21 PROCEDURE — 65270000046 HC RM TELEMETRY

## 2022-11-21 PROCEDURE — 74011250637 HC RX REV CODE- 250/637: Performed by: INTERNAL MEDICINE

## 2022-11-21 PROCEDURE — 36415 COLL VENOUS BLD VENIPUNCTURE: CPT

## 2022-11-21 RX ORDER — PREDNISONE 20 MG/1
20 TABLET ORAL
Status: DISCONTINUED | OUTPATIENT
Start: 2022-11-21 | End: 2022-11-25 | Stop reason: HOSPADM

## 2022-11-21 RX ADMIN — BUDESONIDE 500 MCG: 0.5 INHALANT RESPIRATORY (INHALATION) at 21:15

## 2022-11-21 RX ADMIN — PREDNISONE 20 MG: 20 TABLET ORAL at 10:01

## 2022-11-21 RX ADMIN — METOPROLOL SUCCINATE 25 MG: 25 TABLET, FILM COATED, EXTENDED RELEASE ORAL at 10:02

## 2022-11-21 RX ADMIN — ASPIRIN 81 MG: 81 TABLET, COATED ORAL at 10:02

## 2022-11-21 RX ADMIN — FUROSEMIDE 20 MG: 20 TABLET ORAL at 09:59

## 2022-11-21 RX ADMIN — MIDODRINE HYDROCHLORIDE 10 MG: 5 TABLET ORAL at 12:41

## 2022-11-21 RX ADMIN — ARFORMOTEROL TARTRATE 15 MCG: 15 SOLUTION RESPIRATORY (INHALATION) at 07:56

## 2022-11-21 RX ADMIN — MIDODRINE HYDROCHLORIDE 10 MG: 5 TABLET ORAL at 18:41

## 2022-11-21 RX ADMIN — AMIODARONE HYDROCHLORIDE 100 MG: 200 TABLET ORAL at 10:00

## 2022-11-21 RX ADMIN — SODIUM CHLORIDE, PRESERVATIVE FREE 10 ML: 5 INJECTION INTRAVENOUS at 21:13

## 2022-11-21 RX ADMIN — APIXABAN 5 MG: 5 TABLET, FILM COATED ORAL at 09:59

## 2022-11-21 RX ADMIN — APIXABAN 5 MG: 5 TABLET, FILM COATED ORAL at 21:13

## 2022-11-21 RX ADMIN — MIDODRINE HYDROCHLORIDE 10 MG: 5 TABLET ORAL at 09:59

## 2022-11-21 RX ADMIN — SODIUM CHLORIDE, PRESERVATIVE FREE 10 ML: 5 INJECTION INTRAVENOUS at 07:01

## 2022-11-21 RX ADMIN — BUDESONIDE 500 MCG: 0.5 INHALANT RESPIRATORY (INHALATION) at 07:55

## 2022-11-21 RX ADMIN — ATORVASTATIN CALCIUM 40 MG: 40 TABLET, FILM COATED ORAL at 10:02

## 2022-11-21 RX ADMIN — SODIUM CHLORIDE, PRESERVATIVE FREE 10 ML: 5 INJECTION INTRAVENOUS at 15:40

## 2022-11-21 RX ADMIN — AMIODARONE HYDROCHLORIDE 100 MG: 200 TABLET ORAL at 18:41

## 2022-11-21 RX ADMIN — ARFORMOTEROL TARTRATE 15 MCG: 15 SOLUTION RESPIRATORY (INHALATION) at 21:15

## 2022-11-21 NOTE — PROGRESS NOTES
Problem: Falls - Risk of  Goal: *Absence of Falls  Description: Document Yane Skill Fall Risk and appropriate interventions in the flowsheet.   Outcome: Progressing Towards Goal  Note: Fall Risk Interventions:  Mobility Interventions: Patient to call before getting OOB         Medication Interventions: Patient to call before getting OOB    Elimination Interventions: Bed/chair exit alarm              Problem: Patient Education: Go to Patient Education Activity  Goal: Patient/Family Education  Outcome: Progressing Towards Goal

## 2022-11-21 NOTE — PROGRESS NOTES
[facility] Adult  Hospitalist Group                                                                                          Hospitalist Progress Note  Soraida Vargas MD  Answering service: [phone] from in house phone        Date of Service:  2022  NAME:  Shawna Marcus  :  1950  MRN:  [id number]      Admission Summary:     Patient presents with acute on chronic respiratory failure due to COPD and pneumonia, also with new onset atrial fibrillation with new onset systolic heart failure. Interval history / Subjective:     Patient doing well, no acute complaint.      Assessment & Plan:     Acute respiratory failure  -Acute hypoxic and hypercapnic respiratory failure due to pneumonia and COPD  -Patient with baseline chronic respiratory failure due to COPD, as well as s/p lobectomy due to lung cancer, uses 3 L of oxygen at baseline  -Patient initially requiring BiPAP, overall improving and able to participate more with PT    Sepsis  -Patient with tachycardia and leukocytosis, elevated procalcitonin level  -Sepsis due to multilobar pneumonia  -Sepsis resolved    Multilobar pneumonia  -Blood cultures so far negative, COVID-negative, respiratory culture negative, Legionella negative, MRSA negative  -Patient has completed treatment with Rocephin and Zithromax  -Repeat chest x-ray 2022 shows improvement    Acute exacerbation of COPD  -Continue bronchodilators, breathing treatment, Solu-Medrol was changed to prednisone, start tapering  -Previously evaluated by pulmonology, at baseline patient uses 3 L    Paroxysmal atrial fibrillation  -Patient remaining in sinus rhythm  -Amiodarone drip was changed to p.o. amiodarone, on metoprolol and on Eliquis for anticoagulation    Acute systolic CHF  -New onset systolic CHF, NYHA class IV, echo shows EF of 25 to 30%  -Limited use of guideline directed medical therapy due to hypotension  -Previously evaluated by cardiology  -Continue Lasix, consider resuming Entresto when patient is hemodynamically more improved    Hypotension  -Patient has been treated with albumin, added midodrine for blood pressure support  -Continue monitor hemodynamic    PENNY  -Likely multifactorial including sepsis  -PENNY is resolved    Dyslipidemia  -Continue statin    Non-small cell lung cancer  -History of right lower lobe lobectomy at [facility] in 2009  -Follow-up outpatient     Code status: DNR  Prophylaxis: [address] discussed with: Patient  Anticipated Disposition: Did P2P, denied for IPR. Plan for SNF. Medically stable, placement pending. Hospital Problems  Date Reviewed: 1/9/2019            Codes Class Noted POA    Moderate protein-calorie malnutrition ([facility] ICD-10-CM: E44.0  ICD-9-CM: 263.0  10/27/2022 Yes        COPD (chronic obstructive pulmonary disease) ([facility] ICD-10-CM: J44.9  ICD-9-CM: 466  10/26/2022 Unknown        Pneumonia ICD-10-CM: J18.9  ICD-9-CM: 486  10/26/2022 Unknown        Malignant neoplasm of lung, unspecified laterality, unspecified part of lung ([facility] ICD-10-CM: C34.90  ICD-9-CM: 162.9  10/26/2022 Unknown        Sepsis ([facility] ICD-10-CM: A41.9  ICD-9-CM: 038.9, 995.91  10/26/2022 Unknown             Review of Systems:   A comprehensive review of systems was negative except for that written in the HPI. Vital Signs:    Last 24hrs VS reviewed since prior progress note.  Most recent are:  Visit Vitals  BP (!) 112/56 (BP 1 Location: Right upper arm, BP Patient Position: At rest)   Pulse 60   Temp 98.1 °F (36.7 °C)   Resp 21   Ht 5' 11\" (1.803 m)   Wt 62.3 kg (137 lb 5.6 oz)   SpO2 96%   BMI 19.16 kg/m²         Intake/Output Summary (Last 24 hours) at 11/21/2022 0913  Last data filed at 11/21/2022 0701  Gross per 24 hour   Intake --   Output 1450 ml   Net -1450 ml          Physical Examination:     I had a face to face encounter with this patient and independently examined them on 11/21/2022 as outlined below:          Constitutional:  No acute distress, cooperative, pleasant    ENT:  Oral mucosa moist, oropharynx benign. Resp:  CTA bilaterally. No wheezing/rhonchi/rales. No accessory muscle use. CV:  Regular rhythm, normal rate, no murmurs, gallops, rubs    GI:  Soft, non distended, non tender. normoactive bowel sounds, no hepatosplenomegaly     Musculoskeletal:  No edema, warm, 2+ pulses throughout    Neurologic:  Moves all extremities. AAOx3, CN II-XII reviewed            Data Review:    Review and/or order of clinical lab test      Labs:     Recent Labs     11/21/22  0700 11/20/22  0346   WBC 9.3 9.4   HGB 10.9* 10.7*   HCT 34.2* 33.1*    188       Recent Labs     11/21/22  0700 11/20/22  0346 11/19/22  0923    141 138   K 4.2 3.8 4.1    108 105   CO2 30 27 30   BUN 30* 29* 27*   CREA 1.28 1.16 1.22   * 139* 94   CA 8.3* 8.4* 8.3*     No results for input(s): ALT, AP, TBIL, TBILI, TP, ALB, GLOB, GGT, AML, LPSE in the last 72 hours. No lab exists for component: SGOT, GPT, AMYP, HLPSE  No results for input(s): INR, PTP, APTT, INREXT, INREXT in the last 72 hours. No results for input(s): FE, TIBC, PSAT, FERR in the last 72 hours. No results found for: FOL, RBCF   No results for input(s): PH, PCO2, PO2 in the last 72 hours. No results for input(s): CPK, CKNDX, TROIQ in the last 72 hours.     No lab exists for component: CPKMB  Lab Results   Component Value Date/Time    Cholesterol, total 106 02/20/2018 04:54 AM    HDL Cholesterol 45 02/20/2018 04:54 AM    LDL, calculated 46 02/20/2018 04:54 AM    Triglyceride 75 02/20/2018 04:54 AM    CHOL/HDL Ratio 2.4 02/20/2018 04:54 AM     Lab Results   Component Value Date/Time    Glucose (POC) 150 (H) 11/04/2022 12:52 PM    Glucose (POC) 100 09/14/2015 12:14 PM     Lab Results   Component Value Date/Time    Color YELLOW/STRAW 11/15/2022 11:05 AM    Appearance CLEAR 11/15/2022 11:05 AM    Specific gravity 1.021 11/15/2022 11:05 AM    pH (UA) 5.5 11/15/2022 11:05 AM Protein Negative 11/15/2022 11:05 AM    Glucose Negative 11/15/2022 11:05 AM    Ketone Negative 11/15/2022 11:05 AM    Bilirubin Negative 11/15/2022 11:05 AM    Urobilinogen 0.2 11/15/2022 11:05 AM    Nitrites Negative 11/15/2022 11:05 AM    Leukocyte Esterase Negative 11/15/2022 11:05 AM    Epithelial cells FEW 11/15/2022 11:05 AM    Bacteria Negative 11/15/2022 11:05 AM    WBC 0-4 11/15/2022 11:05 AM    RBC 0-5 11/15/2022 11:05 AM         Medications Reviewed:     Current Facility-Administered Medications   Medication Dose Route Frequency    midodrine (PROAMATINE) tablet 10 mg  10 mg Oral TID WITH MEALS    albuterol-ipratropium (DUO-NEB) 2.5 MG-0.5 MG/3 ML  3 mL Nebulization Q4H PRN    loperamide (IMODIUM) capsule 2 mg  2 mg Oral Q4H PRN    furosemide (LASIX) tablet 20 mg  20 mg Oral DAILY    predniSONE (DELTASONE) tablet 30 mg  30 mg Oral DAILY WITH BREAKFAST    amiodarone (CORDARONE) tablet 100 mg  100 mg Oral BID    magic mouthwash cpd (without sucralfate)  5 mL Oral TID PRN    [Held by provider] sacubitriL-valsartan (ENTRESTO) 24-26 mg tablet 1 Tablet  1 Tablet Oral Q12H    apixaban (ELIQUIS) tablet 5 mg  5 mg Oral Q12H    metoprolol succinate (TOPROL-XL) XL tablet 25 mg  25 mg Oral DAILY    hydrALAZINE (APRESOLINE) 20 mg/mL injection 20 mg  20 mg IntraVENous Q6H PRN    sodium chloride (NS) flush 5-40 mL  5-40 mL IntraVENous Q8H    sodium chloride (NS) flush 5-40 mL  5-40 mL IntraVENous PRN    acetaminophen (TYLENOL) tablet 650 mg  650 mg Oral Q6H PRN    Or    acetaminophen (TYLENOL) suppository 650 mg  650 mg Rectal Q6H PRN    ondansetron (ZOFRAN ODT) tablet 4 mg  4 mg Oral Q8H PRN    Or    ondansetron (ZOFRAN) injection 4 mg  4 mg IntraVENous Q6H PRN    polyethylene glycol (MIRALAX) packet 17 g  17 g Oral DAILY PRN    arformoteroL (BROVANA) neb solution 15 mcg  15 mcg Nebulization BID RT    aspirin delayed-release tablet 81 mg  81 mg Oral DAILY    atorvastatin (LIPITOR) tablet 40 mg  40 mg Oral DAILY budesonide (PULMICORT) 500 mcg/2 ml nebulizer suspension  500 mcg Nebulization BID    oxyCODONE IR (ROXICODONE) tablet 20 mg  20 mg Oral Q6H PRN    oxyCODONE IR (ROXICODONE) tablet 10 mg  10 mg Oral Q6H PRN     ______________________________________________________________________  EXPECTED LENGTH OF STAY: 4d 19h  ACTUAL LENGTH OF STAY:          26                 Malgorzata Mae MD

## 2022-11-21 NOTE — PROGRESS NOTES
Problem: Mobility Impaired (Adult and Pediatric)  Goal: *Acute Goals and Plan of Care (Insert Text)  Description: FUNCTIONAL STATUS PRIOR TO ADMISSION: Patient was independent and active without use of DME. At baseline patient uses 2-4 liters of supplemental oxygen. He reports using a portable pulse ox. When asked he reported no falls in the last 12 months. HOME SUPPORT PRIOR TO ADMISSION: The patient lived with his significant other but did not require assist.    Physical Therapy Goals  Weekly re-assessment 11/21/2022  4. Patient will ambulate with independence for 50 feet with the least restrictive device within 7 day(s). 5.  Patient will ascend/descend 4 stairs with one handrail(s) with modified independence within 7 day(s). Revised 11/11/2022  1. Patient will move from supine to sit and sit to supine , scoot up and down, and roll side to side in bed with independence within 7 day(s). MET  2. Patient will transfer from bed to chair and chair to bed with independence using the least restrictive device within 7 day(s). MET  3. Patient will perform sit to stand with independence within 7 day(s). 4.  Patient will ambulate with independence for 50 feet with the least restrictive device within 7 day(s). 5.  Patient will ascend/descend 4 stairs with one handrail(s) with modified independence within 7 day(s). Physical Therapy Goals  Revisited 11/4/2022, goals not met but remain appropriate so carry over         Physical Therapy Goals  Initiated 10/28/2022  1. Patient will move from supine to sit and sit to supine , scoot up and down, and roll side to side in bed with independence within 7 day(s). 2.  Patient will transfer from bed to chair and chair to bed with independence using the least restrictive device within 7 day(s). 3.  Patient will perform sit to stand with independence within 7 day(s).   4.  Patient will ambulate with independence for 300 feet with the least restrictive device within 7 day(s). 5.  Patient will ascend/descend 4 stairs with one handrail(s) with modified independence within 7 day(s). Outcome: Progressing Towards Goal   PHYSICAL THERAPY TREATMENT: WEEKLY REASSESSMENT  Patient: Kalyn Robison (83 y.o. male)  Date: 11/21/2022  Primary Diagnosis: Sepsis ([facility] [A41.9]  Pneumonia [J18.9]  Malignant neoplasm of lung, unspecified laterality, unspecified part of lung ([facility] [C34.90]  COPD (chronic obstructive pulmonary disease) ([facility] [J44.9]       Precautions:   Fall      ASSESSMENT  Patient continues with skilled PT services and is progressing towards goals. Patient received in bed and motivated to work. Remains limited by decreased balance, endurance for activity and overall upright mobility. Gait is slow and with several episodes of decreased balance requiring assist to prevent fall or reaching out to wall, furniture, rail. Oxygen saturation drops on 4 liters to 85% with activity requiring increased to 6 liters for recovery and then back to 4 liters. Patient indicates not feeling safe to return home without rehab and do feel he is at risk for falls. Highly motivated for rehab as he is familiar with the process and knows what it takes for him to get back to [facility]. Patient's progression toward goals since last assessment:  met goals 1-3  Current Level of Function Impacting Discharge (mobility/balance): minimal assist with upright mobility    Other factors to consider for discharge:          PLAN :  Goals have been updated based on progression since last assessment. Patient continues to benefit from skilled intervention to address the above impairments. Recommendations and Planned Interventions: gait training, therapeutic exercises, patient and family training/education, and therapeutic activities      Frequency/Duration: Patient will be followed by physical therapy:  5 times a week to address goals.     Recommendation for discharge: (in order for the patient to meet his/her long term goals)  Therapy up to 5 days/week in SNF setting    This discharge recommendation:  Has been made in collaboration with the attending provider and/or case management    IF patient discharges home will need the following DME: patient owns DME required for discharge         SUBJECTIVE:   Patient stated I still can't walk much at all.     OBJECTIVE DATA SUMMARY:   HISTORY:    Past Medical History:   Diagnosis Date    Asthma     Cancer ([facility]     Lung Cancer    Chronic obstructive pulmonary disease (HCC)     Chronic pain     High cholesterol     Lung cancer ([facility]      Past Surgical History:   Procedure Laterality Date    HX LOBECTOMY      Right lung    HX OTHER SURGICAL      Partial right lung removal       Personal factors and/or comorbidities impacting plan of care:     Home Situation  Home Environment: Private residence  # Steps to Enter: 4  Hand Rails : Bilateral  One/Two Story Residence: One story  Living Alone: No  Support Systems: Spouse/Significant Other  Patient Expects to be Discharged to[de-identified] Skilled nursing facility  Current DME Used/Available at Home:  (home 02 and pulse ox)  Tub or Shower Type: Tub    EXAMINATION/PRESENTATION/DECISION MAKING:   Critical Behavior:  Neurologic State: Alert, Appropriate for age, Eyes open spontaneously  Orientation Level: Oriented X4  Cognition: Appropriate decision making, Appropriate for age attention/concentration, Appropriate safety awareness, Follows commands  Safety/Judgement: Awareness of environment            Functional Mobility:  Bed Mobility:     Supine to Sit: Modified independent  Sit to Supine: Modified independent     Transfers:  Sit to Stand: Stand-by assistance  Stand to Sit: Stand-by assistance                       Balance:   Sitting: Intact  Standing: Impaired; Without support  Standing - Static: Good  Standing - Dynamic : Fair  Ambulation/Gait Training:  Distance (ft): 80 Feet (ft)  Assistive Device: Gait belt  Ambulation - Level of Assistance: Contact guard assistance;Minimal assistance        Gait Abnormalities: Decreased step clearance; Path deviations;Trunk sway increased        Base of Support: Center of gravity altered;Narrowed     Speed/Maite: Pace decreased (<100 feet/min)  Step Length: Right shortened;Left shortened           Pain Ratin    Activity Tolerance:   Fair, desaturates with exertion and requires oxygen, and requires rest breaks    After treatment patient left in no apparent distress:   Supine in bed, Call bell within reach, and Side rails x 3    COMMUNICATION/EDUCATION:   The patients plan of care was discussed with: Registered nurse. Fall prevention education was provided and the patient/caregiver indicated understanding., Patient/family have participated as able in goal setting and plan of care. , and Patient/family agree to work toward stated goals and plan of care.     Thank you for this referral.  Patricia Juárez, PT   Time Calculation: 20 mins

## 2022-11-22 LAB
ANION GAP SERPL CALC-SCNC: 4 MMOL/L (ref 5–15)
BUN SERPL-MCNC: 33 MG/DL (ref 6–20)
BUN/CREAT SERPL: 27 (ref 12–20)
CALCIUM SERPL-MCNC: 8.3 MG/DL (ref 8.5–10.1)
CHLORIDE SERPL-SCNC: 103 MMOL/L (ref 97–108)
CO2 SERPL-SCNC: 33 MMOL/L (ref 21–32)
CREAT SERPL-MCNC: 1.24 MG/DL (ref 0.7–1.3)
ERYTHROCYTE [DISTWIDTH] IN BLOOD BY AUTOMATED COUNT: 15.3 % (ref 11.5–14.5)
GLUCOSE SERPL-MCNC: 87 MG/DL (ref 65–100)
HCT VFR BLD AUTO: 36.2 % (ref 36.6–50.3)
HGB BLD-MCNC: 11.6 G/DL (ref 12.1–17)
MCH RBC QN AUTO: 30.6 PG (ref 26–34)
MCHC RBC AUTO-ENTMCNC: 32 G/DL (ref 30–36.5)
MCV RBC AUTO: 95.5 FL (ref 80–99)
NRBC # BLD: 0 K/UL (ref 0–0.01)
NRBC BLD-RTO: 0 PER 100 WBC
PLATELET # BLD AUTO: 234 K/UL (ref 150–400)
PMV BLD AUTO: 10 FL (ref 8.9–12.9)
POTASSIUM SERPL-SCNC: 4.6 MMOL/L (ref 3.5–5.1)
RBC # BLD AUTO: 3.79 M/UL (ref 4.1–5.7)
SODIUM SERPL-SCNC: 140 MMOL/L (ref 136–145)
WBC # BLD AUTO: 8.6 K/UL (ref 4.1–11.1)

## 2022-11-22 PROCEDURE — 94640 AIRWAY INHALATION TREATMENT: CPT

## 2022-11-22 PROCEDURE — 85027 COMPLETE CBC AUTOMATED: CPT

## 2022-11-22 PROCEDURE — 74011250637 HC RX REV CODE- 250/637: Performed by: FAMILY MEDICINE

## 2022-11-22 PROCEDURE — 80048 BASIC METABOLIC PNL TOTAL CA: CPT

## 2022-11-22 PROCEDURE — 94664 DEMO&/EVAL PT USE INHALER: CPT

## 2022-11-22 PROCEDURE — 77010033678 HC OXYGEN DAILY

## 2022-11-22 PROCEDURE — 74011250637 HC RX REV CODE- 250/637: Performed by: INTERNAL MEDICINE

## 2022-11-22 PROCEDURE — 74011636637 HC RX REV CODE- 636/637: Performed by: FAMILY MEDICINE

## 2022-11-22 PROCEDURE — 74011000250 HC RX REV CODE- 250: Performed by: INTERNAL MEDICINE

## 2022-11-22 PROCEDURE — 65270000046 HC RM TELEMETRY

## 2022-11-22 PROCEDURE — 97535 SELF CARE MNGMENT TRAINING: CPT

## 2022-11-22 PROCEDURE — 36415 COLL VENOUS BLD VENIPUNCTURE: CPT

## 2022-11-22 RX ADMIN — MIDODRINE HYDROCHLORIDE 10 MG: 5 TABLET ORAL at 13:30

## 2022-11-22 RX ADMIN — ARFORMOTEROL TARTRATE 15 MCG: 15 SOLUTION RESPIRATORY (INHALATION) at 08:26

## 2022-11-22 RX ADMIN — PREDNISONE 20 MG: 20 TABLET ORAL at 08:29

## 2022-11-22 RX ADMIN — APIXABAN 5 MG: 5 TABLET, FILM COATED ORAL at 20:43

## 2022-11-22 RX ADMIN — SODIUM CHLORIDE, PRESERVATIVE FREE 10 ML: 5 INJECTION INTRAVENOUS at 05:03

## 2022-11-22 RX ADMIN — MIDODRINE HYDROCHLORIDE 10 MG: 5 TABLET ORAL at 17:45

## 2022-11-22 RX ADMIN — BUDESONIDE 500 MCG: 0.5 INHALANT RESPIRATORY (INHALATION) at 08:26

## 2022-11-22 RX ADMIN — APIXABAN 5 MG: 5 TABLET, FILM COATED ORAL at 09:33

## 2022-11-22 RX ADMIN — MIDODRINE HYDROCHLORIDE 10 MG: 5 TABLET ORAL at 08:29

## 2022-11-22 RX ADMIN — BUDESONIDE 500 MCG: 0.5 INHALANT RESPIRATORY (INHALATION) at 22:31

## 2022-11-22 RX ADMIN — AMIODARONE HYDROCHLORIDE 100 MG: 200 TABLET ORAL at 17:46

## 2022-11-22 RX ADMIN — ASPIRIN 81 MG: 81 TABLET, COATED ORAL at 09:34

## 2022-11-22 RX ADMIN — SODIUM CHLORIDE, PRESERVATIVE FREE 10 ML: 5 INJECTION INTRAVENOUS at 13:30

## 2022-11-22 RX ADMIN — AMIODARONE HYDROCHLORIDE 100 MG: 200 TABLET ORAL at 09:33

## 2022-11-22 RX ADMIN — ARFORMOTEROL TARTRATE 15 MCG: 15 SOLUTION RESPIRATORY (INHALATION) at 22:31

## 2022-11-22 RX ADMIN — ATORVASTATIN CALCIUM 40 MG: 40 TABLET, FILM COATED ORAL at 09:34

## 2022-11-22 NOTE — PROGRESS NOTES
Problem: Self Care Deficits Care Plan (Adult)  Goal: *Acute Goals and Plan of Care (Insert Text)  Description: FUNCTIONAL STATUS PRIOR TO ADMISSION: Patient was modified independent using a extra time for functional mobility, denies any AD. Patient was modified independent for basic and instrumental ADLs. Patient stated he bathes at baseline but also limits his activity due to fatigue on occasion. HOME SUPPORT: The patient lived with his girlfriend but did not require assist per the patient. Occupational Therapy Goals  Goals reviewed at weekly re-assessment 11/22/2022  1. Patient will perform grooming standing/ sitting at sink with supervision/set-up assist within 7 day(s). Upgraded   2. Patient will perform lower body dressing with modified independence within 7 day(s). Cont  3. Patient will perform toilet transfers with modified independence within 7 day(s). Upgraded   4. Patient will perform all aspects of toileting with modified independence within 7 day(s). Cont   5. Patient will participate in upper extremity therapeutic exercise/activities and breathing exercises with independence for 5 minutes within 7 day(s). Upgraded   6. Patient will utilize energy conservation techniques during functional activities with verbal and visual cues within 7 day(s). Cont     Initiated 10/28/2022, reviewed and updated/continued at weekly re-assessment 11/14/2022  1. Patient will perform grooming seated EOB with set/up within 7 day(s). 2.  Patient will perform lower body dressing with supervision/set-up within 7 day(s). - MET, upgrade to modified independent  3. Patient will perform toilet transfers with supervision/set-up within 7 day(s). 4.  Patient will perform all aspects of toileting with modified independence within 7 day(s). 5.  Patient will participate in upper extremity therapeutic exercise/activities and breathing exercises with supervision/set-up for 5 minutes within 7 day(s).     6.  Patient will utilize energy conservation techniques during functional activities with verbal and visual cues within 7 day(s). Outcome: Progressing Towards Goal   OCCUPATIONAL THERAPY TREATMENT/WEEKLY RE-ASSESSMENT  Patient: Rosina Mata (33 y.o. male)  Date: 11/22/2022  Diagnosis: Sepsis ([facility] [A41.9]  Pneumonia [J18.9]  Malignant neoplasm of lung, unspecified laterality, unspecified part of lung ([facility] [C34.90]  COPD (chronic obstructive pulmonary disease) ([facility] [J44.9] <principal problem not specified>      Precautions: Fall  Chart, occupational therapy assessment, plan of care, and goals were reviewed. ASSESSMENT  Patient continues with skilled OT services and is progressing towards goals. Patient received semi supine in bed, frustrated by IPR denial and plans for discharge to SNF, requires encouragement but then agreeable to working with therapy. Patient ambulating in room without AD but requests a walker to be left in room, adjusted to patient height but did not use for transfer to recliner. Patient with good lower extremity access using bending forward method at edge of bed. Patient able to ambulate around bed to recliner without AD but noted that patient using end of bed for support occasionally during transfer. Patient self-monitoring oxygen levels with portable monitor, shows good insight into deficits and pacing of activities. Overall patient did well with session but remains limited by supplemental O2 reliance and desaturation with activity. Patient would benefit from skilled OT services during admission to improve independence with self care and functional mobility/transfers. Recommend discharge to SNF at this time. If patient declines SNF placement, recommend discharge home with [address] [place] [address], may benefit from [address] [place] [address] for longer distance ambulation and shower chair.        Current Level of Function Impacting Discharge (ADLs): mod independent to SBA for mobility/transfers, independent feeding, setup lower extremity dressing    Other factors to consider for discharge: prior level of function mod independent, lives with GF but did not require assist prior to admission, IPR denial upheld following P2P         PLAN :  Goals have been updated based on progression since last assessment. Patient continues to benefit from skilled intervention to address the above impairments. Continue to follow patient 4 times a week to address goals. Recommend with staff: up to chair 3x/day for meals, functional mobility to and from bathroom for toileting with assist x1    Recommend next OT session: continue POC, energy conservation handouts if discharging home    Recommendation for discharge: (in order for the patient to meet his/her long term goals)  Therapy up to 5 days/week in SNF setting, HHOT with family assist if discharges home    This discharge recommendation:  Has been made in collaboration with the attending provider and/or case management    IF patient discharges home will need the following DME: shower chair and walker: rolling       SUBJECTIVE:   Patient stated If I go to a nursing home they won't do anything for me.     OBJECTIVE DATA SUMMARY:   Cognitive/Behavioral Status:  Neurologic State: Alert  Orientation Level: Oriented X4                Functional Mobility and Transfers for ADLs:  Bed Mobility:  Supine to Sit: Modified independent  Sit to Supine:  (left sitting in recliner)  Scooting: Modified independent    Transfers:  Sit to Stand: Stand-by assistance     Bed to Chair: Stand-by assistance    Balance:  Sitting: Intact  Standing: Impaired; Without support  Standing - Static: Good  Standing - Dynamic : Fair    ADL Intervention:  Feeding  Drink to Mouth:  Independent                             Lower Body Dressing Assistance  Socks: Set-up  Leg Crossed Method Used: No  Position Performed: Seated edge of bed;Bending forward method              Pain:  None reported    Activity Tolerance:   Good and desaturates with exertion and requires oxygen    After treatment patient left in no apparent distress:   Sitting in chair, Call bell within reach, and nursing present in room    COMMUNICATION/COLLABORATION:   The patients plan of care was discussed with: Registered nurse.      BEBA Carpio/L  Time Calculation: 12 mins

## 2022-11-22 NOTE — PROGRESS NOTES
[facility] Adult  Hospitalist Group                                                                                          Hospitalist Progress Note  Marly Sanchez MD  Answering service: [phone] OR 36 from in house phone        Date of Service:  2022  NAME:  Stephanie Henry  :  1950  MRN:  [id number]      Admission Summary:     Patient presents with acute on chronic respiratory failure due to COPD and pneumonia, also with new onset atrial fibrillation with new onset systolic heart failure. Interval history / Subjective:     Patient doing well, no acute complaint.      Assessment & Plan:     Acute respiratory failure  -Acute hypoxic and hypercapnic respiratory failure due to pneumonia and COPD  -Patient with baseline chronic respiratory failure due to COPD, as well as s/p lobectomy due to lung cancer, uses 3 L of oxygen at baseline  -Patient initially requiring BiPAP, overall improving and able to participate more with PT    Sepsis  -Patient with tachycardia and leukocytosis, elevated procalcitonin level  -Sepsis due to multilobar pneumonia  -Sepsis resolved    Multilobar pneumonia  -Blood cultures so far negative, COVID-negative, respiratory culture negative, Legionella negative, MRSA negative  -Patient has completed treatment with Rocephin and Zithromax  -Repeat chest x-ray 2022 shows improvement    Acute exacerbation of COPD  -Continue bronchodilators, breathing treatment, Solu-Medrol was changed to prednisone, start tapering  -Previously evaluated by pulmonology, at baseline patient uses 3 L    Paroxysmal atrial fibrillation  -Patient remaining in sinus rhythm  -Amiodarone drip was changed to p.o. amiodarone, on metoprolol and on Eliquis for anticoagulation    Acute systolic CHF  -New onset systolic CHF, NYHA class IV, echo shows EF of 25 to 30%  -Limited use of guideline directed medical therapy due to hypotension  -Previously evaluated by cardiology  -Continue Lasix, consider resuming Entresto when patient is hemodynamically more improved    Hypotension  -Patient has been treated with albumin, continue midodrine  -Continue monitor hemodynamic    PENNY  -Likely multifactorial including sepsis  -PENNY is resolved    Dyslipidemia  -Continue statin    Non-small cell lung cancer  -History of right lower lobe lobectomy at [facility] in 2009  -Follow-up outpatient     Code status: DNR  Prophylaxis: [address] discussed with: Patient  Anticipated Disposition: Did P2P, denied for IPR. Plan for SNF. Medically stable, placement pending. Hospital Problems  Date Reviewed: 1/9/2019            Codes Class Noted POA    Moderate protein-calorie malnutrition ([facility] ICD-10-CM: E44.0  ICD-9-CM: 263.0  10/27/2022 Yes        COPD (chronic obstructive pulmonary disease) ([facility].) ICD-10-CM: J44.9  ICD-9-CM: 355  10/26/2022 Unknown        Pneumonia ICD-10-CM: J18.9  ICD-9-CM: 486  10/26/2022 Unknown        Malignant neoplasm of lung, unspecified laterality, unspecified part of lung ([facility].) ICD-10-CM: C34.90  ICD-9-CM: 162.9  10/26/2022 Unknown        Sepsis ([facility].) ICD-10-CM: A41.9  ICD-9-CM: 038.9, 995.91  10/26/2022 Unknown             Review of Systems:   A comprehensive review of systems was negative except for that written in the HPI. Vital Signs:    Last 24hrs VS reviewed since prior progress note.  Most recent are:  Visit Vitals  BP (!) 106/56 (BP 1 Location: Right arm, BP Patient Position: At rest)   Pulse 65   Temp 97.6 °F (36.4 °C)   Resp 19   Ht 5' 11\" (1.803 m)   Wt 58.5 kg (129 lb)   SpO2 95%   BMI 17.99 kg/m²         Intake/Output Summary (Last 24 hours) at 11/22/2022 1002  Last data filed at 11/21/2022 2036  Gross per 24 hour   Intake --   Output 775 ml   Net -775 ml          Physical Examination:     I had a face to face encounter with this patient and independently examined them on 11/22/2022 as outlined below:          Constitutional:  No acute distress, cooperative, pleasant ENT:  Oral mucosa moist, oropharynx benign. Resp:  CTA bilaterally. No wheezing/rhonchi/rales. No accessory muscle use. CV:  Regular rhythm, normal rate, no murmurs, gallops, rubs    GI:  Soft, non distended, non tender. normoactive bowel sounds, no hepatosplenomegaly     Musculoskeletal:  No edema, warm, 2+ pulses throughout    Neurologic:  Moves all extremities. AAOx3, CN II-XII reviewed            Data Review:    Review and/or order of clinical lab test      Labs:     Recent Labs     11/22/22 0213 11/21/22  0700   WBC 8.6 9.3   HGB 11.6* 10.9*   HCT 36.2* 34.2*    205       Recent Labs     11/22/22 0213 11/21/22  0700 11/20/22  0346    141 141   K 4.6 4.2 3.8    104 108   CO2 33* 30 27   BUN 33* 30* 29*   CREA 1.24 1.28 1.16   GLU 87 122* 139*   CA 8.3* 8.3* 8.4*     No results for input(s): ALT, AP, TBIL, TBILI, TP, ALB, GLOB, GGT, AML, LPSE in the last 72 hours. No lab exists for component: SGOT, GPT, AMYP, HLPSE  No results for input(s): INR, PTP, APTT, INREXT, INREXT in the last 72 hours. No results for input(s): FE, TIBC, PSAT, FERR in the last 72 hours. No results found for: FOL, RBCF   No results for input(s): PH, PCO2, PO2 in the last 72 hours. No results for input(s): CPK, CKNDX, TROIQ in the last 72 hours.     No lab exists for component: CPKMB  Lab Results   Component Value Date/Time    Cholesterol, total 106 02/20/2018 04:54 AM    HDL Cholesterol 45 02/20/2018 04:54 AM    LDL, calculated 46 02/20/2018 04:54 AM    Triglyceride 75 02/20/2018 04:54 AM    CHOL/HDL Ratio 2.4 02/20/2018 04:54 AM     Lab Results   Component Value Date/Time    Glucose (POC) 150 (H) 11/04/2022 12:52 PM    Glucose (POC) 100 09/14/2015 12:14 PM     Lab Results   Component Value Date/Time    Color YELLOW/STRAW 11/15/2022 11:05 AM    Appearance CLEAR 11/15/2022 11:05 AM    Specific gravity 1.021 11/15/2022 11:05 AM    pH (UA) 5.5 11/15/2022 11:05 AM    Protein Negative 11/15/2022 11:05 AM Glucose Negative 11/15/2022 11:05 AM    Ketone Negative 11/15/2022 11:05 AM    Bilirubin Negative 11/15/2022 11:05 AM    Urobilinogen 0.2 11/15/2022 11:05 AM    Nitrites Negative 11/15/2022 11:05 AM    Leukocyte Esterase Negative 11/15/2022 11:05 AM    Epithelial cells FEW 11/15/2022 11:05 AM    Bacteria Negative 11/15/2022 11:05 AM    WBC 0-4 11/15/2022 11:05 AM    RBC 0-5 11/15/2022 11:05 AM         Medications Reviewed:     Current Facility-Administered Medications   Medication Dose Route Frequency    predniSONE (DELTASONE) tablet 20 mg  20 mg Oral DAILY WITH BREAKFAST    midodrine (PROAMATINE) tablet 10 mg  10 mg Oral TID WITH MEALS    albuterol-ipratropium (DUO-NEB) 2.5 MG-0.5 MG/3 ML  3 mL Nebulization Q4H PRN    loperamide (IMODIUM) capsule 2 mg  2 mg Oral Q4H PRN    furosemide (LASIX) tablet 20 mg  20 mg Oral DAILY    amiodarone (CORDARONE) tablet 100 mg  100 mg Oral BID    magic mouthwash cpd (without sucralfate)  5 mL Oral TID PRN    [Held by provider] sacubitriL-valsartan (ENTRESTO) 24-26 mg tablet 1 Tablet  1 Tablet Oral Q12H    apixaban (ELIQUIS) tablet 5 mg  5 mg Oral Q12H    metoprolol succinate (TOPROL-XL) XL tablet 25 mg  25 mg Oral DAILY    hydrALAZINE (APRESOLINE) 20 mg/mL injection 20 mg  20 mg IntraVENous Q6H PRN    sodium chloride (NS) flush 5-40 mL  5-40 mL IntraVENous Q8H    sodium chloride (NS) flush 5-40 mL  5-40 mL IntraVENous PRN    acetaminophen (TYLENOL) tablet 650 mg  650 mg Oral Q6H PRN    Or    acetaminophen (TYLENOL) suppository 650 mg  650 mg Rectal Q6H PRN    ondansetron (ZOFRAN ODT) tablet 4 mg  4 mg Oral Q8H PRN    Or    ondansetron (ZOFRAN) injection 4 mg  4 mg IntraVENous Q6H PRN    polyethylene glycol (MIRALAX) packet 17 g  17 g Oral DAILY PRN    arformoteroL (BROVANA) neb solution 15 mcg  15 mcg Nebulization BID RT    aspirin delayed-release tablet 81 mg  81 mg Oral DAILY    atorvastatin (LIPITOR) tablet 40 mg  40 mg Oral DAILY    budesonide (PULMICORT) 500 mcg/2 ml nebulizer suspension  500 mcg Nebulization BID    oxyCODONE IR (ROXICODONE) tablet 20 mg  20 mg Oral Q6H PRN    oxyCODONE IR (ROXICODONE) tablet 10 mg  10 mg Oral Q6H PRN     ______________________________________________________________________  EXPECTED LENGTH OF STAY: 4d 19h  ACTUAL LENGTH OF STAY:          27                 Kelin Jang MD

## 2022-11-22 NOTE — DISCHARGE SUMMARY
Discharge Summary       PATIENT ID: Reena Wheeler  MRN: [id number]   YOB: 1950    DATE OF ADMISSION: 10/26/2022  5:44 AM    DATE OF DISCHARGE: 11/22/2022   PRIMARY CARE PROVIDER: Sumeet Babcock MD     ATTENDING PHYSICIAN: Barbara Abrams  DISCHARGING PROVIDER: Raz Hudson MD    To contact this individual call [phone] and ask the  to page. If unavailable ask to be transferred the Adult Hospitalist Department. CONSULTATIONS: IP CONSULT TO HOSPITALIST  IP CONSULT TO PULMONOLOGY  IP CONSULT TO INTENSIVIST  IP CONSULT TO CARDIOLOGY  IP CONSULT TO CARDIOLOGY    PROCEDURES/SURGERIES: * No surgery found *    DISCHARGE DIAGNOSES:     Acute respiratory failure  Sepsis  Multilobar pneumonia  Acute exacerbation of COPD  Paroxysmal atrial fibrillation  Acute systolic CHF  Hypotension  PENNY  Dyslipidemia  Non-small cell lung cancer    ADMISSION SUMMARY AND HOSPITAL COURSE:     FARHAN Wheeler is a 71 y.o. male with a PMHx of lung cancer, COPD, chronic hypoxic respiratory failure, on home O2 at 3 liters/min, who presents with Chief Complaint of Shortness of breath/ Dyspnea on exertion, progressively worsening for the past 3 days. Patient denies any fevers or chills. He used increased doses of inhalers, but it did not help. He called 911. Upon EMS arrival, his O2 saturation was 79%. Patient was placed on a BIPAP upon arrival. At the time of my evaluation, patient is on BIPAP. He appears uncomfortable, he is tachypneic, with a prolonged expiratory phase. He uses accessory muscles to breathe. He is emaciated, and there is diffuse loss of subcutaneous adipose tissue and loss of muscle mass. Limited history was obtained from the patient himself due to respiratory distress; he is able to nod and shake his head, but not able to speak. Work-up in the ED showed an elevated WBC of 22.9, erythrocytosis with an H&H of 17.2 & 53.8, an elevated BUN and Cr of 35 and 2.04 respectively.   Initial lactic acid was 4.4. CXR showed emphysema, with Left lower lobe and Right upper lobe infiltrates. The Hospitalist team has been consulted for admission. Hospital Course  1. Acute respiratory failure  Patient presents with acute on chronic hypoxic and hypercapnic respiratory failure due to pneumonia and COPD. Patient requires 3 to 3.5 L of oxygen at baseline due to chronic respiratory failure with COPD as well as history of lung cancer s/p lobectomy. On presentation patient was requiring BiPAP and later oxygen slowly weaned down to his baseline level. Patient was able to participate with physical therapy more and ambulating with stable oxygen status. 2.  Sepsis  Patient with tachycardia, leukocytosis and elevated procalcitonin level on presentation. Sepsis due to multilobar pneumonia. Sepsis resolved during this admission. Multilobar pneumonia was treated with Rocephin and Zithromax and completed antibiotics. Blood cultures COVID and respiratory cultures negative. Also MRSA and Legionella negative. Patient's repeat chest x-ray this admission shows improvement. 3.  Acute exacerbation of COPD  Patient was treated with IV Solu-Medrol which was later changed to p.o. steroid, also treated with bronchodilators and breathing treatment. Patient was evaluated by pulmonology during this admission. Patient's acute respiratory failure has resolved and COPD exacerbation has resolved as well. Stable on baseline 3 to 3.5 L of oxygen. 4.  Acute systolic CHF  Patient with new onset systolic CHF, NYHA class IV on admission. Echo shows EF of 25 to 30%. Patient was previously evaluated by cardiology. Patient's hypotension limiting the use of: Directed medical therapy. Patient was started on midodrine with improvement in blood pressure. Patient was continued on Lasix and Entresto was resumed on discharge.     5.  Generalized deconditioning  Patient's deconditioning and weakness has also improved gradually, initially was evaluated for IPR but patient did not meet criteria for IPR, then SNF was pursued but due to patient's improving mobility status patient does not meet criteria for SNF as well. Plan for discharge to home with home health. Patient was recently evicted from his home and therefore cannot return to his home but daughter is available to accommodate the patient. Plan has been made for home health and to go home with his daughter. DISCHARGE DIAGNOSES / PLAN:        BMI: Body mass index is 17.99 kg/m². . This patient: Has a BMI within normal limits. PENDING TEST RESULTS:   At the time of discharge the following test results are still pending:      ADDITIONAL CARE RECOMMENDATIONS:     Follow-up with PCP in 1 week. NOTIFY YOUR PHYSICIAN FOR ANY OF THE FOLLOWING:   Fever over 101 degrees for 24 hours. Chest pain, shortness of breath, fever, chills, nausea, vomiting, diarrhea, change in mentation, falling, weakness, bleeding. Severe pain or pain not relieved by medications, as well as any other signs or symptoms that you may have questions about. FOLLOW UP APPOINTMENTS:    Follow-up Information       Follow up With Specialties Details Why Zaheer Cueto MD Internal Medicine Physician Follow up in 1 week(s)  Alban Eastman [facility]  [address] [facility] [address]  [address] [facility] Follow up  [address] [facility]  [phone]               DIET: Cardiac Diet    ACTIVITY: Activity as tolerated    EQUIPMENT needed: None    DISCHARGE MEDICATIONS:  Current Discharge Medication List        START taking these medications    Details   amiodarone (PACERONE) 100 mg tablet Take 1 Tablet by mouth two (2) times a day for 30 days. Qty: 60 Tablet, Refills: 0  Start date: 11/18/2022, End date: 12/18/2022      apixaban (ELIQUIS) 5 mg tablet Take 1 Tablet by mouth every twelve (12) hours for 30 days.   Qty: 60 Tablet, Refills: 0  Start date: 11/18/2022, End date: 12/18/2022      metoprolol succinate (TOPROL-XL) 25 mg XL tablet Take 1 Tablet by mouth daily for 30 days. Qty: 30 Tablet, Refills: 0  Start date: 11/19/2022, End date: 12/19/2022      midodrine (PROAMATINE) 10 mg tablet Take 1 Tablet by mouth three (3) times daily (with meals) for 30 days. Qty: 90 Tablet, Refills: 0  Start date: 11/18/2022, End date: 12/18/2022      sacubitril-valsartan (ENTRESTO) 24 mg/26 mg tablet Take 1 Tablet by mouth every twelve (12) hours for 30 days. Qty: 60 Tablet, Refills: 0  Start date: 11/18/2022, End date: 12/18/2022      predniSONE (DELTASONE) 10 mg tablet Take 30 mg by mouth daily (with breakfast) for 30 days. Qty: 90 Tablet, Refills: 0  Start date: 11/19/2022, End date: 12/19/2022      furosemide (LASIX) 20 mg tablet Take 1 Tablet by mouth daily for 30 days. Qty: 30 Tablet, Refills: 0  Start date: 11/18/2022, End date: 12/18/2022           CONTINUE these medications which have NOT CHANGED    Details   arformoteroL (BROVANA) 15 mcg/2 mL nebu neb solution 15 mcg by Nebulization route two (2) times a day. Sometimes uses TID      budesonide (PULMICORT) 0.5 mg/2 mL nbsp 500 mcg by Nebulization route two (2) times a day. Sometimes uses TID      atorvastatin (LIPITOR) 40 mg tablet Take 40 mg by mouth daily. albuterol (PROVENTIL VENTOLIN) 2.5 mg /3 mL (0.083 %) nebu 2.5 mg by Nebulization route every six (6) hours as needed for Wheezing or Shortness of Breath. oxyCODONE IR (ROXICODONE) 20 mg immediate release tablet Take 40 mg by mouth every twelve (12) hours as needed for Pain (severe pain). aspirin delayed-release 81 mg tablet Take 1 Tab by mouth daily. Qty: 30 Tab, Refills: 0      ketoconazole (NIZORAL) 2 % shampoo Apply  to affected area as needed for Itching.              DISPOSITION:  *  Home With:   OT  PT * HH  RN       Long term SNF/Inpatient Rehab    Independent/assisted living    Hospice    Other:       PATIENT CONDITION AT DISCHARGE:     Functional status    Poor    * Deconditioned     Independent      Cognition    * Lucid     Forgetful     Dementia      Catheters/lines (plus indication)    Burch     PICC     PEG    * None      Code status    * Full code     DNR      PHYSICAL EXAMINATION AT DISCHARGE:  General:          Alert, cooperative, no distress, appears stated age. HEENT:           Atraumatic, anicteric sclerae, pink conjunctivae                          No oral ulcers, mucosa moist, throat clear, dentition fair  Neck:               Supple, symmetrical  Lungs:             Clear to auscultation bilaterally. No Wheezing or Rhonchi. No rales. Chest wall:      No tenderness  No Accessory muscle use. Heart:              Regular  rhythm,  No  murmur   No edema  Abdomen:        Soft, non-tender. Not distended. Bowel sounds normal  Extremities:     No cyanosis. No clubbing,                            Skin turgor normal, Capillary refill normal  Skin:                Not pale. Not Jaundiced  No rashes   Psych:             Not anxious or agitated.   Neurologic:      Alert, moves all extremities, answers questions appropriately and responds to commands       CHRONIC MEDICAL DIAGNOSES:  Problem List as of 11/22/2022 Date Reviewed: 1/9/2019            Codes Class Noted - Resolved    Moderate protein-calorie malnutrition ([facility] ICD-10-CM: E44.0  ICD-9-CM: 263.0  10/27/2022 - Present        COPD (chronic obstructive pulmonary disease) ([facility] ICD-10-CM: J44.9  ICD-9-CM: 496  10/26/2022 - Present        Pneumonia ICD-10-CM: J18.9  ICD-9-CM: 486  10/26/2022 - Present        Malignant neoplasm of lung, unspecified laterality, unspecified part of lung ([facility] ICD-10-CM: C34.90  ICD-9-CM: 162.9  10/26/2022 - Present        Sepsis ([facility] ICD-10-CM: A41.9  ICD-9-CM: 038.9, 995.91  10/26/2022 - Present        SOB (shortness of breath) ICD-10-CM: R06.02  ICD-9-CM: 786.05  1/9/2019 - Present        Malnutrition ([facility] ICD-10-CM: E46  ICD-9-CM: 263.9  4/10/2018 - Present        Respiratory failure with hypoxia [facility]) ICD-10-CM: J96.91  ICD-9-CM: 518.81  4/10/2018 - Present        Aspiration pneumonia ([facility] ICD-10-CM: J69.0  ICD-9-CM: 507.0  4/4/2018 - Present        Systolic CHF, acute (HCC) ICD-10-CM: I50.21  ICD-9-CM: 428.21, 428.0  2/22/2018 - Present        Shortness of breath ICD-10-CM: R06.02  ICD-9-CM: 786.05  2/17/2018 - Present        COPD exacerbation (HCC) ICD-10-CM: J44.1  ICD-9-CM: 491.21  8/10/2015 - Present        RESOLVED: Pneumonia ICD-10-CM: J18.9  ICD-9-CM: 486  1/6/2020 - 1/12/2020        RESOLVED: NSTEMI (non-ST elevated myocardial infarction) ([facility].) ICD-10-CM: I21.4  ICD-9-CM: 410.70  2/22/2018 - 2/22/2018        RESOLVED: COPD exacerbation ([facility].) (Chronic) ICD-10-CM: J44.1  ICD-9-CM: 491.21  9/5/2015 - 2/22/2018           Greater than 60 minutes were spent with the patient on counseling and coordination of care    Signed:   Jose Caruso MD  11/22/2022  2:36 PM

## 2022-11-22 NOTE — PROGRESS NOTES
HOWARD:  1. RUR-14%  2. [facility] accepted, auth denied. 3. BLS transport. 4. [facility] home health referral pending, no preference on home health choice. CM noted in [facility] from [place] at [address]. She said auth denied for SNF, P2P recommended. Perfect serve message sent to attending. Patient is aware of this, states he is not able to go to his daughter house until Monday. Patient is unsure how he will manage at home. P2P phone number is [phone]. Should be completed by noon today, as of now unsure if MD wants to complete. 1540- P2P not completed, patient agreeable to d/c to his daughter home however she and her family are out of town until Thursday evening. Patient agreeable to discharge on Friday, prior to admission he was living in his house but was evicted. He requires home oxygen at 4L. Patient feels that he is not medically ready for discharge given O2 sat requirment, HR and not having a disposition prior to Friday. He is contacting Olivia Erickson to appeal his discharge order. Medicare pt has received, reviewed, and signed 2nd IM letter informing them of their right to appeal the discharge. Signed copy has been placed on pt bedside chart.         Jeanna Michel [facility]

## 2022-11-23 PROCEDURE — 74011636637 HC RX REV CODE- 636/637: Performed by: FAMILY MEDICINE

## 2022-11-23 PROCEDURE — 77010033678 HC OXYGEN DAILY

## 2022-11-23 PROCEDURE — 65270000046 HC RM TELEMETRY

## 2022-11-23 PROCEDURE — 74011250637 HC RX REV CODE- 250/637: Performed by: FAMILY MEDICINE

## 2022-11-23 PROCEDURE — 74011250637 HC RX REV CODE- 250/637: Performed by: INTERNAL MEDICINE

## 2022-11-23 PROCEDURE — 94664 DEMO&/EVAL PT USE INHALER: CPT

## 2022-11-23 PROCEDURE — 74011000250 HC RX REV CODE- 250: Performed by: INTERNAL MEDICINE

## 2022-11-23 PROCEDURE — 94640 AIRWAY INHALATION TREATMENT: CPT

## 2022-11-23 RX ADMIN — ASPIRIN 81 MG: 81 TABLET, COATED ORAL at 09:30

## 2022-11-23 RX ADMIN — ARFORMOTEROL TARTRATE 15 MCG: 15 SOLUTION RESPIRATORY (INHALATION) at 22:02

## 2022-11-23 RX ADMIN — MIDODRINE HYDROCHLORIDE 10 MG: 5 TABLET ORAL at 12:08

## 2022-11-23 RX ADMIN — BUDESONIDE 500 MCG: 0.5 INHALANT RESPIRATORY (INHALATION) at 22:02

## 2022-11-23 RX ADMIN — APIXABAN 5 MG: 5 TABLET, FILM COATED ORAL at 09:30

## 2022-11-23 RX ADMIN — SODIUM CHLORIDE, PRESERVATIVE FREE 10 ML: 5 INJECTION INTRAVENOUS at 22:34

## 2022-11-23 RX ADMIN — MIDODRINE HYDROCHLORIDE 10 MG: 5 TABLET ORAL at 18:40

## 2022-11-23 RX ADMIN — MIDODRINE HYDROCHLORIDE 10 MG: 5 TABLET ORAL at 09:30

## 2022-11-23 RX ADMIN — ATORVASTATIN CALCIUM 40 MG: 40 TABLET, FILM COATED ORAL at 09:30

## 2022-11-23 RX ADMIN — ARFORMOTEROL TARTRATE 15 MCG: 15 SOLUTION RESPIRATORY (INHALATION) at 09:16

## 2022-11-23 RX ADMIN — APIXABAN 5 MG: 5 TABLET, FILM COATED ORAL at 22:34

## 2022-11-23 RX ADMIN — AMIODARONE HYDROCHLORIDE 100 MG: 200 TABLET ORAL at 09:30

## 2022-11-23 RX ADMIN — BUDESONIDE 500 MCG: 0.5 INHALANT RESPIRATORY (INHALATION) at 09:16

## 2022-11-23 RX ADMIN — AMIODARONE HYDROCHLORIDE 100 MG: 200 TABLET ORAL at 18:40

## 2022-11-23 RX ADMIN — PREDNISONE 20 MG: 20 TABLET ORAL at 09:30

## 2022-11-23 RX ADMIN — FUROSEMIDE 20 MG: 20 TABLET ORAL at 09:30

## 2022-11-23 RX ADMIN — METOPROLOL SUCCINATE 25 MG: 25 TABLET, FILM COATED, EXTENDED RELEASE ORAL at 09:30

## 2022-11-23 NOTE — PROGRESS NOTES
Medicare discharge appeal received from [facility]. Case control # [id number]. All requested notes, DND, and IMM letter was sent electronically to [facility]. Patient  given a copy of the DND and a copy was placed on patient's chart. Will continue to monitor the status of the appeal.   Ailyn Su.  Robert, [address] [place] [address]

## 2022-11-23 NOTE — PROGRESS NOTES
[facility]  [facility]ist Group                                                                                          Hospitalist Progress Note  Olga Lidia Rose MD  Answering service: [phone] from in house phone        Date of Service:  2022  NAME:  Zeyad Victoria  :  1950  MRN:  [id number]      Admission Summary:     Patient presents with acute on chronic respiratory failure due to COPD and pneumonia, also with new onset atrial fibrillation with new onset systolic heart failure. Interval history / Subjective:     Patient doing well, no acute complaint.      Assessment & Plan:     Acute respiratory failure  -Acute hypoxic and hypercapnic respiratory failure due to pneumonia and COPD  -Patient with baseline chronic respiratory failure due to COPD, as well as s/p lobectomy due to lung cancer, uses 3 L of oxygen at baseline  -Patient initially requiring BiPAP, overall improving and able to participate more with PT    Sepsis  -Patient with tachycardia and leukocytosis, elevated procalcitonin level  -Sepsis due to multilobar pneumonia  -Sepsis resolved    Multilobar pneumonia  -Blood cultures so far negative, COVID-negative, respiratory culture negative, Legionella negative, MRSA negative  -Patient has completed treatment with Rocephin and Zithromax  -Repeat chest x-ray 2022 shows improvement    Acute exacerbation of COPD  -Continue bronchodilators, breathing treatment, Solu-Medrol was changed to prednisone, start tapering  -Previously evaluated by pulmonology, at baseline patient uses 3 L    Paroxysmal atrial fibrillation  -Patient remaining in sinus rhythm  -Amiodarone drip was changed to p.o. amiodarone, on metoprolol and on Eliquis for anticoagulation    Acute systolic CHF  -New onset systolic CHF, NYHA class IV, echo shows EF of 25 to 30%  -Limited use of guideline directed medical therapy due to hypotension  -Previously evaluated by cardiology  -Continue Lasix, consider resuming Entresto when patient is hemodynamically more improved    Hypotension  -Patient has been treated with albumin, continue midodrine  -Continue monitor hemodynamic    PENNY  -Likely multifactorial including sepsis  -PENNY is resolved    Dyslipidemia  -Continue statin    Non-small cell lung cancer  -History of right lower lobe lobectomy at [facility] in 2009  -Follow-up outpatient     Code status: DNR  Prophylaxis: Eliquis  Care Plan discussed with: Patient  Anticipated Disposition: Patient was discharged with home health yesterday but he appealed the discharge. Pending disposition. Hospital Problems  Date Reviewed: 1/9/2019            Codes Class Noted POA    Moderate protein-calorie malnutrition ([facility] ICD-10-CM: E44.0  ICD-9-CM: 263.0  10/27/2022 Yes        COPD (chronic obstructive pulmonary disease) ([facility] ICD-10-CM: J44.9  ICD-9-CM: 780  10/26/2022 Unknown        Pneumonia ICD-10-CM: J18.9  ICD-9-CM: 486  10/26/2022 Unknown        Malignant neoplasm of lung, unspecified laterality, unspecified part of lung ([facility] ICD-10-CM: C34.90  ICD-9-CM: 162.9  10/26/2022 Unknown        Sepsis ([facility] ICD-10-CM: A41.9  ICD-9-CM: 038.9, 995.91  10/26/2022 Unknown             Review of Systems:   A comprehensive review of systems was negative except for that written in the HPI. Vital Signs:    Last 24hrs VS reviewed since prior progress note. Most recent are:  Visit Vitals  BP (!) 114/49   Pulse 65   Temp 97.4 °F (36.3 °C)   Resp 18   Ht 5' 11\" (1.803 m)   Wt 58.5 kg (129 lb)   SpO2 95%   BMI 17.99 kg/m²       No intake or output data in the 24 hours ending 11/23/22 1355         Physical Examination:     I had a face to face encounter with this patient and independently examined them on 11/23/2022 as outlined below:          Constitutional:  No acute distress, cooperative, pleasant    ENT:  Oral mucosa moist, oropharynx benign. Resp:  CTA bilaterally. No wheezing/rhonchi/rales. No accessory muscle use. CV:  Regular rhythm, normal rate, no murmurs, gallops, rubs    GI:  Soft, non distended, non tender. normoactive bowel sounds, no hepatosplenomegaly     Musculoskeletal:  No edema, warm, 2+ pulses throughout    Neurologic:  Moves all extremities. AAOx3, CN II-XII reviewed            Data Review:    Review and/or order of clinical lab test      Labs:     Recent Labs     11/22/22  0213 11/21/22  0700   WBC 8.6 9.3   HGB 11.6* 10.9*   HCT 36.2* 34.2*    205       Recent Labs     11/22/22  0213 11/21/22  0700    141   K 4.6 4.2    104   CO2 33* 30   BUN 33* 30*   CREA 1.24 1.28   GLU 87 122*   CA 8.3* 8.3*     No results for input(s): ALT, AP, TBIL, TBILI, TP, ALB, GLOB, GGT, AML, LPSE in the last 72 hours. No lab exists for component: SGOT, GPT, AMYP, HLPSE  No results for input(s): INR, PTP, APTT, INREXT, INREXT in the last 72 hours. No results for input(s): FE, TIBC, PSAT, FERR in the last 72 hours. No results found for: FOL, RBCF   No results for input(s): PH, PCO2, PO2 in the last 72 hours. No results for input(s): CPK, CKNDX, TROIQ in the last 72 hours.     No lab exists for component: CPKMB  Lab Results   Component Value Date/Time    Cholesterol, total 106 02/20/2018 04:54 AM    HDL Cholesterol 45 02/20/2018 04:54 AM    LDL, calculated 46 02/20/2018 04:54 AM    Triglyceride 75 02/20/2018 04:54 AM    CHOL/HDL Ratio 2.4 02/20/2018 04:54 AM     Lab Results   Component Value Date/Time    Glucose (POC) 150 (H) 11/04/2022 12:52 PM    Glucose (POC) 100 09/14/2015 12:14 PM     Lab Results   Component Value Date/Time    Color YELLOW/STRAW 11/15/2022 11:05 AM    Appearance CLEAR 11/15/2022 11:05 AM    Specific gravity 1.021 11/15/2022 11:05 AM    pH (UA) 5.5 11/15/2022 11:05 AM    Protein Negative 11/15/2022 11:05 AM    Glucose Negative 11/15/2022 11:05 AM    Ketone Negative 11/15/2022 11:05 AM    Bilirubin Negative 11/15/2022 11:05 AM    Urobilinogen 0.2 11/15/2022 11:05 AM    Nitrites Negative 11/15/2022 11:05 AM    Leukocyte Esterase Negative 11/15/2022 11:05 AM    Epithelial cells FEW 11/15/2022 11:05 AM    Bacteria Negative 11/15/2022 11:05 AM    WBC 0-4 11/15/2022 11:05 AM    RBC 0-5 11/15/2022 11:05 AM         Medications Reviewed:     Current Facility-Administered Medications   Medication Dose Route Frequency    predniSONE (DELTASONE) tablet 20 mg  20 mg Oral DAILY WITH BREAKFAST    midodrine (PROAMATINE) tablet 10 mg  10 mg Oral TID WITH MEALS    albuterol-ipratropium (DUO-NEB) 2.5 MG-0.5 MG/3 ML  3 mL Nebulization Q4H PRN    loperamide (IMODIUM) capsule 2 mg  2 mg Oral Q4H PRN    furosemide (LASIX) tablet 20 mg  20 mg Oral DAILY    amiodarone (CORDARONE) tablet 100 mg  100 mg Oral BID    magic mouthwash cpd (without sucralfate)  5 mL Oral TID PRN    [Held by provider] sacubitriL-valsartan (ENTRESTO) 24-26 mg tablet 1 Tablet  1 Tablet Oral Q12H    apixaban (ELIQUIS) tablet 5 mg  5 mg Oral Q12H    metoprolol succinate (TOPROL-XL) XL tablet 25 mg  25 mg Oral DAILY    hydrALAZINE (APRESOLINE) 20 mg/mL injection 20 mg  20 mg IntraVENous Q6H PRN    sodium chloride (NS) flush 5-40 mL  5-40 mL IntraVENous Q8H    sodium chloride (NS) flush 5-40 mL  5-40 mL IntraVENous PRN    acetaminophen (TYLENOL) tablet 650 mg  650 mg Oral Q6H PRN    Or    acetaminophen (TYLENOL) suppository 650 mg  650 mg Rectal Q6H PRN    ondansetron (ZOFRAN ODT) tablet 4 mg  4 mg Oral Q8H PRN    Or    ondansetron (ZOFRAN) injection 4 mg  4 mg IntraVENous Q6H PRN    polyethylene glycol (MIRALAX) packet 17 g  17 g Oral DAILY PRN    arformoteroL (BROVANA) neb solution 15 mcg  15 mcg Nebulization BID RT    aspirin delayed-release tablet 81 mg  81 mg Oral DAILY    atorvastatin (LIPITOR) tablet 40 mg  40 mg Oral DAILY    budesonide (PULMICORT) 500 mcg/2 ml nebulizer suspension  500 mcg Nebulization BID    oxyCODONE IR (ROXICODONE) tablet 20 mg  20 mg Oral Q6H PRN    oxyCODONE IR (ROXICODONE) tablet 10 mg  10 mg Oral Q6H PRN ______________________________________________________________________  EXPECTED LENGTH OF STAY: 4d 19h  ACTUAL LENGTH OF STAY:          28                 Francy Vieira MD

## 2022-11-23 NOTE — PROGRESS NOTES
Comprehensive Nutrition Assessment    Type and Reason for Visit: Reassess    Nutrition Recommendations/Plan:     Continue regular diet with high kcal/high protein ONS BID  Recommend starting probiotic       Malnutrition Assessment:  Malnutrition Status:  Severe malnutrition (11/10/22 1530)    Context:  Acute illness     Findings of the 6 clinical characteristics of malnutrition:   Energy Intake:  Unable to assess  Weight Loss:  Greater than 2% over 1 week     Body Fat Loss:  Mild body fat loss, Orbital, Triceps, Buccal region   Muscle Mass Loss: Moderate muscle mass loss, Temples (temporalis), Clavicles (pectoralis & deltoids)  Fluid Accumulation:  No significant fluid accumulation,     Strength:  Not performed        Nutrition Assessment:    Past Medical History:   Diagnosis Date    Asthma     Cancer ([facility]     Lung Cancer    Chronic obstructive pulmonary disease (HCC)     Chronic pain     High cholesterol     Lung cancer ([facility]        Pt admitted with Sepsis ([facility] [A41.9]  Pneumonia [J18.9]  Malignant neoplasm of lung, unspecified laterality, unspecified part of lung ([facility] [C34.90]  COPD (chronic obstructive pulmonary disease) ([facility] [J44.9]      11/23: Follow up. Noted pt is appealing discharge, likely home with daughter soon. Wt appears to be down 3# since last week. Visited pt at bedside where he deferred interview d/t being on the phone. Will see Monday if still in house. Thanks     11/17: follow-up. Weight holding stable at 132 lb since last week. On 3 L O2. Pt is medically stable for d/c, awaiting placement. Denied for IPR, plan for SNF. PO intake about the same, but c/o diarrhea with most everything he eats. Believes they are getting a little more formed. Has imodium ordered PRN. His white board indicated he also had Lomotil, but this is not currently ordered. Has been on IV abx. Discussed trying probiotic, pt receptive.   Last 3 Recorded Weights in this Encounter    11/14/22 0049 11/16/22 0148 11/17/22 0348   Weight: 60 kg (132 lb 4.4 oz) 60.3 kg (132 lb 15 oz) 60.2 kg (132 lb 11.5 oz)         11/10: follow-up. Weight consistently trending down. O2 requirements improving, down to 4 L NC. Noted newly dx CHF with EF 25-30%, unclear etiology. Lasix being held d/t low BP. Midodrine started. Scheduled imodium for several days d/t diarrhea, now PRN. Also with mouth ulcers- nystatin and Magic Mouthwash ordered. Weight could be down d/t diarrhea and diuresis. K+ trending high since lasix held. Temporal wasting seems worse - but already marked as severe. Fat loss was documented as mild since he had reported stable weight, but given wt loss, this is likely worse. Pt did not seem surprised that weight was down, but agrees would like to see standing scale weight when up with therapy. He states he continues to eat fairly well at most meals, not doing as many ONS. Will decrease to BID. Last 3 Recorded Weights in this Encounter    11/08/22 0353 11/09/22 0729 11/10/22 0207   Weight: 62.5 kg (137 lb 12.6 oz) 60.8 kg (134 lb 0.6 oz) (P) 60.2 kg (132 lb 11.5 oz)           11/3: follow-up. Visited with pt in room. He states his appetite is better and eating more. Drinking 2-3 Ensure/day. Denied any complaints. Currently on 35 L high flow. Weight appears to be stable, but difficult to assess with bed scales and up and down readings. Labs OK. Looks like he is getting PRN imodium frequently, so ?if having diarrhea. Last BM documented as formed. Last 3 Recorded Weights in this Encounter    10/30/22 0412 10/31/22 0932 11/03/22 0314   Weight: 65.5 kg (144 lb 6.4 oz) 67 kg (147 lb 11.3 oz) 65 kg (143 lb 4.8 oz)           10/27: Pt screened d/t low BMI for age. Visited in room, on high flow. States his appetite/ intake has been poor for past few days, but unsure of any weight loss.   When I told him weight listed at 140 lb, he states \"that's about where I stay\" - confirmed with bed weight, but he was 145 lb at MD visit in August per review of chart. Wt fluctuates per hx below and it is unclear over how long this wt loss occurred. He states he drinks Ensure when he can afford them. Likes chocolate. Would appreciate them coming with meals while admitted. Denies chewing/ swallowing difficulty. Pt does have significant clavicle, temporal wasting and orbital fat loss. However, given weight stability, suspect malnutrition is chronic and therefore, more moderate in nature. However, with current illness and decreased PO intake, could meet acute, severe PCM criteria (he is certainly at risk he doesn't currently). Pt has baseline COPD, on 3 L O2 at home. Also with hx of NSCLC s/p RLL lobectomy in 2009. Unclear if active issue. Regardless, COPD itself puts pt at risk for higher EEN. Nutritionally Significant Medications:  Cordarone, lipitor, lasix, midodrine, prednisone    Estimated Daily Nutrient Needs:  Energy Requirements Based On: Kcal/kg  Weight Used for Energy Requirements: Current (63.5 kg)  Energy (kcal/day): 2200+ (35+ kcal/kg)  Weight Used for Protein Requirements: Current  Protein (g/day): 110 (1.7+ gm/kg or at least 20%)     Fluid (ml/day): 1900 (30 mL/kg)    Nutrition Related Findings:   Edema: No data recorded    Last BM: 11/22/22, Soft    Wounds: None      Current Nutrition Therapies:  Diet: regular  Supplements: Ensure Plus BID with meals  Meal intake: No data found. Supplement intake: No data found. Anthropometric Measures:  Height: 5' 11\" (180.3 cm)  Ideal Body Weight (IBW): 172 lbs (78 kg)  Admission Body Weight: 146 lb 13.2 oz  Current Body Wt:  60.2 kg (132 lb 11.5 oz), 77.2 % IBW.  Bed scale  Current BMI (kg/m2): 18.5  Usual Body Weight: 65.8 kg (145 lb)  % Weight Change (Calculated): -3.4  Weight Adjustment: No adjustment                 BMI Category: Underweight (BMI less than 22) age over 72    Wt Readings from Last 15 Encounters:   11/17/22 60.2 kg (132 lb 11.5 oz) - bed 10/26/22 63.5 kg (140 lb)   01/12/20 68.2 kg (150 lb 4.8 oz)   01/13/19 66.8 kg (147 lb 4.8 oz)   11/26/18 71.5 kg (157 lb 10.1 oz)   06/05/18 66.7 kg (147 lb)   06/05/18 66.7 kg (147 lb)   04/10/18 62.3 kg (137 lb 4.8 oz)   02/23/18 57.6 kg (126 lb 15.8 oz)   11/28/16 71.2 kg (157 lb)   10/05/15 66.2 kg (146 lb)   09/15/15 66.5 kg (146 lb 11.2 oz)   08/11/15 68 kg (149 lb 14.6 oz)     Last 3 Recorded Weights in this Encounter    10/30/22 0412 10/31/22 0932 11/03/22 0314   Weight: 65.5 kg (144 lb 6.4 oz) 67 kg (147 lb 11.3 oz) 65 kg (143 lb 4.8 oz)           Nutrition Diagnosis:   Inadequate oral intake related to impaired respiratory function, increased demand for energy/nutrients as evidenced by BMI, Criteria as identified in malnutrition assessment    Nutrition Interventions:   Food and/or Nutrient Delivery: Continue current diet, Continue oral nutrition supplement  Nutrition Education/Counseling: No recommendations at this time  Coordination of Nutrition Care: Continue to monitor while inpatient       Goals:     Goals: other (specify)  Specify Other Goals: continued PO intake >/=50% of meals with 2-3 ONS daily over next 7 days    Nutrition Monitoring and Evaluation:   Behavioral-Environmental Outcomes: None identified  Food/Nutrient Intake Outcomes: Food and nutrient intake, Supplement intake  Physical Signs/Symptoms Outcomes: Biochemical data, Meal time behavior, Nutrition focused physical findings, Weight, GI status    Discharge Planning:    Continue current diet, Continue oral nutrition supplement    Recent Labs     11/22/22  0213 11/21/22  0700   GLU 87 122*   BUN 33* 30*   CREA 1.24 1.28    141   K 4.6 4.2    104   CO2 33* 30   CA 8.3* 8.3*       Recent Labs     11/22/22  0213 11/21/22  0700   WBC 8.6 9.3   HGB 11.6* 10.9*   HCT 36.2* 34.2*    205           Aurther Foot, RD  Available via [facility]

## 2022-11-24 PROCEDURE — 74011250637 HC RX REV CODE- 250/637: Performed by: FAMILY MEDICINE

## 2022-11-24 PROCEDURE — 74011000250 HC RX REV CODE- 250: Performed by: INTERNAL MEDICINE

## 2022-11-24 PROCEDURE — 74011250637 HC RX REV CODE- 250/637: Performed by: INTERNAL MEDICINE

## 2022-11-24 PROCEDURE — 94761 N-INVAS EAR/PLS OXIMETRY MLT: CPT

## 2022-11-24 PROCEDURE — 94640 AIRWAY INHALATION TREATMENT: CPT

## 2022-11-24 PROCEDURE — 94760 N-INVAS EAR/PLS OXIMETRY 1: CPT

## 2022-11-24 PROCEDURE — 74011636637 HC RX REV CODE- 636/637: Performed by: FAMILY MEDICINE

## 2022-11-24 PROCEDURE — 77010033678 HC OXYGEN DAILY

## 2022-11-24 PROCEDURE — 65270000046 HC RM TELEMETRY

## 2022-11-24 RX ADMIN — ARFORMOTEROL TARTRATE 15 MCG: 15 SOLUTION RESPIRATORY (INHALATION) at 07:58

## 2022-11-24 RX ADMIN — PREDNISONE 20 MG: 20 TABLET ORAL at 07:29

## 2022-11-24 RX ADMIN — MIDODRINE HYDROCHLORIDE 10 MG: 5 TABLET ORAL at 17:48

## 2022-11-24 RX ADMIN — BUDESONIDE 500 MCG: 0.5 INHALANT RESPIRATORY (INHALATION) at 20:36

## 2022-11-24 RX ADMIN — APIXABAN 5 MG: 5 TABLET, FILM COATED ORAL at 22:11

## 2022-11-24 RX ADMIN — ASPIRIN 81 MG: 81 TABLET, COATED ORAL at 08:48

## 2022-11-24 RX ADMIN — ARFORMOTEROL TARTRATE 15 MCG: 15 SOLUTION RESPIRATORY (INHALATION) at 20:36

## 2022-11-24 RX ADMIN — AMIODARONE HYDROCHLORIDE 100 MG: 200 TABLET ORAL at 17:47

## 2022-11-24 RX ADMIN — MIDODRINE HYDROCHLORIDE 10 MG: 5 TABLET ORAL at 07:29

## 2022-11-24 RX ADMIN — APIXABAN 5 MG: 5 TABLET, FILM COATED ORAL at 08:49

## 2022-11-24 RX ADMIN — MIDODRINE HYDROCHLORIDE 10 MG: 5 TABLET ORAL at 12:12

## 2022-11-24 RX ADMIN — ATORVASTATIN CALCIUM 40 MG: 40 TABLET, FILM COATED ORAL at 08:48

## 2022-11-24 RX ADMIN — BUDESONIDE 500 MCG: 0.5 INHALANT RESPIRATORY (INHALATION) at 07:58

## 2022-11-24 RX ADMIN — SODIUM CHLORIDE, PRESERVATIVE FREE 10 ML: 5 INJECTION INTRAVENOUS at 22:14

## 2022-11-24 RX ADMIN — SODIUM CHLORIDE, PRESERVATIVE FREE 10 ML: 5 INJECTION INTRAVENOUS at 17:48

## 2022-11-24 RX ADMIN — AMIODARONE HYDROCHLORIDE 100 MG: 200 TABLET ORAL at 08:49

## 2022-11-24 NOTE — PROGRESS NOTES
[facility]  Hospitalist Group                                                                                          Hospitalist Progress Note  Kathryn Hand MD  Answering service: [phone] from in house phone        Date of Service:  2022  NAME:  Luiz Ziegler  :  1950  MRN:  [id number]      Admission Summary:     Patient presents with acute on chronic respiratory failure due to COPD and pneumonia, also with new onset atrial fibrillation with new onset systolic heart failure. Interval history / Subjective:     Patient doing well, no acute complaint.      Assessment & Plan:     Acute respiratory failure  -Acute hypoxic and hypercapnic respiratory failure due to pneumonia and COPD  -Patient with baseline chronic respiratory failure due to COPD, as well as s/p lobectomy due to lung cancer, uses 3 L of oxygen at baseline  -Patient initially requiring BiPAP, overall improving and able to participate more with PT    Sepsis  -Patient with tachycardia and leukocytosis, elevated procalcitonin level  -Sepsis due to multilobar pneumonia  -Sepsis resolved    Multilobar pneumonia  -Blood cultures so far negative, COVID-negative, respiratory culture negative, Legionella negative, MRSA negative  -Patient has completed treatment with Rocephin and Zithromax  -Repeat chest x-ray 2022 shows improvement    Acute exacerbation of COPD  -Continue bronchodilators, breathing treatment, Solu-Medrol was changed to prednisone, start tapering  -Previously evaluated by pulmonology, at baseline patient uses 3 L    Paroxysmal atrial fibrillation  -Patient remaining in sinus rhythm  -Amiodarone drip was changed to p.o. amiodarone, on metoprolol and on Eliquis for anticoagulation    Acute systolic CHF  -New onset systolic CHF, NYHA class IV, echo shows EF of 25 to 30%  -Limited use of guideline directed medical therapy due to hypotension  -Previously evaluated by cardiology  -Continue Lasix, consider resuming Entresto when patient is hemodynamically more improved    Hypotension  -Patient has been treated with albumin, continue midodrine  -Continue monitor hemodynamic    PENNY  -Likely multifactorial including sepsis  -PENNY is resolved    Dyslipidemia  -Continue statin    Non-small cell lung cancer  -History of right lower lobe lobectomy at [facility] in 2009  -Follow-up outpatient     Code status: DNR  Prophylaxis: [address] discussed with: Patient  Anticipated Disposition: Patient was discharged with home health 11/22/2022 but he appealed the discharge. Patient states her daughter can pick him up tomorrow. Hospital Problems  Date Reviewed: 1/9/2019            Codes Class Noted POA    Moderate protein-calorie malnutrition ([facility] ICD-10-CM: E44.0  ICD-9-CM: 263.0  10/27/2022 Yes        COPD (chronic obstructive pulmonary disease) ([facility] ICD-10-CM: J44.9  ICD-9-CM: 995  10/26/2022 Unknown        Pneumonia ICD-10-CM: J18.9  ICD-9-CM: 486  10/26/2022 Unknown        Malignant neoplasm of lung, unspecified laterality, unspecified part of lung ([facility] ICD-10-CM: C34.90  ICD-9-CM: 162.9  10/26/2022 Unknown        Sepsis ([facility] ICD-10-CM: A41.9  ICD-9-CM: 038.9, 995.91  10/26/2022 Unknown             Review of Systems:   A comprehensive review of systems was negative except for that written in the HPI. Vital Signs:    Last 24hrs VS reviewed since prior progress note.  Most recent are:  Visit Vitals  BP (!) 93/52 (BP 1 Location: Left arm, BP Patient Position: At rest)   Pulse 69   Temp 97.5 °F (36.4 °C)   Resp 16   Ht 5' 11\" (1.803 m)   Wt 58.8 kg (129 lb 11.2 oz)   SpO2 97%   BMI 18.09 kg/m²       No intake or output data in the 24 hours ending 11/24/22 1259         Physical Examination:     I had a face to face encounter with this patient and independently examined them on 11/24/2022 as outlined below:          Constitutional:  No acute distress, cooperative, pleasant    ENT:  Oral mucosa moist, oropharynx benign. Resp:  CTA bilaterally. No wheezing/rhonchi/rales. No accessory muscle use. CV:  Regular rhythm, normal rate, no murmurs, gallops, rubs    GI:  Soft, non distended, non tender. normoactive bowel sounds, no hepatosplenomegaly     Musculoskeletal:  No edema, warm, 2+ pulses throughout    Neurologic:  Moves all extremities. AAOx3, CN II-XII reviewed            Data Review:    Review and/or order of clinical lab test      Labs:     Recent Labs     11/22/22 0213   WBC 8.6   HGB 11.6*   HCT 36.2*          Recent Labs     11/22/22 0213      K 4.6      CO2 33*   BUN 33*   CREA 1.24   GLU 87   CA 8.3*     No results for input(s): ALT, AP, TBIL, TBILI, TP, ALB, GLOB, GGT, AML, LPSE in the last 72 hours. No lab exists for component: SGOT, GPT, AMYP, HLPSE  No results for input(s): INR, PTP, APTT, INREXT, INREXT in the last 72 hours. No results for input(s): FE, TIBC, PSAT, FERR in the last 72 hours. No results found for: FOL, RBCF   No results for input(s): PH, PCO2, PO2 in the last 72 hours. No results for input(s): CPK, CKNDX, TROIQ in the last 72 hours.     No lab exists for component: CPKMB  Lab Results   Component Value Date/Time    Cholesterol, total 106 02/20/2018 04:54 AM    HDL Cholesterol 45 02/20/2018 04:54 AM    LDL, calculated 46 02/20/2018 04:54 AM    Triglyceride 75 02/20/2018 04:54 AM    CHOL/HDL Ratio 2.4 02/20/2018 04:54 AM     Lab Results   Component Value Date/Time    Glucose (POC) 150 (H) 11/04/2022 12:52 PM    Glucose (POC) 100 09/14/2015 12:14 PM     Lab Results   Component Value Date/Time    Color YELLOW/STRAW 11/15/2022 11:05 AM    Appearance CLEAR 11/15/2022 11:05 AM    Specific gravity 1.021 11/15/2022 11:05 AM    pH (UA) 5.5 11/15/2022 11:05 AM    Protein Negative 11/15/2022 11:05 AM    Glucose Negative 11/15/2022 11:05 AM    Ketone Negative 11/15/2022 11:05 AM    Bilirubin Negative 11/15/2022 11:05 AM    Urobilinogen 0.2 11/15/2022 11:05 AM Nitrites Negative 11/15/2022 11:05 AM    Leukocyte Esterase Negative 11/15/2022 11:05 AM    Epithelial cells FEW 11/15/2022 11:05 AM    Bacteria Negative 11/15/2022 11:05 AM    WBC 0-4 11/15/2022 11:05 AM    RBC 0-5 11/15/2022 11:05 AM         Medications Reviewed:     Current Facility-Administered Medications   Medication Dose Route Frequency    predniSONE (DELTASONE) tablet 20 mg  20 mg Oral DAILY WITH BREAKFAST    midodrine (PROAMATINE) tablet 10 mg  10 mg Oral TID WITH MEALS    albuterol-ipratropium (DUO-NEB) 2.5 MG-0.5 MG/3 ML  3 mL Nebulization Q4H PRN    loperamide (IMODIUM) capsule 2 mg  2 mg Oral Q4H PRN    furosemide (LASIX) tablet 20 mg  20 mg Oral DAILY    amiodarone (CORDARONE) tablet 100 mg  100 mg Oral BID    magic mouthwash cpd (without sucralfate)  5 mL Oral TID PRN    [Held by provider] sacubitriL-valsartan (ENTRESTO) 24-26 mg tablet 1 Tablet  1 Tablet Oral Q12H    apixaban (ELIQUIS) tablet 5 mg  5 mg Oral Q12H    metoprolol succinate (TOPROL-XL) XL tablet 25 mg  25 mg Oral DAILY    hydrALAZINE (APRESOLINE) 20 mg/mL injection 20 mg  20 mg IntraVENous Q6H PRN    sodium chloride (NS) flush 5-40 mL  5-40 mL IntraVENous Q8H    sodium chloride (NS) flush 5-40 mL  5-40 mL IntraVENous PRN    acetaminophen (TYLENOL) tablet 650 mg  650 mg Oral Q6H PRN    Or    acetaminophen (TYLENOL) suppository 650 mg  650 mg Rectal Q6H PRN    ondansetron (ZOFRAN ODT) tablet 4 mg  4 mg Oral Q8H PRN    Or    ondansetron (ZOFRAN) injection 4 mg  4 mg IntraVENous Q6H PRN    polyethylene glycol (MIRALAX) packet 17 g  17 g Oral DAILY PRN    arformoteroL (BROVANA) neb solution 15 mcg  15 mcg Nebulization BID RT    aspirin delayed-release tablet 81 mg  81 mg Oral DAILY    atorvastatin (LIPITOR) tablet 40 mg  40 mg Oral DAILY    budesonide (PULMICORT) 500 mcg/2 ml nebulizer suspension  500 mcg Nebulization BID    oxyCODONE IR (ROXICODONE) tablet 20 mg  20 mg Oral Q6H PRN    oxyCODONE IR (ROXICODONE) tablet 10 mg  10 mg Oral Q6H PRN     ______________________________________________________________________  EXPECTED LENGTH OF STAY: 4d 19h  ACTUAL LENGTH OF STAY:          29                 Quang Arguelles MD

## 2022-11-24 NOTE — PROGRESS NOTES
RUR: 14% Low    HOWARD: Anticipated Home health OT/PT with [facility] (p: [phone]). Patient's life partner will likely provide transport home. Follow-up with PCP/specialist.     Primary Contact: Life Partner, Jose Garcia, [phone]    Note from previous CM note dated 11/23:   Medicare discharge appeal received from [facility]. Case control # [id number]. All requested notes, DND, and IMM letter was sent electronically to [facility]. Patient  given a copy of the DND and a copy was placed on patient's chart. Status of the appeal continues to be monitored. Multiple HH referrals sent via [facility] ([facility], [facility], [facility], [facility] [address] (formally known as [facility]), [facility] [address], [facility], and [facility]. [facility] accepted ONLY if patient does not require home IV needs. CM sent attending [employer] message to inquire if home IV needs are anticipated. Per attending, no anticipated home IV needs upon discharge. CM provided update to [facility]. Unit CM will continue to follow as needed.        Cristine Polk, MSW   [phone]

## 2022-11-24 NOTE — PROGRESS NOTES
Problem: Falls - Risk of  Goal: *Absence of Falls  Description: Document Sofia Fothergill Fall Risk and appropriate interventions in the flowsheet. Outcome: Progressing Towards Goal  Note: Fall Risk Interventions:  Mobility Interventions: Communicate number of staff needed for ambulation/transfer         Medication Interventions: Evaluate medications/consider consulting pharmacy    Elimination Interventions: Call light in reach              Problem: Patient Education: Go to Patient Education Activity  Goal: Patient/Family Education  Outcome: Progressing Towards Goal     Problem: Pain  Goal: *Control of Pain  Outcome: Progressing Towards Goal     Problem: Patient Education: Go to Patient Education Activity  Goal: Patient/Family Education  Outcome: Progressing Towards Goal     Problem: Nutrition Deficit  Goal: *Optimize nutritional status  Outcome: Progressing Towards Goal     Problem: Patient Education: Go to Patient Education Activity  Goal: Patient/Family Education  Outcome: Progressing Towards Goal     Problem: Patient Education: Go to Patient Education Activity  Goal: Patient/Family Education  Outcome: Progressing Towards Goal     Problem: Pressure Injury - Risk of  Goal: *Prevention of pressure injury  Description: Document Ramos Scale and appropriate interventions in the flowsheet.   Outcome: Progressing Towards Goal  Note: Pressure Injury Interventions:  Sensory Interventions: Assess changes in LOC    Moisture Interventions: Absorbent underpads    Activity Interventions: Increase time out of bed    Mobility Interventions: Pressure redistribution bed/mattress (bed type)    Nutrition Interventions: Document food/fluid/supplement intake, Offer support with meals,snacks and hydration                     Problem: Patient Education: Go to Patient Education Activity  Goal: Patient/Family Education  Outcome: Progressing Towards Goal

## 2022-11-24 NOTE — PROGRESS NOTES
Bedside and Verbal shift change report given to Rubina Caal (oncoming nurse) by Margot Schulte  (offgoing nurse). Report included the following information SBAR.

## 2022-11-25 VITALS
OXYGEN SATURATION: 93 % | WEIGHT: 129 LBS | RESPIRATION RATE: 19 BRPM | BODY MASS INDEX: 18.06 KG/M2 | TEMPERATURE: 97.8 F | HEIGHT: 71 IN | DIASTOLIC BLOOD PRESSURE: 63 MMHG | SYSTOLIC BLOOD PRESSURE: 111 MMHG | HEART RATE: 68 BPM

## 2022-11-25 PROCEDURE — 74011636637 HC RX REV CODE- 636/637: Performed by: FAMILY MEDICINE

## 2022-11-25 PROCEDURE — 74011250637 HC RX REV CODE- 250/637: Performed by: FAMILY MEDICINE

## 2022-11-25 PROCEDURE — 74011250637 HC RX REV CODE- 250/637: Performed by: INTERNAL MEDICINE

## 2022-11-25 PROCEDURE — 94640 AIRWAY INHALATION TREATMENT: CPT

## 2022-11-25 PROCEDURE — 74011000250 HC RX REV CODE- 250: Performed by: INTERNAL MEDICINE

## 2022-11-25 RX ADMIN — SODIUM CHLORIDE, PRESERVATIVE FREE 10 ML: 5 INJECTION INTRAVENOUS at 05:52

## 2022-11-25 RX ADMIN — PREDNISONE 20 MG: 20 TABLET ORAL at 07:26

## 2022-11-25 RX ADMIN — FUROSEMIDE 20 MG: 20 TABLET ORAL at 09:18

## 2022-11-25 RX ADMIN — MIDODRINE HYDROCHLORIDE 10 MG: 5 TABLET ORAL at 07:26

## 2022-11-25 RX ADMIN — METOPROLOL SUCCINATE 25 MG: 25 TABLET, FILM COATED, EXTENDED RELEASE ORAL at 09:18

## 2022-11-25 RX ADMIN — AMIODARONE HYDROCHLORIDE 100 MG: 200 TABLET ORAL at 09:18

## 2022-11-25 RX ADMIN — BUDESONIDE 500 MCG: 0.5 INHALANT RESPIRATORY (INHALATION) at 07:58

## 2022-11-25 RX ADMIN — ASPIRIN 81 MG: 81 TABLET, COATED ORAL at 09:18

## 2022-11-25 RX ADMIN — ARFORMOTEROL TARTRATE 15 MCG: 15 SOLUTION RESPIRATORY (INHALATION) at 07:58

## 2022-11-25 RX ADMIN — ATORVASTATIN CALCIUM 40 MG: 40 TABLET, FILM COATED ORAL at 09:18

## 2022-11-25 RX ADMIN — APIXABAN 5 MG: 5 TABLET, FILM COATED ORAL at 09:18

## 2022-11-25 NOTE — PROGRESS NOTES
HOWARD: d/c home with [facility] for PT/OT;    [facility] agency in [place]    Pt is 02 dependent at 3L/NC     Daughter transport    Note from previous CM note dated 11/23:   Medicare discharge appeal received from [facility]. Case control # [id number]. All requested notes, DND, and IMM letter was sent electronically to [facility]. Patient  given a copy of the DND and a copy was placed on patient's chart. RUR: 12%  -CHF  -AFIB  -PNA, sepsis, treated with IV ABX  -PT recs. SNF, OT recs. SNF vs HHOT  -0900-CM reviewed pt chart & noted plan for pt to be on new Eliquis & Entrestro. CM met with pt at bedside to assess if pt can afford more expensive medications. Pt stated that he is on a fixed income and would not be able to afford expensive medications. CM provided pt with free coupon cards for both medications as well as prescription assistance information. Pt advised he will be staying with his daughter at d/c and that she will be providing d/c transport at 10am. CM sent message to [facility] in [facility] in notify of plan for d/c today. Nurse informed of plan.   Susie Quintero RN BSN CCM

## 2022-11-25 NOTE — PROGRESS NOTES
Patient IV removed prior to discharge, tip intact, minimal blood loss. Patient and daughter verbalized discharge instructions with primary RN. Patient is being discharged home with family and home health. Patient was given mediation coupons from case management. Problem: Falls - Risk of  Goal: *Absence of Falls  Description: Document Diego Patricia Fall Risk and appropriate interventions in the flowsheet. Outcome: Progressing Towards Goal  Note: Fall Risk Interventions:  Mobility Interventions: Communicate number of staff needed for ambulation/transfer         Medication Interventions: Evaluate medications/consider consulting pharmacy    Elimination Interventions: Call light in reach              Problem: Patient Education: Go to Patient Education Activity  Goal: Patient/Family Education  Outcome: Progressing Towards Goal     Problem: Pain  Goal: *Control of Pain  Outcome: Progressing Towards Goal     Problem: Patient Education: Go to Patient Education Activity  Goal: Patient/Family Education  Outcome: Progressing Towards Goal     Problem: Nutrition Deficit  Goal: *Optimize nutritional status  Outcome: Progressing Towards Goal     Problem: Patient Education: Go to Patient Education Activity  Goal: Patient/Family Education  Outcome: Progressing Towards Goal     Problem: Patient Education: Go to Patient Education Activity  Goal: Patient/Family Education  Outcome: Progressing Towards Goal     Problem: Pressure Injury - Risk of  Goal: *Prevention of pressure injury  Description: Document Ramos Scale and appropriate interventions in the flowsheet.   Outcome: Progressing Towards Goal  Note: Pressure Injury Interventions:  Sensory Interventions: Assess changes in LOC    Moisture Interventions: Absorbent underpads    Activity Interventions: Increase time out of bed    Mobility Interventions: HOB 30 degrees or less, Pressure redistribution bed/mattress (bed type)    Nutrition Interventions: Document food/fluid/supplement intake                     Problem: Patient Education: Go to Patient Education Activity  Goal: Patient/Family Education  Outcome: Progressing Towards Goal

## 2022-11-25 NOTE — DISCHARGE SUMMARY
Discharge Summary       PATIENT ID: Stephanie Henry  MRN: [id number]   YOB: 1950    DATE OF ADMISSION: 10/26/2022  5:44 AM    DATE OF DISCHARGE: 11/22/2022   PRIMARY CARE PROVIDER: Maria Victoria Potter MD     ATTENDING PHYSICIAN: Yris Brewer  DISCHARGING PROVIDER: Marly Sanchez MD    To contact this individual call [phone] and ask the  to page. If unavailable ask to be transferred the Adult Hospitalist Department. CONSULTATIONS: IP CONSULT TO HOSPITALIST  IP CONSULT TO PULMONOLOGY  IP CONSULT TO INTENSIVIST  IP CONSULT TO CARDIOLOGY  IP CONSULT TO CARDIOLOGY    PROCEDURES/SURGERIES: * No surgery found *    DISCHARGE DIAGNOSES:     Acute respiratory failure  Sepsis  Multilobar pneumonia  Acute exacerbation of COPD  Paroxysmal atrial fibrillation  Acute systolic CHF  Hypotension  PENNY  Dyslipidemia  Non-small cell lung cancer    ADMISSION SUMMARY AND HOSPITAL COURSE:     FARHAN Henry is a 71 y.o. male with a PMHx of lung cancer, COPD, chronic hypoxic respiratory failure, on home O2 at 3 liters/min, who presents with Chief Complaint of Shortness of breath/ Dyspnea on exertion, progressively worsening for the past 3 days. Patient denies any fevers or chills. He used increased doses of inhalers, but it did not help. He called 911. Upon EMS arrival, his O2 saturation was 79%. Patient was placed on a BIPAP upon arrival. At the time of my evaluation, patient is on BIPAP. He appears uncomfortable, he is tachypneic, with a prolonged expiratory phase. He uses accessory muscles to breathe. He is emaciated, and there is diffuse loss of subcutaneous adipose tissue and loss of muscle mass. Limited history was obtained from the patient himself due to respiratory distress; he is able to nod and shake his head, but not able to speak. Work-up in the ED showed an elevated WBC of 22.9, erythrocytosis with an H&H of 17.2 & 53.8, an elevated BUN and Cr of 35 and 2.04 respectively.   Initial lactic acid was 4.4. CXR showed emphysema, with Left lower lobe and Right upper lobe infiltrates. The Hospitalist team has been consulted for admission. Hospital Course  1. Acute respiratory failure  Patient presents with acute on chronic hypoxic and hypercapnic respiratory failure due to pneumonia and COPD. Patient requires 3 to 3.5 L of oxygen at baseline due to chronic respiratory failure with COPD as well as history of lung cancer s/p lobectomy. On presentation patient was requiring BiPAP and later oxygen slowly weaned down to his baseline level. Patient was able to participate with physical therapy more and ambulating with stable oxygen status. 2.  Sepsis  Patient with tachycardia, leukocytosis and elevated procalcitonin level on presentation. Sepsis due to multilobar pneumonia. Sepsis resolved during this admission. Multilobar pneumonia was treated with Rocephin and Zithromax and completed antibiotics. Blood cultures COVID and respiratory cultures negative. Also MRSA and Legionella negative. Patient's repeat chest x-ray this admission shows improvement. 3.  Acute exacerbation of COPD  Patient was treated with IV Solu-Medrol which was later changed to p.o. steroid, also treated with bronchodilators and breathing treatment. Patient was evaluated by pulmonology during this admission. Patient's acute respiratory failure has resolved and COPD exacerbation has resolved as well. Stable on baseline 3 to 3.5 L of oxygen. 4.  Acute systolic CHF  Patient with new onset systolic CHF, NYHA class IV on admission. Echo shows EF of 25 to 30%. Patient was previously evaluated by cardiology. Patient's hypotension limiting the use of: Directed medical therapy. Patient was started on midodrine with improvement in blood pressure. Patient was continued on Lasix and Entresto was resumed on discharge.     5.  Generalized deconditioning  Patient's deconditioning and weakness has also improved gradually, initially was evaluated for IPR but patient did not meet criteria for IPR, then SNF was pursued but due to patient's improving mobility status patient does not meet criteria for SNF as well. Plan for discharge to home with home health. Patient was recently evicted from his home and therefore cannot return to his home but daughter is available to accommodate the patient. Plan has been made for home health and to go home with his daughter. DISCHARGE DIAGNOSES / PLAN:        BMI: Body mass index is 17.99 kg/m². . This patient: Has a BMI within normal limits. PENDING TEST RESULTS:   At the time of discharge the following test results are still pending:      ADDITIONAL CARE RECOMMENDATIONS:     Follow-up with PCP in 1 week. NOTIFY YOUR PHYSICIAN FOR ANY OF THE FOLLOWING:   Fever over 101 degrees for 24 hours. Chest pain, shortness of breath, fever, chills, nausea, vomiting, diarrhea, change in mentation, falling, weakness, bleeding. Severe pain or pain not relieved by medications, as well as any other signs or symptoms that you may have questions about. FOLLOW UP APPOINTMENTS:    Follow-up Information       Follow up With Specialties Details Why Elizabeth Crowe MD Internal Medicine Physician Follow up in 1 week(s)  Alban Eastman [address] [place] [id number]  [address] [place] [address]  Follow up  [facility] [address], Nirav Leone   Phone: [phone]               DIET: Cardiac Diet    ACTIVITY: Activity as tolerated    EQUIPMENT needed: None    DISCHARGE MEDICATIONS:  Current Discharge Medication List        START taking these medications    Details   amiodarone (PACERONE) 100 mg tablet Take 1 Tablet by mouth two (2) times a day for 30 days. Qty: 60 Tablet, Refills: 0  Start date: 11/18/2022, End date: 12/18/2022      apixaban (ELIQUIS) 5 mg tablet Take 1 Tablet by mouth every twelve (12) hours for 30 days.   Qty: 60 Tablet, Refills: 0  Start date: 11/18/2022, End date: 12/18/2022      metoprolol succinate (TOPROL-XL) 25 mg XL tablet Take 1 Tablet by mouth daily for 30 days. Qty: 30 Tablet, Refills: 0  Start date: 11/19/2022, End date: 12/19/2022      midodrine (PROAMATINE) 10 mg tablet Take 1 Tablet by mouth three (3) times daily (with meals) for 30 days. Qty: 90 Tablet, Refills: 0  Start date: 11/18/2022, End date: 12/18/2022      sacubitril-valsartan (ENTRESTO) 24 mg/26 mg tablet Take 1 Tablet by mouth every twelve (12) hours for 30 days. Qty: 60 Tablet, Refills: 0  Start date: 11/18/2022, End date: 12/18/2022      predniSONE (DELTASONE) 10 mg tablet Take 30 mg by mouth daily (with breakfast) for 30 days. Qty: 90 Tablet, Refills: 0  Start date: 11/19/2022, End date: 12/19/2022      furosemide (LASIX) 20 mg tablet Take 1 Tablet by mouth daily for 30 days. Qty: 30 Tablet, Refills: 0  Start date: 11/18/2022, End date: 12/18/2022           CONTINUE these medications which have NOT CHANGED    Details   arformoteroL (BROVANA) 15 mcg/2 mL nebu neb solution 15 mcg by Nebulization route two (2) times a day. Sometimes uses TID      budesonide (PULMICORT) 0.5 mg/2 mL nbsp 500 mcg by Nebulization route two (2) times a day. Sometimes uses TID      atorvastatin (LIPITOR) 40 mg tablet Take 40 mg by mouth daily. albuterol (PROVENTIL VENTOLIN) 2.5 mg /3 mL (0.083 %) nebu 2.5 mg by Nebulization route every six (6) hours as needed for Wheezing or Shortness of Breath. oxyCODONE IR (ROXICODONE) 20 mg immediate release tablet Take 40 mg by mouth every twelve (12) hours as needed for Pain (severe pain). aspirin delayed-release 81 mg tablet Take 1 Tab by mouth daily. Qty: 30 Tab, Refills: 0      ketoconazole (NIZORAL) 2 % shampoo Apply  to affected area as needed for Itching.              DISPOSITION:  *  Home With:   OT  PT * BRITTNY  RN       Long term SNF/Inpatient Rehab    Independent/assisted living    Hospice    Other:       PATIENT CONDITION AT DISCHARGE:     Functional status    Poor    * Deconditioned     Independent      Cognition    * Lucid     Forgetful     Dementia      Catheters/lines (plus indication)    Burch     PICC     PEG    * None      Code status    * Full code     DNR      PHYSICAL EXAMINATION AT DISCHARGE:  General:          Alert, cooperative, no distress, appears stated age. HEENT:           Atraumatic, anicteric sclerae, pink conjunctivae                          No oral ulcers, mucosa moist, throat clear, dentition fair  Neck:               Supple, symmetrical  Lungs:             Clear to auscultation bilaterally. No Wheezing or Rhonchi. No rales. Chest wall:      No tenderness  No Accessory muscle use. Heart:              Regular  rhythm,  No  murmur   No edema  Abdomen:        Soft, non-tender. Not distended. Bowel sounds normal  Extremities:     No cyanosis. No clubbing,                            Skin turgor normal, Capillary refill normal  Skin:                Not pale. Not Jaundiced  No rashes   Psych:             Not anxious or agitated.   Neurologic:      Alert, moves all extremities, answers questions appropriately and responds to commands       [address]:  Problem List as of 11/25/2022 Date Reviewed: 1/9/2019            Codes Class Noted - Resolved    Moderate protein-calorie malnutrition ([facility] ICD-10-CM: E44.0  ICD-9-CM: 263.0  10/27/2022 - Present        COPD (chronic obstructive pulmonary disease) ([facility] ICD-10-CM: J44.9  ICD-9-CM: 496  10/26/2022 - Present        Pneumonia ICD-10-CM: J18.9  ICD-9-CM: 486  10/26/2022 - Present        Malignant neoplasm of lung, unspecified laterality, unspecified part of lung ([facility] ICD-10-CM: C34.90  ICD-9-CM: 162.9  10/26/2022 - Present        Sepsis ([facility] ICD-10-CM: A41.9  ICD-9-CM: 038.9, 995.91  10/26/2022 - Present        SOB (shortness of breath) ICD-10-CM: R06.02  ICD-9-CM: 786.05  1/9/2019 - Present        Malnutrition ([facility] ICD-10-CM: E46  ICD-9-CM: 263.9  4/10/2018 - Present Respiratory failure with hypoxia [facility]) ICD-10-CM: J96.91  ICD-9-CM: 518.81  4/10/2018 - Present        Aspiration pneumonia ([facility] ICD-10-CM: J69.0  ICD-9-CM: 507.0  4/4/2018 - Present        Systolic CHF, acute ([facility] ICD-10-CM: I50.21  ICD-9-CM: 428.21, 428.0  2/22/2018 - Present        Shortness of breath ICD-10-CM: R06.02  ICD-9-CM: 786.05  2/17/2018 - Present        COPD exacerbation ([facility]) ICD-10-CM: J44.1  ICD-9-CM: 491.21  8/10/2015 - Present        RESOLVED: Pneumonia ICD-10-CM: J18.9  ICD-9-CM: 486  1/6/2020 - 1/12/2020        RESOLVED: NSTEMI (non-ST elevated myocardial infarction) ([facility] ICD-10-CM: I21.4  ICD-9-CM: 410.70  2/22/2018 - 2/22/2018        RESOLVED: COPD exacerbation ([facility] (Chronic) ICD-10-CM: J44.1  ICD-9-CM: 491.21  9/5/2015 - 2/22/2018           Greater than 60 minutes were spent with the patient on counseling and coordination of care    Signed:   Lala Worley MD  11/25/2022  2:36 PM

## 2023-01-01 NOTE — PROGRESS NOTES
9:02 AM  ED CM consult received. Insurance verified: [employer]. Spoke with CM Management and informed facility does accept [employer] for coverage. Patient is not a [facility] patient. Will provide handoff to following CM.     LIBERTAD Leslie/PIO  Care Management Late  male admitted to the NICU for hypoglycemia requiring IV fluids. Blood sugar now stable off IV. On room air, no infectious concerns, otherwise well.

## 2023-02-08 ENCOUNTER — OFFICE VISIT (OUTPATIENT)
Dept: CARDIOLOGY CLINIC | Age: 73
End: 2023-02-08
Payer: MEDICARE

## 2023-02-08 VITALS
SYSTOLIC BLOOD PRESSURE: 100 MMHG | HEART RATE: 88 BPM | TEMPERATURE: 97.7 F | OXYGEN SATURATION: 94 % | WEIGHT: 139 LBS | BODY MASS INDEX: 19.46 KG/M2 | RESPIRATION RATE: 16 BRPM | HEIGHT: 71 IN | DIASTOLIC BLOOD PRESSURE: 70 MMHG

## 2023-02-08 DIAGNOSIS — R06.02 SHORTNESS OF BREATH: ICD-10-CM

## 2023-02-08 DIAGNOSIS — I50.21 SYSTOLIC CHF, ACUTE (HCC): Primary | ICD-10-CM

## 2023-02-08 DIAGNOSIS — E55.9 VITAMIN D DEFICIENCY: ICD-10-CM

## 2023-02-08 DIAGNOSIS — E61.1 IRON DEFICIENCY: ICD-10-CM

## 2023-02-08 PROCEDURE — 99204 OFFICE O/P NEW MOD 45 MIN: CPT | Performed by: INTERNAL MEDICINE

## 2023-02-08 RX ORDER — SACUBITRIL AND VALSARTAN 24; 26 MG/1; MG/1
1 TABLET, FILM COATED ORAL 2 TIMES DAILY
COMMUNITY

## 2023-02-08 RX ORDER — FUROSEMIDE 20 MG/1
20 TABLET ORAL DAILY
COMMUNITY
Start: 2022-12-23

## 2023-02-08 RX ORDER — AMIODARONE HYDROCHLORIDE 100 MG/1
100 TABLET ORAL 2 TIMES DAILY
COMMUNITY
Start: 2022-12-23

## 2023-02-08 NOTE — PROGRESS NOTES
Identified pt with two pt identifiers(name and ). Reviewed record in preparation for visit and have obtained necessary documentation. All patient medications has been reviewed. Chief Complaint   Patient presents with    New Patient    CHF       3 most recent PHQ Screens 2023   Little interest or pleasure in doing things Not at all   Feeling down, depressed, irritable, or hopeless Not at all   Total Score PHQ 2 0     No flowsheet data found. Health Maintenance Due   Topic    Hepatitis C Screening     Depression Screen     Colorectal Cancer Screening Combo     Shingles Vaccine (1 of 2)    Low dose CT lung screening     AAA Screening 73-69 YO Male Smoking Patients     Medicare Yearly Exam     Lipid Screen     COVID-19 Vaccine (4 - Booster for Pfizer series)    Flu Vaccine (1)     Health Maintenance Review: Patient reminded of \"due or due soon\" health maintenance. I have asked the patient to contact his/her primary care provider (PCP) for follow-up on his/her health maintenance. Vitals:    23 1115   BP: 100/70   Pulse: 88   Resp: 16   Temp: 97.7 °F (36.5 °C)   TempSrc: Oral   SpO2: 94%   Weight: 139 lb (63 kg)   Height: 5' 11\" (1.803 m)       Wt Readings from Last 3 Encounters:   23 139 lb (63 kg)   22 129 lb (58.5 kg)   20 150 lb 4.8 oz (68.2 kg)     Temp Readings from Last 3 Encounters:   23 97.7 °F (36.5 °C) (Oral)   22 97.8 °F (36.6 °C)   20 97.7 °F (36.5 °C)     BP Readings from Last 3 Encounters:   23 100/70   22 111/63   20 106/60     Pulse Readings from Last 3 Encounters:   23 88   22 68   20 89       1. \"Have you been to the ER, urgent care clinic since your last visit? Hospitalized since your last visit? \" No    2. \"Have you seen or consulted any other health care providers outside of the [address] [place] [address] since your last visit? \" No

## 2023-02-08 NOTE — PATIENT INSTRUCTIONS
Medication changes:    Discontinue/ stop taking midodrine and metoprolol     START farxiga 5mg daily- take one tablet by mouth daily     Please take this to your pharmacy to notify them of the change in medications. Testing Ordered:    Genetic testing completed in clinic    Please present to a local lab marj to have labs drawn. You will need to be fasting and labs should be completed in the morning around 8 am. You will be notified of any abnormal results that requires a change in medication regimen. Other Recommendations:       Please take blood pressures in the morning before medications and two hours after medications write them down and keep a log. Call in one week to report blood pressure log. A referral to advanced care  has been made. They will contact you in regards to advance care planning documents. If you make the decision to fill out a DDNR and would like to discontinue life vest please call our office [phone] option 2. Please contact S office to make sure you have follow up with Dr. Mary Lou Coronado in march      [phone]     Please fill out entresto patient assistance form and bring in financial statements as soon as possible. Ensure your drinking an adequate amount of water with a goal of 6-8 eight ounce glasses (1.5-2 liters) of fluid daily. Your urine should be clear and light yellow straw colored. If your blood pressure begins to consistently run below 90/60 and/or you begin to experience dizziness or lightheadedness, please contact the [facility] at [phone]. Follow up  June with Dr. Onofre Silver  with [facility]      Please monitor your weights daily upon waking and after using the bathroom. Keep a written records of your weights and bring to your next appointment. If you have a weight gain of 3 or more pounds overnight OR 5 or more pounds in one week please contact our office.        Thank you for allowing us the privilege of being a part of your healthcare team! Please do not hesitate to contact our office at [phone] with any questions or concerns. Virtual Heart Failure [address] invites you to learn more about heart failure and to share your questions, ideas, and experiences with others. Each month, the Heart Failure Support Group features a new educational topic and a guest speaker, followed by an interactive discussion. Our Heart Failure Nurse Navigator will moderate each session. You will be able to participate by phone, tablet or computer through [address] [place] [address]. This support group takes place on the 3rd Thursday of each month from 6:00-7:30PM. All individuals living with heart failure and their caregivers are welcome to join. If you are interested in participating, please contact us at [email or web address] and you will be sent the link to join the [employer].

## 2023-02-08 NOTE — PROGRESS NOTES
[facility] in [place], 105 [facility] Note    Patient name: Noe Bailon  Patient : 1950  Patient MRN: [id number]  Date of service: 23    Primary care physician: Lisa Beltrán MD  Primary general cardiologist:  Dr. Sharlett Holter    Primary F cardiologist: Navneet Martinez MD    CHIEF COMPLAINT:  Chronic systolic heart failure    PLAN OF CARE:  72 y/o male with COPD on 3L NC and ischemic cardiomyopathy, LVEF 20-25%, stage D, NYHA class IV symptoms mostly due to lung disease; unable to tolerate GDMT due to hypotension and hyperkalemia  Appears compensated, warm and well perfused; RHC possibly could be considered to determine with full certainty if needs inotropic support as palliation for heart failure, but clinically appears too well now  Patient is not a candidate for LVAD-DT due to severe lung disease - oxygen dependent  Patient is not a candidate for heart transplant due to age > 79 years and severe lung disease    PLAN:  Continue current medical therapy for heart failure limited by hypotension and hyperkalemia  Cannot tolerate beta-blockers due to severe COPD  Discontinue metoprolol and midodrine  Cannot tolerate ACEi/ARB/ARNi/MRA due to hypotension and K 4.9  Concern re: starting SGLT2 inhibitor due to h/o yeast in urine and costs, repeat UA and apply for patient assistance  Continue current dose of diuretic  Not on allopurinol or uloric, check uric acid level  Continue baby ASA   Continue current dose of statin, check lipid profile, CPK and LFT  Continue amiodarone 100mg daily, managed by primary cardiology  Wearing lifevest but patient expressed interest in discussing DNR; referral made to ACP counselor   Offered genetic testing for cardiomyopathy  Routine HF labs: CBC, BMP, Mg, LFT, uric acid, pro-NT-BNP, iron profile with ferritin, TSH, vitamin D level, lipid profile, CPK, gammopathy profile, hepatitis panel, AMAURY, HGBA1C, PSA screen  Reinforced low salt diet  Reinforced fluid restriction to 6 x 8oz glasses per day  Provided educational materials \"Living with heart failure\"   Provided advanced care plan forms to be filled out    Referral to nutritionist   Follow-up with primary cardiologist, Dr. Petar He in March 2023; will alternate visits quarterly  Recommend flu, covid and pneumonia vaccinations  Return to [facility] in 6 months with NP    IMPRESSION:  Shortness of breath  Chronic systolic heart failure  Stage C, NYHA class IIIA symptoms  Ischemic cardiomyopathy, LVEF 25-30% (by echo 10/2022)  Coronary artery disease   S/p stents D1 and mid-LCx  At risk for SCD  Lifevest  Cardiac risk factors  HTN  HL  DM2  Anemia  Budding yeast in urine (11/2022)  CKD, stage 2  Low albumin 2.2  COPD c/b emphysema  Chronic respiratory failure, home O2 3 liters  C/b exacerbation and multilobar pneumonia (10/2022)  Non-small cell lung cancer  Cachexia, BMI 17    CARDIAC IMAGING:  Echo (10/28/22)    Left Ventricle: Severely reduced left ventricular systolic function with a visually estimated EF of 25 - 30%. Not well visualized. Left ventricle size is normal. Normal wall thickness. Unable to assess wall motion. Abnormal diastolic function. Right Ventricle: Not well visualized. Reduced systolic function. TAPSE is abnormal. TAPSE is 1.1 cm. Mitral Valve: Mild regurgitation. Left Atrium: Left atrium is mildly dilated. Right Atrium: Right atrium is mildly dilated. EKG (10/27/22) Atrial fibrillation with rapid ventricular response 182bpm, QRS 82ms, low voltage QRS , septal infarct , age undetermined, nonspecific ST and T wave abnormality   [facility] (2/19/18)  HEMODYNAMICS:  --  Hemodynamic assessment demonstrates no systemic hypertension, mildly  elevated LVEDP, mildly elevated pulmonary capillary wedge pressure, and  normal pulmonary vascular resistance.      --  CARDIAC STRUCTURES:  --  Analysis of regional contractile function demonstrated anterolateral  dyskinesis, severe apical hypokinesis, and severe inferolateral  hypokinesis. --  Global left ventricular function was severely depressed. EF calculated by contrast ventriculography was 30 %. --  The left ventricle was moderately dilated. --  CORONARY CIRCULATION:  --  Lateral 1st diagonal: There was a tubular 95 % stenosis at a site with  no prior intervention, in the middle third of the vessel segment. There  was FERNANDA grade 2 flow through the vessel (partial perfusion). --  Mid  circumflex: There was a tubular 100 % stenosis just after OM2. There was  FERNANDA grade 2 flow through the vessel (partial perfusion). This lesion is a  chronic total occlusion. --  RCA: There was a discrete 30 % stenosis in  the proximal third of the vessel segment. --  1ST LESION INTERVENTIONS:  --  A stent was performed on the 95 % lesion in the lateral 1st diagonal.  --  A 2.5mm x 18mm RX Integrity Coronary Stent bare-metal stent was placed  across the lesion and successfully deployed at a maximum inflation  pressure of 16 paulina. --  A 2.5mm x 14mm RX Integrity Coronary Stent  bare-metal stent was placed across the lesion and successfully deployed at  a maximum inflation pressure of 16 paulina. --  2ND LESION INTERVENTIONS:  --  A stent was performed on the 99 % lesion in the mid circumflex. --  A bare-metal stent was placed across the lesion and successfully  deployed at a maximum inflation pressure of 14 paulina. HEMODYNAMICS:  RHC not done  CPEST not done  6MW not done    OTHER IMAGING:  CXR (11/12/22) Improving hazy opacities in the left mid to lower lung. Decreasing small left pleural effusion. CT chest (1/10/20)  Moderate left lower lobe and mild right lower lobe consolidation/pneumonia with left lower lobe airway mucous plugging. Probably small necrotic component to the left lower lobe pneumonia. Small left pleural effusion.  No evidence for pulmonary embolism    LIFE GOALS:  Lifestyle goals discussed with the patient. HISTORY OF PRESENT ILLNESS:  I had the pleasure of seeing Sylvia Acosta in [facility] in [place]. Briefly, Sylvia Acosta is a 71 y.o. male with h/o COPD/emphysema, ho lung cancer treated with resection and chemotherapy in 2009, on 3L NC and ischemic cardiomyopathy, LVEF 20-25%, stage D, NYHA class IV symptoms mostly due to lung disease; unable to tolerate GDMT due to hypotension and hyperkalemia. Patient was recently admitted in 10/2022 for sepsis/pneumonia 10/26-11/22/22. Patient was referred to [facility] by Dr. Adore Camargo. INTERVAL HISTORY:  Today, patient presents for initial clinic visit. Patient is doing poorly from lung perspective. Had been on oxygen for 3 years and can do very little due to dyspnea. Patient takes baths instead of showers. Someone cooks for him. He cannot make a bed or dress himself without dyspnea. He used walker to walk from waiting room to exam room due to leg weakness. He feels he never fully recovered his muscle strength after recent hospitalization. Patient denies symptoms of volume overload or leg edema. Patient denies abdominal bloating or change of appetite. Patient's weight remained stable. Patient denies orthopnea, PND or nocturia. Patient denies irregular heart rate or palpitations. No presyncope or syncope. Lifevest has not fired. Patient denies other cardiac symptoms such as chest pain or leg pain with walking. Patient is compliant with fluid restriction and taking medications as prescribed. Patient manages his own medications.      PHYSICAL EXAM:  Visit Vitals  /70 (BP 1 Location: Left upper arm, BP Patient Position: Sitting, BP Cuff Size: Adult)   Pulse 88   Temp 97.7 °F (36.5 °C) (Oral)   Resp 16   Ht 5' 11\" (1.803 m)   Wt 139 lb (63 kg)   SpO2 94%   BMI 19.39 kg/m²     General: Patient is well developed, well-nourished in no acute distress  HEENT: Unremarkable Neck: Supple. No evidence of thyroid enlargements or lymphadenopathy. JVD: Cannot be appreciated   Lungs: Breath sounds are equal and clear bilaterally. No wheezes, rhonchi, or rales. Heart: Regular rate and rhythm with normal S1 and S2. No murmurs, gallops or rubs. Abdomen: Soft, no mass or tenderness. No organomegaly or hernia. Bowel sounds present. Genitourinary and rectal: deferred  Extremities: No cyanosis, clubbing, or edema. Neurologic: No focal sensory or motor deficits are noted. Grossly intact. Psychiatric: Awake, alert an doriented x 3. Appropriate mood and affect. Skin: Warm, dry and well perfused. REVIEW OF SYSTEMS:  General: Denies fever. Ear, nose and throat: Denies difficulty hearing, sinus problems, nosebleeds  Cardiovascular: see above in the interval history  Respiratory: Denies cough, wheezing, sputum production, hemoptysis. Gastrointestinal: Denies heartburn, constipation, diarrhea, abdominal pain, nausea, blood in stool  Kidney and bladder: Denies painful urination, frequent urination  Musculoskeletal: Denies joint pain, muscle weakness  Skin and hair: Denies change in existing skin lesions    PAST MEDICAL HISTORY:  Past Medical History:   Diagnosis Date    Asthma     Cancer ([facility] [address]     Lung Cancer    Chronic obstructive pulmonary disease ([facility] [address]     Chronic pain     High cholesterol     Lung cancer ([facility] [address]        PAST SURGICAL HISTORY:  Past Surgical History:   Procedure Laterality Date    HX LOBECTOMY      Right lung    HX OTHER SURGICAL      Partial right lung removal       FAMILY HISTORY:  History reviewed. No pertinent family history. SOCIAL HISTORY:  Social History     Socioeconomic History    Marital status: LIFE PARTNER   Tobacco Use    Smoking status: Former     Packs/day: 1.00     Years: 45.00     Pack years: 45.00     Types: Cigarettes     Quit date: 2009     Years since quittin.1    Smokeless tobacco: Never   Substance and Sexual Activity    Alcohol use:  No Comment: rarely/social     Drug use: No   Social History Narrative    ** Merged History Encounter **            LABORATORY RESULTS:  No flowsheet data found. ALLERGY:  No Known Allergies     CURRENT MEDICATIONS:    Current Outpatient Medications:     arformoteroL (BROVANA) 15 mcg/2 mL nebu neb solution, 15 mcg by Nebulization route two (2) times a day. Sometimes uses TID, Disp: , Rfl:     budesonide (PULMICORT) 0.5 mg/2 mL nbsp, 500 mcg by Nebulization route two (2) times a day. Sometimes uses TID, Disp: , Rfl:     atorvastatin (LIPITOR) 40 mg tablet, Take 40 mg by mouth daily. , Disp: , Rfl:     ketoconazole (NIZORAL) 2 % shampoo, Apply  to affected area as needed for Itching., Disp: , Rfl:     albuterol (PROVENTIL VENTOLIN) 2.5 mg /3 mL (0.083 %) nebu, 2.5 mg by Nebulization route every six (6) hours as needed for Wheezing or Shortness of Breath., Disp: , Rfl:     oxyCODONE IR (ROXICODONE) 20 mg immediate release tablet, Take 40 mg by mouth every twelve (12) hours as needed for Pain (severe pain). , Disp: , Rfl:     aspirin delayed-release 81 mg tablet, Take 1 Tab by mouth daily. , Disp: 30 Tab, Rfl: 0    Thank you for your referral and allowing me to participate in this patient's care.     Rach Dewitt MD PhD  [address], [address]  Phone: [phone]  Fax: [phone]    PATIENT CARE TEAM:  Patient Care Team:  Dimitri Ashley MD as PCP - General (Internal Medicine Physician)     Total visit time: 65 minutes  (> 50% spent face-to-face counseling)

## 2023-02-09 ENCOUNTER — PATIENT OUTREACH (OUTPATIENT)
Dept: CASE MANAGEMENT | Age: 73
End: 2023-02-09

## 2023-02-09 LAB
APPEARANCE UR: ABNORMAL
BACTERIA URNS QL MICRO: NEGATIVE /HPF
BILIRUB UR QL: NEGATIVE
CAOX CRY URNS QL MICRO: ABNORMAL
COLOR UR: ABNORMAL
EPITH CASTS URNS QL MICRO: ABNORMAL /LPF
GLUCOSE UR STRIP.AUTO-MCNC: NEGATIVE MG/DL
HGB UR QL STRIP: ABNORMAL
KETONES UR QL STRIP.AUTO: ABNORMAL MG/DL
LEUKOCYTE ESTERASE UR QL STRIP.AUTO: ABNORMAL
NITRITE UR QL STRIP.AUTO: NEGATIVE
PH UR STRIP: 6 (ref 5–8)
PROT UR STRIP-MCNC: ABNORMAL MG/DL
RBC #/AREA URNS HPF: ABNORMAL /HPF (ref 0–5)
SP GR UR REFRACTOMETRY: 1.02 (ref 1–1.03)
UA: UC IF INDICATED,UAUC: ABNORMAL
UROBILINOGEN UR QL STRIP.AUTO: 1 EU/DL (ref 0.2–1)
WBC URNS QL MICRO: ABNORMAL /HPF (ref 0–4)

## 2023-02-09 NOTE — ACP (ADVANCE CARE PLANNING)
Advance Care Planning   Ambulatory ACP Specialist Patient Outreach    Date:  2/9/2023    ACP Specialist:  Valerie Russell    Outreach call to patient in follow-up to ACP Specialist referral from:  Frances Ho MD    [] PCP  [x] Provider   [] Ambulatory Care Management [] Other     For:                  [x] Advance Directive Assistance              [] Complete Portable DNR order              [] Complete POST/MOST              [x] Code Status Discussion             [] Discuss Goals of Care             [] Early ACP Decision-Making              [] Other (Specify)    Date Referral Received:  2/8/2023    Today's Outreach:  [x] First   [] Second  [] Third       Third outreach made by: [] Phone  [] Email / mail    [] RIISnethart     Intervention:  [] Spoke with Patient   [x] Left VM requesting return call      Outcome:     Attempted ACP outreach - no answer. Left VM requesting return call regarding referral received by ACP team from physician's office. Will attempt outreach again in one week if return call not received. ADDENDUM 2/9/23:  Patient returned ACP outreach and expressed interest in scheduling a conversation with an ACP Specialist, but is leaving for vacation and will not return until the first week of March. Patient requested an ACP outreach the first week of March to discuss scheduling ACP conversation. Next Step:   [] ACP scheduled conversation  [x] Outreach again in three weeks               [] Email / Mail ACP Info Sheets  [] Email / Mail Advance Directive   [] Closing referral.  Routing closure to referring provider/staff and to ACP Specialist . [] Closure letter mailed to patient with invitation to contact ACP Specialist if / when ready.   Thank you for this referral.

## 2023-02-16 ENCOUNTER — TELEPHONE (OUTPATIENT)
Dept: CARDIOLOGY CLINIC | Age: 73
End: 2023-02-16

## 2023-03-07 ENCOUNTER — PATIENT OUTREACH (OUTPATIENT)
Dept: CASE MANAGEMENT | Age: 73
End: 2023-03-07

## 2023-03-07 NOTE — ACP (ADVANCE CARE PLANNING)
Advance Care Planning   Ambulatory ACP Specialist Patient Outreach    Date:  3/7/2023    ACP Specialist:  Jojo Vanegas    Outreach call to patient in follow-up to ACP Specialist referral from:  Zev Bartholomew MD    [] PCP  [x] Provider   [] Ambulatory Care Management [] Other     For:                  [x] Advance Directive Assistance              [] Complete Portable DNR order              [] Complete POST/MOST              [x] Code Status Discussion             [] Discuss Goals of Care             [] Early ACP Decision-Making              [] Other (Specify)    Date Referral Received:  2/8/2023    Today's Outreach:  [] First   [x] Second  [] Third       Third outreach made by: [] Phone  [] Email / mail    [] [employer]     Intervention:  [x] Spoke with Patient   [] Left VM requesting return call      Outcome:  Writer placed follow-up ACP outreach the first week of March as patient requested. Patient reports he has previously completed an AMD which is on file with [facility] PCP office. Writer encouraged patient to provide a copy to [facility] provider office to be scanned into [facility] record. Patient declined ACP Specialist assistance. Future assistance offered upon patient request.  Sent ACP Coordinator contact information to patient's confirmed email on file with ACP information sheets What is Advance Care Planning & How to Choose a [address] attached. Next Step:   [] ACP scheduled conversation  [] Outreach again in one week               [x] Email / Mail ACP Info Sheets  [] Email / Mail Advance Directive   [x] Closing referral.  Routing closure to referring provider/staff and to ACP Specialist . [x] Closure letter mailed to patient with invitation to contact ACP Specialist if / when ready.   Thank you for this referral.

## 2023-03-23 ENCOUNTER — TELEPHONE (OUTPATIENT)
Dept: CARDIOLOGY CLINIC | Age: 73
End: 2023-03-23

## 2023-03-23 DIAGNOSIS — I50.21 SYSTOLIC CHF, ACUTE (HCC): Primary | ICD-10-CM

## 2023-03-23 NOTE — LETTER
3/23/2023 11:59 AM    Mr. Jeanette Sesay  [address] [facility] [address]     Dear Mr. Marcia Gordon is a copy of your genetic testing results that were completed during your visit to the [address] [place] [address]. 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 I would like to discuss this further with you on your next appointment on 6/2/23. Until then, my recommendation is for you to take advantage of the complimentary genetic counseling through [employer], the company who also did the testing. Please visit [email or web address] or call [phone] to schedule your telephone genetic counseling appointment. Your RQ number from your test is [id number]. You will be asked for this number when you schedule the appointment. This is available at no charge, so please do this soon. In addition, we recommend you share this result with family members, as they may also be at risk. Please find details on our Family Variant Testing Program at [email or web address], as up to 2  family members may be eligible to be tested at no charge. Finally, we recommend a follow up appointment with a cardiac genetic specialist at [facility], Dr Natalia Smith. We will be happy to discuss this further with you at your next appointment and to place this referral for you. Please don't hesitate to contact our office at [phone] if you have any questions prior to your appointment.         Sincerely,            Justino Marcus MD

## 2023-03-23 NOTE — TELEPHONE ENCOUNTER
Left message for patient to return call: invitae is positive for carrier, will send referral to [facility] Dr. Tika Gray per DR. Dwayne Bates, letter to be sent to patient and copy placed in chart in media. Patient returned call, I reviewed above information, he states understanding, has no further questions.

## 2023-04-05 ENCOUNTER — HOSPITAL ENCOUNTER (INPATIENT)
Age: 73
LOS: 12 days | Discharge: HOME | DRG: 871 | End: 2023-04-17
Attending: EMERGENCY MEDICINE | Admitting: STUDENT IN AN ORGANIZED HEALTH CARE EDUCATION/TRAINING PROGRAM
Payer: MEDICARE

## 2023-04-05 ENCOUNTER — APPOINTMENT (OUTPATIENT)
Dept: GENERAL RADIOLOGY | Age: 73
DRG: 871 | End: 2023-04-05
Attending: EMERGENCY MEDICINE
Payer: MEDICARE

## 2023-04-05 DIAGNOSIS — J96.21 ACUTE ON CHRONIC RESPIRATORY FAILURE WITH HYPOXIA (HCC): Primary | ICD-10-CM

## 2023-04-05 DIAGNOSIS — J18.9 COMMUNITY ACQUIRED PNEUMONIA, UNSPECIFIED LATERALITY: ICD-10-CM

## 2023-04-05 LAB
ALBUMIN SERPL-MCNC: 2.6 G/DL (ref 3.5–5)
ALBUMIN/GLOB SERPL: 0.6 (ref 1.1–2.2)
ALP SERPL-CCNC: 94 U/L (ref 45–117)
ALT SERPL-CCNC: 10 U/L (ref 12–78)
ANION GAP SERPL CALC-SCNC: 8 MMOL/L (ref 5–15)
ARTERIAL PATENCY WRIST A: YES
AST SERPL-CCNC: 7 U/L (ref 15–37)
BASE DEFICIT BLDA-SCNC: 0.5 MMOL/L
BASOPHILS # BLD: 0.1 K/UL (ref 0–0.1)
BASOPHILS NFR BLD: 1 % (ref 0–1)
BDY SITE: ABNORMAL
BILIRUB SERPL-MCNC: 1.1 MG/DL (ref 0.2–1)
BNP SERPL-MCNC: 686 PG/ML
BUN SERPL-MCNC: 30 MG/DL (ref 6–20)
BUN/CREAT SERPL: 24 (ref 12–20)
CALCIUM SERPL-MCNC: 9.4 MG/DL (ref 8.5–10.1)
CHLORIDE SERPL-SCNC: 107 MMOL/L (ref 97–108)
CO2 SERPL-SCNC: 24 MMOL/L (ref 21–32)
CREAT SERPL-MCNC: 1.23 MG/DL (ref 0.7–1.3)
DIFFERENTIAL METHOD BLD: ABNORMAL
EOSINOPHIL # BLD: 0.4 K/UL (ref 0–0.4)
EOSINOPHIL NFR BLD: 3 % (ref 0–7)
ERYTHROCYTE [DISTWIDTH] IN BLOOD BY AUTOMATED COUNT: 14.8 % (ref 11.5–14.5)
FLUAV AG NPH QL IA: NEGATIVE
FLUBV AG NOSE QL IA: NEGATIVE
GAS FLOW.O2 O2 DELIVERY SYS: 7 L/MIN
GLOBULIN SER CALC-MCNC: 4.5 G/DL (ref 2–4)
GLUCOSE SERPL-MCNC: 116 MG/DL (ref 65–100)
HCO3 BLDA-SCNC: 24 MMOL/L (ref 22–26)
HCT VFR BLD AUTO: 41.5 % (ref 36.6–50.3)
HGB BLD-MCNC: 13.4 G/DL (ref 12.1–17)
IMM GRANULOCYTES # BLD AUTO: 0.1 K/UL (ref 0–0.04)
IMM GRANULOCYTES NFR BLD AUTO: 1 % (ref 0–0.5)
LACTATE BLD-SCNC: 0.99 MMOL/L (ref 0.4–2)
LYMPHOCYTES # BLD: 1.2 K/UL (ref 0.8–3.5)
LYMPHOCYTES NFR BLD: 8 % (ref 12–49)
MCH RBC QN AUTO: 28.9 PG (ref 26–34)
MCHC RBC AUTO-ENTMCNC: 32.3 G/DL (ref 30–36.5)
MCV RBC AUTO: 89.6 FL (ref 80–99)
MONOCYTES # BLD: 1.6 K/UL (ref 0–1)
MONOCYTES NFR BLD: 10 % (ref 5–13)
NEUTS SEG # BLD: 11.7 K/UL (ref 1.8–8)
NEUTS SEG NFR BLD: 77 % (ref 32–75)
NRBC # BLD: 0.02 K/UL (ref 0–0.01)
NRBC BLD-RTO: 0.1 PER 100 WBC
PCO2 BLDA: 38 MMHG (ref 35–45)
PH BLDA: 7.42 (ref 7.35–7.45)
PLATELET # BLD AUTO: 406 K/UL (ref 150–400)
PMV BLD AUTO: 10.4 FL (ref 8.9–12.9)
PO2 BLDA: 67 MMHG (ref 80–100)
POTASSIUM SERPL-SCNC: 3.4 MMOL/L (ref 3.5–5.1)
PROT SERPL-MCNC: 7.1 G/DL (ref 6.4–8.2)
RBC # BLD AUTO: 4.63 M/UL (ref 4.1–5.7)
SAO2 % BLD: 94 % (ref 92–97)
SAO2% DEVICE SAO2% SENSOR NAME: ABNORMAL
SARS-COV-2 RDRP RESP QL NAA+PROBE: NOT DETECTED
SODIUM SERPL-SCNC: 139 MMOL/L (ref 136–145)
SOURCE, COVRS: NORMAL
SPECIMEN SITE: ABNORMAL
TROPONIN I SERPL HS-MCNC: 31 NG/L (ref 0–76)
WBC # BLD AUTO: 15.1 K/UL (ref 4.1–11.1)

## 2023-04-05 PROCEDURE — 80053 COMPREHEN METABOLIC PANEL: CPT

## 2023-04-05 PROCEDURE — 87635 SARS-COV-2 COVID-19 AMP PRB: CPT

## 2023-04-05 PROCEDURE — 82803 BLOOD GASES ANY COMBINATION: CPT

## 2023-04-05 PROCEDURE — 84484 ASSAY OF TROPONIN QUANT: CPT

## 2023-04-05 PROCEDURE — 83605 ASSAY OF LACTIC ACID: CPT

## 2023-04-05 PROCEDURE — 74011000258 HC RX REV CODE- 258: Performed by: EMERGENCY MEDICINE

## 2023-04-05 PROCEDURE — 71045 X-RAY EXAM CHEST 1 VIEW: CPT

## 2023-04-05 PROCEDURE — 65270000029 HC RM PRIVATE

## 2023-04-05 PROCEDURE — 83880 ASSAY OF NATRIURETIC PEPTIDE: CPT

## 2023-04-05 PROCEDURE — 87804 INFLUENZA ASSAY W/OPTIC: CPT

## 2023-04-05 PROCEDURE — 99285 EMERGENCY DEPT VISIT HI MDM: CPT

## 2023-04-05 PROCEDURE — 74011250636 HC RX REV CODE- 250/636: Performed by: EMERGENCY MEDICINE

## 2023-04-05 PROCEDURE — 36415 COLL VENOUS BLD VENIPUNCTURE: CPT

## 2023-04-05 PROCEDURE — 93005 ELECTROCARDIOGRAM TRACING: CPT

## 2023-04-05 PROCEDURE — 87040 BLOOD CULTURE FOR BACTERIA: CPT

## 2023-04-05 PROCEDURE — 74011000250 HC RX REV CODE- 250: Performed by: EMERGENCY MEDICINE

## 2023-04-05 PROCEDURE — 85025 COMPLETE CBC W/AUTO DIFF WBC: CPT

## 2023-04-05 PROCEDURE — 94761 N-INVAS EAR/PLS OXIMETRY MLT: CPT

## 2023-04-05 PROCEDURE — 36600 WITHDRAWAL OF ARTERIAL BLOOD: CPT

## 2023-04-05 PROCEDURE — 96374 THER/PROPH/DIAG INJ IV PUSH: CPT

## 2023-04-05 RX ORDER — IPRATROPIUM BROMIDE 0.5 MG/2.5ML
0.5 SOLUTION RESPIRATORY (INHALATION)
Status: DISPENSED | OUTPATIENT
Start: 2023-04-05 | End: 2023-04-06

## 2023-04-05 RX ORDER — IBUPROFEN 200 MG
16 TABLET ORAL AS NEEDED
Status: DISCONTINUED | OUTPATIENT
Start: 2023-04-05 | End: 2023-04-17 | Stop reason: HOSPADM

## 2023-04-05 RX ORDER — AMIODARONE HYDROCHLORIDE 200 MG/1
100 TABLET ORAL 2 TIMES DAILY
Status: DISCONTINUED | OUTPATIENT
Start: 2023-04-06 | End: 2023-04-17 | Stop reason: HOSPADM

## 2023-04-05 RX ORDER — OXYCODONE HYDROCHLORIDE 5 MG/1
40 TABLET ORAL
Status: DISCONTINUED | OUTPATIENT
Start: 2023-04-05 | End: 2023-04-17 | Stop reason: HOSPADM

## 2023-04-05 RX ORDER — SODIUM CHLORIDE 0.9 % (FLUSH) 0.9 %
5-10 SYRINGE (ML) INJECTION AS NEEDED
Status: DISCONTINUED | OUTPATIENT
Start: 2023-04-05 | End: 2023-04-17 | Stop reason: HOSPADM

## 2023-04-05 RX ORDER — ASPIRIN 81 MG/1
81 TABLET ORAL DAILY
Status: DISCONTINUED | OUTPATIENT
Start: 2023-04-06 | End: 2023-04-17 | Stop reason: HOSPADM

## 2023-04-05 RX ORDER — ONDANSETRON 2 MG/ML
4 INJECTION INTRAMUSCULAR; INTRAVENOUS
Status: DISCONTINUED | OUTPATIENT
Start: 2023-04-05 | End: 2023-04-05

## 2023-04-05 RX ORDER — ACETAMINOPHEN 650 MG/1
650 SUPPOSITORY RECTAL
Status: DISCONTINUED | OUTPATIENT
Start: 2023-04-05 | End: 2023-04-17 | Stop reason: HOSPADM

## 2023-04-05 RX ORDER — ATORVASTATIN CALCIUM 40 MG/1
40 TABLET, FILM COATED ORAL DAILY
Status: DISCONTINUED | OUTPATIENT
Start: 2023-04-06 | End: 2023-04-17 | Stop reason: HOSPADM

## 2023-04-05 RX ORDER — FUROSEMIDE 20 MG/1
20 TABLET ORAL DAILY
Status: DISCONTINUED | OUTPATIENT
Start: 2023-04-06 | End: 2023-04-17 | Stop reason: HOSPADM

## 2023-04-05 RX ORDER — ACETAMINOPHEN 325 MG/1
650 TABLET ORAL
Status: DISCONTINUED | OUTPATIENT
Start: 2023-04-05 | End: 2023-04-05

## 2023-04-05 RX ORDER — ONDANSETRON 2 MG/ML
4 INJECTION INTRAMUSCULAR; INTRAVENOUS
Status: DISCONTINUED | OUTPATIENT
Start: 2023-04-05 | End: 2023-04-17 | Stop reason: HOSPADM

## 2023-04-05 RX ORDER — ALBUTEROL SULFATE 2.5 MG/.5ML
5 SOLUTION RESPIRATORY (INHALATION)
Status: COMPLETED | OUTPATIENT
Start: 2023-04-05 | End: 2023-04-05

## 2023-04-05 RX ORDER — BUDESONIDE 0.5 MG/2ML
500 INHALANT ORAL
Status: DISCONTINUED | OUTPATIENT
Start: 2023-04-05 | End: 2023-04-17 | Stop reason: HOSPADM

## 2023-04-05 RX ORDER — IPRATROPIUM BROMIDE AND ALBUTEROL SULFATE 2.5; .5 MG/3ML; MG/3ML
3 SOLUTION RESPIRATORY (INHALATION)
Status: DISCONTINUED | OUTPATIENT
Start: 2023-04-05 | End: 2023-04-17 | Stop reason: HOSPADM

## 2023-04-05 RX ORDER — ACETAMINOPHEN 325 MG/1
650 TABLET ORAL
Status: DISCONTINUED | OUTPATIENT
Start: 2023-04-05 | End: 2023-04-17 | Stop reason: HOSPADM

## 2023-04-05 RX ORDER — ENOXAPARIN SODIUM 100 MG/ML
30 INJECTION SUBCUTANEOUS DAILY
Status: DISCONTINUED | OUTPATIENT
Start: 2023-04-06 | End: 2023-04-06

## 2023-04-05 RX ORDER — INSULIN LISPRO 100 [IU]/ML
1-8 INJECTION, SOLUTION INTRAVENOUS; SUBCUTANEOUS
Status: DISCONTINUED | OUTPATIENT
Start: 2023-04-06 | End: 2023-04-17 | Stop reason: HOSPADM

## 2023-04-05 RX ORDER — POLYETHYLENE GLYCOL 3350 17 G/17G
17 POWDER, FOR SOLUTION ORAL DAILY PRN
Status: DISCONTINUED | OUTPATIENT
Start: 2023-04-05 | End: 2023-04-17 | Stop reason: HOSPADM

## 2023-04-05 RX ORDER — ARFORMOTEROL TARTRATE 15 UG/2ML
15 SOLUTION RESPIRATORY (INHALATION)
Status: DISCONTINUED | OUTPATIENT
Start: 2023-04-05 | End: 2023-04-17 | Stop reason: HOSPADM

## 2023-04-05 RX ORDER — PROMETHAZINE HYDROCHLORIDE 25 MG/1
12.5 TABLET ORAL
Status: DISCONTINUED | OUTPATIENT
Start: 2023-04-05 | End: 2023-04-17 | Stop reason: HOSPADM

## 2023-04-05 RX ADMIN — AZITHROMYCIN DIHYDRATE 500 MG: 500 INJECTION, POWDER, LYOPHILIZED, FOR SOLUTION INTRAVENOUS at 23:56

## 2023-04-05 RX ADMIN — METHYLPREDNISOLONE SODIUM SUCCINATE 125 MG: 125 INJECTION, POWDER, FOR SOLUTION INTRAMUSCULAR; INTRAVENOUS at 22:19

## 2023-04-05 RX ADMIN — CEFTRIAXONE 2 G: 2 INJECTION, POWDER, FOR SOLUTION INTRAMUSCULAR; INTRAVENOUS at 23:10

## 2023-04-05 RX ADMIN — ALBUTEROL SULFATE 5 MG: 2.5 SOLUTION RESPIRATORY (INHALATION) at 23:27

## 2023-04-06 ENCOUNTER — APPOINTMENT (OUTPATIENT)
Dept: CT IMAGING | Age: 73
DRG: 871 | End: 2023-04-06
Attending: GENERAL ACUTE CARE HOSPITAL
Payer: MEDICARE

## 2023-04-06 LAB
ALBUMIN SERPL-MCNC: 2.4 G/DL (ref 3.5–5)
ALBUMIN/GLOB SERPL: 0.7 (ref 1.1–2.2)
ALP SERPL-CCNC: 97 U/L (ref 45–117)
ALT SERPL-CCNC: 11 U/L (ref 12–78)
ANION GAP SERPL CALC-SCNC: 6 MMOL/L (ref 5–15)
AST SERPL-CCNC: 7 U/L (ref 15–37)
ATRIAL RATE: 93 BPM
B PERT DNA SPEC QL NAA+PROBE: NOT DETECTED
BASOPHILS # BLD: 0 K/UL (ref 0–0.1)
BASOPHILS NFR BLD: 0 % (ref 0–1)
BILIRUB SERPL-MCNC: 0.8 MG/DL (ref 0.2–1)
BORDETELLA PARAPERTUSSIS PCR, BORPAR: NOT DETECTED
BUN SERPL-MCNC: 27 MG/DL (ref 6–20)
BUN/CREAT SERPL: 25 (ref 12–20)
C PNEUM DNA SPEC QL NAA+PROBE: NOT DETECTED
CALCIUM SERPL-MCNC: 9.1 MG/DL (ref 8.5–10.1)
CALCULATED P AXIS, ECG09: 90 DEGREES
CALCULATED R AXIS, ECG10: 85 DEGREES
CALCULATED T AXIS, ECG11: 89 DEGREES
CHLORIDE SERPL-SCNC: 108 MMOL/L (ref 97–108)
CO2 SERPL-SCNC: 26 MMOL/L (ref 21–32)
CREAT SERPL-MCNC: 1.06 MG/DL (ref 0.7–1.3)
DIAGNOSIS, 93000: NORMAL
DIFFERENTIAL METHOD BLD: ABNORMAL
EOSINOPHIL # BLD: 0 K/UL (ref 0–0.4)
EOSINOPHIL NFR BLD: 0 % (ref 0–7)
ERYTHROCYTE [DISTWIDTH] IN BLOOD BY AUTOMATED COUNT: 14.7 % (ref 11.5–14.5)
FLUAV SUBTYP SPEC NAA+PROBE: NOT DETECTED
FLUBV RNA SPEC QL NAA+PROBE: NOT DETECTED
GLOBULIN SER CALC-MCNC: 3.5 G/DL (ref 2–4)
GLUCOSE BLD STRIP.AUTO-MCNC: 146 MG/DL (ref 65–117)
GLUCOSE BLD STRIP.AUTO-MCNC: 151 MG/DL (ref 65–117)
GLUCOSE BLD STRIP.AUTO-MCNC: 164 MG/DL (ref 65–117)
GLUCOSE BLD STRIP.AUTO-MCNC: 203 MG/DL (ref 65–117)
GLUCOSE SERPL-MCNC: 204 MG/DL (ref 65–100)
HADV DNA SPEC QL NAA+PROBE: NOT DETECTED
HCOV 229E RNA SPEC QL NAA+PROBE: NOT DETECTED
HCOV HKU1 RNA SPEC QL NAA+PROBE: NOT DETECTED
HCOV NL63 RNA SPEC QL NAA+PROBE: NOT DETECTED
HCOV OC43 RNA SPEC QL NAA+PROBE: NOT DETECTED
HCT VFR BLD AUTO: 40.6 % (ref 36.6–50.3)
HGB BLD-MCNC: 13.2 G/DL (ref 12.1–17)
HMPV RNA SPEC QL NAA+PROBE: NOT DETECTED
HPIV1 RNA SPEC QL NAA+PROBE: NOT DETECTED
HPIV2 RNA SPEC QL NAA+PROBE: NOT DETECTED
HPIV3 RNA SPEC QL NAA+PROBE: NOT DETECTED
HPIV4 RNA SPEC QL NAA+PROBE: NOT DETECTED
IMM GRANULOCYTES # BLD AUTO: 0.2 K/UL (ref 0–0.04)
IMM GRANULOCYTES NFR BLD AUTO: 1 % (ref 0–0.5)
LYMPHOCYTES # BLD: 0.3 K/UL (ref 0.8–3.5)
LYMPHOCYTES NFR BLD: 2 % (ref 12–49)
M PNEUMO DNA SPEC QL NAA+PROBE: NOT DETECTED
MCH RBC QN AUTO: 29 PG (ref 26–34)
MCHC RBC AUTO-ENTMCNC: 32.5 G/DL (ref 30–36.5)
MCV RBC AUTO: 89.2 FL (ref 80–99)
MONOCYTES # BLD: 0.3 K/UL (ref 0–1)
MONOCYTES NFR BLD: 2 % (ref 5–13)
NEUTS SEG # BLD: 14.4 K/UL (ref 1.8–8)
NEUTS SEG NFR BLD: 95 % (ref 32–75)
NRBC # BLD: 0 K/UL (ref 0–0.01)
NRBC BLD-RTO: 0 PER 100 WBC
P-R INTERVAL, ECG05: 144 MS
PLATELET # BLD AUTO: 392 K/UL (ref 150–400)
PMV BLD AUTO: 10.2 FL (ref 8.9–12.9)
POTASSIUM SERPL-SCNC: 4 MMOL/L (ref 3.5–5.1)
PROCALCITONIN SERPL-MCNC: 1.77 NG/ML
PROT SERPL-MCNC: 5.9 G/DL (ref 6.4–8.2)
Q-T INTERVAL, ECG07: 354 MS
QRS DURATION, ECG06: 82 MS
QTC CALCULATION (BEZET), ECG08: 440 MS
RBC # BLD AUTO: 4.55 M/UL (ref 4.1–5.7)
RBC MORPH BLD: ABNORMAL
RSV RNA SPEC QL NAA+PROBE: NOT DETECTED
RV+EV RNA SPEC QL NAA+PROBE: NOT DETECTED
SARS-COV-2 RNA RESP QL NAA+PROBE: NOT DETECTED
SERVICE CMNT-IMP: ABNORMAL
SODIUM SERPL-SCNC: 140 MMOL/L (ref 136–145)
VENTRICULAR RATE, ECG03: 93 BPM
WBC # BLD AUTO: 15.2 K/UL (ref 4.1–11.1)

## 2023-04-06 PROCEDURE — 97165 OT EVAL LOW COMPLEX 30 MIN: CPT | Performed by: OCCUPATIONAL THERAPIST

## 2023-04-06 PROCEDURE — 92610 EVALUATE SWALLOWING FUNCTION: CPT

## 2023-04-06 PROCEDURE — 77010033678 HC OXYGEN DAILY

## 2023-04-06 PROCEDURE — 87449 NOS EACH ORGANISM AG IA: CPT

## 2023-04-06 PROCEDURE — 84145 PROCALCITONIN (PCT): CPT

## 2023-04-06 PROCEDURE — 71275 CT ANGIOGRAPHY CHEST: CPT

## 2023-04-06 PROCEDURE — 36415 COLL VENOUS BLD VENIPUNCTURE: CPT

## 2023-04-06 PROCEDURE — 97535 SELF CARE MNGMENT TRAINING: CPT | Performed by: OCCUPATIONAL THERAPIST

## 2023-04-06 PROCEDURE — 94640 AIRWAY INHALATION TREATMENT: CPT

## 2023-04-06 PROCEDURE — 65660000001 HC RM ICU INTERMED STEPDOWN

## 2023-04-06 PROCEDURE — 97161 PT EVAL LOW COMPLEX 20 MIN: CPT

## 2023-04-06 PROCEDURE — 87899 AGENT NOS ASSAY W/OPTIC: CPT

## 2023-04-06 PROCEDURE — 74011250637 HC RX REV CODE- 250/637: Performed by: GENERAL ACUTE CARE HOSPITAL

## 2023-04-06 PROCEDURE — 74011000258 HC RX REV CODE- 258: Performed by: STUDENT IN AN ORGANIZED HEALTH CARE EDUCATION/TRAINING PROGRAM

## 2023-04-06 PROCEDURE — 74011250637 HC RX REV CODE- 250/637: Performed by: STUDENT IN AN ORGANIZED HEALTH CARE EDUCATION/TRAINING PROGRAM

## 2023-04-06 PROCEDURE — 74011636637 HC RX REV CODE- 636/637: Performed by: STUDENT IN AN ORGANIZED HEALTH CARE EDUCATION/TRAINING PROGRAM

## 2023-04-06 PROCEDURE — 74011250636 HC RX REV CODE- 250/636: Performed by: STUDENT IN AN ORGANIZED HEALTH CARE EDUCATION/TRAINING PROGRAM

## 2023-04-06 PROCEDURE — 74011000258 HC RX REV CODE- 258: Performed by: GENERAL ACUTE CARE HOSPITAL

## 2023-04-06 PROCEDURE — C9113 INJ PANTOPRAZOLE SODIUM, VIA: HCPCS | Performed by: STUDENT IN AN ORGANIZED HEALTH CARE EDUCATION/TRAINING PROGRAM

## 2023-04-06 PROCEDURE — 86738 MYCOPLASMA ANTIBODY: CPT

## 2023-04-06 PROCEDURE — 97116 GAIT TRAINING THERAPY: CPT

## 2023-04-06 PROCEDURE — 74011000636 HC RX REV CODE- 636: Performed by: GENERAL ACUTE CARE HOSPITAL

## 2023-04-06 PROCEDURE — 80053 COMPREHEN METABOLIC PANEL: CPT

## 2023-04-06 PROCEDURE — 74011250636 HC RX REV CODE- 250/636: Performed by: GENERAL ACUTE CARE HOSPITAL

## 2023-04-06 PROCEDURE — 85025 COMPLETE CBC W/AUTO DIFF WBC: CPT

## 2023-04-06 PROCEDURE — 0202U NFCT DS 22 TRGT SARS-COV-2: CPT

## 2023-04-06 PROCEDURE — 74011000250 HC RX REV CODE- 250: Performed by: STUDENT IN AN ORGANIZED HEALTH CARE EDUCATION/TRAINING PROGRAM

## 2023-04-06 PROCEDURE — 82962 GLUCOSE BLOOD TEST: CPT

## 2023-04-06 PROCEDURE — 87070 CULTURE OTHR SPECIMN AEROBIC: CPT

## 2023-04-06 RX ORDER — OXYCODONE HYDROCHLORIDE 20 MG/1
40 TABLET ORAL
COMMUNITY
End: 2023-04-17

## 2023-04-06 RX ORDER — OXYCODONE HYDROCHLORIDE 20 MG/1
20 TABLET ORAL EVERY MORNING
COMMUNITY
End: 2023-04-17

## 2023-04-06 RX ORDER — ENOXAPARIN SODIUM 100 MG/ML
40 INJECTION SUBCUTANEOUS DAILY
Status: DISCONTINUED | OUTPATIENT
Start: 2023-04-07 | End: 2023-04-06

## 2023-04-06 RX ORDER — OXYCODONE HYDROCHLORIDE 20 MG/1
20 TABLET ORAL
COMMUNITY
End: 2023-04-17

## 2023-04-06 RX ADMIN — APIXABAN 5 MG: 5 TABLET, FILM COATED ORAL at 15:42

## 2023-04-06 RX ADMIN — BUDESONIDE INHALATION 500 MCG: 0.5 SUSPENSION RESPIRATORY (INHALATION) at 20:14

## 2023-04-06 RX ADMIN — AMIODARONE HYDROCHLORIDE 100 MG: 200 TABLET ORAL at 17:01

## 2023-04-06 RX ADMIN — ATORVASTATIN CALCIUM 40 MG: 40 TABLET, FILM COATED ORAL at 08:57

## 2023-04-06 RX ADMIN — ENOXAPARIN SODIUM 30 MG: 100 INJECTION SUBCUTANEOUS at 09:00

## 2023-04-06 RX ADMIN — BUDESONIDE INHALATION 500 MCG: 0.5 SUSPENSION RESPIRATORY (INHALATION) at 08:53

## 2023-04-06 RX ADMIN — ARFORMOTEROL TARTRATE 15 MCG: 15 SOLUTION RESPIRATORY (INHALATION) at 08:52

## 2023-04-06 RX ADMIN — DOXYCYCLINE 100 MG: 100 INJECTION, POWDER, LYOPHILIZED, FOR SOLUTION INTRAVENOUS at 09:01

## 2023-04-06 RX ADMIN — Medication 3 UNITS: at 12:12

## 2023-04-06 RX ADMIN — ARFORMOTEROL TARTRATE 15 MCG: 15 SOLUTION RESPIRATORY (INHALATION) at 20:14

## 2023-04-06 RX ADMIN — METHYLPREDNISOLONE SODIUM SUCCINATE 40 MG: 40 INJECTION, POWDER, FOR SOLUTION INTRAMUSCULAR; INTRAVENOUS at 21:12

## 2023-04-06 RX ADMIN — METHYLPREDNISOLONE SODIUM SUCCINATE 40 MG: 40 INJECTION, POWDER, FOR SOLUTION INTRAMUSCULAR; INTRAVENOUS at 15:41

## 2023-04-06 RX ADMIN — IOPAMIDOL 100 ML: 755 INJECTION, SOLUTION INTRAVENOUS at 16:45

## 2023-04-06 RX ADMIN — SACUBITRIL AND VALSARTAN 1 TABLET: 24; 26 TABLET, FILM COATED ORAL at 08:57

## 2023-04-06 RX ADMIN — ASPIRIN 81 MG: 81 TABLET, COATED ORAL at 08:57

## 2023-04-06 RX ADMIN — Medication 2 UNITS: at 08:59

## 2023-04-06 RX ADMIN — SODIUM CHLORIDE 1 G: 900 INJECTION INTRAVENOUS at 15:42

## 2023-04-06 RX ADMIN — PANTOPRAZOLE SODIUM 40 MG: 40 INJECTION, POWDER, FOR SOLUTION INTRAVENOUS at 09:00

## 2023-04-06 RX ADMIN — DOXYCYCLINE 100 MG: 100 INJECTION, POWDER, LYOPHILIZED, FOR SOLUTION INTRAVENOUS at 21:12

## 2023-04-06 RX ADMIN — Medication 2 UNITS: at 17:01

## 2023-04-06 RX ADMIN — DOXYCYCLINE 100 MG: 100 INJECTION, POWDER, LYOPHILIZED, FOR SOLUTION INTRAVENOUS at 02:19

## 2023-04-06 RX ADMIN — AMIODARONE HYDROCHLORIDE 100 MG: 200 TABLET ORAL at 08:57

## 2023-04-06 RX ADMIN — FUROSEMIDE 20 MG: 20 TABLET ORAL at 08:57

## 2023-04-06 RX ADMIN — SACUBITRIL AND VALSARTAN 1 TABLET: 24; 26 TABLET, FILM COATED ORAL at 17:01

## 2023-04-07 LAB
ANION GAP SERPL CALC-SCNC: 4 MMOL/L (ref 5–15)
BACTERIA SPEC CULT: NORMAL
BACTERIA SPEC CULT: NORMAL
BASOPHILS # BLD: 0 K/UL (ref 0–0.1)
BASOPHILS NFR BLD: 0 % (ref 0–1)
BUN SERPL-MCNC: 28 MG/DL (ref 6–20)
BUN/CREAT SERPL: 33 (ref 12–20)
CALCIUM SERPL-MCNC: 8.9 MG/DL (ref 8.5–10.1)
CHLORIDE SERPL-SCNC: 110 MMOL/L (ref 97–108)
CO2 SERPL-SCNC: 25 MMOL/L (ref 21–32)
CREAT SERPL-MCNC: 0.86 MG/DL (ref 0.7–1.3)
DIFFERENTIAL METHOD BLD: ABNORMAL
EOSINOPHIL # BLD: 0 K/UL (ref 0–0.4)
EOSINOPHIL NFR BLD: 0 % (ref 0–7)
ERYTHROCYTE [DISTWIDTH] IN BLOOD BY AUTOMATED COUNT: 14.6 % (ref 11.5–14.5)
GLUCOSE BLD STRIP.AUTO-MCNC: 126 MG/DL (ref 65–117)
GLUCOSE BLD STRIP.AUTO-MCNC: 138 MG/DL (ref 65–117)
GLUCOSE BLD STRIP.AUTO-MCNC: 155 MG/DL (ref 65–117)
GLUCOSE BLD STRIP.AUTO-MCNC: 171 MG/DL (ref 65–117)
GLUCOSE SERPL-MCNC: 179 MG/DL (ref 65–100)
HCT VFR BLD AUTO: 39.3 % (ref 36.6–50.3)
HGB BLD-MCNC: 12.2 G/DL (ref 12.1–17)
IMM GRANULOCYTES # BLD AUTO: 0.3 K/UL (ref 0–0.04)
IMM GRANULOCYTES NFR BLD AUTO: 2 % (ref 0–0.5)
LYMPHOCYTES # BLD: 1 K/UL (ref 0.8–3.5)
LYMPHOCYTES NFR BLD: 5 % (ref 12–49)
M PNEUMO IGG SER IA-ACNC: <100 U/ML (ref 0–99)
M PNEUMO IGM SER IA-ACNC: <770 U/ML (ref 0–769)
MCH RBC QN AUTO: 28.2 PG (ref 26–34)
MCHC RBC AUTO-ENTMCNC: 31 G/DL (ref 30–36.5)
MCV RBC AUTO: 90.8 FL (ref 80–99)
MONOCYTES # BLD: 0.9 K/UL (ref 0–1)
MONOCYTES NFR BLD: 5 % (ref 5–13)
NEUTS SEG # BLD: 17.2 K/UL (ref 1.8–8)
NEUTS SEG NFR BLD: 88 % (ref 32–75)
NRBC # BLD: 0 K/UL (ref 0–0.01)
NRBC BLD-RTO: 0 PER 100 WBC
PLATELET # BLD AUTO: 424 K/UL (ref 150–400)
PMV BLD AUTO: 10.1 FL (ref 8.9–12.9)
POTASSIUM SERPL-SCNC: 3.8 MMOL/L (ref 3.5–5.1)
RBC # BLD AUTO: 4.33 M/UL (ref 4.1–5.7)
SERVICE CMNT-IMP: ABNORMAL
SERVICE CMNT-IMP: NORMAL
SODIUM SERPL-SCNC: 139 MMOL/L (ref 136–145)
WBC # BLD AUTO: 19.4 K/UL (ref 4.1–11.1)

## 2023-04-07 PROCEDURE — 97116 GAIT TRAINING THERAPY: CPT

## 2023-04-07 PROCEDURE — 74011000258 HC RX REV CODE- 258: Performed by: GENERAL ACUTE CARE HOSPITAL

## 2023-04-07 PROCEDURE — 77010033678 HC OXYGEN DAILY

## 2023-04-07 PROCEDURE — 97530 THERAPEUTIC ACTIVITIES: CPT

## 2023-04-07 PROCEDURE — 65270000046 HC RM TELEMETRY

## 2023-04-07 PROCEDURE — 65270000029 HC RM PRIVATE

## 2023-04-07 PROCEDURE — 36415 COLL VENOUS BLD VENIPUNCTURE: CPT

## 2023-04-07 PROCEDURE — 74011250636 HC RX REV CODE- 250/636: Performed by: GENERAL ACUTE CARE HOSPITAL

## 2023-04-07 PROCEDURE — 74011636637 HC RX REV CODE- 636/637: Performed by: STUDENT IN AN ORGANIZED HEALTH CARE EDUCATION/TRAINING PROGRAM

## 2023-04-07 PROCEDURE — 74011250636 HC RX REV CODE- 250/636: Performed by: STUDENT IN AN ORGANIZED HEALTH CARE EDUCATION/TRAINING PROGRAM

## 2023-04-07 PROCEDURE — 85025 COMPLETE CBC W/AUTO DIFF WBC: CPT

## 2023-04-07 PROCEDURE — 94640 AIRWAY INHALATION TREATMENT: CPT

## 2023-04-07 PROCEDURE — 74011000250 HC RX REV CODE- 250: Performed by: GENERAL ACUTE CARE HOSPITAL

## 2023-04-07 PROCEDURE — 74011000250 HC RX REV CODE- 250: Performed by: STUDENT IN AN ORGANIZED HEALTH CARE EDUCATION/TRAINING PROGRAM

## 2023-04-07 PROCEDURE — 74011000258 HC RX REV CODE- 258: Performed by: STUDENT IN AN ORGANIZED HEALTH CARE EDUCATION/TRAINING PROGRAM

## 2023-04-07 PROCEDURE — 94761 N-INVAS EAR/PLS OXIMETRY MLT: CPT

## 2023-04-07 PROCEDURE — 74011250637 HC RX REV CODE- 250/637: Performed by: STUDENT IN AN ORGANIZED HEALTH CARE EDUCATION/TRAINING PROGRAM

## 2023-04-07 PROCEDURE — 80048 BASIC METABOLIC PNL TOTAL CA: CPT

## 2023-04-07 PROCEDURE — C9113 INJ PANTOPRAZOLE SODIUM, VIA: HCPCS | Performed by: STUDENT IN AN ORGANIZED HEALTH CARE EDUCATION/TRAINING PROGRAM

## 2023-04-07 PROCEDURE — 74011250637 HC RX REV CODE- 250/637: Performed by: GENERAL ACUTE CARE HOSPITAL

## 2023-04-07 PROCEDURE — 82962 GLUCOSE BLOOD TEST: CPT

## 2023-04-07 RX ORDER — GUAIFENESIN 600 MG/1
600 TABLET, EXTENDED RELEASE ORAL EVERY 12 HOURS
Status: DISCONTINUED | OUTPATIENT
Start: 2023-04-07 | End: 2023-04-17 | Stop reason: HOSPADM

## 2023-04-07 RX ORDER — IPRATROPIUM BROMIDE 0.5 MG/2.5ML
0.5 SOLUTION RESPIRATORY (INHALATION)
Status: DISCONTINUED | OUTPATIENT
Start: 2023-04-07 | End: 2023-04-08

## 2023-04-07 RX ADMIN — IPRATROPIUM BROMIDE 0.5 MG: 0.5 SOLUTION RESPIRATORY (INHALATION) at 16:39

## 2023-04-07 RX ADMIN — BUDESONIDE INHALATION 500 MCG: 0.5 SUSPENSION RESPIRATORY (INHALATION) at 07:35

## 2023-04-07 RX ADMIN — BUDESONIDE INHALATION 500 MCG: 0.5 SUSPENSION RESPIRATORY (INHALATION) at 19:47

## 2023-04-07 RX ADMIN — GUAIFENESIN 600 MG: 600 TABLET, EXTENDED RELEASE ORAL at 21:47

## 2023-04-07 RX ADMIN — ARFORMOTEROL TARTRATE 15 MCG: 15 SOLUTION RESPIRATORY (INHALATION) at 19:46

## 2023-04-07 RX ADMIN — SACUBITRIL AND VALSARTAN 1 TABLET: 24; 26 TABLET, FILM COATED ORAL at 18:42

## 2023-04-07 RX ADMIN — IPRATROPIUM BROMIDE 0.5 MG: 0.5 SOLUTION RESPIRATORY (INHALATION) at 11:35

## 2023-04-07 RX ADMIN — METHYLPREDNISOLONE SODIUM SUCCINATE 40 MG: 40 INJECTION, POWDER, FOR SOLUTION INTRAMUSCULAR; INTRAVENOUS at 14:16

## 2023-04-07 RX ADMIN — ATORVASTATIN CALCIUM 40 MG: 40 TABLET, FILM COATED ORAL at 08:24

## 2023-04-07 RX ADMIN — AMIODARONE HYDROCHLORIDE 100 MG: 200 TABLET ORAL at 18:42

## 2023-04-07 RX ADMIN — METHYLPREDNISOLONE SODIUM SUCCINATE 40 MG: 40 INJECTION, POWDER, FOR SOLUTION INTRAMUSCULAR; INTRAVENOUS at 06:37

## 2023-04-07 RX ADMIN — ARFORMOTEROL TARTRATE 15 MCG: 15 SOLUTION RESPIRATORY (INHALATION) at 07:35

## 2023-04-07 RX ADMIN — METHYLPREDNISOLONE SODIUM SUCCINATE 40 MG: 40 INJECTION, POWDER, FOR SOLUTION INTRAMUSCULAR; INTRAVENOUS at 21:47

## 2023-04-07 RX ADMIN — DOXYCYCLINE 100 MG: 100 INJECTION, POWDER, LYOPHILIZED, FOR SOLUTION INTRAVENOUS at 21:47

## 2023-04-07 RX ADMIN — IPRATROPIUM BROMIDE 0.5 MG: 0.5 SOLUTION RESPIRATORY (INHALATION) at 19:46

## 2023-04-07 RX ADMIN — SACUBITRIL AND VALSARTAN 1 TABLET: 24; 26 TABLET, FILM COATED ORAL at 08:24

## 2023-04-07 RX ADMIN — PANTOPRAZOLE SODIUM 40 MG: 40 INJECTION, POWDER, FOR SOLUTION INTRAVENOUS at 08:24

## 2023-04-07 RX ADMIN — APIXABAN 5 MG: 5 TABLET, FILM COATED ORAL at 18:43

## 2023-04-07 RX ADMIN — ASPIRIN 81 MG: 81 TABLET, COATED ORAL at 08:24

## 2023-04-07 RX ADMIN — FUROSEMIDE 20 MG: 20 TABLET ORAL at 08:24

## 2023-04-07 RX ADMIN — ALUMINUM HYDROXIDE, MAGNESIUM HYDROXIDE, AND DIMETHICONE 5 ML: 400; 400; 40 SUSPENSION ORAL at 10:16

## 2023-04-07 RX ADMIN — APIXABAN 5 MG: 5 TABLET, FILM COATED ORAL at 08:24

## 2023-04-07 RX ADMIN — SODIUM CHLORIDE 1 G: 900 INJECTION INTRAVENOUS at 14:23

## 2023-04-07 RX ADMIN — Medication 2 UNITS: at 11:57

## 2023-04-07 RX ADMIN — AMIODARONE HYDROCHLORIDE 100 MG: 200 TABLET ORAL at 08:24

## 2023-04-07 RX ADMIN — DOXYCYCLINE 100 MG: 100 INJECTION, POWDER, LYOPHILIZED, FOR SOLUTION INTRAVENOUS at 08:23

## 2023-04-07 RX ADMIN — ALUMINUM HYDROXIDE, MAGNESIUM HYDROXIDE, AND DIMETHICONE 5 ML: 400; 400; 40 SUSPENSION ORAL at 12:09

## 2023-04-07 RX ADMIN — GUAIFENESIN 600 MG: 600 TABLET, EXTENDED RELEASE ORAL at 11:57

## 2023-04-08 LAB
BACTERIA SPEC CULT: NORMAL
BASOPHILS # BLD: 0 K/UL (ref 0–0.1)
BASOPHILS NFR BLD: 0 % (ref 0–1)
DIFFERENTIAL METHOD BLD: ABNORMAL
EOSINOPHIL # BLD: 0 K/UL (ref 0–0.4)
EOSINOPHIL NFR BLD: 0 % (ref 0–7)
ERYTHROCYTE [DISTWIDTH] IN BLOOD BY AUTOMATED COUNT: 14.5 % (ref 11.5–14.5)
GLUCOSE BLD STRIP.AUTO-MCNC: 121 MG/DL (ref 65–117)
GLUCOSE BLD STRIP.AUTO-MCNC: 158 MG/DL (ref 65–117)
GLUCOSE BLD STRIP.AUTO-MCNC: 180 MG/DL (ref 65–117)
GLUCOSE BLD STRIP.AUTO-MCNC: 188 MG/DL (ref 65–117)
GRAM STN SPEC: NORMAL
HCT VFR BLD AUTO: 38.9 % (ref 36.6–50.3)
HGB BLD-MCNC: 12.4 G/DL (ref 12.1–17)
IMM GRANULOCYTES # BLD AUTO: 0.3 K/UL (ref 0–0.04)
IMM GRANULOCYTES NFR BLD AUTO: 1 % (ref 0–0.5)
LYMPHOCYTES # BLD: 1.1 K/UL (ref 0.8–3.5)
LYMPHOCYTES NFR BLD: 5 % (ref 12–49)
MCH RBC QN AUTO: 28.7 PG (ref 26–34)
MCHC RBC AUTO-ENTMCNC: 31.9 G/DL (ref 30–36.5)
MCV RBC AUTO: 90 FL (ref 80–99)
MONOCYTES # BLD: 0.9 K/UL (ref 0–1)
MONOCYTES NFR BLD: 5 % (ref 5–13)
NEUTS SEG # BLD: 17.4 K/UL (ref 1.8–8)
NEUTS SEG NFR BLD: 89 % (ref 32–75)
NRBC # BLD: 0 K/UL (ref 0–0.01)
NRBC BLD-RTO: 0 PER 100 WBC
PLATELET # BLD AUTO: 437 K/UL (ref 150–400)
PMV BLD AUTO: 10 FL (ref 8.9–12.9)
RBC # BLD AUTO: 4.32 M/UL (ref 4.1–5.7)
SERVICE CMNT-IMP: ABNORMAL
SERVICE CMNT-IMP: NORMAL
WBC # BLD AUTO: 19.7 K/UL (ref 4.1–11.1)

## 2023-04-08 PROCEDURE — 85025 COMPLETE CBC W/AUTO DIFF WBC: CPT

## 2023-04-08 PROCEDURE — 74011250637 HC RX REV CODE- 250/637: Performed by: STUDENT IN AN ORGANIZED HEALTH CARE EDUCATION/TRAINING PROGRAM

## 2023-04-08 PROCEDURE — 94640 AIRWAY INHALATION TREATMENT: CPT

## 2023-04-08 PROCEDURE — 74011000258 HC RX REV CODE- 258: Performed by: GENERAL ACUTE CARE HOSPITAL

## 2023-04-08 PROCEDURE — 74011250637 HC RX REV CODE- 250/637: Performed by: GENERAL ACUTE CARE HOSPITAL

## 2023-04-08 PROCEDURE — 94761 N-INVAS EAR/PLS OXIMETRY MLT: CPT

## 2023-04-08 PROCEDURE — 74011000250 HC RX REV CODE- 250: Performed by: GENERAL ACUTE CARE HOSPITAL

## 2023-04-08 PROCEDURE — 74011250636 HC RX REV CODE- 250/636: Performed by: STUDENT IN AN ORGANIZED HEALTH CARE EDUCATION/TRAINING PROGRAM

## 2023-04-08 PROCEDURE — 65270000046 HC RM TELEMETRY

## 2023-04-08 PROCEDURE — 77010033678 HC OXYGEN DAILY

## 2023-04-08 PROCEDURE — 82962 GLUCOSE BLOOD TEST: CPT

## 2023-04-08 PROCEDURE — 74011250636 HC RX REV CODE- 250/636: Performed by: GENERAL ACUTE CARE HOSPITAL

## 2023-04-08 PROCEDURE — C9113 INJ PANTOPRAZOLE SODIUM, VIA: HCPCS | Performed by: STUDENT IN AN ORGANIZED HEALTH CARE EDUCATION/TRAINING PROGRAM

## 2023-04-08 PROCEDURE — 74011000250 HC RX REV CODE- 250: Performed by: STUDENT IN AN ORGANIZED HEALTH CARE EDUCATION/TRAINING PROGRAM

## 2023-04-08 PROCEDURE — 36415 COLL VENOUS BLD VENIPUNCTURE: CPT

## 2023-04-08 PROCEDURE — 74011636637 HC RX REV CODE- 636/637: Performed by: STUDENT IN AN ORGANIZED HEALTH CARE EDUCATION/TRAINING PROGRAM

## 2023-04-08 PROCEDURE — 74011000258 HC RX REV CODE- 258: Performed by: STUDENT IN AN ORGANIZED HEALTH CARE EDUCATION/TRAINING PROGRAM

## 2023-04-08 PROCEDURE — 65270000029 HC RM PRIVATE

## 2023-04-08 RX ORDER — IPRATROPIUM BROMIDE 0.5 MG/2.5ML
0.5 SOLUTION RESPIRATORY (INHALATION) 3 TIMES DAILY
Status: DISCONTINUED | OUTPATIENT
Start: 2023-04-08 | End: 2023-04-17 | Stop reason: HOSPADM

## 2023-04-08 RX ADMIN — METHYLPREDNISOLONE SODIUM SUCCINATE 40 MG: 40 INJECTION, POWDER, FOR SOLUTION INTRAMUSCULAR; INTRAVENOUS at 21:33

## 2023-04-08 RX ADMIN — Medication 2 UNITS: at 16:35

## 2023-04-08 RX ADMIN — BUDESONIDE INHALATION 500 MCG: 0.5 SUSPENSION RESPIRATORY (INHALATION) at 09:39

## 2023-04-08 RX ADMIN — Medication 2 UNITS: at 12:39

## 2023-04-08 RX ADMIN — AMIODARONE HYDROCHLORIDE 100 MG: 200 TABLET ORAL at 08:36

## 2023-04-08 RX ADMIN — SODIUM CHLORIDE 1 G: 900 INJECTION INTRAVENOUS at 16:23

## 2023-04-08 RX ADMIN — IPRATROPIUM BROMIDE 0.5 MG: 0.5 SOLUTION RESPIRATORY (INHALATION) at 09:39

## 2023-04-08 RX ADMIN — AMIODARONE HYDROCHLORIDE 100 MG: 200 TABLET ORAL at 17:05

## 2023-04-08 RX ADMIN — ARFORMOTEROL TARTRATE 15 MCG: 15 SOLUTION RESPIRATORY (INHALATION) at 20:32

## 2023-04-08 RX ADMIN — METHYLPREDNISOLONE SODIUM SUCCINATE 40 MG: 40 INJECTION, POWDER, FOR SOLUTION INTRAMUSCULAR; INTRAVENOUS at 13:12

## 2023-04-08 RX ADMIN — GUAIFENESIN 600 MG: 600 TABLET, EXTENDED RELEASE ORAL at 08:45

## 2023-04-08 RX ADMIN — ASPIRIN 81 MG: 81 TABLET, COATED ORAL at 08:36

## 2023-04-08 RX ADMIN — APIXABAN 5 MG: 5 TABLET, FILM COATED ORAL at 17:13

## 2023-04-08 RX ADMIN — PANTOPRAZOLE SODIUM 40 MG: 40 INJECTION, POWDER, FOR SOLUTION INTRAVENOUS at 08:35

## 2023-04-08 RX ADMIN — SACUBITRIL AND VALSARTAN 1 TABLET: 24; 26 TABLET, FILM COATED ORAL at 08:36

## 2023-04-08 RX ADMIN — IPRATROPIUM BROMIDE 0.5 MG: 0.5 SOLUTION RESPIRATORY (INHALATION) at 12:20

## 2023-04-08 RX ADMIN — BUDESONIDE INHALATION 500 MCG: 0.5 SUSPENSION RESPIRATORY (INHALATION) at 20:32

## 2023-04-08 RX ADMIN — ATORVASTATIN CALCIUM 40 MG: 40 TABLET, FILM COATED ORAL at 08:36

## 2023-04-08 RX ADMIN — METHYLPREDNISOLONE SODIUM SUCCINATE 40 MG: 40 INJECTION, POWDER, FOR SOLUTION INTRAMUSCULAR; INTRAVENOUS at 06:09

## 2023-04-08 RX ADMIN — GUAIFENESIN 600 MG: 600 TABLET, EXTENDED RELEASE ORAL at 21:33

## 2023-04-08 RX ADMIN — ARFORMOTEROL TARTRATE 15 MCG: 15 SOLUTION RESPIRATORY (INHALATION) at 09:39

## 2023-04-08 RX ADMIN — SACUBITRIL AND VALSARTAN 1 TABLET: 24; 26 TABLET, FILM COATED ORAL at 17:05

## 2023-04-08 RX ADMIN — APIXABAN 5 MG: 5 TABLET, FILM COATED ORAL at 08:36

## 2023-04-08 RX ADMIN — ALUMINUM HYDROXIDE, MAGNESIUM HYDROXIDE, AND DIMETHICONE 5 ML: 400; 400; 40 SUSPENSION ORAL at 12:38

## 2023-04-08 RX ADMIN — FUROSEMIDE 20 MG: 20 TABLET ORAL at 08:36

## 2023-04-08 RX ADMIN — DOXYCYCLINE 100 MG: 100 INJECTION, POWDER, LYOPHILIZED, FOR SOLUTION INTRAVENOUS at 21:33

## 2023-04-08 RX ADMIN — IPRATROPIUM BROMIDE 0.5 MG: 0.5 SOLUTION RESPIRATORY (INHALATION) at 20:32

## 2023-04-08 RX ADMIN — DOXYCYCLINE 100 MG: 100 INJECTION, POWDER, LYOPHILIZED, FOR SOLUTION INTRAVENOUS at 08:35

## 2023-04-09 LAB
GLUCOSE BLD STRIP.AUTO-MCNC: 123 MG/DL (ref 65–117)
GLUCOSE BLD STRIP.AUTO-MCNC: 133 MG/DL (ref 65–117)
GLUCOSE BLD STRIP.AUTO-MCNC: 137 MG/DL (ref 65–117)
GLUCOSE BLD STRIP.AUTO-MCNC: 139 MG/DL (ref 65–117)
SERVICE CMNT-IMP: ABNORMAL

## 2023-04-09 PROCEDURE — 65270000029 HC RM PRIVATE

## 2023-04-09 PROCEDURE — 65270000046 HC RM TELEMETRY

## 2023-04-09 PROCEDURE — 74011250636 HC RX REV CODE- 250/636: Performed by: GENERAL ACUTE CARE HOSPITAL

## 2023-04-09 PROCEDURE — 74011250637 HC RX REV CODE- 250/637: Performed by: STUDENT IN AN ORGANIZED HEALTH CARE EDUCATION/TRAINING PROGRAM

## 2023-04-09 PROCEDURE — 74011000258 HC RX REV CODE- 258: Performed by: GENERAL ACUTE CARE HOSPITAL

## 2023-04-09 PROCEDURE — 74011000258 HC RX REV CODE- 258: Performed by: STUDENT IN AN ORGANIZED HEALTH CARE EDUCATION/TRAINING PROGRAM

## 2023-04-09 PROCEDURE — 74011250636 HC RX REV CODE- 250/636: Performed by: STUDENT IN AN ORGANIZED HEALTH CARE EDUCATION/TRAINING PROGRAM

## 2023-04-09 PROCEDURE — 94761 N-INVAS EAR/PLS OXIMETRY MLT: CPT

## 2023-04-09 PROCEDURE — 94640 AIRWAY INHALATION TREATMENT: CPT

## 2023-04-09 PROCEDURE — C9113 INJ PANTOPRAZOLE SODIUM, VIA: HCPCS | Performed by: STUDENT IN AN ORGANIZED HEALTH CARE EDUCATION/TRAINING PROGRAM

## 2023-04-09 PROCEDURE — 74011250637 HC RX REV CODE- 250/637: Performed by: GENERAL ACUTE CARE HOSPITAL

## 2023-04-09 PROCEDURE — 82962 GLUCOSE BLOOD TEST: CPT

## 2023-04-09 PROCEDURE — 74011000250 HC RX REV CODE- 250: Performed by: GENERAL ACUTE CARE HOSPITAL

## 2023-04-09 PROCEDURE — 77010033678 HC OXYGEN DAILY

## 2023-04-09 PROCEDURE — 74011000250 HC RX REV CODE- 250: Performed by: STUDENT IN AN ORGANIZED HEALTH CARE EDUCATION/TRAINING PROGRAM

## 2023-04-09 RX ORDER — PANTOPRAZOLE SODIUM 40 MG/1
40 TABLET, DELAYED RELEASE ORAL
Status: DISCONTINUED | OUTPATIENT
Start: 2023-04-10 | End: 2023-04-17 | Stop reason: HOSPADM

## 2023-04-09 RX ORDER — PREDNISONE 20 MG/1
60 TABLET ORAL
Status: COMPLETED | OUTPATIENT
Start: 2023-04-10 | End: 2023-04-12

## 2023-04-09 RX ORDER — DOXYCYCLINE HYCLATE 100 MG
100 TABLET ORAL EVERY 12 HOURS
Status: COMPLETED | OUTPATIENT
Start: 2023-04-09 | End: 2023-04-11

## 2023-04-09 RX ORDER — PREDNISONE 20 MG/1
40 TABLET ORAL
Status: COMPLETED | OUTPATIENT
Start: 2023-04-13 | End: 2023-04-15

## 2023-04-09 RX ORDER — FLUTICASONE FUROATE, UMECLIDINIUM BROMIDE AND VILANTEROL TRIFENATATE 200; 62.5; 25 UG/1; UG/1; UG/1
1 POWDER RESPIRATORY (INHALATION) DAILY
Qty: 30 EACH | Refills: 2 | Status: SHIPPED | OUTPATIENT
Start: 2023-04-09 | End: 2023-05-09

## 2023-04-09 RX ORDER — CEFUROXIME AXETIL 250 MG/1
250 TABLET ORAL EVERY 12 HOURS
Status: COMPLETED | OUTPATIENT
Start: 2023-04-10 | End: 2023-04-11

## 2023-04-09 RX ADMIN — FUROSEMIDE 20 MG: 20 TABLET ORAL at 09:38

## 2023-04-09 RX ADMIN — GUAIFENESIN 600 MG: 600 TABLET, EXTENDED RELEASE ORAL at 09:38

## 2023-04-09 RX ADMIN — IPRATROPIUM BROMIDE 0.5 MG: 0.5 SOLUTION RESPIRATORY (INHALATION) at 13:28

## 2023-04-09 RX ADMIN — DOXYCYCLINE 100 MG: 100 INJECTION, POWDER, LYOPHILIZED, FOR SOLUTION INTRAVENOUS at 09:37

## 2023-04-09 RX ADMIN — DOXYCYCLINE HYCLATE 100 MG: 100 TABLET, FILM COATED ORAL at 21:39

## 2023-04-09 RX ADMIN — METHYLPREDNISOLONE SODIUM SUCCINATE 40 MG: 40 INJECTION, POWDER, FOR SOLUTION INTRAMUSCULAR; INTRAVENOUS at 06:30

## 2023-04-09 RX ADMIN — APIXABAN 5 MG: 5 TABLET, FILM COATED ORAL at 17:47

## 2023-04-09 RX ADMIN — SACUBITRIL AND VALSARTAN 1 TABLET: 24; 26 TABLET, FILM COATED ORAL at 17:47

## 2023-04-09 RX ADMIN — BUDESONIDE INHALATION 500 MCG: 0.5 SUSPENSION RESPIRATORY (INHALATION) at 19:38

## 2023-04-09 RX ADMIN — ARFORMOTEROL TARTRATE 15 MCG: 15 SOLUTION RESPIRATORY (INHALATION) at 19:38

## 2023-04-09 RX ADMIN — APIXABAN 5 MG: 5 TABLET, FILM COATED ORAL at 09:38

## 2023-04-09 RX ADMIN — IPRATROPIUM BROMIDE 0.5 MG: 0.5 SOLUTION RESPIRATORY (INHALATION) at 07:58

## 2023-04-09 RX ADMIN — GUAIFENESIN 600 MG: 600 TABLET, EXTENDED RELEASE ORAL at 21:39

## 2023-04-09 RX ADMIN — BUDESONIDE INHALATION 500 MCG: 0.5 SUSPENSION RESPIRATORY (INHALATION) at 07:58

## 2023-04-09 RX ADMIN — PANTOPRAZOLE SODIUM 40 MG: 40 INJECTION, POWDER, FOR SOLUTION INTRAVENOUS at 09:38

## 2023-04-09 RX ADMIN — SACUBITRIL AND VALSARTAN 1 TABLET: 24; 26 TABLET, FILM COATED ORAL at 09:38

## 2023-04-09 RX ADMIN — SODIUM CHLORIDE 1 G: 900 INJECTION INTRAVENOUS at 14:03

## 2023-04-09 RX ADMIN — ATORVASTATIN CALCIUM 40 MG: 40 TABLET, FILM COATED ORAL at 09:38

## 2023-04-09 RX ADMIN — AMIODARONE HYDROCHLORIDE 100 MG: 200 TABLET ORAL at 17:47

## 2023-04-09 RX ADMIN — METHYLPREDNISOLONE SODIUM SUCCINATE 40 MG: 40 INJECTION, POWDER, FOR SOLUTION INTRAMUSCULAR; INTRAVENOUS at 14:03

## 2023-04-09 RX ADMIN — ASPIRIN 81 MG: 81 TABLET, COATED ORAL at 09:38

## 2023-04-09 RX ADMIN — IPRATROPIUM BROMIDE 0.5 MG: 0.5 SOLUTION RESPIRATORY (INHALATION) at 19:38

## 2023-04-09 RX ADMIN — AMIODARONE HYDROCHLORIDE 100 MG: 200 TABLET ORAL at 09:38

## 2023-04-09 RX ADMIN — ARFORMOTEROL TARTRATE 15 MCG: 15 SOLUTION RESPIRATORY (INHALATION) at 07:58

## 2023-04-10 LAB
FLUID CULTURE, SPNG2: NORMAL
GLUCOSE BLD STRIP.AUTO-MCNC: 142 MG/DL (ref 65–117)
GLUCOSE BLD STRIP.AUTO-MCNC: 149 MG/DL (ref 65–117)
GLUCOSE BLD STRIP.AUTO-MCNC: 84 MG/DL (ref 65–117)
GLUCOSE BLD STRIP.AUTO-MCNC: 95 MG/DL (ref 65–117)
L PNEUMO1 AG UR QL IA: NEGATIVE
ORGANISM ID, SPNG3: NORMAL
PLEASE NOTE, SPNG4: NORMAL
S PNEUM AG SPEC QL LA: NEGATIVE
SERVICE CMNT-IMP: ABNORMAL
SERVICE CMNT-IMP: ABNORMAL
SERVICE CMNT-IMP: NORMAL
SERVICE CMNT-IMP: NORMAL
SPECIMEN SOURCE: NORMAL
SPECIMEN SOURCE: NORMAL
SPECIMEN, SPNG1: NORMAL

## 2023-04-10 PROCEDURE — 97116 GAIT TRAINING THERAPY: CPT

## 2023-04-10 PROCEDURE — 97535 SELF CARE MNGMENT TRAINING: CPT

## 2023-04-10 PROCEDURE — 77010033678 HC OXYGEN DAILY

## 2023-04-10 PROCEDURE — 65270000046 HC RM TELEMETRY

## 2023-04-10 PROCEDURE — 74011636637 HC RX REV CODE- 636/637: Performed by: GENERAL ACUTE CARE HOSPITAL

## 2023-04-10 PROCEDURE — 97530 THERAPEUTIC ACTIVITIES: CPT

## 2023-04-10 PROCEDURE — 74011250637 HC RX REV CODE- 250/637: Performed by: GENERAL ACUTE CARE HOSPITAL

## 2023-04-10 PROCEDURE — 65270000029 HC RM PRIVATE

## 2023-04-10 PROCEDURE — 74011636637 HC RX REV CODE- 636/637: Performed by: STUDENT IN AN ORGANIZED HEALTH CARE EDUCATION/TRAINING PROGRAM

## 2023-04-10 PROCEDURE — 74011000250 HC RX REV CODE- 250: Performed by: STUDENT IN AN ORGANIZED HEALTH CARE EDUCATION/TRAINING PROGRAM

## 2023-04-10 PROCEDURE — 74011250637 HC RX REV CODE- 250/637: Performed by: STUDENT IN AN ORGANIZED HEALTH CARE EDUCATION/TRAINING PROGRAM

## 2023-04-10 PROCEDURE — 82962 GLUCOSE BLOOD TEST: CPT

## 2023-04-10 PROCEDURE — 74011000250 HC RX REV CODE- 250: Performed by: GENERAL ACUTE CARE HOSPITAL

## 2023-04-10 PROCEDURE — 94640 AIRWAY INHALATION TREATMENT: CPT

## 2023-04-10 PROCEDURE — 94761 N-INVAS EAR/PLS OXIMETRY MLT: CPT

## 2023-04-10 RX ADMIN — APIXABAN 5 MG: 5 TABLET, FILM COATED ORAL at 17:05

## 2023-04-10 RX ADMIN — AMIODARONE HYDROCHLORIDE 100 MG: 200 TABLET ORAL at 08:58

## 2023-04-10 RX ADMIN — ASPIRIN 81 MG: 81 TABLET, COATED ORAL at 08:58

## 2023-04-10 RX ADMIN — GUAIFENESIN 600 MG: 600 TABLET, EXTENDED RELEASE ORAL at 08:59

## 2023-04-10 RX ADMIN — AMIODARONE HYDROCHLORIDE 100 MG: 200 TABLET ORAL at 17:04

## 2023-04-10 RX ADMIN — ARFORMOTEROL TARTRATE 15 MCG: 15 SOLUTION RESPIRATORY (INHALATION) at 07:32

## 2023-04-10 RX ADMIN — ATORVASTATIN CALCIUM 40 MG: 40 TABLET, FILM COATED ORAL at 08:59

## 2023-04-10 RX ADMIN — FUROSEMIDE 20 MG: 20 TABLET ORAL at 08:59

## 2023-04-10 RX ADMIN — DOXYCYCLINE HYCLATE 100 MG: 100 TABLET, FILM COATED ORAL at 08:59

## 2023-04-10 RX ADMIN — BUDESONIDE INHALATION 500 MCG: 0.5 SUSPENSION RESPIRATORY (INHALATION) at 20:04

## 2023-04-10 RX ADMIN — SACUBITRIL AND VALSARTAN 1 TABLET: 24; 26 TABLET, FILM COATED ORAL at 08:59

## 2023-04-10 RX ADMIN — CEFUROXIME AXETIL 250 MG: 250 TABLET, FILM COATED ORAL at 22:03

## 2023-04-10 RX ADMIN — ARFORMOTEROL TARTRATE 15 MCG: 15 SOLUTION RESPIRATORY (INHALATION) at 20:04

## 2023-04-10 RX ADMIN — PREDNISONE 60 MG: 20 TABLET ORAL at 08:59

## 2023-04-10 RX ADMIN — IPRATROPIUM BROMIDE 0.5 MG: 0.5 SOLUTION RESPIRATORY (INHALATION) at 14:03

## 2023-04-10 RX ADMIN — IPRATROPIUM BROMIDE 0.5 MG: 0.5 SOLUTION RESPIRATORY (INHALATION) at 07:33

## 2023-04-10 RX ADMIN — IPRATROPIUM BROMIDE 0.5 MG: 0.5 SOLUTION RESPIRATORY (INHALATION) at 20:04

## 2023-04-10 RX ADMIN — APIXABAN 5 MG: 5 TABLET, FILM COATED ORAL at 08:59

## 2023-04-10 RX ADMIN — SACUBITRIL AND VALSARTAN 1 TABLET: 24; 26 TABLET, FILM COATED ORAL at 17:05

## 2023-04-10 RX ADMIN — Medication 2 UNITS: at 17:27

## 2023-04-10 RX ADMIN — GUAIFENESIN 600 MG: 600 TABLET, EXTENDED RELEASE ORAL at 22:03

## 2023-04-10 RX ADMIN — CEFUROXIME AXETIL 250 MG: 250 TABLET, FILM COATED ORAL at 08:58

## 2023-04-10 RX ADMIN — DOXYCYCLINE HYCLATE 100 MG: 100 TABLET, FILM COATED ORAL at 22:03

## 2023-04-10 RX ADMIN — BUDESONIDE INHALATION 500 MCG: 0.5 SUSPENSION RESPIRATORY (INHALATION) at 07:33

## 2023-04-10 RX ADMIN — ALUMINUM HYDROXIDE, MAGNESIUM HYDROXIDE, AND DIMETHICONE 5 ML: 400; 400; 40 SUSPENSION ORAL at 09:01

## 2023-04-10 RX ADMIN — PANTOPRAZOLE SODIUM 40 MG: 40 TABLET, DELAYED RELEASE ORAL at 07:01

## 2023-04-11 LAB
BACTERIA SPEC CULT: NORMAL
BACTERIA SPEC CULT: NORMAL
GLUCOSE BLD STRIP.AUTO-MCNC: 137 MG/DL (ref 65–117)
GLUCOSE BLD STRIP.AUTO-MCNC: 143 MG/DL (ref 65–117)
GLUCOSE BLD STRIP.AUTO-MCNC: 88 MG/DL (ref 65–117)
GLUCOSE BLD STRIP.AUTO-MCNC: 96 MG/DL (ref 65–117)
SERVICE CMNT-IMP: ABNORMAL
SERVICE CMNT-IMP: ABNORMAL
SERVICE CMNT-IMP: NORMAL

## 2023-04-11 PROCEDURE — 65270000046 HC RM TELEMETRY

## 2023-04-11 PROCEDURE — 82962 GLUCOSE BLOOD TEST: CPT

## 2023-04-11 PROCEDURE — 94640 AIRWAY INHALATION TREATMENT: CPT

## 2023-04-11 PROCEDURE — 77010033678 HC OXYGEN DAILY

## 2023-04-11 PROCEDURE — 74011636637 HC RX REV CODE- 636/637: Performed by: GENERAL ACUTE CARE HOSPITAL

## 2023-04-11 PROCEDURE — 74011000250 HC RX REV CODE- 250: Performed by: STUDENT IN AN ORGANIZED HEALTH CARE EDUCATION/TRAINING PROGRAM

## 2023-04-11 PROCEDURE — 65270000029 HC RM PRIVATE

## 2023-04-11 PROCEDURE — 74011250637 HC RX REV CODE- 250/637: Performed by: STUDENT IN AN ORGANIZED HEALTH CARE EDUCATION/TRAINING PROGRAM

## 2023-04-11 PROCEDURE — 94761 N-INVAS EAR/PLS OXIMETRY MLT: CPT

## 2023-04-11 PROCEDURE — 74011250637 HC RX REV CODE- 250/637: Performed by: GENERAL ACUTE CARE HOSPITAL

## 2023-04-11 PROCEDURE — 74011000250 HC RX REV CODE- 250: Performed by: GENERAL ACUTE CARE HOSPITAL

## 2023-04-11 RX ORDER — IPRATROPIUM BROMIDE AND ALBUTEROL SULFATE 2.5; .5 MG/3ML; MG/3ML
3 SOLUTION RESPIRATORY (INHALATION)
Qty: 60 EACH | Refills: 1 | Status: SHIPPED | OUTPATIENT
Start: 2023-04-11

## 2023-04-11 RX ORDER — DOXYCYCLINE HYCLATE 100 MG
100 TABLET ORAL EVERY 12 HOURS
Qty: 2 TABLET | Refills: 0 | Status: SHIPPED | OUTPATIENT
Start: 2023-04-11 | End: 2023-04-12

## 2023-04-11 RX ORDER — PREDNISONE 10 MG/1
TABLET ORAL
Qty: 36 TABLET | Refills: 0 | Status: SHIPPED | OUTPATIENT
Start: 2023-04-11 | End: 2023-04-24

## 2023-04-11 RX ORDER — CEFUROXIME AXETIL 250 MG/1
250 TABLET ORAL EVERY 12 HOURS
Qty: 2 TABLET | Refills: 0 | Status: SHIPPED | OUTPATIENT
Start: 2023-04-11 | End: 2023-04-12

## 2023-04-11 RX ADMIN — CEFUROXIME AXETIL 250 MG: 250 TABLET, FILM COATED ORAL at 21:13

## 2023-04-11 RX ADMIN — AMIODARONE HYDROCHLORIDE 100 MG: 200 TABLET ORAL at 09:01

## 2023-04-11 RX ADMIN — BUDESONIDE INHALATION 500 MCG: 0.5 SUSPENSION RESPIRATORY (INHALATION) at 07:56

## 2023-04-11 RX ADMIN — APIXABAN 5 MG: 5 TABLET, FILM COATED ORAL at 09:00

## 2023-04-11 RX ADMIN — ARFORMOTEROL TARTRATE 15 MCG: 15 SOLUTION RESPIRATORY (INHALATION) at 19:47

## 2023-04-11 RX ADMIN — PREDNISONE 60 MG: 20 TABLET ORAL at 09:02

## 2023-04-11 RX ADMIN — IPRATROPIUM BROMIDE 0.5 MG: 0.5 SOLUTION RESPIRATORY (INHALATION) at 19:47

## 2023-04-11 RX ADMIN — IPRATROPIUM BROMIDE 0.5 MG: 0.5 SOLUTION RESPIRATORY (INHALATION) at 07:56

## 2023-04-11 RX ADMIN — ARFORMOTEROL TARTRATE 15 MCG: 15 SOLUTION RESPIRATORY (INHALATION) at 07:56

## 2023-04-11 RX ADMIN — BUDESONIDE INHALATION 500 MCG: 0.5 SUSPENSION RESPIRATORY (INHALATION) at 19:47

## 2023-04-11 RX ADMIN — SACUBITRIL AND VALSARTAN 1 TABLET: 24; 26 TABLET, FILM COATED ORAL at 17:33

## 2023-04-11 RX ADMIN — AMIODARONE HYDROCHLORIDE 100 MG: 200 TABLET ORAL at 17:34

## 2023-04-11 RX ADMIN — CEFUROXIME AXETIL 250 MG: 250 TABLET, FILM COATED ORAL at 09:00

## 2023-04-11 RX ADMIN — IPRATROPIUM BROMIDE 0.5 MG: 0.5 SOLUTION RESPIRATORY (INHALATION) at 13:48

## 2023-04-11 RX ADMIN — ASPIRIN 81 MG: 81 TABLET, COATED ORAL at 09:01

## 2023-04-11 RX ADMIN — DOXYCYCLINE HYCLATE 100 MG: 100 TABLET, FILM COATED ORAL at 09:07

## 2023-04-11 RX ADMIN — GUAIFENESIN 600 MG: 600 TABLET, EXTENDED RELEASE ORAL at 09:01

## 2023-04-11 RX ADMIN — SACUBITRIL AND VALSARTAN 1 TABLET: 24; 26 TABLET, FILM COATED ORAL at 09:00

## 2023-04-11 RX ADMIN — ALUMINUM HYDROXIDE, MAGNESIUM HYDROXIDE, AND DIMETHICONE 5 ML: 400; 400; 40 SUSPENSION ORAL at 09:08

## 2023-04-11 RX ADMIN — PANTOPRAZOLE SODIUM 40 MG: 40 TABLET, DELAYED RELEASE ORAL at 06:34

## 2023-04-11 RX ADMIN — GUAIFENESIN 600 MG: 600 TABLET, EXTENDED RELEASE ORAL at 21:13

## 2023-04-11 RX ADMIN — FUROSEMIDE 20 MG: 20 TABLET ORAL at 09:01

## 2023-04-11 RX ADMIN — ATORVASTATIN CALCIUM 40 MG: 40 TABLET, FILM COATED ORAL at 09:01

## 2023-04-11 RX ADMIN — APIXABAN 5 MG: 5 TABLET, FILM COATED ORAL at 17:34

## 2023-04-11 RX ADMIN — DOXYCYCLINE HYCLATE 100 MG: 100 TABLET, FILM COATED ORAL at 21:13

## 2023-04-12 LAB
GLUCOSE BLD STRIP.AUTO-MCNC: 112 MG/DL (ref 65–117)
GLUCOSE BLD STRIP.AUTO-MCNC: 125 MG/DL (ref 65–117)
GLUCOSE BLD STRIP.AUTO-MCNC: 135 MG/DL (ref 65–117)
GLUCOSE BLD STRIP.AUTO-MCNC: 171 MG/DL (ref 65–117)
GLUCOSE BLD STRIP.AUTO-MCNC: 82 MG/DL (ref 65–117)
SERVICE CMNT-IMP: ABNORMAL
SERVICE CMNT-IMP: NORMAL
SERVICE CMNT-IMP: NORMAL

## 2023-04-12 PROCEDURE — 94761 N-INVAS EAR/PLS OXIMETRY MLT: CPT

## 2023-04-12 PROCEDURE — 74011000250 HC RX REV CODE- 250: Performed by: GENERAL ACUTE CARE HOSPITAL

## 2023-04-12 PROCEDURE — 97530 THERAPEUTIC ACTIVITIES: CPT

## 2023-04-12 PROCEDURE — 94640 AIRWAY INHALATION TREATMENT: CPT

## 2023-04-12 PROCEDURE — 97116 GAIT TRAINING THERAPY: CPT

## 2023-04-12 PROCEDURE — 74011250637 HC RX REV CODE- 250/637: Performed by: STUDENT IN AN ORGANIZED HEALTH CARE EDUCATION/TRAINING PROGRAM

## 2023-04-12 PROCEDURE — 82962 GLUCOSE BLOOD TEST: CPT

## 2023-04-12 PROCEDURE — 77010033678 HC OXYGEN DAILY

## 2023-04-12 PROCEDURE — 65270000046 HC RM TELEMETRY

## 2023-04-12 PROCEDURE — 74011636637 HC RX REV CODE- 636/637: Performed by: GENERAL ACUTE CARE HOSPITAL

## 2023-04-12 PROCEDURE — 74011250637 HC RX REV CODE- 250/637: Performed by: GENERAL ACUTE CARE HOSPITAL

## 2023-04-12 PROCEDURE — 74011000250 HC RX REV CODE- 250: Performed by: STUDENT IN AN ORGANIZED HEALTH CARE EDUCATION/TRAINING PROGRAM

## 2023-04-12 RX ADMIN — GUAIFENESIN 600 MG: 600 TABLET, EXTENDED RELEASE ORAL at 10:01

## 2023-04-12 RX ADMIN — SACUBITRIL AND VALSARTAN 1 TABLET: 24; 26 TABLET, FILM COATED ORAL at 09:00

## 2023-04-12 RX ADMIN — ARFORMOTEROL TARTRATE 15 MCG: 15 SOLUTION RESPIRATORY (INHALATION) at 19:49

## 2023-04-12 RX ADMIN — AMIODARONE HYDROCHLORIDE 100 MG: 200 TABLET ORAL at 17:46

## 2023-04-12 RX ADMIN — ATORVASTATIN CALCIUM 40 MG: 40 TABLET, FILM COATED ORAL at 10:01

## 2023-04-12 RX ADMIN — APIXABAN 5 MG: 5 TABLET, FILM COATED ORAL at 10:01

## 2023-04-12 RX ADMIN — ALUMINUM HYDROXIDE, MAGNESIUM HYDROXIDE, AND DIMETHICONE 5 ML: 400; 400; 40 SUSPENSION ORAL at 03:15

## 2023-04-12 RX ADMIN — APIXABAN 5 MG: 5 TABLET, FILM COATED ORAL at 17:46

## 2023-04-12 RX ADMIN — ASPIRIN 81 MG: 81 TABLET, COATED ORAL at 10:01

## 2023-04-12 RX ADMIN — PREDNISONE 60 MG: 20 TABLET ORAL at 10:01

## 2023-04-12 RX ADMIN — IPRATROPIUM BROMIDE 0.5 MG: 0.5 SOLUTION RESPIRATORY (INHALATION) at 13:35

## 2023-04-12 RX ADMIN — SACUBITRIL AND VALSARTAN 1 TABLET: 24; 26 TABLET, FILM COATED ORAL at 17:46

## 2023-04-12 RX ADMIN — FUROSEMIDE 20 MG: 20 TABLET ORAL at 10:01

## 2023-04-12 RX ADMIN — AMIODARONE HYDROCHLORIDE 100 MG: 200 TABLET ORAL at 10:01

## 2023-04-12 RX ADMIN — BUDESONIDE INHALATION 500 MCG: 0.5 SUSPENSION RESPIRATORY (INHALATION) at 19:49

## 2023-04-12 RX ADMIN — BUDESONIDE INHALATION 500 MCG: 0.5 SUSPENSION RESPIRATORY (INHALATION) at 09:23

## 2023-04-12 RX ADMIN — ARFORMOTEROL TARTRATE 15 MCG: 15 SOLUTION RESPIRATORY (INHALATION) at 09:23

## 2023-04-12 RX ADMIN — IPRATROPIUM BROMIDE 0.5 MG: 0.5 SOLUTION RESPIRATORY (INHALATION) at 09:23

## 2023-04-12 RX ADMIN — GUAIFENESIN 600 MG: 600 TABLET, EXTENDED RELEASE ORAL at 21:57

## 2023-04-12 RX ADMIN — IPRATROPIUM BROMIDE 0.5 MG: 0.5 SOLUTION RESPIRATORY (INHALATION) at 19:49

## 2023-04-13 LAB
ANION GAP SERPL CALC-SCNC: 2 MMOL/L (ref 5–15)
BUN SERPL-MCNC: 30 MG/DL (ref 6–20)
BUN/CREAT SERPL: 32 (ref 12–20)
CALCIUM SERPL-MCNC: 8.5 MG/DL (ref 8.5–10.1)
CHLORIDE SERPL-SCNC: 104 MMOL/L (ref 97–108)
CO2 SERPL-SCNC: 33 MMOL/L (ref 21–32)
CREAT SERPL-MCNC: 0.93 MG/DL (ref 0.7–1.3)
ERYTHROCYTE [DISTWIDTH] IN BLOOD BY AUTOMATED COUNT: 15.2 % (ref 11.5–14.5)
GLUCOSE BLD STRIP.AUTO-MCNC: 103 MG/DL (ref 65–117)
GLUCOSE BLD STRIP.AUTO-MCNC: 127 MG/DL (ref 65–117)
GLUCOSE BLD STRIP.AUTO-MCNC: 141 MG/DL (ref 65–117)
GLUCOSE BLD STRIP.AUTO-MCNC: 84 MG/DL (ref 65–117)
GLUCOSE SERPL-MCNC: 101 MG/DL (ref 65–100)
HCT VFR BLD AUTO: 42.2 % (ref 36.6–50.3)
HGB BLD-MCNC: 13.7 G/DL (ref 12.1–17)
MCH RBC QN AUTO: 28.4 PG (ref 26–34)
MCHC RBC AUTO-ENTMCNC: 32.5 G/DL (ref 30–36.5)
MCV RBC AUTO: 87.6 FL (ref 80–99)
NRBC # BLD: 0 K/UL (ref 0–0.01)
NRBC BLD-RTO: 0 PER 100 WBC
PLATELET # BLD AUTO: 399 K/UL (ref 150–400)
PMV BLD AUTO: 9.4 FL (ref 8.9–12.9)
POTASSIUM SERPL-SCNC: 4.1 MMOL/L (ref 3.5–5.1)
RBC # BLD AUTO: 4.82 M/UL (ref 4.1–5.7)
SERVICE CMNT-IMP: ABNORMAL
SERVICE CMNT-IMP: ABNORMAL
SERVICE CMNT-IMP: NORMAL
SERVICE CMNT-IMP: NORMAL
SODIUM SERPL-SCNC: 139 MMOL/L (ref 136–145)
WBC # BLD AUTO: 18.9 K/UL (ref 4.1–11.1)

## 2023-04-13 PROCEDURE — 82962 GLUCOSE BLOOD TEST: CPT

## 2023-04-13 PROCEDURE — 74011250637 HC RX REV CODE- 250/637: Performed by: STUDENT IN AN ORGANIZED HEALTH CARE EDUCATION/TRAINING PROGRAM

## 2023-04-13 PROCEDURE — 85027 COMPLETE CBC AUTOMATED: CPT

## 2023-04-13 PROCEDURE — 74011000250 HC RX REV CODE- 250: Performed by: GENERAL ACUTE CARE HOSPITAL

## 2023-04-13 PROCEDURE — 94640 AIRWAY INHALATION TREATMENT: CPT

## 2023-04-13 PROCEDURE — 74011250637 HC RX REV CODE- 250/637: Performed by: GENERAL ACUTE CARE HOSPITAL

## 2023-04-13 PROCEDURE — 80048 BASIC METABOLIC PNL TOTAL CA: CPT

## 2023-04-13 PROCEDURE — 74011636637 HC RX REV CODE- 636/637: Performed by: GENERAL ACUTE CARE HOSPITAL

## 2023-04-13 PROCEDURE — 65270000046 HC RM TELEMETRY

## 2023-04-13 PROCEDURE — 94761 N-INVAS EAR/PLS OXIMETRY MLT: CPT

## 2023-04-13 PROCEDURE — 36415 COLL VENOUS BLD VENIPUNCTURE: CPT

## 2023-04-13 PROCEDURE — 74011000250 HC RX REV CODE- 250: Performed by: STUDENT IN AN ORGANIZED HEALTH CARE EDUCATION/TRAINING PROGRAM

## 2023-04-13 PROCEDURE — 74011000250 HC RX REV CODE- 250: Performed by: EMERGENCY MEDICINE

## 2023-04-13 PROCEDURE — 77010033678 HC OXYGEN DAILY

## 2023-04-13 RX ADMIN — AMIODARONE HYDROCHLORIDE 100 MG: 200 TABLET ORAL at 19:13

## 2023-04-13 RX ADMIN — APIXABAN 5 MG: 5 TABLET, FILM COATED ORAL at 10:29

## 2023-04-13 RX ADMIN — AMIODARONE HYDROCHLORIDE 100 MG: 200 TABLET ORAL at 10:29

## 2023-04-13 RX ADMIN — BUDESONIDE INHALATION 500 MCG: 0.5 SUSPENSION RESPIRATORY (INHALATION) at 19:18

## 2023-04-13 RX ADMIN — GUAIFENESIN 600 MG: 600 TABLET, EXTENDED RELEASE ORAL at 10:29

## 2023-04-13 RX ADMIN — GUAIFENESIN 600 MG: 600 TABLET, EXTENDED RELEASE ORAL at 22:48

## 2023-04-13 RX ADMIN — IPRATROPIUM BROMIDE 0.5 MG: 0.5 SOLUTION RESPIRATORY (INHALATION) at 19:18

## 2023-04-13 RX ADMIN — ATORVASTATIN CALCIUM 40 MG: 40 TABLET, FILM COATED ORAL at 10:29

## 2023-04-13 RX ADMIN — PREDNISONE 40 MG: 20 TABLET ORAL at 10:29

## 2023-04-13 RX ADMIN — SODIUM CHLORIDE, PRESERVATIVE FREE 10 ML: 5 INJECTION INTRAVENOUS at 19:13

## 2023-04-13 RX ADMIN — IPRATROPIUM BROMIDE 0.5 MG: 0.5 SOLUTION RESPIRATORY (INHALATION) at 07:25

## 2023-04-13 RX ADMIN — ARFORMOTEROL TARTRATE 15 MCG: 15 SOLUTION RESPIRATORY (INHALATION) at 07:30

## 2023-04-13 RX ADMIN — APIXABAN 5 MG: 5 TABLET, FILM COATED ORAL at 19:13

## 2023-04-13 RX ADMIN — IPRATROPIUM BROMIDE 0.5 MG: 0.5 SOLUTION RESPIRATORY (INHALATION) at 13:03

## 2023-04-13 RX ADMIN — SACUBITRIL AND VALSARTAN 1 TABLET: 24; 26 TABLET, FILM COATED ORAL at 19:13

## 2023-04-13 RX ADMIN — FUROSEMIDE 20 MG: 20 TABLET ORAL at 10:29

## 2023-04-13 RX ADMIN — ARFORMOTEROL TARTRATE 15 MCG: 15 SOLUTION RESPIRATORY (INHALATION) at 19:18

## 2023-04-13 RX ADMIN — BUDESONIDE INHALATION 500 MCG: 0.5 SUSPENSION RESPIRATORY (INHALATION) at 07:31

## 2023-04-13 RX ADMIN — ASPIRIN 81 MG: 81 TABLET, COATED ORAL at 10:29

## 2023-04-13 RX ADMIN — SACUBITRIL AND VALSARTAN 1 TABLET: 24; 26 TABLET, FILM COATED ORAL at 10:29

## 2023-04-14 LAB
ANION GAP SERPL CALC-SCNC: 1 MMOL/L (ref 5–15)
BUN SERPL-MCNC: 31 MG/DL (ref 6–20)
BUN/CREAT SERPL: 36 (ref 12–20)
CALCIUM SERPL-MCNC: 8.3 MG/DL (ref 8.5–10.1)
CHLORIDE SERPL-SCNC: 105 MMOL/L (ref 97–108)
CO2 SERPL-SCNC: 32 MMOL/L (ref 21–32)
CREAT SERPL-MCNC: 0.85 MG/DL (ref 0.7–1.3)
GLUCOSE BLD STRIP.AUTO-MCNC: 102 MG/DL (ref 65–117)
GLUCOSE BLD STRIP.AUTO-MCNC: 150 MG/DL (ref 65–117)
GLUCOSE BLD STRIP.AUTO-MCNC: 162 MG/DL (ref 65–117)
GLUCOSE BLD STRIP.AUTO-MCNC: 83 MG/DL (ref 65–117)
GLUCOSE SERPL-MCNC: 105 MG/DL (ref 65–100)
POTASSIUM SERPL-SCNC: 4.1 MMOL/L (ref 3.5–5.1)
SERVICE CMNT-IMP: ABNORMAL
SERVICE CMNT-IMP: ABNORMAL
SERVICE CMNT-IMP: NORMAL
SERVICE CMNT-IMP: NORMAL
SODIUM SERPL-SCNC: 138 MMOL/L (ref 136–145)

## 2023-04-14 PROCEDURE — 94640 AIRWAY INHALATION TREATMENT: CPT

## 2023-04-14 PROCEDURE — 65270000046 HC RM TELEMETRY

## 2023-04-14 PROCEDURE — 74011250637 HC RX REV CODE- 250/637: Performed by: STUDENT IN AN ORGANIZED HEALTH CARE EDUCATION/TRAINING PROGRAM

## 2023-04-14 PROCEDURE — 97535 SELF CARE MNGMENT TRAINING: CPT

## 2023-04-14 PROCEDURE — 74011000250 HC RX REV CODE- 250: Performed by: GENERAL ACUTE CARE HOSPITAL

## 2023-04-14 PROCEDURE — 77010033678 HC OXYGEN DAILY

## 2023-04-14 PROCEDURE — 80048 BASIC METABOLIC PNL TOTAL CA: CPT

## 2023-04-14 PROCEDURE — 82962 GLUCOSE BLOOD TEST: CPT

## 2023-04-14 PROCEDURE — 36415 COLL VENOUS BLD VENIPUNCTURE: CPT

## 2023-04-14 PROCEDURE — 74011636637 HC RX REV CODE- 636/637: Performed by: GENERAL ACUTE CARE HOSPITAL

## 2023-04-14 PROCEDURE — 74011000250 HC RX REV CODE- 250: Performed by: STUDENT IN AN ORGANIZED HEALTH CARE EDUCATION/TRAINING PROGRAM

## 2023-04-14 PROCEDURE — 97530 THERAPEUTIC ACTIVITIES: CPT

## 2023-04-14 PROCEDURE — 97116 GAIT TRAINING THERAPY: CPT | Performed by: PHYSICAL THERAPIST

## 2023-04-14 PROCEDURE — 74011250637 HC RX REV CODE- 250/637: Performed by: GENERAL ACUTE CARE HOSPITAL

## 2023-04-14 PROCEDURE — 74011636637 HC RX REV CODE- 636/637: Performed by: STUDENT IN AN ORGANIZED HEALTH CARE EDUCATION/TRAINING PROGRAM

## 2023-04-14 PROCEDURE — 94761 N-INVAS EAR/PLS OXIMETRY MLT: CPT

## 2023-04-14 RX ADMIN — AMIODARONE HYDROCHLORIDE 100 MG: 200 TABLET ORAL at 17:31

## 2023-04-14 RX ADMIN — Medication 2 UNITS: at 17:32

## 2023-04-14 RX ADMIN — AMIODARONE HYDROCHLORIDE 100 MG: 200 TABLET ORAL at 10:18

## 2023-04-14 RX ADMIN — SACUBITRIL AND VALSARTAN 1 TABLET: 24; 26 TABLET, FILM COATED ORAL at 17:32

## 2023-04-14 RX ADMIN — FUROSEMIDE 20 MG: 20 TABLET ORAL at 10:18

## 2023-04-14 RX ADMIN — ATORVASTATIN CALCIUM 40 MG: 40 TABLET, FILM COATED ORAL at 10:17

## 2023-04-14 RX ADMIN — IPRATROPIUM BROMIDE 0.5 MG: 0.5 SOLUTION RESPIRATORY (INHALATION) at 08:18

## 2023-04-14 RX ADMIN — ARFORMOTEROL TARTRATE 15 MCG: 15 SOLUTION RESPIRATORY (INHALATION) at 19:51

## 2023-04-14 RX ADMIN — APIXABAN 5 MG: 5 TABLET, FILM COATED ORAL at 10:18

## 2023-04-14 RX ADMIN — GUAIFENESIN 600 MG: 600 TABLET, EXTENDED RELEASE ORAL at 22:29

## 2023-04-14 RX ADMIN — ASPIRIN 81 MG: 81 TABLET, COATED ORAL at 10:18

## 2023-04-14 RX ADMIN — IPRATROPIUM BROMIDE 0.5 MG: 0.5 SOLUTION RESPIRATORY (INHALATION) at 19:51

## 2023-04-14 RX ADMIN — PREDNISONE 40 MG: 20 TABLET ORAL at 10:17

## 2023-04-14 RX ADMIN — SACUBITRIL AND VALSARTAN 1 TABLET: 24; 26 TABLET, FILM COATED ORAL at 10:18

## 2023-04-14 RX ADMIN — BUDESONIDE INHALATION 500 MCG: 0.5 SUSPENSION RESPIRATORY (INHALATION) at 19:51

## 2023-04-14 RX ADMIN — ARFORMOTEROL TARTRATE 15 MCG: 15 SOLUTION RESPIRATORY (INHALATION) at 08:23

## 2023-04-14 RX ADMIN — GUAIFENESIN 600 MG: 600 TABLET, EXTENDED RELEASE ORAL at 10:17

## 2023-04-14 RX ADMIN — APIXABAN 5 MG: 5 TABLET, FILM COATED ORAL at 17:32

## 2023-04-14 RX ADMIN — IPRATROPIUM BROMIDE 0.5 MG: 0.5 SOLUTION RESPIRATORY (INHALATION) at 13:37

## 2023-04-14 RX ADMIN — BUDESONIDE INHALATION 500 MCG: 0.5 SUSPENSION RESPIRATORY (INHALATION) at 08:23

## 2023-04-15 ENCOUNTER — APPOINTMENT (OUTPATIENT)
Dept: GENERAL RADIOLOGY | Age: 73
DRG: 871 | End: 2023-04-15
Attending: STUDENT IN AN ORGANIZED HEALTH CARE EDUCATION/TRAINING PROGRAM
Payer: MEDICARE

## 2023-04-15 LAB
ANION GAP SERPL CALC-SCNC: 3 MMOL/L (ref 5–15)
BNP SERPL-MCNC: 62 PG/ML
BUN SERPL-MCNC: 31 MG/DL (ref 6–20)
BUN/CREAT SERPL: 36 (ref 12–20)
CALCIUM SERPL-MCNC: 8.9 MG/DL (ref 8.5–10.1)
CHLORIDE SERPL-SCNC: 103 MMOL/L (ref 97–108)
CO2 SERPL-SCNC: 31 MMOL/L (ref 21–32)
CREAT SERPL-MCNC: 0.87 MG/DL (ref 0.7–1.3)
GLUCOSE BLD STRIP.AUTO-MCNC: 100 MG/DL (ref 65–117)
GLUCOSE BLD STRIP.AUTO-MCNC: 122 MG/DL (ref 65–117)
GLUCOSE BLD STRIP.AUTO-MCNC: 141 MG/DL (ref 65–117)
GLUCOSE BLD STRIP.AUTO-MCNC: 92 MG/DL (ref 65–117)
GLUCOSE SERPL-MCNC: 98 MG/DL (ref 65–100)
POTASSIUM SERPL-SCNC: 4.5 MMOL/L (ref 3.5–5.1)
SERVICE CMNT-IMP: ABNORMAL
SERVICE CMNT-IMP: ABNORMAL
SERVICE CMNT-IMP: NORMAL
SERVICE CMNT-IMP: NORMAL
SODIUM SERPL-SCNC: 137 MMOL/L (ref 136–145)

## 2023-04-15 PROCEDURE — 74011250637 HC RX REV CODE- 250/637: Performed by: STUDENT IN AN ORGANIZED HEALTH CARE EDUCATION/TRAINING PROGRAM

## 2023-04-15 PROCEDURE — 71046 X-RAY EXAM CHEST 2 VIEWS: CPT

## 2023-04-15 PROCEDURE — 82962 GLUCOSE BLOOD TEST: CPT

## 2023-04-15 PROCEDURE — 77010033678 HC OXYGEN DAILY

## 2023-04-15 PROCEDURE — 65270000046 HC RM TELEMETRY

## 2023-04-15 PROCEDURE — 74011000250 HC RX REV CODE- 250: Performed by: GENERAL ACUTE CARE HOSPITAL

## 2023-04-15 PROCEDURE — 36415 COLL VENOUS BLD VENIPUNCTURE: CPT

## 2023-04-15 PROCEDURE — 74011250637 HC RX REV CODE- 250/637: Performed by: GENERAL ACUTE CARE HOSPITAL

## 2023-04-15 PROCEDURE — 80048 BASIC METABOLIC PNL TOTAL CA: CPT

## 2023-04-15 PROCEDURE — 94761 N-INVAS EAR/PLS OXIMETRY MLT: CPT

## 2023-04-15 PROCEDURE — 83880 ASSAY OF NATRIURETIC PEPTIDE: CPT

## 2023-04-15 PROCEDURE — 74011000250 HC RX REV CODE- 250: Performed by: STUDENT IN AN ORGANIZED HEALTH CARE EDUCATION/TRAINING PROGRAM

## 2023-04-15 PROCEDURE — 74011636637 HC RX REV CODE- 636/637: Performed by: GENERAL ACUTE CARE HOSPITAL

## 2023-04-15 PROCEDURE — 94640 AIRWAY INHALATION TREATMENT: CPT

## 2023-04-15 RX ADMIN — APIXABAN 5 MG: 5 TABLET, FILM COATED ORAL at 08:56

## 2023-04-15 RX ADMIN — GUAIFENESIN 600 MG: 600 TABLET, EXTENDED RELEASE ORAL at 08:57

## 2023-04-15 RX ADMIN — IPRATROPIUM BROMIDE 0.5 MG: 0.5 SOLUTION RESPIRATORY (INHALATION) at 08:07

## 2023-04-15 RX ADMIN — ARFORMOTEROL TARTRATE 15 MCG: 15 SOLUTION RESPIRATORY (INHALATION) at 08:07

## 2023-04-15 RX ADMIN — PREDNISONE 40 MG: 20 TABLET ORAL at 08:57

## 2023-04-15 RX ADMIN — ASPIRIN 81 MG: 81 TABLET, COATED ORAL at 08:57

## 2023-04-15 RX ADMIN — GUAIFENESIN 600 MG: 600 TABLET, EXTENDED RELEASE ORAL at 21:00

## 2023-04-15 RX ADMIN — BUDESONIDE INHALATION 500 MCG: 0.5 SUSPENSION RESPIRATORY (INHALATION) at 08:07

## 2023-04-15 RX ADMIN — ATORVASTATIN CALCIUM 40 MG: 40 TABLET, FILM COATED ORAL at 08:57

## 2023-04-15 RX ADMIN — AMIODARONE HYDROCHLORIDE 100 MG: 200 TABLET ORAL at 08:56

## 2023-04-15 RX ADMIN — BUDESONIDE INHALATION 500 MCG: 0.5 SUSPENSION RESPIRATORY (INHALATION) at 20:16

## 2023-04-15 RX ADMIN — APIXABAN 5 MG: 5 TABLET, FILM COATED ORAL at 17:09

## 2023-04-15 RX ADMIN — SACUBITRIL AND VALSARTAN 1 TABLET: 24; 26 TABLET, FILM COATED ORAL at 08:56

## 2023-04-15 RX ADMIN — ARFORMOTEROL TARTRATE 15 MCG: 15 SOLUTION RESPIRATORY (INHALATION) at 20:16

## 2023-04-15 RX ADMIN — IPRATROPIUM BROMIDE 0.5 MG: 0.5 SOLUTION RESPIRATORY (INHALATION) at 20:18

## 2023-04-15 RX ADMIN — IPRATROPIUM BROMIDE 0.5 MG: 0.5 SOLUTION RESPIRATORY (INHALATION) at 14:49

## 2023-04-15 RX ADMIN — SACUBITRIL AND VALSARTAN 1 TABLET: 24; 26 TABLET, FILM COATED ORAL at 17:09

## 2023-04-15 RX ADMIN — FUROSEMIDE 20 MG: 20 TABLET ORAL at 08:56

## 2023-04-16 LAB
GLUCOSE BLD STRIP.AUTO-MCNC: 115 MG/DL (ref 65–117)
GLUCOSE BLD STRIP.AUTO-MCNC: 118 MG/DL (ref 65–117)
GLUCOSE BLD STRIP.AUTO-MCNC: 120 MG/DL (ref 65–117)
GLUCOSE BLD STRIP.AUTO-MCNC: 147 MG/DL (ref 65–117)
SERVICE CMNT-IMP: ABNORMAL
SERVICE CMNT-IMP: NORMAL

## 2023-04-16 PROCEDURE — 94761 N-INVAS EAR/PLS OXIMETRY MLT: CPT

## 2023-04-16 PROCEDURE — 77010033678 HC OXYGEN DAILY

## 2023-04-16 PROCEDURE — 94640 AIRWAY INHALATION TREATMENT: CPT

## 2023-04-16 PROCEDURE — 65270000046 HC RM TELEMETRY

## 2023-04-16 PROCEDURE — 74011000250 HC RX REV CODE- 250: Performed by: GENERAL ACUTE CARE HOSPITAL

## 2023-04-16 PROCEDURE — 74011250637 HC RX REV CODE- 250/637: Performed by: GENERAL ACUTE CARE HOSPITAL

## 2023-04-16 PROCEDURE — 74011636637 HC RX REV CODE- 636/637: Performed by: GENERAL ACUTE CARE HOSPITAL

## 2023-04-16 PROCEDURE — 74011250637 HC RX REV CODE- 250/637: Performed by: STUDENT IN AN ORGANIZED HEALTH CARE EDUCATION/TRAINING PROGRAM

## 2023-04-16 PROCEDURE — 74011000250 HC RX REV CODE- 250: Performed by: STUDENT IN AN ORGANIZED HEALTH CARE EDUCATION/TRAINING PROGRAM

## 2023-04-16 PROCEDURE — 82962 GLUCOSE BLOOD TEST: CPT

## 2023-04-16 RX ORDER — LIDOCAINE 40 MG/G
CREAM TOPICAL AS NEEDED
Status: DISCONTINUED | OUTPATIENT
Start: 2023-04-16 | End: 2023-04-17 | Stop reason: HOSPADM

## 2023-04-16 RX ADMIN — PREDNISONE 30 MG: 20 TABLET ORAL at 08:58

## 2023-04-16 RX ADMIN — ASPIRIN 81 MG: 81 TABLET, COATED ORAL at 08:58

## 2023-04-16 RX ADMIN — IPRATROPIUM BROMIDE 0.5 MG: 0.5 SOLUTION RESPIRATORY (INHALATION) at 20:02

## 2023-04-16 RX ADMIN — IPRATROPIUM BROMIDE 0.5 MG: 0.5 SOLUTION RESPIRATORY (INHALATION) at 07:42

## 2023-04-16 RX ADMIN — GUAIFENESIN 600 MG: 600 TABLET, EXTENDED RELEASE ORAL at 21:30

## 2023-04-16 RX ADMIN — SACUBITRIL AND VALSARTAN 1 TABLET: 24; 26 TABLET, FILM COATED ORAL at 08:56

## 2023-04-16 RX ADMIN — APIXABAN 5 MG: 5 TABLET, FILM COATED ORAL at 17:43

## 2023-04-16 RX ADMIN — GUAIFENESIN 600 MG: 600 TABLET, EXTENDED RELEASE ORAL at 08:57

## 2023-04-16 RX ADMIN — ARFORMOTEROL TARTRATE 15 MCG: 15 SOLUTION RESPIRATORY (INHALATION) at 07:42

## 2023-04-16 RX ADMIN — AMIODARONE HYDROCHLORIDE 100 MG: 200 TABLET ORAL at 08:58

## 2023-04-16 RX ADMIN — APIXABAN 5 MG: 5 TABLET, FILM COATED ORAL at 08:57

## 2023-04-16 RX ADMIN — FUROSEMIDE 20 MG: 20 TABLET ORAL at 08:58

## 2023-04-16 RX ADMIN — BUDESONIDE INHALATION 500 MCG: 0.5 SUSPENSION RESPIRATORY (INHALATION) at 07:42

## 2023-04-16 RX ADMIN — ATORVASTATIN CALCIUM 40 MG: 40 TABLET, FILM COATED ORAL at 08:57

## 2023-04-16 RX ADMIN — AMIODARONE HYDROCHLORIDE 100 MG: 200 TABLET ORAL at 17:44

## 2023-04-16 RX ADMIN — BUDESONIDE INHALATION 500 MCG: 0.5 SUSPENSION RESPIRATORY (INHALATION) at 20:02

## 2023-04-16 RX ADMIN — SACUBITRIL AND VALSARTAN 1 TABLET: 24; 26 TABLET, FILM COATED ORAL at 17:44

## 2023-04-16 RX ADMIN — ARFORMOTEROL TARTRATE 15 MCG: 15 SOLUTION RESPIRATORY (INHALATION) at 20:02

## 2023-04-16 RX ADMIN — IPRATROPIUM BROMIDE 0.5 MG: 0.5 SOLUTION RESPIRATORY (INHALATION) at 14:23

## 2023-04-17 VITALS
SYSTOLIC BLOOD PRESSURE: 107 MMHG | TEMPERATURE: 98.4 F | BODY MASS INDEX: 17.65 KG/M2 | WEIGHT: 126.1 LBS | OXYGEN SATURATION: 92 % | HEART RATE: 69 BPM | HEIGHT: 71 IN | DIASTOLIC BLOOD PRESSURE: 69 MMHG | RESPIRATION RATE: 19 BRPM

## 2023-04-17 LAB
GLUCOSE BLD STRIP.AUTO-MCNC: 112 MG/DL (ref 65–117)
GLUCOSE BLD STRIP.AUTO-MCNC: 115 MG/DL (ref 65–117)
GLUCOSE BLD STRIP.AUTO-MCNC: 89 MG/DL (ref 65–117)
SERVICE CMNT-IMP: NORMAL

## 2023-04-17 PROCEDURE — 97530 THERAPEUTIC ACTIVITIES: CPT

## 2023-04-17 PROCEDURE — 74011636637 HC RX REV CODE- 636/637: Performed by: GENERAL ACUTE CARE HOSPITAL

## 2023-04-17 PROCEDURE — 94761 N-INVAS EAR/PLS OXIMETRY MLT: CPT

## 2023-04-17 PROCEDURE — 74011000250 HC RX REV CODE- 250: Performed by: GENERAL ACUTE CARE HOSPITAL

## 2023-04-17 PROCEDURE — 94640 AIRWAY INHALATION TREATMENT: CPT

## 2023-04-17 PROCEDURE — 97116 GAIT TRAINING THERAPY: CPT

## 2023-04-17 PROCEDURE — 77010033678 HC OXYGEN DAILY

## 2023-04-17 PROCEDURE — 74011000250 HC RX REV CODE- 250: Performed by: STUDENT IN AN ORGANIZED HEALTH CARE EDUCATION/TRAINING PROGRAM

## 2023-04-17 PROCEDURE — 74011250637 HC RX REV CODE- 250/637: Performed by: GENERAL ACUTE CARE HOSPITAL

## 2023-04-17 PROCEDURE — 74011000250 HC RX REV CODE- 250: Performed by: EMERGENCY MEDICINE

## 2023-04-17 PROCEDURE — 74011250637 HC RX REV CODE- 250/637: Performed by: STUDENT IN AN ORGANIZED HEALTH CARE EDUCATION/TRAINING PROGRAM

## 2023-04-17 PROCEDURE — 82962 GLUCOSE BLOOD TEST: CPT

## 2023-04-17 RX ADMIN — IPRATROPIUM BROMIDE 0.5 MG: 0.5 SOLUTION RESPIRATORY (INHALATION) at 14:11

## 2023-04-17 RX ADMIN — IPRATROPIUM BROMIDE 0.5 MG: 0.5 SOLUTION RESPIRATORY (INHALATION) at 07:44

## 2023-04-17 RX ADMIN — FUROSEMIDE 20 MG: 20 TABLET ORAL at 09:32

## 2023-04-17 RX ADMIN — SACUBITRIL AND VALSARTAN 1 TABLET: 24; 26 TABLET, FILM COATED ORAL at 09:32

## 2023-04-17 RX ADMIN — ARFORMOTEROL TARTRATE 15 MCG: 15 SOLUTION RESPIRATORY (INHALATION) at 07:44

## 2023-04-17 RX ADMIN — ATORVASTATIN CALCIUM 40 MG: 40 TABLET, FILM COATED ORAL at 09:32

## 2023-04-17 RX ADMIN — AMIODARONE HYDROCHLORIDE 100 MG: 200 TABLET ORAL at 09:32

## 2023-04-17 RX ADMIN — PREDNISONE 30 MG: 20 TABLET ORAL at 09:31

## 2023-04-17 RX ADMIN — SODIUM CHLORIDE, PRESERVATIVE FREE 10 ML: 5 INJECTION INTRAVENOUS at 13:43

## 2023-04-17 RX ADMIN — GUAIFENESIN 600 MG: 600 TABLET, EXTENDED RELEASE ORAL at 09:32

## 2023-04-17 RX ADMIN — BUDESONIDE INHALATION 500 MCG: 0.5 SUSPENSION RESPIRATORY (INHALATION) at 07:44

## 2023-04-17 RX ADMIN — APIXABAN 5 MG: 5 TABLET, FILM COATED ORAL at 09:32

## 2023-04-17 RX ADMIN — ASPIRIN 81 MG: 81 TABLET, COATED ORAL at 09:32

## 2023-04-17 NOTE — PROGRESS NOTES
Notified pt of discharge. Pt was denied auth for SNF. Pt states that his S/O has his oxygen concentrator. Pt uses [employer]. CM sent referral via [employer] for oxygen tank at bedside and for delivery to concentrator to address that he is currently staying at in [place]. Pt refused [place] citing it did not help him in the past.  Pt can discharge when his oxygen tank arrives at bedside and confirmation of the concentrator for delivery is recei    Address:  [address] [place] [address]         Care Management Interventions  PCP Verified by CM: Yes  Mode of Transport at Discharge: BLS  Transition of Care Consult (CM Consult): Discharge Planning  Physical Therapy Consult: Yes  Occupational Therapy Consult: Yes  Support Systems: Child(gregorio), Spouse/Significant Other  Confirm Follow Up Transport: Family  The Patient and/or Patient Representative was Provided with a Choice of Provider and Agrees with the Discharge Plan?: Yes  Discharge Location  Patient Expects to be Discharged to[de-identified] Home with home health                       RUR Score:                   13%  Plan for utilizing home health:     Refused HH; Appeal for SNF and IPR declined. PCP: First and Last name:  Chaparrita Schwartz MD     Name of Practice:    Are you a current patient: Yes/No:    Approximate date of last visit:    Can you participate in a virtual visit with your PCP:                     Current Advanced Directive/Advance Care Plan: Full Code      Healthcare Decision Maker:   Click here to complete [address] including selection of the Healthcare Decision Maker Relationship (ie \"Primary\")             Primary Decision Maker: [employer] - Life Partner - [phone]    Secondary Decision Maker: Jimmy Hutchison - Sister - [phone]                  Transition of Care Plan:       Home with adult children.

## 2023-04-17 NOTE — PROGRESS NOTES
Physician Progress Note      Carolyn Castro  [facility] #:                  [id number]  :                       1950  ADMIT DATE:       2023 9:27 PM  [address] DATE:  RESPONDING  PROVIDER #:        Hari Mai MD          QUERY TEXT:    Pt admitted with Respiratory distress. Pt noted to have HR: 100; RR: 26; WBC: 15.1 and Mild pneumonia or atelectasis in the right lower lobe per CTA Chest. If possible, please document in the progress notes and discharge summary if you are evaluating and /or treating any of the following: The medical record reflects the following:  Risk Factors: Hx:  chronic respiratory failure secondary to COPD on 3 L baseline O2; Asthma; Lung CA; HLD  Clinical Indicators: hr: ; rr: 20-31. .... Anel Donate POC Lac acid: 0.99; wbc: 15.1. ..^ 19.4; bands: 11.7. Anel Donate Anel Donate ^ 17.2; Na+139; K+: 3.4; gluc: 116; BUn/Cr: 30/1.23 ^ 1.06 ^ 0.86; alb: 2.6 ^ 2.4; Trop. hs: 31; pBNP: 686; procal: 1.77; COVID: Neg. .... Anel Donate CTA Chest: cta chest: Mild pneumonia or atelectasis in the right lower lobe; Endobronchial mucous plugging segmental collapse in the superior and posterior basal segments of the left lower lobe; Trace left pleural effusion. ... Anel Donate BCx: NG. ...Resp. Cx: FEW Gram positive cocci IN PAIRS AND CHAINS. .... Anel Donate ABG: ph: 7.42; pCO2: 38; pO2: 67; HCO3: 24; sO2: 94==> ON 7L NC. ..... ED NOTED: Patient complaining of COPD exacerbation. Was on 5L NC at home, O2 was at 87%. EMS gave duoneb, 10mg decadron, and had him on 15L NRB, patient status improved, so EMS put patient on 6L NC. Treatment: Labs; Lac acid; COVID R/o; Procal; CXR; CTA Chest; BCx; Resp. Cx; IVF; IV Abx; Nebs;  Supplemental O2;    Thank you,    Manav Somers  CDI  Options provided:  -- Sepsis, present on admission  -- Sepsis, present on admission now resolved  -- Pneumonia without Sepsis  -- Sepsis was ruled out  -- Other - I will add my own diagnosis  -- Disagree - Not applicable / Not valid  -- Disagree - Clinically unable to determine / Unknown  -- Refer to Clinical Documentation Reviewer    PROVIDER RESPONSE TEXT:    This patient has sepsis which was present on admission.     Query created by: Meena Kaur on 4/14/2023 4:28 PM      Electronically signed by:  Marisabel Crocker MD 4/17/2023 8:12 AM

## 2023-04-17 NOTE — PROGRESS NOTES
Problem: Falls - Risk of  Goal: *Absence of Falls  Description: Document Thomas Gonzalez Fall Risk and appropriate interventions in the flowsheet.   Outcome: Progressing Towards Goal  Note: Fall Risk Interventions:  Mobility Interventions: Bed/chair exit alarm                             Problem: Patient Education: Go to Patient Education Activity  Goal: Patient/Family Education  Outcome: Progressing Towards Goal     Problem: Patient Education: Go to Patient Education Activity  Goal: Patient/Family Education  Outcome: Progressing Towards Goal     Problem: Patient Education: Go to Patient Education Activity  Goal: Patient/Family Education  Outcome: Progressing Towards Goal     Problem: Airway Clearance - Ineffective  Goal: *Absence of airway secretions  Outcome: Progressing Towards Goal  Goal: *Able to cough effectively  Outcome: Progressing Towards Goal

## 2023-04-17 NOTE — PROGRESS NOTES
End of Shift Note    Bedside shift change report given to Calixtoleonard Douglas (oncoming nurse) by Adriana Palafox RN (offgoing nurse). Report included the following information SBAR, Kardex, Intake/Output, and MAR    Shift worked:  night     Shift summary and any significant changes:     nil     Concerns for physician to address: Too  much sugar intake     Zone phone for oncangus shift:   [phone]       Activity:  Activity Level: Up ad payam  Number times ambulated in hallways past shift: 1  Number of times OOB to chair past shift: 4    Cardiac:   Cardiac Monitoring: No      Cardiac Rhythm: Sinus Rhythm    Access:  Current line(s): PIV     Genitourinary:   Urinary status: voiding    Respiratory:   O2 Device: Nasal cannula  Chronic home O2 use?: NO  Incentive spirometer at bedside: NO       GI:  Last Bowel Movement Date: 04/15/23  Current diet:  ADULT ORAL NUTRITION SUPPLEMENT Breakfast, Dinner; Low Calorie/High Protein  DIET ONE TIME MESSAGE  ADULT DIET Regular; 5 carb choices (75 gm/meal); Low Sodium (2 gm)  Passing flatus: YES  Tolerating current diet: YES       Pain Management:   Patient states pain is manageable on current regimen: N/A    Skin:  Ramos Score: 22  Interventions: increase time out of bed and PT/OT consult    Patient Safety:  Fall Score:  Total Score: 1  Interventions: bed/chair alarm, assistive device (walker, cane, etc), gripper socks, and pt to call before getting OOB       Length of Stay:  Expected LOS: 4d 0h  Actual LOS: [address]

## 2023-04-17 NOTE — PROGRESS NOTES
Problem: Falls - Risk of  Goal: *Absence of Falls  Description: Document Catarino Crowell Fall Risk and appropriate interventions in the flowsheet. 4/17/2023 1702 by Rodrigue Ware RN  Outcome: Progressing Towards Goal  Note: Fall Risk Interventions:  Mobility Interventions: Bed/chair exit alarm                          4/17/2023 1507 by Rodrigue Ware RN  Outcome: Progressing Towards Goal  Note: Fall Risk Interventions:  Mobility Interventions: Bed/chair exit alarm                             Problem: Falls - Risk of  Goal: *Absence of Falls  Description: Document Catarino Crowell Fall Risk and appropriate interventions in the flowsheet.   4/17/2023 1702 by Rodrigue Ware RN  Outcome: Resolved/Met  Note: Fall Risk Interventions:  Mobility Interventions: Bed/chair exit alarm                          4/17/2023 1702 by Rodrigue Ware RN  Outcome: Progressing Towards Goal  Note: Fall Risk Interventions:  Mobility Interventions: Bed/chair exit alarm                          4/17/2023 1507 by Rodrigue Ware RN  Outcome: Progressing Towards Goal  Note: Fall Risk Interventions:  Mobility Interventions: Bed/chair exit alarm

## 2023-04-17 NOTE — DISCHARGE SUMMARY
Hospitalist Discharge Summary     Patient ID:  Pearl Christopher  [id number]  71 y.o.  1950  4/5/2023    PCP on record: Sabrina Mora MD    Admit date: 4/5/2023  Discharge date and time: 4/17/2023    DISCHARGE DIAGNOSIS:  CAP  COPD exa  Mucous plugging   H/o Jacy Ca, in remission   Acute on chronic hypoxic resp failure  CHF  A fib  Weight loss    CONSULTATIONS:  IP CONSULT TO HOSPITALIST    Excerpted HPI from H&P of Wendie Reardon, DO:  Chief Complaint: Shortness of breath     71 y.o. male presents with Couple of days duration of abrupt onset, gradual worsening, severe, constant, shortness of breath that is associated with cough and fevers, remitted by nothing, exacerbated by nothing. Further history: Patient has a history of lung cancer and COPD, states that he feels like he is having a COPD exacerbation. Work-up in the ED, however, review of the patient's ABG was relatively normal but his PaO2 was down. Patient was started on antibiotics for CAP in the ED. Pertinent chart review: Nothing else pertinent. Case discussed with ED specialist; Documentation from the ED reviewed, as well as outpatient charts reviewed. ____________________________________________________________________  DISCHARGE SUMMARY/HOSPITAL COURSE:  for full details see H&P, daily progress notes, labs, consult notes. Pt with  fever of 101 at home. [place] tired. Cough/SOB. Lost 15 Lb in few weeks   H/o Lung Ca in remission. COPD On 3 L/M baseline     CAP  COPD exa  Mucous plugging   H/o Jacy Ca, in remission   Acute on chronic hypoxic resp failure  - CT showed  1. No large central pulmonary embolism. 2. Mild pneumonia or atelectasis in the right lower lobe. 3. Endobronchial mucous plugging segmental collapse in the superior and  posterior basal segments of the left lower lobe. Trace left pleural effusion. 4. Moderate to severe emphysema.  Pulmonary artery hypertension.  - Abx, switch to po to complete total of 7 days course  - IV steroids switched to po prednisone, cont taper down  - NEBs  - Procalcitonin +  - neg Respiratory Virus Panel. Pneumonia infectious w/up neg  - chest PT prn  - Has been of Trelegy as he did not afford, he checked with Pharmacy and now covered !!  resume relegy after DC (order sent to pharmacy) + prn albuterol   - PT recs Rehab. \"Patient's HR elevated to 120 bpm with activity, O2 sats dropped to 88% on 4L O2. Patient continues to require multiple seated rest breaks to recover with PLB to mid 90s%. Patient will benefit from IP pulm rehab at discharge as he is functioning below his baseline and with decreased endurance. \"   IPR declined by insurance, I called peer to peer and insurance still declines IPR coverage. Patient updated about the above. Patient requested to discuss with his family what to do next and make a decision about discharge tomorrow. He also requested to appeal insurance decision, but he is leaning towards going home.   CM updated re the above  Patient will go home with home health     CHF  -Continue home Entresto, Lasix, aspirin     A fib  Cont eliquis   Cont amio      Weight loss  Unintentional  F/up with PCP    All Micro Results       Procedure Component Value Units Date/Time    CULTURE, BLOOD [[id number]] Collected: 04/05/23 2255    Order Status: Completed Specimen: Blood Updated: 04/11/23 0740     Special Requests: NO SPECIAL REQUESTS        Culture result: NO GROWTH 6 DAYS       CULTURE, BLOOD [[id number]] Collected: 04/05/23 2312    Order Status: Completed Specimen: Blood Updated: 04/11/23 0740     Special Requests: NO SPECIAL REQUESTS        Culture result: NO GROWTH 6 DAYS       LEGIONELLA PNEUMOPHILA AG, URINE [[id number]] Collected: 04/06/23 1732    Order Status: Completed Specimen: Urine Updated: 04/10/23 1537     Source URINE        L pneumophila S1 Ag, urine Negative        Comment: (NOTE)  Presumptive negative for L. pneumophila serogroup 1 antigen in urine,  suggesting no recent or current infection. Legionnaires' disease  cannot be ruled out since other serogroups and species may also  cause disease. Performed At: [facility] & [address] [id number]  Olivia Nobles MD [place]:[id number]         SSunny Pena , UR/CSF [[id number]] Collected: 04/06/23 1732    Order Status: Completed Specimen: Miscellaneous sample Updated: 04/10/23 1537     Source URINE        Specimen Urine     Streptococcus pneumoniae Ag Negative        Fluid culture Not indicated. Organism ID Not indicated. Please note Comment        Comment: (NOTE)  College of American Pathologists standards require a culture to be  performed on CSF specimens submitted for bacterial antigen testing. (CAP [id number]) Urine specimens will not be cultured. Performed At: [facility] & [address] [id number]  Olivia Nobles MD [place]:[id number]         CULTURE, RESPIRATORY/SPUTUM/BRONCH Pauline Burger [[id number]] Collected: 04/06/23 1319    Order Status: Completed Specimen: Sputum Updated: 04/08/23 1151     Special Requests: NO SPECIAL REQUESTS        GRAM STAIN FEW EPITHELIAL CELLS SEEN         1+ WBCS SEEN               FEW Gram positive cocci IN PAIRS AND CHAINS           Culture result:       MODERATE NORMAL RESPIRATORY TY          CULTURE, MRSA [[id number]] Collected: 04/06/23 1039    Order Status: Completed Specimen: Nasal Updated: 04/07/23 1610     Special Requests: NO SPECIAL REQUESTS        Culture result: MRSA NOT PRESENT               Screening of patient nares for MRSA is for surveillance purposes and, if positive, to facilitate isolation considerations in high risk settings. It is not intended for automatic decolonization interventions per se as regimens are not sufficiently effective to warrant routine use.       RESPIRATORY VIRUS PANEL W/COVID-19, PCR [[id number]] Collected: 04/06/23 1039    Order Status: Completed Specimen: Nasopharyngeal Updated: 04/06/23 1654     Adenovirus Not detected        Coronavirus 229E Not detected        Coronavirus HKU1 Not detected        Coronavirus CVNL63 Not detected        Coronavirus OC43 Not detected        SARS-CoV-2, PCR Not detected        Metapneumovirus Not detected        Rhinovirus and Enterovirus Not detected        Influenza A Not detected        Influenza B Not detected        Parainfluenza 1 Not detected        Parainfluenza 2 Not detected        Parainfluenza 3 Not detected        Parainfluenza virus 4 Not detected        RSV by PCR Not detected        B. parapertussis, PCR Not detected        Bordetella pertussis - PCR Not detected        Chlamydophila pneumoniae DNA, QL, PCR Not detected        Mycoplasma pneumoniae DNA, QL, PCR Not detected       COVID-19 RAPID TEST [[id number]] Collected: 04/05/23 2226    Order Status: Completed Specimen: Nasopharyngeal Updated: 04/05/23 2251     Specimen source Nasopharyngeal        COVID-19 rapid test Not detected        Comment: Rapid Abbott ID Now       Rapid NAAT:  The specimen is NEGATIVE for SARS-CoV-2, the novel coronavirus associated with COVID-19. Negative results should be treated as presumptive and, if inconsistent with clinical signs and symptoms or necessary for patient management, should be tested with an alternative molecular assay. Negative results do not preclude SARS-CoV-2 infection and should not be used as the sole basis for patient management decisions. This test has been authorized by the FDA under an Emergency Use Authorization (EUA) for use by authorized laboratories.    Fact sheet for Healthcare Providers:  http://www.della.benjamin/  Fact sheet for Patients: http://www.sierra-elise.biruben/       Methodology: Isothermal Nucleic Acid Amplification                10/26/22    ECHO ADULT COMPLETE 10/28/2022 10/28/2022    Interpretation Summary    Left Ventricle: Severely reduced left ventricular systolic function with a visually estimated EF of 25 - 30%. Not well visualized. Left ventricle size is normal. Normal wall thickness. Unable to assess wall motion. Abnormal diastolic function. Right Ventricle: Not well visualized. Reduced systolic function. TAPSE is abnormal. TAPSE is 1.1 cm. Mitral Valve: Mild regurgitation. Left Atrium: Left atrium is mildly dilated. Right Atrium: Right atrium is mildly dilated. Signed by: Hans Portillo MD on 10/28/2022  4:48 PM    _______________________________________________________________________  Patient seen and examined by me on discharge day. Pertinent Findings:  General:          No distress. cachectic. HEENT:           Atraumatic, anicteric sclerae, pink conjunctivae, MMM  Neck:               Supple, symmetrical  Lungs:             Limited air entry b/l. No Wheezing. No rales. No tenderness. No Accessory muscle use. CVS:                Regular rhythm. No murmur. No JVD   GI/:             Soft. NT. ND. BS normal  Extremities:     No edema. No cyanosis. No clubbing. Skin:                Not pale. Not Jaundiced. No rashes   Psych:             Good insight. Not depressed. Not anxious or agitated. Neurologic:      Alert and oriented X 4. EOMs intact. No facial asymmetry. No slurred speech. Symmetrical strength, Sensation grossly intact.   _______________________________________________________________________  DISCHARGE MEDICATIONS:   Current Discharge Medication List        START taking these medications    Details   albuterol-ipratropium (DUO-NEB) 2.5 mg-0.5 mg/3 ml nebu 3 mL by Nebulization route every four (4) hours as needed for Wheezing, Shortness of Breath, Respiratory Distress or Cough.   Qty: 60 Each, Refills: 1  Start date: 4/11/2023      diphenhydrAMINE 12.5 mg/5 mL elix 75 mg, lidocaine 2 % soln 30 mL, aluminum & magnesium hydroxide-simethicone 400-400-40 mg/5 mL susp 30 mL Take 5 mL by mouth four (4) times daily as needed for PRN Reason (Other) (mouth ulcers). Qty: 1 Bottle, Refills: 0  Start date: 4/11/2023      predniSONE (DELTASONE) 10 mg tablet Take 60 mg by mouth daily (with breakfast) for 1 day, THEN 40 mg daily (with breakfast) for 3 days, THEN 30 mg daily (with breakfast) for 3 days, THEN 20 mg daily (with breakfast) for 3 days, THEN 10 mg daily (with breakfast) for 3 days. Qty: 36 Tablet, Refills: 0  Start date: 4/11/2023, End date: 4/24/2023      cefUROXime (CEFTIN) 250 mg tablet Take 1 Tablet by mouth every twelve (12) hours for 2 doses. Qty: 2 Tablet, Refills: 0  Start date: 4/11/2023, End date: 4/12/2023      doxycycline (VIBRA-TABS) 100 mg tablet Take 1 Tablet by mouth every twelve (12) hours for 2 doses. Qty: 2 Tablet, Refills: 0  Start date: 4/11/2023, End date: 4/12/2023      fluticasone-umeclidin-vilanter (Trelegy Ellipta) 200-62.5-25 mcg inhaler Take 1 Puff by inhalation daily for 30 doses. Qty: 30 Each, Refills: 2  Start date: 4/9/2023, End date: 5/9/2023           CONTINUE these medications which have NOT CHANGED    Details   apixaban (ELIQUIS) 5 mg tablet Take 1 Tablet by mouth every twelve (12) hours. dapagliflozin (FARXIGA) 5 mg tab tablet Take 1 Tablet by mouth daily. Qty: 30 Tablet, Refills: 1      amiodarone (PACERONE) 100 mg tablet Take 1 Tablet by mouth two (2) times a day. furosemide (LASIX) 20 mg tablet Take 1 Tablet by mouth daily. sacubitriL-valsartan (Entresto) 24-26 mg tablet Take 1 Tablet by mouth two (2) times a day. atorvastatin (LIPITOR) 40 mg tablet Take 1 Tablet by mouth daily. albuterol (PROVENTIL VENTOLIN) 2.5 mg /3 mL (0.083 %) nebu 3 mL by Nebulization route every six (6) hours as needed for Wheezing or Shortness of Breath. aspirin delayed-release 81 mg tablet Take 1 Tab by mouth daily.   Qty: 30 Tab, Refills: 0           STOP taking these medications       oxyCODONE IR (ROXICODONE) 20 mg immediate release tablet Comments:   Reason for Stopping:         oxyCODONE IR (ROXICODONE) 20 mg immediate release tablet Comments:   Reason for Stopping:         oxyCODONE IR (ROXICODONE) 20 mg immediate release tablet Comments:   Reason for Stopping:         arformoteroL (BROVANA) 15 mcg/2 mL nebu neb solution Comments:   Reason for Stopping:         budesonide (PULMICORT) 0.5 mg/2 mL nbsp Comments:   Reason for Stopping:         oxyCODONE IR (ROXICODONE) 20 mg immediate release tablet Comments:   Reason for Stopping:                 Patient Follow Up Instructions: Activity: Activity as tolerated  Diet: ADULT ORAL NUTRITION SUPPLEMENT Breakfast, Dinner; Low Calorie/High Protein  DIET ONE TIME MESSAGE  ADULT DIET Regular; 5 carb choices (75 gm/meal); Low Sodium (2 gm)  Wound Care: None needed  Follow-up with PCP in  1 week. Follow-up tests/labs None  If you have any concerns that you feel you need to come back to the hospital, please do not hesitate. Follow-up Information       Follow up With Specialties Details Why Danilo De La Cruz MD Internal Medicine Physician Go on 4/24/2023 at 10:20am for your PCP hospital follow up. [address]  [phone]          ________________________________________________________________    Risk of deterioration: Moderate    Condition at Discharge:  Stable  __________________________________________________________________    Disposition  IP Rehab    ____________________________________________________________________    Code Status: Full Code  ___________________________________________________________________      Total time in minutes spent coordinating this discharge (includes going over instructions, follow-up, prescriptions, and preparing report for sign off to her PCP) :  39 minutes    Signed:   Erica Brody MD

## 2023-04-17 NOTE — PROGRESS NOTES
Bedside and Verbal shift change report given to Lee Rodríguez (oncoming nurse) by Ailyn Rosales (offgoing nurse). Report included the following information SBAR, Kardex, and MAR.

## 2023-04-17 NOTE — PROGRESS NOTES
Problem: Mobility Impaired (Adult and Pediatric)  Goal: *Acute Goals and Plan of Care (Insert Text)  Description: FUNCTIONAL STATUS PRIOR TO ADMISSION: Pt reports that he was discharged from [facility] in Feb 2023 and has not gotten back to his baseline. He ambulated Jacque without an ad. He Uses 3 liters oxygen at home and maintains 93-94%, decreased oxygen to 85% when he walks to his truck without oxygen. Has portable tank in his truck. Reports going to pulmonary rehab in the past .     HOME SUPPORT PRIOR TO ADMISSION: The patient lived with his daughter but did not require assist.  She works during the day and does the cooking. Physical Therapy Goals  Initiated 4/6/2023  1. Patient will move from supine to sit and sit to supine , scoot up and down, and roll side to side in bed with independence within 7 day(s). 2.  Patient will transfer from bed to chair and chair to bed with independence using the least restrictive device within 7 day(s). 3.  Patient will perform sit to stand with modified independence within 7 day(s). 4.  Patient will ambulate with stand by assist for 150 feet with the least restrictive device within 7 day(s). 5.  Patient will ascend/descend 2 stairs with  handrail(s) with independence within 7 day(s). Physical Therapy Goals  Revised 4/14/2023  1. Patient will move from supine to sit and sit to supine  in bed with independence within 7 day(s). 2.  Patient will transfer from bed to chair and chair to bed with independence using the least restrictive device within 7 day(s). 3.  Patient will perform sit to stand with independence within 7 day(s). 4.  Patient will ambulate with supervision/set-up for 200 feet with the least restrictive device within 7 day(s). 5.  Patient will ascend/descend 2 stairs with 1 handrail(s) with supervision/set-up within 7 day(s).         Outcome: Progressing Towards Goal   PHYSICAL THERAPY TREATMENT  Patient: Hannah Cartwright (71 y.o. male)  Date: 4/17/2023  Diagnosis: COPD exacerbation ([facility] [J44.1] <principal problem not specified>      Precautions:    Chart, physical therapy assessment, plan of care and goals were reviewed. ASSESSMENT  Patient continues with skilled PT services and is progressing towards goals. Pt was received seated upright on 3L NC (his baseline) with oxygen saturation 88-91% prior to activity and placed on 4L in anticipation for mobility. Pt continues to move well throughout therapy service without use of an assistive device however noted with shortness of breath with activity needing standing rest breaks. Pt continues to have good understanding into his deficits, using his own portable pulse ox throughout session and performing PLB to recover. Pt drops lowest to 84% on 4L NC, recovers slowly and able to be placed back on 3L NC at end of session. Continue to recommend HHPT services, however pt declining. Current Level of Function Impacting Discharge (mobility/balance): mod-I with transfer, supervision without AD for gait    Other factors to consider for discharge:         PLAN :  Patient continues to benefit from skilled intervention to address the above impairments. Continue treatment per established plan of care. to address goals. Recommendation for discharge: (in order for the patient to meet his/her long term goals)  Physical therapy at least 2 days/week in the home ; however pt declining    This discharge recommendation:  Has been made in collaboration with the attending provider and/or case management    IF patient discharges home will need the following DME: patient owns DME required for discharge     SUBJECTIVE:   Patient stated I need some rehab here in the hospital.... no HHPT I can just do that on my own.     OBJECTIVE DATA SUMMARY:   Critical Behavior:  Neurologic State: Alert  Orientation Level: Oriented X4  Cognition: Follows commands  Safety/Judgement: Awareness of environment, Fall prevention, Home safety  Functional Mobility Training:  Bed Mobility:     Supine to Sit:  (pt was seated upright upon arrival)  Sit to Supine: Modified independent    Transfers:  Sit to Stand: Modified independent  Stand to Sit: Modified independent    Balance:  Sitting: Intact  Standing: Intact  Standing - Static: Good  Standing - Dynamic : Good  Ambulation/Gait Training:  Distance (ft): 150 Feet (ft) (75ft x2 with standing rest breaks)  Assistive Device: Gait belt  Ambulation - Level of Assistance: Supervision    Gait Abnormalities: Decreased step clearance     Speed/Maite: Pace decreased (<100 feet/min)    Pain Rating:  No pain rating received from pt    Activity Tolerance:   Fair, desaturates with exertion and requires oxygen, and requires rest breaks    After treatment patient left in no apparent distress:   Supine in bed, Call bell within reach, and Side rails x 3    COMMUNICATION/COLLABORATION:   The patients plan of care was discussed with: Occupational therapist and Registered nurse.      Vanda Min PTA   Time Calculation: 27 mins

## 2023-04-17 NOTE — PROGRESS NOTES
Problem: Falls - Risk of  Goal: *Absence of Falls  Description: Document Kerri Jane Fall Risk and appropriate interventions in the flowsheet.   Outcome: Progressing Towards Goal  Note: Fall Risk Interventions:  Mobility Interventions: Bed/chair exit alarm

## 2023-04-17 NOTE — PROGRESS NOTES
Hospital follow-up PCP transitional care appointment has been scheduled with Dr. Meghan Zelaya on 4/24/23 at 1020. This is the first available appt due to limited provider availability. PCP office does not offer alternate provider option for hospital follow up. Pending patient discharge.  Max Aguirre, Care Management Assistant

## 2023-04-21 ENCOUNTER — TELEPHONE (OUTPATIENT)
Dept: CARDIOLOGY CLINIC | Age: 73
End: 2023-04-21

## 2023-06-01 ENCOUNTER — TELEPHONE (OUTPATIENT)
Age: 73
End: 2023-06-01

## 2023-06-01 NOTE — TELEPHONE ENCOUNTER
Pt had to cx 6/2/23 appt. I offered him 6/5/23 and 6/7/23 slots, but he'll be out of town, so he's scheduled for Wed, 7/19/23 at 10:00 a.m. with Brandi.

## 2023-06-01 NOTE — TELEPHONE ENCOUNTER
Telephone Call RE:  Appointment reminder     Outcome:     [] Patient confirmed appointment   [x] Patient rescheduled appointment for    [] Unable to reach  [] Left message              [x] Other:     Patient is out of town, and asked to be rescheduled.

## 2023-07-18 ENCOUNTER — TELEPHONE (OUTPATIENT)
Age: 73
End: 2023-07-18

## 2023-07-19 ENCOUNTER — OFFICE VISIT (OUTPATIENT)
Age: 73
End: 2023-07-19
Payer: MEDICARE

## 2023-07-19 VITALS
BODY MASS INDEX: 18.51 KG/M2 | RESPIRATION RATE: 24 BRPM | OXYGEN SATURATION: 97 % | WEIGHT: 132.2 LBS | SYSTOLIC BLOOD PRESSURE: 104 MMHG | TEMPERATURE: 97.8 F | HEIGHT: 71 IN | DIASTOLIC BLOOD PRESSURE: 68 MMHG | HEART RATE: 94 BPM

## 2023-07-19 DIAGNOSIS — E61.1 IRON DEFICIENCY: ICD-10-CM

## 2023-07-19 DIAGNOSIS — R53.83 FATIGUE, UNSPECIFIED TYPE: ICD-10-CM

## 2023-07-19 DIAGNOSIS — I50.21 ACUTE SYSTOLIC (CONGESTIVE) HEART FAILURE (HCC): Primary | ICD-10-CM

## 2023-07-19 DIAGNOSIS — E55.9 VITAMIN D DEFICIENCY, UNSPECIFIED: ICD-10-CM

## 2023-07-19 PROCEDURE — 1123F ACP DISCUSS/DSCN MKR DOCD: CPT | Performed by: NURSE PRACTITIONER

## 2023-07-19 PROCEDURE — 99214 OFFICE O/P EST MOD 30 MIN: CPT | Performed by: NURSE PRACTITIONER

## 2023-07-19 RX ORDER — FLUTICASONE FUROATE, UMECLIDINIUM BROMIDE AND VILANTEROL TRIFENATATE 200; 62.5; 25 UG/1; UG/1; UG/1
1 POWDER RESPIRATORY (INHALATION) DAILY
COMMUNITY
Start: 2023-07-13

## 2023-07-19 ASSESSMENT — PATIENT HEALTH QUESTIONNAIRE - PHQ9
1. LITTLE INTEREST OR PLEASURE IN DOING THINGS: 0
SUM OF ALL RESPONSES TO PHQ QUESTIONS 1-9: 0
SUM OF ALL RESPONSES TO PHQ QUESTIONS 1-9: 0
2. FEELING DOWN, DEPRESSED OR HOPELESS: 0
SUM OF ALL RESPONSES TO PHQ QUESTIONS 1-9: 0
SUM OF ALL RESPONSES TO PHQ9 QUESTIONS 1 & 2: 0
SUM OF ALL RESPONSES TO PHQ QUESTIONS 1-9: 0

## 2023-07-19 ASSESSMENT — ENCOUNTER SYMPTOMS
SHORTNESS OF BREATH: 1
NAUSEA: 0
ABDOMINAL DISTENTION: 0
VOMITING: 0
COUGH: 0

## 2023-07-19 NOTE — PATIENT INSTRUCTIONS
Medication changes:    No medication changes     Please take this to your pharmacy to notify them of the change in medications. Testing Ordered:    Please have labs drawn in one week at any local lab [facility]. You will be notified of any abnormal results that requires a change in medication regimen. Other Recommendations: It is recommended that you wear your life vest at all times except for showering     A referral to Dr. Gallito Bennett has been made you will be contacted for scheduling. Your [facility] results qualify for complimentary family testing for up to two blood relatives. If you would like to proceed with family testing, please contact the [address] [place] [address] at [phone] Option 2 to provide the following information:    -Name of Family Member  -Date of Birth  -Home Address  -Phone Number  -Email (Optional)   -Any cardiac history (Optional)    Our office will order the test and a testing kit will be sent directly to the home of your family member. Instructions for specimen collection and shipping material to return the specimen to [facility] will be provided with the kit. This test is complimentary and is at no cost to you or your family members. [facility] genetic testing results have been reviewed today. Your testing results qualify for complimentary genetic counseling through [facility]. Please visit [email or web address] to schedule your telephone genetic counseling appointment. You may also call [phone]. Your RQ number from your test is [id number]. You will be asked for this number when you schedule the appointment. Please call [phone] to schedule a follow up with Dr. Manuel Doyle office    Ensure your drinking an adequate amount of water with a goal of 6-8 eight ounce glasses (1.5-2 liters) of fluid daily. Your urine should be clear and light yellow straw colored.       If your blood pressure begins to consistently run below 90/60 and/or

## 2023-07-26 DIAGNOSIS — R53.83 FATIGUE, UNSPECIFIED TYPE: ICD-10-CM

## 2023-07-26 DIAGNOSIS — E61.1 IRON DEFICIENCY: ICD-10-CM

## 2023-07-26 DIAGNOSIS — E55.9 VITAMIN D DEFICIENCY, UNSPECIFIED: ICD-10-CM

## 2023-07-26 DIAGNOSIS — I50.21 ACUTE SYSTOLIC (CONGESTIVE) HEART FAILURE (HCC): ICD-10-CM

## 2023-08-18 NOTE — TELEPHONE ENCOUNTER
Requested Prescriptions     Signed Prescriptions Disp Refills    dapagliflozin (FARXIGA) 5 MG tablet 90 tablet 3     Sig: Take 1 tablet by mouth daily     Authorizing Provider: Dorys Izaguirre     Ordering User: Ronel Zhao

## 2023-10-02 ENCOUNTER — TELEPHONE (OUTPATIENT)
Age: 73
End: 2023-10-02

## 2023-10-02 NOTE — TELEPHONE ENCOUNTER
Called patient using two patient identifiers. Patient states that his echo was cancelled at some point due to insurance coverage issues. Notified patient that he did have echo scheduled with Dr. Arnulfo Pastrana office. Advised that patient follow up with him or call back if new echo order is needed.

## 2023-10-02 NOTE — TELEPHONE ENCOUNTER
Called pt to move his 10/23 appt b/c Shaheed Salazar will not be in clinic that morning. Pt's 8/16/23 echo was cancelled. He's not sure why and needs direction. Please call pt to tell him if he should still have it and how to get it rescheduled.

## 2023-10-23 ENCOUNTER — OFFICE VISIT (OUTPATIENT)
Age: 73
End: 2023-10-23
Payer: MEDICARE

## 2023-10-23 VITALS
HEART RATE: 95 BPM | RESPIRATION RATE: 20 BRPM | HEIGHT: 71 IN | OXYGEN SATURATION: 98 % | WEIGHT: 132 LBS | TEMPERATURE: 97.4 F | BODY MASS INDEX: 18.48 KG/M2 | SYSTOLIC BLOOD PRESSURE: 108 MMHG | DIASTOLIC BLOOD PRESSURE: 72 MMHG

## 2023-10-23 DIAGNOSIS — E55.9 VITAMIN D DEFICIENCY, UNSPECIFIED: ICD-10-CM

## 2023-10-23 DIAGNOSIS — E61.1 IRON DEFICIENCY: ICD-10-CM

## 2023-10-23 DIAGNOSIS — I50.21 SYSTOLIC CHF, ACUTE (HCC): Primary | ICD-10-CM

## 2023-10-23 DIAGNOSIS — R53.83 FATIGUE, UNSPECIFIED TYPE: ICD-10-CM

## 2023-10-23 PROCEDURE — 1123F ACP DISCUSS/DSCN MKR DOCD: CPT | Performed by: NURSE PRACTITIONER

## 2023-10-23 PROCEDURE — 99214 OFFICE O/P EST MOD 30 MIN: CPT | Performed by: NURSE PRACTITIONER

## 2023-10-23 ASSESSMENT — PATIENT HEALTH QUESTIONNAIRE - PHQ9
SUM OF ALL RESPONSES TO PHQ QUESTIONS 1-9: 0
SUM OF ALL RESPONSES TO PHQ9 QUESTIONS 1 & 2: 0
SUM OF ALL RESPONSES TO PHQ QUESTIONS 1-9: 0
SUM OF ALL RESPONSES TO PHQ QUESTIONS 1-9: 0
2. FEELING DOWN, DEPRESSED OR HOPELESS: 0
1. LITTLE INTEREST OR PLEASURE IN DOING THINGS: 0
SUM OF ALL RESPONSES TO PHQ QUESTIONS 1-9: 0

## 2023-10-23 ASSESSMENT — ENCOUNTER SYMPTOMS
NAUSEA: 0
SHORTNESS OF BREATH: 1
ABDOMINAL DISTENTION: 0
VOMITING: 0
COUGH: 0

## 2023-10-23 NOTE — PATIENT INSTRUCTIONS
Medication changes:    Decrease lasix to daily as needed- (only take if weight gain of 3lb overnight or 5lb in 1 week, s/s of fluid retention such as swelling in abdomen, legs, or feet) If you are unsure please call us at [phone] option 2    Please take this to your pharmacy to notify them of the change in medications. Testing Ordered:    Lab work has been completed in clinic. If results are normal you may just received a message through my chart. Please make sure to have an active my chart account. Having issues logging in? Contact the [facility] at [phone]. Our office will notify you of any abnormal results requiring changes to your current plan of care. An order for echocardiogram has been placed to be done in in 3 months. You will be receiving an automated call from Coordination of Care to schedule this test. If you are unavailable to receive the call or would like to contact coordination of care yourself you may contact [phone] to schedule. You will need to contact coordination of care yourself if you miss their calls as they will only make 3 attempts to reach you. Other Recommendations:     Please call [facility] at [phone] to schedule appt with Dr Patrick Marshall for positive genetic testing. Please record blood pressure daily before medication and two hours after medication, please record weight daily upon waking/after using the bathroom. Keep a written records of your weights and blood pressure and bring to your next appointment. If you have a weight gain of 3 or more pounds overnight OR 5 or more pounds in one week, new/worsened shortness of breath or swelling, or if your blood pressure begins to consistently run below 90/60 and/or you begin to experience dizziness or lightheadedness please contact our office at [phone] option 2. Ensure your drinking an adequate amount of water with a goal of 6-8 eight ounce glasses (1.5-2 liters) of fluid daily.

## 2023-10-23 NOTE — PROGRESS NOTES
[address] in [address] Note    Patient name: Sofia Delgado  Patient : 1950  Patient MRN: [id number]  Date of service: 10/23/23    Primary care physician: Aparna Chi MD  Primary general cardiologist: Dr. Sangeetha Carrillo  Primary [facility] cardiologist: Sabrina Vanegas    Chief Complaint   Patient presents with    Congestive Heart Failure    Follow-up    Shortness of Breath     W/ & w/o exertion         PLAN OF CARE:  68 y.o. male with COPD on 3L NC and ischemic cardiomyopathy, LVEF 20-25%, stage D, NYHA class IV symptoms mostly due to lung disease; unable to tolerate GDMT due to hypotension and hyperkalemia  Appears compensated, warm and well perfused; RHC possibly could be considered to determine with full certainty if needs inotropic support as palliation for heart failure, but clinically appears too well now  Patient is not a candidate for LVAD-DT due to severe lung disease - oxygen dependent  Patient is not a candidate for heart transplant due to age > 79 years and severe lung disease    INTERVAL HISTORY:  10/23/23 Sofia Delgado presents today for HF follow up. He states he feels well today, his SOB is at his baseline and he remains on 3LNC. He denies acute HF symptoms at this time. He does his normal activities but he notes he isn't able to do much with his lung function. His most recent EF improved to 50%, he will return his lifevest to [address].  He has not yet had his lab work done or been able to call Dr. Kalia Mendez for his positive genetic test.     PLAN:  Continue current medical therapy for heart failure limited by hypotension and hyperkalemia  Cannot tolerate beta-blockers due to severe COPD  Cannot tolerate ACEi/ARB/ARNi/MRA due to hypotension and K 4.9  SGLT2 inhibitor: Continue Farxiga 5mg daily   Decrease Lasix to PRN- will watch weights at home  Not on allopurinol or uloric, check uric acid level  Continue baby

## 2023-10-24 ENCOUNTER — TELEPHONE (OUTPATIENT)
Age: 73
End: 2023-10-24

## 2023-10-24 PROBLEM — E61.1 IRON DEFICIENCY: Status: ACTIVE | Noted: 2023-10-24

## 2023-10-24 LAB
25(OH)D3 SERPL-MCNC: 22.3 NG/ML (ref 30–100)
ALBUMIN SERPL-MCNC: 4 G/DL (ref 3.5–5)
ALBUMIN/GLOB SERPL: 1.3 (ref 1.1–2.2)
ALP SERPL-CCNC: 113 U/L (ref 45–117)
ALT SERPL-CCNC: 21 U/L (ref 12–78)
ANION GAP SERPL CALC-SCNC: 6 MMOL/L (ref 5–15)
AST SERPL-CCNC: 15 U/L (ref 15–37)
BILIRUB SERPL-MCNC: 0.8 MG/DL (ref 0.2–1)
BUN SERPL-MCNC: 20 MG/DL (ref 6–20)
BUN/CREAT SERPL: 16 (ref 12–20)
CALCIUM SERPL-MCNC: 9.1 MG/DL (ref 8.5–10.1)
CHLORIDE SERPL-SCNC: 107 MMOL/L (ref 97–108)
CO2 SERPL-SCNC: 28 MMOL/L (ref 21–32)
CREAT SERPL-MCNC: 1.24 MG/DL (ref 0.7–1.3)
FERRITIN SERPL-MCNC: 38 NG/ML (ref 26–388)
GLOBULIN SER CALC-MCNC: 3.2 G/DL (ref 2–4)
GLUCOSE SERPL-MCNC: 87 MG/DL (ref 65–100)
IRON SATN MFR SERPL: 18 % (ref 20–50)
IRON SERPL-MCNC: 68 UG/DL (ref 35–150)
MAGNESIUM SERPL-MCNC: 2.2 MG/DL (ref 1.6–2.4)
NT PRO BNP: 36 PG/ML
POTASSIUM SERPL-SCNC: 4.3 MMOL/L (ref 3.5–5.1)
PROT SERPL-MCNC: 7.2 G/DL (ref 6.4–8.2)
SODIUM SERPL-SCNC: 141 MMOL/L (ref 136–145)
T4 FREE SERPL-MCNC: 1.4 NG/DL (ref 0.8–1.5)
TIBC SERPL-MCNC: 373 UG/DL (ref 250–450)
TSH SERPL DL<=0.05 MIU/L-ACNC: 1.44 UIU/ML (ref 0.36–3.74)
URATE SERPL-MCNC: 4.1 MG/DL (ref 3.5–7.2)

## 2023-10-24 RX ORDER — ACETAMINOPHEN 325 MG/1
650 TABLET ORAL
OUTPATIENT
Start: 2023-10-24

## 2023-10-24 RX ORDER — SODIUM CHLORIDE 9 MG/ML
5-250 INJECTION, SOLUTION INTRAVENOUS PRN
OUTPATIENT
Start: 2023-10-24

## 2023-10-24 RX ORDER — DIPHENHYDRAMINE HYDROCHLORIDE 50 MG/ML
50 INJECTION INTRAMUSCULAR; INTRAVENOUS
OUTPATIENT
Start: 2023-10-24

## 2023-10-24 RX ORDER — EPINEPHRINE 1 MG/ML
0.3 INJECTION, SOLUTION, CONCENTRATE INTRAVENOUS PRN
OUTPATIENT
Start: 2023-10-24

## 2023-10-24 RX ORDER — CHOLECALCIFEROL (VITAMIN D3) 50 MCG
2000 TABLET ORAL DAILY
Qty: 30 TABLET | Refills: 2 | Status: SHIPPED | OUTPATIENT
Start: 2023-10-24

## 2023-10-24 RX ORDER — FAMOTIDINE 10 MG/ML
20 INJECTION, SOLUTION INTRAVENOUS
OUTPATIENT
Start: 2023-10-24

## 2023-10-24 RX ORDER — HEPARIN SODIUM (PORCINE) LOCK FLUSH IV SOLN 100 UNIT/ML 100 UNIT/ML
500 SOLUTION INTRAVENOUS PRN
OUTPATIENT
Start: 2023-10-24

## 2023-10-24 RX ORDER — SODIUM CHLORIDE 0.9 % (FLUSH) 0.9 %
5-40 SYRINGE (ML) INJECTION PRN
OUTPATIENT
Start: 2023-10-24

## 2023-10-24 RX ORDER — SODIUM CHLORIDE 9 MG/ML
INJECTION, SOLUTION INTRAVENOUS CONTINUOUS
OUTPATIENT
Start: 2023-10-24

## 2023-10-24 RX ORDER — ONDANSETRON 2 MG/ML
8 INJECTION INTRAMUSCULAR; INTRAVENOUS
OUTPATIENT
Start: 2023-10-24

## 2023-10-24 RX ORDER — ALBUTEROL SULFATE 90 UG/1
4 AEROSOL, METERED RESPIRATORY (INHALATION) PRN
OUTPATIENT
Start: 2023-10-24

## 2023-10-24 NOTE — TELEPHONE ENCOUNTER
----- Message from SILVIA Anderson NP sent at 10/24/2023 10:21 AM EDT -----  Please call [facility] to discuss their abnormal lab results. He needs iron infusion and daily Vit D 2000 units    Called patient about abnormal labs. Patient is in agreement to Vitamin D supplement, and iron infusion and [facility]. Patient is driving and requests call back later to continue to educate on above and give contact info for [facility]. Patient requested my chart password update--completed    See orders only encounter for venofer therapy plan. Gave patient address and phone number to [facility], told him to call if he hasnt been scheduled within the week.

## 2023-11-03 ENCOUNTER — HOSPITAL ENCOUNTER (OUTPATIENT)
Facility: HOSPITAL | Age: 73
Setting detail: INFUSION SERIES
Discharge: HOME | End: 2023-11-03
Payer: MEDICARE

## 2023-11-03 VITALS
RESPIRATION RATE: 20 BRPM | OXYGEN SATURATION: 97 % | DIASTOLIC BLOOD PRESSURE: 56 MMHG | HEART RATE: 76 BPM | TEMPERATURE: 98.1 F | SYSTOLIC BLOOD PRESSURE: 104 MMHG

## 2023-11-03 DIAGNOSIS — I50.21 SYSTOLIC CHF, ACUTE (HCC): ICD-10-CM

## 2023-11-03 DIAGNOSIS — E61.1 IRON DEFICIENCY: Primary | ICD-10-CM

## 2023-11-03 DIAGNOSIS — R06.02 SHORTNESS OF BREATH: ICD-10-CM

## 2023-11-03 PROCEDURE — 96374 THER/PROPH/DIAG INJ IV PUSH: CPT

## 2023-11-03 PROCEDURE — 2580000003 HC RX 258: Performed by: NURSE PRACTITIONER

## 2023-11-03 PROCEDURE — 6360000002 HC RX W HCPCS: Performed by: NURSE PRACTITIONER

## 2023-11-03 RX ORDER — SODIUM CHLORIDE 9 MG/ML
5-250 INJECTION, SOLUTION INTRAVENOUS PRN
OUTPATIENT
Start: 2023-11-10

## 2023-11-03 RX ORDER — HEPARIN 100 UNIT/ML
500 SYRINGE INTRAVENOUS PRN
OUTPATIENT
Start: 2023-11-10

## 2023-11-03 RX ORDER — SODIUM CHLORIDE 9 MG/ML
INJECTION, SOLUTION INTRAVENOUS CONTINUOUS
OUTPATIENT
Start: 2023-11-10

## 2023-11-03 RX ORDER — SODIUM CHLORIDE 0.9 % (FLUSH) 0.9 %
5-40 SYRINGE (ML) INJECTION PRN
OUTPATIENT
Start: 2023-11-10

## 2023-11-03 RX ORDER — ACETAMINOPHEN 325 MG/1
650 TABLET ORAL
OUTPATIENT
Start: 2023-11-10

## 2023-11-03 RX ORDER — EPINEPHRINE 1 MG/ML
0.3 INJECTION, SOLUTION, CONCENTRATE INTRAVENOUS PRN
OUTPATIENT
Start: 2023-11-10

## 2023-11-03 RX ORDER — DIPHENHYDRAMINE HYDROCHLORIDE 50 MG/ML
50 INJECTION INTRAMUSCULAR; INTRAVENOUS
OUTPATIENT
Start: 2023-11-10

## 2023-11-03 RX ORDER — SODIUM CHLORIDE 9 MG/ML
5-250 INJECTION, SOLUTION INTRAVENOUS PRN
Status: CANCELLED | OUTPATIENT
Start: 2023-11-10

## 2023-11-03 RX ORDER — ALBUTEROL SULFATE 90 UG/1
4 AEROSOL, METERED RESPIRATORY (INHALATION) PRN
OUTPATIENT
Start: 2023-11-10

## 2023-11-03 RX ORDER — SODIUM CHLORIDE 9 MG/ML
5-250 INJECTION, SOLUTION INTRAVENOUS PRN
Status: DISCONTINUED | OUTPATIENT
Start: 2023-11-03 | End: 2023-11-04 | Stop reason: HOSPADM

## 2023-11-03 RX ORDER — ONDANSETRON 2 MG/ML
8 INJECTION INTRAMUSCULAR; INTRAVENOUS
OUTPATIENT
Start: 2023-11-10

## 2023-11-03 RX ADMIN — IRON SUCROSE 200 MG: 20 INJECTION, SOLUTION INTRAVENOUS at 12:12

## 2023-11-03 RX ADMIN — SODIUM CHLORIDE 25 ML/HR: 9 INJECTION, SOLUTION INTRAVENOUS at 11:39

## 2023-11-03 ASSESSMENT — PAIN SCALES - GENERAL: PAINLEVEL_OUTOF10: 0

## 2023-11-10 ENCOUNTER — HOSPITAL ENCOUNTER (OUTPATIENT)
Facility: HOSPITAL | Age: 73
Setting detail: INFUSION SERIES
Discharge: HOME | End: 2023-11-10
Payer: MEDICARE

## 2023-11-10 VITALS
DIASTOLIC BLOOD PRESSURE: 62 MMHG | BODY MASS INDEX: 19.44 KG/M2 | HEART RATE: 75 BPM | SYSTOLIC BLOOD PRESSURE: 108 MMHG | WEIGHT: 139.4 LBS | RESPIRATION RATE: 21 BRPM | TEMPERATURE: 98.9 F | OXYGEN SATURATION: 92 %

## 2023-11-10 DIAGNOSIS — R06.02 SHORTNESS OF BREATH: ICD-10-CM

## 2023-11-10 DIAGNOSIS — I50.21 SYSTOLIC CHF, ACUTE (HCC): ICD-10-CM

## 2023-11-10 DIAGNOSIS — E61.1 IRON DEFICIENCY: Primary | ICD-10-CM

## 2023-11-10 PROCEDURE — 96374 THER/PROPH/DIAG INJ IV PUSH: CPT

## 2023-11-10 PROCEDURE — 2580000003 HC RX 258: Performed by: NURSE PRACTITIONER

## 2023-11-10 PROCEDURE — 6360000002 HC RX W HCPCS: Performed by: NURSE PRACTITIONER

## 2023-11-10 RX ORDER — ONDANSETRON 2 MG/ML
8 INJECTION INTRAMUSCULAR; INTRAVENOUS
OUTPATIENT
Start: 2023-11-10

## 2023-11-10 RX ORDER — SODIUM CHLORIDE 0.9 % (FLUSH) 0.9 %
5-40 SYRINGE (ML) INJECTION PRN
OUTPATIENT
Start: 2023-11-10

## 2023-11-10 RX ORDER — HEPARIN 100 UNIT/ML
500 SYRINGE INTRAVENOUS PRN
OUTPATIENT
Start: 2023-11-10

## 2023-11-10 RX ORDER — SODIUM CHLORIDE 9 MG/ML
5-250 INJECTION, SOLUTION INTRAVENOUS PRN
OUTPATIENT
Start: 2023-11-10

## 2023-11-10 RX ORDER — DIPHENHYDRAMINE HYDROCHLORIDE 50 MG/ML
50 INJECTION INTRAMUSCULAR; INTRAVENOUS
OUTPATIENT
Start: 2023-11-10

## 2023-11-10 RX ORDER — ALBUTEROL SULFATE 90 UG/1
4 AEROSOL, METERED RESPIRATORY (INHALATION) PRN
OUTPATIENT
Start: 2023-11-10

## 2023-11-10 RX ORDER — SODIUM CHLORIDE 9 MG/ML
INJECTION, SOLUTION INTRAVENOUS CONTINUOUS
OUTPATIENT
Start: 2023-11-10

## 2023-11-10 RX ORDER — EPINEPHRINE 1 MG/ML
0.3 INJECTION, SOLUTION, CONCENTRATE INTRAVENOUS PRN
OUTPATIENT
Start: 2023-11-10

## 2023-11-10 RX ORDER — SODIUM CHLORIDE 9 MG/ML
5-250 INJECTION, SOLUTION INTRAVENOUS PRN
Status: DISCONTINUED | OUTPATIENT
Start: 2023-11-10 | End: 2023-11-11 | Stop reason: HOSPADM

## 2023-11-10 RX ORDER — ACETAMINOPHEN 325 MG/1
650 TABLET ORAL
OUTPATIENT
Start: 2023-11-10

## 2023-11-10 RX ADMIN — SODIUM CHLORIDE 25 ML/HR: 9 INJECTION, SOLUTION INTRAVENOUS at 10:35

## 2023-11-10 RX ADMIN — IRON SUCROSE 200 MG: 20 INJECTION, SOLUTION INTRAVENOUS at 11:23

## 2023-11-10 ASSESSMENT — PAIN SCALES - GENERAL: PAINLEVEL_OUTOF10: 0

## 2023-11-16 ENCOUNTER — TELEPHONE (OUTPATIENT)
Age: 73
End: 2023-11-16

## 2023-11-16 NOTE — TELEPHONE ENCOUNTER
Fax notification that patient was approved for re-enrollment for [facility] [place] patient assistance through [employer]

## 2024-01-05 ENCOUNTER — HOSPITAL ENCOUNTER (OUTPATIENT)
Facility: HOSPITAL | Age: 74
End: 2024-01-05
Attending: INTERNAL MEDICINE
Payer: MEDICARE

## 2024-01-05 VITALS
BODY MASS INDEX: 19.51 KG/M2 | WEIGHT: 139.33 LBS | HEIGHT: 71 IN | SYSTOLIC BLOOD PRESSURE: 110 MMHG | DIASTOLIC BLOOD PRESSURE: 65 MMHG

## 2024-01-05 DIAGNOSIS — I50.22 CHRONIC SYSTOLIC HEART FAILURE (HCC): ICD-10-CM

## 2024-01-05 LAB
ECHO AO ROOT DIAM: 2.9 CM
ECHO AO ROOT INDEX: 1.6 CM/M2
ECHO AV AREA PEAK VELOCITY: 1.4 CM2
ECHO AV AREA VTI: 1.5 CM2
ECHO AV AREA/BSA PEAK VELOCITY: 0.8 CM2/M2
ECHO AV AREA/BSA VTI: 0.8 CM2/M2
ECHO AV MEAN GRADIENT: 7 MMHG
ECHO AV MEAN VELOCITY: 1.2 M/S
ECHO AV PEAK GRADIENT: 10 MMHG
ECHO AV PEAK VELOCITY: 1.6 M/S
ECHO AV VELOCITY RATIO: 0.44
ECHO AV VTI: 40.6 CM
ECHO BSA: 1.78 M2
ECHO LA DIAMETER INDEX: 1.44 CM/M2
ECHO LA DIAMETER: 2.6 CM
ECHO LA TO AORTIC ROOT RATIO: 0.9
ECHO LV E' LATERAL VELOCITY: 8 CM/S
ECHO LV E' SEPTAL VELOCITY: 10 CM/S
ECHO LV FRACTIONAL SHORTENING: 30 % (ref 28–44)
ECHO LV INTERNAL DIMENSION DIASTOLE INDEX: 2.38 CM/M2
ECHO LV INTERNAL DIMENSION DIASTOLIC: 4.3 CM (ref 4.2–5.9)
ECHO LV INTERNAL DIMENSION SYSTOLIC INDEX: 1.66 CM/M2
ECHO LV INTERNAL DIMENSION SYSTOLIC: 3 CM
ECHO LV IVSD: 1 CM (ref 0.6–1)
ECHO LV MASS 2D: 152.6 G (ref 88–224)
ECHO LV MASS INDEX 2D: 84.3 G/M2 (ref 49–115)
ECHO LV POSTERIOR WALL DIASTOLIC: 1.1 CM (ref 0.6–1)
ECHO LV RELATIVE WALL THICKNESS RATIO: 0.51
ECHO LVOT AREA: 3.5 CM2
ECHO LVOT AV VTI INDEX: 0.44
ECHO LVOT DIAM: 2.1 CM
ECHO LVOT MEAN GRADIENT: 1 MMHG
ECHO LVOT PEAK GRADIENT: 2 MMHG
ECHO LVOT PEAK VELOCITY: 0.7 M/S
ECHO LVOT STROKE VOLUME INDEX: 33.9 ML/M2
ECHO LVOT SV: 61.3 ML
ECHO LVOT VTI: 17.7 CM
ECHO MV A VELOCITY: 0.79 M/S
ECHO MV AREA PHT: 3.6 CM2
ECHO MV E DECELERATION TIME (DT): 208 MS
ECHO MV E VELOCITY: 0.59 M/S
ECHO MV E/A RATIO: 0.75
ECHO MV E/E' LATERAL: 7.38
ECHO MV E/E' RATIO (AVERAGED): 6.64
ECHO MV PRESSURE HALF TIME (PHT): 60.3 MS
ECHO RV INTERNAL DIMENSION: 3.6 CM
ECHO RV LONGITUDINAL DIMENSION: 5.1 CM
ECHO RV MID DIMENSION: 2.7 CM

## 2024-01-05 PROCEDURE — 93306 TTE W/DOPPLER COMPLETE: CPT

## 2024-01-19 ENCOUNTER — TELEPHONE (OUTPATIENT)
Age: 74
End: 2024-01-19

## 2024-01-23 DIAGNOSIS — J44.9 CHRONIC OBSTRUCTIVE PULMONARY DISEASE, UNSPECIFIED COPD TYPE (HCC): Primary | ICD-10-CM

## 2024-01-25 ASSESSMENT — ENCOUNTER SYMPTOMS
BLOOD IN STOOL: 0
CHEST TIGHTNESS: 0
EYE PAIN: 0
ABDOMINAL PAIN: 0
SORE THROAT: 0
ABDOMINAL DISTENTION: 0

## 2024-01-26 ENCOUNTER — OFFICE VISIT (OUTPATIENT)
Age: 74
End: 2024-01-26
Payer: MEDICARE

## 2024-01-26 VITALS
BODY MASS INDEX: 19.23 KG/M2 | OXYGEN SATURATION: 95 % | TEMPERATURE: 96.2 F | DIASTOLIC BLOOD PRESSURE: 88 MMHG | HEART RATE: 87 BPM | SYSTOLIC BLOOD PRESSURE: 128 MMHG | WEIGHT: 137.8 LBS

## 2024-01-26 DIAGNOSIS — I50.22 CHRONIC SYSTOLIC HEART FAILURE (HCC): Primary | ICD-10-CM

## 2024-01-26 PROCEDURE — 99214 OFFICE O/P EST MOD 30 MIN: CPT | Performed by: NURSE PRACTITIONER

## 2024-01-26 PROCEDURE — 1123F ACP DISCUSS/DSCN MKR DOCD: CPT | Performed by: NURSE PRACTITIONER

## 2024-01-26 ASSESSMENT — ENCOUNTER SYMPTOMS: SHORTNESS OF BREATH: 1

## 2024-01-26 NOTE — PATIENT INSTRUCTIONS
Medication changes:    No medication changes     Please complete entresto patient assistance forms and bring them back to the office as soon as possible with income documents. It takes around 2 to 3 weeks to receive a decision once completed application is faxed.       Please take this to your pharmacy to notify them of the change in medications.     Testing Ordered:    Please present to a local lab [facility] to have labs completed. You will be notified of any abnormal results that requires a change in medication regimen.     Other Recommendations:      Please record blood pressure and heart rate daily before medication and two hours after medication, please record weight daily upon waking/after using the bathroom. Keep a written records of your weights and blood pressure and bring to your next appointment. If you have a weight gain of 3 or more pounds overnight OR 5 or more pounds in one week, new/worsened shortness of breath or swelling, or if your blood pressure begins to consistently run below 90/60 and/or you begin to experience dizziness or lightheadedness please contact our office at [phone] option 2.    Ensure your drinking an adequate amount of water with a goal of 6-8 eight ounce glasses (1.5-2 liters) of fluid daily. Your urine should be clear and light yellow straw colored.     Follow up August with NP with [facility] [place]    Thank you for allowing us the privilege of being a part of your healthcare team! Please do not hesitate to contact our office at [phone] option 2 with any questions or concerns.

## 2024-01-26 NOTE — PROGRESS NOTES
Conjunctivae normal.   Neck:      Vascular: No JVD.   Cardiovascular:      Rate and Rhythm: Normal rate and regular rhythm.      Pulses: Normal pulses.      Heart sounds: Normal heart sounds. No murmur heard.     No friction rub. No gallop.   Pulmonary:      Effort: Pulmonary effort is normal. No respiratory distress.      Breath sounds: Examination of the right-lower field reveals rhonchi. Examination of the left-lower field reveals rhonchi. Rhonchi present.      Comments: Wearing O2  Abdominal:      General: Bowel sounds are normal. There is no distension.      Palpations: Abdomen is soft.   Musculoskeletal:         General: No swelling or deformity.      Cervical back: Normal range of motion and neck supple.      Right lower leg: No edema.      Left lower leg: No edema.   Skin:     General: Skin is warm and dry.   Neurological:      General: No focal deficit present.      Mental Status: He is alert and oriented to person, place, and time.   Psychiatric:         Mood and Affect: Mood normal.         Behavior: Behavior normal.         Thought Content: Thought content normal.         Judgment: Judgment normal.           Review of Systems   Constitutional:  Positive for fatigue. Negative for activity change, appetite change, chills, fever and unexpected weight change.        So so appetite   HENT:  Negative for congestion, nosebleeds and sore throat.    Eyes:  Negative for pain and visual disturbance.   Respiratory:  Positive for cough and shortness of breath. Negative for chest tightness.    Cardiovascular:  Negative for chest pain, palpitations and leg swelling.   Gastrointestinal:  Negative for abdominal distention, abdominal pain and blood in stool.   Genitourinary:  Negative for dysuria and hematuria.   Musculoskeletal:  Negative for arthralgias and myalgias.   Skin:  Negative for rash and wound.   Neurological:  Negative for dizziness, weakness, light-headedness and headaches.   Psychiatric/Behavioral:

## 2024-04-02 ENCOUNTER — TELEPHONE (OUTPATIENT)
Age: 74
End: 2024-04-02

## 2024-04-02 NOTE — TELEPHONE ENCOUNTER
Left voicemail asking pt to call me back to schedule 7 month follow-up due in Aug.    Birth Control Pills Counseling: Birth Control Pill Counseling: I discussed with the patient the potential side effects of OCPs including but not limited to increased risk of stroke, heart attack, thrombophlebitis, deep venous thrombosis, hepatic adenomas, breast changes, GI upset, headaches, and depression.  The patient verbalized understanding of the proper use and possible adverse effects of OCPs. All of the patient's questions and concerns were addressed.

## 2024-05-01 RX ORDER — CHOLECALCIFEROL (VITAMIN D3) 125 MCG
1 CAPSULE ORAL DAILY
Qty: 30 TABLET | Refills: 3 | Status: SHIPPED | OUTPATIENT
Start: 2024-05-01

## 2024-05-01 NOTE — TELEPHONE ENCOUNTER
Requested Prescriptions     Pending Prescriptions Disp Refills    Cholecalciferol (VITAMIN D3) 50 MCG (2000 UT) TABS [Pharmacy Med Name: VITAMIN D3 50MCG TABLETS] 30 tablet 3     Sig: TAKE 1 TABLET BY MOUTH DAILY      Last office visit 1/26/24; f/u visit 8/26/24

## 2024-08-19 ENCOUNTER — HOSPITAL ENCOUNTER (INPATIENT)
Facility: HOSPITAL | Age: 74
LOS: 7 days | Discharge: NURSING, REHAB OR LONG-TERM CARE | DRG: 177 | End: 2024-08-27
Attending: EMERGENCY MEDICINE | Admitting: INTERNAL MEDICINE
Payer: MEDICARE

## 2024-08-19 ENCOUNTER — APPOINTMENT (OUTPATIENT)
Facility: HOSPITAL | Age: 74
DRG: 177 | End: 2024-08-19
Payer: MEDICARE

## 2024-08-19 DIAGNOSIS — J96.01 ACUTE RESPIRATORY FAILURE WITH HYPOXIA (HCC): Primary | ICD-10-CM

## 2024-08-19 DIAGNOSIS — J18.9 PNEUMONIA DUE TO INFECTIOUS ORGANISM, UNSPECIFIED LATERALITY, UNSPECIFIED PART OF LUNG: ICD-10-CM

## 2024-08-19 DIAGNOSIS — E87.20 LACTIC ACIDOSIS: ICD-10-CM

## 2024-08-19 DIAGNOSIS — I21.4 NSTEMI (NON-ST ELEVATED MYOCARDIAL INFARCTION) (HCC): ICD-10-CM

## 2024-08-19 DIAGNOSIS — R06.02 SHORTNESS OF BREATH: ICD-10-CM

## 2024-08-19 LAB
ALBUMIN SERPL-MCNC: 2.9 G/DL (ref 3.5–5)
ALBUMIN/GLOB SERPL: 0.8 (ref 1.1–2.2)
ALP SERPL-CCNC: 151 U/L (ref 45–117)
ALT SERPL-CCNC: 61 U/L (ref 12–78)
ANION GAP SERPL CALC-SCNC: 9 MMOL/L (ref 5–15)
AST SERPL-CCNC: 81 U/L (ref 15–37)
BASOPHILS # BLD: 0.1 K/UL (ref 0–0.1)
BASOPHILS NFR BLD: 1 % (ref 0–1)
BILIRUB SERPL-MCNC: 1 MG/DL (ref 0.2–1)
BUN SERPL-MCNC: 14 MG/DL (ref 6–20)
BUN/CREAT SERPL: 13 (ref 12–20)
CALCIUM SERPL-MCNC: 9 MG/DL (ref 8.5–10.1)
CHLORIDE SERPL-SCNC: 104 MMOL/L (ref 97–108)
CO2 SERPL-SCNC: 25 MMOL/L (ref 21–32)
CREAT SERPL-MCNC: 1.06 MG/DL (ref 0.7–1.3)
DIFFERENTIAL METHOD BLD: ABNORMAL
EOSINOPHIL # BLD: 0 K/UL (ref 0–0.4)
EOSINOPHIL NFR BLD: 0 % (ref 0–7)
ERYTHROCYTE [DISTWIDTH] IN BLOOD BY AUTOMATED COUNT: 14.2 % (ref 11.5–14.5)
GLOBULIN SER CALC-MCNC: 3.8 G/DL (ref 2–4)
GLUCOSE SERPL-MCNC: 111 MG/DL (ref 65–100)
HCT VFR BLD AUTO: 45.7 % (ref 36.6–50.3)
HGB BLD-MCNC: 14.8 G/DL (ref 12.1–17)
IMM GRANULOCYTES # BLD AUTO: 0.1 K/UL (ref 0–0.04)
IMM GRANULOCYTES NFR BLD AUTO: 1 % (ref 0–0.5)
LACTATE BLD-SCNC: 2.28 MMOL/L (ref 0.4–2)
LYMPHOCYTES # BLD: 1.7 K/UL (ref 0.8–3.5)
LYMPHOCYTES NFR BLD: 16 % (ref 12–49)
MAGNESIUM SERPL-MCNC: 2 MG/DL (ref 1.6–2.4)
MCH RBC QN AUTO: 29.8 PG (ref 26–34)
MCHC RBC AUTO-ENTMCNC: 32.4 G/DL (ref 30–36.5)
MCV RBC AUTO: 92 FL (ref 80–99)
MONOCYTES # BLD: 1.4 K/UL (ref 0–1)
MONOCYTES NFR BLD: 13 % (ref 5–13)
NEUTS SEG # BLD: 7.4 K/UL (ref 1.8–8)
NEUTS SEG NFR BLD: 69 % (ref 32–75)
NRBC # BLD: 0 K/UL (ref 0–0.01)
NRBC BLD-RTO: 0 PER 100 WBC
PLATELET # BLD AUTO: 337 K/UL (ref 150–400)
PMV BLD AUTO: 9.8 FL (ref 8.9–12.9)
POTASSIUM SERPL-SCNC: 4 MMOL/L (ref 3.5–5.1)
PROT SERPL-MCNC: 6.7 G/DL (ref 6.4–8.2)
RBC # BLD AUTO: 4.97 M/UL (ref 4.1–5.7)
SODIUM SERPL-SCNC: 138 MMOL/L (ref 136–145)
TROPONIN I SERPL HS-MCNC: 3029 NG/L (ref 0–76)
WBC # BLD AUTO: 10.7 K/UL (ref 4.1–11.1)

## 2024-08-19 PROCEDURE — 96367 TX/PROPH/DG ADDL SEQ IV INF: CPT

## 2024-08-19 PROCEDURE — 6360000002 HC RX W HCPCS: Performed by: EMERGENCY MEDICINE

## 2024-08-19 PROCEDURE — 96375 TX/PRO/DX INJ NEW DRUG ADDON: CPT

## 2024-08-19 PROCEDURE — 83735 ASSAY OF MAGNESIUM: CPT

## 2024-08-19 PROCEDURE — 80053 COMPREHEN METABOLIC PANEL: CPT

## 2024-08-19 PROCEDURE — 96366 THER/PROPH/DIAG IV INF ADDON: CPT

## 2024-08-19 PROCEDURE — 93005 ELECTROCARDIOGRAM TRACING: CPT | Performed by: EMERGENCY MEDICINE

## 2024-08-19 PROCEDURE — 2580000003 HC RX 258: Performed by: EMERGENCY MEDICINE

## 2024-08-19 PROCEDURE — 71045 X-RAY EXAM CHEST 1 VIEW: CPT

## 2024-08-19 PROCEDURE — 83605 ASSAY OF LACTIC ACID: CPT

## 2024-08-19 PROCEDURE — 99285 EMERGENCY DEPT VISIT HI MDM: CPT

## 2024-08-19 PROCEDURE — 96365 THER/PROPH/DIAG IV INF INIT: CPT

## 2024-08-19 PROCEDURE — 36415 COLL VENOUS BLD VENIPUNCTURE: CPT

## 2024-08-19 PROCEDURE — 85025 COMPLETE CBC W/AUTO DIFF WBC: CPT

## 2024-08-19 PROCEDURE — 84484 ASSAY OF TROPONIN QUANT: CPT

## 2024-08-19 PROCEDURE — 87040 BLOOD CULTURE FOR BACTERIA: CPT

## 2024-08-19 RX ORDER — ASPIRIN 81 MG/1
324 TABLET, CHEWABLE ORAL ONCE
Status: COMPLETED | OUTPATIENT
Start: 2024-08-19 | End: 2024-08-20

## 2024-08-19 RX ORDER — ALBUTEROL SULFATE 0.83 MG/ML
10 SOLUTION RESPIRATORY (INHALATION)
Status: COMPLETED | OUTPATIENT
Start: 2024-08-19 | End: 2024-08-19

## 2024-08-19 RX ORDER — MAGNESIUM SULFATE IN WATER 40 MG/ML
2000 INJECTION, SOLUTION INTRAVENOUS
Status: COMPLETED | OUTPATIENT
Start: 2024-08-19 | End: 2024-08-19

## 2024-08-19 RX ADMIN — MAGNESIUM SULFATE HEPTAHYDRATE 2000 MG: 40 INJECTION, SOLUTION INTRAVENOUS at 23:21

## 2024-08-19 RX ADMIN — ALBUTEROL SULFATE 10 MG: 2.5 SOLUTION RESPIRATORY (INHALATION) at 23:53

## 2024-08-19 RX ADMIN — WATER 125 MG: 1 INJECTION INTRAMUSCULAR; INTRAVENOUS; SUBCUTANEOUS at 23:23

## 2024-08-20 ENCOUNTER — APPOINTMENT (OUTPATIENT)
Facility: HOSPITAL | Age: 74
DRG: 177 | End: 2024-08-20
Payer: MEDICARE

## 2024-08-20 PROBLEM — J44.1 COPD WITH ACUTE EXACERBATION (HCC): Status: ACTIVE | Noted: 2024-08-20

## 2024-08-20 PROBLEM — I21.4 NSTEMI (NON-ST ELEVATED MYOCARDIAL INFARCTION) (HCC): Status: ACTIVE | Noted: 2024-08-20

## 2024-08-20 LAB
APTT PPP: 34.9 SEC (ref 22.1–31)
ERYTHROCYTE [DISTWIDTH] IN BLOOD BY AUTOMATED COUNT: 14 % (ref 11.5–14.5)
FLUAV RNA SPEC QL NAA+PROBE: NOT DETECTED
FLUBV RNA SPEC QL NAA+PROBE: NOT DETECTED
HCT VFR BLD AUTO: 40.1 % (ref 36.6–50.3)
HGB BLD-MCNC: 13.2 G/DL (ref 12.1–17)
INR PPP: 1 (ref 0.9–1.1)
LACTATE BLD-SCNC: 1.28 MMOL/L (ref 0.4–2)
MCH RBC QN AUTO: 30.3 PG (ref 26–34)
MCHC RBC AUTO-ENTMCNC: 32.9 G/DL (ref 30–36.5)
MCV RBC AUTO: 92.2 FL (ref 80–99)
NRBC # BLD: 0 K/UL (ref 0–0.01)
NRBC BLD-RTO: 0 PER 100 WBC
PLATELET # BLD AUTO: 274 K/UL (ref 150–400)
PMV BLD AUTO: 10 FL (ref 8.9–12.9)
PROTHROMBIN TIME: 10.9 SEC (ref 9–11.1)
RBC # BLD AUTO: 4.35 M/UL (ref 4.1–5.7)
SARS-COV-2 RNA RESP QL NAA+PROBE: DETECTED
SOURCE: ABNORMAL
THERAPEUTIC RANGE: ABNORMAL SECS (ref 58–77)
TROPONIN I SERPL HS-MCNC: 2438 NG/L (ref 0–76)
UFH PPP CHRO-ACNC: 0.18 IU/ML
UFH PPP CHRO-ACNC: 0.22 IU/ML
UFH PPP CHRO-ACNC: <0.1 IU/ML
WBC # BLD AUTO: 8.2 K/UL (ref 4.1–11.1)

## 2024-08-20 PROCEDURE — 96368 THER/DIAG CONCURRENT INF: CPT

## 2024-08-20 PROCEDURE — 96375 TX/PRO/DX INJ NEW DRUG ADDON: CPT

## 2024-08-20 PROCEDURE — 2580000003 HC RX 258: Performed by: INTERNAL MEDICINE

## 2024-08-20 PROCEDURE — 94640 AIRWAY INHALATION TREATMENT: CPT

## 2024-08-20 PROCEDURE — 85610 PROTHROMBIN TIME: CPT

## 2024-08-20 PROCEDURE — 6360000004 HC RX CONTRAST MEDICATION: Performed by: RADIOLOGY

## 2024-08-20 PROCEDURE — 85027 COMPLETE CBC AUTOMATED: CPT

## 2024-08-20 PROCEDURE — 6370000000 HC RX 637 (ALT 250 FOR IP): Performed by: INTERNAL MEDICINE

## 2024-08-20 PROCEDURE — 6360000002 HC RX W HCPCS: Performed by: INTERNAL MEDICINE

## 2024-08-20 PROCEDURE — 6370000000 HC RX 637 (ALT 250 FOR IP): Performed by: EMERGENCY MEDICINE

## 2024-08-20 PROCEDURE — 96374 THER/PROPH/DIAG INJ IV PUSH: CPT

## 2024-08-20 PROCEDURE — 2060000000 HC ICU INTERMEDIATE R&B

## 2024-08-20 PROCEDURE — 71275 CT ANGIOGRAPHY CHEST: CPT

## 2024-08-20 PROCEDURE — 36415 COLL VENOUS BLD VENIPUNCTURE: CPT

## 2024-08-20 PROCEDURE — 85520 HEPARIN ASSAY: CPT

## 2024-08-20 PROCEDURE — 96365 THER/PROPH/DIAG IV INF INIT: CPT

## 2024-08-20 PROCEDURE — 87636 SARSCOV2 & INF A&B AMP PRB: CPT

## 2024-08-20 PROCEDURE — 83605 ASSAY OF LACTIC ACID: CPT

## 2024-08-20 PROCEDURE — 84484 ASSAY OF TROPONIN QUANT: CPT

## 2024-08-20 PROCEDURE — 85730 THROMBOPLASTIN TIME PARTIAL: CPT

## 2024-08-20 PROCEDURE — 6360000002 HC RX W HCPCS: Performed by: EMERGENCY MEDICINE

## 2024-08-20 PROCEDURE — 2580000003 HC RX 258: Performed by: EMERGENCY MEDICINE

## 2024-08-20 RX ORDER — POLYETHYLENE GLYCOL 3350 17 G/17G
17 POWDER, FOR SOLUTION ORAL DAILY
Status: DISCONTINUED | OUTPATIENT
Start: 2024-08-20 | End: 2024-08-27 | Stop reason: HOSPADM

## 2024-08-20 RX ORDER — SODIUM CHLORIDE 0.9 % (FLUSH) 0.9 %
5-40 SYRINGE (ML) INJECTION EVERY 12 HOURS SCHEDULED
Status: DISCONTINUED | OUTPATIENT
Start: 2024-08-20 | End: 2024-08-27 | Stop reason: HOSPADM

## 2024-08-20 RX ORDER — HEPARIN SODIUM 1000 [USP'U]/ML
4000 INJECTION, SOLUTION INTRAVENOUS; SUBCUTANEOUS ONCE
Status: COMPLETED | OUTPATIENT
Start: 2024-08-20 | End: 2024-08-20

## 2024-08-20 RX ORDER — GUAIFENESIN/DEXTROMETHORPHAN 100-10MG/5
10 SYRUP ORAL EVERY 6 HOURS PRN
Status: DISCONTINUED | OUTPATIENT
Start: 2024-08-20 | End: 2024-08-27 | Stop reason: HOSPADM

## 2024-08-20 RX ORDER — HEPARIN SODIUM 1000 [USP'U]/ML
30 INJECTION, SOLUTION INTRAVENOUS; SUBCUTANEOUS PRN
Status: DISCONTINUED | OUTPATIENT
Start: 2024-08-20 | End: 2024-08-20 | Stop reason: SDUPTHER

## 2024-08-20 RX ORDER — HEPARIN SODIUM 10000 [USP'U]/100ML
5-30 INJECTION, SOLUTION INTRAVENOUS CONTINUOUS
Status: DISCONTINUED | OUTPATIENT
Start: 2024-08-20 | End: 2024-08-20 | Stop reason: SDUPTHER

## 2024-08-20 RX ORDER — ONDANSETRON 4 MG/1
4 TABLET, ORALLY DISINTEGRATING ORAL EVERY 8 HOURS PRN
Status: DISCONTINUED | OUTPATIENT
Start: 2024-08-20 | End: 2024-08-27 | Stop reason: HOSPADM

## 2024-08-20 RX ORDER — 0.9 % SODIUM CHLORIDE 0.9 %
1000 INTRAVENOUS SOLUTION INTRAVENOUS ONCE
Status: COMPLETED | OUTPATIENT
Start: 2024-08-20 | End: 2024-08-20

## 2024-08-20 RX ORDER — ACETAMINOPHEN 325 MG/1
650 TABLET ORAL EVERY 6 HOURS PRN
Status: DISCONTINUED | OUTPATIENT
Start: 2024-08-20 | End: 2024-08-27 | Stop reason: HOSPADM

## 2024-08-20 RX ORDER — ONDANSETRON 2 MG/ML
4 INJECTION INTRAMUSCULAR; INTRAVENOUS EVERY 6 HOURS PRN
Status: DISCONTINUED | OUTPATIENT
Start: 2024-08-20 | End: 2024-08-27 | Stop reason: HOSPADM

## 2024-08-20 RX ORDER — HEPARIN SODIUM 1000 [USP'U]/ML
60 INJECTION, SOLUTION INTRAVENOUS; SUBCUTANEOUS ONCE
Status: DISCONTINUED | OUTPATIENT
Start: 2024-08-20 | End: 2024-08-20 | Stop reason: SDUPTHER

## 2024-08-20 RX ORDER — HEPARIN SODIUM 1000 [USP'U]/ML
60 INJECTION, SOLUTION INTRAVENOUS; SUBCUTANEOUS PRN
Status: DISCONTINUED | OUTPATIENT
Start: 2024-08-20 | End: 2024-08-20 | Stop reason: SDUPTHER

## 2024-08-20 RX ORDER — METOPROLOL SUCCINATE 25 MG/1
25 TABLET, EXTENDED RELEASE ORAL DAILY
Status: DISCONTINUED | OUTPATIENT
Start: 2024-08-20 | End: 2024-08-27 | Stop reason: HOSPADM

## 2024-08-20 RX ORDER — HEPARIN SODIUM 10000 [USP'U]/100ML
5-30 INJECTION, SOLUTION INTRAVENOUS CONTINUOUS
Status: DISCONTINUED | OUTPATIENT
Start: 2024-08-20 | End: 2024-08-22

## 2024-08-20 RX ORDER — FUROSEMIDE 10 MG/ML
20 INJECTION INTRAMUSCULAR; INTRAVENOUS DAILY
Status: DISCONTINUED | OUTPATIENT
Start: 2024-08-21 | End: 2024-08-25

## 2024-08-20 RX ORDER — HEPARIN SODIUM 1000 [USP'U]/ML
4000 INJECTION, SOLUTION INTRAVENOUS; SUBCUTANEOUS PRN
Status: DISCONTINUED | OUTPATIENT
Start: 2024-08-20 | End: 2024-08-22

## 2024-08-20 RX ORDER — IOPAMIDOL 755 MG/ML
100 INJECTION, SOLUTION INTRAVASCULAR
Status: COMPLETED | OUTPATIENT
Start: 2024-08-20 | End: 2024-08-20

## 2024-08-20 RX ORDER — ACETAMINOPHEN 650 MG/1
650 SUPPOSITORY RECTAL EVERY 6 HOURS PRN
Status: DISCONTINUED | OUTPATIENT
Start: 2024-08-20 | End: 2024-08-27 | Stop reason: HOSPADM

## 2024-08-20 RX ORDER — BISACODYL 10 MG
10 SUPPOSITORY, RECTAL RECTAL DAILY PRN
Status: DISCONTINUED | OUTPATIENT
Start: 2024-08-20 | End: 2024-08-27 | Stop reason: HOSPADM

## 2024-08-20 RX ORDER — ATORVASTATIN CALCIUM 40 MG/1
40 TABLET, FILM COATED ORAL NIGHTLY
Status: DISCONTINUED | OUTPATIENT
Start: 2024-08-20 | End: 2024-08-27 | Stop reason: HOSPADM

## 2024-08-20 RX ORDER — SODIUM CHLORIDE 9 MG/ML
INJECTION, SOLUTION INTRAVENOUS PRN
Status: DISCONTINUED | OUTPATIENT
Start: 2024-08-20 | End: 2024-08-27 | Stop reason: HOSPADM

## 2024-08-20 RX ORDER — AMIODARONE HYDROCHLORIDE 200 MG/1
200 TABLET ORAL DAILY
Status: DISCONTINUED | OUTPATIENT
Start: 2024-08-20 | End: 2024-08-27 | Stop reason: HOSPADM

## 2024-08-20 RX ORDER — FUROSEMIDE 10 MG/ML
40 INJECTION INTRAMUSCULAR; INTRAVENOUS DAILY
Status: DISCONTINUED | OUTPATIENT
Start: 2024-08-20 | End: 2024-08-20

## 2024-08-20 RX ORDER — HEPARIN SODIUM 1000 [USP'U]/ML
2000 INJECTION, SOLUTION INTRAVENOUS; SUBCUTANEOUS PRN
Status: DISCONTINUED | OUTPATIENT
Start: 2024-08-20 | End: 2024-08-22

## 2024-08-20 RX ORDER — ENOXAPARIN SODIUM 100 MG/ML
40 INJECTION SUBCUTANEOUS DAILY
Status: DISCONTINUED | OUTPATIENT
Start: 2024-08-21 | End: 2024-08-20

## 2024-08-20 RX ORDER — ASPIRIN 81 MG/1
81 TABLET, CHEWABLE ORAL DAILY
Status: DISCONTINUED | OUTPATIENT
Start: 2024-08-21 | End: 2024-08-27 | Stop reason: HOSPADM

## 2024-08-20 RX ORDER — SODIUM CHLORIDE 0.9 % (FLUSH) 0.9 %
5-40 SYRINGE (ML) INJECTION PRN
Status: DISCONTINUED | OUTPATIENT
Start: 2024-08-20 | End: 2024-08-27 | Stop reason: HOSPADM

## 2024-08-20 RX ORDER — IPRATROPIUM BROMIDE AND ALBUTEROL SULFATE 2.5; .5 MG/3ML; MG/3ML
1 SOLUTION RESPIRATORY (INHALATION)
Status: DISCONTINUED | OUTPATIENT
Start: 2024-08-20 | End: 2024-08-21

## 2024-08-20 RX ADMIN — CEFTRIAXONE SODIUM 2000 MG: 2 INJECTION, POWDER, FOR SOLUTION INTRAMUSCULAR; INTRAVENOUS at 00:53

## 2024-08-20 RX ADMIN — HEPARIN SODIUM 2000 UNITS: 1000 INJECTION INTRAVENOUS; SUBCUTANEOUS at 09:29

## 2024-08-20 RX ADMIN — WATER 60 MG: 1 INJECTION INTRAMUSCULAR; INTRAVENOUS; SUBCUTANEOUS at 16:31

## 2024-08-20 RX ADMIN — WATER 60 MG: 1 INJECTION INTRAMUSCULAR; INTRAVENOUS; SUBCUTANEOUS at 21:10

## 2024-08-20 RX ADMIN — SODIUM CHLORIDE 1000 ML: 9 INJECTION, SOLUTION INTRAVENOUS at 00:51

## 2024-08-20 RX ADMIN — SODIUM CHLORIDE, PRESERVATIVE FREE 10 ML: 5 INJECTION INTRAVENOUS at 11:46

## 2024-08-20 RX ADMIN — FUROSEMIDE 40 MG: 10 INJECTION, SOLUTION INTRAMUSCULAR; INTRAVENOUS at 16:34

## 2024-08-20 RX ADMIN — GUAIFENESIN AND DEXTROMETHORPHAN 10 ML: 100; 10 SYRUP ORAL at 16:10

## 2024-08-20 RX ADMIN — IPRATROPIUM BROMIDE AND ALBUTEROL SULFATE 1 DOSE: 2.5; .5 SOLUTION RESPIRATORY (INHALATION) at 19:30

## 2024-08-20 RX ADMIN — IOPAMIDOL 60 ML: 755 INJECTION, SOLUTION INTRAVENOUS at 00:24

## 2024-08-20 RX ADMIN — SODIUM CHLORIDE, PRESERVATIVE FREE 10 ML: 5 INJECTION INTRAVENOUS at 21:13

## 2024-08-20 RX ADMIN — IPRATROPIUM BROMIDE AND ALBUTEROL SULFATE 1 DOSE: 2.5; .5 SOLUTION RESPIRATORY (INHALATION) at 15:58

## 2024-08-20 RX ADMIN — ASPIRIN 324 MG: 81 TABLET, CHEWABLE ORAL at 00:12

## 2024-08-20 RX ADMIN — HEPARIN SODIUM 2000 UNITS: 1000 INJECTION INTRAVENOUS; SUBCUTANEOUS at 17:58

## 2024-08-20 RX ADMIN — HEPARIN SODIUM AND DEXTROSE 12 UNITS/KG/HR: 10000; 5 INJECTION INTRAVENOUS at 01:17

## 2024-08-20 RX ADMIN — WATER 60 MG: 1 INJECTION INTRAMUSCULAR; INTRAVENOUS; SUBCUTANEOUS at 09:25

## 2024-08-20 RX ADMIN — ATORVASTATIN CALCIUM 40 MG: 40 TABLET, FILM COATED ORAL at 21:06

## 2024-08-20 RX ADMIN — AZITHROMYCIN MONOHYDRATE 500 MG: 500 INJECTION, POWDER, LYOPHILIZED, FOR SOLUTION INTRAVENOUS at 01:41

## 2024-08-20 RX ADMIN — HEPARIN SODIUM 4000 UNITS: 1000 INJECTION INTRAVENOUS; SUBCUTANEOUS at 01:15

## 2024-08-20 RX ADMIN — IPRATROPIUM BROMIDE AND ALBUTEROL SULFATE 1 DOSE: 2.5; .5 SOLUTION RESPIRATORY (INHALATION) at 12:05

## 2024-08-20 RX ADMIN — AMIODARONE HYDROCHLORIDE 200 MG: 200 TABLET ORAL at 18:54

## 2024-08-20 RX ADMIN — METOPROLOL SUCCINATE 25 MG: 25 TABLET, EXTENDED RELEASE ORAL at 18:54

## 2024-08-20 NOTE — ED NOTES
TRANSFER - OUT REPORT:    Verbal report given to Chica SMITH on Harman Ryder  being transferred to [facility] rm 2152 for routine progression of patient care       Report consisted of patient's Situation, Background, Assessment and   Recommendations(SBAR).     Information from the following report(s) Nurse Handoff Report, ED Encounter Summary, and MAR was reviewed with the receiving nurse.    [place] Fall Assessment:    Presents to emergency department  because of falls (Syncope, seizure, or loss of consciousness): No  Age > 70: Yes  Altered Mental Status, Intoxication with alcohol or substance confusion (Disorientation, impaired judgment, poor safety awaremess, or inability to follow instructions): No  Impaired Mobility: Ambulates or transfers with assistive devices or assistance; Unable to ambulate or transer.: Yes  Nursing Judgement: Yes          Lines:   Peripheral IV 08/19/24 Left Forearm (Active)       Peripheral IV 08/19/24 Right Antecubital (Active)   Site Assessment Clean, dry & intact 08/19/24 2324   Line Status Brisk blood return 08/19/24 2324   Phlebitis Assessment No symptoms 08/19/24 2324   Infiltration Assessment 0 08/19/24 2324   Dressing Status New dressing applied;Clean, dry & intact 08/19/24 2324   Dressing Type Transparent 08/19/24 2324   Dressing Intervention New 08/19/24 2324        Opportunity for questions and clarification was provided.      Patient transported with:  Registered Nurse

## 2024-08-20 NOTE — PROGRESS NOTES
ADULT PROTOCOL: JET AEROSOL ASSESSMENT    Patient  Harman Ryder     73 y.o.   male     8/20/2024  2:11 PM    Breath Sounds Pre Procedure: Breath Sounds Pre-Tx JOSE: Diminished                                  Breath Sounds Pre-Tx LLL: Diminished        Breath Sounds Pre-Tx RUL: Diminished        Breath Sounds Pre-Tx RML: Diminished        Breath Sounds Pre-Tx RLL: Diminished  Breath Sounds Post Procedure: Breath Sounds Post-Tx JOSE: Diminished          Breath Sounds Post-Tx LLL: Diminished          Breath Sounds Post-Tx RUL: Diminished          Breath Sounds Post-Tx RML: Diminished          Breath Sounds Post-Tx RLL: Diminished                                       Heart Rate: Pre procedure Pre-Tx Pulse: 77           Post procedure Post-Tx Pulse: 77    Resp Rate: Pre procedure Pre-Tx Resps: 20           Post procedure Post-Tx Resps: 22      Oxygen: O2 Therapy: Oxygen   nasal cannula     Changed: No    SpO2:  SpO2: 94 %   with Oxygen                Nebulizer Therapy: Current medications Medications: Albuterol/Ipratropium      Changed: No    Comments:     Problem List:   Patient Active Problem List   Diagnosis    Malnutrition (HCC)    Systolic CHF, acute (HCC)    Aspiration pneumonia (HCC)    Shortness of breath    COPD exacerbation (HCC)    Respiratory failure with hypoxia (HCC)    SOB (shortness of breath)    COPD (chronic obstructive pulmonary disease) (HCC)    Pneumonia    Malignant neoplasm of lung, unspecified laterality, unspecified part of lung (HCC)    Sepsis (HCC)    Moderate protein-calorie malnutrition (HCC)    Iron deficiency    NSTEMI (non-ST elevated myocardial infarction) (HCC)    COPD with acute exacerbation (HCC)       Respiratory Therapist: Sherry Ward RCP

## 2024-08-20 NOTE — PROGRESS NOTES
End of Shift Note    Bedside shift change report given to  (oncoming nurse) by Verena Warner RN (offgoing nurse).  Report included the following information SBAR, Kardex, Procedure Summary, Intake/Output, and MAR    Shift worked:  7a-7p     Shift summary and any significant changes:     Heparin is currently running at 16 u/kg. Gave a dose of lasix. Dyspnea with exertion. Significant other was in room for most of the day.      Concerns for physician to address:       Zone phone for oncoming shift:          Activity:     Number times ambulated in hallways past shift: 0  Number of times OOB to chair past shift: 0    Cardiac:   Cardiac Monitoring: Yes           Access:  Current line(s): PIV     Genitourinary:   Urinary status: voiding    Respiratory:      Chronic home O2 use?: YES  Incentive spirometer at bedside: NO       GI:     Current diet:  ADULT DIET; Regular; Low Fat/Low Chol/High Fiber/RAFI  Diet NPO  Passing flatus: YES  Tolerating current diet: YES       Pain Management:   Patient states pain is manageable on current regimen: YES    Skin:     Interventions: float heels    Patient Safety:  Fall Score:    Interventions: bed/chair alarm       Length of Stay:  Expected LOS: 3  Actual LOS: 0      Verena Warner, RN

## 2024-08-20 NOTE — PLAN OF CARE
Problem: Discharge Planning  Goal: Discharge to home or other facility with appropriate resources  8/20/2024 1504 by Verena Warner RN  Outcome: Progressing  8/20/2024 0729 by Chica Couch RN  Outcome: Progressing     Problem: Safety - Adult  Goal: Free from fall injury  8/20/2024 1504 by Verena Warner RN  Outcome: Progressing  8/20/2024 0729 by Chica Couch RN  Outcome: Progressing     Problem: ABCDS Injury Assessment  Goal: Absence of physical injury  8/20/2024 1504 by Verena Warner RN  Outcome: Progressing  8/20/2024 0729 by Chica Couch RN  Outcome: Progressing     Problem: Respiratory - Adult  Goal: Achieves optimal ventilation and oxygenation  8/20/2024 1504 by Verena Warnre RN  Outcome: Progressing  8/20/2024 1410 by Sherry Ward RCP  Outcome: Progressing

## 2024-08-20 NOTE — PROGRESS NOTES
4 Eyes Skin Assessment     NAME:  Harman Ryder  YOB: 1950  MEDICAL RECORD NUMBER:  [id number]    The patient is being assessed for  Admission    I agree that at least one RN has performed a thorough Head to Toe Skin Assessment on the patient. ALL assessment sites listed below have been assessed.      Areas assessed by both nurses:    Head, Face, Ears, Shoulders, Back, Chest, Arms, Elbows, Hands, Sacrum. Buttock, Coccyx, Ischium, Legs. Feet and Heels, and Under Medical Devices         Does the Patient have a Wound? No noted wound(s)       Sanjiv Prevention initiated by RN: Yes  Wound Care Orders initiated by RN: No    Pressure Injury (Stage 3,4, Unstageable, DTI, NWPT, and Complex wounds) if present, place Wound referral order by RN under : No    New Ostomies, if present place, Ostomy referral order under : No     Nurse 1 eSignature: Electronically signed by Dannielle Clark RN on 8/20/24 at 4:56 PM EDT    **SHARE this note so that the co-signing nurse can place an eSignature**    Nurse 2 eSignature: Electronically signed by Verena Warner RN on 8/20/24 at 5:23 PM EDT

## 2024-08-20 NOTE — CONSULTS
[facility]        Consult    NAME: Harman Ryder   :  1950   MRN:  [id number]     Date/Time:  2024 4:04 PM    Patient PCP: Angela Archuleta MD  ________________________________________________________________________     Assessment:   NSTEMI/ CAD s/p SHRUTHI Mid Cx/D1 ()  2.  Ischemic Cardiomyopathy  3. COPD/ Chronic Continuous oxygen (3L)  4. PAF  5. History of Tobacco Abuse  6. Elevated Transaminases  7. Essential Hypertension  8. Hyperlipidemia  9. Type 2 Diabetes  10. History of Lung Cancer (NSCLC )  11. CKD  12. Chronic Pain  13. Pulmonary Hypertension  No Tobacco/Marijuana/ minimal ETOH (1-2 drinks a month)  Full Code            Plan:   Further plan per Dr Riley  Troponins 3029, 2438. EKG with ST, LAFB, NSST change, TTE with LVEF 55-60%, no significant valvular disease (2024). Heparin drip. Asa, BB, Takes Atorvastatin 40 at home. No statin (elevated Liver fx). Likely needs C. NPO after midnight. Dr Riley will see  LVEF improved on GDMT, not candidate for LVAD previously. Has seen [facility]. Takes Lasix 20 po daily. Has not taken any meds for 4 days due to feeling poorly     [x]       High complexity decision making was performed in this patient at high risk for decompensation with multiple organ involvement.      Subjective:   CHIEF COMPLAINT: NSTEMI    HISTORY OF PRESENT ILLNESS:     Harman is a 73 y.o.   male who presented to the [facility] ED with shortness of breath. He has chronic shortness of breath all the time but feels like he has had an increased work of breathing for several weeks. He lives with his daughter and her family. He has a friend at the bedside today. He denies chest pain, palpitations, lightheadedness, Orthopnea, PND or Edema. He has felt weak the last several days.       We were asked to consult for work up and evaluation of the above problems.     Past Medical History:   Diagnosis Date    Asthma     Cancer (HCC)   solution for nebulization inhale 2 milliliter by inhalation route 2 times every day Y   aspirin 81 mg tablet,delayed release take 1 tablet by oral route  every day Y   atorvastatin 40 mg tablet take 1 tablet by oral route  every day Y   budesonide 0.5 mg/2 mL suspension for nebulization inhale 2 milliliter by nebulization route  every day Y   Eliquis 5 mg tablet take 1 tablet by oral route 2 times every day N   Entresto 24 mg-26 mg tablet take 1 tablet by oral route 2 times every day N   Farxiga 5 mg tablet take 1 tablet by oral route  every day in the morning Y   furosemide 20 mg tablet take 1 tablet by oral route  every day N   metoprolol succinate ER 25 mg tablet,extended release 24 hr take 1 tablet by oral route  every day N   oxycodone 20 mg tablet take 1 tablet by oral route  every 4 - 6 hours Y     Electronically signed by:      Mark Anthony Gregory MD 11/09/2023  @ 11:12 AM

## 2024-08-20 NOTE — PROGRESS NOTES
Orders received and noted that order start time is for tomorrow.  OT will follow up tomorrow for assessment when orders are active.

## 2024-08-20 NOTE — ED NOTES
Spoke with pharmacist Laura regarding pts Heparin gtt initial dose, as well as his last dose of Eliquis at home. Pt states he has not taken Eliquis, or any other home meds, since Friday. Per Laura, Heparin gtt should start at 12 unit/kg/hour.

## 2024-08-20 NOTE — H&P
Hospitalist Admission Note    NAME:   Harman Ryder   : 1950   MRN: [id number]     Date/Time: 2024 1:44 PM    Patient PCP: Angela Archuleta MD    ______________________________________________________________________  Given the patient's current clinical presentation, I have a high level of concern for decompensation if discharged from the emergency department.  Complex decision making was performed, which includes reviewing the patient's available past medical records, laboratory results, and x-ray films.       My assessment of this patient's clinical condition and my plan of care is as follows.    Assessment / Plan:    COPD with acute exacerbation POA  Possible bilateral pneumonia versus bronchitis POA  COVID-19 infection +  Acute on chronic systolic heart failure LVEF 25-30% (by echo 10/2022), last EF 55-60%  Chronic respiratory failure with with hypoxia on 3.5 L nasal cannula, now on 6 L  Elevated lactate 2.28 -->  1.28 POA  Chronic dyspnea on exertion  Baseline wears 3.5 L nasal cannula oxygen   Past 5 days, fever 1 day to 102, generalized weakness and malaise   Dry cough   Diarrhea for 1 day  More short of breath with exertion now on 5 to 6 L oxygen  No chest pain, sore throat  No fevers or WBC  Chest x-ray with COPD changes or right basilar atelectasis/airspace disease  CTA chest IMPRESSION:  No acute pulmonary embolus.   Chronic patchy bilateral lower lobe airspace disease is not significantly changed.   Severe centrilobular emphysema.  COVID-19 PCR positive, influenza AB PCR negative  Admit  O2 to keep sats greater than 90%  Continue Trelegy, albuterol as needed  IV steroids  Empiric ceftriaxone azithromycin  Check procalcitonin  Check CRP and D-dimer   If CRP elevated consider baricitinib  Wean O2 as tolerated    Non-ST elevation myocardial infarction POA HS troponin 3029  Coronary artery disease POA S/p stents D1 and mid-LCx  Essential HTN POA  Atrial fibrillation on Eliquis  Reconciliation, Ar   oxyCODONE (ROXICODONE) 20 MG immediate release tablet Take 2 tablets by mouth.   Yes Automatic Reconciliation, Ar   sacubitril-valsartan (ENTRESTO) 24-26 MG per tablet Take 1 tablet by mouth 2 times daily   Yes Automatic Reconciliation, Ar         Objective:   VITALS:    Patient Vitals for the past 24 hrs:   BP Temp Temp src Pulse Resp SpO2 Height Weight   24 1030 124/71 -- -- 76 18 94 % -- --   24 0800 118/74 96.8 °F (36 °C) Axillary 80 20 94 % -- --   24 0657 -- -- -- -- -- -- -- 68.5 kg (151 lb 0.2 oz)   24 0630 -- -- -- -- -- -- 1.803 m (5' 11\") --   24 0615 106/71 97.6 °F (36.4 °C) Oral 87 22 94 % -- --   24 0514 108/69 -- -- 83 23 97 % -- --   24 0459 119/72 -- -- 84 -- 98 % -- --   24 0110 -- -- -- -- -- -- -- 72 kg (158 lb 11.7 oz)   24 0016 -- -- -- -- -- -- -- 62 kg (136 lb 11 oz)   24 0002 114/78 -- -- (!) 110 22 97 % -- --   24 2345 112/75 -- -- (!) 112 22 91 % -- --   24 2330 117/74 -- -- (!) 112 26 94 % -- --   24 2315 119/80 -- -- (!) 124 26 97 % -- --   24 2300 (!) 134/103 99.7 °F (37.6 °C) Axillary -- -- -- -- --       Temp (24hrs), Av °F (36.7 °C), Min:96.8 °F (36 °C), Max:99.7 °F (37.6 °C)      O2 Flow Rate (L/min): 5 L/min O2 Device: Nasal cannula    Wt Readings from Last 12 Encounters:   24 68.5 kg (151 lb 0.2 oz)   24 62.5 kg (137 lb 12.8 oz)   24 63.2 kg (139 lb 5.3 oz)   11/10/23 63.2 kg (139 lb 6.4 oz)   10/23/23 59.9 kg (132 lb)   23 60 kg (132 lb 3.2 oz)   23 63 kg (139 lb)   22 58.5 kg (129 lb)         PHYSICAL EXAM:  General:    Alert, cooperative, appears stated age.     Lungs:   Decreased breath sounds b/l.  No wheezing or Rhonchi.  Chest wall:  No tenderness.  No accessory muscle use.  Heart:   Regular  rhythm,  No  Murmur.   No edema  Abdomen:   Soft, NT. ND  BS+  Extremities: No cyanosis.  No clubbing,      Skin turgor normal, Radial dial

## 2024-08-21 ENCOUNTER — APPOINTMENT (OUTPATIENT)
Facility: HOSPITAL | Age: 74
DRG: 177 | End: 2024-08-21
Attending: INTERNAL MEDICINE
Payer: MEDICARE

## 2024-08-21 LAB
ALBUMIN SERPL-MCNC: 2.5 G/DL (ref 3.5–5)
ALBUMIN/GLOB SERPL: 0.6 (ref 1.1–2.2)
ALP SERPL-CCNC: 124 U/L (ref 45–117)
ALT SERPL-CCNC: 53 U/L (ref 12–78)
ANION GAP SERPL CALC-SCNC: 6 MMOL/L (ref 5–15)
AST SERPL-CCNC: 42 U/L (ref 15–37)
BASOPHILS # BLD: 0 K/UL (ref 0–0.1)
BASOPHILS NFR BLD: 0 % (ref 0–1)
BILIRUB SERPL-MCNC: 0.3 MG/DL (ref 0.2–1)
BUN SERPL-MCNC: 21 MG/DL (ref 6–20)
BUN/CREAT SERPL: 20 (ref 12–20)
CALCIUM SERPL-MCNC: 9 MG/DL (ref 8.5–10.1)
CHLORIDE SERPL-SCNC: 107 MMOL/L (ref 97–108)
CO2 SERPL-SCNC: 28 MMOL/L (ref 21–32)
CREAT SERPL-MCNC: 1.06 MG/DL (ref 0.7–1.3)
CRP SERPL-MCNC: 12.3 MG/DL (ref 0–0.3)
D DIMER PPP FEU-MCNC: 0.91 MG/L FEU (ref 0–0.65)
DIFFERENTIAL METHOD BLD: ABNORMAL
ECHO AV CUSP MM: 2 CM
ECHO BSA: 1.87 M2
ECHO EST RA PRESSURE: 10 MMHG
ECHO LA DIAMETER INDEX: 1.48 CM/M2
ECHO LA DIAMETER: 2.8 CM
ECHO LV EF PHYSICIAN: 50 %
ECHO LV FRACTIONAL SHORTENING: 35 % (ref 28–44)
ECHO LV INTERNAL DIMENSION DIASTOLE INDEX: 2.12 CM/M2
ECHO LV INTERNAL DIMENSION DIASTOLIC: 4 CM (ref 4.2–5.9)
ECHO LV INTERNAL DIMENSION SYSTOLIC INDEX: 1.38 CM/M2
ECHO LV INTERNAL DIMENSION SYSTOLIC: 2.6 CM
ECHO LV IVSD: 1.3 CM (ref 0.6–1)
ECHO LV MASS 2D: 186.5 G (ref 88–224)
ECHO LV MASS INDEX 2D: 98.7 G/M2 (ref 49–115)
ECHO LV POSTERIOR WALL DIASTOLIC: 1.3 CM (ref 0.6–1)
ECHO LV RELATIVE WALL THICKNESS RATIO: 0.65
ECHO LVOT AREA: 3.5 CM2
ECHO LVOT DIAM: 2.1 CM
ECHO PV MAX VELOCITY: 0.7 M/S
ECHO PV PEAK GRADIENT: 2 MMHG
ECHO RIGHT VENTRICULAR SYSTOLIC PRESSURE (RVSP): 28 MMHG
ECHO RV INTERNAL DIMENSION: 4.8 CM
ECHO TV REGURGITANT MAX VELOCITY: 2.13 M/S
ECHO TV REGURGITANT PEAK GRADIENT: 19 MMHG
EKG ATRIAL RATE: 116 BPM
EKG DIAGNOSIS: NORMAL
EKG P AXIS: 78 DEGREES
EKG P-R INTERVAL: 128 MS
EKG Q-T INTERVAL: 304 MS
EKG QRS DURATION: 78 MS
EKG QTC CALCULATION (BAZETT): 422 MS
EKG R AXIS: -60 DEGREES
EKG T AXIS: 62 DEGREES
EKG VENTRICULAR RATE: 116 BPM
EOSINOPHIL # BLD: 0 K/UL (ref 0–0.4)
EOSINOPHIL NFR BLD: 0 % (ref 0–7)
ERYTHROCYTE [DISTWIDTH] IN BLOOD BY AUTOMATED COUNT: 13.9 % (ref 11.5–14.5)
GLOBULIN SER CALC-MCNC: 4.5 G/DL (ref 2–4)
GLUCOSE SERPL-MCNC: 140 MG/DL (ref 65–100)
HCT VFR BLD AUTO: 42.6 % (ref 36.6–50.3)
HGB BLD-MCNC: 13.6 G/DL (ref 12.1–17)
IMM GRANULOCYTES # BLD AUTO: 0 K/UL (ref 0–0.04)
IMM GRANULOCYTES NFR BLD AUTO: 0 % (ref 0–0.5)
LYMPHOCYTES # BLD: 2.6 K/UL (ref 0.8–3.5)
LYMPHOCYTES NFR BLD: 17 % (ref 12–49)
MCH RBC QN AUTO: 29.5 PG (ref 26–34)
MCHC RBC AUTO-ENTMCNC: 31.9 G/DL (ref 30–36.5)
MCV RBC AUTO: 92.4 FL (ref 80–99)
MONOCYTES # BLD: 0.9 K/UL (ref 0–1)
MONOCYTES NFR BLD: 6 % (ref 5–13)
NEUTS SEG # BLD: 12 K/UL (ref 1.8–8)
NEUTS SEG NFR BLD: 77 % (ref 32–75)
NRBC # BLD: 0 K/UL (ref 0–0.01)
NRBC BLD-RTO: 0 PER 100 WBC
NT PRO BNP: 5159 PG/ML
PLATELET # BLD AUTO: 321 K/UL (ref 150–400)
PMV BLD AUTO: 10.1 FL (ref 8.9–12.9)
POTASSIUM SERPL-SCNC: 3.9 MMOL/L (ref 3.5–5.1)
PROCALCITONIN SERPL-MCNC: 0.49 NG/ML
PROT SERPL-MCNC: 7 G/DL (ref 6.4–8.2)
RBC # BLD AUTO: 4.61 M/UL (ref 4.1–5.7)
RBC MORPH BLD: ABNORMAL
SODIUM SERPL-SCNC: 141 MMOL/L (ref 136–145)
UFH PPP CHRO-ACNC: 0.25 IU/ML
UFH PPP CHRO-ACNC: 0.31 IU/ML
UFH PPP CHRO-ACNC: 0.4 IU/ML
WBC # BLD AUTO: 15.5 K/UL (ref 4.1–11.1)

## 2024-08-21 PROCEDURE — 6370000000 HC RX 637 (ALT 250 FOR IP): Performed by: INTERNAL MEDICINE

## 2024-08-21 PROCEDURE — 97165 OT EVAL LOW COMPLEX 30 MIN: CPT

## 2024-08-21 PROCEDURE — 97530 THERAPEUTIC ACTIVITIES: CPT

## 2024-08-21 PROCEDURE — 85520 HEPARIN ASSAY: CPT

## 2024-08-21 PROCEDURE — 6360000002 HC RX W HCPCS: Performed by: INTERNAL MEDICINE

## 2024-08-21 PROCEDURE — 93306 TTE W/DOPPLER COMPLETE: CPT

## 2024-08-21 PROCEDURE — 2580000003 HC RX 258: Performed by: INTERNAL MEDICINE

## 2024-08-21 PROCEDURE — 97161 PT EVAL LOW COMPLEX 20 MIN: CPT

## 2024-08-21 PROCEDURE — 83880 ASSAY OF NATRIURETIC PEPTIDE: CPT

## 2024-08-21 PROCEDURE — 80053 COMPREHEN METABOLIC PANEL: CPT

## 2024-08-21 PROCEDURE — 86140 C-REACTIVE PROTEIN: CPT

## 2024-08-21 PROCEDURE — 2060000000 HC ICU INTERMEDIATE R&B

## 2024-08-21 PROCEDURE — 94640 AIRWAY INHALATION TREATMENT: CPT

## 2024-08-21 PROCEDURE — 85379 FIBRIN DEGRADATION QUANT: CPT

## 2024-08-21 PROCEDURE — 85025 COMPLETE CBC W/AUTO DIFF WBC: CPT

## 2024-08-21 PROCEDURE — 6360000002 HC RX W HCPCS: Performed by: EMERGENCY MEDICINE

## 2024-08-21 PROCEDURE — 84145 PROCALCITONIN (PCT): CPT

## 2024-08-21 PROCEDURE — 97535 SELF CARE MNGMENT TRAINING: CPT

## 2024-08-21 RX ORDER — IPRATROPIUM BROMIDE AND ALBUTEROL SULFATE 2.5; .5 MG/3ML; MG/3ML
1 SOLUTION RESPIRATORY (INHALATION)
Status: DISCONTINUED | OUTPATIENT
Start: 2024-08-21 | End: 2024-08-23 | Stop reason: SDUPTHER

## 2024-08-21 RX ADMIN — FUROSEMIDE 20 MG: 10 INJECTION, SOLUTION INTRAMUSCULAR; INTRAVENOUS at 07:56

## 2024-08-21 RX ADMIN — ASPIRIN 81 MG: 81 TABLET, CHEWABLE ORAL at 07:51

## 2024-08-21 RX ADMIN — WATER 60 MG: 1 INJECTION INTRAMUSCULAR; INTRAVENOUS; SUBCUTANEOUS at 07:53

## 2024-08-21 RX ADMIN — IPRATROPIUM BROMIDE AND ALBUTEROL SULFATE 1 DOSE: 2.5; .5 SOLUTION RESPIRATORY (INHALATION) at 09:07

## 2024-08-21 RX ADMIN — SODIUM CHLORIDE, PRESERVATIVE FREE 10 ML: 5 INJECTION INTRAVENOUS at 22:23

## 2024-08-21 RX ADMIN — AMIODARONE HYDROCHLORIDE 200 MG: 200 TABLET ORAL at 07:51

## 2024-08-21 RX ADMIN — SACUBITRIL AND VALSARTAN 1 TABLET: 24; 26 TABLET, FILM COATED ORAL at 22:19

## 2024-08-21 RX ADMIN — METOPROLOL SUCCINATE 25 MG: 25 TABLET, EXTENDED RELEASE ORAL at 07:51

## 2024-08-21 RX ADMIN — ATORVASTATIN CALCIUM 40 MG: 40 TABLET, FILM COATED ORAL at 22:20

## 2024-08-21 RX ADMIN — GUAIFENESIN AND DEXTROMETHORPHAN 10 ML: 100; 10 SYRUP ORAL at 00:16

## 2024-08-21 RX ADMIN — SODIUM CHLORIDE, PRESERVATIVE FREE 10 ML: 5 INJECTION INTRAVENOUS at 07:58

## 2024-08-21 RX ADMIN — SACUBITRIL AND VALSARTAN 1 TABLET: 24; 26 TABLET, FILM COATED ORAL at 07:51

## 2024-08-21 RX ADMIN — WATER 60 MG: 1 INJECTION INTRAMUSCULAR; INTRAVENOUS; SUBCUTANEOUS at 22:18

## 2024-08-21 RX ADMIN — HEPARIN SODIUM AND DEXTROSE 16 UNITS/KG/HR: 10000; 5 INJECTION INTRAVENOUS at 00:04

## 2024-08-21 RX ADMIN — WATER 60 MG: 1 INJECTION INTRAMUSCULAR; INTRAVENOUS; SUBCUTANEOUS at 03:40

## 2024-08-21 RX ADMIN — WATER 60 MG: 1 INJECTION INTRAMUSCULAR; INTRAVENOUS; SUBCUTANEOUS at 15:05

## 2024-08-21 RX ADMIN — GUAIFENESIN AND DEXTROMETHORPHAN 10 ML: 100; 10 SYRUP ORAL at 07:01

## 2024-08-21 RX ADMIN — HEPARIN SODIUM 2000 UNITS: 1000 INJECTION INTRAVENOUS; SUBCUTANEOUS at 01:19

## 2024-08-21 RX ADMIN — IPRATROPIUM BROMIDE AND ALBUTEROL SULFATE 1 DOSE: 2.5; .5 SOLUTION RESPIRATORY (INHALATION) at 17:30

## 2024-08-21 RX ADMIN — AZITHROMYCIN DIHYDRATE 500 MG: 500 INJECTION, POWDER, LYOPHILIZED, FOR SOLUTION INTRAVENOUS at 01:25

## 2024-08-21 RX ADMIN — HEPARIN SODIUM AND DEXTROSE 18 UNITS/KG/HR: 10000; 5 INJECTION INTRAVENOUS at 20:00

## 2024-08-21 RX ADMIN — CEFTRIAXONE SODIUM 2000 MG: 2 INJECTION, POWDER, FOR SOLUTION INTRAMUSCULAR; INTRAVENOUS at 00:12

## 2024-08-21 NOTE — PROGRESS NOTES
dyspnea.  Discussed with RN events overnight.     Review of Systems:    Symptom Y/N Comments  Symptom Y/N Comments   Fever/Chills N   Chest Pain N    Poor Appetite N   Edema N    Cough N   Abdominal Pain N    Sputum N   Joint Pain N    SOB/WALTERS y   Pruritis/Rash N    Nausea/vomit N   Tolerating PT/OT     Diarrhea N   Tolerating Diet Y    Constipation N   Other       Could NOT obtain due to:      Objective:      Physical Exam:    Last 24hrs VS reviewed since prior progress note. Most recent are:    /71   Pulse 89   Temp 98.2 °F (36.8 °C) (Oral)   Resp 20   Ht 1.803 m (5' 11\")   Wt 68.5 kg (151 lb 0.2 oz)   SpO2 97%   BMI 21.06 kg/m²     Intake/Output Summary (Last 24 hours) at 8/21/2024 0600  Last data filed at 8/20/2024 2010  Gross per 24 hour   Intake 0 ml   Output 1800 ml   Net -1800 ml        General Appearance: Well developed, chronically ill, alert & oriented x 3,    no acute distress.  Ears/Nose/Mouth/Throat: Hearing grossly normal.  Neck: Supple.  Chest: Lungs distant to auscultation bilaterally.  Cardiovascular: Regular rate and rhythm, S1S2 normal, no murmur.  Abdomen: Soft, non-tender, bowel sounds are active.  Extremities: No edema bilaterally.  Skin: Warm and dry.    PMH/SH reviewed - no change compared to H&P    Data Review    Telemetry: normal sinus rhythm     Lab Data Personally Reviewed:    Recent Labs     08/19/24  2315 08/20/24  0050   WBC 10.7 8.2   HGB 14.8 13.2   HCT 45.7 40.1    274     Recent Labs     08/20/24  0050   INR 1.0   APTT 34.9*      Recent Labs     08/19/24  2315      K 4.0      CO2 25   BUN 14   MG 2.0     No results for input(s): \"CPK\" in the last 72 hours.    Invalid input(s): \"CPKMB\", \"CKNDX\", \"TROIQ\"  No results found for: \"CHOL\", \"CHLST\", \"CHOLV\", \"HDL\", \"HDLC\", \"LDL\"    Recent Labs     08/19/24  2315   GLOB 3.8     No results for input(s): \"PH\", \"PCO2\", \"PO2\" in the last 72 hours.    Medications Personally Reviewed:    Current  Facility-Administered Medications   Medication Dose Route Frequency    heparin 25,000 units in dextrose 5% 250 mL (premix) infusion  5-30 Units/kg/hr IntraVENous Continuous    heparin (porcine) injection 2,000 Units  2,000 Units IntraVENous PRN    heparin (porcine) injection 4,000 Units  4,000 Units IntraVENous PRN    azithromycin (ZITHROMAX) 500 mg in sodium chloride 0.9 % 250 mL IVPB (Ofvw8Lhf)  500 mg IntraVENous Q24H    cefTRIAXone (ROCEPHIN) 2,000 mg in sodium chloride 0.9 % 50 mL IVPB (mini-bag)  2,000 mg IntraVENous Q24H    methylPREDNISolone sodium succ (SOLU-MEDROL) 60 mg in sterile water 0.96 mL injection  60 mg IntraVENous Q6H    ipratropium 0.5 mg-albuterol 2.5 mg (DUONEB) nebulizer solution 1 Dose  1 Dose Inhalation 4x Daily RT    sodium chloride flush 0.9 % injection 5-40 mL  5-40 mL IntraVENous 2 times per day    sodium chloride flush 0.9 % injection 5-40 mL  5-40 mL IntraVENous PRN    0.9 % sodium chloride infusion   IntraVENous PRN    ondansetron (ZOFRAN-ODT) disintegrating tablet 4 mg  4 mg Oral Q8H PRN    Or    ondansetron (ZOFRAN) injection 4 mg  4 mg IntraVENous Q6H PRN    polyethylene glycol (GLYCOLAX) packet 17 g  17 g Oral Daily    bisacodyl (DULCOLAX) suppository 10 mg  10 mg Rectal Daily PRN    acetaminophen (TYLENOL) tablet 650 mg  650 mg Oral Q6H PRN    Or    acetaminophen (TYLENOL) suppository 650 mg  650 mg Rectal Q6H PRN    aspirin chewable tablet 81 mg  81 mg Oral Daily    guaiFENesin-dextromethorphan (ROBITUSSIN DM) 100-10 MG/5ML syrup 10 mL  10 mL Oral Q6H PRN    amiodarone (CORDARONE) tablet 200 mg  200 mg Oral Daily    metoprolol succinate (TOPROL XL) extended release tablet 25 mg  25 mg Oral Daily    sacubitril-valsartan (ENTRESTO) 24-26 MG per tablet 1 tablet  1 tablet Oral BID    furosemide (LASIX) injection 20 mg  20 mg IntraVENous Daily    atorvastatin (LIPITOR) tablet 40 mg  40 mg Oral Nightly         César Riley MD

## 2024-08-21 NOTE — PROGRESS NOTES
Hospitalist Progress Note    NAME:   Harman Ryder   : 1950   MRN: [id number]     Date/Time: 2024 11:51 AM  Patient PCP: Angela Archuleta MD    Estimated discharge date:  Barriers: Respiratory status improvement, cardiology clearance      Assessment / Plan:          COPD with acute exacerbation POA  Possible bilateral pneumonia versus bronchitis POA  COVID-19 infection +  Acute on chronic systolic heart failure LVEF 25-30% (by echo 10/2022), last EF 55-60%  Chronic respiratory failure with with hypoxia on 3.5 L nasal cannula, now on 6 L  Elevated lactate 2.28 -->  1.28 POA  Chronic dyspnea on exertion  Baseline wears 3.5 L nasal cannula oxygen   Past 5 days, fever 1 day to 102, generalized weakness and malaise              Dry cough              Diarrhea for 1 day  More short of breath with exertion now on 5 to 6 L oxygen  No chest pain, sore throat  No fevers or WBC  Chest x-ray with COPD changes or right basilar atelectasis/airspace disease  CTA chest IMPRESSION:  No acute pulmonary embolus.   Chronic patchy bilateral lower lobe airspace disease is not significantly changed.   Severe centrilobular emphysema.  COVID-19 PCR positive, influenza AB PCR negative    -Continue empiric ceftriaxone, azithromycin  Continue Solu-Medrol  O2 to keep sats greater than 90%  Continue Trelegy, albuterol as needed  IV steroids    Wean O2 as tolerated     Non-ST elevation myocardial infarction POA HS troponin 3029  Coronary artery disease POA S/p stents D1 and mid-LCx  Essential HTN POA  Atrial fibrillation on Eliquis POA  Hyperlipidemia POA  Currently no chest pain  ASA, statin, IV heparin drip, hold Eliquis  Resume Toprol-XL, Entresto  Consult Dr. Riley, likely medical management  No PE on CTA chest     Prior Non-small cell lung cancer     Body mass index is 21.06 kg/m².     Code Status: Full code  DVT Prophylaxis: hep gtt  Baseline:      Medical Decision Making:   I personally reviewed labs: CBC, CMP,

## 2024-08-21 NOTE — PLAN OF CARE
Problem: Physical Therapy - Adult  Goal: By Discharge: Performs mobility at highest level of function for planned discharge setting.  See evaluation for individualized goals.  Description: FUNCTIONAL STATUS PRIOR TO ADMISSION: Patient was modified independent using a rolling walker for functional mobility.    HOME SUPPORT PRIOR TO ADMISSION: The patient lived with daughter but did not require assistance.    Physical Therapy Goals  Initiated 8/21/2024  1.  Patient will move from supine to sit and sit to supine in bed with independence within 7 day(s).    2.  Patient will perform sit to stand with supervision/set-up within 7 day(s).  3.  Patient will transfer from bed to chair and chair to bed with supervision/set-up using the least restrictive device within 7 day(s).  4.  Patient will ambulate with supervision/set-up for 100 feet with the least restrictive device within 7 day(s).   8/21/2024 1357 by Kelly Herman, PT  Outcome: Not Progressing     Problem: Discharge Planning  Goal: Discharge to home or other facility with appropriate resources  Outcome: Progressing     Problem: Safety - Adult  Goal: Free from fall injury  Outcome: Progressing     Problem: ABCDS Injury Assessment  Goal: Absence of physical injury  Outcome: Progressing     Problem: Respiratory - Adult  Goal: Achieves optimal ventilation and oxygenation  8/21/2024 1719 by Kera Salazar RN  Outcome: Progressing  8/21/2024 0939 by Matthew Causey, RT  Outcome: Progressing     Problem: Chronic Conditions and Co-morbidities  Goal: Patient's chronic conditions and co-morbidity symptoms are monitored and maintained or improved  Outcome: Progressing     Problem: Occupational Therapy - Adult  Goal: By Discharge: Performs self-care activities at highest level of function for planned discharge setting.  See evaluation for individualized goals.  Description: FUNCTIONAL STATUS PRIOR TO ADMISSION:  Patient was ambulatory using a RW around his home, stated he was

## 2024-08-21 NOTE — CARE COORDINATION
Care Management Initial Assessment       RUR: 12% Moderate RUR  Readmission? No  1st IM letter given? Yes - 08/20  1st  letter given: No     08/21/24 1104   Service Assessment   Patient Orientation Alert and Oriented;Person;Place;Situation;Self   Cognition Alert   History Provided By Patient   Primary Caregiver Self   Support Systems Children  (Sherie Lee  [phone])   Patient's Healthcare Decision Maker is: Legal Next of Kin   PCP Verified by CM Yes   Last Visit to PCP Within last 3 months   Prior Functional Level Independent in ADLs/IADLs   Current Functional Level Independent in ADLs/IADLs   Can patient return to prior living arrangement Yes   Ability to make needs known: Good   Family able to assist with home care needs: Yes   Would you like for me to discuss the discharge plan with any other family members/significant others, and if so, who? Yes  (Sherie Lee  [phone] and  Crissy Giordano (Domestic Partner)  [phone])   Financial Resources Medicare   Community Resources None   CM/SW Referral Disease Management Education   Social/Functional History   Lives With Daughter  (Sherie Lee [phone])   Type of Home House   Home Layout Two level;Able to Live on Main level with bedroom/bathroom   Home Access Stairs to enter without rails   Entrance Stairs - Number of Steps 1   Home Equipment Walker - Rolling;Oxygen  (3.5 LPM)   Active  Yes   Discharge Planning   Type of Residence House   Living Arrangements Children  (Sherie Lee [phone])   Current Services Prior To Admission None   Potential Assistance Needed N/A   Patient expects to be discharged to: House     The  (CM) conducted an initial meeting with the patient at the bedside, formally introducing themselves and clarifying their role in discharge planning and transitional care. Demographic and insurance details were verified for accuracy. Notably, the patient has no prior history with home health, skilled nursing

## 2024-08-21 NOTE — PROGRESS NOTES
ADULT PROTOCOL: JET AEROSOL ASSESSMENT    Patient  Harman Ryder     73 y.o.   male     8/21/2024  9:40 AM    Breath Sounds Pre Procedure: Breath Sounds Pre-Tx JOSE: Diminished                                  Breath Sounds Pre-Tx LLL: Diminished        Breath Sounds Pre-Tx RUL: Diminished        Breath Sounds Pre-Tx RML: Diminished        Breath Sounds Pre-Tx RLL: Diminished  Breath Sounds Post Procedure: Breath Sounds Post-Tx JOSE: Diminished          Breath Sounds Post-Tx LLL: Diminished          Breath Sounds Post-Tx RUL: Diminished          Breath Sounds Post-Tx RML: Diminished          Breath Sounds Post-Tx RLL: Diminished                                     Heart Rate: Pre procedure Pre-Tx Pulse: 92           Post procedure Post-Tx Pulse: 77    Resp Rate: Pre procedure Pre-Tx Resps: 18           Post procedure Post-Tx Resps: 22    Oxygen: O2 Therapy: Oxygen   nasal cannula     Changed: No    SpO2:  SpO2: 97 %   with Oxygen                Nebulizer Therapy: Current medications Medications: Albuterol/Ipratropium      Changed: Yes, to TID    Smoking History:   Tobacco Use    Smoking status: Former       Current packs/day: 0.00       Types: Cigarettes       Quit date: 1/1/2009       Years since quitting: 15.6    Smokeless tobacco: Never       Problem List:   Patient Active Problem List   Diagnosis    Malnutrition (HCC)    Systolic CHF, acute (HCC)    Aspiration pneumonia (HCC)    Shortness of breath    COPD exacerbation (HCC)    Respiratory failure with hypoxia (HCC)    SOB (shortness of breath)    COPD (chronic obstructive pulmonary disease) (HCC)    Pneumonia    Malignant neoplasm of lung, unspecified laterality, unspecified part of lung (HCC)    Sepsis (HCC)    Moderate protein-calorie malnutrition (HCC)    Iron deficiency    NSTEMI (non-ST elevated myocardial infarction) (HCC)    COPD with acute exacerbation (HCC)       Respiratory Therapist: Matthew Causey, RT

## 2024-08-21 NOTE — PROGRESS NOTES
Physical Therapy    Chart reviewed and attempted visit but patient is receiving echo at bedside.  Will defer and continue to follow for PT eval.    MARCOS UreñaT

## 2024-08-21 NOTE — ED PROVIDER NOTES
[facility] CARDIOPULMONARY CARE  EMERGENCY DEPARTMENT ENCOUNTER       Pt Name: Harman Ryder  MRN: [id number]  Birthdate 1950  Date of evaluation: 8/19/2024  Provider: Amrit Rouse DO   PCP: Angela Archuleta MD  Note Started: 12:25 AM EDT 8/21/24     CHIEF COMPLAINT       Chief Complaint   Patient presents with    Shortness of Breath     Pt arrives via EMS from home for c/o SOB x5 days. Pt wears 3-5L O2NC at baseline. EMS found pt to have low O2 at home, and gave nebulizers en route. Pt has COPD and asthma. Pt arrives on a neb, O2 in the low 80s. Pt switched to NRB at this time.         HISTORY OF PRESENT ILLNESS: 1 or more elements      History From: patient, History limited by: none     Harman Ryder is a 73 y.o. male presenting the emergency department complaining of shortness of breath.  History of COPD, tobacco abuse.  Symptoms severe       Please See MDM for Additional Details of the HPI/PMH  Nursing Notes were all reviewed and agreed with or any disagreements were addressed in the HPI.     REVIEW OF SYSTEMS        Positives and Pertinent negatives as per HPI.    PAST HISTORY     Past Medical History:  Past Medical History:   Diagnosis Date    Asthma     Cancer (HCC)     Lung Cancer    Chronic obstructive pulmonary disease (HCC)     Chronic pain     High cholesterol     Lung cancer (HCC)        Past Surgical History:  Past Surgical History:   Procedure Laterality Date    LOBECTOMY      Right lung    OTHER SURGICAL HISTORY      Partial right lung removal       Family History:  No family history on file.    Social History:  Social History     Tobacco Use    Smoking status: Former     Current packs/day: 0.00     Types: Cigarettes     Quit date: 1/1/2009     Years since quitting: 15.6    Smokeless tobacco: Never   Substance Use Topics    Alcohol use: Yes     Comment: rarely ( a beer or two a year)    Drug use: No       Allergies:  No Known Allergies    CURRENT MEDICATIONS      Current Discharge Medication  care-plan and conveys that all of his questions have been answered.  I have also provided discharge instructions for him that include: educational information regarding their diagnosis and treatment, and list of reasons why they would want to return to the ED prior to their follow-up appointment, should his condition change.     CLINICAL IMPRESSION    Admit Note: Pt is being admitted by Dr. Barnard. The results of their tests and reason(s) for their admission have been discussed with pt and/or available family. They convey agreement and understanding for the need to be admitted and for the admission diagnosis.     PATIENT REFERRED TO:  No follow-up provider specified.     DISCHARGE MEDICATIONS:     Medication List        ASK your doctor about these medications      albuterol (2.5 MG/3ML) 0.083% nebulizer solution  Commonly known as: PROVENTIL     amiodarone 100 MG tablet  Commonly known as: PACERONE     arformoterol tartrate 15 MCG/2ML Nebu  Commonly known as: BROVANA     aspirin 81 MG EC tablet     atorvastatin 40 MG tablet  Commonly known as: LIPITOR     budesonide 0.5 MG/2ML nebulizer suspension  Commonly known as: PULMICORT     dapagliflozin 5 MG tablet  Commonly known as: FARXIGA  Take 1 tablet by mouth daily     Eliquis 5 MG Tabs tablet  Generic drug: apixaban     Entresto 24-26 MG per tablet  Generic drug: sacubitril-valsartan     furosemide 20 MG tablet  Commonly known as: LASIX     ketoconazole 2 % shampoo  Commonly known as: NIZORAL     oxyCODONE 20 MG immediate release tablet  Commonly known as: ROXICODONE     Trelegy Ellipta 200-62.5-25 MCG/ACT Aepb inhaler  Generic drug: fluticasone-umeclidin-vilant     Vitamin D3 50 MCG (2000 UT) Tabs  TAKE 1 TABLET BY MOUTH DAILY                DISCONTINUED MEDICATIONS:  Current Discharge Medication List          I am the Primary Clinician of Record.   Amrit Rouse DO (electronically signed)    (Please note that parts of this dictation were completed with voice

## 2024-08-21 NOTE — PLAN OF CARE
Problem: Occupational Therapy - Adult  Goal: By Discharge: Performs self-care activities at highest level of function for planned discharge setting.  See evaluation for individualized goals.  Description: FUNCTIONAL STATUS PRIOR TO ADMISSION:  Patient was ambulatory using a RW around his home, stated he was driving and completing his ADLs. The patient stated that since his last two admissions he has felt he has trouble returning to baseline with  services.     HOME SUPPORT: Patient lived with his daughter who provided assistance as needed however both she and DANDY work during the day.    Occupational Therapy Goals:  Initiated 8/21/2024  1.  Patient will perform grooming with Modified [place] within 7 day(s).  2.  Patient will perform upper body dressing with Modified [place] within 7 day(s).  3.  Patient will perform lower body dressing with Modified [place] within 7 day(s).  4.  Patient will perform toilet transfers with Modified [place]  within 7 day(s).  5.  Patient will perform all aspects of toileting with Modified [place] within 7 day(s).  6.  Patient will participate in upper extremity therapeutic exercise/activities with Modified [place] for 15 minutes within 7 day(s).        Outcome: Progressing     OCCUPATIONAL THERAPY EVALUATION    Patient: Harman Ryder (73 y.o. male)  Date: 8/21/2024  Primary Diagnosis: NSTEMI (non-ST elevated myocardial infarction) ([facility]) [I21.4]  COPD with acute exacerbation ([facility]) [J44.1]         Precautions:                    ASSESSMENT :  The patient is limited by decreased functional mobility, activity tolerance, orthostatic hypotension, and increased SPO2 needs from baseline . The patient needed min A to functional mobility, standing at EOB and sitting down with increase in RR and decrease in SPO2 stats to 88%. The patient has a slow recovery sitting EOB back to 92-93%. Pt is on 5L when he is typically on 3ish. Min A needs anticipated for most

## 2024-08-21 NOTE — PLAN OF CARE
Problem: Physical Therapy - Adult  Goal: By Discharge: Performs mobility at highest level of function for planned discharge setting.  See evaluation for individualized goals.  Description: FUNCTIONAL STATUS PRIOR TO ADMISSION: Patient was modified independent using a rolling walker for functional mobility.    HOME SUPPORT PRIOR TO ADMISSION: The patient lived with daughter but did not require assistance.    Physical Therapy Goals  Initiated 8/21/2024  1.  Patient will move from supine to sit and sit to supine in bed with independence within 7 day(s).    2.  Patient will perform sit to stand with supervision/set-up within 7 day(s).  3.  Patient will transfer from bed to chair and chair to bed with supervision/set-up using the least restrictive device within 7 day(s).  4.  Patient will ambulate with supervision/set-up for 100 feet with the least restrictive device within 7 day(s).   Outcome: Not Progressing   PHYSICAL THERAPY EVALUATION    Patient: Harman Ryder (73 y.o. male)  Date: 8/21/2024  Primary Diagnosis: NSTEMI (non-ST elevated myocardial infarction) ([facility]) [I21.4]  COPD with acute exacerbation ([facility]) [J44.1]       Precautions:                        ASSESSMENT :   DEFICITS/IMPAIRMENTS:   The patient is limited by decreased functional mobility, strength, activity tolerance, balance.  Patient received in bed and agreeable to participate, reports feeling weak and more SOB than usual.  Patient was able to perform bed level exercises with good tolerance then came to sit EOB with CGA, sitting balance is intact.  Patient stood with min assist and was able to take a few sidesteps to HOB, mildly unsteady but no overt LOB.  Sats dropped to 88% on 5 liters 02, recovered in sitting position using PLB, took 2-3 minutes to come up to 90%.  Educated on pacing and progression of activity, gentle trunk stretching and encouraged being up in chair later today for dinner.      Based on the impairments listed above patient

## 2024-08-21 NOTE — PROGRESS NOTES
Pharmacy Heparin Monitoring    Indication: ACS/PCI/STEMI/NSTEMI (uncertain at this time)    Factor Xa inhibitor/LMWH use within the past 72 hours? yes    Apixaban PTA  If yes, date of last administration: Pt off floor, will ask RN to obtain date/time of last dose  If yes, when can aPTT nomogram be changed to anti-Xa: ???    Hypertriglyceridemia (> 414 mg/dL) or hyperbilirubinemia (> 37 mg/dL) present? NO  Recent Labs     Units 08/21/24  0639 08/19/24  2315   BILITOT MG/DL 0.3 1.0     Heparin to be monitored using aPTT until 72 hours following last factor Xa inhibitor/LMWH administration then anti-Xa    Goal: aPTT 58-77.9 sec    Initial dosing Weight: 62 kg    Labs:  Recent Labs     Units 08/21/24  0636 08/20/24  2357 08/20/24  1553 08/20/24  0749 08/20/24  0050   HEPXALMWHUNF IU/mL 0.40 0.25 0.18   < > <0.10   APTT sec  --   --   --   --  34.9*    < > = values in this interval not displayed.     Recent Labs     Units 08/21/24  0639 08/20/24  0050   HGB g/dL 13.6 13.2   PLT K/uL 321 274   INR    --  1.0     Current rate: 18 unit/kg/hr    Impression/Plan:   Anti-Xa is therapeutic - continue current rate  PRN bolus dose: no  Infusion rate, PRN bolus dose and anti-Xa/aPTT results were discussed with the nurse: no     Thank you,  HARRIET ROSAS [facility]

## 2024-08-21 NOTE — PLAN OF CARE
Problem: Discharge Planning  Goal: Discharge to home or other facility with appropriate resources  8/20/2024 1504 by Verena Warner, RN  Outcome: Progressing     Problem: Safety - Adult  Goal: Free from fall injury  8/20/2024 2228 by Keli Valladares RN  Outcome: Progressing  8/20/2024 1504 by Verena Warner RN  Outcome: Progressing     Problem: ABCDS Injury Assessment  Goal: Absence of physical injury  8/20/2024 1504 by Verena Warner, RN  Outcome: Progressing     Problem: Respiratory - Adult  Goal: Achieves optimal ventilation and oxygenation  8/20/2024 1504 by Verena Warner RN  Outcome: Progressing  8/20/2024 1410 by Sherry Ward RCP  Outcome: Progressing

## 2024-08-21 NOTE — PROGRESS NOTES
End of Shift Note    Bedside shift change report given to SARAH Dickey (oncoming nurse) by Kera Salazar RN (offgoing nurse).  Report included the following information SBAR, MAR, Cardiac Rhythm NSR, and Quality Measures    Shift worked:  7a-7p     Shift summary and any significant changes:     Weaned pt to 5L. MD advised 3L is OK. No other significant changes.      Concerns for physician to address:       Zone phone for oncoming shift:          Activity:     Number times ambulated in hallways past shift: 0  Number of times OOB to chair past shift: 1    Cardiac:   Cardiac Monitoring: Yes           Access:  Current line(s): PIV     Genitourinary:   Urinary status: voiding    Respiratory:      Chronic home O2 use?: YES  Incentive spirometer at bedside: YES       GI:     Current diet:  ADULT DIET; Regular; Low Fat/Low Chol/High Fiber/RAFI  Passing flatus: YES  Tolerating current diet: YES       Pain Management:   Patient states pain is manageable on current regimen: YES    Skin:     Interventions: specialty bed, increase time out of bed, PT/OT consult, and internal/external urinary devices    Patient Safety:  Fall Score:    Interventions: bed/chair alarm, assistive device (walker, cane. etc), gripper socks, pt to call before getting OOB, and stay with me (per policy)       Length of Stay:  Expected LOS: 4  Actual LOS: 1      Kera Salazar, RN

## 2024-08-22 LAB
ERYTHROCYTE [DISTWIDTH] IN BLOOD BY AUTOMATED COUNT: 14 % (ref 11.5–14.5)
HCT VFR BLD AUTO: 38.8 % (ref 36.6–50.3)
HGB BLD-MCNC: 12.7 G/DL (ref 12.1–17)
MCH RBC QN AUTO: 30 PG (ref 26–34)
MCHC RBC AUTO-ENTMCNC: 32.7 G/DL (ref 30–36.5)
MCV RBC AUTO: 91.5 FL (ref 80–99)
NRBC # BLD: 0 K/UL (ref 0–0.01)
NRBC BLD-RTO: 0 PER 100 WBC
PLATELET # BLD AUTO: 369 K/UL (ref 150–400)
PMV BLD AUTO: 10.1 FL (ref 8.9–12.9)
RBC # BLD AUTO: 4.24 M/UL (ref 4.1–5.7)
UFH PPP CHRO-ACNC: 0.23 IU/ML
WBC # BLD AUTO: 21.1 K/UL (ref 4.1–11.1)

## 2024-08-22 PROCEDURE — 2580000003 HC RX 258: Performed by: INTERNAL MEDICINE

## 2024-08-22 PROCEDURE — 6370000000 HC RX 637 (ALT 250 FOR IP): Performed by: INTERNAL MEDICINE

## 2024-08-22 PROCEDURE — 6360000002 HC RX W HCPCS: Performed by: EMERGENCY MEDICINE

## 2024-08-22 PROCEDURE — 2060000000 HC ICU INTERMEDIATE R&B

## 2024-08-22 PROCEDURE — 94640 AIRWAY INHALATION TREATMENT: CPT

## 2024-08-22 PROCEDURE — 6360000002 HC RX W HCPCS: Performed by: INTERNAL MEDICINE

## 2024-08-22 PROCEDURE — 85027 COMPLETE CBC AUTOMATED: CPT

## 2024-08-22 PROCEDURE — 36415 COLL VENOUS BLD VENIPUNCTURE: CPT

## 2024-08-22 PROCEDURE — 85520 HEPARIN ASSAY: CPT

## 2024-08-22 PROCEDURE — 2700000000 HC OXYGEN THERAPY PER DAY

## 2024-08-22 RX ADMIN — IPRATROPIUM BROMIDE AND ALBUTEROL SULFATE 1 DOSE: 2.5; .5 SOLUTION RESPIRATORY (INHALATION) at 21:11

## 2024-08-22 RX ADMIN — WATER 60 MG: 1 INJECTION INTRAMUSCULAR; INTRAVENOUS; SUBCUTANEOUS at 13:07

## 2024-08-22 RX ADMIN — AZITHROMYCIN DIHYDRATE 500 MG: 500 INJECTION, POWDER, LYOPHILIZED, FOR SOLUTION INTRAVENOUS at 02:38

## 2024-08-22 RX ADMIN — IPRATROPIUM BROMIDE AND ALBUTEROL SULFATE 1 DOSE: 2.5; .5 SOLUTION RESPIRATORY (INHALATION) at 15:56

## 2024-08-22 RX ADMIN — SACUBITRIL AND VALSARTAN 1 TABLET: 24; 26 TABLET, FILM COATED ORAL at 10:53

## 2024-08-22 RX ADMIN — FUROSEMIDE 20 MG: 10 INJECTION, SOLUTION INTRAMUSCULAR; INTRAVENOUS at 11:38

## 2024-08-22 RX ADMIN — AMIODARONE HYDROCHLORIDE 200 MG: 200 TABLET ORAL at 10:53

## 2024-08-22 RX ADMIN — ATORVASTATIN CALCIUM 40 MG: 40 TABLET, FILM COATED ORAL at 22:13

## 2024-08-22 RX ADMIN — HEPARIN SODIUM 2000 UNITS: 1000 INJECTION INTRAVENOUS; SUBCUTANEOUS at 02:20

## 2024-08-22 RX ADMIN — SACUBITRIL AND VALSARTAN 1 TABLET: 24; 26 TABLET, FILM COATED ORAL at 22:14

## 2024-08-22 RX ADMIN — ASPIRIN 81 MG: 81 TABLET, CHEWABLE ORAL at 10:53

## 2024-08-22 RX ADMIN — APIXABAN 5 MG: 5 TABLET, FILM COATED ORAL at 11:39

## 2024-08-22 RX ADMIN — SODIUM CHLORIDE, PRESERVATIVE FREE 10 ML: 5 INJECTION INTRAVENOUS at 22:15

## 2024-08-22 RX ADMIN — GUAIFENESIN AND DEXTROMETHORPHAN 10 ML: 100; 10 SYRUP ORAL at 11:39

## 2024-08-22 RX ADMIN — SODIUM CHLORIDE: 9 INJECTION, SOLUTION INTRAVENOUS at 02:37

## 2024-08-22 RX ADMIN — SODIUM CHLORIDE: 9 INJECTION, SOLUTION INTRAVENOUS at 01:45

## 2024-08-22 RX ADMIN — METOPROLOL SUCCINATE 25 MG: 25 TABLET, EXTENDED RELEASE ORAL at 10:53

## 2024-08-22 RX ADMIN — CEFTRIAXONE SODIUM 2000 MG: 2 INJECTION, POWDER, FOR SOLUTION INTRAMUSCULAR; INTRAVENOUS at 01:46

## 2024-08-22 RX ADMIN — APIXABAN 5 MG: 5 TABLET, FILM COATED ORAL at 22:14

## 2024-08-22 RX ADMIN — SODIUM CHLORIDE, PRESERVATIVE FREE 10 ML: 5 INJECTION INTRAVENOUS at 10:54

## 2024-08-22 RX ADMIN — WATER 60 MG: 1 INJECTION INTRAMUSCULAR; INTRAVENOUS; SUBCUTANEOUS at 02:24

## 2024-08-22 NOTE — PROGRESS NOTES
Attempted to schedule [facility] hospital follow up. Sent office a message, awaiting return call from office with appt information. [facility] placed [facility] information AVS for patient resource.  Pending patient discharge. Jovana Paulino, Care Management Assistant

## 2024-08-22 NOTE — PROGRESS NOTES
personally reviewed labs: CBC, CMP, UA, Troponin  I personally reviewed imaging: CXR, CTA chest  I personally reviewed EKG:  Toxic drug monitoring:   Discussed case with: rn,pt,idr    Subjective:     Chief Complaint / Reason for Physician Visit  Improving oxygen requirement,feels same as yesterday, says he is still very weak    Objective:     VITALS:   Last 24hrs VS reviewed since prior progress note. Most recent are:  Patient Vitals for the past 24 hrs:   BP Temp Temp src Pulse Resp SpO2   08/22/24 1050 111/64 97.9 °F (36.6 °C) -- 80 29 91 %   08/22/24 0740 105/67 97.9 °F (36.6 °C) Oral 70 16 96 %   08/22/24 0330 109/67 98.7 °F (37.1 °C) Oral 73 21 95 %   08/21/24 2219 110/69 -- -- -- -- --   08/21/24 2215 110/69 97.2 °F (36.2 °C) Oral 83 24 95 %   08/21/24 1945 117/78 97.9 °F (36.6 °C) Oral 81 17 94 %   08/21/24 1515 116/70 97.8 °F (36.6 °C) Oral 70 17 95 %         Intake/Output Summary (Last 24 hours) at 8/22/2024 1501  Last data filed at 8/22/2024 1300  Gross per 24 hour   Intake 20 ml   Output 1450 ml   Net -1430 ml        I had a face to face encounter and independently examined this patient on 8/22/2024, as outlined below:  PHYSICAL EXAM:  General: Alert, cooperative  EENT:  EOMI. Anicteric sclerae.  Resp:  Diminished air entry bilateral  CV:  Regular  rhythm,  No edema  GI:  Soft, Non distended, Non tender.  +Bowel sounds  Neurologic:  Alert and oriented X 3, normal speech,   Psych:   Good insight. Not anxious nor agitated  Skin:  No rashes.  No jaundice    Reviewed most current lab test results and cultures  YES  Reviewed most current radiology test results   YES  Review and summation of old records today    NO  Reviewed patient's current orders and MAR    YES  PMH/SH reviewed - no change compared to H&P  ________________________________________________________________________  Care Plan discussed with:    Comments   Patient x    Family      RN x    Care Manager     Consultant                         Multidiciplinary team rounds were held today with , nursing, pharmacist and clinical coordinator.  Patient's plan of care was discussed; medications were reviewed and discharge planning was addressed.     ________________________________________________________________________  Total NON critical care TIME:  47  Minutes    Total CRITICAL CARE TIME Spent:   Minutes non procedure based      Comments   >50% of visit spent in counseling and coordination of care     ________________________________________________________________________  Danish Clifton MD     Procedures: see electronic medical records for all procedures/Xrays and details which were not copied into this note but were reviewed prior to creation of Plan.      LABS:  I reviewed today's most current labs and imaging studies.  Pertinent labs include:  Recent Labs     08/20/24  0050 08/21/24  0639 08/22/24  0126   WBC 8.2 15.5* 21.1*   HGB 13.2 13.6 12.7   HCT 40.1 42.6 38.8    321 369     Recent Labs     08/19/24  2315 08/20/24  0050 08/21/24  0639     --  141   K 4.0  --  3.9     --  107   CO2 25  --  28   GLUCOSE 111*  --  140*   BUN 14  --  21*   CREATININE 1.06  --  1.06   CALCIUM 9.0  --  9.0   MG 2.0  --   --    BILITOT 1.0  --  0.3   AST 81*  --  42*   ALT 61  --  53   INR  --  1.0  --        Signed: Danish Clifton MD

## 2024-08-22 NOTE — PLAN OF CARE
Problem: Discharge Planning  Goal: Discharge to home or other facility with appropriate resources  8/21/2024 1719 by Kera Salazar RN  Outcome: Progressing     Problem: Safety - Adult  Goal: Free from fall injury  8/22/2024 0524 by Aggie Quesada RN  Outcome: Progressing  8/21/2024 1719 by Kera Salazar RN  Outcome: Progressing     Problem: ABCDS Injury Assessment  Goal: Absence of physical injury  8/21/2024 1719 by Kera Salazar RN  Outcome: Progressing     Problem: Respiratory - Adult  Goal: Achieves optimal ventilation and oxygenation  8/22/2024 0524 by Aggie Quesada RN  Outcome: Progressing  8/21/2024 1719 by Kera Salazar RN  Outcome: Progressing     Problem: Chronic Conditions and Co-morbidities  Goal: Patient's chronic conditions and co-morbidity symptoms are monitored and maintained or improved  8/21/2024 1719 by Kera Salazar RN  Outcome: Progressing

## 2024-08-22 NOTE — PROGRESS NOTES
Hospital follow-up PCP transitional care appointment has been scheduled with Dr. Mark Anthony Gregory on 9/3/24 at 1100. This is the first available appt due to limited provider availability. PCP office does not offer alternate provider option for hospital follow up. [facility] placed [facility] information AVS for patient resource. Pending patient discharge.  Jovana Paulino , Care Management Assistant      none

## 2024-08-22 NOTE — PROGRESS NOTES
[facility]     Progress Note      8/22/2024 8:51 AM  NAME: Harman Ryder   MRN:  [id number]   Admit Diagnosis: NSTEMI (non-ST elevated myocardial infarction) (HCC) [I21.4]  COPD with acute exacerbation (HCC) [J44.1]                Assessment:       Resp failuire  COVID  NSTEMI     NSTEMI/ CAD s/p SHRUTHI Mid Cx/D1 (2018)  2.  Ischemic Cardiomyopathy  3. COPD/ Chronic Continuous oxygen (3L)  4. PAF  5. History of Tobacco Abuse  6. Elevated Transaminases  7. Essential Hypertension  8. Hyperlipidemia  9. Type 2 Diabetes  10. History of Lung Cancer (NSCLC 2009)  11. CKD  12. Chronic Pain  13. Pulmonary Hypertension  No Tobacco/Marijuana/ minimal ETOH (1-2 drinks a month)  Full Code  , one son, 2 step kids, ADLs     Cardiologist:  Bri                        Plan:        - No chest pain, no acute ST changes, increase hs troponin at 3029  - No edema  - Sinus  - CTA negative for PE  - COVID     Echo EF 50%  Mildly increased pBNP  No chest pain, good EF, improving with medical therapy, at this point favor medical therapy for more likely type II MI from hypoxia     - Stop heparin and resume eliquis  - cont asa  - Cont amiodarone  - Cont metoprolol  - Cont entresto  - Cont lasix 20mg to IV, change back to po once off of steroids, follow bmp  - Hospital does not have farxiga, resume at discharge  - cont atorvastatin      Follow up with Bri.    Stable for rehab vs. Home from cardiology perspective.         [x]       High complexity decision making was performed in this patient at high risk for decompensation with multiple organ involvement.       We discussed the expected course, resolution and complications of the diagnoses in detail.  Medication risk, benefits, costs, interactions, and alternatives were discussed as indicated.  I advised him to contact the office if his condition worsens, changes or fails to improve as anticipated.  Patient expressed understanding with the diagnoses   and plan.    Subjective:     Harman Ryder denies chest pain, dyspnea.  Discussed with RN events overnight.     Review of Systems:    Symptom Y/N Comments  Symptom Y/N Comments   Fever/Chills N   Chest Pain N    Poor Appetite N   Edema N    Cough N   Abdominal Pain N    Sputum N   Joint Pain N    SOB/WALTERS y   Pruritis/Rash N    Nausea/vomit N   Tolerating PT/OT     Diarrhea N   Tolerating Diet Y    Constipation N   Other       Could NOT obtain due to:      Objective:      Physical Exam:    Last 24hrs VS reviewed since prior progress note. Most recent are:    /67   Pulse 70   Temp 97.9 °F (36.6 °C) (Oral)   Resp 16   Ht 1.83 m (6' 0.05\")   Wt 68.5 kg (151 lb)   SpO2 96%   BMI 20.45 kg/m²     Intake/Output Summary (Last 24 hours) at 8/22/2024 0851  Last data filed at 8/22/2024 0345  Gross per 24 hour   Intake 20 ml   Output 1450 ml   Net -1430 ml        General Appearance: Well developed, chronically ill, alert & oriented x 3,    no acute distress.  Ears/Nose/Mouth/Throat: Hearing grossly normal.  Neck: Supple.  Chest: Lungs distant to auscultation bilaterally.  Cardiovascular: Regular rate and rhythm, S1S2 normal, no murmur.  Abdomen: Soft, non-tender, bowel sounds are active.  Extremities: No edema bilaterally.  Skin: Warm and dry.    PMH/SH reviewed - no change compared to H&P    Data Review    Telemetry: normal sinus rhythm     Lab Data Personally Reviewed:    Recent Labs     08/21/24  0639 08/22/24  0126   WBC 15.5* 21.1*   HGB 13.6 12.7   HCT 42.6 38.8    369     Recent Labs     08/20/24  0050   INR 1.0   APTT 34.9*      Recent Labs     08/19/24  2315 08/21/24  0639    141   K 4.0 3.9    107   CO2 25 28   BUN 14 21*   MG 2.0  --      No results for input(s): \"CPK\" in the last 72 hours.    Invalid input(s): \"CPKMB\", \"CKNDX\", \"TROIQ\"  No results found for: \"CHOL\", \"CHLST\", \"CHOLV\", \"HDL\", \"HDLC\", \"LDL\"    Recent Labs     08/19/24  2315 08/21/24  0639   GLOB 3.8 4.5*     No

## 2024-08-22 NOTE — PROGRESS NOTES
1920: Bedside and Verbal shift change report given to Keli RN/Aggie RN (oncoming nurse) by Kera RN (offgoing nurse). Report included the following information Nurse Handoff Report, Adult Overview, MAR, Recent Results, and Cardiac Rhythm NSR .

## 2024-08-22 NOTE — PROGRESS NOTES
End of Shift Note    Bedside shift change report given to KeliRN and SARAH Marcial (oncoming nurse) by Kera Salazar RN (offgoing nurse).  Report included the following information SBAR, MAR, Cardiac Rhythm NSR, and Quality Measures    Shift worked:  7a-7p     Shift summary and any significant changes:     MD discontinued heparin drip. Pt had no complaints of pain.      Concerns for physician to address:       Zone phone for oncoming shift:          Activity:     Number times ambulated in hallways past shift: 0  Number of times OOB to chair past shift: 2    Cardiac:   Cardiac Monitoring: Yes           Access:  Current line(s): PIV     Genitourinary:   Urinary status: voiding    Respiratory:      Chronic home O2 use?: YES  Incentive spirometer at bedside: YES       GI:     Current diet:  ADULT DIET; Regular; Low Fat/Low Chol/High Fiber/RAFI  Passing flatus: YES  Tolerating current diet: YES       Pain Management:   Patient states pain is manageable on current regimen: YES    Skin:     Interventions: specialty bed, float heels, increase time out of bed, PT/OT consult, and nutritional support    Patient Safety:  Fall Score:    Interventions: bed/chair alarm, assistive device (walker, cane. etc), gripper socks, pt to call before getting OOB, and stay with me (per policy)       Length of Stay:  Expected LOS: 4  Actual LOS: 2      Kera Salazar RN

## 2024-08-22 NOTE — CARE COORDINATION
Transition of Care Plan:    RUR: 12% (low RUR)  Prior Level of Functioning: Independent  Disposition: IPR - [facility], auth pending   If SNF or IPR: Date FOC offered: 08/21  Date FOC received: 08/21  Accepting facility: [facility]  Date authorization started with reference number: Auth started by facility 08/22; Reference Number [id number]   Date authorization received and expires: Pending   Follow up appointments: defer to IPR vs to be arranged  DME needed: defer to IPR vs to be arranged  Transportation at discharge: Family to transport  IM/IMM Medicare/ letter given: Last given 08/20  Is patient a [place] and connected with [facility]? N/a   If yes, was [place] transfer form completed and VA notified? N/a  Caregiver Contact: daughterSherie,[phone]  Discharge Caregiver contacted prior to discharge? CM to contact  Care Conference needed? Not at this time  Barriers to discharge: Medical clearance, auth     0932 - Covering CM assumed transitions of care planning from  DEE Kennedy. Chart reviewed. Noted recs for IPR, patient choice is [facility] per previous CM. Referral previously placed and pending, CM made facility aware that PT/OT recs are now in. Facility to start auth if they can accept. CM following.     1237 - No updates yet from [facility], referral still pending.     1424 - [facility] accepted, they have started auth. Reference Number [id number].    BALDOMERO Brian  Care Management  [facility]  [phone]

## 2024-08-23 LAB
ANION GAP SERPL CALC-SCNC: 4 MMOL/L (ref 5–15)
BUN SERPL-MCNC: 28 MG/DL (ref 6–20)
BUN/CREAT SERPL: 28 (ref 12–20)
CALCIUM SERPL-MCNC: 9 MG/DL (ref 8.5–10.1)
CHLORIDE SERPL-SCNC: 104 MMOL/L (ref 97–108)
CO2 SERPL-SCNC: 32 MMOL/L (ref 21–32)
CREAT SERPL-MCNC: 1 MG/DL (ref 0.7–1.3)
ERYTHROCYTE [DISTWIDTH] IN BLOOD BY AUTOMATED COUNT: 13.7 % (ref 11.5–14.5)
GLUCOSE SERPL-MCNC: 148 MG/DL (ref 65–100)
HCT VFR BLD AUTO: 39.5 % (ref 36.6–50.3)
HGB BLD-MCNC: 12.7 G/DL (ref 12.1–17)
MAGNESIUM SERPL-MCNC: 2.2 MG/DL (ref 1.6–2.4)
MCH RBC QN AUTO: 29.6 PG (ref 26–34)
MCHC RBC AUTO-ENTMCNC: 32.2 G/DL (ref 30–36.5)
MCV RBC AUTO: 92.1 FL (ref 80–99)
NRBC # BLD: 0 K/UL (ref 0–0.01)
NRBC BLD-RTO: 0 PER 100 WBC
PLATELET # BLD AUTO: 361 K/UL (ref 150–400)
PMV BLD AUTO: 10.6 FL (ref 8.9–12.9)
POTASSIUM SERPL-SCNC: 3.8 MMOL/L (ref 3.5–5.1)
RBC # BLD AUTO: 4.29 M/UL (ref 4.1–5.7)
SODIUM SERPL-SCNC: 140 MMOL/L (ref 136–145)
WBC # BLD AUTO: 21.3 K/UL (ref 4.1–11.1)

## 2024-08-23 PROCEDURE — 2700000000 HC OXYGEN THERAPY PER DAY

## 2024-08-23 PROCEDURE — 85027 COMPLETE CBC AUTOMATED: CPT

## 2024-08-23 PROCEDURE — 83735 ASSAY OF MAGNESIUM: CPT

## 2024-08-23 PROCEDURE — 36415 COLL VENOUS BLD VENIPUNCTURE: CPT

## 2024-08-23 PROCEDURE — 6360000002 HC RX W HCPCS: Performed by: INTERNAL MEDICINE

## 2024-08-23 PROCEDURE — 2060000000 HC ICU INTERMEDIATE R&B

## 2024-08-23 PROCEDURE — 6370000000 HC RX 637 (ALT 250 FOR IP): Performed by: INTERNAL MEDICINE

## 2024-08-23 PROCEDURE — 2580000003 HC RX 258: Performed by: INTERNAL MEDICINE

## 2024-08-23 PROCEDURE — 94640 AIRWAY INHALATION TREATMENT: CPT

## 2024-08-23 PROCEDURE — 80048 BASIC METABOLIC PNL TOTAL CA: CPT

## 2024-08-23 RX ORDER — ARFORMOTEROL TARTRATE 15 UG/2ML
15 SOLUTION RESPIRATORY (INHALATION)
Status: DISCONTINUED | OUTPATIENT
Start: 2024-08-23 | End: 2024-08-23 | Stop reason: CLARIF

## 2024-08-23 RX ORDER — ALBUTEROL SULFATE 90 UG/1
2 AEROSOL, METERED RESPIRATORY (INHALATION)
Status: DISCONTINUED | OUTPATIENT
Start: 2024-08-23 | End: 2024-08-27 | Stop reason: HOSPADM

## 2024-08-23 RX ADMIN — SACUBITRIL AND VALSARTAN 1 TABLET: 24; 26 TABLET, FILM COATED ORAL at 07:47

## 2024-08-23 RX ADMIN — AMIODARONE HYDROCHLORIDE 200 MG: 200 TABLET ORAL at 07:47

## 2024-08-23 RX ADMIN — ALBUTEROL SULFATE 2 PUFF: 90 AEROSOL, METERED RESPIRATORY (INHALATION) at 21:43

## 2024-08-23 RX ADMIN — APIXABAN 5 MG: 5 TABLET, FILM COATED ORAL at 21:35

## 2024-08-23 RX ADMIN — METOPROLOL SUCCINATE 25 MG: 25 TABLET, EXTENDED RELEASE ORAL at 07:47

## 2024-08-23 RX ADMIN — AZITHROMYCIN DIHYDRATE 500 MG: 500 INJECTION, POWDER, LYOPHILIZED, FOR SOLUTION INTRAVENOUS at 02:33

## 2024-08-23 RX ADMIN — IPRATROPIUM BROMIDE AND ALBUTEROL SULFATE 1 DOSE: 2.5; .5 SOLUTION RESPIRATORY (INHALATION) at 07:55

## 2024-08-23 RX ADMIN — CEFTRIAXONE SODIUM 2000 MG: 2 INJECTION, POWDER, FOR SOLUTION INTRAMUSCULAR; INTRAVENOUS at 01:39

## 2024-08-23 RX ADMIN — ASPIRIN 81 MG: 81 TABLET, CHEWABLE ORAL at 07:47

## 2024-08-23 RX ADMIN — FUROSEMIDE 20 MG: 10 INJECTION, SOLUTION INTRAMUSCULAR; INTRAVENOUS at 07:48

## 2024-08-23 RX ADMIN — WATER 60 MG: 1 INJECTION INTRAMUSCULAR; INTRAVENOUS; SUBCUTANEOUS at 02:34

## 2024-08-23 RX ADMIN — IPRATROPIUM BROMIDE AND ALBUTEROL SULFATE 1 DOSE: 2.5; .5 SOLUTION RESPIRATORY (INHALATION) at 13:31

## 2024-08-23 RX ADMIN — GUAIFENESIN AND DEXTROMETHORPHAN 10 ML: 100; 10 SYRUP ORAL at 13:14

## 2024-08-23 RX ADMIN — APIXABAN 5 MG: 5 TABLET, FILM COATED ORAL at 08:41

## 2024-08-23 RX ADMIN — SACUBITRIL AND VALSARTAN 1 TABLET: 24; 26 TABLET, FILM COATED ORAL at 21:35

## 2024-08-23 RX ADMIN — SODIUM CHLORIDE, PRESERVATIVE FREE 10 ML: 5 INJECTION INTRAVENOUS at 07:52

## 2024-08-23 RX ADMIN — SODIUM CHLORIDE: 9 INJECTION, SOLUTION INTRAVENOUS at 02:33

## 2024-08-23 RX ADMIN — SODIUM CHLORIDE, PRESERVATIVE FREE 10 ML: 5 INJECTION INTRAVENOUS at 21:36

## 2024-08-23 RX ADMIN — SODIUM CHLORIDE: 9 INJECTION, SOLUTION INTRAVENOUS at 01:38

## 2024-08-23 RX ADMIN — WATER 60 MG: 1 INJECTION INTRAMUSCULAR; INTRAVENOUS; SUBCUTANEOUS at 13:11

## 2024-08-23 RX ADMIN — ATORVASTATIN CALCIUM 40 MG: 40 TABLET, FILM COATED ORAL at 21:35

## 2024-08-23 NOTE — CARE COORDINATION
Transition of Care Plan    RUR: 12% (low RUR)  Prior Level of Functioning: Independent  Disposition: SNF  If SNF or IPR: Date FOC offered: 08/23  Date FOC received: 08/23  Accepting facility:   Date authorization started with reference number:   Date authorization received and expires:   Follow up appointments: SNF  DME needed: defer to IPR vs to be arranged  Transportation at discharge: Family to transport  IM/IMM Medicare/ letter given: Last given 08/20  Is patient a  and connected with VA? N/a              If yes, was [place] transfer form completed and VA notified? N/a  Caregiver Contact: daughterSherie,[phone]  Discharge Caregiver contacted prior to discharge? CM to contact  Care Conference needed? Not at this time  Barriers to discharge: Medical clearance, auth        1145 - Patient denied by [facility] because he doesn't meet medical necessity. CM sent perfect serve to Dr Clifton regarding P2P.  Dr. Clifton to call [phone] option 3  CM to talk with patient about SNF options.    9646 - Patient made 3 SNF selections. CM sent referrals to [facility], [facility], and [facility] per patient request.       Erum Brooke, BSN, RN, CM  [facility]  [phone]

## 2024-08-23 NOTE — PROGRESS NOTES
1905: Bedside and Verbal shift change report given to Keli RN/Aggei RN (oncoming nurse) by Kera RN (offgoing nurse). Report included the following information Nurse Handoff Report, Adult Overview, MAR, and Cardiac Rhythm NSR .

## 2024-08-23 NOTE — PROGRESS NOTES
Attempted to schedule PCP hospital follow up. Sent PCP office a message, awaiting return call from PCP office with appt information. [facility] placed [facility] information AVS for patient resource.  Pending patient discharge. Jovana Paulino, Care Management Assistant

## 2024-08-23 NOTE — PROGRESS NOTES
Hospitalist Progress Note    NAME:   Harman Ryder   : 1950   MRN: [id number]     Date/Time: 2024 2:14 PM  Patient PCP: Angela Archuleta MD    Estimated discharge date:  Barriers: Respiratory status improvement,  Dispo, rehab, Auth denied, P2P done, which is denied as well, medical director with insurance recommending SNF      Assessment / Plan:          COPD with acute exacerbation POA  Possible bilateral pneumonia versus bronchitis POA  COVID-19 infection +  Acute on chronic systolic heart failure LVEF 25-30% (by echo 10/2022), last EF 55-60%  Chronic respiratory failure with with hypoxia on 3.5 L nasal cannula, now on 6 L  Elevated lactate 2.28 -->  1.28 POA  Chronic dyspnea on exertion  Baseline wears 3.5 L nasal cannula oxygen   Past 5 days, fever 1 day to 102, generalized weakness and malaise              Dry cough              Diarrhea for 1 day  More short of breath with exertion now on 5 to 6 L oxygen  No chest pain, sore throat  No fevers or WBC  Chest x-ray with COPD changes or right basilar atelectasis/airspace disease  CTA chest IMPRESSION:  No acute pulmonary embolus.   Chronic patchy bilateral lower lobe airspace disease is not significantly changed.   Severe centrilobular emphysema.  COVID-19 PCR positive, influenza AB PCR negative    -Continue empiric ceftriaxone, azithromycin  Continue Solu-Medrol, decrease to 60 mg IV twice daily  O2 to keep sats greater than 90%  Continue Trelegy, albuterol as needed  Lasix IV, will change to p.o. on discharge    Wean O2 as tolerated     Non-ST elevation myocardial infarction POA HS troponin 3029  Coronary artery disease POA S/p stents D1 and mid-LCx  Essential HTN POA  Atrial fibrillation on Eliquis POA  Hyperlipidemia POA  Currently no chest pain  ASA, statin, Eliquis  Resume Toprol-XL, Entresto  Consult Dr. Riley, likely medical management  No PE on CTA chest     Prior Non-small cell lung cancer     Body mass index is 21.06  H&P  ________________________________________________________________________  Care Plan discussed with:    Comments   Patient x    Family      RN x    Care Manager     Consultant                        Multidiciplinary team rounds were held today with , nursing, pharmacist and clinical coordinator.  Patient's plan of care was discussed; medications were reviewed and discharge planning was addressed.     ________________________________________________________________________  Total NON critical care TIME:  45 Minutes    Total CRITICAL CARE TIME Spent:   Minutes non procedure based      Comments   >50% of visit spent in counseling and coordination of care     ________________________________________________________________________  Danish Clifton MD     Procedures: see electronic medical records for all procedures/Xrays and details which were not copied into this note but were reviewed prior to creation of Plan.      LABS:  I reviewed today's most current labs and imaging studies.  Pertinent labs include:  Recent Labs     08/21/24  0639 08/22/24  0126 08/23/24  0436   WBC 15.5* 21.1* 21.3*   HGB 13.6 12.7 12.7   HCT 42.6 38.8 39.5    369 361     Recent Labs     08/21/24  0639 08/23/24  0436    140   K 3.9 3.8    104   CO2 28 32   GLUCOSE 140* 148*   BUN 21* 28*   CREATININE 1.06 1.00   CALCIUM 9.0 9.0   MG  --  2.2   BILITOT 0.3  --    AST 42*  --    ALT 53  --        Signed: Danish Clifton MD

## 2024-08-23 NOTE — PROGRESS NOTES
End of Shift Note    Bedside shift change report given to SARAH Pelaez (oncoming nurse) by Kera Salazar RN (offgoing nurse).  Report included the following information SBAR, MAR, Cardiac Rhythm NSR, and Quality Measures    Shift worked:  7a-7p     Shift summary and any significant changes:     PT cleared for discharge, waiting auth from insurance for inpatient rehab or SNF     Concerns for physician to address:       Zone phone for oncoming shift:          Activity:     Number times ambulated in hallways past shift: 0  Number of times OOB to chair past shift: 1    Cardiac:   Cardiac Monitoring: Yes           Access:  Current line(s): PIV     Genitourinary:   Urinary status: voiding    Respiratory:      Chronic home O2 use?: YES  Incentive spirometer at bedside: NO       GI:     Current diet:  ADULT DIET; Regular; Low Fat/Low Chol/High Fiber/RAFI  Passing flatus: YES  Tolerating current diet: YES       Pain Management:   Patient states pain is manageable on current regimen: YES    Skin:     Interventions: specialty bed, float heels, increase time out of bed, and PT/OT consult    Patient Safety:  Fall Score:    Interventions: bed/chair alarm, assistive device (walker, cane. etc), gripper socks, pt to call before getting OOB, and stay with me (per policy)       Length of Stay:  Expected LOS: 5  Actual LOS: 3      Kera Salazar RN

## 2024-08-23 NOTE — PLAN OF CARE
Problem: Discharge Planning  Goal: Discharge to home or other facility with appropriate resources  Outcome: Progressing     Problem: Safety - Adult  Goal: Free from fall injury  Outcome: Progressing     Problem: Respiratory - Adult  Goal: Achieves optimal ventilation and oxygenation  8/23/2024 0318 by Aggie Quesada RN  Outcome: Progressing  8/22/2024 2115 by Mere Bridges RCP  Outcome: Progressing  8/22/2024 1557 by Keli Jama, RT  Outcome: Progressing

## 2024-08-23 NOTE — PLAN OF CARE
Problem: Discharge Planning  Goal: Discharge to home or other facility with appropriate resources  8/23/2024 0950 by Kera Salazar RN  Outcome: Progressing  8/23/2024 0318 by Aggie Quesada RN  Outcome: Progressing     Problem: Safety - Adult  Goal: Free from fall injury  8/23/2024 0950 by Kera Salazar RN  Outcome: Progressing  8/23/2024 0318 by Aggie Quesada RN  Outcome: Progressing     Problem: ABCDS Injury Assessment  Goal: Absence of physical injury  Outcome: Progressing     Problem: Respiratory - Adult  Goal: Achieves optimal ventilation and oxygenation  8/23/2024 0950 by Kera Saalzar RN  Outcome: Progressing  8/23/2024 0909 by Sherry Ward RCP  Outcome: Progressing  8/23/2024 0318 by Aggie Quesada RN  Outcome: Progressing  8/22/2024 2115 by Mere Bridges RCP  Outcome: Progressing     Problem: Chronic Conditions and Co-morbidities  Goal: Patient's chronic conditions and co-morbidity symptoms are monitored and maintained or improved  Outcome: Progressing

## 2024-08-24 PROCEDURE — 2580000003 HC RX 258: Performed by: INTERNAL MEDICINE

## 2024-08-24 PROCEDURE — 6370000000 HC RX 637 (ALT 250 FOR IP): Performed by: INTERNAL MEDICINE

## 2024-08-24 PROCEDURE — 6360000002 HC RX W HCPCS: Performed by: INTERNAL MEDICINE

## 2024-08-24 PROCEDURE — 2060000000 HC ICU INTERMEDIATE R&B

## 2024-08-24 PROCEDURE — 94640 AIRWAY INHALATION TREATMENT: CPT

## 2024-08-24 PROCEDURE — 2700000000 HC OXYGEN THERAPY PER DAY

## 2024-08-24 RX ORDER — BUDESONIDE 0.5 MG/2ML
1 INHALANT ORAL
Status: DISCONTINUED | OUTPATIENT
Start: 2024-08-24 | End: 2024-08-24

## 2024-08-24 RX ORDER — ARFORMOTEROL TARTRATE 15 UG/2ML
15 SOLUTION RESPIRATORY (INHALATION)
Status: DISCONTINUED | OUTPATIENT
Start: 2024-08-24 | End: 2024-08-24

## 2024-08-24 RX ADMIN — APIXABAN 5 MG: 5 TABLET, FILM COATED ORAL at 10:57

## 2024-08-24 RX ADMIN — GUAIFENESIN AND DEXTROMETHORPHAN 10 ML: 100; 10 SYRUP ORAL at 22:51

## 2024-08-24 RX ADMIN — SODIUM CHLORIDE, PRESERVATIVE FREE 10 ML: 5 INJECTION INTRAVENOUS at 21:58

## 2024-08-24 RX ADMIN — METOPROLOL SUCCINATE 25 MG: 25 TABLET, EXTENDED RELEASE ORAL at 08:20

## 2024-08-24 RX ADMIN — SODIUM CHLORIDE: 9 INJECTION, SOLUTION INTRAVENOUS at 00:56

## 2024-08-24 RX ADMIN — FUROSEMIDE 20 MG: 10 INJECTION, SOLUTION INTRAMUSCULAR; INTRAVENOUS at 08:20

## 2024-08-24 RX ADMIN — SACUBITRIL AND VALSARTAN 1 TABLET: 24; 26 TABLET, FILM COATED ORAL at 08:20

## 2024-08-24 RX ADMIN — GUAIFENESIN AND DEXTROMETHORPHAN 10 ML: 100; 10 SYRUP ORAL at 08:21

## 2024-08-24 RX ADMIN — SODIUM CHLORIDE, PRESERVATIVE FREE 10 ML: 5 INJECTION INTRAVENOUS at 08:23

## 2024-08-24 RX ADMIN — AMIODARONE HYDROCHLORIDE 200 MG: 200 TABLET ORAL at 08:20

## 2024-08-24 RX ADMIN — WATER 60 MG: 1 INJECTION INTRAMUSCULAR; INTRAVENOUS; SUBCUTANEOUS at 13:25

## 2024-08-24 RX ADMIN — ALBUTEROL SULFATE 2 PUFF: 90 AEROSOL, METERED RESPIRATORY (INHALATION) at 07:50

## 2024-08-24 RX ADMIN — ASPIRIN 81 MG: 81 TABLET, CHEWABLE ORAL at 08:20

## 2024-08-24 RX ADMIN — CEFTRIAXONE SODIUM 2000 MG: 2 INJECTION, POWDER, FOR SOLUTION INTRAMUSCULAR; INTRAVENOUS at 00:58

## 2024-08-24 RX ADMIN — WATER 60 MG: 1 INJECTION INTRAMUSCULAR; INTRAVENOUS; SUBCUTANEOUS at 01:36

## 2024-08-24 RX ADMIN — SACUBITRIL AND VALSARTAN 1 TABLET: 24; 26 TABLET, FILM COATED ORAL at 21:55

## 2024-08-24 RX ADMIN — ATORVASTATIN CALCIUM 40 MG: 40 TABLET, FILM COATED ORAL at 21:55

## 2024-08-24 RX ADMIN — ALBUTEROL SULFATE 2 PUFF: 90 AEROSOL, METERED RESPIRATORY (INHALATION) at 14:25

## 2024-08-24 RX ADMIN — APIXABAN 5 MG: 5 TABLET, FILM COATED ORAL at 21:55

## 2024-08-24 RX ADMIN — AZITHROMYCIN DIHYDRATE 500 MG: 500 INJECTION, POWDER, LYOPHILIZED, FOR SOLUTION INTRAVENOUS at 01:39

## 2024-08-24 RX ADMIN — ALBUTEROL SULFATE 2 PUFF: 90 AEROSOL, METERED RESPIRATORY (INHALATION) at 20:01

## 2024-08-24 NOTE — CARE COORDINATION
[facility] informed that referral sent to [facility].  CM informed that pt was accepted to the facility and auth will be initiated.  Auth will potentially not be approved during weekend.    BALDOMERO Johnson   [phone]

## 2024-08-24 NOTE — PROGRESS NOTES
Hospitalist Progress Note    NAME:   Harman Ryder   : 1950   MRN: [id number]     Date/Time: 2024 1:36 PM  Patient PCP: Angela Archuleta MD    Estimated discharge date:  Barriers: Respiratory status improvement,  Dispo, rehab, Auth denied, P2P done, which is denied as well, medical director with insurance recommending SNF      Assessment / Plan:          COPD with acute exacerbation POA  Possible bilateral pneumonia versus bronchitis POA  COVID-19 infection +  Acute on chronic systolic heart failure LVEF 25-30% (by echo 10/2022), last EF 55-60%  Chronic respiratory failure with with hypoxia on 3.5 L nasal cannula, now on 6 L  Elevated lactate 2.28 -->  1.28 POA  Chronic dyspnea on exertion  Baseline wears 3.5 L nasal cannula oxygen   Past 5 days, fever 1 day to 102, generalized weakness and malaise              Dry cough              Diarrhea for 1 day  More short of breath with exertion now on 5 to 6 L oxygen  No chest pain, sore throat  No fevers or WBC  Chest x-ray with COPD changes or right basilar atelectasis/airspace disease  CTA chest IMPRESSION:  No acute pulmonary embolus.   Chronic patchy bilateral lower lobe airspace disease is not significantly changed.   Severe centrilobular emphysema.  COVID-19 PCR positive, influenza AB PCR negative    -Status post Solu-Medrol 60 mg every 6 hour, now have decreased to 60 every 12 hour IV   -Would continue same for now  -S/p completion of antibiotics  -Continue Lasix 20 IV daily  Wean O2 as tolerated, currently remains on 5 L nasal cannula  -Resume home ICS/LABA       Non-ST elevation myocardial infarction POA HS troponin 3029  Coronary artery disease POA S/p stents D1 and mid-LCx  Essential HTN POA  Atrial fibrillation on Eliquis POA  Hyperlipidemia POA  Currently no chest pain  ASA, statin, Eliquis  Resume Toprol-XL, Entresto  Consult Dr. Riley, likely medical management  No PE on CTA chest     Prior Non-small cell lung cancer     Body

## 2024-08-24 NOTE — PLAN OF CARE
Problem: Discharge Planning  Goal: Discharge to home or other facility with appropriate resources  Outcome: Progressing  Flowsheets (Taken 8/24/2024 0103)  Discharge to home or other facility with appropriate resources: Identify barriers to discharge with patient and caregiver     Problem: Safety - Adult  Goal: Free from fall injury  Outcome: Progressing  Flowsheets (Taken 8/24/2024 0103)  Free From Fall Injury: Instruct family/caregiver on patient safety     Problem: ABCDS Injury Assessment  Goal: Absence of physical injury  Outcome: Progressing  Flowsheets (Taken 8/24/2024 0103)  Absence of Physical Injury: Implement safety measures based on patient assessment     Problem: Respiratory - Adult  Goal: Achieves optimal ventilation and oxygenation  Outcome: Progressing  Flowsheets (Taken 8/24/2024 0103)  Achieves optimal ventilation and oxygenation:   Assess for changes in respiratory status   Assess for changes in mentation and behavior   Oxygen supplementation based on oxygen saturation or arterial blood gases   Position to facilitate oxygenation and minimize respiratory effort     Problem: Chronic Conditions and Co-morbidities  Goal: Patient's chronic conditions and co-morbidity symptoms are monitored and maintained or improved  Outcome: Progressing  Flowsheets (Taken 8/24/2024 0103)  Care Plan - Patient's Chronic Conditions and Co-Morbidity Symptoms are Monitored and Maintained or Improved:   Monitor and assess patient's chronic conditions and comorbid symptoms for stability, deterioration, or improvement   Collaborate with multidisciplinary team to address chronic and comorbid conditions and prevent exacerbation or deterioration   Update acute care plan with appropriate goals if chronic or comorbid symptoms are exacerbated and prevent overall improvement and discharge

## 2024-08-25 ENCOUNTER — APPOINTMENT (OUTPATIENT)
Facility: HOSPITAL | Age: 74
DRG: 177 | End: 2024-08-25
Payer: MEDICARE

## 2024-08-25 LAB
ANION GAP SERPL CALC-SCNC: 2 MMOL/L (ref 5–15)
BACTERIA SPEC CULT: NORMAL
BACTERIA SPEC CULT: NORMAL
BUN SERPL-MCNC: 30 MG/DL (ref 6–20)
BUN/CREAT SERPL: 31 (ref 12–20)
CALCIUM SERPL-MCNC: 8.8 MG/DL (ref 8.5–10.1)
CHLORIDE SERPL-SCNC: 103 MMOL/L (ref 97–108)
CO2 SERPL-SCNC: 34 MMOL/L (ref 21–32)
CREAT SERPL-MCNC: 0.96 MG/DL (ref 0.7–1.3)
ERYTHROCYTE [DISTWIDTH] IN BLOOD BY AUTOMATED COUNT: 13.6 % (ref 11.5–14.5)
GLUCOSE SERPL-MCNC: 134 MG/DL (ref 65–100)
HCT VFR BLD AUTO: 40.2 % (ref 36.6–50.3)
HGB BLD-MCNC: 13.2 G/DL (ref 12.1–17)
MAGNESIUM SERPL-MCNC: 2.6 MG/DL (ref 1.6–2.4)
MCH RBC QN AUTO: 30.2 PG (ref 26–34)
MCHC RBC AUTO-ENTMCNC: 32.8 G/DL (ref 30–36.5)
MCV RBC AUTO: 92 FL (ref 80–99)
NRBC # BLD: 0 K/UL (ref 0–0.01)
NRBC BLD-RTO: 0 PER 100 WBC
PLATELET # BLD AUTO: 435 K/UL (ref 150–400)
PMV BLD AUTO: 9.8 FL (ref 8.9–12.9)
POTASSIUM SERPL-SCNC: 4.2 MMOL/L (ref 3.5–5.1)
RBC # BLD AUTO: 4.37 M/UL (ref 4.1–5.7)
SERVICE CMNT-IMP: NORMAL
SERVICE CMNT-IMP: NORMAL
SODIUM SERPL-SCNC: 139 MMOL/L (ref 136–145)
WBC # BLD AUTO: 30.4 K/UL (ref 4.1–11.1)

## 2024-08-25 PROCEDURE — 83735 ASSAY OF MAGNESIUM: CPT

## 2024-08-25 PROCEDURE — 2580000003 HC RX 258: Performed by: INTERNAL MEDICINE

## 2024-08-25 PROCEDURE — 80048 BASIC METABOLIC PNL TOTAL CA: CPT

## 2024-08-25 PROCEDURE — 85027 COMPLETE CBC AUTOMATED: CPT

## 2024-08-25 PROCEDURE — 6370000000 HC RX 637 (ALT 250 FOR IP): Performed by: INTERNAL MEDICINE

## 2024-08-25 PROCEDURE — 2060000000 HC ICU INTERMEDIATE R&B

## 2024-08-25 PROCEDURE — 71045 X-RAY EXAM CHEST 1 VIEW: CPT

## 2024-08-25 PROCEDURE — 94640 AIRWAY INHALATION TREATMENT: CPT

## 2024-08-25 PROCEDURE — 6360000002 HC RX W HCPCS: Performed by: INTERNAL MEDICINE

## 2024-08-25 PROCEDURE — 36415 COLL VENOUS BLD VENIPUNCTURE: CPT

## 2024-08-25 PROCEDURE — 6360000002 HC RX W HCPCS

## 2024-08-25 PROCEDURE — 2700000000 HC OXYGEN THERAPY PER DAY

## 2024-08-25 RX ORDER — LEVOFLOXACIN 5 MG/ML
750 INJECTION, SOLUTION INTRAVENOUS EVERY 24 HOURS
Status: DISCONTINUED | OUTPATIENT
Start: 2024-08-25 | End: 2024-08-27 | Stop reason: HOSPADM

## 2024-08-25 RX ORDER — ALBUTEROL SULFATE 0.83 MG/ML
SOLUTION RESPIRATORY (INHALATION)
Status: COMPLETED
Start: 2024-08-25 | End: 2024-08-25

## 2024-08-25 RX ORDER — FUROSEMIDE 20 MG
20 TABLET ORAL DAILY
Status: DISCONTINUED | OUTPATIENT
Start: 2024-08-26 | End: 2024-08-27 | Stop reason: HOSPADM

## 2024-08-25 RX ORDER — ALBUTEROL SULFATE 0.83 MG/ML
2.5 SOLUTION RESPIRATORY (INHALATION) EVERY 6 HOURS PRN
Status: DISCONTINUED | OUTPATIENT
Start: 2024-08-25 | End: 2024-08-27 | Stop reason: ALTCHOICE

## 2024-08-25 RX ADMIN — ATORVASTATIN CALCIUM 40 MG: 40 TABLET, FILM COATED ORAL at 21:28

## 2024-08-25 RX ADMIN — WATER 60 MG: 1 INJECTION INTRAMUSCULAR; INTRAVENOUS; SUBCUTANEOUS at 14:18

## 2024-08-25 RX ADMIN — ASPIRIN 81 MG: 81 TABLET, CHEWABLE ORAL at 08:01

## 2024-08-25 RX ADMIN — FUROSEMIDE 20 MG: 10 INJECTION, SOLUTION INTRAMUSCULAR; INTRAVENOUS at 08:00

## 2024-08-25 RX ADMIN — SACUBITRIL AND VALSARTAN 1 TABLET: 24; 26 TABLET, FILM COATED ORAL at 08:00

## 2024-08-25 RX ADMIN — GUAIFENESIN AND DEXTROMETHORPHAN 10 ML: 100; 10 SYRUP ORAL at 08:06

## 2024-08-25 RX ADMIN — GUAIFENESIN AND DEXTROMETHORPHAN 10 ML: 100; 10 SYRUP ORAL at 21:28

## 2024-08-25 RX ADMIN — ALBUTEROL SULFATE 2.5 MG: 0.83 SOLUTION RESPIRATORY (INHALATION) at 22:38

## 2024-08-25 RX ADMIN — ALBUTEROL SULFATE 2 PUFF: 90 AEROSOL, METERED RESPIRATORY (INHALATION) at 07:36

## 2024-08-25 RX ADMIN — SODIUM CHLORIDE, PRESERVATIVE FREE 10 ML: 5 INJECTION INTRAVENOUS at 08:01

## 2024-08-25 RX ADMIN — ALBUTEROL SULFATE 2.5 MG: 2.5 SOLUTION RESPIRATORY (INHALATION) at 22:38

## 2024-08-25 RX ADMIN — ALBUTEROL SULFATE 2 PUFF: 90 AEROSOL, METERED RESPIRATORY (INHALATION) at 20:13

## 2024-08-25 RX ADMIN — APIXABAN 5 MG: 5 TABLET, FILM COATED ORAL at 08:05

## 2024-08-25 RX ADMIN — SODIUM CHLORIDE, PRESERVATIVE FREE 10 ML: 5 INJECTION INTRAVENOUS at 21:31

## 2024-08-25 RX ADMIN — METOPROLOL SUCCINATE 25 MG: 25 TABLET, EXTENDED RELEASE ORAL at 08:00

## 2024-08-25 RX ADMIN — AMIODARONE HYDROCHLORIDE 200 MG: 200 TABLET ORAL at 08:00

## 2024-08-25 RX ADMIN — LEVOFLOXACIN 750 MG: 750 INJECTION, SOLUTION INTRAVENOUS at 14:53

## 2024-08-25 RX ADMIN — APIXABAN 5 MG: 5 TABLET, FILM COATED ORAL at 21:30

## 2024-08-25 RX ADMIN — WATER 60 MG: 1 INJECTION INTRAMUSCULAR; INTRAVENOUS; SUBCUTANEOUS at 02:34

## 2024-08-25 RX ADMIN — SACUBITRIL AND VALSARTAN 1 TABLET: 24; 26 TABLET, FILM COATED ORAL at 21:28

## 2024-08-25 RX ADMIN — ALBUTEROL SULFATE 2 PUFF: 90 AEROSOL, METERED RESPIRATORY (INHALATION) at 14:15

## 2024-08-25 RX ADMIN — TIOTROPIUM BROMIDE INHALATION SPRAY 2 PUFF: 3.12 SPRAY, METERED RESPIRATORY (INHALATION) at 07:36

## 2024-08-25 NOTE — PROGRESS NOTES
Hospitalist Progress Note    NAME:   Harman Ryder   : 1950   MRN: [id number]     Date/Time: 2024 2:32 PM  Patient PCP: Angela Archuleta MD    Estimated discharge date:  Barriers: Respiratory status improvement,        Assessment / Plan:          COPD with acute exacerbation POA  Possible bilateral pneumonia versus bronchitis POA  COVID-19 infection +  Acute on chronic systolic heart failure LVEF 25-30% (by echo 10/2022), last EF 55-60%  Chronic respiratory failure with with hypoxia on 3.5 L nasal cannula, now on 6 L  Elevated lactate 2.28 -->  1.28 POA  Chronic dyspnea on exertion  Baseline wears 3.5 L nasal cannula oxygen   Past 5 days, fever 1 day to 102, generalized weakness and malaise              Dry cough              Diarrhea for 1 day  More short of breath with exertion now on 5 to 6 L oxygen  No chest pain, sore throat  No fevers or WBC  Chest x-ray with COPD changes or right basilar atelectasis/airspace disease  CTA chest IMPRESSION:  No acute pulmonary embolus.   Chronic patchy bilateral lower lobe airspace disease is not significantly changed.   Severe centrilobular emphysema.  COVID-19 PCR positive, influenza AB PCR negative    -Status post Solu-Medrol 60 mg every 6 hour, now have decreased to 60 every 12 hour IV   -S/p completion of antibiotics  WBC is increased probably from high-dose of steroid.  Empirically adding Levaquin to cover Pseudomonas.  Monitor QTc  Check chest x-ray  Recheck CBC tomorrow       Non-ST elevation myocardial infarction POA HS troponin 3029  Coronary artery disease POA S/p stents D1 and mid-LCx  Essential HTN POA  Atrial fibrillation on Eliquis POA  Hyperlipidemia POA  Currently no chest pain  ASA, statin, Eliquis  Resume Toprol-XL, Entresto  Consult Dr. Riley, likely medical management  No PE on CTA chest     Prior Non-small cell lung cancer     Body mass index is 21.06 kg/m².     Code Status: Full code  DVT Prophylaxis: Eliquis  Baseline:       Medical Decision Making:   I personally reviewed labs: CBC, CMP, UA, Troponin  I personally reviewed imaging: CXR, CTA chest  I personally reviewed EKG:  Toxic drug monitoring:   Discussed case with: rn,pt,idr    Subjective:     Chief Complaint / Reason for Physician Visit  Oxygen requirement remains around 5 l, short of breath with minimal activity.  Mainly dry cough      Objective:     VITALS:   Last 24hrs VS reviewed since prior progress note. Most recent are:  Patient Vitals for the past 24 hrs:   BP Temp Temp src Pulse Resp SpO2 Weight   08/25/24 1415 -- -- -- 76 21 91 % --   08/25/24 1043 110/66 97.7 °F (36.5 °C) Axillary 64 23 97 % --   08/25/24 0759 115/69 97.7 °F (36.5 °C) Axillary 73 18 94 % --   08/25/24 0738 -- -- -- 66 25 100 % --   08/25/24 0540 -- -- -- -- -- -- 68.9 kg (151 lb 14.4 oz)   08/25/24 0245 132/78 97.8 °F (36.6 °C) Oral 78 22 92 % --   08/24/24 2248 -- -- Oral -- -- -- --   08/24/24 2155 (!) 143/82 -- -- -- -- -- --   08/24/24 2010 120/71 98.4 °F (36.9 °C) Axillary 64 24 96 % --   08/24/24 1648 122/71 97.9 °F (36.6 °C) Oral 78 24 94 % --         Intake/Output Summary (Last 24 hours) at 8/25/2024 1432  Last data filed at 8/25/2024 0942  Gross per 24 hour   Intake 240 ml   Output 1060 ml   Net -820 ml        I had a face to face encounter and independently examined this patient on 8/25/2024, as outlined below:  PHYSICAL EXAM:  General: Alert, cooperative  EENT:  EOMI. Anicteric sclerae.  Resp:  Diminished air entry bilaterally  CV:  Regular  rhythm,  No edema  GI:  Soft, Non distended, Non tender.  +Bowel sounds  Neurologic:  Alert and oriented X 3, normal speech,   Psych:   Good insight. Not anxious nor agitated  Skin:  No rashes.  No jaundice    Reviewed most current lab test results and cultures  YES  Reviewed most current radiology test results   YES  Review and summation of old records today    NO  Reviewed patient's current orders and MAR    YES  PMH/SH reviewed - no change compared

## 2024-08-25 NOTE — PLAN OF CARE
Problem: Discharge Planning  Goal: Discharge to home or other facility with appropriate resources  Outcome: Progressing  Flowsheets (Taken 8/24/2024 0103)  Discharge to home or other facility with appropriate resources: Identify barriers to discharge with patient and caregiver     Problem: ABCDS Injury Assessment  Goal: Absence of physical injury  Outcome: Progressing  Flowsheets (Taken 8/24/2024 0103)  Absence of Physical Injury: Implement safety measures based on patient assessment     Problem: Respiratory - Adult  Goal: Achieves optimal ventilation and oxygenation  Outcome: Progressing  Flowsheets (Taken 8/24/2024 0103)  Achieves optimal ventilation and oxygenation:   Assess for changes in respiratory status   Assess for changes in mentation and behavior   Oxygen supplementation based on oxygen saturation or arterial blood gases   Position to facilitate oxygenation and minimize respiratory effort     Problem: Chronic Conditions and Co-morbidities  Goal: Patient's chronic conditions and co-morbidity symptoms are monitored and maintained or improved  Outcome: Progressing  Flowsheets (Taken 8/24/2024 0103)  Care Plan - Patient's Chronic Conditions and Co-Morbidity Symptoms are Monitored and Maintained or Improved:   Monitor and assess patient's chronic conditions and comorbid symptoms for stability, deterioration, or improvement   Collaborate with multidisciplinary team to address chronic and comorbid conditions and prevent exacerbation or deterioration   Update acute care plan with appropriate goals if chronic or comorbid symptoms are exacerbated and prevent overall improvement and discharge

## 2024-08-26 LAB
ANION GAP SERPL CALC-SCNC: 5 MMOL/L (ref 5–15)
BUN SERPL-MCNC: 34 MG/DL (ref 6–20)
BUN/CREAT SERPL: 35 (ref 12–20)
CALCIUM SERPL-MCNC: 9 MG/DL (ref 8.5–10.1)
CHLORIDE SERPL-SCNC: 102 MMOL/L (ref 97–108)
CO2 SERPL-SCNC: 32 MMOL/L (ref 21–32)
CREAT SERPL-MCNC: 0.96 MG/DL (ref 0.7–1.3)
ERYTHROCYTE [DISTWIDTH] IN BLOOD BY AUTOMATED COUNT: 13.6 % (ref 11.5–14.5)
GLUCOSE SERPL-MCNC: 141 MG/DL (ref 65–100)
HCT VFR BLD AUTO: 40.6 % (ref 36.6–50.3)
HGB BLD-MCNC: 13.1 G/DL (ref 12.1–17)
MAGNESIUM SERPL-MCNC: 2.4 MG/DL (ref 1.6–2.4)
MCH RBC QN AUTO: 29.6 PG (ref 26–34)
MCHC RBC AUTO-ENTMCNC: 32.3 G/DL (ref 30–36.5)
MCV RBC AUTO: 91.9 FL (ref 80–99)
NRBC # BLD: 0 K/UL (ref 0–0.01)
NRBC BLD-RTO: 0 PER 100 WBC
PLATELET # BLD AUTO: 466 K/UL (ref 150–400)
PMV BLD AUTO: 9.6 FL (ref 8.9–12.9)
POTASSIUM SERPL-SCNC: 4.4 MMOL/L (ref 3.5–5.1)
RBC # BLD AUTO: 4.42 M/UL (ref 4.1–5.7)
SODIUM SERPL-SCNC: 139 MMOL/L (ref 136–145)
WBC # BLD AUTO: 33.4 K/UL (ref 4.1–11.1)

## 2024-08-26 PROCEDURE — 36415 COLL VENOUS BLD VENIPUNCTURE: CPT

## 2024-08-26 PROCEDURE — 87070 CULTURE OTHR SPECIMN AEROBIC: CPT

## 2024-08-26 PROCEDURE — 2700000000 HC OXYGEN THERAPY PER DAY

## 2024-08-26 PROCEDURE — 2580000003 HC RX 258: Performed by: INTERNAL MEDICINE

## 2024-08-26 PROCEDURE — 94640 AIRWAY INHALATION TREATMENT: CPT

## 2024-08-26 PROCEDURE — 97535 SELF CARE MNGMENT TRAINING: CPT

## 2024-08-26 PROCEDURE — 6370000000 HC RX 637 (ALT 250 FOR IP): Performed by: INTERNAL MEDICINE

## 2024-08-26 PROCEDURE — 94669 MECHANICAL CHEST WALL OSCILL: CPT

## 2024-08-26 PROCEDURE — 87205 SMEAR GRAM STAIN: CPT

## 2024-08-26 PROCEDURE — 85027 COMPLETE CBC AUTOMATED: CPT

## 2024-08-26 PROCEDURE — 2060000000 HC ICU INTERMEDIATE R&B

## 2024-08-26 PROCEDURE — 6360000002 HC RX W HCPCS: Performed by: INTERNAL MEDICINE

## 2024-08-26 PROCEDURE — 6370000000 HC RX 637 (ALT 250 FOR IP): Performed by: PHYSICIAN ASSISTANT

## 2024-08-26 PROCEDURE — 97116 GAIT TRAINING THERAPY: CPT

## 2024-08-26 PROCEDURE — 80048 BASIC METABOLIC PNL TOTAL CA: CPT

## 2024-08-26 PROCEDURE — 83735 ASSAY OF MAGNESIUM: CPT

## 2024-08-26 RX ORDER — GUAIFENESIN 600 MG/1
600 TABLET, EXTENDED RELEASE ORAL 2 TIMES DAILY
Status: DISCONTINUED | OUTPATIENT
Start: 2024-08-26 | End: 2024-08-27

## 2024-08-26 RX ADMIN — APIXABAN 5 MG: 5 TABLET, FILM COATED ORAL at 21:28

## 2024-08-26 RX ADMIN — GUAIFENESIN 600 MG: 600 TABLET, EXTENDED RELEASE ORAL at 21:27

## 2024-08-26 RX ADMIN — APIXABAN 5 MG: 5 TABLET, FILM COATED ORAL at 09:52

## 2024-08-26 RX ADMIN — GUAIFENESIN 600 MG: 600 TABLET, EXTENDED RELEASE ORAL at 13:18

## 2024-08-26 RX ADMIN — ALBUTEROL SULFATE 2 PUFF: 90 AEROSOL, METERED RESPIRATORY (INHALATION) at 08:17

## 2024-08-26 RX ADMIN — AMIODARONE HYDROCHLORIDE 200 MG: 200 TABLET ORAL at 09:25

## 2024-08-26 RX ADMIN — FUROSEMIDE 20 MG: 20 TABLET ORAL at 09:26

## 2024-08-26 RX ADMIN — LEVOFLOXACIN 750 MG: 750 INJECTION, SOLUTION INTRAVENOUS at 16:44

## 2024-08-26 RX ADMIN — METOPROLOL SUCCINATE 25 MG: 25 TABLET, EXTENDED RELEASE ORAL at 09:25

## 2024-08-26 RX ADMIN — ATORVASTATIN CALCIUM 40 MG: 40 TABLET, FILM COATED ORAL at 21:27

## 2024-08-26 RX ADMIN — SODIUM CHLORIDE: 9 INJECTION, SOLUTION INTRAVENOUS at 16:42

## 2024-08-26 RX ADMIN — WATER 60 MG: 1 INJECTION INTRAMUSCULAR; INTRAVENOUS; SUBCUTANEOUS at 13:18

## 2024-08-26 RX ADMIN — ASPIRIN 81 MG: 81 TABLET, CHEWABLE ORAL at 09:25

## 2024-08-26 RX ADMIN — ALBUTEROL SULFATE 2 PUFF: 90 AEROSOL, METERED RESPIRATORY (INHALATION) at 13:46

## 2024-08-26 RX ADMIN — GUAIFENESIN AND DEXTROMETHORPHAN 10 ML: 100; 10 SYRUP ORAL at 10:11

## 2024-08-26 RX ADMIN — WATER 60 MG: 1 INJECTION INTRAMUSCULAR; INTRAVENOUS; SUBCUTANEOUS at 02:27

## 2024-08-26 RX ADMIN — SODIUM CHLORIDE, PRESERVATIVE FREE 10 ML: 5 INJECTION INTRAVENOUS at 09:28

## 2024-08-26 RX ADMIN — SACUBITRIL AND VALSARTAN 1 TABLET: 24; 26 TABLET, FILM COATED ORAL at 21:27

## 2024-08-26 RX ADMIN — SODIUM CHLORIDE, PRESERVATIVE FREE 10 ML: 5 INJECTION INTRAVENOUS at 21:28

## 2024-08-26 RX ADMIN — TIOTROPIUM BROMIDE INHALATION SPRAY 2 PUFF: 3.12 SPRAY, METERED RESPIRATORY (INHALATION) at 08:18

## 2024-08-26 NOTE — CARE COORDINATION
CM Note:  Auth received to go to [facility]  Aut # [id number]  Dates 8/25-9/6  Notified Dr. Clifton via Perfect service  Tentative SC Tuesday    Afternoon Note: [facility] does not have isolation bed available. [facility] does have beds. Offered this to patient and he agrees to go to [facility]    English Sandra SMITH CM   [id number]

## 2024-08-26 NOTE — CONSULTS
Pulmonary, Critical Care, and Sleep Medicine~Consult Note    Name: Harman Ryder MRN: [id number]   : 1950 Hospital: [facility]   Date: 2024 11:46 AM Admission: 2024     Impression Plan   Acute on chronic hypoxemic respiratory failure  COVID-19 infection  +/- PNA  COPD/asthma with acute exacerbation  Non-STEMI  Acute on chronic HFpEF  CAD s/p stents  PAF on Eliquis  Lung cancer s/p RUL resection  HTN  DM  CKD  Leukocytosis Supp O2 for goal sats 92-95%  Scheduled albuterol HFA, Spiriva. Unable to give nebs d/t covid-19 infection. Will see if he can get someone to bring in his Trelegy inhaler from home.  Sputum cx if able  Levofloxacin. Holding home azithro tiw, resume once levofloxacin is completed  Cont IVCS  Trend wbc. Consider CT chest/abd/pelvis if continuing to trend upwards  Repeat procal, crp, bnp tomorrow  Add mucinex, flutter valve, IS  Po lasix as per cardiology  Eliquis    Thank you for the consult, will follow     Daily Progression:    Consult for copd exacerbation    HPI: 74 y/o M with PMH COPD (FEV1 34%, DLCO/VA 29%), lung cancer s/p RUL resection, chronic hypoxemic respiratory failure (4-5 lpm O2), CAD s/p stents, HFpEF, PAF on Eliquis, CKD, HTN, HLD who is admitted for COPD exacerbation, COVID-19, possible PNA, acute on chronic hypoxia, non-STEMI, CHF.  O2 requirement had been improving but now worsening again since yesterday morning; 4L O2-->6L    Labs: bnp 5159, procal 0.49, crp 12.3, trop 2438, wbc 10.7-->33.4    CTA chest 24: Severe centrilobular emphysema. Patchy bilateral lower lobe airspace  disease, not significantly changed. Bilateral upper lobe atelectasis/scarring.    Follows with Dr. Balbuena, last seen 24. At that visit he was continued on Trelegy 200, budesonide nebs, azithro 500mg tiw. A cxr was ordered to f/u on prior abnormal cxr. Allergy labs were ordered to determine candidacy for biologics (?ACOS). IgE    WBC 30.4* 33.4*   HGB 13.2 13.1   HCT 40.2 40.6   * 466*   MCV 92.0 91.9   MCH 30.2 29.6        Metabolic  Panel Recent Labs     08/25/24  0242 08/26/24  0237    139   K 4.2 4.4    102   CO2 34* 32   BUN 30* 34*   MG 2.6* 2.4        Pertinent Labs                SURYA Peace  8/26/2024

## 2024-08-26 NOTE — PLAN OF CARE
Problem: Discharge Planning  Goal: Discharge to home or other facility with appropriate resources  Outcome: Progressing  Flowsheets (Taken 8/24/2024 0103)  Discharge to home or other facility with appropriate resources: Identify barriers to discharge with patient and caregiver     Problem: Safety - Adult  Goal: Free from fall injury  Outcome: Progressing  Flowsheets (Taken 8/25/2024 0350)  Free From Fall Injury: Instruct family/caregiver on patient safety     Problem: ABCDS Injury Assessment  Goal: Absence of physical injury  Outcome: Progressing  Flowsheets (Taken 8/24/2024 0103)  Absence of Physical Injury: Implement safety measures based on patient assessment     Problem: Respiratory - Adult  Goal: Achieves optimal ventilation and oxygenation  Outcome: Progressing  Flowsheets (Taken 8/24/2024 0103)  Achieves optimal ventilation and oxygenation:   Assess for changes in respiratory status   Assess for changes in mentation and behavior   Oxygen supplementation based on oxygen saturation or arterial blood gases   Position to facilitate oxygenation and minimize respiratory effort     Problem: Chronic Conditions and Co-morbidities  Goal: Patient's chronic conditions and co-morbidity symptoms are monitored and maintained or improved  Outcome: Progressing  Flowsheets (Taken 8/24/2024 0103)  Care Plan - Patient's Chronic Conditions and Co-Morbidity Symptoms are Monitored and Maintained or Improved:   Monitor and assess patient's chronic conditions and comorbid symptoms for stability, deterioration, or improvement   Collaborate with multidisciplinary team to address chronic and comorbid conditions and prevent exacerbation or deterioration   Update acute care plan with appropriate goals if chronic or comorbid symptoms are exacerbated and prevent overall improvement and discharge

## 2024-08-26 NOTE — PROGRESS NOTES
Physician Progress Note      PATIENT:               CARLOS MEZA  [facility] #:                  [id number]  :                       1950  ADMIT DATE:       2024 10:48 PM  DISCH DATE:  RESPONDING  PROVIDER #:        Danish Clifton MD          QUERY TEXT:    Good Morning    This patient admitted on 2024- present for COPD exacerbation. COVID 19.    Cardiology notes NSTEMI, likely Type 2 MI  Attending notes NSTEMI      If possible,  can you please clarify and please document in progress notes and   discharge summary if you are evaluating and /or treating any of the   following:    The medical record reflects the following:  Risk Factors: CHF< COPD, PAF< HTN, Pulmonary HTN  Clinical Indicators: H&P notes Non-ST elevation Myocardial Infarction. Cards   notes on --type 2 MI from hypoxia, Troponin increase HS @ 3029, NO acute   Changes  Treatment: Troponins, Cardiology, Echo, Initially was on Heparin, but this   d/c, Cont. ASA< Amiodarone, IV Lasix    Thank you  MONICA EliN,RN, CPHQ, CCDS, SMART  Options provided:  -- Type 2 MI is confirmed and NSTEMI is ruled out  -- NSTEMI is confirmed and Type 2 MI is ruled out  -- Other - I will add my own diagnosis  -- Disagree - Not applicable / Not valid  -- Disagree - Clinically unable to determine / Unknown  -- Refer to Clinical Documentation Reviewer    PROVIDER RESPONSE TEXT:    After study, Type 2 MI is confirmed and NSTEMI is ruled out.    Query created by: Vilma Mcgowan on 2024 1:38 PM      QUERY TEXT:    Good Afternoon    This patient admitted on 2024- for COPD exacerbation.    The H&P and progress notes for the attending notes \"Possible Brent. Pneumonia s.   Bronchitis, and COVID 10 infection.      If possible, please document in progress notes and discharge summary the   relationship, if any, between COPD exacerbation  and  COVID 19 .    The medical record reflects the following:  Risk Factors: Known COPD, CHF, COVID 19 +Tobacco

## 2024-08-26 NOTE — PROGRESS NOTES
Hospitalist Progress Note    NAME:   Harman Ryder   : 1950   MRN: [id number]     Date/Time: 2024 12:29 PM  Patient PCP: Angela Archuleta MD    Estimated discharge date:  Barriers: Respiratory status improvement,Pulm clearance  Dispo- SNF      Assessment / Plan:          COPD with acute exacerbation POA  Possible bilateral pneumonia versus bronchitis POA  COVID-19 infection +  Acute on chronic systolic heart failure LVEF 25-30% (by echo 10/2022), last EF 55-60%  Chronic respiratory failure with with hypoxia on 3.5 L nasal cannula, now on 6 L  Elevated lactate 2.28 -->  1.28 POA  Chronic dyspnea on exertion  Baseline wears 3.5 L nasal cannula oxygen   Past 5 days, fever 1 day to 102, generalized weakness and malaise              Dry cough              Diarrhea for 1 day  More short of breath with exertion now on 5 to 6 L oxygen  No chest pain, sore throat  No fevers or WBC  Chest x-ray with COPD changes or right basilar atelectasis/airspace disease  CTA chest IMPRESSION:  No acute pulmonary embolus.   Chronic patchy bilateral lower lobe airspace disease is not significantly changed.   Severe centrilobular emphysema.  COVID-19 PCR positive, influenza AB PCR negative    -Status post Solu-Medrol 60 mg every 6 hour, now have decreased to 60 every 12 hour IV   -S/p completion of antibiotics  WBC is increased probably from high-dose of steroid.  Empirically adding Levaquin to cover Pseudomonas.  Monitor QTc  CXR okay  Wbc increased, check tomorrow  Consult pulm       Non-ST elevation myocardial infarction POA HS troponin 3029  Coronary artery disease POA S/p stents D1 and mid-LCx  Essential HTN POA  Atrial fibrillation on Eliquis POA  Hyperlipidemia POA  Currently no chest pain  ASA, statin, Eliquis  Resume Toprol-XL, Entresto  Consult Dr. Riley, likely medical management  No PE on CTA chest     Prior Non-small cell lung cancer     Body mass index is 21.06 kg/m².     Code Status: Full

## 2024-08-26 NOTE — PLAN OF CARE
Problem: Occupational Therapy - Adult  Goal: By Discharge: Performs self-care activities at highest level of function for planned discharge setting.  See evaluation for individualized goals.  Description: FUNCTIONAL STATUS PRIOR TO ADMISSION:  Patient was ambulatory using a RW around his home, stated he was driving and completing his ADLs. The patient stated that since his last two admissions he has felt he has trouble returning to baseline with HH services.     HOME SUPPORT: Patient lived with his daughter who provided assistance as needed however both she and DANDY work during the day.    Occupational Therapy Goals:  Initiated 8/21/2024  1.  Patient will perform grooming with Modified [place] within 7 day(s).  2.  Patient will perform upper body dressing with Modified [place] within 7 day(s).  3.  Patient will perform lower body dressing with Modified [place] within 7 day(s).  4.  Patient will perform toilet transfers with Modified [place]  within 7 day(s).  5.  Patient will perform all aspects of toileting with Modified [place] within 7 day(s).  6.  Patient will participate in upper extremity therapeutic exercise/activities with Modified [place] for 15 minutes within 7 day(s).        Outcome: Progressing    OCCUPATIONAL THERAPY TREATMENT  Patient: Harman Ryder (73 y.o. male)  Date: 8/26/2024  Primary Diagnosis: NSTEMI (non-ST elevated myocardial infarction) ([facility]) [I21.4]  COPD with acute exacerbation ([facility]) [J44.1]       Precautions:                  Chart, occupational therapy assessment, plan of care, and goals were reviewed.    ASSESSMENT  Patient continues to benefit from skilled OT services and is slowly progressing towards goals. Pt cleared to work with therapy services via RN, pt agreeable to therapy. Pt on 5L NC and O2 ranged from 94-95% with and without activity, VSS during session. While long sitting in bed, pt presented with good hip flexibility and able to don BLE  socks w setup/SBA. Pt completed supine>sit transfer w/ SBA and stated minimal dizziness, able to dissipate w/ seated rest break. Pt tolerated brief ambulation from EOB to chair and seated himself w/ good hand placement on chair. Pt left seated with all needs met.              PLAN :  Patient continues to benefit from skilled intervention to address the above impairments.  Continue treatment per established plan of care to address goals.    Recommendation for discharge: (in order for the patient to meet his/her long term goals):   High intensity/comprehensive skilled occupational therapy in a multidisciplinary setting as patient is working towards tolerating up to 3 hours of therapy/day 5-7x/week    Other factors to consider for discharge: concern for safely navigating or managing the home environment    IF patient discharges home will need the following DME: continuing to assess with progress       SUBJECTIVE:   Patient stated “I am pretty tired today.”    OBJECTIVE DATA SUMMARY:   Cognitive/Behavioral Status:  Orientation  Overall Orientation Status: Within Normal Limits  Cognition  Overall Cognitive Status: WNL    Functional Mobility and Transfers for ADLs:  Bed Mobility:  Bed Mobility Training  Bed Mobility Training: Yes  Interventions: Verbal cues;Safety awareness training  Rolling: Stand-by assistance  Supine to Sit: Stand-by assistance     Transfers:   Transfer Training  Sit to Stand: Contact-guard assistance  Stand to Sit: Contact-guard assistance  Bed to Chair: Contact-guard assistance           Balance:     Balance  Sitting: Intact  Standing: Impaired  Standing - Static: Unsupported;Good  Standing - Dynamic: Unsupported;Good;Occasional      ADL Intervention:    Grooming: Setup   Grooming Skilled Clinical Factors: seated       UE Dressing: Setup       LE Dressing: Setup;Stand by assistance  LE Dressing Skilled Clinical Factors: donning socks in long sitting of bed        Patient instructed and indicated

## 2024-08-26 NOTE — PLAN OF CARE
Problem: Physical Therapy - Adult  Goal: By Discharge: Performs mobility at highest level of function for planned discharge setting.  See evaluation for individualized goals.  Description: FUNCTIONAL STATUS PRIOR TO ADMISSION: Patient was modified independent using a rolling walker for functional mobility.    HOME SUPPORT PRIOR TO ADMISSION: The patient lived with daughter but did not require assistance.    Physical Therapy Goals  Initiated 8/21/2024  1.  Patient will move from supine to sit and sit to supine in bed with independence within 7 day(s).    2.  Patient will perform sit to stand with supervision/set-up within 7 day(s).  3.  Patient will transfer from bed to chair and chair to bed with supervision/set-up using the least restrictive device within 7 day(s).  4.  Patient will ambulate with supervision/set-up for 100 feet with the least restrictive device within 7 day(s).   Outcome: Progressing   PHYSICAL THERAPY TREATMENT    Patient: Harman Ryder (73 y.o. male)  Date: 8/26/2024  Diagnosis: NSTEMI (non-ST elevated myocardial infarction) ([facility]) [I21.4]  COPD with acute exacerbation ([facility]) [J44.1] NSTEMI (non-ST elevated myocardial infarction) ([facility])      Precautions:                        ASSESSMENT:  Patient continues to benefit from skilled PT services and is slowly progressing towards goals. Patient received in bed and agreeable to participate, on 5 liters 02 with sats in mid 90s.  Patient able to come to sit EOB with SBA, then requires seated rest break for SOB, uses PLB for recovery.  Patient stood with CGA and ambulated a short distance to chair with CGA and slow pace, fatigues quickly and RR in 30s with activity, sats 94%.  Educated on pacing and progression of activity and performed gentle seated stretches for trunk.  Left up in chair  with call bell in reach, patient remains well below baseline and is at increased risk of falls.          PLAN:  Patient continues to benefit from skilled

## 2024-08-27 VITALS
BODY MASS INDEX: 19.56 KG/M2 | OXYGEN SATURATION: 96 % | DIASTOLIC BLOOD PRESSURE: 65 MMHG | WEIGHT: 144.4 LBS | SYSTOLIC BLOOD PRESSURE: 117 MMHG | HEART RATE: 64 BPM | RESPIRATION RATE: 18 BRPM | HEIGHT: 72 IN | TEMPERATURE: 97.6 F

## 2024-08-27 LAB
CRP SERPL-MCNC: 0.31 MG/DL (ref 0–0.3)
NT PRO BNP: 304 PG/ML
PROCALCITONIN SERPL-MCNC: 0.06 NG/ML

## 2024-08-27 PROCEDURE — 84145 PROCALCITONIN (PCT): CPT

## 2024-08-27 PROCEDURE — 6370000000 HC RX 637 (ALT 250 FOR IP): Performed by: PHYSICIAN ASSISTANT

## 2024-08-27 PROCEDURE — 86140 C-REACTIVE PROTEIN: CPT

## 2024-08-27 PROCEDURE — 83880 ASSAY OF NATRIURETIC PEPTIDE: CPT

## 2024-08-27 PROCEDURE — 6370000000 HC RX 637 (ALT 250 FOR IP): Performed by: INTERNAL MEDICINE

## 2024-08-27 PROCEDURE — 2700000000 HC OXYGEN THERAPY PER DAY

## 2024-08-27 PROCEDURE — 6360000002 HC RX W HCPCS: Performed by: INTERNAL MEDICINE

## 2024-08-27 PROCEDURE — 2580000003 HC RX 258: Performed by: INTERNAL MEDICINE

## 2024-08-27 PROCEDURE — 36415 COLL VENOUS BLD VENIPUNCTURE: CPT

## 2024-08-27 RX ORDER — GUAIFENESIN 600 MG/1
1200 TABLET, EXTENDED RELEASE ORAL 2 TIMES DAILY
Status: DISCONTINUED | OUTPATIENT
Start: 2024-08-27 | End: 2024-08-27 | Stop reason: HOSPADM

## 2024-08-27 RX ORDER — ALBUTEROL SULFATE 90 UG/1
2 AEROSOL, METERED RESPIRATORY (INHALATION) EVERY 6 HOURS PRN
Status: DISCONTINUED | OUTPATIENT
Start: 2024-08-27 | End: 2024-08-27 | Stop reason: HOSPADM

## 2024-08-27 RX ORDER — FUROSEMIDE 20 MG
20 TABLET ORAL DAILY
Qty: 30 TABLET | Refills: 0 | Status: SHIPPED
Start: 2024-08-27

## 2024-08-27 RX ORDER — AZITHROMYCIN 500 MG/1
500 TABLET, FILM COATED ORAL
Qty: 12 TABLET | Refills: 0 | Status: SHIPPED
Start: 2024-08-28 | End: 2024-09-27

## 2024-08-27 RX ORDER — METOPROLOL SUCCINATE 25 MG/1
25 TABLET, EXTENDED RELEASE ORAL DAILY
Qty: 30 TABLET | Refills: 3 | Status: SHIPPED
Start: 2024-08-28

## 2024-08-27 RX ORDER — PREDNISONE 20 MG/1
TABLET ORAL
Qty: 5 TABLET | Refills: 0 | Status: SHIPPED
Start: 2024-08-27

## 2024-08-27 RX ADMIN — WATER 60 MG: 1 INJECTION INTRAMUSCULAR; INTRAVENOUS; SUBCUTANEOUS at 03:41

## 2024-08-27 RX ADMIN — AMIODARONE HYDROCHLORIDE 200 MG: 200 TABLET ORAL at 08:45

## 2024-08-27 RX ADMIN — APIXABAN 5 MG: 5 TABLET, FILM COATED ORAL at 08:44

## 2024-08-27 RX ADMIN — FUROSEMIDE 20 MG: 20 TABLET ORAL at 08:45

## 2024-08-27 RX ADMIN — SODIUM CHLORIDE, PRESERVATIVE FREE 10 ML: 5 INJECTION INTRAVENOUS at 08:45

## 2024-08-27 RX ADMIN — SACUBITRIL AND VALSARTAN 1 TABLET: 24; 26 TABLET, FILM COATED ORAL at 08:45

## 2024-08-27 RX ADMIN — TIOTROPIUM BROMIDE INHALATION SPRAY 2 PUFF: 3.12 SPRAY, METERED RESPIRATORY (INHALATION) at 06:06

## 2024-08-27 RX ADMIN — ALBUTEROL SULFATE 2 PUFF: 90 AEROSOL, METERED RESPIRATORY (INHALATION) at 06:06

## 2024-08-27 RX ADMIN — ASPIRIN 81 MG: 81 TABLET, CHEWABLE ORAL at 08:45

## 2024-08-27 RX ADMIN — GUAIFENESIN 600 MG: 600 TABLET, EXTENDED RELEASE ORAL at 08:45

## 2024-08-27 RX ADMIN — METOPROLOL SUCCINATE 25 MG: 25 TABLET, EXTENDED RELEASE ORAL at 08:45

## 2024-08-27 NOTE — PLAN OF CARE
Problem: Discharge Planning  Goal: Discharge to home or other facility with appropriate resources  8/27/2024 1321 by Juliette Tyler, SARAH  Outcome: Adequate for Discharge  8/27/2024 0832 by Mitzi Tracey RT  Outcome: Progressing  Flowsheets (Taken 8/26/2024 2127 by Verena Anguiano, RN)  Discharge to home or other facility with appropriate resources: Identify barriers to discharge with patient and caregiver     Problem: Safety - Adult  Goal: Free from fall injury  Outcome: Adequate for Discharge     Problem: ABCDS Injury Assessment  Goal: Absence of physical injury  Outcome: Adequate for Discharge     Problem: Respiratory - Adult  Goal: Achieves optimal ventilation and oxygenation  Outcome: Adequate for Discharge     Problem: Chronic Conditions and Co-morbidities  Goal: Patient's chronic conditions and co-morbidity symptoms are monitored and maintained or improved  Outcome: Adequate for Discharge

## 2024-08-27 NOTE — PROGRESS NOTES
End of Shift Note    Bedside shift change report given to Juliette SMITH (oncoming nurse) by Elizabeth Harry RN (offgoing nurse).  Report included the following information SBAR, Kardex, MAR, Recent Results, and Cardiac Rhythm NSR .    Shift worked:  [id number]     Shift summary and any significant changes:     Bed alarm refusal signed over night.     Concerns for physician to address:  Still with none productive/ congested cough.  O2 sats mid-low 90s on 5L nc. Pt still with dyspnea at rest and with exertion     Zone phone for oncoming shift:   [phone]       Activity:     Number times ambulated in hallways past shift: 0  Number of times OOB to chair past shift: 0    Cardiac:   Cardiac Monitoring: Yes           Access:  Current line(s): PIV     Genitourinary:   Urinary status: voiding    Respiratory:      Chronic home O2 use?: Yes  Incentive spirometer at bedside: Yes       GI:     Current diet:  ADULT DIET; Regular; Low Fat/Low Chol/High Fiber/RAFI  Passing flatus: YES  Tolerating current diet: YES       Pain Management:   Patient states pain is manageable on current regimen: N/A    Skin:     Interventions: increase time out of bed and limit briefs    Patient Safety:  Fall Score:    Interventions: gripper socks and pt to call before getting OOB       Length of Stay:  Expected LOS: 8  Actual LOS: 7      Elizabeth Harry RN

## 2024-08-27 NOTE — PROGRESS NOTES
Pulmonary, Critical Care, and Sleep Medicine~Consult Note    Name: Harman Ryder MRN: [id number]   : 1950 Hospital: [facility]   Date: 2024 11:25 AM Admission: 2024     Impression Plan   Acute on chronic hypoxemic respiratory failure  COVID-19 infection  +/- PNA  COPD/asthma with acute exacerbation  Non-STEMI  Acute on chronic HFpEF  CAD s/p stents  PAF on Eliquis  Lung cancer s/p RUL resection  HTN  DM  CKD  Leukocytosis--procal low, afebrile. Suspect secondary to steroids Supp O2 for goal sats 92-95%--on baseline 4L  Scheduled albuterol HFA, Spiriva. Unable to give nebs d/t covid-19 infection. Will see if he can get someone to bring in his Trelegy inhaler from home.  F/u sputum cx   Levofloxacin. Holding home azithro tiw, resume once levofloxacin is completed  taper IVCS  Trend wbc.  increase mucinex, continue flutter valve, IS. Add chest PT  Po lasix as per cardiology  Eliquis       Daily Progression:    24: breathing is slowly improving. Chest is still congested, unable to expectorate. Using flutter valve. On 4L O2  Bnp 304 (down from >5k)  Crp 0.31 (down from 12)  Procal low    Consult for copd exacerbation    HPI: 72 y/o M with PMH COPD (FEV1 34%, DLCO/VA 29%), lung cancer s/p RUL resection, chronic hypoxemic respiratory failure (4-5 lpm O2), CAD s/p stents, HFpEF, PAF on Eliquis, CKD, HTN, HLD who is admitted for COPD exacerbation, COVID-19, possible PNA, acute on chronic hypoxia, non-STEMI, CHF.  O2 requirement had been improving but now worsening again since yesterday morning; 4L O2-->6L    Labs: bnp 5159, procal 0.49, crp 12.3, trop 2438, wbc 10.7-->33.4    CTA chest 24: Severe centrilobular emphysema. Patchy bilateral lower lobe airspace  disease, not significantly changed. Bilateral upper lobe atelectasis/scarring.    Follows with Dr. Balbuena, last seen 24. At that visit he was continued on Trelegy 200, budesonide nebs,  wheeze/rales,  severely diminished throughout. Congested cough    ABD: Soft/NT    EXT: No cyanosis/clubbing/edema, normal peripheral pulses    Skin: No rashes or ulcers, no mottling    Neuro: A/O x 3        Medications:  Current Facility-Administered Medications   Medication Dose Route Frequency    guaiFENesin (MUCINEX) extended release tablet 600 mg  600 mg Oral BID    furosemide (LASIX) tablet 20 mg  20 mg Oral Daily    levoFLOXacin (LEVAQUIN) 750 MG/150ML infusion 750 mg  750 mg IntraVENous Q24H    albuterol (PROVENTIL) (2.5 MG/3ML) 0.083% nebulizer solution 2.5 mg  2.5 mg Nebulization Q6H PRN    tiotropium (SPIRIVA RESPIMAT) 2.5 MCG/ACT inhaler 2 puff  2 puff Inhalation Daily RT    albuterol sulfate HFA (PROVENTIL;VENTOLIN;PROAIR) 108 (90 Base) MCG/ACT inhaler 2 puff  2 puff Inhalation TID RT    apixaban (ELIQUIS) tablet 5 mg  5 mg Oral BID    methylPREDNISolone sodium succ (SOLU-MEDROL) 60 mg in sterile water 0.96 mL injection  60 mg IntraVENous Q12H    sodium chloride flush 0.9 % injection 5-40 mL  5-40 mL IntraVENous 2 times per day    sodium chloride flush 0.9 % injection 5-40 mL  5-40 mL IntraVENous PRN    0.9 % sodium chloride infusion   IntraVENous PRN    ondansetron (ZOFRAN-ODT) disintegrating tablet 4 mg  4 mg Oral Q8H PRN    Or    ondansetron (ZOFRAN) injection 4 mg  4 mg IntraVENous Q6H PRN    polyethylene glycol (GLYCOLAX) packet 17 g  17 g Oral Daily    bisacodyl (DULCOLAX) suppository 10 mg  10 mg Rectal Daily PRN    acetaminophen (TYLENOL) tablet 650 mg  650 mg Oral Q6H PRN    Or    acetaminophen (TYLENOL) suppository 650 mg  650 mg Rectal Q6H PRN    aspirin chewable tablet 81 mg  81 mg Oral Daily    guaiFENesin-dextromethorphan (ROBITUSSIN DM) 100-10 MG/5ML syrup 10 mL  10 mL Oral Q6H PRN    amiodarone (CORDARONE) tablet 200 mg  200 mg Oral Daily    metoprolol succinate (TOPROL XL) extended release tablet 25 mg  25 mg Oral Daily    sacubitril-valsartan (ENTRESTO) 24-26 MG per tablet 1 tablet  1

## 2024-08-27 NOTE — DISCHARGE INSTRUCTIONS
HOSPITALIST DISCHARGE INSTRUCTIONS    NAME: Harman Ryder   :  1950   MRN:  [id number]     Date/Time:  2024 11:43 AM    ADMIT DATE: 2024   DISCHARGE DATE: 2024     Attending Physician: Danish Clifton MD  COPD with acute exacerbation POA  Possible bilateral pneumonia versus bronchitis POA  COVID-19 infection +  Acute on chronic systolic heart failure  Type II MI  CAD  HTN  A-fib  HLD    Medications: Per above medication reconciliation.    Pain Management: per above medications    Recommended diet: regular diet    Recommended activity: activity as tolerated    Wound care: None    Indwelling devices:  None    Supplemental Oxygen: Chronic Oxygen,  4  LNC at baseline    Required Lab work: Per SNF routine.  Check CBC in 3 to 5 days    Glucose management:  Accucheck ACHS with sliding scale per SNF protocol    Code status: Full code

## 2024-08-27 NOTE — DISCHARGE SUMMARY
to 102, generalized weakness and malaise              Dry cough              Diarrhea for 1 day  More short of breath with exertion now on 5 to 6 L oxygen    Patient was admitted due to above, initially requiring on 5 to 6 L nasal cannula, patient was started empiric antibiotics as well as IV Solu-Medrol, he has been improving slowly, though over the weekend he shortness of breath got worse so he was placed on Levaquin.  His white count was increasing though likely it was from steroid.  Recheck CBC in 3 to 5 days at SNF his procalcitonin was normal.  No fever.  Pulmonary was on board.  He was discharged in stable condition to [facility]          Non-ST elevation myocardial infarction POA HS troponin 3029  Coronary artery disease POA S/p stents D1 and mid-LCx  Essential HTN POA  Atrial fibrillation on Eliquis POA  Hyperlipidemia POA  Currently no chest pain  ASA, statin, Eliquis  Resume Toprol-XL, Entresto  Consult Dr. Riley, recommends medical management  No PE on CTA chest     Prior Non-small cell lung cancer    Discharge Exam:  Patient seen and examined by me on discharge day.  Pertinent Findings:  Patient Vitals for the past 24 hrs:   BP Temp Temp src Pulse Resp SpO2   08/27/24 0845 129/66 98.1 °F (36.7 °C) Oral 64 20 96 %   08/27/24 0335 118/71 97.5 °F (36.4 °C) Oral 66 24 93 %   08/27/24 0204 -- -- -- 70 -- 95 %   08/27/24 0016 113/64 97.1 °F (36.2 °C) Axillary 64 22 95 %   08/26/24 2130 -- -- -- 70 -- 91 %   08/26/24 2127 115/62 97 °F (36.1 °C) Oral -- 22 --   08/26/24 1555 113/65 98.1 °F (36.7 °C) Oral 79 20 94 %   08/26/24 1359 108/63 98.1 °F (36.7 °C) Oral 67 21 93 %   08/26/24 1346 -- -- -- -- -- 95 %       Gen:    Not in distress  Chest: Clear lungs  CVS:   Regular rhythm.  No edema  Abd:  Soft, not distended, not tender  Neuro: awake, moving all exts    Discharge/Recent Laboratory Results:  Recent Labs     08/26/24  0237      K 4.4      CO2 32   BUN 34*   CREATININE 0.96   GLUCOSE 141*   CALCIUM  9.0   MG 2.4     Recent Labs     08/26/24  0237   HGB 13.1   HCT 40.6   WBC 33.4*   *       Discharge Medications:     Medication List        START taking these medications      azithromycin 500 MG tablet  Commonly known as: ZITHROMAX  Take 1 tablet by mouth three times a week  Start taking on: August 28, 2024     metoprolol succinate 25 MG extended release tablet  Commonly known as: TOPROL XL  Take 1 tablet by mouth daily  Start taking on: August 28, 2024     predniSONE 20 MG tablet  Commonly known as: DELTASONE  Day 1-3 take 40 mg Daily, Day 4-6 Take 20 mg daily, Day 7-9 Take 10 mg daily            CHANGE how you take these medications      furosemide 20 MG tablet  Commonly known as: LASIX  Take 1 tablet by mouth daily  What changed:   when to take this  reasons to take this            CONTINUE taking these medications      albuterol (2.5 MG/3ML) 0.083% nebulizer solution  Commonly known as: PROVENTIL     amiodarone 100 MG tablet  Commonly known as: PACERONE     aspirin 81 MG EC tablet     atorvastatin 40 MG tablet  Commonly known as: LIPITOR     dapagliflozin 5 MG tablet  Commonly known as: FARXIGA  Take 1 tablet by mouth daily     Eliquis 5 MG Tabs tablet  Generic drug: apixaban     Entresto 24-26 MG per tablet  Generic drug: sacubitril-valsartan     ketoconazole 2 % shampoo  Commonly known as: NIZORAL     Trelegy Ellipta 200-62.5-25 MCG/ACT Aepb inhaler  Generic drug: fluticasone-umeclidin-vilant     Vitamin D3 50 MCG (2000 UT) Tabs  TAKE 1 TABLET BY MOUTH DAILY            STOP taking these medications      arformoterol tartrate 15 MCG/2ML Nebu  Commonly known as: BROVANA     budesonide 0.5 MG/2ML nebulizer suspension  Commonly known as: PULMICORT     oxyCODONE 20 MG immediate release tablet  Commonly known as: ROXICODONE               Where to Get Your Medications        Information about where to get these medications is not yet available    Ask your nurse or doctor about these

## 2024-08-27 NOTE — CARE COORDINATION
08/27/24 1218   Discharge Planning   Type of Residence Skilled Nursing Facility   Potential Assistance Needed N/A   DME Ordered? No   Patient expects to be discharged to: Skilled nursing facility   Services At/After Discharge   Transition of Care Consult (CM Consult) N/A   Services At/After Discharge None   Mode of Transport at Discharge Other (see comment)   Confirm Follow Up Transport Family   Condition of Participation: Discharge Planning   The Plan for Transition of Care is related to the following treatment goals: PCP and specialist   The Patient and/or Patient Representative was provided with a Choice of Provider? Patient   The Patient and/Or Patient Representative agree with the Discharge Plan? Yes   Freedom of Choice list was provided with basic dialogue that supports the patient's individualized plan of care/goals, treatment preferences, and shares the quality data associated with the providers?  Yes     Patient has DC order in.  His significant other will transport him to [facility]  She has the address and phone #  She will bring in his home 02 to go to the facility.    No further needs from    1259  English Sandra SMITH CM

## 2024-08-27 NOTE — CARE COORDINATION
Transition of Care Plan to SNF/Rehab    Communication to Patient/Family:  Met with patient and family and they are agreeable to the transition plan. The Plan for Transition of Care is related to the following treatment goals: SNF    The Patient and/or patient representative was provided with a choice of provider and agrees  with the discharge plan.      Yes [x] No []    A Freedom of choice list was provided with basic dialogue that supports the patient's individualized plan of care/goals and shares the quality data associated with the providers.       Yes [x] No []    SNF/Rehab Transition:  Patient has been accepted to [facility] and meets criteria for admission.   Patient will transported by significant other  and expected to leave at time of DC.    Communication to SNF/Rehab:  Bedside RN, , has been notified to update the transition plan to the facility and call report (phone number).  Discharge information has been updated on the AVS. And communicated to facility via Monitor My Meds/[employer], or CC link.     Discharge instructions to be sent to facility  Call report to [phone]  Patient to go to room 315B         Nursing Please include all hard scripts for controlled substances, med rec and dc summary, and AVS in packet.     Reviewed and confirmed with facility, They, can manage the patient care needs for the following:     [place]acorta with (X) only those applicable:  Medication:  []Medications are available at the facility  []IV Antibiotics    []Controlled Substance - hard copies available sent.  []Weekly Labs    Equipment:  []CPAP/BiPAP  []Wound Vacuum  []Gates or Urinary Device  []PICC/Central Line  []Nebulizer  []Ventilator    Treatment:  []Isolation (for MRSA, VRE, etc.)  []Surgical Drain Management  []Tracheostomy Care  []Dressing Changes  []Dialysis with transportation  []PEG Care  []Oxygen  []Daily Weights for Heart Failure    Dietary:  []Any diet limitations  []Tube Feedings   []Total Parenteral

## 2024-08-27 NOTE — CARE COORDINATION
CM Note: received call from [facility] rep for [facility] ([facility]) and [facility] has a bed and auth has transferred from [facility] to [facility]. Bed is available for when patient is discharged.    English Sandra SMITH CM   0149

## 2024-08-28 LAB
BACTERIA SPEC CULT: NORMAL
GRAM STN SPEC: NORMAL
GRAM STN SPEC: NORMAL
SERVICE CMNT-IMP: NORMAL

## 2024-09-06 ENCOUNTER — TELEPHONE (OUTPATIENT)
Age: 74
End: 2024-09-06

## 2024-09-06 NOTE — TELEPHONE ENCOUNTER
Called pt to move him from waitlist to open slot next week.  He left [facility] around Monday and is not in rehab for an undetermined period of time.  I told him that I'd call him at the end of next week to see if we could get him on the schedule for the week of 9/16/24.

## 2024-09-25 NOTE — PROGRESS NOTES
[address] [facility]  Hospitalist Group                                                                                          Hospitalist Progress Note  Quirino Zhang MD  Answering service: [phone] OR [phone] from in house phone        Date of Service:  10/27/2022  NAME:  Triston Herrera  :  1950  MRN:  [id number]      Admission Summary:   Triston Herrera is a 71 y.o. male with a PMHx of lung cancer, COPD, chronic hypoxic respiratory failure, on home O2 at 3 liters/min, who presents with Chief Complaint of Shortness of breath/ Dyspnea on exertion, progressively worsening for the past 3 days. Patient denies any fevers or chills. He used increased doses of inhalers, but it did not help. He called 911. Upon EMS arrival, his O2 saturation was 79%.   Patient was placed on a BIPAP upon arrival.      Interval history / Subjective:   Patient seen and examined on high flow nasal cannula 40 L/min weaned off BiPAP increased work of breathing seen by pulmonology patient is still tachycardic hypotensive and looks like high risk for deterioration and requiring invasive ventilation     Assessment & Plan:     Acute hypercapnic respiratory failure due to COPD pneumonia  Acute on chronic hypoxic respiratory failure due to COPD  Sepsis due to pneumonia multilobar pneumonia  --Initially patient was on BiPAP ABG reviewed pulmonary on board  --Patient weaned off to high flow nasal cannula  --We will do nasal cannula as tolerated  --Continue nebs steroid antibiotics follow cultures    Malignant neoplasm of the lung  --Follow-up as an outpatient  --History of right lung lobectomy    PENNY POA currently improving with IV fluid    Hyperlipidemia continue statin    Uncontrolled hypertension--currently on as needed hydralazine    Code status: Full code  DVT prophylaxis: Lovenox    Care Plan discussed with: Patient/Family and Nurse  Anticipated Disposition: Home w/Family  Anticipated Discharge: 24 hours to 48 hours    CRITICAL CARE ATTESTATION:  I had a face to face encounter with the patient, reviewed and interpreted patient data including clinical events, labs, images, vital signs, I/O's, and examined patient. I have discussed the case and the plan and management of the patient's care with the consulting services, the bedside nurses and necessary ancillary providers. NOTE OF PERSONAL INVOLVEMENT IN CARE   This patient has a high probability of imminent, clinically significant deterioration, which requires the highest level of preparedness to intervene urgently. I participated in the decision-making and personally managed or directed the management of the following life and organ supporting interventions that required my frequent assessment to treat or prevent imminent deterioration. I personally spent 45 minutes of critical care time. This is time spent at this critically ill patient's bedside actively involved in patient care as well as the coordination of care and discussions with the patient's family. This does not include any procedural time which has been billed separately. Hospital Problems  Date Reviewed: 1/9/2019            Codes Class Noted POA    COPD (chronic obstructive pulmonary disease) ([facility] ICD-10-CM: J44.9  ICD-9-CM: 161  10/26/2022 Unknown        Pneumonia ICD-10-CM: J18.9  ICD-9-CM: 486  10/26/2022 Unknown        Malignant neoplasm of lung, unspecified laterality, unspecified part of lung ([facility] ICD-10-CM: C34.90  ICD-9-CM: 162.9  10/26/2022 Unknown        Sepsis ([facility] ICD-10-CM: A41.9  ICD-9-CM: 038.9, 995.91  10/26/2022 Unknown             Review of Systems:   A comprehensive review of systems was negative. Vital Signs:    Last 24hrs VS reviewed since prior progress note.  Most recent are:  Visit Vitals  BP (!) 160/108 (BP 1 Location: Left upper arm, BP Patient Position: At rest)   Pulse (!) 114   Temp 98.9 °F (37.2 °C)   Resp 17   Ht 5' 11\" (1.803 m)   Wt 63.5 kg (140 lb) SpO2 95%   BMI 19.53 kg/m²       No intake or output data in the 24 hours ending 10/27/22 1415     Physical Examination:     I had a face to face encounter with this patient and independently examined them on 10/27/2022 as outlined below:          General : alert x 3, awake, no acute distress, resting in bed, pleasant \  HEENT: PEERL, EOMI, moist mucus membrane, TM clear  Neck: supple, no JVD, no meningeal signs  Chest: Coarse breath sound scattered wheezing  CVS: S1 S2 heard, Capillary refill less than 2 seconds  Abd: soft/ Non tender, non distended, BS physiological,   Ext: no clubbing, no cyanosis, no edema, brisk 2+ DP pulses  Neuro/Psych: pleasant mood and affect, CN 2-12 grossly intact, sensory grossly within normal limit, Strength 5/5 in all extremities, DTR 1+ x 4  Skin: warm     Data Review:    I personally reviewed  Image and labs      Labs:     Recent Labs     10/27/22  1144 10/26/22  0600   WBC 11.6* 22.9*   HGB 14.2 17.2*   HCT 43.4 53.8*    343     Recent Labs     10/27/22  1144 10/26/22  0600    138   K 3.8 4.3   * 104   CO2 22 21   BUN 37* 35*   CREA 0.98 2.04*   * 152*   CA 9.1 10.0   MG 2.2  --      Recent Labs     10/27/22  1144 10/26/22  0600   ALT 15 14   AP 68 97   TBILI 0.8 1.6*   TP 6.6 8.4*   ALB 2.2* 3.0*   GLOB 4.4* 5.4*     No results for input(s): INR, PTP, APTT, INREXT in the last 72 hours. No results for input(s): FE, TIBC, PSAT, FERR in the last 72 hours. No results found for: FOL, RBCF   No results for input(s): PH, PCO2, PO2 in the last 72 hours. No results for input(s): CPK, CKNDX, TROIQ in the last 72 hours.     No lab exists for component: CPKMB  Lab Results   Component Value Date/Time    Cholesterol, total 106 02/20/2018 04:54 AM    HDL Cholesterol 45 02/20/2018 04:54 AM    LDL, calculated 46 02/20/2018 04:54 AM    Triglyceride 75 02/20/2018 04:54 AM    CHOL/HDL Ratio 2.4 02/20/2018 04:54 AM     Lab Results   Component Value Date/Time    Glucose (POC) 100 09/14/2015 12:14 PM     Lab Results   Component Value Date/Time    Color YELLOW/STRAW 01/09/2019 05:00 PM    Appearance CLEAR 01/09/2019 05:00 PM    Specific gravity 1.014 01/09/2019 05:00 PM    pH (UA) 7.5 01/09/2019 05:00 PM    Protein NEGATIVE 01/09/2019 05:00 PM    Glucose NEGATIVE 01/09/2019 05:00 PM    Ketone TRACE (A) 01/09/2019 05:00 PM    Bilirubin NEGATIVE 01/09/2019 05:00 PM    Urobilinogen 0.2 01/09/2019 05:00 PM    Nitrites NEGATIVE 01/09/2019 05:00 PM    Leukocyte Esterase NEGATIVE 01/09/2019 05:00 PM    Epithelial cells FEW 01/09/2019 05:00 PM    Bacteria NEGATIVE 01/09/2019 05:00 PM    WBC 0-4 01/09/2019 05:00 PM    RBC 0-5 01/09/2019 05:00 PM         Medications Reviewed:     Current Facility-Administered Medications   Medication Dose Route Frequency    albuterol-ipratropium (DUO-NEB) 2.5 MG-0.5 MG/3 ML  3 mL Nebulization BID RT    azithromycin (ZITHROMAX) 500 mg in 0.9% sodium chloride 250 mL ([id number])  500 mg IntraVENous Q24H    sodium chloride (NS) flush 5-40 mL  5-40 mL IntraVENous Q8H    sodium chloride (NS) flush 5-40 mL  5-40 mL IntraVENous PRN    acetaminophen (TYLENOL) tablet 650 mg  650 mg Oral Q6H PRN    Or    acetaminophen (TYLENOL) suppository 650 mg  650 mg Rectal Q6H PRN    ondansetron (ZOFRAN ODT) tablet 4 mg  4 mg Oral Q8H PRN    Or    ondansetron (ZOFRAN) injection 4 mg  4 mg IntraVENous Q6H PRN    enoxaparin (LOVENOX) injection 30 mg  30 mg SubCUTAneous DAILY    polyethylene glycol (MIRALAX) packet 17 g  17 g Oral DAILY PRN    methylPREDNISolone (PF) (SOLU-MEDROL) injection 60 mg  60 mg IntraVENous Q6H    piperacillin-tazobactam (ZOSYN) 3.375 g in 0.9% sodium chloride (MBP/ADV) 100 mL MBP  3.375 g IntraVENous Q8H    Vancomycin- Pharmacy Dosing   Other Rx Dosing/Monitoring    arformoteroL (BROVANA) neb solution 15 mcg  15 mcg Nebulization BID RT    aspirin delayed-release tablet 81 mg  81 mg Oral DAILY    atorvastatin (LIPITOR) tablet 40 mg  40 mg Oral DAILY budesonide (PULMICORT) 500 mcg/2 ml nebulizer suspension  500 mcg Nebulization BID    oxyCODONE IR (ROXICODONE) tablet 20 mg  20 mg Oral Q6H PRN    oxyCODONE IR (ROXICODONE) tablet 10 mg  10 mg Oral Q6H PRN     ______________________________________________________________________  EXPECTED LENGTH OF STAY: - - -  ACTUAL LENGTH OF STAY:          1      Please note that this dictation was completed with [employer], the computer voice recognition software. Quite often unanticipated grammatical, syntax, homophones, and other interpretive errors are inadvertently transcribed by the computer software. Please disregard these errors. Please excuse any errors that have escaped final proofreading.                 Pedro Pablo Gonzalez MD 99

## 2024-09-26 ENCOUNTER — TELEPHONE (OUTPATIENT)
Age: 74
End: 2024-09-26

## 2024-09-30 ENCOUNTER — TELEPHONE (OUTPATIENT)
Age: 74
End: 2024-09-30

## 2024-09-30 NOTE — TELEPHONE ENCOUNTER
Called pt to try to get him in with Dr. Clifton tomorrow, 10/1/24, but he's out of town for a few days.

## 2024-10-08 ENCOUNTER — OFFICE VISIT (OUTPATIENT)
Age: 74
End: 2024-10-08
Payer: MEDICARE

## 2024-10-08 VITALS
BODY MASS INDEX: 18.58 KG/M2 | SYSTOLIC BLOOD PRESSURE: 132 MMHG | HEIGHT: 72 IN | RESPIRATION RATE: 20 BRPM | WEIGHT: 137.2 LBS | TEMPERATURE: 97.8 F | HEART RATE: 82 BPM | DIASTOLIC BLOOD PRESSURE: 88 MMHG | OXYGEN SATURATION: 98 %

## 2024-10-08 DIAGNOSIS — I50.43 ACUTE ON CHRONIC COMBINED SYSTOLIC AND DIASTOLIC CONGESTIVE HEART FAILURE (HCC): Primary | ICD-10-CM

## 2024-10-08 PROCEDURE — 1123F ACP DISCUSS/DSCN MKR DOCD: CPT | Performed by: INTERNAL MEDICINE

## 2024-10-08 PROCEDURE — 99214 OFFICE O/P EST MOD 30 MIN: CPT | Performed by: INTERNAL MEDICINE

## 2024-10-08 ASSESSMENT — PATIENT HEALTH QUESTIONNAIRE - PHQ9
SUM OF ALL RESPONSES TO PHQ QUESTIONS 1-9: 0
SUM OF ALL RESPONSES TO PHQ QUESTIONS 1-9: 0
SUM OF ALL RESPONSES TO PHQ9 QUESTIONS 1 & 2: 0
SUM OF ALL RESPONSES TO PHQ QUESTIONS 1-9: 0
2. FEELING DOWN, DEPRESSED OR HOPELESS: NOT AT ALL
SUM OF ALL RESPONSES TO PHQ QUESTIONS 1-9: 0
1. LITTLE INTEREST OR PLEASURE IN DOING THINGS: NOT AT ALL

## 2024-10-08 NOTE — PROGRESS NOTES
Housing Stability Vital Sign     Unable to Pay for Housing in the Last Year: No     Number of Times Moved in the Last Year: 1     Homeless in the Last Year: No       LABORATORY RESULTS:  Lab Results   Component Value Date/Time     08/26/2024 02:37 AM    K 4.4 08/26/2024 02:37 AM     08/26/2024 02:37 AM    CO2 32 08/26/2024 02:37 AM    BUN 34 08/26/2024 02:37 AM    GFRAA >60 01/12/2020 02:56 AM    GLOB 4.5 08/21/2024 06:39 AM    ALT 53 08/21/2024 06:39 AM    AST 42 08/21/2024 06:39 AM       Lab Results   Component Value Date/Time    WBC 33.4 08/26/2024 02:37 AM    HGB 13.1 08/26/2024 02:37 AM    HCT 40.6 08/26/2024 02:37 AM     08/26/2024 02:37 AM    MCV 91.9 08/26/2024 02:37 AM       ALLERGY:  No Known Allergies      Thank you for your referral and allowing me to participate in this patient's care.    Matteo Clifton MD, [facility]   Advanced Heart Failure     Advanced Heart Failure Center  [facility]  [address], [address]  Phone: [phone]  Fax: [phone]    PATIENT CARE TEAM:  Patient Care Team:  Angela Archuleta MD as PCP - General    62 minutes of time spent in chart preparation, reviewing recent laboratories, reviewing imaging studies, reviewing physician and advanced practitioner notes, providing heart failure specific education, updating medications and arranging outpatient/follow-up services.  (> 50% spent face-to-face counseling)

## 2024-10-08 NOTE — PATIENT INSTRUCTIONS
Dear Harman,    Thank you for visiting my office today. Your commitment to improving your health is commendable, and it is encouraging to see the progress in your heart function.    Following our consultation, here are the key instructions and recommendations for your care plan:    - Medications:    - Continue taking Lasix 20 mg as directed. Adjust the frequency to daily or every other day based on your fluid retention levels: if you gain 3 lbs in 1 day or 5 lbs in a week.    - Nutrition:    - Ensure adequate protein intake to prevent muscle loss.    - Vaccinations:    - Stay up-to-date with your vaccinations, including COVID, pneumonia, and the flu shots.    Your heart function has shown significant improvement, currently at a normal rate of 50-55%. The primary concerns at this moment are related to your lungs and recent COVID infection. We will keep a close watch on your condition, but no adjustments to your heart medications are necessary at this time.    Please do not hesitate to reach out with any questions or concerns you may have.  Follow up in 4 months    Best regards,    Dr. Matteo Clifton MD  Cardiology

## 2024-12-10 RX ORDER — DAPAGLIFLOZIN 5 MG/1
5 TABLET, FILM COATED ORAL DAILY
Qty: 90 TABLET | Refills: 1 | Status: SHIPPED | OUTPATIENT
Start: 2024-12-10

## 2024-12-10 NOTE — TELEPHONE ENCOUNTER
Requested Prescriptions     Signed Prescriptions Disp Refills    dapagliflozin (FARXIGA) 5 MG tablet 90 tablet 1     Sig: Take 1 tablet by mouth daily     Authorizing Provider: CONCETTA ADAMSON     Ordering User: TONYA LANDAVERDE       Last appointment 10/8/24 next scheduled appointment 2/4/25

## 2025-01-31 ENCOUNTER — TELEPHONE (OUTPATIENT)
Age: 75
End: 2025-01-31

## 2025-01-31 NOTE — TELEPHONE ENCOUNTER
Telephone Call RE:  Appointment reminder     Outcome:     [] Patient confirmed appointment   [] Patient rescheduled appointment for    [x] Unable to reach  [] Left message              [] Other:       First attempt, pt number not in service.

## 2025-02-03 ENCOUNTER — TELEPHONE (OUTPATIENT)
Age: 75
End: 2025-02-03

## 2025-02-03 NOTE — TELEPHONE ENCOUNTER
Telephone Call RE:  Appointment reminder     Outcome:     [x] Patient confirmed appointment   [] Patient rescheduled appointment for    [] Unable to reach  [] Left message              [] Other:       Updated pt's number with pt's domestic partner.  (His old # was out of service.)  Pt then confirmed appt, and we covered all appt details, including parking permit.

## 2025-02-03 NOTE — TELEPHONE ENCOUNTER
Pt moved appt from 2/4 to 2/6/25 b/c he's in [place] and doesn't thing he'll be back by 3:00 tomorrow.  He will see Aretha Buck at 11:00 a.m.

## 2025-02-10 ENCOUNTER — TELEPHONE (OUTPATIENT)
Age: 75
End: 2025-02-10

## 2025-02-10 NOTE — TELEPHONE ENCOUNTER
I attempted to call patient (someone answered 3 times and kept hanging up) and life partner (on PHI-left vm).  Will continue to try-need to change appt for 2/12/25 from am to afternoon due to impending inclement weather.

## 2025-02-11 ENCOUNTER — TELEPHONE (OUTPATIENT)
Age: 75
End: 2025-02-11

## 2025-02-11 NOTE — TELEPHONE ENCOUNTER
Pt called back and was fine moving his 2/12/25 morning appt b/c he's in [place] anyway.  He wanted the appt to be next month, but he's leaving for a cruise on 3/13, so he has been rescheduled for Tues, 3/11/25 at 11:00 a.m.

## 2025-03-24 ENCOUNTER — TELEPHONE (OUTPATIENT)
Age: 75
End: 2025-03-24

## 2025-05-19 ENCOUNTER — TELEPHONE (OUTPATIENT)
Age: 75
End: 2025-05-19

## 2025-05-20 ENCOUNTER — TELEPHONE (OUTPATIENT)
Age: 75
End: 2025-05-20

## 2025-05-20 NOTE — TELEPHONE ENCOUNTER
Telephone Call RE:  Appointment reminder     Outcome:     [] Patient confirmed appointment   [] Patient rescheduled appointment for    [] Unable to reach  [x] Left message              [] Other:        Covered all appt details.

## (undated) DEVICE — SOLUTION IV 500ML 0.9% SOD CHL FLX CONT

## (undated) DEVICE — 1200 GUARD II KIT W/5MM TUBE W/O VAC TUBE: Brand: GUARDIAN

## (undated) DEVICE — QUILTED PREMIUM COMFORT UNDERPAD,EXTRA HEAVY: Brand: WINGS

## (undated) DEVICE — NDL FLTR TIP 5 MIC 18GX1.5IN --

## (undated) DEVICE — CATHETER IV 20GA L1IN FEP STR HUB INTROCAN SFTY

## (undated) DEVICE — NEONATAL-ADULT SPO2 SENSOR: Brand: NELLCOR

## (undated) DEVICE — SYRINGE MED 10CC ECC TIP W/O NDL

## (undated) DEVICE — PREP SKN CHLRAPRP SNGL 1.75ML --

## (undated) DEVICE — 3M™ CUROS™ DISINFECTING CAP FOR NEEDLELESS CONNECTORS 270/CARTON 20 CARTONS/CASE CFF1-270: Brand: CUROS™

## (undated) DEVICE — SYR LR LCK 1ML GRAD NSAF 30ML --

## (undated) DEVICE — TRAP SUC MUCOUS 70ML -- MEDICHOICE MEDLINE

## (undated) DEVICE — SET ADMIN 16ML TBNG L100IN 2 Y INJ SITE IV PIGGY BK DISP

## (undated) DEVICE — BAG SPEC BIOHZRD 10 X 10 IN --

## (undated) DEVICE — FORCEPS ENDO DIA1.8MM SERR CUP ALWAYS-ON TIP TRACKED

## (undated) DEVICE — KIT IV STRT W CHLORAPREP PD 1ML

## (undated) DEVICE — SOLIDIFIER FLUID 3000 CC ABSORB

## (undated) DEVICE — BITE BLK ENDOSCP AD 54FR GRN POLYETH ENDOSCP W STRP SLD

## (undated) DEVICE — SYRINGE MED 20ML STD CLR PLAS LUERSLIP TIP N CTRL DISP

## (undated) DEVICE — AIRLIFE™ ADULT CUSHION NASAL CANNULA 14 FOOT (4.3) CRUSH-RESISTANT OXYGEN TUBING, AND U/CONNECT-IT ADAPTER: Brand: AIRLIFE™

## (undated) DEVICE — SOL IRRIGATION INJ NACL 0.9% 500ML BTL

## (undated) DEVICE — SYR 5ML 1/5 GRAD LL NSAF LF --

## (undated) DEVICE — CATH SUC CTRL PRT TRIFLO 14FR --

## (undated) DEVICE — SINGLE USE SUCTION VALVE MAJ-209: Brand: SINGLE USE SUCTION VALVE (STERILE)

## (undated) DEVICE — KIT COLON W/ 1.1OZ LUB AND 2 END

## (undated) DEVICE — MEDI-VAC NON-CONDUCTIVE SUCTION TUBING: Brand: CARDINAL HEALTH

## (undated) DEVICE — Z CONVERTED USE 2274305 CUFF BLD PRSS AD L SZ 12 FOR 32-43CM LIMB VLY SFT W/O TUBE

## (undated) DEVICE — SYR 3ML LL TIP 1/10ML GRAD --

## (undated) DEVICE — BASIN SPNG 32OZ BLU STRL --

## (undated) DEVICE — SINGLE USE BIOPSY VALVE MAJ-210: Brand: SINGLE USE BIOPSY VALVE (STERILE)

## (undated) DEVICE — KENDALL RADIOLUCENT FOAM MONITORING ELECTRODE -RECTANGULAR SHAPE: Brand: KENDALL